# Patient Record
Sex: MALE | Race: WHITE | NOT HISPANIC OR LATINO | Employment: FULL TIME | URBAN - METROPOLITAN AREA
[De-identification: names, ages, dates, MRNs, and addresses within clinical notes are randomized per-mention and may not be internally consistent; named-entity substitution may affect disease eponyms.]

---

## 2017-03-23 ENCOUNTER — HOSPITAL ENCOUNTER (OUTPATIENT)
Dept: LAB | Facility: MEDICAL CENTER | Age: 57
End: 2017-03-23
Attending: INTERNAL MEDICINE
Payer: COMMERCIAL

## 2017-03-23 LAB
ALBUMIN SERPL BCP-MCNC: 4.4 G/DL (ref 3.2–4.9)
ALBUMIN/GLOB SERPL: 1.7 G/DL
ALP SERPL-CCNC: 63 U/L (ref 30–99)
ALT SERPL-CCNC: 35 U/L (ref 2–50)
ANION GAP SERPL CALC-SCNC: 8 MMOL/L (ref 0–11.9)
AST SERPL-CCNC: 24 U/L (ref 12–45)
BILIRUB SERPL-MCNC: 1.1 MG/DL (ref 0.1–1.5)
BUN SERPL-MCNC: 9 MG/DL (ref 8–22)
CALCIUM SERPL-MCNC: 9.9 MG/DL (ref 8.5–10.5)
CHLORIDE SERPL-SCNC: 101 MMOL/L (ref 96–112)
CHOLEST SERPL-MCNC: 127 MG/DL (ref 100–199)
CO2 SERPL-SCNC: 30 MMOL/L (ref 20–33)
CREAT SERPL-MCNC: 0.87 MG/DL (ref 0.5–1.4)
EST. AVERAGE GLUCOSE BLD GHB EST-MCNC: 160 MG/DL
GLOBULIN SER CALC-MCNC: 2.6 G/DL (ref 1.9–3.5)
GLUCOSE SERPL-MCNC: 120 MG/DL (ref 65–99)
HBA1C MFR BLD: 7.2 % (ref 0–5.6)
HDLC SERPL-MCNC: 32 MG/DL
LDLC SERPL CALC-MCNC: 70 MG/DL
POTASSIUM SERPL-SCNC: 3.8 MMOL/L (ref 3.6–5.5)
PROT SERPL-MCNC: 7 G/DL (ref 6–8.2)
SODIUM SERPL-SCNC: 139 MMOL/L (ref 135–145)
T4 FREE SERPL-MCNC: 1.14 NG/DL (ref 0.53–1.43)
TRIGL SERPL-MCNC: 127 MG/DL (ref 0–149)
TSH SERPL DL<=0.005 MIU/L-ACNC: 2.08 UIU/ML (ref 0.3–3.7)

## 2017-03-23 PROCEDURE — 36415 COLL VENOUS BLD VENIPUNCTURE: CPT

## 2017-03-23 PROCEDURE — 86337 INSULIN ANTIBODIES: CPT

## 2017-03-23 PROCEDURE — 80053 COMPREHEN METABOLIC PANEL: CPT

## 2017-03-23 PROCEDURE — 83516 IMMUNOASSAY NONANTIBODY: CPT

## 2017-03-23 PROCEDURE — 80061 LIPID PANEL: CPT

## 2017-03-23 PROCEDURE — 84681 ASSAY OF C-PEPTIDE: CPT

## 2017-03-23 PROCEDURE — 83036 HEMOGLOBIN GLYCOSYLATED A1C: CPT

## 2017-03-23 PROCEDURE — 84443 ASSAY THYROID STIM HORMONE: CPT

## 2017-03-23 PROCEDURE — 84439 ASSAY OF FREE THYROXINE: CPT

## 2017-03-25 LAB — C PEPTIDE SERPL-MCNC: 2.8 NG/ML (ref 0.8–3.5)

## 2017-03-26 LAB — GAD65 AB SER IA-ACNC: <5 IU/ML (ref 0–5)

## 2017-03-28 LAB — INSULIN HUMAN AB SER-ACNC: 8.3 U/ML (ref 0–0.4)

## 2017-05-01 ENCOUNTER — APPOINTMENT (OUTPATIENT)
Dept: RADIOLOGY | Facility: MEDICAL CENTER | Age: 57
DRG: 390 | End: 2017-05-01
Attending: HOSPITALIST
Payer: COMMERCIAL

## 2017-05-01 ENCOUNTER — APPOINTMENT (OUTPATIENT)
Dept: RADIOLOGY | Facility: MEDICAL CENTER | Age: 57
DRG: 390 | End: 2017-05-01
Attending: EMERGENCY MEDICINE
Payer: COMMERCIAL

## 2017-05-01 ENCOUNTER — RESOLUTE PROFESSIONAL BILLING HOSPITAL PROF FEE (OUTPATIENT)
Dept: HOSPITALIST | Facility: MEDICAL CENTER | Age: 57
End: 2017-05-01
Payer: COMMERCIAL

## 2017-05-01 ENCOUNTER — HOSPITAL ENCOUNTER (INPATIENT)
Facility: MEDICAL CENTER | Age: 57
LOS: 1 days | DRG: 390 | End: 2017-05-02
Attending: EMERGENCY MEDICINE | Admitting: HOSPITALIST
Payer: COMMERCIAL

## 2017-05-01 PROBLEM — I10 HTN (HYPERTENSION): Status: ACTIVE | Noted: 2017-05-01

## 2017-05-01 PROBLEM — K52.9 ENTERITIS: Status: ACTIVE | Noted: 2017-05-01

## 2017-05-01 PROBLEM — K56.7 ILEUS (HCC): Status: ACTIVE | Noted: 2017-05-01

## 2017-05-01 PROBLEM — D75.1 ERYTHROCYTOSIS: Status: ACTIVE | Noted: 2017-05-01

## 2017-05-01 PROBLEM — G47.33 OSA ON CPAP: Status: ACTIVE | Noted: 2017-05-01

## 2017-05-01 LAB
ALBUMIN SERPL BCP-MCNC: 4.4 G/DL (ref 3.2–4.9)
ALBUMIN/GLOB SERPL: 1.9 G/DL
ALP SERPL-CCNC: 61 U/L (ref 30–99)
ALT SERPL-CCNC: 21 U/L (ref 2–50)
ANION GAP SERPL CALC-SCNC: 12 MMOL/L (ref 0–11.9)
APPEARANCE UR: CLEAR
AST SERPL-CCNC: 15 U/L (ref 12–45)
BASOPHILS # BLD AUTO: 0.5 % (ref 0–1.8)
BASOPHILS # BLD: 0.07 K/UL (ref 0–0.12)
BILIRUB SERPL-MCNC: 0.9 MG/DL (ref 0.1–1.5)
BILIRUB UR QL STRIP.AUTO: NEGATIVE
BUN SERPL-MCNC: 9 MG/DL (ref 8–22)
CALCIUM SERPL-MCNC: 9.5 MG/DL (ref 8.5–10.5)
CHLORIDE SERPL-SCNC: 104 MMOL/L (ref 96–112)
CO2 SERPL-SCNC: 22 MMOL/L (ref 20–33)
COLOR UR: ABNORMAL
CREAT SERPL-MCNC: 0.8 MG/DL (ref 0.5–1.4)
CULTURE IF INDICATED INDCX: NO UA CULTURE
EOSINOPHIL # BLD AUTO: 0.45 K/UL (ref 0–0.51)
EOSINOPHIL NFR BLD: 3.4 % (ref 0–6.9)
ERYTHROCYTE [DISTWIDTH] IN BLOOD BY AUTOMATED COUNT: 39.5 FL (ref 35.9–50)
GFR SERPL CREATININE-BSD FRML MDRD: >60 ML/MIN/1.73 M 2
GLOBULIN SER CALC-MCNC: 2.3 G/DL (ref 1.9–3.5)
GLUCOSE BLD-MCNC: 138 MG/DL (ref 65–99)
GLUCOSE BLD-MCNC: 140 MG/DL (ref 65–99)
GLUCOSE BLD-MCNC: 90 MG/DL (ref 65–99)
GLUCOSE BLD-MCNC: 91 MG/DL (ref 65–99)
GLUCOSE SERPL-MCNC: 164 MG/DL (ref 65–99)
GLUCOSE UR STRIP.AUTO-MCNC: >1000 MG/DL
HCT VFR BLD AUTO: 50.7 % (ref 42–52)
HGB BLD-MCNC: 17.7 G/DL (ref 14–18)
IMM GRANULOCYTES # BLD AUTO: 0.03 K/UL (ref 0–0.11)
IMM GRANULOCYTES NFR BLD AUTO: 0.2 % (ref 0–0.9)
KETONES UR STRIP.AUTO-MCNC: 20 MG/DL
LEUKOCYTE ESTERASE UR QL STRIP.AUTO: NEGATIVE
LIPASE SERPL-CCNC: 42 U/L (ref 11–82)
LYMPHOCYTES # BLD AUTO: 1.47 K/UL (ref 1–4.8)
LYMPHOCYTES NFR BLD: 11.1 % (ref 22–41)
MCH RBC QN AUTO: 28.9 PG (ref 27–33)
MCHC RBC AUTO-ENTMCNC: 34.9 G/DL (ref 33.7–35.3)
MCV RBC AUTO: 82.8 FL (ref 81.4–97.8)
MICRO URNS: ABNORMAL
MONOCYTES # BLD AUTO: 1.03 K/UL (ref 0–0.85)
MONOCYTES NFR BLD AUTO: 7.8 % (ref 0–13.4)
NEUTROPHILS # BLD AUTO: 10.23 K/UL (ref 1.82–7.42)
NEUTROPHILS NFR BLD: 77 % (ref 44–72)
NITRITE UR QL STRIP.AUTO: NEGATIVE
NRBC # BLD AUTO: 0 K/UL
NRBC BLD AUTO-RTO: 0 /100 WBC
PH UR STRIP.AUTO: 6.5 [PH]
PLATELET # BLD AUTO: 310 K/UL (ref 164–446)
PMV BLD AUTO: 10.1 FL (ref 9–12.9)
POTASSIUM SERPL-SCNC: 3.8 MMOL/L (ref 3.6–5.5)
PROT SERPL-MCNC: 6.7 G/DL (ref 6–8.2)
PROT UR QL STRIP: NEGATIVE MG/DL
RBC # BLD AUTO: 6.12 M/UL (ref 4.7–6.1)
RBC UR QL AUTO: NEGATIVE
SODIUM SERPL-SCNC: 138 MMOL/L (ref 135–145)
SP GR UR STRIP.AUTO: >1.035
WBC # BLD AUTO: 13.3 K/UL (ref 4.8–10.8)

## 2017-05-01 PROCEDURE — 96376 TX/PRO/DX INJ SAME DRUG ADON: CPT

## 2017-05-01 PROCEDURE — A9270 NON-COVERED ITEM OR SERVICE: HCPCS | Performed by: HOSPITALIST

## 2017-05-01 PROCEDURE — 99223 1ST HOSP IP/OBS HIGH 75: CPT | Performed by: HOSPITALIST

## 2017-05-01 PROCEDURE — 770006 HCHG ROOM/CARE - MED/SURG/GYN SEMI*

## 2017-05-01 PROCEDURE — 700102 HCHG RX REV CODE 250 W/ 637 OVERRIDE(OP): Performed by: HOSPITALIST

## 2017-05-01 PROCEDURE — 700117 HCHG RX CONTRAST REV CODE 255: Performed by: EMERGENCY MEDICINE

## 2017-05-01 PROCEDURE — 80053 COMPREHEN METABOLIC PANEL: CPT

## 2017-05-01 PROCEDURE — 700105 HCHG RX REV CODE 258: Performed by: HOSPITALIST

## 2017-05-01 PROCEDURE — 74177 CT ABD & PELVIS W/CONTRAST: CPT

## 2017-05-01 PROCEDURE — 83690 ASSAY OF LIPASE: CPT

## 2017-05-01 PROCEDURE — A9270 NON-COVERED ITEM OR SERVICE: HCPCS | Performed by: PHARMACIST

## 2017-05-01 PROCEDURE — 85025 COMPLETE CBC W/AUTO DIFF WBC: CPT

## 2017-05-01 PROCEDURE — 74000 DX-ABDOMEN-1 VIEW: CPT

## 2017-05-01 PROCEDURE — 700111 HCHG RX REV CODE 636 W/ 250 OVERRIDE (IP): Performed by: HOSPITALIST

## 2017-05-01 PROCEDURE — 700111 HCHG RX REV CODE 636 W/ 250 OVERRIDE (IP): Performed by: EMERGENCY MEDICINE

## 2017-05-01 PROCEDURE — 96361 HYDRATE IV INFUSION ADD-ON: CPT

## 2017-05-01 PROCEDURE — 82962 GLUCOSE BLOOD TEST: CPT | Mod: 91

## 2017-05-01 PROCEDURE — 700105 HCHG RX REV CODE 258: Performed by: EMERGENCY MEDICINE

## 2017-05-01 PROCEDURE — 96375 TX/PRO/DX INJ NEW DRUG ADDON: CPT

## 2017-05-01 PROCEDURE — 81003 URINALYSIS AUTO W/O SCOPE: CPT

## 2017-05-01 PROCEDURE — 99285 EMERGENCY DEPT VISIT HI MDM: CPT

## 2017-05-01 PROCEDURE — 96374 THER/PROPH/DIAG INJ IV PUSH: CPT

## 2017-05-01 PROCEDURE — 700111 HCHG RX REV CODE 636 W/ 250 OVERRIDE (IP): Performed by: NURSE PRACTITIONER

## 2017-05-01 PROCEDURE — 304561 HCHG STAT O2

## 2017-05-01 PROCEDURE — 700102 HCHG RX REV CODE 250 W/ 637 OVERRIDE(OP): Performed by: PHARMACIST

## 2017-05-01 RX ORDER — OXYCODONE HYDROCHLORIDE 5 MG/1
5 TABLET ORAL ONCE
Status: COMPLETED | OUTPATIENT
Start: 2017-05-01 | End: 2017-05-01

## 2017-05-01 RX ORDER — ATORVASTATIN CALCIUM 40 MG/1
80 TABLET, FILM COATED ORAL NIGHTLY
Status: DISCONTINUED | OUTPATIENT
Start: 2017-05-01 | End: 2017-05-02 | Stop reason: HOSPADM

## 2017-05-01 RX ORDER — OXYCODONE HYDROCHLORIDE 10 MG/1
10 TABLET ORAL EVERY 4 HOURS PRN
Status: DISCONTINUED | OUTPATIENT
Start: 2017-05-01 | End: 2017-05-02 | Stop reason: HOSPADM

## 2017-05-01 RX ORDER — AMOXICILLIN 250 MG
2 CAPSULE ORAL 2 TIMES DAILY
Status: DISCONTINUED | OUTPATIENT
Start: 2017-05-01 | End: 2017-05-01

## 2017-05-01 RX ORDER — POLYETHYLENE GLYCOL 3350 17 G/17G
1 POWDER, FOR SOLUTION ORAL
Status: DISCONTINUED | OUTPATIENT
Start: 2017-05-01 | End: 2017-05-02 | Stop reason: HOSPADM

## 2017-05-01 RX ORDER — BENAZEPRIL HYDROCHLORIDE 20 MG/1
20 TABLET ORAL DAILY
Status: DISCONTINUED | OUTPATIENT
Start: 2017-05-01 | End: 2017-05-02 | Stop reason: HOSPADM

## 2017-05-01 RX ORDER — OXYCODONE HYDROCHLORIDE 5 MG/1
5 TABLET ORAL EVERY 4 HOURS PRN
Status: DISCONTINUED | OUTPATIENT
Start: 2017-05-01 | End: 2017-05-02 | Stop reason: HOSPADM

## 2017-05-01 RX ORDER — MORPHINE SULFATE 4 MG/ML
4 INJECTION, SOLUTION INTRAMUSCULAR; INTRAVENOUS ONCE
Status: COMPLETED | OUTPATIENT
Start: 2017-05-01 | End: 2017-05-01

## 2017-05-01 RX ORDER — BISACODYL 10 MG
10 SUPPOSITORY, RECTAL RECTAL
Status: DISCONTINUED | OUTPATIENT
Start: 2017-05-01 | End: 2017-05-02 | Stop reason: HOSPADM

## 2017-05-01 RX ORDER — SODIUM CHLORIDE 9 MG/ML
1000 INJECTION, SOLUTION INTRAVENOUS ONCE
Status: COMPLETED | OUTPATIENT
Start: 2017-05-01 | End: 2017-05-01

## 2017-05-01 RX ORDER — ONDANSETRON 2 MG/ML
4 INJECTION INTRAMUSCULAR; INTRAVENOUS ONCE
Status: COMPLETED | OUTPATIENT
Start: 2017-05-01 | End: 2017-05-01

## 2017-05-01 RX ORDER — LABETALOL HYDROCHLORIDE 5 MG/ML
10 INJECTION, SOLUTION INTRAVENOUS EVERY 4 HOURS PRN
Status: DISCONTINUED | OUTPATIENT
Start: 2017-05-01 | End: 2017-05-02 | Stop reason: HOSPADM

## 2017-05-01 RX ORDER — DEXTROSE MONOHYDRATE 25 G/50ML
25 INJECTION, SOLUTION INTRAVENOUS
Status: DISCONTINUED | OUTPATIENT
Start: 2017-05-01 | End: 2017-05-02 | Stop reason: HOSPADM

## 2017-05-01 RX ORDER — PROMETHAZINE HYDROCHLORIDE 25 MG/1
12.5-25 TABLET ORAL EVERY 4 HOURS PRN
Status: DISCONTINUED | OUTPATIENT
Start: 2017-05-01 | End: 2017-05-02 | Stop reason: HOSPADM

## 2017-05-01 RX ORDER — ONDANSETRON 2 MG/ML
4 INJECTION INTRAMUSCULAR; INTRAVENOUS EVERY 4 HOURS PRN
Status: DISCONTINUED | OUTPATIENT
Start: 2017-05-01 | End: 2017-05-02 | Stop reason: HOSPADM

## 2017-05-01 RX ORDER — DILTIAZEM HYDROCHLORIDE 180 MG/1
180 CAPSULE, COATED, EXTENDED RELEASE ORAL DAILY
Status: DISCONTINUED | OUTPATIENT
Start: 2017-05-01 | End: 2017-05-02 | Stop reason: HOSPADM

## 2017-05-01 RX ORDER — PANTOPRAZOLE SODIUM 40 MG/1
40 TABLET, DELAYED RELEASE ORAL DAILY
Status: DISCONTINUED | OUTPATIENT
Start: 2017-05-01 | End: 2017-05-01

## 2017-05-01 RX ORDER — SIMETHICONE 80 MG
80 TABLET,CHEWABLE ORAL 3 TIMES DAILY PRN
Status: DISCONTINUED | OUTPATIENT
Start: 2017-05-01 | End: 2017-05-02 | Stop reason: HOSPADM

## 2017-05-01 RX ORDER — MORPHINE SULFATE 4 MG/ML
1-2 INJECTION, SOLUTION INTRAMUSCULAR; INTRAVENOUS
Status: DISCONTINUED | OUTPATIENT
Start: 2017-05-01 | End: 2017-05-02 | Stop reason: HOSPADM

## 2017-05-01 RX ORDER — L. ACIDOPHILUS/L.BULGARICUS 100MM CELL
1 GRANULES IN PACKET (EA) ORAL
Status: DISCONTINUED | OUTPATIENT
Start: 2017-05-01 | End: 2017-05-02 | Stop reason: HOSPADM

## 2017-05-01 RX ORDER — OMEPRAZOLE 20 MG/1
20 CAPSULE, DELAYED RELEASE ORAL DAILY
Status: DISCONTINUED | OUTPATIENT
Start: 2017-05-01 | End: 2017-05-02 | Stop reason: HOSPADM

## 2017-05-01 RX ORDER — PROMETHAZINE HYDROCHLORIDE 25 MG/1
12.5-25 SUPPOSITORY RECTAL EVERY 4 HOURS PRN
Status: DISCONTINUED | OUTPATIENT
Start: 2017-05-01 | End: 2017-05-02 | Stop reason: HOSPADM

## 2017-05-01 RX ORDER — SODIUM CHLORIDE 9 MG/ML
INJECTION, SOLUTION INTRAVENOUS CONTINUOUS
Status: DISCONTINUED | OUTPATIENT
Start: 2017-05-01 | End: 2017-05-01

## 2017-05-01 RX ORDER — ONDANSETRON 4 MG/1
4 TABLET, ORALLY DISINTEGRATING ORAL EVERY 4 HOURS PRN
Status: DISCONTINUED | OUTPATIENT
Start: 2017-05-01 | End: 2017-05-02 | Stop reason: HOSPADM

## 2017-05-01 RX ORDER — DEXTROSE AND SODIUM CHLORIDE 5; .9 G/100ML; G/100ML
INJECTION, SOLUTION INTRAVENOUS CONTINUOUS
Status: DISCONTINUED | OUTPATIENT
Start: 2017-05-01 | End: 2017-05-02 | Stop reason: HOSPADM

## 2017-05-01 RX ORDER — ACETAMINOPHEN 325 MG/1
650 TABLET ORAL EVERY 6 HOURS
Status: DISCONTINUED | OUTPATIENT
Start: 2017-05-01 | End: 2017-05-02 | Stop reason: HOSPADM

## 2017-05-01 RX ADMIN — DILTIAZEM HYDROCHLORIDE 180 MG: 180 CAPSULE, EXTENDED RELEASE ORAL at 10:44

## 2017-05-01 RX ADMIN — DEXTROSE AND SODIUM CHLORIDE: 5; 900 INJECTION, SOLUTION INTRAVENOUS at 20:18

## 2017-05-01 RX ADMIN — MORPHINE SULFATE 4 MG: 4 INJECTION INTRAVENOUS at 05:00

## 2017-05-01 RX ADMIN — ACETAMINOPHEN 650 MG: 325 TABLET, FILM COATED ORAL at 18:05

## 2017-05-01 RX ADMIN — STANDARDIZED SENNA CONCENTRATE AND DOCUSATE SODIUM 2 TABLET: 8.6; 5 TABLET, FILM COATED ORAL at 08:55

## 2017-05-01 RX ADMIN — OXYCODONE HYDROCHLORIDE 10 MG: 10 TABLET ORAL at 15:09

## 2017-05-01 RX ADMIN — OXYCODONE HYDROCHLORIDE 5 MG: 5 TABLET ORAL at 08:56

## 2017-05-01 RX ADMIN — SODIUM CHLORIDE: 9 INJECTION, SOLUTION INTRAVENOUS at 18:08

## 2017-05-01 RX ADMIN — ENOXAPARIN SODIUM 40 MG: 100 INJECTION SUBCUTANEOUS at 12:46

## 2017-05-01 RX ADMIN — SODIUM CHLORIDE 1000 ML: 9 INJECTION, SOLUTION INTRAVENOUS at 05:00

## 2017-05-01 RX ADMIN — IOHEXOL 100 ML: 350 INJECTION, SOLUTION INTRAVENOUS at 03:36

## 2017-05-01 RX ADMIN — ONDANSETRON 4 MG: 2 INJECTION INTRAMUSCULAR; INTRAVENOUS at 10:38

## 2017-05-01 RX ADMIN — SODIUM CHLORIDE 1000 ML: 9 INJECTION, SOLUTION INTRAVENOUS at 02:21

## 2017-05-01 RX ADMIN — OMEPRAZOLE 20 MG: 20 CAPSULE, DELAYED RELEASE ORAL at 08:55

## 2017-05-01 RX ADMIN — OXYCODONE HYDROCHLORIDE 5 MG: 5 TABLET ORAL at 10:41

## 2017-05-01 RX ADMIN — ACETAMINOPHEN 650 MG: 325 TABLET, FILM COATED ORAL at 13:07

## 2017-05-01 RX ADMIN — ATORVASTATIN CALCIUM 80 MG: 40 TABLET, FILM COATED ORAL at 20:17

## 2017-05-01 RX ADMIN — SODIUM CHLORIDE: 9 INJECTION, SOLUTION INTRAVENOUS at 08:16

## 2017-05-01 RX ADMIN — ACETAMINOPHEN 650 MG: 325 TABLET, FILM COATED ORAL at 23:26

## 2017-05-01 RX ADMIN — LACTOBACILLUS ACIDOPHILUS / LACTOBACILLUS BULGARICUS 1 PACKET: 100 MILLION CFU STRENGTH GRANULES at 18:05

## 2017-05-01 RX ADMIN — ONDANSETRON 4 MG: 2 INJECTION INTRAMUSCULAR; INTRAVENOUS at 02:21

## 2017-05-01 RX ADMIN — MORPHINE SULFATE 4 MG: 4 INJECTION INTRAVENOUS at 02:21

## 2017-05-01 RX ADMIN — ASPIRIN 81 MG: 81 TABLET ORAL at 08:56

## 2017-05-01 RX ADMIN — BENAZEPRIL HYDROCHLORIDE 20 MG: 20 TABLET, COATED ORAL at 08:57

## 2017-05-01 ASSESSMENT — ENCOUNTER SYMPTOMS
SHORTNESS OF BREATH: 0
ABDOMINAL PAIN: 0
DIARRHEA: 0
CONSTIPATION: 0
VOMITING: 0
NAUSEA: 0

## 2017-05-01 ASSESSMENT — PAIN SCALES - GENERAL
PAINLEVEL_OUTOF10: 2
PAINLEVEL_OUTOF10: 4
PAINLEVEL_OUTOF10: 1
PAINLEVEL_OUTOF10: 2
PAINLEVEL_OUTOF10: 2
PAINLEVEL_OUTOF10: 6
PAINLEVEL_OUTOF10: 9

## 2017-05-01 ASSESSMENT — LIFESTYLE VARIABLES
ON A TYPICAL DAY WHEN YOU DRINK ALCOHOL HOW MANY DRINKS DO YOU HAVE: 3
EVER FELT BAD OR GUILTY ABOUT YOUR DRINKING: NO
TOTAL SCORE: 0
AVERAGE NUMBER OF DAYS PER WEEK YOU HAVE A DRINK CONTAINING ALCOHOL: 0
ALCOHOL_USE: YES
HOW MANY TIMES IN THE PAST YEAR HAVE YOU HAD 5 OR MORE DRINKS IN A DAY: 0
HAVE YOU EVER FELT YOU SHOULD CUT DOWN ON YOUR DRINKING: NO
HAVE PEOPLE ANNOYED YOU BY CRITICIZING YOUR DRINKING: NO
TOTAL SCORE: 0
EVER HAD A DRINK FIRST THING IN THE MORNING TO STEADY YOUR NERVES TO GET RID OF A HANGOVER: NO
CONSUMPTION TOTAL: NEGATIVE
EVER_SMOKED: YES
TOTAL SCORE: 0

## 2017-05-01 NOTE — ED PROVIDER NOTES
"ED Provider Note    Scribed for Yenny Lowery M.D. by Ida Gonzalez. 5/1/2017, 2:08 AM.    Primary care provider: Corwin Rios M.D.  Means of arrival: walk in   History obtained from: Patient  History limited by: none    CHIEF COMPLAINT  Chief Complaint   Patient presents with   • Abdominal Pain     HPI  Barron Hewitt is a 56 y.o. male who presents to the Emergency Department for abdominal pain onset 14 hours prior to examination.  He notes that his abdominal pain onset as \"gurgles\". The patient notes that he has had two episodes of diarrhea and two episodes of emesis as well.  The patient states that the pain now feels sharp and is 7/10 severity at this time. He notes that he is no longer passing gas but he is still burping. The patient denies any nausea, fevers or chills. The patient had a hiatal hernia repaired 4 years ago but denies any other prior abdominal surgeries.     REVIEW OF SYSTEMS  See HPI for further details. All other systems are negative. C    PAST MEDICAL HISTORY   has a past medical history of Hyperlipidemia; Hypertension; ASTHMA; Allergy; Anesthesia; Snoring; Sleep apnea; Myocardial bridge; CHEST PAIN (6/15/2011); Abnormal nuclear cardiac imaging test (1/31/2014); Chest pain at rest (1/31/2014); Coronary-myocardial bridge (11/28/2012); and Diabetes (2009).    SURGICAL HISTORY   has past surgical history that includes sinusotomies (1990's); knee arthroscopy (1990's); tumor excision with biopsy (1990's); shoulder arthroscopy (1990's); ventral hernia repair laparoscopic (11/1/2012); and recovery (4/8/2016).    SOCIAL HISTORY  Social History   Substance Use Topics   • Smoking status: Current Every Day Smoker     Types: Cigars   • Smokeless tobacco: Never Used   • Alcohol Use: Yes      Comment: 1-2/month      History   Drug Use No     Comment: occ cigar smoking     FAMILY HISTORY  Family History   Problem Relation Age of Onset   • Heart Disease     • Hypertension     • " Cancer       CURRENT MEDICATIONS  No current facility-administered medications on file prior to encounter.     Current Outpatient Prescriptions on File Prior to Encounter   Medication Sig Dispense Refill   • diltiazem CD (CARDIZEM CD) 180 MG CAPSULE SR 24 HR Take 1 Cap by mouth every day. 90 Cap 3   • benazepril (LOTENSIN) 20 MG Tab Take 1 Tab by mouth every day. 90 Tab 3   • VENTOLIN  (90 BASE) MCG/ACT Aero Soln inhalation aerosol INHALE 2 PUFFS BY MOUTH EVERY 4 HOURS AS NEEDED FOR SHORTNESS OF BREATH 1 Inhaler 5   • zolpidem (AMBIEN) 5 MG Tab TAKE ONE TABLET BY MOUTH AT BEDTIME AS NEEDED FOR  INSOMNIA 30 Tab 5   • Exenatide ER (BYDUREON) 2 MG SUSR Inject  as instructed. weekly     • Canagliflozin (INVOKANA) 300 MG TABS Take  by mouth every day.     • metformin (GLUCOPHAGE) 500 MG TABS Take 500 mg by mouth 2 times a day, with meals. 1 tablet am 2 tablets pm     • atorvastatin (LIPITOR) 80 MG tablet Take 80 mg by mouth every evening.     • insulin glargine (LANTUS) 100 UNIT/ML SOLN Inject  as instructed every evening. 14 units daily     • ALPHA LIPOIC ACID PO Take 600 mg by mouth 2 Times a Day.     • Omega-3 Fatty Acids (FISH OIL) 1200 MG CAPS Take  by mouth.     • Cinnamon 500 MG CAPS Take 1,000 mg by mouth.     • aspirin EC (ECOTRIN) 81 MG TBEC Take 81 mg by mouth every day.       • fluticasone-salmeterol (ADVAIR DISKUS) 500-50 MCG/DOSE AEPB Inhale 1 Puff by mouth every 12 hours.       • Albuterol (VENTOLIN INH) Inhale  by mouth as needed.       • pantoprazole (PROTONIX) 40 MG TBEC Take 40 mg by mouth every day.       • Coenzyme Q10 (CO Q-10) 200 MG CAPS Take  by mouth every day.       • Cholecalciferol (VITAMIN D) 2000 UNIT TABS Take  by mouth 2 Times a Day.     • cetirizine (ZYRTEC) 10 MG TABS Take 10 mg by mouth every day.     Reviewed. See Encounter Summary.     ALLERGIES  Allergies   Allergen Reactions   • Kiwi Extract Anaphylaxis   • Shellfish Allergy Anaphylaxis   • Strawberry Anaphylaxis   • Sulfa  "Drugs Rash     PHYSICAL EXAM  VITAL SIGNS: /101 mmHg  Pulse 90  Temp(Src) 36.3 °C (97.4 °F)  Resp 20  Ht 1.753 m (5' 9\")  Wt 89.4 kg (197 lb 1.5 oz)  BMI 29.09 kg/m2  SpO2 98%   Pulse ox interpretation: I interpret this pulse ox as normal.  Constitutional: Alert in no apparent distress.  HENT: No signs of trauma, Bilateral external ears normal, Nose normal.   Eyes: Pupils are equal and reactive, Conjunctiva normal, Non-icteric.   Neck: Normal range of motion, No tenderness, Supple, No stridor.   Lymphatic: No lymphadenopathy noted.   Cardiovascular: Regular rate and rhythm, no murmurs.   Thorax & Lungs: Normal breath sounds, No respiratory distress, No wheezing, No chest tenderness.   Abdomen: Bowel sounds hypoactive, Soft, diffuse tenderness, right lower quadrant with some guarding, no rebound. No masses, No pulsatile masses. No peritoneal signs.  Skin: Warm, Dry, No erythema, No rash.   Back: No bony tenderness, No CVA tenderness.   Extremities: Intact distal pulses, No edema, No tenderness, No cyanosis,  Negative Lesley's sign.   Musculoskeletal: Good range of motion in all major joints. No tenderness to palpation or major deformities noted.   Neurologic: Alert , Normal motor function, Normal sensory function, No focal deficits noted.   Psychiatric: Affect normal, Judgment normal, Mood normal.     DIFFERENTIAL DIAGNOSIS AND WORK UP PLAN    2:08 AM Patient seen and examined at bedside. The patient presents with abdominal pain and the differential diagnosis includes but is not limited to bowel obstruction, viral gastritis, food poisoning, appendicitis, colitis, pancreatitis, dehydration, electrolyte abnormality. Ordered for CT abdomen pelvis, CBC with differential, CMP, lipase, urinalysis and culture if indicated,estimated GFR to evaluate. Patient will be treated with Zofran 4mg IV, Morphine 4mg IV, NS infusion 1000mL for his symptoms.     DIAGNOSTIC STUDIES / PROCEDURES     LABS   white blood count 13 " "with a left shift CMP with elevated glucose of 154 otherwise CBC and CMP lipase within normal limits    All labs were reviewed by me.    RADIOLOGY  CT-ABDOMEN-PELVIS WITH   Final Result      1.  Findings suggestive of early or low grade obstruction in the mid to distal small bowel. Enteritis with associated ileus could also give a similar appearance.   2.  LEFT calyceal stone                 The radiologist's interpretation of all radiological studies have been reviewed by me.    COURSE & MEDICAL DECISION MAKING  Pertinent Labs & Imaging studies reviewed. (See chart for details)    4:24 AM - Re-examined; The patient is resting in bed and is still very uncomfortable. The  I discussed his above findings and plans for admission for further monitoring.  The patient verbalized understanding.     4:24 AM Paged Hospitalist     4:34 AM Spoke with Dr. Del Rio, Hospitalist, about the patient's condition. She will admit the patient, care is transferred at this time. \    Patient present to the ED with acute onset diarrhea and nausea vomiting with imaging likely consistent with an enteritis and associated ileus based on his history however could be a low-grade early small bowel obstruction. The patient is nothing by mouth will be given further IV fluids and pain management admitted to the hospital for further evaluation. I'll not consult surgery at this time as it is likely more of an ileus and he is not completely obstructed nothing by mouth and bowel rest should solve the problem.    /101 mmHg  Pulse 90  Temp(Src) 36.3 °C (97.4 °F)  Resp 20  Ht 1.753 m (5' 9\")  Wt 89.4 kg (197 lb 1.5 oz)  BMI 29.09 kg/m2  SpO2 96%      DISPOSITION:  [facility].     FINAL IMPRESSION  1. Enteritis  2. Ileus early SBO     I, Ida Gonazlez (Scribe), am scribing for, and in the presence of, Yenny Lowery M.D..    Electronically signed by: Ida Gonzalez " (Scribe), 5/1/2017    Yenny APODACA M.D. personally performed the services described in this documentation, as scribed by Ida Gonzalez in my presence, and it is both accurate and complete.    The note accurately reflects work and decisions made by me.  Yenny Lowery  5/1/2017  5:26 AM    This dictation has been created using voice recognition software and/or scribes. The accuracy of the dictation is limited by the abilities of the software and the expertise of the scribes. I expect there may be some errors of grammar and possibly content. I made every attempt to manually correct the errors within my dictation. However, errors related to voice recognition software and/or scribes may still exist and should be interpreted within the appropriate context.

## 2017-05-01 NOTE — PROGRESS NOTES
AO x 4, ambulated form gurney to bed with steady gait.  NPO, sips with meds per order.  Medicated for 6, aching intermittent, abdomen pain. +BM yesterday afternoon, loose, diarrhea per patient report. No emesis reported.  Admit profile completed, but awaiting name of diabetic med from patient's wife once she arrives. Plan of care discussed, questions answered, verbalized understanding.

## 2017-05-01 NOTE — H&P
CHIEF COMPLAINT:  Abdominal pain.    PRIMARY MEDICAL PHYSICIAN:  Corwin Rios MD    HISTORY OF PRESENT ILLNESS:  This is a 56-year-old gentleman with a known   history of diabetes mellitus, hypertension, hyperlipidemia, and obstructive   sleep apnea who comes in with complaints of half a day of abdominal pain.  The   patient states that his pain began yesterday after [employer].  He had had a   biscuit with gravy at a [employer], but no one else to his knowledge has been ill.    Around 4:00 p.m., the patient felt his stomach was gurgling and that he had   increased belching.  He initially thought it was a bad reaction to foods, but   then his pain worsened.  It was described as diffuse, sharp and 7/10 in   intensity.  Associated with this, he had 2 episodes of diarrhea and then 2   episodes of vomiting; however, he did not improve.  He also admits that his   vomiting was partially self-induced because of feeling so poorly.  He denies   any new medications.  No over-the-counter medications other than aspirin and   no herbal medications.  He has had chills, but no chest pain, shortness of   breath, or diarrhea.  His last formed normal bowel movement was yesterday   morning.  His last flatus was around 7-8 p.m. last night.  He does carry a   history of abdominal surgery and had a ventral hernia repair 4 years ago.    REVIEW OF SYSTEMS:  A full review of systems was completed and all pertinent   positives and negatives are included in the HPI above.    PAST MEDICAL HISTORY:  1.  Hypertension.  2.  Hyperlipidemia.  3.  Diabetes mellitus.  4.  ISELA.  5.  Myocardial bridge.    PAST SURGICAL HISTORY:  1.  Sinus surgery.  2.  Right knee scope.  3.  Right hand tumor removal.  4.  Right shoulder scope.  5.  Hernia repair.    SOCIAL HISTORY:  Patient smokes cigars, he has rare alcoholic drinks.  He   denies any illicit drugs.    FAMILY HISTORY:  Positive for both heart disease and hypertension.    ALLERGIES:  KIWI, SHELLFISH,  STRAWBERRIES AND SULFA.    HOME MEDICATIONS:  1.  Diltiazem 180 mg p.o. daily.  2.  Benazepril 20 mg p.o. daily.  3.  Ventolin 2 puffs q.4 hours p.r.n. shortness of breath.  4.  Ambien 5 mg p.o. at bedtime p.r.n. insomnia.  5.  Exenatide 2 mg injected weekly.  6.  Invokana 300 mg p.o. daily.  7.  Metformin 500 mg p.o. b.i.d.  8.  Lipitor 80 mg p.o. daily.  9.  Lantus 14 units subQ daily.  10.  Alpha lipoic acid.  11.  Omega-3 fatty acid.  12.  Cinnamon.  13.  Aspirin 81 mg p.o. daily.  14.  Advair 1 puff q.12 hours.  15.  Pantoprazole 40 mg p.o. daily.  16.  Coenzyme Q.  17.  Vitamin D.  18.  Zyrtec over-the-counter as needed.    PHYSICAL EXAMINATION:  VITAL SIGNS:  Temperature 97.4, heart rate 90, respirations 20, blood pressure   140/101.  Patient is saturating at 98% on room air.  GENERAL:  This is a well-appearing middle-aged white male who is in no acute   distress.  HEENT:  Normocephalic, atraumatic.  EOMI, moist mucous membranes.  NECK:  Supple, there is no JVD.  There is no supraclavicular adenopathy.  CARDIOVASCULAR:  Positive S1, S2, regular rate and rhythm.  No murmurs, rubs,   or gallops appreciated.  PULMONARY:  Clear to auscultation bilaterally.  No wheezes, rubs, or rhonchi   heard.  ABDOMEN:  Soft, diffusely tender, hypoactive to no bowel sounds.  No   hepatosplenomegaly appreciated; however, exam is limited by pain.  EXTREMITIES:  Warm, well-perfused.  No clubbing, cyanosis, or edema.    Capillary refill less than 3 seconds.  Distal pulses are intact.  NEUROLOGIC:  A and O x3.  Cranial nerves II-XII grossly intact.    STUDIES:  WBC 13.3, hemoglobin 17.7, hematocrit 50.7, platelets 310.  Sodium   138, potassium 3.8, chloride 104, CO2 22, glucose 164, anion gap 12, BUN 9,   creatinine 0.8, GFR greater than 60.    CT abdomen and pelvis:  Findings suggestive of early or low-grade obstruction   in the mid to distal small bowel, enteritis with associated ileus could also   give a similar appearance, left  calyceal stone.    ASSESSMENT AND PLAN:  This is a 56-year-old gentleman who comes in with   complaints of abdominal pain.  Upon assessment in the ER, he is noted to have   CT evidence of either an early small-bowel obstruction or ileus with   enteritis.  1.  Early obstruction versus ileus with enteritis:  The patient is not   nauseated and has not been vomiting here in the ER.  He does not need an NG   tube at this point, but we will have low tolerance for inserting one if his   symptoms worsen.  I will keep him n.p.o. for now for bowel rest and start him   on IV fluid resuscitation.  He will have symptomatic management for his pain   and nausea.  I will obtain a serial abdominal x-ray tomorrow to see if there   has been any improvement.  If he has significant improvement overnight, then   we will consider advancing his diet.  2.  Metabolic acidosis:  Secondary to above, the patient will be receiving IV   fluids and we will trend his chemistries.  3.  Leukocytosis:  Again, suspect reactive from above, continue to treat as   planned.  No clear indication of infection at this point and we will hold off   on antibiotic therapy.  4.  Hyperlipidemia.  Continue home aspirin and Lipitor.  5.  Hypertension.  Continue home Lotensin and diltiazem.  6.  Diabetes mellitus.  Holding home Lantus and oral hypoglycemics for now as   he will be n.p.o.  He will be covered with insulin sliding scale and   Accu-Cheks.    DISPOSITION:  Surgical floor.    PROPHYLAXIS:  Sequential compression devices for DVT prophylaxis, home PPI   ordered, stool softeners ordered.    CODE:  Full.       ____________________________________     RAMIREZ LIEBERMAN MD    DTC / NTS    DD:  05/01/2017 05:32:15  DT:  05/01/2017 05:52:39    D#:  [id number]  Job#:  [id number]

## 2017-05-01 NOTE — IP AVS SNAPSHOT
" Home Care Instructions                                                                                                                  Name:Barron Hewitt  Medical Record Number:[id number]  CSN: [id number]    YOB: 1960   Age: 56 y.o.  Sex: male  HT:1.753 m (5' 9\") WT: 89.4 kg (197 lb 1.5 oz)          Admit Date: 5/1/2017     Discharge Date:   Today's Date: 5/2/2017  Attending Doctor:  Chey Roberts M.D.                  Allergies:  Kiwi extract; Shellfish allergy; Strawberry; and Sulfa drugs            Discharge Instructions       Discharge Instructions    Discharged to home by car with relative. Discharged via wheelchair, hospital escort: Yes.  Special equipment needed: Not Applicable    Be sure to schedule a follow-up appointment with your primary care doctor or any specialists as instructed.     Discharge Plan:   Diet Plan: Discussed  Activity Level: Discussed  Confirmed Follow up Appointment: Patient to Call and Schedule Appointment  Confirmed Symptoms Management: Discussed  Medication Reconciliation Updated: Yes  Influenza Vaccine Indication: Indicated: Not available from distributor/    I understand that a diet low in cholesterol, fat, and sodium is recommended for good health. Unless I have been given specific instructions below for another diet, I accept this instruction as my diet prescription.   Other diet: Advance diet slowly and as tolerated    Special Instructions: None    · Is patient discharged on Warfarin / Coumadin?   No     · Is patient Post Blood Transfusion?  No    Depression / Suicide Risk    As you are discharged from this [facility] facility, it is important to learn how to keep safe from harming yourself.    Recognize the warning signs:  · Abrupt changes in personality, positive or negative- including increase in energy   · Giving away possessions  · Change in eating patterns- significant weight changes-  positive or negative  · Change in sleeping patterns- " unable to sleep or sleeping all the time   · Unwillingness or inability to communicate  · Depression  · Unusual sadness, discouragement and loneliness  · Talk of wanting to die  · Neglect of personal appearance   · Rebelliousness- reckless behavior  · Withdrawal from people/activities they love  · Confusion- inability to concentrate     If you or a loved one observes any of these behaviors or has concerns about self-harm, here's what you can do:  · Talk about it- your feelings and reasons for harming yourself  · Remove any means that you might use to hurt yourself (examples: pills, rope, extension cords, firearm)  · Get professional help from the community (Mental Health, Substance Abuse, psychological counseling)  · Do not be alone:Call your Safe Contact- someone whom you trust who will be there for you.  · Call your local CRISIS HOTLINE 234-4009 or 464-988-2930  · Call your local Children's Mobile Crisis Response Team [place] (209) 501-7975 or www.Flamsred  · Call the toll free National Suicide Prevention Hotlines   · National Suicide Prevention Lifeline 320-275-ROIG (0272)  · National Hope Line Network 800-SUICIDE (149-0621)        Small Bowel Obstruction  A small bowel obstruction is a blockage (obstruction) of the small intestine (small bowel). The small bowel is a long, slender tube that connects the stomach to the colon. Its job is to absorb nutrients from the fluids and foods you consume into the bloodstream.   CAUSES   There are many causes of intestinal blockage. The most common ones include:  · Hernias. This is a more common cause in children than adults.  · Inflammatory bowel disease (enteritis and colitis).  · Twisting of the bowel (volvulus).  · Tumors.  · Scar tissue (adhesions) from previous surgery or radiation treatment.  · Recent surgery. This may cause an acute small bowel obstruction called an ileus.  SYMPTOMS   2. Abdominal pain. This may be dull cramps or sharp pain. It may occur  in one area or may be present in the entire abdomen. Pain can range from mild to severe, depending on the degree of obstruction.  3. Nausea and vomiting. Vomit may be greenish or yellow bile color.  4. Distended or swollen stomach. Abdominal bloating is a common symptom.  5. Constipation.  6. Lack of passing gas.  7. Frequent belching.  8. Diarrhea. This may occur if runny stool is able to leak around the obstruction.  DIAGNOSIS   Your caregiver can usually diagnose small bowel obstruction by taking a history, doing a physical exam, and taking X-rays. If the cause is unclear, a CT scan (computerized tomography) of your abdomen and pelvis may be needed.  TREATMENT   Treatment of the blockage depends on the cause and how bad the problem is.   2. Sometimes, the obstruction improves with bed rest and intravenous (IV) fluids.  3. Resting the bowel is very important. This means following a simple diet. Sometimes, a clear liquid diet may be required for several days.  4. Sometimes, a small tube (nasogastric tube) is placed into the stomach to decompress the bowel. When the bowel is blocked, it usually swells up like a balloon filled with air and fluids. Decompression means that the air and fluids are removed by suction through that tube. This can help with pain, discomfort, and nausea. It can also help the obstruction resolve faster.  5. Surgery may be required if other treatments do not work. Bowel obstruction from a hernia may require early surgery and can be an emergency procedure. Adhesions that cause frequent or severe obstructions may also require surgery.  HOME CARE INSTRUCTIONS  If your bowel obstruction is only partial or incomplete, you may be allowed to go home.  2. Get plenty of rest.  3. Follow your diet as directed by your caregiver.  4. Only consume clear liquids until your condition improves.  5. Avoid solid foods as instructed.  SEEK IMMEDIATE MEDICAL CARE IF:  2. You have increased pain or  cramping.  3. You vomit blood.  4. You have uncontrolled vomiting or nausea.  5. You cannot drink fluids due to vomiting or pain.  6. You develop confusion.  7. You begin feeling very dry or thirsty (dehydrated).  8. You have severe bloating.  9. You have chills.  10. You have a fever.  11. You feel extremely weak or you faint.  MAKE SURE YOU:  · Understand these instructions.  · Will watch your condition.  · Will get help right away if you are not doing well or get worse.     This information is not intended to replace advice given to you by your health care provider. Make sure you discuss any questions you have with your health care provider.     Document Released: 03/06/2007 Document Revised: 03/11/2013 Document Reviewed: 02/11/2016  [employer] Interactive Patient Education ©2016 [employer].      Follow-up Information     1. Follow up with Corwin Rios M.D..    Specialty:  Family Medicine    Why:  As needed    Contact information    [address] [id number] [phone]           Discharge Medication Instructions:    Below are the medications your physician expects you to take upon discharge:    Review all your home medications and newly ordered medications with your doctor and/or pharmacist. Follow medication instructions as directed by your doctor and/or pharmacist.    Please keep your medication list with you and share with your physician.               Medication List      CONTINUE taking these medications        Instructions    Morning Afternoon Evening Bedtime    ADVAIR DISKUS 500-50 MCG/DOSE Aepb   Generic drug:  fluticasone-salmeterol        Inhale 1 Puff by mouth every 12 hours.   Dose:  1 Puff                        ALPHA LIPOIC ACID PO        Take 600 mg by mouth 2 Times a Day.   Dose:  600 mg                        aspirin EC 81 MG Tbec   Last time this was given:  81 mg on 5/2/2017  9:01 AM   Commonly known as:  ECOTRIN        Take 81 mg by mouth every day.   Dose:  81 mg                         benazepril 20 MG Tabs   Last time this was given:  20 mg on 5/2/2017  9:03 AM   Commonly known as:  LOTENSIN        Take 1 Tab by mouth every day.   Dose:  20 mg                        Cinnamon 500 MG Caps        Take 1,000 mg by mouth.   Dose:  1000 mg                        Coenzyme Q10 200 MG Caps        Take  by mouth every day.                        diltiazem  MG Cp24   Last time this was given:  180 mg on 5/2/2017  9:01 AM   Commonly known as:  CARDIZEM CD        Take 1 Cap by mouth every day.   Dose:  180 mg                        Fish Oil 1200 MG Caps        Take  by mouth.                        insulin glargine 100 UNIT/ML Soln   Commonly known as:  LANTUS        Inject  as instructed every evening. 14 units daily                        INVOKANA 300 MG Tabs   Generic drug:  Canagliflozin        Take  by mouth every day.                        LIPITOR 80 MG tablet   Last time this was given:  80 mg on 5/1/2017  8:17 PM   Generic drug:  atorvastatin        Take 80 mg by mouth every evening.   Dose:  80 mg                        metformin 500 MG Tabs   Commonly known as:  GLUCOPHAGE        Take 500 mg by mouth 2 times a day, with meals. 2 tablet am 1 tablets pm   Dose:  500 mg                        pantoprazole 40 MG Tbec   Commonly known as:  PROTONIX        Take 40 mg by mouth every day.   Dose:  40 mg                        VENTOLIN  (90 BASE) MCG/ACT Aers inhalation aerosol   Last time this was given:  2 Puffs on 5/2/2017 10:00 AM   Generic drug:  albuterol        INHALE 2 PUFFS BY MOUTH EVERY 4 HOURS AS NEEDED FOR SHORTNESS OF BREATH                        VENTOLIN INH        Inhale  by mouth as needed.                        VICTOZA 18 MG/3ML Sopn injection   Generic drug:  liraglutide        Inject 1.8 mg as instructed every day.   Dose:  1.8 mg                        vitamin D 2000 UNIT Tabs        Take  by mouth 2 Times a Day.                        zolpidem 5 MG Tabs    Commonly known as:  AMBIEN        TAKE ONE TABLET BY MOUTH AT BEDTIME AS NEEDED FOR  INSOMNIA                        ZYRTEC 10 MG Tabs   Generic drug:  cetirizine        Take 10 mg by mouth every day.   Dose:  10 mg                                Instructions           Diet / Nutrition:    Follow any diet instructions given to you by your doctor or the dietician, including how much salt (sodium) you are allowed each day.    If you are overweight, talk to your doctor about a weight reduction plan.    Activity:    Remain physically active following your doctor's instructions about exercise and activity.    Rest often.     Any time you become even a little tired or short of breath, SIT DOWN and rest.    Worsening Symptoms:    Report any of the following signs and symptoms to the doctor's office immediately:    *Pain of jaw, arm, or neck  *Chest pain not relieved by medication                               *Dizziness or loss of consciousness  *Difficulty breathing even when at rest   *More tired than usual                                       *Bleeding drainage or swelling of surgical site  *Swelling of feet, ankles, legs or stomach                 *Fever (>100ºF)  *Pink or blood tinged sputum  *Weight gain (3lbs/day or 5lbs /week)           *Shock from internal defibrillator (if applicable)  *Palpitations or irregular heartbeats                *Cool and/or numb extremities    Stroke Awareness    Common Risk Factors for Stroke include:    Age  Atrial Fibrillation  Carotid Artery Stenosis  Diabetes Mellitus  Excessive alcohol consumption  High blood pressure  Overweight   Physical inactivity  Smoking    Warning signs and symptoms of a stroke include:    *Sudden numbness or weakness of the face, arm or leg (especially on one side of the body).  *Sudden confusion, trouble speaking or understanding.  *Sudden trouble seeing in one or both eyes.  *Sudden trouble walking, dizziness, loss of balance or coordination.Sudden  severe headache with no known cause.    It is very important to get treatment quickly when a stroke occurs. If you experience any of the above warning signs, call 911 immediately.                   Disclaimer         Quit Smoking / Tobacco Use:    I understand the use of any tobacco products increases my chance of suffering from future heart disease or stroke and could cause other illnesses which may shorten my life. Quitting the use of tobacco products is the single most important thing I can do to improve my health. For further information on smoking / tobacco cessation call a Toll Free Quit Line at 1-278.641.3517 (*National [employer] [place]) or 1-730.507.4522 (American Lung Association) or you can access the web based program at www.lungusa.org.    [place] Tobacco Users Help Line:  (431) 677-5967       Toll Free: 1-229.307.9715  Quit Tobacco Program [facility] ([phone]    Crisis Hotline:    [place] Crisis Hotline:  7-242-JHVPCVO or 1-648.562.3450    [place] Crisis Hotline:    1-670.310.6810 or 751-357-9790    Discharge Survey:   Thank you for choosing [facility]. We hope we did everything we could to make your hospital stay a pleasant one. You may be receiving a phone survey and we would appreciate your time and participation in answering the questions. Your input is very valuable to us in our efforts to improve our service to our patients and their families.        My signature on this form indicates that:    1. I have reviewed and understand the above information.  2. My questions regarding this information have been answered to my satisfaction.  3. I have formulated a plan with my discharge nurse to obtain my prescribed medications for home.                  Disclaimer         __________________________________                     __________       ________                       Patient Signature                                                 Date                    Time

## 2017-05-01 NOTE — IP AVS SNAPSHOT
[employer] Access Code: Activation code not generated  Current [employer] Status: Active    [employer]  A secure, online tool to manage your health information     [employer]’s [employer]® is a secure, online tool that connects you to your personalized health information from the privacy of your home -- day or night - making it very easy for you to manage your healthcare. Once the activation process is completed, you can even access your medical information using the [employer] bharati, which is available for free in the Apple Bharati store or Google Play store.     [employer] provides the following levels of access (as shown below):   My Chart Features   [facility] Primary Care Doctor [facility]  Specialists [facility]  Urgent  Care Non-[facility]  Primary Care  Doctor   Email your healthcare team securely and privately 24/7 X X X X   Manage appointments: schedule your next appointment; view details of past/upcoming appointments X      Request prescription refills. X      View recent personal medical records, including lab and immunizations X X X X   View health record, including health history, allergies, medications X X X X   Read reports about your outpatient visits, procedures, consult and ER notes X X X X   See your discharge summary, which is a recap of your hospital and/or ER visit that includes your diagnosis, lab results, and care plan. X X       How to register for [employer]:  1. Go to  [email or web address].  2. Click on the Sign Up Now box, which takes you to the New Member Sign Up page. You will need to provide the following information:  a. Enter your [employer] Access Code exactly as it appears at the top of this page. (You will not need to use this code after you’ve completed the sign-up process. If you do not sign up before the expiration date, you must request a new code.)   b. Enter your date of birth.   c. Enter your home email address.   d. Click Submit, and follow the next screen’s instructions.  3. Create a [employer] ID. This will  be your [employer] login ID and cannot be changed, so think of one that is secure and easy to remember.  4. Create a [employer] password. You can change your password at any time.  5. Enter your Password Reset Question and Answer. This can be used at a later time if you forget your password.   6. Enter your e-mail address. This allows you to receive e-mail notifications when new information is available in [employer].  7. Click Sign Up. You can now view your health information.    For assistance activating your [employer] account, call [phone]

## 2017-05-01 NOTE — PROGRESS NOTES
Hospital Medicine Progress Note, Adult, Complex               Author: Uri Cochran Date & Time created: 5/1/2017  4:32 PM     Interval History:  Admitted for abd pain c ?early SBO vs ileus vs enteritis.  Pt is feeling much better.  He had a BM yesterday.  He thinks the pain may be from gas.  Family at bedside.    Review of Systems:  Review of Systems   Respiratory: Negative for shortness of breath.    Cardiovascular: Negative for chest pain.   Gastrointestinal: Negative for nausea, vomiting, abdominal pain, diarrhea and constipation.   All other systems reviewed and are negative.      Physical Exam:  Physical Exam  Nursing note and vitals reviewed.  Constitutional: He is oriented to person, place, and time. He appears well-developed and well-nourished overweight.   HENT:   Head: Normocephalic and atraumatic.   Right Ear: External ear normal.   Left Ear: External ear normal.   Nose: Nose normal.   Mouth/Throat: Oropharynx is small with Mallanpati score of 3.  Mucosa is clear and moist.   Eyes: Conjunctivae and extraocular motions are normal. Pupils are equal, round, and reactive to light.   Neck: Normal range of motion. Neck supple.   Cardiovascular: Normal rate, regular rhythm, normal heart sounds and intact distal pulses.    Pulmonary/Chest: Effort normal and breath sounds normal.   Abdominal: Soft. Bowel sounds are normal.  No pain.  Musculoskeletal: Normal range of motion.   Neurological: He is alert and oriented to person, place, and time.   Skin: Skin is warm and dry.       Labs:        Invalid input(s): [id number]      Recent Labs      05/01/17   0155   SODIUM  138   POTASSIUM  3.8   CHLORIDE  104   CO2  22   BUN  9   CREATININE  0.80   CALCIUM  9.5     Recent Labs      05/01/17   0155   ALTSGPT  21   ASTSGOT  15   ALKPHOSPHAT  61   TBILIRUBIN  0.9   LIPASE  42   GLUCOSE  164*     Recent Labs      05/01/17   0155   RBC  6.12*   HEMOGLOBIN  17.7   HEMATOCRIT  50.7   PLATELETCT  310     Recent Labs       17   0155   WBC  13.3*   NEUTSPOLYS  77.00*   LYMPHOCYTES  11.10*   MONOCYTES  7.80   EOSINOPHILS  3.40   BASOPHILS  0.50   ASTSGOT  15   ALTSGPT  21   ALKPHOSPHAT  61   TBILIRUBIN  0.9           Hemodynamics:  Temp (24hrs), Av.2 °C (97.1 °F), Min:36.1 °C (96.9 °F), Max:36.3 °C (97.4 °F)  Temperature: 36.1 °C (96.9 °F)  Pulse  Av.8  Min: 80  Max: 95   Blood Pressure: 107/70 mmHg     Respiratory:    Respiration: 18, Pulse Oximetry: 90 %           Fluids:  No intake or output data in the 24 hours ending 17 1632  Weight: 89.4 kg (197 lb 1.5 oz)  GI/Nutrition:  Orders Placed This Encounter   Procedures   • Diet NPO     Standing Status: Standing      Number of Occurrences: 1      Standing Expiration Date:      Order Specific Question:  Restrict to:     Answer:  Sips with Medications [3]      Comments:  ice chips ok     Medical Decision Making, by Problem:  Active Hospital Problems    Diagnosis   • Ileus (CMS-HCC) [K56.7] vs early SBO vs enteritis - cont PPI and start Lactinex.  Check AXR tomorrow.   Abd pain - improving.  Start simethicone.  Erythrocytosis - mildly above normal.  Follow for now.  Tobacco - cessation ed.  DM 2 - home regimen and cover c ISS.  L-nephrolithiasis - non-obstructive.  Outpatient follow up.  ISELA - CPAP per home setting.  HTN - follow c pain management.  Stable issues - med hx (myocardial bridge, HLD),  Preventives - IS, Vax, stool soft, DVTP.  Dispo - complex/guarded.      Medications reviewed, Radiology images reviewed and Labs reviewed  Mckeon catheter: No Mckeon      DVT Prophylaxis: Enoxaparin (Lovenox)    Ulcer prophylaxis: Yes    Assessed for rehab: Patient returned to prior level of function, rehabilitation not indicated at this time

## 2017-05-01 NOTE — ED NOTES
"Pt ambulatory to triage c/o \"sharp\" diffuse abd pain since 1700 yesterday. + n/v x2/ d x2. Pt reports pain is worse upper to mid abd. Educated on triage process, encouraged to inform staff of any changes.   "

## 2017-05-01 NOTE — IP AVS SNAPSHOT
5/2/2017    Barron Hewitt  [address] [place] [address]    Dear Barron:    [facility] wants to ensure your discharge home is safe and you or your loved ones have had all of your questions answered regarding your care after you leave the hospital.    Below is a list of resources and contact information should you have any questions regarding your hospital stay, follow-up instructions, or active medical symptoms.    Questions or Concerns Regarding… Contact   Medical Questions Related to Your Discharge  (7 days a week, 8am-5pm) Contact a Nurse Care Coordinator   [phone]   Medical Questions Not Related to Your Discharge  (24 hours a day / 7 days a week)  Contact the Nurse Health Line   [phone]    Medications or Discharge Instructions Refer to your discharge packet   or contact your [facility] Primary Care Provider   [phone]   Follow-up Appointment(s) Schedule your appointment via Sky[employer]rase   or contact Scheduling [phone]   Billing Review your statement via [employer]  or contact Billing [phone]   Medical Records Review your records via [employer]   or contact Medical Records [phone]     You may receive a telephone call within two days of discharge. This call is to make certain you understand your discharge instructions and have the opportunity to have any questions answered. You can also easily access your medical information, test results and upcoming appointments via the SkyPhrase free online health management tool. You can learn more and sign up at [employer]Sky[employer]rase. For assistance setting up your SkyPhrase account, please call [phone].    Once again, we want to ensure your discharge home is safe and that you have a clear understanding of any next steps in your care. If you have any questions or concerns, please do not hesitate to contact us, we are here for you. Thank you for choosing [facility] for your healthcare needs.    Sincerely,    Your [facility] Healthcare Team

## 2017-05-01 NOTE — IP AVS SNAPSHOT
" <p align=\"LEFT\"><IMG SRC=\"//[email or web address]$/[email or web address]\" alt=\"Image\" WIDTH=\"50%\" HEIGHT=\"200\" BORDER=\"\"></p>                   Name:Barron Hewitt  Medical Record Number:[id number]  CSN: [id number]    YOB: 1960   Age: 56 y.o.  Sex: male  HT:1.753 m (5' 9\") WT: 89.4 kg (197 lb 1.5 oz)          Admit Date: 5/1/2017     Discharge Date:   Today's Date: 5/2/2017  Attending Doctor:  Chey Roberts M.D.                  Allergies:  Kiwi extract; Shellfish allergy; Strawberry; and Sulfa drugs          Follow-up Information     1. Follow up with Corwin Rios M.D..    Specialty:  Family Medicine    Why:  As needed    Contact information    [facility] [address] [address]  [phone]           Medication List      Take these Medications        Instructions    ADVAIR DISKUS 500-50 MCG/DOSE Aepb   Generic drug:  fluticasone-salmeterol    Inhale 1 Puff by mouth every 12 hours.   Dose:  1 Puff       ALPHA LIPOIC ACID PO    Take 600 mg by mouth 2 Times a Day.   Dose:  600 mg       aspirin EC 81 MG Tbec   Commonly known as:  ECOTRIN    Take 81 mg by mouth every day.   Dose:  81 mg       benazepril 20 MG Tabs   Commonly known as:  LOTENSIN    Take 1 Tab by mouth every day.   Dose:  20 mg       Cinnamon 500 MG Caps    Take 1,000 mg by mouth.   Dose:  1000 mg       Coenzyme Q10 200 MG Caps    Take  by mouth every day.       diltiazem  MG Cp24   Commonly known as:  CARDIZEM CD    Take 1 Cap by mouth every day.   Dose:  180 mg       Fish Oil 1200 MG Caps    Take  by mouth.       insulin glargine 100 UNIT/ML Soln   Commonly known as:  LANTUS    Inject  as instructed every evening. 14 units daily       INVOKANA 300 MG Tabs   Generic drug:  Canagliflozin    Take  by mouth every day.       LIPITOR 80 MG tablet   Generic drug:  atorvastatin    Take 80 mg by mouth every evening.   Dose:  80 mg       metformin 500 MG Tabs   Commonly known as:  GLUCOPHAGE    Take 500 mg by mouth 2 times a day, with meals. 2 " tablet am 1 tablets pm   Dose:  500 mg       pantoprazole 40 MG Tbec   Commonly known as:  PROTONIX    Take 40 mg by mouth every day.   Dose:  40 mg       VENTOLIN  (90 BASE) MCG/ACT Aers inhalation aerosol   Generic drug:  albuterol    INHALE 2 PUFFS BY MOUTH EVERY 4 HOURS AS NEEDED FOR SHORTNESS OF BREATH       VENTOLIN INH    Inhale  by mouth as needed.       VICTOZA 18 MG/3ML Sopn injection   Generic drug:  liraglutide    Inject 1.8 mg as instructed every day.   Dose:  1.8 mg       vitamin D 2000 UNIT Tabs    Take  by mouth 2 Times a Day.       zolpidem 5 MG Tabs   Commonly known as:  AMBIEN    TAKE ONE TABLET BY MOUTH AT BEDTIME AS NEEDED FOR  INSOMNIA       ZYRTEC 10 MG Tabs   Generic drug:  cetirizine    Take 10 mg by mouth every day.   Dose:  10 mg

## 2017-05-02 ENCOUNTER — APPOINTMENT (OUTPATIENT)
Dept: RADIOLOGY | Facility: MEDICAL CENTER | Age: 57
DRG: 390 | End: 2017-05-02
Attending: HOSPITALIST
Payer: COMMERCIAL

## 2017-05-02 VITALS
BODY MASS INDEX: 29.19 KG/M2 | TEMPERATURE: 98.2 F | HEART RATE: 78 BPM | HEIGHT: 69 IN | DIASTOLIC BLOOD PRESSURE: 76 MMHG | RESPIRATION RATE: 18 BRPM | WEIGHT: 197.09 LBS | OXYGEN SATURATION: 96 % | SYSTOLIC BLOOD PRESSURE: 110 MMHG

## 2017-05-02 LAB
ANION GAP SERPL CALC-SCNC: 7 MMOL/L (ref 0–11.9)
BUN SERPL-MCNC: 8 MG/DL (ref 8–22)
CALCIUM SERPL-MCNC: 8.2 MG/DL (ref 8.5–10.5)
CHLORIDE SERPL-SCNC: 105 MMOL/L (ref 96–112)
CO2 SERPL-SCNC: 26 MMOL/L (ref 20–33)
CREAT SERPL-MCNC: 0.77 MG/DL (ref 0.5–1.4)
ERYTHROCYTE [DISTWIDTH] IN BLOOD BY AUTOMATED COUNT: 41.5 FL (ref 35.9–50)
GFR SERPL CREATININE-BSD FRML MDRD: >60 ML/MIN/1.73 M 2
GLUCOSE BLD-MCNC: 105 MG/DL (ref 65–99)
GLUCOSE BLD-MCNC: 112 MG/DL (ref 65–99)
GLUCOSE BLD-MCNC: 126 MG/DL (ref 65–99)
GLUCOSE SERPL-MCNC: 127 MG/DL (ref 65–99)
HCT VFR BLD AUTO: 45.3 % (ref 42–52)
HGB BLD-MCNC: 15 G/DL (ref 14–18)
MCH RBC QN AUTO: 28.6 PG (ref 27–33)
MCHC RBC AUTO-ENTMCNC: 33.1 G/DL (ref 33.7–35.3)
MCV RBC AUTO: 86.3 FL (ref 81.4–97.8)
PLATELET # BLD AUTO: 246 K/UL (ref 164–446)
PMV BLD AUTO: 10 FL (ref 9–12.9)
POTASSIUM SERPL-SCNC: 3.7 MMOL/L (ref 3.6–5.5)
RBC # BLD AUTO: 5.25 M/UL (ref 4.7–6.1)
SODIUM SERPL-SCNC: 138 MMOL/L (ref 135–145)
WBC # BLD AUTO: 7.9 K/UL (ref 4.8–10.8)

## 2017-05-02 PROCEDURE — 85027 COMPLETE CBC AUTOMATED: CPT

## 2017-05-02 PROCEDURE — A9270 NON-COVERED ITEM OR SERVICE: HCPCS | Performed by: HOSPITALIST

## 2017-05-02 PROCEDURE — 700102 HCHG RX REV CODE 250 W/ 637 OVERRIDE(OP): Performed by: NURSE PRACTITIONER

## 2017-05-02 PROCEDURE — 99239 HOSP IP/OBS DSCHRG MGMT >30: CPT | Performed by: HOSPITALIST

## 2017-05-02 PROCEDURE — A9270 NON-COVERED ITEM OR SERVICE: HCPCS | Performed by: PHARMACIST

## 2017-05-02 PROCEDURE — 700111 HCHG RX REV CODE 636 W/ 250 OVERRIDE (IP): Performed by: NURSE PRACTITIONER

## 2017-05-02 PROCEDURE — 700102 HCHG RX REV CODE 250 W/ 637 OVERRIDE(OP): Performed by: HOSPITALIST

## 2017-05-02 PROCEDURE — 700111 HCHG RX REV CODE 636 W/ 250 OVERRIDE (IP): Performed by: HOSPITALIST

## 2017-05-02 PROCEDURE — A9270 NON-COVERED ITEM OR SERVICE: HCPCS | Performed by: NURSE PRACTITIONER

## 2017-05-02 PROCEDURE — 80048 BASIC METABOLIC PNL TOTAL CA: CPT

## 2017-05-02 PROCEDURE — 82962 GLUCOSE BLOOD TEST: CPT

## 2017-05-02 PROCEDURE — 36415 COLL VENOUS BLD VENIPUNCTURE: CPT

## 2017-05-02 PROCEDURE — 74000 DX-ABDOMEN-1 VIEW: CPT

## 2017-05-02 PROCEDURE — 700102 HCHG RX REV CODE 250 W/ 637 OVERRIDE(OP): Performed by: PHARMACIST

## 2017-05-02 RX ORDER — ALBUTEROL SULFATE 90 UG/1
2 AEROSOL, METERED RESPIRATORY (INHALATION)
Status: DISCONTINUED | OUTPATIENT
Start: 2017-05-02 | End: 2017-05-02

## 2017-05-02 RX ORDER — ALBUTEROL SULFATE 90 UG/1
2 AEROSOL, METERED RESPIRATORY (INHALATION) EVERY 4 HOURS PRN
Status: DISCONTINUED | OUTPATIENT
Start: 2017-05-02 | End: 2017-05-02 | Stop reason: HOSPADM

## 2017-05-02 RX ADMIN — ALBUTEROL SULFATE 2 PUFF: 90 AEROSOL, METERED RESPIRATORY (INHALATION) at 10:00

## 2017-05-02 RX ADMIN — DILTIAZEM HYDROCHLORIDE 180 MG: 180 CAPSULE, EXTENDED RELEASE ORAL at 09:01

## 2017-05-02 RX ADMIN — ASPIRIN 81 MG: 81 TABLET ORAL at 09:01

## 2017-05-02 RX ADMIN — ENOXAPARIN SODIUM 40 MG: 100 INJECTION SUBCUTANEOUS at 09:04

## 2017-05-02 RX ADMIN — BENAZEPRIL HYDROCHLORIDE 20 MG: 20 TABLET, COATED ORAL at 09:03

## 2017-05-02 RX ADMIN — ACETAMINOPHEN 650 MG: 325 TABLET, FILM COATED ORAL at 05:27

## 2017-05-02 RX ADMIN — LACTOBACILLUS ACIDOPHILUS / LACTOBACILLUS BULGARICUS 1 PACKET: 100 MILLION CFU STRENGTH GRANULES at 09:02

## 2017-05-02 RX ADMIN — OMEPRAZOLE 20 MG: 20 CAPSULE, DELAYED RELEASE ORAL at 09:01

## 2017-05-02 RX ADMIN — DEXTROSE AND SODIUM CHLORIDE: 5; 900 INJECTION, SOLUTION INTRAVENOUS at 05:27

## 2017-05-02 RX ADMIN — ACETAMINOPHEN 650 MG: 325 TABLET, FILM COATED ORAL at 12:20

## 2017-05-02 ASSESSMENT — COPD QUESTIONNAIRES
DURING THE PAST 4 WEEKS HOW MUCH DID YOU FEEL SHORT OF BREATH: NONE/LITTLE OF THE TIME
COPD SCREENING SCORE: 1
HAVE YOU SMOKED AT LEAST 100 CIGARETTES IN YOUR ENTIRE LIFE: NO/DON'T KNOW
DO YOU EVER COUGH UP ANY MUCUS OR PHLEGM?: NO/ONLY WITH OCCASIONAL COLDS OR INFECTIONS

## 2017-05-02 ASSESSMENT — PAIN SCALES - GENERAL
PAINLEVEL_OUTOF10: 2
PAINLEVEL_OUTOF10: 5

## 2017-05-02 NOTE — PROGRESS NOTES
AO x 4, no c/o abdomen pain.  C/O headache, declined intervention.  No nausea/emesis.  Hyperactive BS and reported a little flatus.  IS in use, achieves 50199.  Up to restroom for shower, steady gait. Plan of care discussed, questions answered, verbalized understanding.

## 2017-05-02 NOTE — PROGRESS NOTES
Discharging Patient home per physician order.  Discharged with spouse.  Demonstrated understanding of discharge instructions, follow up appointments, home medications, prescriptions, home care for surgical wound, and nursing care instructions for bowel obstructions.  Ambulating without assistance, voiding without difficulty, pain well controlled, tolerating oral medications, oxygen saturation greater than 90% , tolerating diet.  No n/v with consuming 50% of clear liquid trays, denies abdomen pain. Educational handouts given and discussed.  Discussed home meds and what time next doses used. Verbalized understanding of discharge instructions and educational handouts.  All questions answered.  Patient able to state several reasons why to seek medical attention, call the Dr. Or return to the ED. Belongings with patient at time of discharge.

## 2017-05-02 NOTE — PROGRESS NOTES
Patient alert and oriented x 4. Patient stated that his pain level is at a 2/10. Stated that Tylenol was adequate for his pain level. Continues NPO diet with ice chips. Bed in lowest position and call light within reach.

## 2017-05-02 NOTE — DISCHARGE INSTRUCTIONS
Discharge Instructions    Discharged to home by car with relative. Discharged via wheelchair, hospital escort: Yes.  Special equipment needed: Not Applicable    Be sure to schedule a follow-up appointment with your primary care doctor or any specialists as instructed.     Discharge Plan:   Diet Plan: Discussed  Activity Level: Discussed  Confirmed Follow up Appointment: Patient to Call and Schedule Appointment  Confirmed Symptoms Management: Discussed  Medication Reconciliation Updated: Yes  Influenza Vaccine Indication: Indicated: Not available from distributor/    I understand that a diet low in cholesterol, fat, and sodium is recommended for good health. Unless I have been given specific instructions below for another diet, I accept this instruction as my diet prescription.   Other diet: Advance diet slowly and as tolerated    Special Instructions: None    · Is patient discharged on Warfarin / Coumadin?   No     · Is patient Post Blood Transfusion?  No    Depression / Suicide Risk    As you are discharged from this [facility] facility, it is important to learn how to keep safe from harming yourself.    Recognize the warning signs:  · Abrupt changes in personality, positive or negative- including increase in energy   · Giving away possessions  · Change in eating patterns- significant weight changes-  positive or negative  · Change in sleeping patterns- unable to sleep or sleeping all the time   · Unwillingness or inability to communicate  · Depression  · Unusual sadness, discouragement and loneliness  · Talk of wanting to die  · Neglect of personal appearance   · Rebelliousness- reckless behavior  · Withdrawal from people/activities they love  · Confusion- inability to concentrate     If you or a loved one observes any of these behaviors or has concerns about self-harm, here's what you can do:  · Talk about it- your feelings and reasons for harming yourself  · Remove any means that you might use to  hurt yourself (examples: pills, rope, extension cords, firearm)  · Get professional help from the community (Mental Health, Substance Abuse, psychological counseling)  · Do not be alone:Call your Safe Contact- someone whom you trust who will be there for you.  · Call your local CRISIS HOTLINE 320-1961 or 840-563-4348  · Call your local Children's Mobile Crisis Response Team [place] (423) 241-9741 or www.[employer]  · Call the toll free National Suicide Prevention Hotlines   · National Suicide Prevention Lifeline 979-355-HFPO (2505)  · [place] AS Line Network 800-SUICIDE (771-9753)        Small Bowel Obstruction  A small bowel obstruction is a blockage (obstruction) of the small intestine (small bowel). The small bowel is a long, slender tube that connects the stomach to the colon. Its job is to absorb nutrients from the fluids and foods you consume into the bloodstream.   CAUSES   There are many causes of intestinal blockage. The most common ones include:  · Hernias. This is a more common cause in children than adults.  · Inflammatory bowel disease (enteritis and colitis).  · Twisting of the bowel (volvulus).  · Tumors.  · Scar tissue (adhesions) from previous surgery or radiation treatment.  · Recent surgery. This may cause an acute small bowel obstruction called an ileus.  SYMPTOMS   2. Abdominal pain. This may be dull cramps or sharp pain. It may occur in one area or may be present in the entire abdomen. Pain can range from mild to severe, depending on the degree of obstruction.  3. Nausea and vomiting. Vomit may be greenish or yellow bile color.  4. Distended or swollen stomach. Abdominal bloating is a common symptom.  5. Constipation.  6. Lack of passing gas.  7. Frequent belching.  8. Diarrhea. This may occur if runny stool is able to leak around the obstruction.  DIAGNOSIS   Your caregiver can usually diagnose small bowel obstruction by taking a history, doing a physical exam, and taking  X-rays. If the cause is unclear, a CT scan (computerized tomography) of your abdomen and pelvis may be needed.  TREATMENT   Treatment of the blockage depends on the cause and how bad the problem is.   2. Sometimes, the obstruction improves with bed rest and intravenous (IV) fluids.  3. Resting the bowel is very important. This means following a simple diet. Sometimes, a clear liquid diet may be required for several days.  4. Sometimes, a small tube (nasogastric tube) is placed into the stomach to decompress the bowel. When the bowel is blocked, it usually swells up like a balloon filled with air and fluids. Decompression means that the air and fluids are removed by suction through that tube. This can help with pain, discomfort, and nausea. It can also help the obstruction resolve faster.  5. Surgery may be required if other treatments do not work. Bowel obstruction from a hernia may require early surgery and can be an emergency procedure. Adhesions that cause frequent or severe obstructions may also require surgery.  HOME CARE INSTRUCTIONS  If your bowel obstruction is only partial or incomplete, you may be allowed to go home.  2. Get plenty of rest.  3. Follow your diet as directed by your caregiver.  4. Only consume clear liquids until your condition improves.  5. Avoid solid foods as instructed.  SEEK IMMEDIATE MEDICAL CARE IF:  2. You have increased pain or cramping.  3. You vomit blood.  4. You have uncontrolled vomiting or nausea.  5. You cannot drink fluids due to vomiting or pain.  6. You develop confusion.  7. You begin feeling very dry or thirsty (dehydrated).  8. You have severe bloating.  9. You have chills.  10. You have a fever.  11. You feel extremely weak or you faint.  MAKE SURE YOU:  · Understand these instructions.  · Will watch your condition.  · Will get help right away if you are not doing well or get worse.     This information is not intended to replace advice given to you by your health care  provider. Make sure you discuss any questions you have with your health care provider.     Document Released: 03/06/2007 Document Revised: 03/11/2013 Document Reviewed: 02/11/2016  Elsevier Interactive Patient Education ©2016 Elsevier Inc.

## 2017-05-04 NOTE — DISCHARGE SUMMARY
DISCHARGE DIAGNOSES:  Small-bowel obstruction with ____, chronic diagnoses of   hypertension, obstructive sleep apnea, coronary artery disease, diabetes, and   hyperlipidemia.    HOME MEDICATIONS:  Advair Diskus 500/50 one puff b.i.d., alpha lipoic acid 600   mg twice daily, aspirin 81 mg daily, Benazepril 20 mg daily, cinnamon 1000 mg   daily, Coenzyme Q 200 mg daily, diltiazem  mg daily, fish oil 1200 mg   daily, insulin Glargine 14 units daily, Invokana 300 mg daily, Lipitor 80 mg   daily, metformin 1000 mg in a.m. and 500 mg in p.m., pantoprazole 40 mg daily,   Ventolin HFA 1-2 puffs every 4-6 hours as needed for shortness of breath or   wheezing, Victoza 1.8 mg injected daily, vitamin D 2000 units b.i.d., Zolpidem   5 mg at bedtime as needed for insomnia, and Zyrtec 10 mg daily.    CONSULTATIONS:  None.    PROCEDURES:  None.    HOSPITAL COURSE:  The patient is a 56-year-old man admitted with abdominal   pain and diarrhea associated with vomiting.  The pain got severe.  He came in   for evaluation and he was found to have early small-bowel obstruction versus   ileus with enteritis.  He does have a history of a ventral hernia repair in   the past.  Patient had an unexpectedly rapid recovery within 24 hours.  His   bowel sounds have returned.  Bloating and pain had subsided and he was   tolerating a diet without issue.  He had a follow up KUB film of the abdomen   that showed near resolution of the ileus.  He was ambulating in the halls   without difficulty and desired to be discharged home.    DISPOSITION:  Home.    CONDITION:  Good.    Follow up is with primary care provider in the next two weeks.  I recommended   that if he has symptoms, as such, again, he should make himself n.p.o., rest,   and take Xanax as needed.  He agrees with this plan.  If his pain returns, he   is to return to the emergency department.    DIET:  Regular diet.    Total time spent preparing him for discharge is 30 minutes.        ____________________________________     MD DIO STOVALL    DD:  05/03/2017 11:22:04  DT:  05/03/2017 22:36:04    D#:  [id number]  Job#:  [id number]

## 2017-06-27 ENCOUNTER — HOSPITAL ENCOUNTER (OUTPATIENT)
Dept: LAB | Facility: MEDICAL CENTER | Age: 57
End: 2017-06-27
Attending: PHYSICIAN ASSISTANT
Payer: COMMERCIAL

## 2017-06-27 LAB
EST. AVERAGE GLUCOSE BLD GHB EST-MCNC: 157 MG/DL
HBA1C MFR BLD: 7.1 % (ref 0–5.6)
T4 FREE SERPL-MCNC: 1.02 NG/DL (ref 0.53–1.43)
TSH SERPL DL<=0.005 MIU/L-ACNC: 1.62 UIU/ML (ref 0.3–3.7)

## 2017-06-27 PROCEDURE — 36415 COLL VENOUS BLD VENIPUNCTURE: CPT

## 2017-06-27 PROCEDURE — 84443 ASSAY THYROID STIM HORMONE: CPT

## 2017-06-27 PROCEDURE — 82985 ASSAY OF GLYCATED PROTEIN: CPT

## 2017-06-27 PROCEDURE — 84439 ASSAY OF FREE THYROXINE: CPT

## 2017-06-27 PROCEDURE — 83036 HEMOGLOBIN GLYCOSYLATED A1C: CPT

## 2017-06-28 LAB — FRUCTOSAMINE SERPL-SCNC: 266 UMOL/L (ref 170–285)

## 2017-07-20 ENCOUNTER — TELEPHONE (OUTPATIENT)
Dept: SLEEP MEDICINE | Facility: MEDICAL CENTER | Age: 57
End: 2017-07-20

## 2017-07-20 DIAGNOSIS — G47.33 OSA (OBSTRUCTIVE SLEEP APNEA): ICD-10-CM

## 2017-07-20 NOTE — TELEPHONE ENCOUNTER
Pt has FV for next month but wants Rx for supplies renewed to DME:  [facility]  / [facility] [phone] / fax [phone] Please sign if ok.  Also He would like a refill for his Zolpidem sent to [facility] in chart.  Prescribed on 6/30/16  Last seen 12/4/15  Next apt is 8/22/17  Please advise

## 2017-07-21 RX ORDER — ZOLPIDEM TARTRATE 5 MG/1
TABLET ORAL
Qty: 30 TAB | Refills: 0 | Status: SHIPPED
Start: 2017-07-21 | End: 2017-07-27 | Stop reason: SDUPTHER

## 2017-07-21 NOTE — TELEPHONE ENCOUNTER
Received call from [facility] looking for order and they wanted to fax over their copy of an order  I faxed the supply order and most recent notes to [facility]  Updated DME que

## 2017-07-22 NOTE — TELEPHONE ENCOUNTER
RX faxed to   [facility] - [place], [place] - [address]  [address] [facility] [address] [facility] [address]  Phone: [phone] Fax: [phone]      I spoke to the patient advised rx sent ant documentation sent to [facility]. Patient made appt

## 2017-07-27 ENCOUNTER — SLEEP CENTER VISIT (OUTPATIENT)
Dept: SLEEP MEDICINE | Facility: MEDICAL CENTER | Age: 57
End: 2017-07-27
Payer: COMMERCIAL

## 2017-07-27 VITALS
OXYGEN SATURATION: 94 % | HEART RATE: 107 BPM | SYSTOLIC BLOOD PRESSURE: 104 MMHG | DIASTOLIC BLOOD PRESSURE: 68 MMHG | RESPIRATION RATE: 16 BRPM | HEIGHT: 69 IN | WEIGHT: 199 LBS | BODY MASS INDEX: 29.47 KG/M2 | TEMPERATURE: 97.5 F

## 2017-07-27 DIAGNOSIS — G47.33 OSA ON CPAP: ICD-10-CM

## 2017-07-27 DIAGNOSIS — G47.33 OSA (OBSTRUCTIVE SLEEP APNEA): ICD-10-CM

## 2017-07-27 DIAGNOSIS — J45.20 MILD INTERMITTENT ASTHMA WITHOUT COMPLICATION: ICD-10-CM

## 2017-07-27 PROCEDURE — 99213 OFFICE O/P EST LOW 20 MIN: CPT | Performed by: NURSE PRACTITIONER

## 2017-07-27 RX ORDER — ZOLPIDEM TARTRATE 5 MG/1
TABLET ORAL
Qty: 30 TAB | Refills: 3 | Status: SHIPPED | OUTPATIENT
Start: 2017-07-27 | End: 2018-08-17 | Stop reason: SDUPTHER

## 2017-07-27 NOTE — PATIENT INSTRUCTIONS
1. Mask fit today  2. Continue APAP 5-15 centimeters.  3. Clean equipment weekly and replace supplies as allowed by insurance.

## 2017-07-27 NOTE — MR AVS SNAPSHOT
"        Barron Sepulveda Kash   2017 3:20 PM   Sleep Center Visit   MRN: [id number]    Department:  [facility]   Dept Phone:  [phone]    Description:  Male : 1960   Provider:  NIRANJAN Tavarez           Reason for Visit     Apnea LAst seem 2015      Allergies as of 2017     Allergen Noted Reactions    Kiwi Extract 10/30/2012   Anaphylaxis    Shellfish Allergy 10/30/2012   Anaphylaxis    Strawberry 10/30/2012   Anaphylaxis    Sulfa Drugs 2010   Rash      You were diagnosed with     ISELA on CPAP   [092838]       Mild intermittent asthma without complication   [842818]       ISELA (obstructive sleep apnea)   [869054]         Vital Signs     Blood Pressure Pulse Temperature Respirations Height Weight    104/68 mmHg 107 36.4 °C (97.5 °F) 16 1.753 m (5' 9.02\") 90.266 kg (199 lb)    Body Mass Index Oxygen Saturation Smoking Status             29.37 kg/m2 94% Current Every Day Smoker         Basic Information     Date Of Birth Sex Race Ethnicity Preferred Language    1960 Male White Non- English      Problem List              ICD-10-CM Priority Class Noted - Resolved    CHEST PAIN (Chronic)    6/15/2011 - Present    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   2012 - Present    Chest pain at rest R07.9   2014 - Present    Abnormal nuclear cardiac imaging test R93.1   2014 - Present    DM (diabetes mellitus) (CMS-HCC) E11.9   2014 - Present    Hyperlipidemia E78.5   2014 - Present    Hypertension I10   2014 - Present    Angina pectoris (CMS-HCC) I20.9   3/11/2016 - Present    Coronary artery disease, non-occlusive I25.10   4/15/2016 - Present    Ileus (CMS-HCC) K56.7   2017 - Present    Erythrocytosis D75.1   2017 - Present    ISELA on CPAP G47.33, Z99.89   2017 - Present    HTN (hypertension) I10   2017 - Present    Enteritis K52.9   2017 - Present    Mild intermittent asthma without complication J45.20   2017 - Present      "   Health Maintenance        Date Due Completion Dates    IMM HEP B VACCINE (1 of 3 - Primary Series) 1960 ---    DIABETES MONOFILAMENT / LE EXAM 5/3/1959 ---    RETINAL SCREENING 11/3/1976 ---    IMM DTaP/Tdap/Td Vaccine (1 - Tdap) 11/3/1977 ---    IMM PNEUMOCOCCAL 19-64 (ADULT) MEDIUM RISK SERIES (1 of 1 - PPSV23) 11/3/1977 ---    COLONOSCOPY 11/3/2008 ---    URINE ACR / MICROALBUMIN 9/1/2017 9/1/2016, 3/21/2016, 11/11/2014, 11/20/2013, 6/25/2013, 5/10/2013, 9/26/2012    IMM INFLUENZA (1) 9/1/2017 ---    A1C SCREENING 12/27/2017 6/27/2017, 3/23/2017, 12/22/2016, 9/1/2016, 5/18/2015, 11/11/2014, 5/20/2014, 2/14/2014, 11/20/2013, 8/27/2013, 5/10/2013, 2/12/2013, 9/26/2012, 7/17/2012    FASTING LIPID PROFILE 3/23/2018 3/23/2017, 3/21/2016, 5/18/2015, 5/20/2014, 8/27/2013, 8/27/2013, 5/10/2013, 2/12/2013, 9/26/2012, 7/17/2012    SERUM CREATININE 5/2/2018 5/2/2017, 5/1/2017, 3/23/2017, 4/7/2016, 3/21/2016, 5/18/2015, 11/11/2014, 5/20/2014, 2/14/2014, 11/20/2013, 10/21/2013, 8/27/2013, 8/27/2013, 6/25/2013, 2/12/2013, 11/27/2012, 10/30/2012, 9/26/2012, 7/17/2012, 7/10/2008            Current Immunizations     Pneumococcal Vaccine (PCV7) Historical Data 11/1/2015      Below and/or attached are the medications your provider expects you to take. Review all of your home medications and newly ordered medications with your provider and/or pharmacist. Follow medication instructions as directed by your provider and/or pharmacist. Please keep your medication list with you and share with your provider. Update the information when medications are discontinued, doses are changed, or new medications (including over-the-counter products) are added; and carry medication information at all times in the event of emergency situations     Allergies:  KIWI EXTRACT - Anaphylaxis     SHELLFISH ALLERGY - Anaphylaxis     STRAWBERRY - Anaphylaxis     SULFA DRUGS - Rash               Medications  Valid as of: July 27, 2017 -  3:49 PM     Generic Name Brand Name Tablet Size Instructions for use    Albuterol   Inhale  by mouth as needed.          Albuterol Sulfate (Aero Soln) VENTOLIN  (90 BASE) MCG/ACT INHALE 2 PUFFS BY MOUTH EVERY 4 HOURS AS NEEDED FOR SHORTNESS OF BREATH        Alpha-Lipoic Acid   Take 600 mg by mouth 2 Times a Day.        Aspirin (Tablet Delayed Response) ECOTRIN 81 MG Take 81 mg by mouth every day.          Atorvastatin Calcium (Tab) LIPITOR 80 MG Take 80 mg by mouth every evening.        Benazepril HCl (Tab) LOTENSIN 20 MG Take 1 Tab by mouth every day.        Canagliflozin (Tab) Canagliflozin 300 MG Take  by mouth every day.        Cetirizine HCl (Tab) ZYRTEC 10 MG Take 10 mg by mouth every day.        Cholecalciferol (Tab) vitamin D 2000 UNIT Take  by mouth 2 Times a Day.        Cinnamon (Cap) Cinnamon 500 MG Take 1,000 mg by mouth.        Coenzyme Q10 (Cap) Coenzyme Q10 200 MG Take  by mouth every day.          DilTIAZem HCl Coated Beads (CAPSULE SR 24 HR) CARDIZEM  MG Take 1 Cap by mouth every day.        Fluticasone-Salmeterol (AEROSOL POWDER, BREATH ACTIVATED) ADVAIR 500-50 MCG/DOSE Inhale 1 Puff by mouth every 12 hours.          Insulin Glargine (Solution) LANTUS 100 UNIT/ML Inject  as instructed every evening. 14 units daily        Liraglutide (Solution Pen-injector) VICTOZA 18 MG/3ML Inject 1.8 mg as instructed every day.        MetFORMIN HCl (Tab) GLUCOPHAGE 500 MG Take 500 mg by mouth 2 times a day, with meals. 2 tablet am 1 tablets pm        Omega-3 Fatty Acids (Cap) Fish Oil 1200 MG Take  by mouth.        Pantoprazole Sodium (Tablet Delayed Response) PROTONIX 40 MG Take 40 mg by mouth every day.          Zolpidem Tartrate (Tab) AMBIEN 5 MG TAKE ONE TABLET BY MOUTH AT BEDTIME AS NEEDED FOR  INSOMNIA        .                 Medicines prescribed today were sent to:     [facility] [address] [place] [address] - [address] [address] [facility] [address]    Phone: [phone] Fax:  [phone]    Open 24 Hours?: No    [id number] - [place], [place] - [address] [facility]    [address] [facility] ([facility]) [place] [address]    Phone: [phone] Fax: [phone]    Open 24 Hours?: No      Medication refill instructions:       If your prescription bottle indicates you have medication refills left, it is not necessary to call your provider’s office. Please contact your pharmacy and they will refill your medication.    If your prescription bottle indicates you do not have any refills left, you may request refills at any time through one of the following ways: The online YOLLEGE system (except Urgent Care), by calling your provider’s office, or by asking your pharmacy to contact your provider’s office with a refill request. Medication refills are processed only during regular business hours and may not be available until the next business day. Your provider may request additional information or to have a follow-up visit with you prior to refilling your medication.   *Please Note: Medication refills are assigned a new Rx number when refilled electronically. Your pharmacy may indicate that no refills were authorized even though a new prescription for the same medication is available at the pharmacy. Please request the medicine by name with the pharmacy before contacting your provider for a refill.        Instructions    1. Mask fit today  2. Continue APAP 5-15 centimeters.  3. Clean equipment weekly and replace supplies as allowed by insurance.               YOLLEGE Access Code: Activation code not generated  Current YOLLEGE Status: Active          Quit Tobacco Information     Do you want to quit using tobacco?    Quitting tobacco decreases risks of cancer, heart and lung disease, increases life expectancy, improves sense of taste and smell, and increases spending money, among other benefits.    If you are thinking about quitting, we can help.  • [facility] Quit  Tobacco Program: 101.259.1745  o Program occurs weekly for four weeks and includes pharmacist consultation on products to support quitting smoking or chewing tobacco. A provider referral is needed for pharmacist consultation.  • Tobacco Users Help Hotline: 3-003-QUIT-NOW (230-7883) or https://[place].quitlogix.org/  o Free, confidential telephone and online coaching for [place] residents. Sessions are designed on a schedule that is convenient for you. Eligible clients receive free nicotine replacement therapy.  • Nationally: www.smokefree.gov  o Information and professional assistance to support both immediate and long-term needs as you become, and remain, a non-smoker. Smokefree.gov allows you to choose the help that best fits your needs.

## 2017-07-27 NOTE — PROGRESS NOTES
Chief Complaint   Patient presents with   • Apnea     LAst seem 12/2015         HPI:  This is a 56 y.o. male with a history of asthma and obstructive sleep apnea. Pulmonary function tests indicate FEV1 2.64 L, 70% predicted with positive bronchodilator response, FEV1/FVC 71%, and DLCO 155% predicted. The patient is compliant with Advair 500/50 µg twice daily and Ventolin as needed. He reports no significant shortness of breath. He denies fevers, chills, sweats, and hemoptysis. He uses Ventolin minimally.    Polysomnogram indicates AHI 69.6 and minimum saturation 82%. The patient is using AutoPap 5-10 centimeters. Compliance dated 6/28/17-7/27/17 indicates 83% usage for 4 hours 16 minutes. The AHI is been reduced of 0.9. Average pressure is 10 cm. The patient reports only sleeping for 4 hours. He will except and then has it difficult time falling back asleep. He is going to try and use his ramp feature in the middle the night.    Past Medical History   Diagnosis Date   • Hyperlipidemia    • Hypertension    • ASTHMA    • Allergy    • Anesthesia      PONV   • Snoring    • Sleep apnea      has CPAP   • Myocardial bridge      cardiologist, Dr. 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 at [facility]   • Recovery  4/8/2016     Procedure: CATH LAB [facility] WITH POSSIBLE NAVIN;  Surgeon: [facility];  Location: SURGERY PRE-POST [facility] UNIT [facility] [place];  Service:   "      Social History   Substance Use Topics   • Smoking status: Current Every Day Smoker     Types: Cigars   • Smokeless tobacco: Never Used   • Alcohol Use: Yes      Comment: 1-2/month       ROS:   Constitutional: Denies fevers, chills, sweats, fatigue, and weight loss.  Eyes: Denies glasses.  Ears/nose/mouth/throat: Denies injury.  Cardiovascular: Denies chest pain, tightness.  Respiratory: See history of present illness.  GI: Denies heartburn, difficulty swallowing, nausea, and vomiting.  Neurological: Denies frequent headaches, dizziness, weakness.    Vitals:  Filed Vitals:    07/27/17 1453   Height: 1.753 m (5' 9.02\")   Weight: 90.266 kg (199 lb)   Weight % change since last entry.: 0 %   BP: 104/68   Pulse: 107   BMI (Calculated): 29.37   Resp: 16   Temp: 36.4 °C (97.5 °F)   O2 sat % room air: 94 %       Allergies:  Kiwi extract; Shellfish allergy; Strawberry; and Sulfa drugs    Medications:  Current Outpatient Prescriptions   Medication Sig Dispense Refill   • zolpidem (AMBIEN) 5 MG Tab TAKE ONE TABLET BY MOUTH AT BEDTIME AS NEEDED FOR  INSOMNIA 30 Tab 3   • liraglutide (VICTOZA) 18 MG/3ML Solution Pen-injector injection Inject 1.8 mg as instructed every day.     • diltiazem CD (CARDIZEM CD) 180 MG CAPSULE SR 24 HR Take 1 Cap by mouth every day. 90 Cap 3   • benazepril (LOTENSIN) 20 MG Tab Take 1 Tab by mouth every day. 90 Tab 3   • VENTOLIN  (90 BASE) MCG/ACT Aero Soln inhalation aerosol INHALE 2 PUFFS BY MOUTH EVERY 4 HOURS AS NEEDED FOR SHORTNESS OF BREATH 1 Inhaler 5   • Canagliflozin (INVOKANA) 300 MG TABS Take  by mouth every day.     • metformin (GLUCOPHAGE) 500 MG TABS Take 500 mg by mouth 2 times a day, with meals. 2 tablet am 1 tablets pm     • atorvastatin (LIPITOR) 80 MG tablet Take 80 mg by mouth every evening.     • insulin glargine (LANTUS) 100 UNIT/ML SOLN Inject  as instructed every evening. 14 units daily     • ALPHA LIPOIC ACID PO Take 600 mg by mouth 2 Times a Day.     • Omega-3 " Fatty Acids (FISH OIL) 1200 MG CAPS Take  by mouth.     • Cinnamon 500 MG CAPS Take 1,000 mg by mouth.     • aspirin EC (ECOTRIN) 81 MG TBEC Take 81 mg by mouth every day.       • fluticasone-salmeterol (ADVAIR DISKUS) 500-50 MCG/DOSE AEPB Inhale 1 Puff by mouth every 12 hours.       • pantoprazole (PROTONIX) 40 MG TBEC Take 40 mg by mouth every day.       • Coenzyme Q10 (CO Q-10) 200 MG CAPS Take  by mouth every day.       • Cholecalciferol (VITAMIN D) 2000 UNIT TABS Take  by mouth 2 Times a Day.     • cetirizine (ZYRTEC) 10 MG TABS Take 10 mg by mouth every day.     • Albuterol (VENTOLIN INH) Inhale  by mouth as needed.         No current facility-administered medications for this visit.       PHYSICAL EXAM:  Appearance: Well-developed, well-nourished, no acute distress.  Eyes. PERRL.  Hearing: Grossly intact.  Oropharynx: Tongue normal, posterior pharynx without erythema or exudate.  Respiratory effort: No intercostal retractions or use of accessory muscles.  Lung auscultation: No crackles, wheezing.  Heart auscultation: No murmur, gallop, or rub. Regular rate and rhythm.  Extremities: No cyanosis or edema.  Gait and Station: Normal  Orientation: Oriented to time, place, and person.    Assessment:  1. ISELA on CPAP  DME MASK AND SUPPLIES   2. Mild intermittent asthma without complication     3. ISELA (obstructive sleep apnea)  zolpidem (AMBIEN) 5 MG Tab         Plan:  1. Mask fit today  2. Continue APAP 5-15 centimeters.  3. Clean equipment weekly and replace supplies as allowed by insurance.  4. Zolpidem 5 mg #30, 3 refills     Return in about 1 year (around 7/27/2018) for With JAMES Edwards.

## 2017-07-29 DIAGNOSIS — I10 BENIGN ESSENTIAL HTN: ICD-10-CM

## 2017-07-31 RX ORDER — DILTIAZEM HYDROCHLORIDE 180 MG/1
CAPSULE, EXTENDED RELEASE ORAL
Qty: 90 CAP | Refills: 0 | Status: SHIPPED | OUTPATIENT
Start: 2017-07-31 | End: 2017-11-16 | Stop reason: SDUPTHER

## 2017-07-31 RX ORDER — BENAZEPRIL HYDROCHLORIDE 20 MG/1
TABLET ORAL
Qty: 90 TAB | Refills: 0 | Status: SHIPPED | OUTPATIENT
Start: 2017-07-31 | End: 2017-11-16 | Stop reason: SDUPTHER

## 2017-09-11 DIAGNOSIS — J45.909 UNCOMPLICATED ASTHMA: ICD-10-CM

## 2017-09-11 RX ORDER — ALBUTEROL SULFATE 90 UG/1
AEROSOL, METERED RESPIRATORY (INHALATION)
Qty: 18 INHALER | Refills: 5 | OUTPATIENT
Start: 2017-09-11

## 2017-09-11 NOTE — TELEPHONE ENCOUNTER
Have we ever prescribed this med? Yes.  If yes, what date? 07/01/2016    Last OV: 7/27/17-[facility]    Next OV: 1 yr return- no appt on file    DX: Asthma    Medications: Ventolin

## 2017-09-13 RX ORDER — ALBUTEROL SULFATE 90 UG/1
2 AEROSOL, METERED RESPIRATORY (INHALATION) EVERY 4 HOURS PRN
Qty: 1 INHALER | Refills: 5 | Status: SHIPPED | OUTPATIENT
Start: 2017-09-13 | End: 2018-08-17 | Stop reason: SDUPTHER

## 2017-09-15 ENCOUNTER — TELEPHONE (OUTPATIENT)
Dept: PULMONOLOGY | Facility: HOSPICE | Age: 57
End: 2017-09-15

## 2017-09-15 DIAGNOSIS — J45.909 UNCOMPLICATED ASTHMA, UNSPECIFIED ASTHMA SEVERITY: ICD-10-CM

## 2017-09-15 NOTE — TELEPHONE ENCOUNTER
Called rx into the [facility] mail order for 3/3 and spoke w/ Massimo (male pharmacist), pt notified.

## 2017-10-09 ENCOUNTER — OFFICE VISIT (OUTPATIENT)
Dept: CARDIOLOGY | Facility: MEDICAL CENTER | Age: 57
End: 2017-10-09
Payer: COMMERCIAL

## 2017-10-09 VITALS
HEIGHT: 69 IN | DIASTOLIC BLOOD PRESSURE: 72 MMHG | WEIGHT: 198 LBS | HEART RATE: 94 BPM | SYSTOLIC BLOOD PRESSURE: 116 MMHG | OXYGEN SATURATION: 96 % | BODY MASS INDEX: 29.33 KG/M2

## 2017-10-09 DIAGNOSIS — I25.10 CORONARY ARTERY DISEASE, NON-OCCLUSIVE: ICD-10-CM

## 2017-10-09 DIAGNOSIS — I10 ESSENTIAL HYPERTENSION, BENIGN: ICD-10-CM

## 2017-10-09 DIAGNOSIS — E78.5 DYSLIPIDEMIA: ICD-10-CM

## 2017-10-09 DIAGNOSIS — Q24.5 CORONARY-MYOCARDIAL BRIDGE: ICD-10-CM

## 2017-10-09 PROCEDURE — 99214 OFFICE O/P EST MOD 30 MIN: CPT | Performed by: INTERNAL MEDICINE

## 2017-10-09 ASSESSMENT — ENCOUNTER SYMPTOMS
ORTHOPNEA: 0
MYALGIAS: 0
SHORTNESS OF BREATH: 0
PND: 0
PALPITATIONS: 0
DIZZINESS: 0
LOSS OF CONSCIOUSNESS: 0

## 2017-10-09 NOTE — PROGRESS NOTES
Subjective:   Barron Hewitt is a 56 y.o. male who presents today for follow up evaluation of CAD by recent catheterization, hypertension, hyperlipidemia myocardial bridging.    Last seen8/22/2016.    Since 8/22/2016 the patient has had some mild inconsistent intermittent exertional angina.  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    Past medical history  All 4 of his children are engaged to be  within the next year.  Tolerating his medications.    The patient is being seen today after recent cardiac catheterization that was performed because of the patient having chest discomfort and abnormal standard stress test indicating ischemia.  Catheterization showed a 60's 70% diagonal lesion with an FFR of 89.  No new cardiac symptoms.    Has sleep apnea Followed by [facility].  Has not tolerated CPAP.  Does not get restful sleep and has a lot of fatigue.  Interested in exercising.  Walk regularly this past year up to 4 miles a day but not in the past several months.  Has been seen by Dr. Grover, orthopedic surgeon for his knee problems.    Has had previous plans to initiate a more aerobic cardiovascular exercise program with stationary bicycling.  In both quadriceps which has limited his exercise choices.    Diabetes mellitus is improved.    Past Medical History  In 2012 he had an abdominal hernia repair without complications.  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 Valverde   • Sleep apnea     has  CPAP   • Snoring      Past Surgical History:   Procedure Laterality Date   • KNEE ARTHROSCOPY  1990's    right   • RECOVERY  4/8/2016    Procedure: CATH LAB [facility] WITH POSSIBLE NAVIN;  Surgeon: [facility];  Location[place] PRE[place] [facility] [place];  Service:    • SHOULDER ARTHROSCOPY  1990's    right   • SINUSOTOMIES  1990's    times two surgeries   • TUMOR EXCISION WITH BIOPSY  1990's    right hand   • VENTRAL HERNIA REPAIR LAPAROSCOPIC  11/1/2012    Performed by Marcos Ramírez M.D. at [facility]     Family History   Problem Relation Age of Onset   • Heart Disease     • Hypertension     • Cancer     • Breast Cancer Mother    • Hypertension Mother    • Hypertension Father    • No Known Problems Sister    • Arthritis Brother    • No Known Problems Brother      History   Smoking Status   • Current Every Day Smoker   • Types: Cigars   Smokeless Tobacco   • Never Used     Allergies   Allergen Reactions   • Kiwi Extract Anaphylaxis   • Shellfish Allergy Anaphylaxis   • Strawberry Anaphylaxis   • Sulfa Drugs Rash     Outpatient Encounter Prescriptions as of 10/9/2017   Medication Sig Dispense Refill   • albuterol (VENTOLIN HFA) 108 (90 Base) MCG/ACT Aero Soln inhalation aerosol Inhale 2 Puffs by mouth every four hours as needed. FOR SHORTNESS OF BREATH 1 Inhaler 5   • CARTIA  MG CAPSULE SR 24 HR TAKE 1 CAPSULE BY MOUTH DAILY 90 Cap 0   • benazepril (LOTENSIN) 20 MG Tab TAKE 1 TABLET BY MOUTH ONE TIME A DAY 90 Tab 0   • zolpidem (AMBIEN) 5 MG Tab TAKE ONE TABLET BY MOUTH AT BEDTIME AS NEEDED FOR  INSOMNIA 30 Tab 3   • liraglutide (VICTOZA) 18 MG/3ML Solution Pen-injector injection Inject 1.8 mg as instructed every day.     • Canagliflozin (INVOKANA) 300 MG TABS Take  by mouth every day.     • metformin (GLUCOPHAGE) 500 MG TABS Take 1,000 mg by mouth 2 times a day, with meals. 2 tablet am 1 tablets pm     • atorvastatin (LIPITOR) 80 MG tablet Take 80 mg by mouth every evening.     • insulin  "glargine (LANTUS) 100 UNIT/ML SOLN Inject  as instructed every evening. 14 units daily     • ALPHA LIPOIC ACID PO Take 600 mg by mouth 2 Times a Day.     • Omega-3 Fatty Acids (FISH OIL) 1200 MG CAPS Take  by mouth.     • Cinnamon 500 MG CAPS Take 1,000 mg by mouth.     • aspirin EC (ECOTRIN) 81 MG TBEC Take 81 mg by mouth every day.       • fluticasone-salmeterol (ADVAIR DISKUS) 500-50 MCG/DOSE AEPB Inhale 1 Puff by mouth every 12 hours.       • pantoprazole (PROTONIX) 40 MG TBEC Take 40 mg by mouth every day.       • Coenzyme Q10 (CO Q-10) 200 MG CAPS Take  by mouth every day.       • Cholecalciferol (VITAMIN D) 2000 UNIT TABS Take  by mouth 2 Times a Day.     • cetirizine (ZYRTEC) 10 MG TABS Take 10 mg by mouth every day.     • Albuterol (VENTOLIN INH) Inhale  by mouth as needed.         No facility-administered encounter medications on file as of 10/9/2017.      Review of Systems   Respiratory: Negative for shortness of breath.    Cardiovascular: Negative for palpitations, orthopnea, leg swelling and PND.   Musculoskeletal: Negative for myalgias.   Neurological: Negative for dizziness and loss of consciousness.        Objective:   /72   Pulse 94   Ht 1.753 m (5' 9\")   Wt 89.8 kg (198 lb)   SpO2 96%   BMI 29.24 kg/m²     Physical Exam   Constitutional: He is oriented to person, place, and time. He appears well-nourished. No distress.   Neck: No JVD present. Carotid bruit is not present.   Normal jugular venous pressure.   Cardiovascular: Normal rate, regular rhythm, S1 normal and S2 normal.  Exam reveals no gallop and no friction rub.    No murmur heard.  Pulses:       Carotid pulses are 2+ on the right side, and 2+ on the left side.       Radial pulses are 2+ on the right side, and 2+ on the left side.        Femoral pulses are 2+ on the right side, and 2+ on the left side.       Posterior tibial pulses are 2+ on the right side, and 2+ on the left side.   No femoral bruits.   Pulmonary/Chest: Effort " "normal and breath sounds normal. He has no wheezes. He has no rhonchi. He has no rales.   Abdominal: Soft. Bowel sounds are normal. He exhibits no abdominal bruit, no pulsatile midline mass and no mass. There is no hepatosplenomegaly. There is no tenderness.   Protuberant.   Musculoskeletal: He exhibits no edema.   Neurological: He is alert and oriented to person, place, and time. He has normal strength. Gait normal.   Skin: Skin is warm and dry. No cyanosis. Nails show no clubbing.   Psychiatric: He has a normal mood and affect. His behavior is normal.     03/27/2008 Standard exercise stress test  demonstrated  asymptomatic upsloping horizontal 1 mm ST-segment depression in the  inferolateral leads.      05/22/2008 myocardial perfusion stress test     Demonstrated the 1 to 1-1/2 mm  horizontal/upsloping ST-segment depression in inferolateral leads  after achieving 91% of maximum age predicted heart rate of 155 with  the perfusion scan suggesting \"ischemia in the left anterior  descending distribution\", ejection fraction was 69%.    3/22/2016 TREADMILL STRESS TEST  Stress ECG: Ischemic 2 mm ST depression in the inferior and  lateral leads with exercise.to exercise  Impression: Ischemic ECG changes at a  fair workload    07/11/2008 Cardiac Catheterization:  EF 73%.  Normal left ventricular wall motion.  Mid LAD mild myocardial bridging.     04/06/2016 CARDIAC CATHETERIZATION  1.  Normal left ventricular end-diastolic pressure.  2.  Normal left ventricular systolic function. EF 75%.  3.  Eccentric 60-70% lesion at the origin of the second LAD diagonal with FFR    across this lesion of 0.89.  Assessment:     1. Coronary artery disease, non-occlusive     2. Coronary-myocardial bridge     3. Essential hypertension, benign     4. Dyslipidemia          Medical Decision Making:  Today's Assessment / Status / Plan:     Angina pectoris with exertion.     CAD. Cardiac catheterization 4/6/2016    Myocardial bridging.  2008 " angiogram.    Diabetes mellitus.    Hypertension. Currently controlled.    Hyperlipidemia. Continue atorvastatin.  Controlled. 3/2016 laboratory reviewed.    Recommendation  Custody walking exercise program.  Monitor symptoms of angina and have worse we'll contact me.  Continue current therapy.  Follow-up 6 months, sooner if necessary.

## 2017-10-09 NOTE — LETTER
[facility]   [address], [address]  [place] [address]  Phone: [phone]  Fax: [phone]              Barron Hewitt  1960    Encounter Date: 10/9/2017    Marcos Carlin M.D.          PROGRESS NOTE:  Subjective:   Barron Hewitt is a 56 y.o. male who presents today for follow up evaluation of CAD by recent catheterization, hypertension, hyperlipidemia myocardial bridging.    Last seen8/22/2016.    Since 8/22/2016 the patient has had some mild inconsistent intermittent exertional angina.  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    Past medical history  All 4 of his children are engaged to be  within the next year.  Tolerating his medications.    The patient is being seen today after recent cardiac catheterization that was performed because of the patient having chest discomfort and abnormal standard stress test indicating ischemia.  Catheterization showed a 60's 70% diagonal lesion with an FFR of 89.  No new cardiac symptoms.    Has sleep apnea Followed by [facility].  Has not tolerated CPAP.  Does not get restful sleep and has a lot of fatigue.  Interested in exercising.  Walk regularly this past year up to 4 miles a day but not in the past several months.  Has been seen by Dr. Grover, orthopedic surgeon for his knee problems.    Has had previous plans to initiate a more aerobic cardiovascular exercise program with stationary bicycling.  In both quadriceps which has limited his exercise choices.    Diabetes mellitus is improved.    Past Medical History  In 2012 he had an abdominal hernia repair without complications.  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 Valverde   • Sleep apnea     has CPAP   • Snoring      Past Surgical History:   Procedure Laterality Date   • KNEE ARTHROSCOPY  1990's    right   • RECOVERY  4/8/2016    Procedure: CATH LAB [facility] WITH POSSIBLE NAVIN;  Surgeon: [facility];  Location: SURGERY PRE-POST PROC UNIT [facility] [place];  Service:    • SHOULDER ARTHROSCOPY  1990's    right   • SINUSOTOMIES  1990's    times two surgeries   • TUMOR EXCISION WITH BIOPSY  1990's    right hand   • VENTRAL HERNIA REPAIR LAPAROSCOPIC  11/1/2012    Performed by Marcos Ramírez M.D. at [facility]     Family History   Problem Relation Age of Onset   • Heart Disease     • Hypertension     • Cancer     • Breast Cancer Mother    • Hypertension Mother    • Hypertension Father    • No Known Problems Sister    • Arthritis Brother    • No Known Problems Brother      History   Smoking Status   • Current Every Day Smoker   • Types: Cigars   Smokeless Tobacco   • Never Used     Allergies   Allergen Reactions   • Kiwi Extract Anaphylaxis   • Shellfish Allergy Anaphylaxis   • Strawberry Anaphylaxis   • Sulfa Drugs Rash     Outpatient Encounter Prescriptions as of 10/9/2017   Medication Sig Dispense Refill   • albuterol (VENTOLIN HFA) 108 (90 Base) MCG/ACT Aero Soln inhalation aerosol Inhale 2 Puffs by mouth every four hours as needed. FOR SHORTNESS OF BREATH 1 Inhaler 5   • CARTIA  MG CAPSULE SR 24 HR TAKE 1 CAPSULE BY MOUTH DAILY 90 Cap 0   • benazepril (LOTENSIN) 20 MG Tab TAKE 1 TABLET BY MOUTH ONE TIME A DAY 90 Tab 0   • zolpidem (AMBIEN) 5 MG Tab TAKE ONE TABLET BY MOUTH AT BEDTIME AS NEEDED FOR  INSOMNIA 30 Tab 3   • liraglutide (VICTOZA) 18 MG/3ML Solution Pen-injector injection Inject 1.8 mg as instructed every day.   "   • Canagliflozin (INVOKANA) 300 MG TABS Take  by mouth every day.     • metformin (GLUCOPHAGE) 500 MG TABS Take 1,000 mg by mouth 2 times a day, with meals. 2 tablet am 1 tablets pm     • atorvastatin (LIPITOR) 80 MG tablet Take 80 mg by mouth every evening.     • insulin glargine (LANTUS) 100 UNIT/ML SOLN Inject  as instructed every evening. 14 units daily     • ALPHA LIPOIC ACID PO Take 600 mg by mouth 2 Times a Day.     • Omega-3 Fatty Acids (FISH OIL) 1200 MG CAPS Take  by mouth.     • Cinnamon 500 MG CAPS Take 1,000 mg by mouth.     • aspirin EC (ECOTRIN) 81 MG TBEC Take 81 mg by mouth every day.       • fluticasone-salmeterol (ADVAIR DISKUS) 500-50 MCG/DOSE AEPB Inhale 1 Puff by mouth every 12 hours.       • pantoprazole (PROTONIX) 40 MG TBEC Take 40 mg by mouth every day.       • Coenzyme Q10 (CO Q-10) 200 MG CAPS Take  by mouth every day.       • Cholecalciferol (VITAMIN D) 2000 UNIT TABS Take  by mouth 2 Times a Day.     • cetirizine (ZYRTEC) 10 MG TABS Take 10 mg by mouth every day.     • Albuterol (VENTOLIN INH) Inhale  by mouth as needed.         No facility-administered encounter medications on file as of 10/9/2017.      Review of Systems   Respiratory: Negative for shortness of breath.    Cardiovascular: Negative for palpitations, orthopnea, leg swelling and PND.   Musculoskeletal: Negative for myalgias.   Neurological: Negative for dizziness and loss of consciousness.        Objective:   /72   Pulse 94   Ht 1.753 m (5' 9\")   Wt 89.8 kg (198 lb)   SpO2 96%   BMI 29.24 kg/m²      Physical Exam   Constitutional: He is oriented to person, place, and time. He appears well-nourished. No distress.   Neck: No JVD present. Carotid bruit is not present.   Normal jugular venous pressure.   Cardiovascular: Normal rate, regular rhythm, S1 normal and S2 normal.  Exam reveals no gallop and no friction rub.    No murmur heard.  Pulses:       Carotid pulses are 2+ on the right side, and 2+ on the left " "side.       Radial pulses are 2+ on the right side, and 2+ on the left side.        Femoral pulses are 2+ on the right side, and 2+ on the left side.       Posterior tibial pulses are 2+ on the right side, and 2+ on the left side.   No femoral bruits.   Pulmonary/Chest: Effort normal and breath sounds normal. He has no wheezes. He has no rhonchi. He has no rales.   Abdominal: Soft. Bowel sounds are normal. He exhibits no abdominal bruit, no pulsatile midline mass and no mass. There is no hepatosplenomegaly. There is no tenderness.   Protuberant.   Musculoskeletal: He exhibits no edema.   Neurological: He is alert and oriented to person, place, and time. He has normal strength. Gait normal.   Skin: Skin is warm and dry. No cyanosis. Nails show no clubbing.   Psychiatric: He has a normal mood and affect. His behavior is normal.     03/27/2008 Standard exercise stress test  demonstrated  asymptomatic upsloping horizontal 1 mm ST-segment depression in the  inferolateral leads.      05/22/2008 myocardial perfusion stress test     Demonstrated the 1 to 1-1/2 mm  horizontal/upsloping ST-segment depression in inferolateral leads  after achieving 91% of maximum age predicted heart rate of 155 with  the perfusion scan suggesting \"ischemia in the left anterior  descending distribution\", ejection fraction was 69%.    3/22/2016 TREADMILL STRESS TEST  Stress ECG: Ischemic 2 mm ST depression in the inferior and  lateral leads with exercise.to exercise  Impression: Ischemic ECG changes at a  fair workload    07/11/2008 Cardiac Catheterization:  EF 73%.  Normal left ventricular wall motion.  Mid LAD mild myocardial bridging.     04/06/2016 CARDIAC CATHETERIZATION  1.  Normal left ventricular end-diastolic pressure.  2.  Normal left ventricular systolic function. EF 75%.  3.  Eccentric 60-70% lesion at the origin of the second LAD diagonal with FFR    across this lesion of 0.89.  Assessment:     1. Coronary artery disease, " non-occlusive     2. Coronary-myocardial bridge     3. Essential hypertension, benign     4. Dyslipidemia          Medical Decision Making:  Today's Assessment / Status / Plan:     Angina pectoris with exertion.     CAD. Cardiac catheterization 4/6/2016    Myocardial bridging.  2008 angiogram.    Diabetes mellitus.    Hypertension. Currently controlled.    Hyperlipidemia. Continue atorvastatin.  Controlled. 3/2016 laboratory reviewed.    Recommendation  Custody walking exercise program.  Monitor symptoms of angina and have worse we'll contact me.  Continue current therapy.  Follow-up 6 months, sooner if necessary.      Corwin Rios M.D.  [address] [address]  VIA Facsimile: [phone]

## 2017-11-10 ENCOUNTER — HOSPITAL ENCOUNTER (OUTPATIENT)
Dept: LAB | Facility: MEDICAL CENTER | Age: 57
End: 2017-11-10
Attending: ORTHOPAEDIC SURGERY
Payer: COMMERCIAL

## 2017-11-10 LAB
BUN SERPL-MCNC: 14 MG/DL (ref 8–22)
CREAT SERPL-MCNC: 0.83 MG/DL (ref 0.5–1.4)
GFR SERPL CREATININE-BSD FRML MDRD: >60 ML/MIN/1.73 M 2

## 2017-11-10 PROCEDURE — 82565 ASSAY OF CREATININE: CPT

## 2017-11-10 PROCEDURE — 84520 ASSAY OF UREA NITROGEN: CPT

## 2017-11-10 PROCEDURE — 36415 COLL VENOUS BLD VENIPUNCTURE: CPT

## 2017-11-16 DIAGNOSIS — I10 BENIGN ESSENTIAL HTN: ICD-10-CM

## 2017-11-16 RX ORDER — BENAZEPRIL HYDROCHLORIDE 20 MG/1
TABLET ORAL
Qty: 90 TAB | Refills: 3 | Status: SHIPPED | OUTPATIENT
Start: 2017-11-16 | End: 2018-12-26 | Stop reason: SDUPTHER

## 2017-11-16 RX ORDER — DILTIAZEM HYDROCHLORIDE 180 MG/1
CAPSULE, EXTENDED RELEASE ORAL
Qty: 90 CAP | Refills: 3 | Status: SHIPPED | OUTPATIENT
Start: 2017-11-16 | End: 2019-01-03 | Stop reason: SDUPTHER

## 2017-11-27 ENCOUNTER — APPOINTMENT (OUTPATIENT)
Dept: RADIOLOGY | Facility: MEDICAL CENTER | Age: 57
End: 2017-11-27
Attending: ORTHOPAEDIC SURGERY
Payer: COMMERCIAL

## 2017-12-07 ENCOUNTER — HOSPITAL ENCOUNTER (OUTPATIENT)
Dept: RADIOLOGY | Facility: MEDICAL CENTER | Age: 57
End: 2017-12-07
Attending: ORTHOPAEDIC SURGERY
Payer: COMMERCIAL

## 2017-12-07 DIAGNOSIS — M75.42 IMPINGEMENT SYNDROME OF LEFT SHOULDER: ICD-10-CM

## 2017-12-07 PROCEDURE — 77002 NEEDLE LOCALIZATION BY XRAY: CPT | Mod: LT

## 2017-12-07 PROCEDURE — 73222 MRI JOINT UPR EXTREM W/DYE: CPT | Mod: LT

## 2017-12-07 PROCEDURE — A9579 GAD-BASE MR CONTRAST NOS,1ML: HCPCS | Performed by: ORTHOPAEDIC SURGERY

## 2017-12-07 PROCEDURE — 700117 HCHG RX CONTRAST REV CODE 255: Performed by: ORTHOPAEDIC SURGERY

## 2017-12-07 RX ADMIN — GADODIAMIDE 5 ML: 287 INJECTION INTRAVENOUS at 14:18

## 2017-12-07 RX ADMIN — IOHEXOL 50 ML: 300 INJECTION, SOLUTION INTRAVENOUS at 14:18

## 2017-12-20 ENCOUNTER — TELEPHONE (OUTPATIENT)
Dept: CARDIOLOGY | Facility: MEDICAL CENTER | Age: 57
End: 2017-12-20

## 2017-12-20 NOTE — TELEPHONE ENCOUNTER
Received cardiac clearance request from [facility] for a scheduled left shoulder surgery with Dr. Linwood Grover on 1/04/2018.     Hx: CAD w/ cath 4/2016 , myocardial bridging, HTN, HLD    Pt is scheduled to see Paula Rodriguez tomorrow 12/21 for surgery clearance.

## 2017-12-21 ENCOUNTER — OFFICE VISIT (OUTPATIENT)
Dept: CARDIOLOGY | Facility: MEDICAL CENTER | Age: 57
End: 2017-12-21
Payer: COMMERCIAL

## 2017-12-21 VITALS
DIASTOLIC BLOOD PRESSURE: 68 MMHG | OXYGEN SATURATION: 68 % | SYSTOLIC BLOOD PRESSURE: 120 MMHG | BODY MASS INDEX: 30.36 KG/M2 | HEIGHT: 69 IN | WEIGHT: 205 LBS | HEART RATE: 102 BPM

## 2017-12-21 DIAGNOSIS — I25.10 CORONARY ARTERY DISEASE, NON-OCCLUSIVE: ICD-10-CM

## 2017-12-21 DIAGNOSIS — I10 ESSENTIAL HYPERTENSION, BENIGN: ICD-10-CM

## 2017-12-21 DIAGNOSIS — Z01.818 PREOPERATIVE CLEARANCE: ICD-10-CM

## 2017-12-21 DIAGNOSIS — E78.5 DYSLIPIDEMIA: ICD-10-CM

## 2017-12-21 DIAGNOSIS — Q24.5 CORONARY-MYOCARDIAL BRIDGE: ICD-10-CM

## 2017-12-21 DIAGNOSIS — I25.119 CORONARY ARTERY DISEASE WITH ANGINA PECTORIS, UNSPECIFIED VESSEL OR LESION TYPE, UNSPECIFIED WHETHER NATIVE OR TRANSPLANTED HEART (HCC): ICD-10-CM

## 2017-12-21 DIAGNOSIS — R93.1 ABNORMAL NUCLEAR CARDIAC IMAGING TEST: ICD-10-CM

## 2017-12-21 LAB — EKG IMPRESSION: NORMAL

## 2017-12-21 PROCEDURE — 93000 ELECTROCARDIOGRAM COMPLETE: CPT | Performed by: NURSE PRACTITIONER

## 2017-12-21 PROCEDURE — 99213 OFFICE O/P EST LOW 20 MIN: CPT | Performed by: NURSE PRACTITIONER

## 2017-12-21 ASSESSMENT — ENCOUNTER SYMPTOMS
SINUS PAIN: 0
CLAUDICATION: 0
DIAPHORESIS: 0
SORE THROAT: 0
NAUSEA: 0
VOMITING: 0
PALPITATIONS: 0
BLOOD IN STOOL: 0
MYALGIAS: 0
CONSTIPATION: 0
HEADACHES: 0
DIZZINESS: 0
ABDOMINAL PAIN: 0
WEIGHT LOSS: 1
INSOMNIA: 0
DIARRHEA: 0
SENSORY CHANGE: 0
SHORTNESS OF BREATH: 0
HEMOPTYSIS: 0
FEVER: 0
SPEECH CHANGE: 0
WHEEZING: 0
SPUTUM PRODUCTION: 0
BRUISES/BLEEDS EASILY: 0
PND: 0
DEPRESSION: 0
FALLS: 0
CHILLS: 0
HEARTBURN: 0
NERVOUS/ANXIOUS: 0
COUGH: 0
ORTHOPNEA: 0
WEAKNESS: 0
FOCAL WEAKNESS: 0

## 2017-12-21 NOTE — LETTER
[facility]   [address]  [place] [address]  Phone: [phone]  Fax: [phone]              Barron Hewitt  1960    Encounter Date: 12/21/2017    VEDA Clayton          PROGRESS NOTE:  No notes on file      Corwin Rios M.D.  [address] [id number]  VIA Facsimile: [phone]

## 2017-12-21 NOTE — PROGRESS NOTES
Subjective:     Barron Hewitt is a pleasant 57 y.o. male who presents today for surgical clearance. He is scheduled on 1/4/18 to have left shoulder surgery. He does have a history of hypertension, hyperlipidemia, CAD, and myocardial bridging. He last saw Dr. Carlin in October of this year. He did have recent coronary angiography, which revealed 70% diagonal lesion with an FFR of 89. He has had preserved systolic function. Today he states he feels good. He has had no angina, shortness of breath, palpitations, dizziness, or syncope. He does have an past medical history of sleep apnea, for which he wears CPAP at home and has been compliant with that. His energy level has been good but not what it used to be when he was younger. He has already stopped his baby aspirin and fish oil for the procedure.  No bleeding diatheses.  His diabetes has been under relatively good control, this is managed by an endocrinologist. Blood pressure today is 120/68. Oxygen saturation 98%. He states he has intentionally lost quite a bit of weight, which has helped his overall medical problems. His EKG done today in clinic shows sinus rhythm rate 87 bpm, with no ST changes or ectopy.  Current cardiac medications include Cartia 180 mg capsules every day, benazepril 20 mg daily, atorvastatin 80 mg tablets daily, and the supplements CoQ10 in addition to fish oil. He denies having myalgias. He has no evidence of volume overload.    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 Valverde   • Sleep apnea     has CPAP   • Snoring      Past Surgical History:    Procedure Laterality Date   • RECOVERY  4/8/2016    Procedure: CATH LAB [facility] WITH POSSIBLE NAVIN;  Surgeon: [facility];  [place] [facility] [place];  Service:    • VENTRAL HERNIA REPAIR LAPAROSCOPIC  11/1/2012    Performed by Marcos Ramírez M.D. at SURGERY [facility] ORS   • SINUSOTOMIES  1990's    times two surgeries   • KNEE ARTHROSCOPY  1990's    right   • TUMOR EXCISION WITH BIOPSY  1990's    right hand   • SHOULDER ARTHROSCOPY  1990's    right     Family History   Problem Relation Age of Onset   • Heart Disease     • Hypertension     • Cancer     • Breast Cancer Mother    • Hypertension Mother    • Hypertension Father    • No Known Problems Sister    • Arthritis Brother    • No Known Problems Brother      History   Smoking Status   • Current Every Day Smoker   • Types: Cigars   Smokeless Tobacco   • Never Used     Allergies   Allergen Reactions   • Kiwi Extract Anaphylaxis   • Shellfish Allergy Anaphylaxis   • Strawberry Anaphylaxis   • Sulfa Drugs Rash     Outpatient Encounter Prescriptions as of 12/21/2017   Medication Sig Dispense Refill   • Insulin Degludec (TRESIBA FLEXTOUCH SC) Inject  as instructed.     • CARTIA  MG CAPSULE SR 24 HR TAKE 1 CAPSULE BY MOUTH DAILY 90 Cap 3   • benazepril (LOTENSIN) 20 MG Tab TAKE 1 TABLET BY MOUTH ONE TIME A DAY 90 Tab 3   • albuterol (VENTOLIN HFA) 108 (90 Base) MCG/ACT Aero Soln inhalation aerosol Inhale 2 Puffs by mouth every four hours as needed. FOR SHORTNESS OF BREATH 1 Inhaler 5   • zolpidem (AMBIEN) 5 MG Tab TAKE ONE TABLET BY MOUTH AT BEDTIME AS NEEDED FOR  INSOMNIA 30 Tab 3   • liraglutide (VICTOZA) 18 MG/3ML Solution Pen-injector injection Inject 1.8 mg as instructed every day.     • Canagliflozin (INVOKANA) 300 MG TABS Take  by mouth every day.     • metformin (GLUCOPHAGE) 500 MG TABS Take 1,000 mg by mouth 2 times a day, with meals. 2 tablet am 1 tablets pm     • atorvastatin (LIPITOR) 80 MG tablet Take 80 mg by mouth every  evening.     • ALPHA LIPOIC ACID PO Take 600 mg by mouth 2 Times a Day.     • Omega-3 Fatty Acids (FISH OIL) 1200 MG CAPS Take  by mouth.     • Cinnamon 500 MG CAPS Take 1,000 mg by mouth.     • aspirin EC (ECOTRIN) 81 MG TBEC Take 81 mg by mouth every day.       • fluticasone-salmeterol (ADVAIR DISKUS) 500-50 MCG/DOSE AEPB Inhale 1 Puff by mouth every 12 hours.       • pantoprazole (PROTONIX) 40 MG TBEC Take 40 mg by mouth every day.       • Coenzyme Q10 (CO Q-10) 200 MG CAPS Take  by mouth every day.       • Cholecalciferol (VITAMIN D) 2000 UNIT TABS Take  by mouth 2 Times a Day.     • cetirizine (ZYRTEC) 10 MG TABS Take 10 mg by mouth every day.     • [DISCONTINUED] insulin glargine (LANTUS) 100 UNIT/ML SOLN Inject  as instructed every evening. 14 units daily     • [DISCONTINUED] Albuterol (VENTOLIN INH) Inhale  by mouth as needed.         No facility-administered encounter medications on file as of 12/21/2017.      Review of Systems   Constitutional: Positive for weight loss. Negative for chills, diaphoresis, fever and malaise/fatigue.        Intentional weight loss     HENT: Negative for congestion, nosebleeds, sinus pain and sore throat.    Respiratory: Negative for cough, hemoptysis, sputum production, shortness of breath and wheezing.    Cardiovascular: Negative for chest pain, palpitations, orthopnea, claudication, leg swelling and PND.   Gastrointestinal: Negative for abdominal pain, blood in stool, constipation, diarrhea, heartburn, melena, nausea and vomiting.   Genitourinary: Negative for dysuria, frequency, hematuria and urgency.   Musculoskeletal: Negative for falls and myalgias.   Skin: Negative for itching and rash.   Neurological: Negative for dizziness, sensory change, speech change, focal weakness, weakness and headaches.   Endo/Heme/Allergies: Positive for environmental allergies. Does not bruise/bleed easily.   Psychiatric/Behavioral: Negative for depression. The patient is not nervous/anxious  "and does not have insomnia.    All other systems reviewed and are negative.     Objective:   /68   Pulse (!) 102   Ht 1.753 m (5' 9\")   Wt 93 kg (205 lb)   SpO2 (!) 68%   BMI 30.27 kg/m²     Physical Exam   Constitutional: He is oriented to person, place, and time. He appears well-developed and well-nourished. No distress.   HENT:   Head: Normocephalic and atraumatic.   Right Ear: External ear normal.   Left Ear: External ear normal.   Nose: Nose normal.   Mouth/Throat: Oropharynx is clear and moist.   Eyes: EOM are normal. Pupils are equal, round, and reactive to light. Right eye exhibits no discharge. Left eye exhibits no discharge. No scleral icterus.   Neck: Normal range of motion. Neck supple. No JVD present.   Cardiovascular: Normal rate, regular rhythm, normal heart sounds and intact distal pulses.  Exam reveals no gallop and no friction rub.    No murmur heard.  Pulmonary/Chest: Effort normal and breath sounds normal. No stridor. No respiratory distress. He has no wheezes. He has no rales.   Abdominal: Soft. Bowel sounds are normal. He exhibits no distension. There is no tenderness. There is no rebound and no guarding.   Musculoskeletal: Normal range of motion. He exhibits no edema.   Neurological: He is alert and oriented to person, place, and time.   Skin: Skin is warm and dry. No rash noted. He is not diaphoretic. No erythema. No pallor.   Psychiatric: He has a normal mood and affect. His behavior is normal. Judgment and thought content normal.   Nursing note and vitals reviewed.    Assessment:     1. Coronary artery disease with angina pectoris, unspecified vessel or lesion type, unspecified whether native or transplanted heart (CMS-[facility])  EKG   2. Preoperative clearance  DX-CHEST-2 VIEWS    CBC WITH DIFFERENTIAL    COMP METABOLIC PANEL    LIPID PROFILE    MAGNESIUM    BTYPE NATRIURETIC PEPTIDE   3. Essential hypertension, benign     4. Dyslipidemia  LIPID PROFILE   5. Coronary-myocardial " bridge     6. Coronary artery disease, non-occlusive     7. Abnormal nuclear cardiac imaging test       Medical Decision Making:  Today's Assessment / Status / Plan:     We will continue his current medication regimen. He has had no angina or anginal equivalents. We will obtain preoperative lab work in addition to a baseline chest x-ray. EKG in clinic today was normal. From a cardiac standpoint he is clear for surgery. We will send a letter of surgical clearance to [facility] as requested.  He will follow-up with Dr. Carlin in April, which is approximately 6 months since his last visit.      Collaborating MD:  Dr. White To

## 2017-12-21 NOTE — LETTER
[facility]   [address], [address]  [place] [address]  Phone: [phone]  Fax: [phone]              Barron Hewitt  1960    Encounter Date: 12/21/2017    VEDA Clayton          PROGRESS NOTE:  Subjective:     Barron Hewitt is a pleasant 57 y.o. male who presents today for surgical clearance. He is scheduled on 1/4/18 to have left shoulder surgery. He does have a history of hypertension, hyperlipidemia, CAD, and myocardial bridging. He last saw Dr. Carlin in October of this year. He did have recent coronary angiography, which revealed 70% diagonal lesion with an FFR of 89. He has had preserved systolic function. Today he states he feels good. He has had no angina, shortness of breath, palpitations, dizziness, or syncope. He does have an past medical history of sleep apnea, for which he wears CPAP at home and has been compliant with that. His energy level has been good but not what it used to be when he was younger. He has already stopped his baby aspirin and fish oil for the procedure.  No bleeding diatheses.  His diabetes has been under relatively good control, this is managed by an endocrinologist. Blood pressure today is 120/68. Oxygen saturation 98%. He states he has intentionally lost quite a bit of weight, which has helped his overall medical problems. His EKG done today in clinic shows sinus rhythm rate 87 bpm, with no ST changes or ectopy.  Current cardiac medications include Cartia 180 mg capsules every day, benazepril 20 mg daily, atorvastatin 80 mg tablets daily, and the supplements CoQ10 in addition to fish oil. He denies having myalgias. He has no evidence of volume overload.    Past Medical History:   Diagnosis Date   • Abnormal nuclear cardiac imaging test 1/31/2014   • Allergy    • Anesthesia     PONV   • ASTHMA    • CHEST PAIN 6/15/2011   • Chest pain at rest 1/31/2014   • Coronary-myocardial bridge  11/28/2012   • Diabetes 2009    insulin and oral meds   • Hyperlipidemia    • Hypertension    • Mild intermittent asthma without complication 7/27/2017   • Myocardial bridge     cardiologist, Dr. Valverde   • Sleep apnea     has CPAP   • Snoring      Past Surgical History:   Procedure Laterality Date   • RECOVERY  4/8/2016    Procedure: CATH LAB [facility] WITH POSSIBLE NAVIN;  Surgeon: Recoveryons[facility]ino Surgery;  Location: SURGERY PRE-POST PROC UNIT [facility] [place];  Service:    • VENTRAL HERNIA REPAIR LAPAROSCOPIC  11/1/2012    Performed by Marcos Ramírez M.D. at [facility]   • SINUSOTOMIES  1990's    times two surgeries   • KNEE ARTHROSCOPY  1990's    right   • TUMOR EXCISION WITH BIOPSY  1990's    right hand   • SHOULDER ARTHROSCOPY  1990's    right     Family History   Problem Relation Age of Onset   • Heart Disease     • Hypertension     • Cancer     • Breast Cancer Mother    • Hypertension Mother    • Hypertension Father    • No Known Problems Sister    • Arthritis Brother    • No Known Problems Brother      History   Smoking Status   • Current Every Day Smoker   • Types: Cigars   Smokeless Tobacco   • Never Used     Allergies   Allergen Reactions   • Kiwi Extract Anaphylaxis   • Shellfish Allergy Anaphylaxis   • Strawberry Anaphylaxis   • Sulfa Drugs Rash     Outpatient Encounter Prescriptions as of 12/21/2017   Medication Sig Dispense Refill   • Insulin Degludec (TRESIBA FLEXTOUCH SC) Inject  as instructed.     • CARTIA  MG CAPSULE SR 24 HR TAKE 1 CAPSULE BY MOUTH DAILY 90 Cap 3   • benazepril (LOTENSIN) 20 MG Tab TAKE 1 TABLET BY MOUTH ONE TIME A DAY 90 Tab 3   • albuterol (VENTOLIN HFA) 108 (90 Base) MCG/ACT Aero Soln inhalation aerosol Inhale 2 Puffs by mouth every four hours as needed. FOR SHORTNESS OF BREATH 1 Inhaler 5   • zolpidem (AMBIEN) 5 MG Tab TAKE ONE TABLET BY MOUTH AT BEDTIME AS NEEDED FOR  INSOMNIA 30 Tab 3   • liraglutide (VICTOZA) 18 MG/3ML Solution Pen-injector injection Inject 1.8 mg  as instructed every day.     • Canagliflozin (INVOKANA) 300 MG TABS Take  by mouth every day.     • metformin (GLUCOPHAGE) 500 MG TABS Take 1,000 mg by mouth 2 times a day, with meals. 2 tablet am 1 tablets pm     • atorvastatin (LIPITOR) 80 MG tablet Take 80 mg by mouth every evening.     • ALPHA LIPOIC ACID PO Take 600 mg by mouth 2 Times a Day.     • Omega-3 Fatty Acids (FISH OIL) 1200 MG CAPS Take  by mouth.     • Cinnamon 500 MG CAPS Take 1,000 mg by mouth.     • aspirin EC (ECOTRIN) 81 MG TBEC Take 81 mg by mouth every day.       • fluticasone-salmeterol (ADVAIR DISKUS) 500-50 MCG/DOSE AEPB Inhale 1 Puff by mouth every 12 hours.       • pantoprazole (PROTONIX) 40 MG TBEC Take 40 mg by mouth every day.       • Coenzyme Q10 (CO Q-10) 200 MG CAPS Take  by mouth every day.       • Cholecalciferol (VITAMIN D) 2000 UNIT TABS Take  by mouth 2 Times a Day.     • cetirizine (ZYRTEC) 10 MG TABS Take 10 mg by mouth every day.     • [DISCONTINUED] insulin glargine (LANTUS) 100 UNIT/ML SOLN Inject  as instructed every evening. 14 units daily     • [DISCONTINUED] Albuterol (VENTOLIN INH) Inhale  by mouth as needed.         No facility-administered encounter medications on file as of 12/21/2017.      Review of Systems   Constitutional: Positive for weight loss. Negative for chills, diaphoresis, fever and malaise/fatigue.        Intentional weight loss     HENT: Negative for congestion, nosebleeds, sinus pain and sore throat.    Respiratory: Negative for cough, hemoptysis, sputum production, shortness of breath and wheezing.    Cardiovascular: Negative for chest pain, palpitations, orthopnea, claudication, leg swelling and PND.   Gastrointestinal: Negative for abdominal pain, blood in stool, constipation, diarrhea, heartburn, melena, nausea and vomiting.   Genitourinary: Negative for dysuria, frequency, hematuria and urgency.   Musculoskeletal: Negative for falls and myalgias.   Skin: Negative for itching and rash.    "  Neurological: Negative for dizziness, sensory change, speech change, focal weakness, weakness and headaches.   Endo/Heme/Allergies: Positive for environmental allergies. Does not bruise/bleed easily.   Psychiatric/Behavioral: Negative for depression. The patient is not nervous/anxious and does not have insomnia.    All other systems reviewed and are negative.     Objective:   /68   Pulse (!) 102   Ht 1.753 m (5' 9\")   Wt 93 kg (205 lb)   SpO2 (!) 68%   BMI 30.27 kg/m²      Physical Exam   Constitutional: He is oriented to person, place, and time. He appears well-developed and well-nourished. No distress.   HENT:   Head: Normocephalic and atraumatic.   Right Ear: External ear normal.   Left Ear: External ear normal.   Nose: Nose normal.   Mouth/Throat: Oropharynx is clear and moist.   Eyes: EOM are normal. Pupils are equal, round, and reactive to light. Right eye exhibits no discharge. Left eye exhibits no discharge. No scleral icterus.   Neck: Normal range of motion. Neck supple. No JVD present.   Cardiovascular: Normal rate, regular rhythm, normal heart sounds and intact distal pulses.  Exam reveals no gallop and no friction rub.    No murmur heard.  Pulmonary/Chest: Effort normal and breath sounds normal. No stridor. No respiratory distress. He has no wheezes. He has no rales.   Abdominal: Soft. Bowel sounds are normal. He exhibits no distension. There is no tenderness. There is no rebound and no guarding.   Musculoskeletal: Normal range of motion. He exhibits no edema.   Neurological: He is alert and oriented to person, place, and time.   Skin: Skin is warm and dry. No rash noted. He is not diaphoretic. No erythema. No pallor.   Psychiatric: He has a normal mood and affect. His behavior is normal. Judgment and thought content normal.   Nursing note and vitals reviewed.    Assessment:     1. Coronary artery disease with angina pectoris, unspecified vessel or lesion type, unspecified whether native or " transplanted heart (CMS-HCC)  EKG   2. Preoperative clearance  DX-CHEST-2 VIEWS    CBC WITH DIFFERENTIAL    COMP METABOLIC PANEL    LIPID PROFILE    MAGNESIUM    BTYPE NATRIURETIC PEPTIDE   3. Essential hypertension, benign     4. Dyslipidemia  LIPID PROFILE   5. Coronary-myocardial bridge     6. Coronary artery disease, non-occlusive     7. Abnormal nuclear cardiac imaging test       Medical Decision Making:  Today's Assessment / Status / Plan:     We will continue his current medication regimen. He has had no angina or anginal equivalents. We will obtain preoperative lab work in addition to a baseline chest x-ray. EKG in clinic today was normal. From a cardiac standpoint he is clear for surgery. We will send a letter of surgical clearance to [facility] as requested.  He will follow-up with Dr. Carlin in April, which is approximately 6 months since his last visit.      Collaborating MD:  Dr. White To      Linwood Grover M.D.  [address]  [address]  [facility] [address]  VIA Facsimile: [phone]     Corwin Rios M.D.  [address]  [facility] [address]  VIA Facsimile: [phone]

## 2017-12-22 ENCOUNTER — APPOINTMENT (OUTPATIENT)
Dept: ADMISSIONS | Facility: MEDICAL CENTER | Age: 57
End: 2017-12-22
Attending: FAMILY MEDICINE
Payer: COMMERCIAL

## 2017-12-22 ENCOUNTER — HOSPITAL ENCOUNTER (OUTPATIENT)
Dept: LAB | Facility: MEDICAL CENTER | Age: 57
End: 2017-12-22
Attending: PHYSICIAN ASSISTANT
Payer: COMMERCIAL

## 2017-12-22 ENCOUNTER — HOSPITAL ENCOUNTER (OUTPATIENT)
Dept: RADIOLOGY | Facility: MEDICAL CENTER | Age: 57
End: 2017-12-22
Attending: NURSE PRACTITIONER
Payer: COMMERCIAL

## 2017-12-22 ENCOUNTER — TELEPHONE (OUTPATIENT)
Dept: CARDIOLOGY | Facility: MEDICAL CENTER | Age: 57
End: 2017-12-22

## 2017-12-22 ENCOUNTER — HOSPITAL ENCOUNTER (OUTPATIENT)
Dept: LAB | Facility: MEDICAL CENTER | Age: 57
End: 2017-12-22
Attending: NURSE PRACTITIONER
Payer: COMMERCIAL

## 2017-12-22 DIAGNOSIS — Z01.818 PREOPERATIVE CLEARANCE: ICD-10-CM

## 2017-12-22 DIAGNOSIS — E78.5 DYSLIPIDEMIA: ICD-10-CM

## 2017-12-22 LAB
ALBUMIN SERPL BCP-MCNC: 4.5 G/DL (ref 3.2–4.9)
ALBUMIN SERPL BCP-MCNC: 4.5 G/DL (ref 3.2–4.9)
ALBUMIN/GLOB SERPL: 1.7 G/DL
ALBUMIN/GLOB SERPL: 1.7 G/DL
ALP SERPL-CCNC: 67 U/L (ref 30–99)
ALP SERPL-CCNC: 68 U/L (ref 30–99)
ALT SERPL-CCNC: 27 U/L (ref 2–50)
ALT SERPL-CCNC: 27 U/L (ref 2–50)
ANION GAP SERPL CALC-SCNC: 7 MMOL/L (ref 0–11.9)
ANION GAP SERPL CALC-SCNC: 8 MMOL/L (ref 0–11.9)
APPEARANCE UR: CLEAR
AST SERPL-CCNC: 16 U/L (ref 12–45)
AST SERPL-CCNC: 18 U/L (ref 12–45)
BASOPHILS # BLD AUTO: 1.5 % (ref 0–1.8)
BASOPHILS # BLD AUTO: 1.9 % (ref 0–1.8)
BASOPHILS # BLD: 0.12 K/UL (ref 0–0.12)
BASOPHILS # BLD: 0.15 K/UL (ref 0–0.12)
BILIRUB SERPL-MCNC: 0.8 MG/DL (ref 0.1–1.5)
BILIRUB SERPL-MCNC: 0.8 MG/DL (ref 0.1–1.5)
BILIRUB UR QL STRIP.AUTO: NEGATIVE
BNP SERPL-MCNC: 10 PG/ML (ref 0–100)
BUN SERPL-MCNC: 10 MG/DL (ref 8–22)
BUN SERPL-MCNC: 10 MG/DL (ref 8–22)
CALCIUM SERPL-MCNC: 9.6 MG/DL (ref 8.5–10.5)
CALCIUM SERPL-MCNC: 9.6 MG/DL (ref 8.5–10.5)
CHLORIDE SERPL-SCNC: 104 MMOL/L (ref 96–112)
CHLORIDE SERPL-SCNC: 104 MMOL/L (ref 96–112)
CHOLEST SERPL-MCNC: 124 MG/DL (ref 100–199)
CHOLEST SERPL-MCNC: 126 MG/DL (ref 100–199)
CO2 SERPL-SCNC: 25 MMOL/L (ref 20–33)
CO2 SERPL-SCNC: 26 MMOL/L (ref 20–33)
COLOR UR: YELLOW
CREAT SERPL-MCNC: 0.75 MG/DL (ref 0.5–1.4)
CREAT SERPL-MCNC: 0.77 MG/DL (ref 0.5–1.4)
CREAT UR-MCNC: 95.8 MG/DL
CULTURE IF INDICATED INDCX: NO UA CULTURE
EOSINOPHIL # BLD AUTO: 0.38 K/UL (ref 0–0.51)
EOSINOPHIL # BLD AUTO: 0.4 K/UL (ref 0–0.51)
EOSINOPHIL NFR BLD: 4.8 % (ref 0–6.9)
EOSINOPHIL NFR BLD: 5 % (ref 0–6.9)
ERYTHROCYTE [DISTWIDTH] IN BLOOD BY AUTOMATED COUNT: 41.8 FL (ref 35.9–50)
ERYTHROCYTE [DISTWIDTH] IN BLOOD BY AUTOMATED COUNT: 42.1 FL (ref 35.9–50)
EST. AVERAGE GLUCOSE BLD GHB EST-MCNC: 194 MG/DL
GFR SERPL CREATININE-BSD FRML MDRD: >60 ML/MIN/1.73 M 2
GFR SERPL CREATININE-BSD FRML MDRD: >60 ML/MIN/1.73 M 2
GLOBULIN SER CALC-MCNC: 2.7 G/DL (ref 1.9–3.5)
GLOBULIN SER CALC-MCNC: 2.7 G/DL (ref 1.9–3.5)
GLUCOSE SERPL-MCNC: 124 MG/DL (ref 65–99)
GLUCOSE SERPL-MCNC: 135 MG/DL (ref 65–99)
GLUCOSE UR STRIP.AUTO-MCNC: >=1000 MG/DL
HBA1C MFR BLD: 8.4 % (ref 0–5.6)
HCT VFR BLD AUTO: 52.4 % (ref 42–52)
HCT VFR BLD AUTO: 53.1 % (ref 42–52)
HDLC SERPL-MCNC: 32 MG/DL
HDLC SERPL-MCNC: 33 MG/DL
HGB BLD-MCNC: 17.8 G/DL (ref 14–18)
HGB BLD-MCNC: 17.9 G/DL (ref 14–18)
IMM GRANULOCYTES # BLD AUTO: 0.02 K/UL (ref 0–0.11)
IMM GRANULOCYTES # BLD AUTO: 0.02 K/UL (ref 0–0.11)
IMM GRANULOCYTES NFR BLD AUTO: 0.3 % (ref 0–0.9)
IMM GRANULOCYTES NFR BLD AUTO: 0.3 % (ref 0–0.9)
KETONES UR STRIP.AUTO-MCNC: NEGATIVE MG/DL
LDLC SERPL CALC-MCNC: 56 MG/DL
LDLC SERPL CALC-MCNC: 59 MG/DL
LEUKOCYTE ESTERASE UR QL STRIP.AUTO: NEGATIVE
LYMPHOCYTES # BLD AUTO: 1.57 K/UL (ref 1–4.8)
LYMPHOCYTES # BLD AUTO: 1.62 K/UL (ref 1–4.8)
LYMPHOCYTES NFR BLD: 19.8 % (ref 22–41)
LYMPHOCYTES NFR BLD: 20.4 % (ref 22–41)
MAGNESIUM SERPL-MCNC: 1.8 MG/DL (ref 1.5–2.5)
MCH RBC QN AUTO: 28.1 PG (ref 27–33)
MCH RBC QN AUTO: 28.6 PG (ref 27–33)
MCHC RBC AUTO-ENTMCNC: 33.5 G/DL (ref 33.7–35.3)
MCHC RBC AUTO-ENTMCNC: 34.2 G/DL (ref 33.7–35.3)
MCV RBC AUTO: 83.7 FL (ref 81.4–97.8)
MCV RBC AUTO: 83.8 FL (ref 81.4–97.8)
MICRO URNS: ABNORMAL
MICROALBUMIN UR-MCNC: 2.4 MG/DL
MICROALBUMIN/CREAT UR: 25 MG/G (ref 0–30)
MONOCYTES # BLD AUTO: 0.71 K/UL (ref 0–0.85)
MONOCYTES # BLD AUTO: 0.71 K/UL (ref 0–0.85)
MONOCYTES NFR BLD AUTO: 9 % (ref 0–13.4)
MONOCYTES NFR BLD AUTO: 9 % (ref 0–13.4)
NEUTROPHILS # BLD AUTO: 5.03 K/UL (ref 1.82–7.42)
NEUTROPHILS # BLD AUTO: 5.11 K/UL (ref 1.82–7.42)
NEUTROPHILS NFR BLD: 63.4 % (ref 44–72)
NEUTROPHILS NFR BLD: 64.6 % (ref 44–72)
NITRITE UR QL STRIP.AUTO: NEGATIVE
NRBC # BLD AUTO: 0 K/UL
NRBC # BLD AUTO: 0 K/UL
NRBC BLD-RTO: 0 /100 WBC
NRBC BLD-RTO: 0 /100 WBC
PH UR STRIP.AUTO: 5 [PH]
PLATELET # BLD AUTO: 313 K/UL (ref 164–446)
PLATELET # BLD AUTO: 336 K/UL (ref 164–446)
PMV BLD AUTO: 10.6 FL (ref 9–12.9)
PMV BLD AUTO: 10.7 FL (ref 9–12.9)
POTASSIUM SERPL-SCNC: 3.8 MMOL/L (ref 3.6–5.5)
POTASSIUM SERPL-SCNC: 3.8 MMOL/L (ref 3.6–5.5)
PROT SERPL-MCNC: 7.2 G/DL (ref 6–8.2)
PROT SERPL-MCNC: 7.2 G/DL (ref 6–8.2)
PROT UR QL STRIP: NEGATIVE MG/DL
PSA SERPL-MCNC: 0.4 NG/ML (ref 0–4)
RBC # BLD AUTO: 6.26 M/UL (ref 4.7–6.1)
RBC # BLD AUTO: 6.34 M/UL (ref 4.7–6.1)
RBC UR QL AUTO: NEGATIVE
SODIUM SERPL-SCNC: 137 MMOL/L (ref 135–145)
SODIUM SERPL-SCNC: 137 MMOL/L (ref 135–145)
SP GR UR STRIP.AUTO: 1.04
T3FREE SERPL-MCNC: 3.59 PG/ML (ref 2.4–4.2)
T4 FREE SERPL-MCNC: 0.94 NG/DL (ref 0.53–1.43)
TRIGL SERPL-MCNC: 175 MG/DL (ref 0–149)
TRIGL SERPL-MCNC: 176 MG/DL (ref 0–149)
TSH SERPL DL<=0.005 MIU/L-ACNC: 1.23 UIU/ML (ref 0.38–5.33)
URATE SERPL-MCNC: 4.2 MG/DL (ref 2.5–8.3)
UROBILINOGEN UR STRIP.AUTO-MCNC: 0.2 MG/DL
WBC # BLD AUTO: 7.9 K/UL (ref 4.8–10.8)
WBC # BLD AUTO: 7.9 K/UL (ref 4.8–10.8)

## 2017-12-22 PROCEDURE — 71020 DX-CHEST-2 VIEWS: CPT

## 2017-12-22 PROCEDURE — 82043 UR ALBUMIN QUANTITATIVE: CPT

## 2017-12-22 PROCEDURE — 85025 COMPLETE CBC W/AUTO DIFF WBC: CPT | Mod: 91

## 2017-12-22 PROCEDURE — 81003 URINALYSIS AUTO W/O SCOPE: CPT

## 2017-12-22 PROCEDURE — 84153 ASSAY OF PSA TOTAL: CPT

## 2017-12-22 PROCEDURE — 80061 LIPID PANEL: CPT

## 2017-12-22 PROCEDURE — 85610 PROTHROMBIN TIME: CPT

## 2017-12-22 PROCEDURE — 83036 HEMOGLOBIN GLYCOSYLATED A1C: CPT

## 2017-12-22 PROCEDURE — 83880 ASSAY OF NATRIURETIC PEPTIDE: CPT

## 2017-12-22 PROCEDURE — 85730 THROMBOPLASTIN TIME PARTIAL: CPT

## 2017-12-22 PROCEDURE — 82570 ASSAY OF URINE CREATININE: CPT

## 2017-12-22 PROCEDURE — 36415 COLL VENOUS BLD VENIPUNCTURE: CPT

## 2017-12-22 PROCEDURE — 83735 ASSAY OF MAGNESIUM: CPT

## 2017-12-22 PROCEDURE — 84443 ASSAY THYROID STIM HORMONE: CPT

## 2017-12-22 PROCEDURE — 84439 ASSAY OF FREE THYROXINE: CPT

## 2017-12-22 PROCEDURE — 84550 ASSAY OF BLOOD/URIC ACID: CPT

## 2017-12-22 PROCEDURE — 80053 COMPREHEN METABOLIC PANEL: CPT | Mod: 91

## 2017-12-22 PROCEDURE — 85025 COMPLETE CBC W/AUTO DIFF WBC: CPT

## 2017-12-22 PROCEDURE — 80053 COMPREHEN METABOLIC PANEL: CPT

## 2017-12-22 PROCEDURE — 84481 FREE ASSAY (FT-3): CPT

## 2017-12-22 PROCEDURE — 80061 LIPID PANEL: CPT | Mod: 91

## 2017-12-22 NOTE — TELEPHONE ENCOUNTER
----- Message -----   From: VEDA Clayton   Sent: 12/21/2017   3:03 PM   To: Nory Langston R.N.     Please send letter of surgical clearance & my note to Dr. Grover at [facility].  Thank you!      Surgical clearance faxed to Dr. Grover with office notes.

## 2017-12-22 NOTE — OR NURSING
Pre admit apt: Pt. Instructed to continue regularly prescribed medications through day before surgery.  Instructed to take the following medications, the day of surgery, with a sip of water per anesthesia protocol:albuterol, cartia, zyrtec, advair, protonix

## 2017-12-23 LAB
APTT PPP: 27.7 SEC (ref 24.7–36)
INR PPP: 1.04 (ref 0.87–1.13)
PROTHROMBIN TIME: 13.3 SEC (ref 12–14.6)

## 2018-01-04 ENCOUNTER — HOSPITAL ENCOUNTER (OUTPATIENT)
Facility: MEDICAL CENTER | Age: 58
End: 2018-01-04
Attending: ORTHOPAEDIC SURGERY | Admitting: ORTHOPAEDIC SURGERY
Payer: COMMERCIAL

## 2018-01-04 VITALS
HEIGHT: 69 IN | SYSTOLIC BLOOD PRESSURE: 121 MMHG | HEART RATE: 81 BPM | WEIGHT: 197.53 LBS | RESPIRATION RATE: 16 BRPM | OXYGEN SATURATION: 96 % | TEMPERATURE: 97.5 F | DIASTOLIC BLOOD PRESSURE: 87 MMHG | BODY MASS INDEX: 29.26 KG/M2

## 2018-01-04 LAB — GLUCOSE BLD-MCNC: 124 MG/DL (ref 65–99)

## 2018-01-04 PROCEDURE — 700101 HCHG RX REV CODE 250

## 2018-01-04 PROCEDURE — 501838 HCHG SUTURE GENERAL: Performed by: ORTHOPAEDIC SURGERY

## 2018-01-04 PROCEDURE — 500151 HCHG CANNULA, THRDED 8.4: Performed by: ORTHOPAEDIC SURGERY

## 2018-01-04 PROCEDURE — 160036 HCHG PACU - EA ADDL 30 MINS PHASE I: Performed by: ORTHOPAEDIC SURGERY

## 2018-01-04 PROCEDURE — 302135 SEQUENTIAL COMPRESSION MACHINE: Performed by: ORTHOPAEDIC SURGERY

## 2018-01-04 PROCEDURE — 160029 HCHG SURGERY MINUTES - 1ST 30 MINS LEVEL 4: Performed by: ORTHOPAEDIC SURGERY

## 2018-01-04 PROCEDURE — 700105 HCHG RX REV CODE 258: Performed by: ORTHOPAEDIC SURGERY

## 2018-01-04 PROCEDURE — 160035 HCHG PACU - 1ST 60 MINS PHASE I: Performed by: ORTHOPAEDIC SURGERY

## 2018-01-04 PROCEDURE — 82962 GLUCOSE BLOOD TEST: CPT

## 2018-01-04 PROCEDURE — 700102 HCHG RX REV CODE 250 W/ 637 OVERRIDE(OP)

## 2018-01-04 PROCEDURE — 160025 RECOVERY II MINUTES (STATS): Performed by: ORTHOPAEDIC SURGERY

## 2018-01-04 PROCEDURE — 160048 HCHG OR STATISTICAL LEVEL 1-5: Performed by: ORTHOPAEDIC SURGERY

## 2018-01-04 PROCEDURE — 160041 HCHG SURGERY MINUTES - EA ADDL 1 MIN LEVEL 4: Performed by: ORTHOPAEDIC SURGERY

## 2018-01-04 PROCEDURE — 160022 HCHG BLOCK: Performed by: ORTHOPAEDIC SURGERY

## 2018-01-04 PROCEDURE — 700111 HCHG RX REV CODE 636 W/ 250 OVERRIDE (IP)

## 2018-01-04 PROCEDURE — 160009 HCHG ANES TIME/MIN: Performed by: ORTHOPAEDIC SURGERY

## 2018-01-04 PROCEDURE — A6402 STERILE GAUZE <= 16 SQ IN: HCPCS | Performed by: ORTHOPAEDIC SURGERY

## 2018-01-04 PROCEDURE — 500028 HCHG ARTHROWAND TURBOVAC 3.5/90 SUCT.: Performed by: ORTHOPAEDIC SURGERY

## 2018-01-04 PROCEDURE — A9270 NON-COVERED ITEM OR SERVICE: HCPCS

## 2018-01-04 PROCEDURE — 502581 HCHG PACK, SHOULDER ARTHROSCOPY: Performed by: ORTHOPAEDIC SURGERY

## 2018-01-04 PROCEDURE — 160002 HCHG RECOVERY MINUTES (STAT): Performed by: ORTHOPAEDIC SURGERY

## 2018-01-04 PROCEDURE — 160046 HCHG PACU - 1ST 60 MINS PHASE II: Performed by: ORTHOPAEDIC SURGERY

## 2018-01-04 RX ORDER — OXYCODONE HYDROCHLORIDE AND ACETAMINOPHEN 5; 325 MG/1; MG/1
TABLET ORAL
Status: COMPLETED
Start: 2018-01-04 | End: 2018-01-04

## 2018-01-04 RX ORDER — SODIUM CHLORIDE, SODIUM LACTATE, POTASSIUM CHLORIDE, CALCIUM CHLORIDE 600; 310; 30; 20 MG/100ML; MG/100ML; MG/100ML; MG/100ML
1000 INJECTION, SOLUTION INTRAVENOUS
Status: DISCONTINUED | OUTPATIENT
Start: 2018-01-04 | End: 2018-01-04 | Stop reason: HOSPADM

## 2018-01-04 RX ORDER — LIDOCAINE HYDROCHLORIDE 10 MG/ML
INJECTION, SOLUTION INFILTRATION; PERINEURAL
Status: COMPLETED
Start: 2018-01-04 | End: 2018-01-04

## 2018-01-04 RX ORDER — EPINEPHRINE 1 MG/ML(1)
AMPUL (ML) INJECTION
Status: DISCONTINUED | OUTPATIENT
Start: 2018-01-04 | End: 2018-01-04 | Stop reason: HOSPADM

## 2018-01-04 RX ADMIN — FENTANYL CITRATE 25 MCG: 50 INJECTION, SOLUTION INTRAMUSCULAR; INTRAVENOUS at 09:06

## 2018-01-04 RX ADMIN — SODIUM CHLORIDE, POTASSIUM CHLORIDE, SODIUM LACTATE AND CALCIUM CHLORIDE 1000 ML: 600; 310; 30; 20 INJECTION, SOLUTION INTRAVENOUS at 06:15

## 2018-01-04 RX ADMIN — OXYCODONE HYDROCHLORIDE AND ACETAMINOPHEN 1 TABLET: 5; 325 TABLET ORAL at 09:03

## 2018-01-04 RX ADMIN — LIDOCAINE HYDROCHLORIDE: 10 INJECTION, SOLUTION INFILTRATION; PERINEURAL at 06:15

## 2018-01-04 ASSESSMENT — PAIN SCALES - GENERAL
PAINLEVEL_OUTOF10: 0
PAINLEVEL_OUTOF10: 4
PAINLEVEL_OUTOF10: 0
PAINLEVEL_OUTOF10: 6

## 2018-01-04 NOTE — OR NURSING
0814: To PACU post left shoulder arthroscopy w/ interscalene block. Pt is extubated, breathing is spontaneous and unlabored. Strong pulse noted. Polar care in place.  0830: Pt more awake. Able to sense touch to and move left hand. States having tolerable burning pain in shoulder.  0900: Pain is increasing, see MAR.  0925: Pain is tolerable, no nausea. Unable to maintain room air sat > 90% at this time. 02 re-applied.

## 2018-01-04 NOTE — OR NURSING
Respirations easy. Dressing clean and dry.  Immobilizer in place. Fingers warm, mobile and pink with brisk capillary refill. Polar care in place. Patient and his wife verbalize good understanding of discharge instructions.

## 2018-01-04 NOTE — OP REPORT
DATE OF SERVICE:  01/04/2018    PREOPERATIVE DIAGNOSES:  Left shoulder biceps tendon tear, labral tear,   partial rotator cuff tear and acromioclavicular joint osteoarthritis.    POSTOPERATIVE DIAGNOSES:  Left shoulder biceps tendon tear, labral tear,   partial rotator cuff tear and acromioclavicular joint osteoarthritis.    PROCEDURES:  1.  Left shoulder arthroscopy with debridement of anterior and posterior   labrum, biceps tendon and anchor, subscapularis and supraspinatus partial   rotator cuff tearing.  2.  Biceps tenotomy arthroscopically.  3.  Subacromial decompression, bursectomy.  4.  Distal clavicle resection arthroscopically.    SURGEON:  Linwood Grover MD.    ASSISTANT:  Aishwarya Chairez, certified first assist.    ANESTHESIA:  General and block.    ANESTHESIOLOGIST:  Keagan Summers MD.    BLOOD LOSS:  Minimal.    COMPLICATIONS:  No apparent.    DISPOSITION:  PACU.    CONDITION:  Stable.    INDICATIONS:  The patient is a 57-year-old male with ongoing left shoulder   pain.  He has diabetes.  MRI shows partial rotator cuff tearing and   significant tearing of the biceps tendon and labrum, failed conservative   measures, injections and physical therapy.  After discussing risks, benefits,   and rationale, he wished to proceed with elective arthroscopic shoulder   surgery.  Risks include, but not limited to infection, neurovascular injury,   incomplete symptoms, complications of anesthesia, need for postoperative   recovery, progression of rotator cuff disease, DVT, PE, complications of   anesthesia.  Informed consent was signed and placed in the chart.  All of his   questions were answered; no guarantees implied or given.    TECHNIQUE:  Both patient and I agreed the correct operative site as the left   shoulder, signed and marked in the preop holding, was given 2 g IV Ancef   prophylaxis.  He was done as the first case of the day because of his   diabetes.  I consulted by Dr. Keagan Summers of anesthesia  regarding   perioperative pain management and he consented the patient for interscalene   block, which was carried out under sterile technique without complications.    Patient was taken to the operative suite.  After adequate anesthesia, a   time-out was taken by all in room to identify the correct patient, limb, and   procedure.  Examination of the arm under anesthesia showed good range of   motion and he had mechanical symptoms of the left shoulder.  He had mechanical   symptoms of the right shoulder without gross instability.  He was placed in a   beach-chair position with prominences well padded.  Left shoulder was   sterilely prepped and draped in standard fashion.  Standard arthroscopic   portals were established.  Findings were as follows:  Significant tearing of   the biceps tendon being held on by a small thread of tissue, tearing of the   biceps anchor, anterior labral fraying, posterior labral fraying, leading edge   supraspinatus articular-sided partial tearing and partial tearing of the   subscapularis superior border.  A shaver was placed into the shoulder.  The   remnant of the biceps tendon was released, tenotomy was completed, the   remaining portion of the biceps tendon was debrided including the biceps   labral anchor, anterior labrum and posterior labrum from roughly the 7 o'clock   to 2 o'clock position.  Debridement was carried out on the leading edge of   the supraspinatus tendon and also on the upper border of the subscapularis.    Subscapularis had a good attachment.  There was not enough of a tear to   warrant repair.  Instruments were then moved to subacromial space.  There was   significant bursitis.  A complete bursectomy was performed.  Evaluation of the   rotator cuff from above showed no significant bursal-sided tear, subacromial   decompression/acromioplasty was carried out with an arthroscopic bur, _____   from front to back, pelvis smoothed.  The distal clavicle, which had  symptoms   along with arthritic findings on MRI was resected.  The distal clavicle   resection was carried out arthroscopically.  Hemostasis was achieved.  Fluid   was taken off the shoulder.  Portals were closed with simple nylon.  Xeroform   soft compressive dressing applied.  Patient was transferred to recovery in   stable condition.  Counts were correct.  No apparent complications.  In   recovery, he had 2+ radial and ulnar pulses, fingers are pink and warm with   brisk capillary refill.    Aishwarya Chairez, certified first assist, was essential for successful   completion of the case.  It could not have been done without her.       ____________________________________     MD MIRA Pond / MARGE    DD:  01/04/2018 08:30:42  DT:  01/04/2018 09:13:20    D#:  [id number]  Job#:  [id number]

## 2018-01-04 NOTE — DISCHARGE INSTRUCTIONS
ACTIVITY: Rest and take it easy for the first 24 hours.  A responsible adult is recommended to remain with you during that time.  It is normal to feel sleepy.  We encourage you to not do anything that requires balance, judgment or coordination.    MILD FLU-LIKE SYMPTOMS ARE NORMAL. YOU MAY EXPERIENCE GENERALIZED MUSCLE ACHES, THROAT IRRITATION, HEADACHE AND/OR SOME NAUSEA.    FOR 24 HOURS DO NOT:  Drive, operate machinery or run household appliances.  Drink beer or alcoholic beverages.   Make important decisions or sign legal documents.    SPECIAL INSTRUCTIONS: Activity is to be non weight bearing to operative extremity. Ice and elevate. See Dr. Grover's instruction sheet for further detail.     You are at a higher risk for sleep apnea for the next week after anesthesia. Please be very vigilant in wearing your CPAP device even if napping.    DIET: To avoid nausea, slowly advance diet as tolerated, avoiding spicy or greasy foods for the first day.  Add more substantial food to your diet according to your physician's instructions.  Babies can be fed formula or breast milk as soon as they are hungry.  INCREASE FLUIDS AND FIBER TO AVOID CONSTIPATION.    SURGICAL DRESSING/BATHING: Keep dressings clean and dry. May shower in 3 days. See Dr. Grover's instruction sheet for further detail.       FOLLOW-UP APPOINTMENT:  A follow-up appointment should be arranged with your doctor in 1-2 days; call to schedule.    You should CALL YOUR PHYSICIAN if you develop:  Fever greater than 101 degrees F.  Pain not relieved by medication, or persistent nausea or vomiting.  Excessive bleeding (blood soaking through dressing) or unexpected drainage from the wound.  Extreme redness or swelling around the incision site, drainage of pus or foul smelling drainage.  Inability to urinate or empty your bladder within 8 hours.    You should call 911 if you develop problems with breathing or chest pain.    If you are unable to contact your doctor or  surgical center, you should go to the nearest emergency room or urgent care center.      Physician's telephone #: Dr. Grover [phone]    If any questions arise, call your doctor.  If your doctor is not available, please feel free to call the Surgical Center at [phone].  The Center is open Monday through Friday from 7AM to 7PM.      You can also call the HEALTH HOTLINE open 24 hours/day, 7 days/week and speak to a nurse at [phone], or toll free at [phone].    A registered nurse may call you a few days after your surgery to see how you are doing after your procedure.    MEDICATIONS: Resume taking daily medication.  Take prescribed pain medication with food.  If no medication is prescribed, you may take non-aspirin pain medication if needed.  PAIN MEDICATION CAN BE VERY CONSTIPATING.  Take a stool softener or laxative such as senokot, pericolace, or milk of magnesia if needed.    Prescription given for pre op.      Last pain medication (Percocet 5 mg) given at 9:00 am.    If your physician has prescribed pain medication that includes Acetaminophen (Tylenol), do not take additional Acetaminophen (Tylenol) while taking the prescribed medication.    Peripheral Nerve Block Discharge Instructions from Same Day Surgery and Inpatient :    What to Expect - Upper Extremity  · You may experience numbness and weakness in shoulder, arm and hand  on the same side as your surgery  · This is normal. For some people, this may be an unpleasant sensation. Be very careful with your numb limb  · Ask for help when you need it  Shoulder Surgery Side Effects  · In addition to numbness and weakness you may experience other symptoms  · Other nerves that are close to those nerves injected can also be affected by local anesthesia  · You may experience a hoarseness in your voice  · Your breathing may feel different  · You may also notice drooping of your eyelid, pupil constriction, and decreased sweating, on the side of  "your surgery  · All of these side effects are normal and will resolve when the local anesthetic wears off   Prevent Injury  · Protect the limb like a baby  · Beware of exposing your limb to extreme heat or cold or trauma  · The limb may be injured without you noticing because it is numb  · Keep the limb elevated whenever possible  · Do not sleep on the limb  · Change the position of the limb regularly  · Avoid putting pressure on your surgical limb  Pain Control  · The initial block on the day of surgery will make your extremity feel \"numb\"  · Any consecutive injection including prior to discharge from the hospital will make your extremity feel \"numb\"  · You may feel an aching or burning when the local anesthesia starts to wear off  · Take pain pills as prescribed by your surgeon  · Call your surgeon or anesthesiologist if you do not have adequate pain control  ·       Depression / Suicide Risk    As you are discharged from this [facility], it is important to learn how to keep safe from harming yourself.    Recognize the warning signs:  · Abrupt changes in personality, positive or negative- including increase in energy   · Giving away possessions  · Change in eating patterns- significant weight changes-  positive or negative  · Change in sleeping patterns- unable to sleep or sleeping all the time   · Unwillingness or inability to communicate  · Depression  · Unusual sadness, discouragement and loneliness  · Talk of wanting to die  · Neglect of personal appearance   · Rebelliousness- reckless behavior  · Withdrawal from people/activities they love  · Confusion- inability to concentrate     If you or a loved one observes any of these behaviors or has concerns about self-harm, here's what you can do:  · Talk about it- your feelings and reasons for harming yourself  · Remove any means that you might use to hurt yourself (examples: pills, rope, extension cords, firearm)  · Get professional help from the " community (Mental Health, Substance Abuse, psychological counseling)  · Do not be alone:Call your Safe Contact- someone whom you trust who will be there for you.  · Call your local CRISIS HOTLINE 103-7103 or 779-358-4624  · Call your local Children's Mobile Crisis Response Team [place] (020) 863-4460 or www.ClarityRay  · Call the toll free National Suicide Prevention Hotlines   · National Suicide Prevention Lifeline 934-106-GUTO (3201)  National Hope Line Network 800-SUICIDE (349-4647)

## 2018-01-04 NOTE — OR NURSING
1025: Rcvd report from TANYA Hagen and assumed care. Pain is tolerable, no nausea. Pt on room air, occasionally O2 saturations drop to 86-88%. Pt sat up more and IS started. Able to pull 200 ml on IS, goal is 3000 ml.  1030: Pain is still tolerable, no nausea. Pt continuing to work with IS.   1040: O2 saturations have remain > 90%, except for on drop to 83%, pt was encouraged to take a deep breath and sats returned to 92%.   1045: Meets criteria to transfer to Stage 2, report called and pt readied for transfer.

## 2018-01-04 NOTE — OR SURGEON
Immediate Post OP Note    PreOp Diagnosis: left shoulder biceps tear, labral tear, RTC tear, AC OA     PostOp Diagnosis: same     Procedure(s):  SHOULDER debridement and DECOMPRESSION ARTHROSCOPIC - SUBACROMIAL - Wound Class: Clean  SHOULDER ARTHROSCOPY W/ BICIPITAL Tenotomy - Wound Class: Clean  CLAVICLE DISTAL EXCISION - POSSIBLE - Wound Class: Clean    Surgeon(s):  Linwood Grover M.D.    Anesthesiologist/Type of Anesthesia:  Anesthesiologist: Keagan Summers M.D./General    Surgical Staff:  Circulator: Daysi Bowling R.N.  Scrub Person: Vidal Vallecillo Assist: Aishwarya Chairez [facility]    Specimens:  * No specimens in log *    Estimated Blood Loss: min    Findings: biceps tear, labral tear, RTC tear, AC OA     Complications: no apparent         1/4/2018 8:24 AM Linwood Grover

## 2018-01-11 ENCOUNTER — HOSPITAL ENCOUNTER (OUTPATIENT)
Dept: LAB | Facility: MEDICAL CENTER | Age: 58
End: 2018-01-11
Attending: PHYSICIAN ASSISTANT
Payer: COMMERCIAL

## 2018-01-11 LAB
ALBUMIN SERPL BCP-MCNC: 4.2 G/DL (ref 3.2–4.9)
ALBUMIN/GLOB SERPL: 1.4 G/DL
ALP SERPL-CCNC: 55 U/L (ref 30–99)
ALT SERPL-CCNC: 22 U/L (ref 2–50)
ANION GAP SERPL CALC-SCNC: 10 MMOL/L (ref 0–11.9)
AST SERPL-CCNC: 16 U/L (ref 12–45)
BILIRUB SERPL-MCNC: 0.6 MG/DL (ref 0.1–1.5)
BUN SERPL-MCNC: 12 MG/DL (ref 8–22)
CALCIUM SERPL-MCNC: 9.5 MG/DL (ref 8.5–10.5)
CHLORIDE SERPL-SCNC: 101 MMOL/L (ref 96–112)
CHOLEST SERPL-MCNC: 145 MG/DL (ref 100–199)
CO2 SERPL-SCNC: 29 MMOL/L (ref 20–33)
CORTIS SERPL-MCNC: 21.1 UG/DL (ref 0–23)
CREAT SERPL-MCNC: 0.82 MG/DL (ref 0.5–1.4)
CREAT UR-MCNC: 82 MG/DL
GLOBULIN SER CALC-MCNC: 2.9 G/DL (ref 1.9–3.5)
GLUCOSE SERPL-MCNC: 114 MG/DL (ref 65–99)
HDLC SERPL-MCNC: 37 MG/DL
LDLC SERPL CALC-MCNC: 71 MG/DL
MICROALBUMIN UR-MCNC: <0.7 MG/DL
MICROALBUMIN/CREAT UR: NORMAL MG/G (ref 0–30)
POTASSIUM SERPL-SCNC: 3.7 MMOL/L (ref 3.6–5.5)
PROT SERPL-MCNC: 7.1 G/DL (ref 6–8.2)
SODIUM SERPL-SCNC: 140 MMOL/L (ref 135–145)
T4 FREE SERPL-MCNC: 1.02 NG/DL (ref 0.53–1.43)
TRIGL SERPL-MCNC: 185 MG/DL (ref 0–149)
TSH SERPL DL<=0.005 MIU/L-ACNC: 2.21 UIU/ML (ref 0.38–5.33)

## 2018-01-11 PROCEDURE — 82533 TOTAL CORTISOL: CPT

## 2018-01-11 PROCEDURE — 82570 ASSAY OF URINE CREATININE: CPT

## 2018-01-11 PROCEDURE — 36415 COLL VENOUS BLD VENIPUNCTURE: CPT

## 2018-01-11 PROCEDURE — 84439 ASSAY OF FREE THYROXINE: CPT

## 2018-01-11 PROCEDURE — 80061 LIPID PANEL: CPT

## 2018-01-11 PROCEDURE — 80053 COMPREHEN METABOLIC PANEL: CPT

## 2018-01-11 PROCEDURE — 82043 UR ALBUMIN QUANTITATIVE: CPT

## 2018-01-11 PROCEDURE — 84305 ASSAY OF SOMATOMEDIN: CPT

## 2018-01-11 PROCEDURE — 84443 ASSAY THYROID STIM HORMONE: CPT

## 2018-01-11 PROCEDURE — 83036 HEMOGLOBIN GLYCOSYLATED A1C: CPT

## 2018-01-12 LAB
EST. AVERAGE GLUCOSE BLD GHB EST-MCNC: 169 MG/DL
HBA1C MFR BLD: 7.5 % (ref 0–5.6)

## 2018-01-13 LAB
IGF-I SERPL-MCNC: 129 NG/ML (ref 55–203)
IGF-I Z-SCORE SERPL: 0.3

## 2018-04-10 ENCOUNTER — HOSPITAL ENCOUNTER (OUTPATIENT)
Dept: LAB | Facility: MEDICAL CENTER | Age: 58
End: 2018-04-10
Attending: PHYSICIAN ASSISTANT
Payer: COMMERCIAL

## 2018-04-10 LAB
25(OH)D3 SERPL-MCNC: 56 NG/ML (ref 30–100)
CHOLEST SERPL-MCNC: 124 MG/DL (ref 100–199)
EST. AVERAGE GLUCOSE BLD GHB EST-MCNC: 169 MG/DL
HBA1C MFR BLD: 7.5 % (ref 0–5.6)
HDLC SERPL-MCNC: 35 MG/DL
LDLC SERPL CALC-MCNC: 48 MG/DL
T4 FREE SERPL-MCNC: 1 NG/DL (ref 0.53–1.43)
TRIGL SERPL-MCNC: 207 MG/DL (ref 0–149)
TSH SERPL DL<=0.005 MIU/L-ACNC: 1.65 UIU/ML (ref 0.38–5.33)

## 2018-04-10 PROCEDURE — 84439 ASSAY OF FREE THYROXINE: CPT

## 2018-04-10 PROCEDURE — 83036 HEMOGLOBIN GLYCOSYLATED A1C: CPT

## 2018-04-10 PROCEDURE — 84443 ASSAY THYROID STIM HORMONE: CPT

## 2018-04-10 PROCEDURE — 36415 COLL VENOUS BLD VENIPUNCTURE: CPT

## 2018-04-10 PROCEDURE — 82306 VITAMIN D 25 HYDROXY: CPT

## 2018-04-10 PROCEDURE — 80061 LIPID PANEL: CPT

## 2018-04-19 ENCOUNTER — OFFICE VISIT (OUTPATIENT)
Dept: CARDIOLOGY | Facility: MEDICAL CENTER | Age: 58
End: 2018-04-19
Payer: COMMERCIAL

## 2018-04-19 VITALS
BODY MASS INDEX: 29.03 KG/M2 | SYSTOLIC BLOOD PRESSURE: 126 MMHG | OXYGEN SATURATION: 93 % | HEART RATE: 88 BPM | WEIGHT: 196 LBS | HEIGHT: 69 IN | DIASTOLIC BLOOD PRESSURE: 80 MMHG

## 2018-04-19 DIAGNOSIS — E78.5 DYSLIPIDEMIA: ICD-10-CM

## 2018-04-19 DIAGNOSIS — I25.10 CORONARY ARTERY DISEASE, NON-OCCLUSIVE: ICD-10-CM

## 2018-04-19 DIAGNOSIS — I20.9 ANGINA PECTORIS (HCC): ICD-10-CM

## 2018-04-19 DIAGNOSIS — Q24.5 CORONARY-MYOCARDIAL BRIDGE: ICD-10-CM

## 2018-04-19 DIAGNOSIS — I10 ESSENTIAL HYPERTENSION, BENIGN: ICD-10-CM

## 2018-04-19 PROCEDURE — 99214 OFFICE O/P EST MOD 30 MIN: CPT | Performed by: INTERNAL MEDICINE

## 2018-04-19 ASSESSMENT — ENCOUNTER SYMPTOMS
LOSS OF CONSCIOUSNESS: 0
PALPITATIONS: 0
MYALGIAS: 0
WHEEZING: 0
DIZZINESS: 0
COUGH: 0

## 2018-04-19 NOTE — PROGRESS NOTES
Chief Complaint   Patient presents with   • Coronary artery disease        Subjective:   Barron Hewitt is a 57 y.o. male who presents today for follow up evaluation of CAD by recent catheterization, hypertension, hyperlipidemia myocardial bridging.     Last seen on 12/21/2017 by APN.    Since 12/21/2017 appointment the patient has had no cardiac symptoms.  In January, 2018 he had left shoulder surgery without complications or perioperative cardiac problems.  Compliant with medications.     Since 8/22/2016 the patient has had some mild inconsistent intermittent exertional angina.  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     Past medical history  All 4 of his children are engaged to be  within the next year.  Tolerating his medications.     The patient is being seen today after recent cardiac catheterization that was performed because of the patient having chest discomfort and abnormal standard stress test indicating ischemia.  Catheterization showed a 60's 70% diagonal lesion with an FFR of 89.  No new cardiac symptoms.     Has sleep apnea Followed by [facility].  Has not tolerated CPAP.  Does not get restful sleep and has a lot of fatigue.  Interested in exercising.  Walk regularly this past year up to 4 miles a day but not in the past several months.  Has been seen by Dr. Grover, orthopedic surgeon for his knee problems.     Has had previous plans to initiate a more aerobic cardiovascular exercise program with stationary bicycling.  In both quadriceps which has limited his exercise choices.     Diabetes mellitus is improved.     Past Medical History  In 2012 he had an abdominal hernia repair without complications.  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 Valverde   • Sleep apnea     has CPAP   • Snoring      Past Surgical History:   Procedure Laterality Date   • SHOULDER DECOMPRESSION ARTHROSCOPIC Left 1/4/2018    Procedure: SHOULDER DECOMPRESSION ARTHROSCOPIC - SUBACROMIAL;  Surgeon: Linwood Grover M.D.;  Location: SURGERY [facility];  Service: Orthopedics   • SHOULDER ARTHROSCOPY W/ BICIPITAL TENODESIS REPAIR Left 1/4/2018    Procedure: SHOULDER ARTHROSCOPY W/ BICIPITAL Tenotomy REPAIR;  Surgeon: Linwood Grover M.D.;  Location: [facility];  Service: Orthopedics   • CLAVICLE DISTAL EXCISION Left 1/4/2018    Procedure: CLAVICLE DISTAL EXCISION - POSSIBLE;  Surgeon: Linwood Grover M.D.;  Location: [facility];  Service: Orthopedics   • RECOVERY  4/8/2016    Procedure: CATH LAB [facility] WITH POSSIBLE NAVIN;  Surgeon: [facility];  Location: SURGERY PRE-POST PROC UNIT [facility];  Service:    • VENTRAL HERNIA REPAIR LAPAROSCOPIC  11/1/2012    Performed by Marcos Ramírez M.D. at [facility]   • SINUSOTOMIES  1990's    times two surgeries   • KNEE ARTHROSCOPY  1990's    right   • TUMOR EXCISION WITH BIOPSY  1990's    right hand   • SHOULDER ARTHROSCOPY  1990's    right     Family History   Problem Relation Age of Onset   • Breast Cancer Mother    • Hypertension Mother    • Hypertension Father    • No Known Problems Sister    • Arthritis Brother    • Heart Disease     • Hypertension     • Cancer     • No Known Problems Brother      Social History     Social History   • Marital status:      Spouse name: N/A   • Number of children: N/A   • Years of education: N/A     Occupational History   • Not on file.     Social History Main  Topics   • Smoking status: Current Every Day Smoker     Types: Cigars   • Smokeless tobacco: Never Used   • Alcohol use Yes      Comment: 1-2/month   • Drug use: No      Comment: occ cigar smoking   • Sexual activity: Not on file     Other Topics Concern   • Not on file     Social History Narrative   • No narrative on file     Allergies   Allergen Reactions   • Kiwi Extract Anaphylaxis   • Shellfish Allergy Anaphylaxis   • Strawberry Anaphylaxis   • Sulfa Drugs Rash     Outpatient Encounter Prescriptions as of 4/19/2018   Medication Sig Dispense Refill   • Insulin Degludec (TRESIBA FLEXTOUCH SC) Inject 28 Units as instructed.     • CARTIA  MG CAPSULE SR 24 HR TAKE 1 CAPSULE BY MOUTH DAILY 90 Cap 3   • benazepril (LOTENSIN) 20 MG Tab TAKE 1 TABLET BY MOUTH ONE TIME A DAY 90 Tab 3   • albuterol (VENTOLIN HFA) 108 (90 Base) MCG/ACT Aero Soln inhalation aerosol Inhale 2 Puffs by mouth every four hours as needed. FOR SHORTNESS OF BREATH 1 Inhaler 5   • zolpidem (AMBIEN) 5 MG Tab TAKE ONE TABLET BY MOUTH AT BEDTIME AS NEEDED FOR  INSOMNIA 30 Tab 3   • liraglutide (VICTOZA) 18 MG/3ML Solution Pen-injector injection Inject 1.8 mg as instructed every day.     • Canagliflozin (INVOKANA) 300 MG TABS Take  by mouth every day.     • metformin (GLUCOPHAGE) 500 MG TABS Take 1,000 mg by mouth 2 times a day, with meals. 2 tablet am 1 tablets pm     • atorvastatin (LIPITOR) 80 MG tablet Take 80 mg by mouth every evening.     • ALPHA LIPOIC ACID PO Take 600 mg by mouth 2 Times a Day.     • Omega-3 Fatty Acids (FISH OIL) 1200 MG CAPS Take  by mouth.     • Cinnamon 500 MG CAPS Take 1,000 mg by mouth.     • aspirin EC (ECOTRIN) 81 MG TBEC Take 81 mg by mouth every day.       • fluticasone-salmeterol (ADVAIR DISKUS) 500-50 MCG/DOSE AEPB Inhale 1 Puff by mouth every 12 hours.       • pantoprazole (PROTONIX) 40 MG TBEC Take 40 mg by mouth every day.       • Coenzyme Q10 (CO Q-10) 200 MG CAPS Take  by mouth every day.       •  "Cholecalciferol (VITAMIN D) 2000 UNIT TABS Take  by mouth 2 Times a Day.     • cetirizine (ZYRTEC) 10 MG TABS Take 10 mg by mouth every day.       No facility-administered encounter medications on file as of 4/19/2018.      Review of Systems   Respiratory: Negative for cough and wheezing.    Cardiovascular: Negative for chest pain and palpitations.   Musculoskeletal: Negative for myalgias.   Neurological: Negative for dizziness and loss of consciousness.        Objective:   /80   Pulse 88   Ht 1.753 m (5' 9\")   Wt 88.9 kg (196 lb)   SpO2 93%   BMI 28.94 kg/m²     Physical Exam   Constitutional: He is oriented to person, place, and time. He appears well-developed and well-nourished. No distress.   Neck: No JVD present.   Cardiovascular: Normal rate, regular rhythm, normal heart sounds and intact distal pulses.  Exam reveals no gallop and no friction rub.    No murmur heard.  Pulmonary/Chest: Effort normal and breath sounds normal. No respiratory distress. He has no wheezes. He has no rales.   Abdominal: Soft.   Musculoskeletal: He exhibits no edema.   Neurological: He is alert and oriented to person, place, and time.   Skin: Skin is warm and dry.   Psychiatric: He has a normal mood and affect. His behavior is normal.     03/27/2008 Standard exercise stress test  demonstrated  asymptomatic upsloping horizontal 1 mm ST-segment depression in the  inferolateral leads.       05/22/2008 myocardial perfusion stress test     Demonstrated the 1 to 1-1/2 mm  horizontal/upsloping ST-segment depression in inferolateral leads  after achieving 91% of maximum age predicted heart rate of 155 with  the perfusion scan suggesting \"ischemia in the left anterior  descending distribution\", ejection fraction was 69%.   3/22/2016 TREADMILL STRESS TEST  Stress ECG: Ischemic 2 mm ST depression in the inferior and  lateral leads with exercise.to exercise  Impression: Ischemic ECG changes at a  fair workload    07/11/2008 Cardiac " Catheterization:  EF 73%.  Normal left ventricular wall motion.  Mid LAD mild myocardial bridging.      04/06/2016 CARDIAC CATHETERIZATION  1.  Normal left ventricular end-diastolic pressure.  2.  Normal left ventricular systolic function. EF 75%.  3.  Eccentric 60-70% lesion at the origin of the second LAD diagonal with FFR    across this lesion of 0.89.    Assessment:     1. Angina pectoris (CMS-HCC)     2. Coronary artery disease, non-occlusive     3. Coronary-myocardial bridge     4. Essential hypertension, benign     5. Dyslipidemia         Medical Decision Making:  Today's Assessment / Status / Plan:   Angina pectoris with exertion. Stable.     CAD. Cardiac catheterization 4/6/2016     Myocardial bridging.  2008 angiogram.     Diabetes mellitus.     Hypertension. Currently controlled.     Hyperlipidemia. Continue atorvastatin.  Controlled.       Recommendation  I reviewed with patient that his current cardiac condition is stable.  I reviewed his recent lipid panel with the patient.  Continue current therapy.  Follow-up 6 months, sooner if necessary.

## 2018-08-10 ENCOUNTER — HOSPITAL ENCOUNTER (OUTPATIENT)
Dept: LAB | Facility: MEDICAL CENTER | Age: 58
End: 2018-08-10
Attending: PHYSICIAN ASSISTANT
Payer: COMMERCIAL

## 2018-08-10 LAB
CHOLEST SERPL-MCNC: 153 MG/DL (ref 100–199)
EST. AVERAGE GLUCOSE BLD GHB EST-MCNC: 174 MG/DL
HBA1C MFR BLD: 7.7 % (ref 0–5.6)
HDLC SERPL-MCNC: 33 MG/DL
LDLC SERPL CALC-MCNC: 88 MG/DL
T4 FREE SERPL-MCNC: 1.01 NG/DL (ref 0.53–1.43)
TRIGL SERPL-MCNC: 159 MG/DL (ref 0–149)
TSH SERPL DL<=0.005 MIU/L-ACNC: 1.66 UIU/ML (ref 0.38–5.33)

## 2018-08-10 PROCEDURE — 80061 LIPID PANEL: CPT

## 2018-08-10 PROCEDURE — 84443 ASSAY THYROID STIM HORMONE: CPT

## 2018-08-10 PROCEDURE — 84439 ASSAY OF FREE THYROXINE: CPT

## 2018-08-10 PROCEDURE — 36415 COLL VENOUS BLD VENIPUNCTURE: CPT

## 2018-08-10 PROCEDURE — 83036 HEMOGLOBIN GLYCOSYLATED A1C: CPT

## 2018-08-17 ENCOUNTER — SLEEP CENTER VISIT (OUTPATIENT)
Dept: SLEEP MEDICINE | Facility: MEDICAL CENTER | Age: 58
End: 2018-08-17
Payer: COMMERCIAL

## 2018-08-17 VITALS
DIASTOLIC BLOOD PRESSURE: 70 MMHG | RESPIRATION RATE: 16 BRPM | WEIGHT: 196 LBS | BODY MASS INDEX: 29.03 KG/M2 | HEIGHT: 69 IN | SYSTOLIC BLOOD PRESSURE: 132 MMHG | OXYGEN SATURATION: 94 % | HEART RATE: 91 BPM

## 2018-08-17 DIAGNOSIS — J45.20 MILD INTERMITTENT ASTHMA WITHOUT COMPLICATION: ICD-10-CM

## 2018-08-17 DIAGNOSIS — G47.33 OSA (OBSTRUCTIVE SLEEP APNEA): ICD-10-CM

## 2018-08-17 DIAGNOSIS — G47.09 OTHER INSOMNIA: ICD-10-CM

## 2018-08-17 DIAGNOSIS — G47.33 OSA ON CPAP: ICD-10-CM

## 2018-08-17 PROCEDURE — 99214 OFFICE O/P EST MOD 30 MIN: CPT | Performed by: NURSE PRACTITIONER

## 2018-08-17 RX ORDER — ALBUTEROL SULFATE 90 UG/1
2 AEROSOL, METERED RESPIRATORY (INHALATION) EVERY 4 HOURS PRN
Qty: 3 INHALER | Refills: 3 | Status: SHIPPED | OUTPATIENT
Start: 2018-08-17 | End: 2019-10-07 | Stop reason: SDUPTHER

## 2018-08-17 RX ORDER — ZOLPIDEM TARTRATE 5 MG/1
TABLET ORAL
Qty: 90 TAB | Refills: 0 | Status: SHIPPED
Start: 2018-08-17 | End: 2018-11-17

## 2018-08-17 ASSESSMENT — ENCOUNTER SYMPTOMS
FEVER: 0
SHORTNESS OF BREATH: 0
EYE DISCHARGE: 0
COUGH: 1
MUSCULOSKELETAL NEGATIVE: 1
EYE PAIN: 0
CHILLS: 0
NEUROLOGICAL NEGATIVE: 1
WHEEZING: 1
BRUISES/BLEEDS EASILY: 0
WEIGHT LOSS: 0
SPUTUM PRODUCTION: 0
HEMOPTYSIS: 0
CARDIOVASCULAR NEGATIVE: 1
WEAKNESS: 0
GASTROINTESTINAL NEGATIVE: 1
DIAPHORESIS: 0
PSYCHIATRIC NEGATIVE: 1
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
## 2018-11-14 ENCOUNTER — TELEPHONE (OUTPATIENT)
Dept: PULMONOLOGY | Facility: HOSPICE | Age: 58
End: 2018-11-14

## 2018-11-14 DIAGNOSIS — G47.33 OSA (OBSTRUCTIVE SLEEP APNEA): ICD-10-CM

## 2018-11-19 ENCOUNTER — HOSPITAL ENCOUNTER (OUTPATIENT)
Dept: PULMONOLOGY | Facility: MEDICAL CENTER | Age: 58
End: 2018-11-19
Attending: NURSE PRACTITIONER
Payer: COMMERCIAL

## 2018-11-19 PROCEDURE — 94729 DIFFUSING CAPACITY: CPT | Mod: 26 | Performed by: INTERNAL MEDICINE

## 2018-11-19 PROCEDURE — 94060 EVALUATION OF WHEEZING: CPT | Mod: 26 | Performed by: INTERNAL MEDICINE

## 2018-11-19 PROCEDURE — 94726 PLETHYSMOGRAPHY LUNG VOLUMES: CPT | Mod: 26 | Performed by: INTERNAL MEDICINE

## 2018-11-19 PROCEDURE — 94726 PLETHYSMOGRAPHY LUNG VOLUMES: CPT

## 2018-11-19 PROCEDURE — 94729 DIFFUSING CAPACITY: CPT

## 2018-11-19 PROCEDURE — 94060 EVALUATION OF WHEEZING: CPT

## 2018-11-19 ASSESSMENT — PULMONARY FUNCTION TESTS
FEV1_PERCENT_CHANGE: 8
FEV1/FVC: 70.97
FEV1_PERCENT_CHANGE: 6
FEV1/FVC: 70
FEV1/FVC_PERCENT_PREDICTED: 89
FEV1/FVC: 71
FVC_LLN: 3.74
FEV1/FVC: 70
FEV1/FVC_PERCENT_PREDICTED: 77
FVC_PERCENT_PREDICTED: 96
FEV1/FVC_PREDICTED: 78
FVC: 4.06
FEV1/FVC_PERCENT_LLN: 65
FEV1_LLN: 2.90
FVC_PERCENT_PREDICTED: 90
FEV1: 3.08
FVC: 4.34
FVC_PREDICTED: 4.48
FEV1_PERCENT_PREDICTED: 82
FEV1_PREDICTED: 3.47
FEV1/FVC_PERCENT_PREDICTED: 92
FEV1/FVC_PERCENT_PREDICTED: 91
FEV1/FVC_PERCENT_LLN: 65
FEV1: 2.85
FEV1_LLN: 2.90
FEV1/FVC_PERCENT_CHANGE: 1
FEV1/FVC_PERCENT_PREDICTED: 91
FVC_LLN: 3.74
FEV1/FVC_PERCENT_CHANGE: 133
FEV1_PERCENT_PREDICTED: 88

## 2018-12-26 DIAGNOSIS — I10 BENIGN ESSENTIAL HTN: ICD-10-CM

## 2018-12-30 RX ORDER — BENAZEPRIL HYDROCHLORIDE 20 MG/1
20 TABLET ORAL DAILY
Qty: 90 TAB | Refills: 1 | Status: SHIPPED | OUTPATIENT
Start: 2018-12-30 | End: 2020-12-15 | Stop reason: SDUPTHER

## 2019-01-03 DIAGNOSIS — I10 BENIGN ESSENTIAL HTN: ICD-10-CM

## 2019-01-05 RX ORDER — DILTIAZEM HYDROCHLORIDE 180 MG/1
180 CAPSULE, COATED, EXTENDED RELEASE ORAL DAILY
Qty: 90 CAP | Refills: 1 | Status: SHIPPED | OUTPATIENT
Start: 2019-01-05 | End: 2019-06-12 | Stop reason: SDUPTHER

## 2019-01-28 ENCOUNTER — OFFICE VISIT (OUTPATIENT)
Dept: CARDIOLOGY | Facility: MEDICAL CENTER | Age: 59
End: 2019-01-28
Payer: COMMERCIAL

## 2019-01-28 VITALS
HEART RATE: 90 BPM | DIASTOLIC BLOOD PRESSURE: 80 MMHG | HEIGHT: 69 IN | SYSTOLIC BLOOD PRESSURE: 120 MMHG | WEIGHT: 196 LBS | BODY MASS INDEX: 29.03 KG/M2 | OXYGEN SATURATION: 96 %

## 2019-01-28 DIAGNOSIS — E78.5 DYSLIPIDEMIA: ICD-10-CM

## 2019-01-28 DIAGNOSIS — I10 ESSENTIAL HYPERTENSION, BENIGN: ICD-10-CM

## 2019-01-28 DIAGNOSIS — R93.1 ABNORMAL NUCLEAR CARDIAC IMAGING TEST: ICD-10-CM

## 2019-01-28 DIAGNOSIS — I25.10 CORONARY ARTERY DISEASE, NON-OCCLUSIVE: ICD-10-CM

## 2019-01-28 DIAGNOSIS — Q24.5 CORONARY-MYOCARDIAL BRIDGE: ICD-10-CM

## 2019-01-28 PROCEDURE — 99214 OFFICE O/P EST MOD 30 MIN: CPT | Performed by: INTERNAL MEDICINE

## 2019-01-28 ASSESSMENT — ENCOUNTER SYMPTOMS
MYALGIAS: 0
PALPITATIONS: 0
WHEEZING: 0
LOSS OF CONSCIOUSNESS: 0
COUGH: 0
DIZZINESS: 0

## 2019-01-28 NOTE — PROGRESS NOTES
Chief Complaint   Patient presents with   • Coronary artery disease        Subjective:   Barron Hewitt is a 58 y.o. male who presents today for follow up evaluation of CAD by recent catheterization, hypertension, hyperlipidemia myocardial bridging.     Last seen on 4/19/2018.    Since 4/19/2018 appointment patient's had no chest pain, shortness of breath or lightheadedness.  Still struggling to get his diabetes under control.  Has some tingling in his feet when he walks.  Schedule seated podiatrist.    Since 12/21/2017 appointment the patient has had no cardiac symptoms.  In January, 2018 he had left shoulder surgery without complications or perioperative cardiac problems.  Compliant with medications.     Since 8/22/2016 the patient has had some mild inconsistent intermittent exertional angina.  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     Past medical history  All 4 of his children are engaged to be  within the next year.  Tolerating his medications.     The patient is being seen today after recent cardiac catheterization that was performed because of the patient having chest discomfort and abnormal standard stress test indicating ischemia.  Catheterization showed a 60's 70% diagonal lesion with an FFR of 89.  No new cardiac symptoms.     Has sleep apnea Followed by [facility].  Has not tolerated CPAP.  Does not get restful sleep and has a lot of fatigue.  Interested in exercising.  Walk regularly this past year up to 4 miles a day but not in the past several months.  Has been seen by Dr. Grover, orthopedic surgeon for his knee problems.     Has had previous plans to initiate a more aerobic cardiovascular exercise program with stationary bicycling.  In both quadriceps which has limited his exercise choices.     Diabetes mellitus is improved.     Past Medical History  In 2012 he had an abdominal hernia repair without  complications.  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 Valverde   • Sleep apnea     has CPAP   • Snoring      Past Surgical History:   Procedure Laterality Date   • SHOULDER DECOMPRESSION ARTHROSCOPIC Left 1/4/2018    Procedure: SHOULDER DECOMPRESSION ARTHROSCOPIC - SUBACROMIAL;  Surgeon: Linwood Grover M.D.;  Location: [facility];  Service: Orthopedics   • SHOULDER ARTHROSCOPY W/ BICIPITAL TENODESIS REPAIR Left 1/4/2018    Procedure: SHOULDER ARTHROSCOPY W/ BICIPITAL Tenotomy REPAIR;  Surgeon: Linwood Grover M.D.;  Location: [facility];  Service: Orthopedics   • CLAVICLE DISTAL EXCISION Left 1/4/2018    Procedure: CLAVICLE DISTAL EXCISION - POSSIBLE;  Surgeon: Linwood Grover M.D.;  Location: [facility];  Service: Orthopedics   • RECOVERY  4/8/2016    Procedure: CATH LAB [facility] WITH POSSIBLE NAVIN;  Surgeon: Recoveryons[facility] Surgery;  Location: SURGERY PRE-POST PROC UNIT [facility];  Service:    • VENTRAL HERNIA REPAIR LAPAROSCOPIC  11/1/2012    Performed by Marcos Ramírez M.D. at [facility]   • SINUSOTOMIES  1990's    times two surgeries   • KNEE ARTHROSCOPY  1990's    right   • TUMOR EXCISION WITH BIOPSY  1990's    right hand   • SHOULDER ARTHROSCOPY  1990's    right     Family History   Problem Relation Age of Onset   • Breast Cancer Mother    • Hypertension Mother    • Hypertension Father    • No Known Problems Sister    • Arthritis Brother    • Heart Disease Unknown    • Hypertension Unknown    •  Cancer Unknown    • No Known Problems Brother      Social History     Social History   • Marital status:      Spouse name: N/A   • Number of children: N/A   • Years of education: N/A     Occupational History   • Not on file.     Social History Main Topics   • Smoking status: Current Every Day Smoker     Types: Cigars   • Smokeless tobacco: Never Used   • Alcohol use Yes      Comment: 1-2/month   • Drug use: No      Comment: occ cigar smoking   • Sexual activity: Not on file     Other Topics Concern   • Not on file     Social History Narrative   • No narrative on file     Allergies   Allergen Reactions   • Kiwi Extract Anaphylaxis   • Shellfish Allergy Anaphylaxis   • Strawberry Anaphylaxis   • Sulfa Drugs Rash     Outpatient Encounter Prescriptions as of 1/28/2019   Medication Sig Dispense Refill   • Semaglutide (OZEMPIC) 0.25 or 0.5 MG/DOSE Solution Pen-injector Inject  as instructed.     • DILTIAZem CD (CARTIA XT) 180 MG CAPSULE SR 24 HR Take 1 Cap by mouth every day. 90 Cap 1   • benazepril (LOTENSIN) 20 MG Tab Take 1 Tab by mouth every day. 90 Tab 1   • albuterol (VENTOLIN HFA) 108 (90 Base) MCG/ACT Aero Soln inhalation aerosol Inhale 2 Puffs by mouth every four hours as needed. FOR SHORTNESS OF BREATH 3 Inhaler 3   • fluticasone-salmeterol (ADVAIR DISKUS) 500-50 MCG/DOSE AEROSOL POWDER, BREATH ACTIVATED Inhale 1 Puff by mouth every 12 hours. 3 Inhaler 3   • Insulin Degludec (TRESIBA FLEXTOUCH SC) Inject 28 Units as instructed.     • Canagliflozin (INVOKANA) 300 MG TABS Take  by mouth every day.     • metformin (GLUCOPHAGE) 500 MG TABS Take 1,000 mg by mouth 2 times a day, with meals. 2 tablet am 1 tablets pm     • atorvastatin (LIPITOR) 80 MG tablet Take 80 mg by mouth every evening.     • ALPHA LIPOIC ACID PO Take 600 mg by mouth 2 Times a Day.     • Omega-3 Fatty Acids (FISH OIL) 1200 MG CAPS Take  by mouth.     • Cinnamon 500 MG CAPS Take 1,000 mg by mouth.     • aspirin EC (ECOTRIN) 81 MG TBEC Take  "81 mg by mouth every day.       • pantoprazole (PROTONIX) 40 MG TBEC Take 40 mg by mouth every day.       • Coenzyme Q10 (CO Q-10) 200 MG CAPS Take  by mouth every day.       • Cholecalciferol (VITAMIN D) 2000 UNIT TABS Take  by mouth 2 Times a Day.     • cetirizine (ZYRTEC) 10 MG TABS Take 10 mg by mouth every day.     • Dulaglutide (TRULICITY) 1.5 MG/0.5ML Solution Pen-injector Inject  as instructed.     • liraglutide (VICTOZA) 18 MG/3ML Solution Pen-injector injection Inject 1.8 mg as instructed every day.       No facility-administered encounter medications on file as of 1/28/2019.      Review of Systems   Respiratory: Negative for cough and wheezing.    Cardiovascular: Negative for chest pain and palpitations.   Musculoskeletal: Negative for myalgias.   Neurological: Negative for dizziness and loss of consciousness.        Objective:   /80 (BP Location: Left arm, Patient Position: Sitting, BP Cuff Size: Adult)   Pulse 90   Ht 1.753 m (5' 9\")   Wt 88.9 kg (196 lb)   SpO2 96%   BMI 28.94 kg/m²     Physical Exam   Constitutional: He is oriented to person, place, and time. He appears well-developed and well-nourished. No distress.   Neck: No JVD present.   Cardiovascular: Normal rate, regular rhythm, normal heart sounds and intact distal pulses.  Exam reveals no gallop and no friction rub.    No murmur heard.  Pulses:       Dorsalis pedis pulses are 3+ on the right side, and 3+ on the left side.        Posterior tibial pulses are 3+ on the right side, and 3+ on the left side.   Pulmonary/Chest: Effort normal and breath sounds normal. No respiratory distress. He has no wheezes. He has no rales.   Abdominal: Soft.   Musculoskeletal: He exhibits no edema.   Neurological: He is alert and oriented to person, place, and time.   Skin: Skin is warm and dry.   Psychiatric: He has a normal mood and affect. His behavior is normal.     03/27/2008 Standard exercise stress test  demonstrated  asymptomatic upsloping " "horizontal 1 mm ST-segment depression in the  inferolateral leads.       05/22/2008 myocardial perfusion stress test     Demonstrated the 1 to 1-1/2 mm  horizontal/upsloping ST-segment depression in inferolateral leads  after achieving 91% of maximum age predicted heart rate of 155 with  the perfusion scan suggesting \"ischemia in the left anterior  descending distribution\", ejection fraction was 69%.   3/22/2016 TREADMILL STRESS TEST  Stress ECG: Ischemic 2 mm ST depression in the inferior and  lateral leads with exercise.to exercise  Impression: Ischemic ECG changes at a  fair workload    07/11/2008 Cardiac Catheterization:  EF 73%.  Normal left ventricular wall motion.  Mid LAD mild myocardial bridging.      04/06/2016 CARDIAC CATHETERIZATION  1.  Normal left ventricular end-diastolic pressure.  2.  Normal left ventricular systolic function. EF 75%.  3.  Eccentric 60-70% lesion at the origin of the second LAD diagonal with FFR    across this lesion of 0.89.    Assessment:     1. Coronary artery disease, non-occlusive     2. Coronary-myocardial bridge     3. Essential hypertension, benign     4. Dyslipidemia  LIPID PANEL   5. Abnormal nuclear cardiac imaging test         Medical Decision Making:  Today's Assessment / Status / Plan:   Angina pectoris with exertion.  No recurrence.     CAD. Cardiac catheterization 4/6/2016     Myocardial bridging.  2008 angiogram.     Diabetes mellitus.     Hypertension. Currently controlled.     Hyperlipidemia. Continue atorvastatin.  Controlled.       Recommendation  The patient's current cardiovascular status is stable in reference to his CAD, blood pressure and lipid status.  Update lipid status.  Continue current cardiac therapy.  Follow-up 12 months, sooner if necessary.    "

## 2019-02-28 ENCOUNTER — SLEEP CENTER VISIT (OUTPATIENT)
Dept: SLEEP MEDICINE | Facility: MEDICAL CENTER | Age: 59
End: 2019-02-28
Payer: COMMERCIAL

## 2019-02-28 VITALS
OXYGEN SATURATION: 93 % | HEART RATE: 82 BPM | HEIGHT: 69 IN | DIASTOLIC BLOOD PRESSURE: 80 MMHG | BODY MASS INDEX: 28.88 KG/M2 | SYSTOLIC BLOOD PRESSURE: 124 MMHG | WEIGHT: 195 LBS | RESPIRATION RATE: 16 BRPM

## 2019-02-28 DIAGNOSIS — J30.2 SEASONAL ALLERGIES: ICD-10-CM

## 2019-02-28 DIAGNOSIS — G47.33 OSA ON CPAP: ICD-10-CM

## 2019-02-28 DIAGNOSIS — J45.20 MILD INTERMITTENT ASTHMA WITHOUT COMPLICATION: ICD-10-CM

## 2019-02-28 DIAGNOSIS — G47.09 OTHER INSOMNIA: ICD-10-CM

## 2019-02-28 PROCEDURE — 99214 OFFICE O/P EST MOD 30 MIN: CPT | Performed by: NURSE PRACTITIONER

## 2019-02-28 ASSESSMENT — ENCOUNTER SYMPTOMS
EYE DISCHARGE: 0
MEMORY LOSS: 0
GASTROINTESTINAL NEGATIVE: 1
MUSCULOSKELETAL NEGATIVE: 1
BRUISES/BLEEDS EASILY: 0
DEPRESSION: 0
INSOMNIA: 1
CARDIOVASCULAR NEGATIVE: 1
EYE PAIN: 0
HALLUCINATIONS: 0
CONSTITUTIONAL NEGATIVE: 1
RESPIRATORY NEGATIVE: 1
NEUROLOGICAL NEGATIVE: 1
NERVOUS/ANXIOUS: 0

## 2019-02-28 ASSESSMENT — LIFESTYLE VARIABLES: SUBSTANCE_ABUSE: 0

## 2019-02-28 NOTE — PROGRESS NOTES
Chief Complaint   Patient presents with   • Apnea     Last Seen 08/17/18         HPI: This patient is a 58 y.o. male, who presents for PFT results.     Barron he has a history of moderate persistent asthma.  Former PFTs show an FEV1 of 2.64 L 70% predicted.  PFTs in November indicate an FEV1 of 2.85 L 82% predicted, FEV1 FEC ratio of 70, DLCO 167% predicted with no significant bronchodilator response.  He remains compliant with Advair 500/50 twice daily, Ventolin typically 1 time per day.  He has significant seasonal allergies for which he takes Zyrtec with good result.  He will occasionally require Benadryl in addition to Zyrtec.  He denies dyspnea, cough or wheeze.    Former PSG indicates severe sleep apnea with an AHI of 69.6 and a minimum saturation of 82%.  He is compliant with auto CPAP 5-15 cm H2O.  He did not bring his compliance chip today but continues to use his machine.  He requires mask and supplies.  Currently with [facility] but needs to change DME companies.    He has a history of insomnia uses Ambien on occasion.  Denies side effects.  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 Valverde   • Sleep apnea     has CPAP   • Snoring        Social History   Substance Use Topics   • Smoking status: Current Every Day Smoker     Types: Cigars   • Smokeless tobacco: Never Used      Comment: rare cigar   • Alcohol use Yes      Comment: 1-2/month       Family History   Problem Relation Age of Onset   • Breast Cancer Mother    • Hypertension Mother    • Hypertension Father    • No Known Problems Sister    • Arthritis Brother    •  Heart Disease Unknown    • Hypertension Unknown    • Cancer Unknown    • No Known Problems Brother        Immunization History   Administered Date(s) Administered   • Pneumococcal Vaccine (PCV7) Historical Data 11/01/2015       Current medications as of today   Current Outpatient Prescriptions   Medication Sig Dispense Refill   • Semaglutide (OZEMPIC) 0.25 or 0.5 MG/DOSE Solution Pen-injector Inject  as instructed.     • DILTIAZem CD (CARTIA XT) 180 MG CAPSULE SR 24 HR Take 1 Cap by mouth every day. 90 Cap 1   • benazepril (LOTENSIN) 20 MG Tab Take 1 Tab by mouth every day. 90 Tab 1   • fluticasone-salmeterol (ADVAIR DISKUS) 500-50 MCG/DOSE AEROSOL POWDER, BREATH ACTIVATED Inhale 1 Puff by mouth every 12 hours. 3 Inhaler 3   • Insulin Degludec (TRESIBA FLEXTOUCH SC) Inject 28 Units as instructed.     • Canagliflozin (INVOKANA) 300 MG TABS Take  by mouth every day.     • metformin (GLUCOPHAGE) 500 MG TABS Take 1,000 mg by mouth 2 times a day, with meals. 2 tablet am 1 tablets pm     • atorvastatin (LIPITOR) 80 MG tablet Take 80 mg by mouth every evening.     • ALPHA LIPOIC ACID PO Take 600 mg by mouth 2 Times a Day.     • Omega-3 Fatty Acids (FISH OIL) 1200 MG CAPS Take  by mouth.     • Cinnamon 500 MG CAPS Take 1,000 mg by mouth.     • aspirin EC (ECOTRIN) 81 MG TBEC Take 81 mg by mouth every day.       • pantoprazole (PROTONIX) 40 MG TBEC Take 40 mg by mouth every day.       • Coenzyme Q10 (CO Q-10) 200 MG CAPS Take  by mouth every day.       • Cholecalciferol (VITAMIN D) 2000 UNIT TABS Take  by mouth 2 Times a Day.     • cetirizine (ZYRTEC) 10 MG TABS Take 10 mg by mouth every day.     • Dulaglutide (TRULICITY) 1.5 MG/0.5ML Solution Pen-injector Inject  as instructed.     • albuterol (VENTOLIN HFA) 108 (90 Base) MCG/ACT Aero Soln inhalation aerosol Inhale 2 Puffs by mouth every four hours as needed. FOR SHORTNESS OF BREATH 3 Inhaler 3   • liraglutide (VICTOZA) 18 MG/3ML Solution Pen-injector injection Inject 1.8  "mg as instructed every day.       No current facility-administered medications for this visit.        Allergies: Kiwi extract; Shellfish allergy; Strawberry; and Sulfa drugs    Blood pressure 124/80, pulse 82, resp. rate 16, height 1.753 m (5' 9\"), weight 88.5 kg (195 lb), SpO2 93 %.      Review of Systems   Constitutional: Negative.    HENT: Negative.    Eyes: Negative for pain and discharge.   Respiratory: Negative.    Cardiovascular: Negative.    Gastrointestinal: Negative.    Musculoskeletal: Negative.    Skin: Negative.    Neurological: Negative.    Endo/Heme/Allergies: Negative for environmental allergies. Does not bruise/bleed easily.   Psychiatric/Behavioral: Negative for depression, hallucinations, memory loss, substance abuse and suicidal ideas. The patient has insomnia. The patient is not nervous/anxious.        Physical Exam   Constitutional: He is oriented to person, place, and time and well-developed, well-nourished, and in no distress.   HENT:   Head: Normocephalic and atraumatic.   Eyes: Pupils are equal, round, and reactive to light.   Neck: Normal range of motion. Neck supple. No tracheal deviation present.   Cardiovascular: Normal rate, regular rhythm and normal heart sounds.    Pulmonary/Chest: Breath sounds normal. No respiratory distress. He has no wheezes. He has no rales.   Musculoskeletal: Normal range of motion. He exhibits no edema.   Neurological: He is alert and oriented to person, place, and time.   Skin: Skin is warm and dry.   Psychiatric: Mood, memory, affect and judgment normal.   Vitals reviewed.      Diagnoses/Plan:    1. ISELA on CPAP   Continue CPAP nightly, Clean mask & tubing weekly, Replace supplies as insurance will allow, [facility].  Follow-up in 1 year at the sleep clinic.  - DME Mask and Supplies    2. Mild intermittent asthma without complication  Symptoms are stable, he will continue current bronchodilators. I would like him to follow-up " in 6 months at the pulmonary clinic.    3. Other insomnia  Stable, continue Ambien as needed.  Patient understands to use sparingly.    4. Seasonal allergies  Continue Zyrtec daily.  For significant allergy symptoms recommend the addition of Singulair.              This dictation was created using voice recognition software. The accuracy of the dictation is limited to the abilities of the software. I expect there may be some errors of grammar and possibly content.

## 2019-03-12 ENCOUNTER — HOSPITAL ENCOUNTER (OUTPATIENT)
Dept: LAB | Facility: MEDICAL CENTER | Age: 59
End: 2019-03-12
Attending: INTERNAL MEDICINE
Payer: COMMERCIAL

## 2019-03-12 ENCOUNTER — HOSPITAL ENCOUNTER (OUTPATIENT)
Dept: LAB | Facility: MEDICAL CENTER | Age: 59
End: 2019-03-12
Attending: PHYSICIAN ASSISTANT
Payer: COMMERCIAL

## 2019-03-12 LAB
25(OH)D3 SERPL-MCNC: 41 NG/ML (ref 30–100)
ALBUMIN SERPL BCP-MCNC: 4.4 G/DL (ref 3.2–4.9)
ALBUMIN/GLOB SERPL: 1.7 G/DL
ALP SERPL-CCNC: 71 U/L (ref 30–99)
ALT SERPL-CCNC: 18 U/L (ref 2–50)
ANION GAP SERPL CALC-SCNC: 8 MMOL/L (ref 0–11.9)
AST SERPL-CCNC: 15 U/L (ref 12–45)
BILIRUB SERPL-MCNC: 0.6 MG/DL (ref 0.1–1.5)
BUN SERPL-MCNC: 13 MG/DL (ref 8–22)
CALCIUM SERPL-MCNC: 9.9 MG/DL (ref 8.5–10.5)
CHLORIDE SERPL-SCNC: 101 MMOL/L (ref 96–112)
CHOLEST SERPL-MCNC: 196 MG/DL (ref 100–199)
CO2 SERPL-SCNC: 31 MMOL/L (ref 20–33)
CREAT SERPL-MCNC: 1 MG/DL (ref 0.5–1.4)
CREAT UR-MCNC: 92 MG/DL
EST. AVERAGE GLUCOSE BLD GHB EST-MCNC: 166 MG/DL
GLOBULIN SER CALC-MCNC: 2.6 G/DL (ref 1.9–3.5)
GLUCOSE SERPL-MCNC: 162 MG/DL (ref 65–99)
HBA1C MFR BLD: 7.4 % (ref 0–5.6)
HDLC SERPL-MCNC: 34 MG/DL
LDLC SERPL CALC-MCNC: 96 MG/DL
MICROALBUMIN UR-MCNC: 0.8 MG/DL
MICROALBUMIN/CREAT UR: 9 MG/G (ref 0–30)
POTASSIUM SERPL-SCNC: 4.6 MMOL/L (ref 3.6–5.5)
PROT SERPL-MCNC: 7 G/DL (ref 6–8.2)
SODIUM SERPL-SCNC: 140 MMOL/L (ref 135–145)
T4 FREE SERPL-MCNC: 0.99 NG/DL (ref 0.53–1.43)
TRIGL SERPL-MCNC: 330 MG/DL (ref 0–149)
TSH SERPL DL<=0.005 MIU/L-ACNC: 1.81 UIU/ML (ref 0.38–5.33)

## 2019-03-12 PROCEDURE — 80053 COMPREHEN METABOLIC PANEL: CPT

## 2019-03-12 PROCEDURE — 84439 ASSAY OF FREE THYROXINE: CPT

## 2019-03-12 PROCEDURE — 82306 VITAMIN D 25 HYDROXY: CPT

## 2019-03-12 PROCEDURE — 80061 LIPID PANEL: CPT

## 2019-03-12 PROCEDURE — 84443 ASSAY THYROID STIM HORMONE: CPT

## 2019-03-12 PROCEDURE — 82043 UR ALBUMIN QUANTITATIVE: CPT

## 2019-03-12 PROCEDURE — 83036 HEMOGLOBIN GLYCOSYLATED A1C: CPT

## 2019-03-12 PROCEDURE — 82570 ASSAY OF URINE CREATININE: CPT

## 2019-03-12 PROCEDURE — 36415 COLL VENOUS BLD VENIPUNCTURE: CPT

## 2019-03-14 ENCOUNTER — TELEPHONE (OUTPATIENT)
Dept: CARDIOLOGY | Facility: MEDICAL CENTER | Age: 59
End: 2019-03-14

## 2019-03-14 NOTE — TELEPHONE ENCOUNTER
Lipid status   Received: Today   Message Contents   Marcos Carlin M.D.  Abby Tellez RKRAIG.             Cholesterol slightly worse.   Continue atorvastatin 80 mg daily.   Recheck lipid panel prior to next appointment      Attempted to contact patient.  Left detailed message.

## 2019-06-07 DIAGNOSIS — G47.33 OSA (OBSTRUCTIVE SLEEP APNEA): ICD-10-CM

## 2019-06-07 RX ORDER — ZOLPIDEM TARTRATE 5 MG/1
TABLET ORAL
Qty: 30 TAB | Refills: 2 | Status: SHIPPED
Start: 2019-06-07 | End: 2021-10-04 | Stop reason: SDUPTHER

## 2019-06-07 NOTE — TELEPHONE ENCOUNTER
RX has been faxed to   [id number] - [place] - [address]  [address] [address]  Phone: [phone] Fax: [phone]

## 2019-06-07 NOTE — TELEPHONE ENCOUNTER
Have we ever prescribed this med? Yes.  If yes, what date? 08/17/2018    Last OV: 02/28/2019 - Yenny Vargas    Next OV: 08/26/2019 - Yenny Vargas    DX: ISELA    Medications: AMBIEN

## 2019-06-12 DIAGNOSIS — I10 BENIGN ESSENTIAL HTN: ICD-10-CM

## 2019-06-12 RX ORDER — DILTIAZEM HYDROCHLORIDE 180 MG/1
180 CAPSULE, COATED, EXTENDED RELEASE ORAL DAILY
Qty: 90 CAP | Refills: 2 | Status: SHIPPED | OUTPATIENT
Start: 2019-06-12 | End: 2020-04-16 | Stop reason: SDUPTHER

## 2019-08-23 ENCOUNTER — TELEPHONE (OUTPATIENT)
Dept: SLEEP MEDICINE | Facility: MEDICAL CENTER | Age: 59
End: 2019-08-23

## 2019-08-23 NOTE — TELEPHONE ENCOUNTER
Patient was last seen 02/28/19 with Sonal RAMIREZ at [facility]    Per Follow up Instructions     Return in about 6 months (around 8/28/2019) for F/U with Lata RAMIREZ, at pulmonary clinic & 1 year with anny at [facility].     Patient has Follow Up Appointment Scheduled at Pulm on 08/26/19 Sonal RAMIREZ      FYI to Sonal RAMIREZ

## 2019-08-26 ENCOUNTER — OFFICE VISIT (OUTPATIENT)
Dept: PULMONOLOGY | Facility: HOSPICE | Age: 59
End: 2019-08-26
Payer: COMMERCIAL

## 2019-08-26 VITALS
RESPIRATION RATE: 16 BRPM | HEIGHT: 69 IN | BODY MASS INDEX: 29.33 KG/M2 | OXYGEN SATURATION: 94 % | SYSTOLIC BLOOD PRESSURE: 124 MMHG | DIASTOLIC BLOOD PRESSURE: 70 MMHG | WEIGHT: 198 LBS | HEART RATE: 84 BPM

## 2019-08-26 DIAGNOSIS — G47.33 OSA ON CPAP: ICD-10-CM

## 2019-08-26 DIAGNOSIS — J45.20 MILD INTERMITTENT ASTHMA WITHOUT COMPLICATION: ICD-10-CM

## 2019-08-26 DIAGNOSIS — J30.2 SEASONAL ALLERGIES: ICD-10-CM

## 2019-08-26 PROCEDURE — 99214 OFFICE O/P EST MOD 30 MIN: CPT | Performed by: NURSE PRACTITIONER

## 2019-08-26 RX ORDER — MONTELUKAST SODIUM 10 MG/1
10 TABLET ORAL EVERY EVENING
Qty: 30 TAB | Refills: 5 | Status: SHIPPED | OUTPATIENT
Start: 2019-08-26 | End: 2020-03-23 | Stop reason: SDUPTHER

## 2019-08-26 RX ORDER — LEVOTHYROXINE SODIUM 0.07 MG/1
75 TABLET ORAL
COMMUNITY
End: 2021-10-04

## 2019-08-26 RX ORDER — LEVOTHYROXINE SODIUM 0.05 MG/1
50 TABLET ORAL
COMMUNITY
End: 2021-10-04 | Stop reason: SDUPTHER

## 2019-08-26 ASSESSMENT — ENCOUNTER SYMPTOMS
STRIDOR: 0
MUSCULOSKELETAL NEGATIVE: 1
MEMORY LOSS: 0
COUGH: 0
HEMOPTYSIS: 0
NEUROLOGICAL NEGATIVE: 1
SHORTNESS OF BREATH: 0
HALLUCINATIONS: 0
RESPIRATORY NEGATIVE: 1
CONSTITUTIONAL NEGATIVE: 1
EYE DISCHARGE: 0
CARDIOVASCULAR NEGATIVE: 1
SINUS PAIN: 0
WHEEZING: 0
BRUISES/BLEEDS EASILY: 0
EYE PAIN: 0
SPUTUM PRODUCTION: 0
GASTROINTESTINAL NEGATIVE: 1
DEPRESSION: 0
NERVOUS/ANXIOUS: 0
INSOMNIA: 1
SORE THROAT: 0

## 2019-08-26 ASSESSMENT — LIFESTYLE VARIABLES: SUBSTANCE_ABUSE: 0

## 2019-08-26 NOTE — PROGRESS NOTES
"Chief Complaint   Patient presents with   • Apnea     Last Seen 02/28/19         HPI: This patient is a 58 y.o. male, who presents for follow-up mild to moderate persistent asthma.     He is also followed at our sleep clinic for ISELA.    PFTs in November showed normal spirometry and indicate an FEV1 of 2.85 L 82% predicted, FEV1 FVC ratio of 70, DLCO 167% predicted with no significant bronchodilator response.  He remains compliant with Advair 500/50 twice daily, Ventolin typically 0-1 time per day.    He will smoke a rare cigar.  He does have some exertional dyspnea but remains very active and walks frequently.  He reports chest tightness on occasion.  Denies routine cough or wheeze.  Denies URI since last being seen.    He has significant seasonal allergies for which he takes Zyrtec with good result.  He will occasionally require Benadryl in addition to Zyrtec.  Reports his allergies have been bothering him for the past several months.  He reports chronic clear nasal drainage for which she uses nasal spray.  Frequent sneezing .     In regards to ISELA, former PSG indicates severe sleep apnea with an AHI of 69.6 and a minimum saturation of 82%.  He is compliant with auto CPAP 5-15 cm H2O.  He uses Ambien for chronic insomnia.  His insurance only provided \"28 tablets\" for period of 3 months.  He tries to use these infrequently.    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 Valverde   • Sleep apnea     has CPAP   • Snoring        Social History     Tobacco Use   • Smoking status: Current Every Day Smoker     Types: Cigars   • Smokeless " tobacco: Never Used   • Tobacco comment: rare cigar   Substance Use Topics   • Alcohol use: Yes     Comment: 1-2/month   • Drug use: No     Comment: occ cigar smoking       Family History   Problem Relation Age of Onset   • Breast Cancer Mother    • Hypertension Mother    • Hypertension Father    • No Known Problems Sister    • Arthritis Brother    • Heart Disease Other    • Hypertension Other    • Cancer Other    • No Known Problems Brother        Immunization History   Administered Date(s) Administered   • Pneumococcal Vaccine (PCV7) Historical Data 11/01/2015       Current medications as of today   Current Outpatient Medications   Medication Sig Dispense Refill   • levothyroxine (SYNTHROID) 75 MCG Tab Take 75 mcg by mouth Every morning on an empty stomach.     • levothyroxine (SYNTHROID) 50 MCG Tab Take 50 mcg by mouth Every morning on an empty stomach.     • DILTIAZem CD (CARTIA XT) 180 MG CAPSULE SR 24 HR Take 1 Cap by mouth every day. 90 Cap 2   • benazepril (LOTENSIN) 20 MG Tab Take 1 Tab by mouth every day. 90 Tab 1   • albuterol (VENTOLIN HFA) 108 (90 Base) MCG/ACT Aero Soln inhalation aerosol Inhale 2 Puffs by mouth every four hours as needed. FOR SHORTNESS OF BREATH 3 Inhaler 3   • fluticasone-salmeterol (ADVAIR DISKUS) 500-50 MCG/DOSE AEROSOL POWDER, BREATH ACTIVATED Inhale 1 Puff by mouth every 12 hours. 3 Inhaler 3   • Insulin Degludec (TRESIBA FLEXTOUCH SC) Inject 28 Units as instructed.     • Canagliflozin (INVOKANA) 300 MG TABS Take  by mouth every day.     • metformin (GLUCOPHAGE) 500 MG TABS Take 1,000 mg by mouth 2 times a day, with meals. 2 tablet am 1 tablets pm     • atorvastatin (LIPITOR) 80 MG tablet Take 80 mg by mouth every evening.     • ALPHA LIPOIC ACID PO Take 600 mg by mouth 2 Times a Day.     • Omega-3 Fatty Acids (FISH OIL) 1200 MG CAPS Take  by mouth.     • aspirin EC (ECOTRIN) 81 MG TBEC Take 81 mg by mouth every day.       • pantoprazole (PROTONIX) 40 MG TBEC Take 40 mg by mouth  "every day.       • Coenzyme Q10 (CO Q-10) 200 MG CAPS Take  by mouth every day.       • Cholecalciferol (VITAMIN D) 2000 UNIT TABS Take  by mouth 2 Times a Day.     • cetirizine (ZYRTEC) 10 MG TABS Take 10 mg by mouth every day.     • Semaglutide (OZEMPIC) 0.25 or 0.5 MG/DOSE Solution Pen-injector Inject  as instructed.     • Dulaglutide (TRULICITY) 1.5 MG/0.5ML Solution Pen-injector Inject  as instructed.     • liraglutide (VICTOZA) 18 MG/3ML Solution Pen-injector injection Inject 1.8 mg as instructed every day.     • Cinnamon 500 MG CAPS Take 1,000 mg by mouth.       No current facility-administered medications for this visit.        Allergies: Kiwi extract; Shellfish allergy; Strawberry; and Sulfa drugs    /70 (BP Location: Right arm, Patient Position: Sitting, BP Cuff Size: Adult)   Pulse 84   Resp 16   Ht 1.753 m (5' 9\")   Wt 89.8 kg (198 lb)   SpO2 94%       Review of Systems   Constitutional: Negative.    HENT: Positive for congestion. Negative for ear discharge, ear pain, hearing loss, nosebleeds, sinus pain, sore throat and tinnitus.         + allergy symptoms   Eyes: Negative for pain and discharge.   Respiratory: Negative.  Negative for cough, hemoptysis, sputum production, shortness of breath, wheezing and stridor.         + tightness   Cardiovascular: Negative.    Gastrointestinal: Negative.    Musculoskeletal: Negative.    Neurological: Negative.    Endo/Heme/Allergies: Negative for environmental allergies. Does not bruise/bleed easily.   Psychiatric/Behavioral: Negative for depression, hallucinations, memory loss, substance abuse and suicidal ideas. The patient has insomnia. The patient is not nervous/anxious.        Physical Exam   Constitutional: He is oriented to person, place, and time and well-developed, well-nourished, and in no distress.   HENT:   Head: Normocephalic and atraumatic.   Mouth/Throat: Oropharynx is clear and moist.   Eyes: Pupils are equal, round, and reactive to light. "   Neck: Normal range of motion. Neck supple. No tracheal deviation present.   Cardiovascular: Normal rate, regular rhythm and normal heart sounds.   Pulmonary/Chest: Effort normal and breath sounds normal. No respiratory distress. He has no wheezes. He has no rales.   Musculoskeletal: Normal range of motion. He exhibits no edema.   Neurological: He is alert and oriented to person, place, and time.   Skin: Skin is warm and dry.   Psychiatric: Mood, memory, affect and judgment normal.   Vitals reviewed.      Diagnoses/Plan:    1. Mild intermittent asthma without complication  Symptoms are overall stable.  He is using albuterol several times per week.  He will continue Advair 500/50 twice daily.  Recommend the addition of Singulair.    2. Seasonal allergies  Continue daily Zyrtec.  Add daily Singulair for allergy symptoms.  Continue Qnasl as needed.  - montelukast (SINGULAIR) 10 MG Tab; Take 1 Tab by mouth every evening.  Dispense: 30 Tab; Refill: 5    3. ISELA on CPAP  Stable, continues with CPAP use.  Recommend he bring chip to next visit for download. Our office will look into Priyanka contreras if necessary for Ambien.    He will follow-up in 6 months at the pulmonary clinic            This dictation was created using voice recognition software. The accuracy of the dictation is limited to the abilities of the software. I expect there may be some errors of grammar and possibly content.

## 2019-08-27 ENCOUNTER — TELEPHONE (OUTPATIENT)
Dept: PULMONOLOGY | Facility: HOSPICE | Age: 59
End: 2019-08-27

## 2019-08-27 NOTE — TELEPHONE ENCOUNTER
MEDICATION PRIOR AUTHORIZATION NEEDED:    1. Name of Medication: Zolpidem 5 mg    2. Requested By (Name of Pharmacy): [facility]     3. Is insurance on file current? yes    4. What is the name & phone number of the 3rd party payor? [employer] [phone]

## 2019-08-27 NOTE — TELEPHONE ENCOUNTER
DOCUMENTATION OF PRIOR AUTH STATUS    1. Medication name and dose: Zolpidem 5 mg    2. Name and Phone # of Prescription coverage company: [employer] [phone]    3. Date Prior Auth was submitted: 8/26/2019    4. What information was given to obtain insurance decision: Clinical notes    5. Prior Auth letter Approved or Denied: Approved through 8/25/2020    6. Pharmacy notified: Yes    7. Patient notified: Yes

## 2019-10-07 DIAGNOSIS — J45.20 MILD INTERMITTENT ASTHMA WITHOUT COMPLICATION: ICD-10-CM

## 2019-10-07 RX ORDER — ALBUTEROL SULFATE 90 UG/1
AEROSOL, METERED RESPIRATORY (INHALATION)
Qty: 3 INHALER | Refills: 3 | Status: SHIPPED | OUTPATIENT
Start: 2019-10-07 | End: 2020-11-25

## 2019-10-15 ENCOUNTER — TELEPHONE (OUTPATIENT)
Dept: CARDIOLOGY | Facility: MEDICAL CENTER | Age: 59
End: 2019-10-15

## 2019-10-15 NOTE — TELEPHONE ENCOUNTER
Barron Hewitt Robert D, M.D. Yesterday (8:31 AM)        Recently (about a month ago) I turned my head to back down a long driveway. After about 5 seconds I got severe lightheadedness, I had to stop and get my head clear before backing down the driveway. Since then I have experienced this 4 or 5 times, whether I turn my head right or left.     Last night (Oct 13th) I was at a restaurant and seemed to have blacked out (my wife says for about 4-5 seconds) When I came to my senses i didn't remember anything had happened.  I am a diabetic but my blood sugar at the time of dinner was about 165 so I believe this wasn't a diabetic event.     Would like to schedule an appt. to see Dr. Carlin      Attempted to contact patient for more information.  No answer. LVM to call back.       To SW - please advise.      Discussed with SW.  Defer to PCP.  Non-cardiac related.     Smit Ovenst message sent to patient. And also left

## 2019-11-07 ENCOUNTER — OFFICE VISIT (OUTPATIENT)
Dept: MEDICAL GROUP | Facility: LAB | Age: 59
End: 2019-11-07
Payer: COMMERCIAL

## 2019-11-07 VITALS
WEIGHT: 192 LBS | SYSTOLIC BLOOD PRESSURE: 120 MMHG | OXYGEN SATURATION: 96 % | RESPIRATION RATE: 14 BRPM | BODY MASS INDEX: 28.44 KG/M2 | HEIGHT: 69 IN | TEMPERATURE: 97.9 F | HEART RATE: 86 BPM | DIASTOLIC BLOOD PRESSURE: 70 MMHG

## 2019-11-07 DIAGNOSIS — D75.1 ERYTHROCYTOSIS: ICD-10-CM

## 2019-11-07 DIAGNOSIS — Z79.4 TYPE 2 DIABETES MELLITUS WITHOUT COMPLICATION, WITH LONG-TERM CURRENT USE OF INSULIN (HCC): ICD-10-CM

## 2019-11-07 DIAGNOSIS — J45.20 MILD INTERMITTENT ASTHMA WITHOUT COMPLICATION: ICD-10-CM

## 2019-11-07 DIAGNOSIS — J30.2 SEASONAL ALLERGIES: ICD-10-CM

## 2019-11-07 DIAGNOSIS — G47.33 OSA ON CPAP: ICD-10-CM

## 2019-11-07 DIAGNOSIS — E11.9 TYPE 2 DIABETES MELLITUS WITHOUT COMPLICATION, WITH LONG-TERM CURRENT USE OF INSULIN (HCC): ICD-10-CM

## 2019-11-07 DIAGNOSIS — G47.09 OTHER INSOMNIA: ICD-10-CM

## 2019-11-07 DIAGNOSIS — Z87.19 HISTORY OF SMALL BOWEL OBSTRUCTION: ICD-10-CM

## 2019-11-07 DIAGNOSIS — Q24.5 CORONARY-MYOCARDIAL BRIDGE: ICD-10-CM

## 2019-11-07 DIAGNOSIS — Z23 NEED FOR VACCINATION: ICD-10-CM

## 2019-11-07 DIAGNOSIS — I10 ESSENTIAL HYPERTENSION, BENIGN: ICD-10-CM

## 2019-11-07 DIAGNOSIS — I25.10 CORONARY ARTERY DISEASE, NON-OCCLUSIVE: ICD-10-CM

## 2019-11-07 DIAGNOSIS — R55 SYNCOPE AND COLLAPSE: ICD-10-CM

## 2019-11-07 DIAGNOSIS — E78.5 DYSLIPIDEMIA: ICD-10-CM

## 2019-11-07 PROBLEM — K52.9 ENTERITIS: Status: RESOLVED | Noted: 2017-05-01 | Resolved: 2019-11-07

## 2019-11-07 PROBLEM — K56.7 ILEUS (HCC): Status: RESOLVED | Noted: 2017-05-01 | Resolved: 2019-11-07

## 2019-11-07 PROCEDURE — 90746 HEPB VACCINE 3 DOSE ADULT IM: CPT | Performed by: NURSE PRACTITIONER

## 2019-11-07 PROCEDURE — 90472 IMMUNIZATION ADMIN EACH ADD: CPT | Performed by: NURSE PRACTITIONER

## 2019-11-07 PROCEDURE — 90715 TDAP VACCINE 7 YRS/> IM: CPT | Performed by: NURSE PRACTITIONER

## 2019-11-07 PROCEDURE — 90686 IIV4 VACC NO PRSV 0.5 ML IM: CPT | Performed by: NURSE PRACTITIONER

## 2019-11-07 PROCEDURE — 99215 OFFICE O/P EST HI 40 MIN: CPT | Mod: 25 | Performed by: NURSE PRACTITIONER

## 2019-11-07 PROCEDURE — 90471 IMMUNIZATION ADMIN: CPT | Performed by: NURSE PRACTITIONER

## 2019-11-07 ASSESSMENT — PATIENT HEALTH QUESTIONNAIRE - PHQ9: CLINICAL INTERPRETATION OF PHQ2 SCORE: 0

## 2019-11-08 ENCOUNTER — TELEPHONE (OUTPATIENT)
Dept: MEDICAL GROUP | Facility: LAB | Age: 59
End: 2019-11-08

## 2019-11-08 NOTE — TELEPHONE ENCOUNTER
----- Message from NIRANJAN Butts sent at 11/7/2019  4:44 PM PST -----  Please ask for colonoscopy from [facility] - thanks

## 2019-11-08 NOTE — PROGRESS NOTES
Chief Complaint   Patient presents with   • Faint     2 weeks ago    • Establish Care     lab orders needed    • Diabetes     see's Dr Lori BILLY  Aly is a 60 yo male here to [facility] and go over chronic medical issues.  Previous primary care provider was Dr. Rios.  Also followed by cardiology, endocrinology and sleep medicine.  He is  with 4 grown children and has several grandchildren.  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  He smokes occasional cigars but no cigarettes.  Drinks socially, typically once or twice a month.  Overall feeling pretty well with the exception of a syncopal episode a few weeks ago, see below.    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  Is followed by Dr. Carlin.      Coronary artery disease, non-occlusive  Chronic issue.  Taking atorvastatin 80 mg nightly.     Last cardiac cath 2016:   IMPRESSION:  1.  Normal left ventricular end-diastolic pressure.  2.  Normal left ventricular systolic function.  3.  Eccentric 60-70% lesion at the origin of the second LAD diagonal with FFR   across this lesion of 0.89.    ISELA on CPAP  Chronic issue. Dx 4-5 yrs ago.  Uses cpap nightly unless he has a URI.     Mild intermittent asthma without complication  Chronic issue / lifelong.  Uses advair bid and albuterol prn.  Well controlled.     Essential hypertension, benign  Chronic issue.  Taking benazepril / diltiazem daily.   BP well controlled per pt at home and in medical offices.      Dyslipidemia  Chronic issue.  Controlled with atorvastatin.  Last LP 3/2019 - low HDL and high TG.    Other insomnia  Improved with cpap usage.  Uses periodic ambien a few nights per week if needed.      Type 2 diabetes mellitus without complication, with long-term current use of insulin (HCC)  Chronic issue.  tx by Dr. Sandoval as well.  Taking Tresiba 28 units, invokana 300 mg and metformin 1000 mg daily.  A1C 7.2 - 8.0 over the past year.  Denies retinopathy,  "neuropathy, nephropathy.    Was very ill with ozempic / trulicity.      Seasonal allergies  Occasional issue for pt.  Takes zyrtec daily.      Syncope and collapse  Initial / only episode occurred at a dinner table for 3-4 seconds 2 weeks ago.  Felt a bit groggy when he \"came to.\"  Came down with a sinus infection a few days later - improving with amoxicillin x 7 d.    He states that he did not have a HA, n/v, bowel / bladder incontinence, or CP before or after the syncopal episode.  Admits that if he turns his head far to the right, he feels that he may pass out and quickly reverses his neck turn.  He denies neck pain.  No recent head injuries.  Denies new onset headaches or any other associated symptoms.  He tells me that he did not have any sort of hint that he would lose consciousness when he was sitting at the dinner table.  His wife did check his blood sugar and it was normal, patient cannot recall the exact number.  He has not been struggling with low blood pressures.  He tells me that he was hydrated at the table and had not been drinking alcohol.  Saw Dr. Rios's PA after this episode and EKG was done and was told this is normal.      HCM:  Colonoscopy [facility] 2 yrs ago - cleared for 10 yrs.   Due for influenza vaccine  Last PNA was 2015  Has had one shingrix about a year ago.   Last blood work 3/2019 - going again next month.      Current Outpatient Medications:   •  VENTOLIN HFA, USE 2 INHALATIONS EVERY 4 HOURS AS NEEDED FOR SHORTNESS OF BREATH, Taking  •  levothyroxine, 75 mcg, Oral, AM ES, Taking  •  levothyroxine, 50 mcg, Oral, AM ES, Taking  •  DILTIAZem CD, 180 mg, Oral, DAILY, Taking  •  benazepril, 20 mg, Oral, DAILY, Taking  •  fluticasone-salmeterol, 1 Puff, Inhalation, Q12HRS, Taking  •  Insulin Degludec (TRESIBA FLEXTOUCH SC), Inject 28 Units as instructed., Taking  •  Canagliflozin, Take  by mouth every day., Taking  •  metFORMIN, 1,000 mg, Oral, BID WITH MEALS, Taking  •  atorvastatin, 80 mg, " Oral, Nightly, Taking  •  ALPHA LIPOIC ACID PO, 600 mg, Oral, BID, Taking  •  Fish Oil, Take  by mouth., Taking  •  Cinnamon, Take 1,000 mg by mouth., Taking  •  aspirin EC, 81 mg, Oral, DAILY, Taking  •  pantoprazole, 40 mg, Oral, DAILY, Taking  •  Coenzyme Q10, Take  by mouth every day.  , Taking  •  vitamin D, Take  by mouth 2 Times a Day., Taking  •  cetirizine, 10 mg, Oral, DAILY, Taking  •  montelukast, 10 mg, Oral, Q EVENING  •  Semaglutide, Inject  as instructed., Not Taking  •  Dulaglutide, Inject  as instructed., Not Taking  •  liraglutide, 1.8 mg, Subcutaneous, DAILY, Not Taking    Family History   Problem Relation Age of Onset   • Breast Cancer Mother    • Hypertension Mother    • Cancer Mother         breast   • Heart Disease Mother         hx of bypass   • Hypertension Father    • Arthritis Father    • Diabetes Father         prediabetes   • No Known Problems Sister    • Arthritis Brother    • Heart Disease Other    • Hypertension Other    • Cancer Other    • No Known Problems Brother      Past Surgical History:   Procedure Laterality Date   • SHOULDER DECOMPRESSION ARTHROSCOPIC Left 1/4/2018    Procedure: SHOULDER DECOMPRESSION ARTHROSCOPIC - SUBACROMIAL;  Surgeon: Linwood Grover M.D.;  Location: [facility];  Service: Orthopedics   • SHOULDER ARTHROSCOPY W/ BICIPITAL TENODESIS REPAIR Left 1/4/2018    Procedure: SHOULDER ARTHROSCOPY W/ BICIPITAL Tenotomy REPAIR;  Surgeon: Linwood Grover M.D.;  Location: [facility];  Service: Orthopedics   • CLAVICLE DISTAL EXCISION Left 1/4/2018    Procedure: CLAVICLE DISTAL EXCISION - POSSIBLE;  Surgeon: Linwood Grover M.D.;  Location: [facility];  Service: Orthopedics   • RECOVERY  4/8/2016    Procedure: CATH LAB [facility] WITH POSSIBLE NAVIN;  Surgeon: Recoveryonsa[facility]no Surgery;  Location: SURGERY PRE-POST PROC UNIT [facility];  Service:    • VENTRAL HERNIA REPAIR LAPAROSCOPIC  11/1/2012    Performed by Marcos JONES  "ARIANNE Ramírez. at SURGERY [facility] ORS   • KNEE ARTHROSCOPY      right   • SHOULDER ARTHROSCOPY      right   • SINUSOTOMIES      times two surgeries   • TUMOR EXCISION WITH BIOPSY      right hand - thumb         Past medical, surgical, family, and social history is reviewed and updated in Epic chart by me today.   Medications and allergies reviewed and updated in Epic chart by me today.     ROS:   As documented in history of present illness above    Exam:  /70 (BP Location: Right arm, Patient Position: Sitting, BP Cuff Size: Large adult)   Pulse 86   Temp 36.6 °C (97.9 °F)   Resp 14   Ht 1.753 m (5' 9\")   Wt 87.1 kg (192 lb)   SpO2 96%   Constitutional: Alert, no distress, plus 3 vital signs  Skin:  Warm, dry, no rashes invisible areas  Eye: Equal, round and reactive, conjunctiva clear  ENMT: Lips without lesions, good dentition, oropharynx without erythema or exudate.  Tympanic membranes translucent bilaterally.  Neck: Trachea midline, no masses, no thyromegaly.  I do not appreciate any carotid bruits today.  Respiratory: Unlabored respiration, lungs clear to auscultation, no wheezes, no rhonchi  Cardiovascular: Normal rate and rhythm.  No edema.  Abdomen: Soft, nontender, no masses or hepatosplenomegaly  Psych: Alert, pleasant, well-groomed, normal affect    Assessment / Plan / Medical Decision makin. Type 2 diabetes mellitus without complication, with long-term current use of insulin (HCC)  -Stable based on last A1c.  Followed closely by endocrinology.  Has a follow-up with endocrinology in January.  Doing well on all medications.  Updated hepatitis B and influenza vaccines today.  Encouraged him to go see his eye doctor.    2. Coronary-myocardial bridge  -Followed by cardiology.  On statin therapy.    3. Coronary artery disease, non-occlusive  -Denies any current issues with chest pain.  On statin therapy and aspirin.  Followed closely by cardiology.  Having an " updated lipid panel in January.    4. History of small bowel obstruction  -Denies any current issues with constipation.    5. ISELA on CPAP  -Doing well with current CPAP machine.    6. Mild intermittent asthma without complication  -Well-controlled with Advair.  Updated influenza vaccine today.    7. Essential hypertension, benign  -Stable.  Continue same.    8. Dyslipidemia  -Stable based on last lipid panel with exception of elevated triglycerides and low HDL.  We discussed food sources of omega-3 and the importance of exercise.  I encouraged him to eliminate white carbohydrates.    9. Other insomnia  -Stable with periodic use of zolpidem.    10. Seasonal allergies  -Stable.    11. Syncope and collapse  -Because of his recent syncopal episode, recommend a carotid artery ultrasound with consideration of echocardiogram and MRI of his brain to follow.  EKG done through Kellen Vera @ Dr. Rios's office and pt has delivered this to his cardiologist.  Encouraged him to drive very little when possible until further work-up has been completed.  Sinus infection symptoms have resolved.  Feeling well today.  Encouraged him to stay well-hydrated and monitor his blood pressure as well as blood sugar very closely.  - US-CAROTID DOPPLER BILAT; Future    12. Erythrocytosis  -Recommend an updated CBC.  Encouraged him to give blood every 6 months.  - CBC WITH DIFFERENTIAL; Future    13. Need for vaccination  - Influenza Vaccine Quad Injection (PF)  - Tdap =>8yo IM  - Hepatitis B Vaccine Adult IM  pt was counseled regarding all immunizations, what the patient is being immunized against, possible side effects, and the importance of immunization.     Total face to face time 40 minutes of which over 50% of this visit is spent in counseling, education and outlining a plan of treatment and coordination of care for the above conditions. This included but was not limited to discussion of medication options and potential risks related  to the medications, referral and specialty care options. All patient questions were answered

## 2019-11-08 NOTE — ASSESSMENT & PLAN NOTE
"Initial / only episode occurred at a dinner table for 3-4 seconds 2 weeks ago.  Felt a bit groggy when he \"came to.\"  Came down with a sinus infection a few days later - improving with amoxicillin x 7 d.    He states that he did not have a HA, n/v, bowel / bladder incontinence, or CP before or after the syncopal episode.    Saw Dr. Rios's PA after this episode and EKG was done and was told this is normal.    "

## 2019-11-08 NOTE — ASSESSMENT & PLAN NOTE
Chronic issue.  tx by Dr. Sandoval as well.  Taking Tresiba 28 units, invokana 300 mg and metformin 1000 mg daily.  A1C 7.2 - 8.0 over the past year.  Denies retinopathy, neuropathy, nephropathy.    Was very ill with ozempic / trulicity.

## 2019-11-08 NOTE — ASSESSMENT & PLAN NOTE
Chronic issue.  Taking atorvastatin 80 mg nightly.     Last cardiac cath 2016:   IMPRESSION:  1.  Normal left ventricular end-diastolic pressure.  2.  Normal left ventricular systolic function.  3.  Eccentric 60-70% lesion at the origin of the second LAD diagonal with FFR   across this lesion of 0.89.

## 2019-11-08 NOTE — LETTER
[facility]  Ruthie Unger ATerenceP.N.  [address]  [facility] [address]  Fax: [phone]   Authorization for Release/Disclosure of   Protected Health Information   Name: YSLVIE JARAMILLO : 1960 SSN: [id number]   Address: [address] [place] [id number] Phone:    [phone] (home) [phone] (work)   I authorize the entity listed below to release/disclose the PHI below to:   [facility]/Ruthie Unger ATerenceP.SHEILA. and JOSE ENRIQUE Butts.P.N.   Provider or Entity Name:  [facility]   Address   City, [facility], Zip:               [address] [place] [address]   Phone:  [phone]      Fax:      [phone]        Reason for request: continuity of care   Information to be released:    [ X ] LAST COLONOSCOPY,  including any PATH REPORT and follow-up  [ X ] LAST FIT/COLOGUARD RESULT [  ] LAST DEXA  [  ] LAST MAMMOGRAM  [  ] LAST PAP  [  ] LAST LABS [  ] RETINA EXAM REPORT  [  ] IMMUNIZATION RECORDS  [  ] Release all info      [  ] Check here and initial the line next to each item to release ALL health information INCLUDING  _____ Care and treatment for drug and / or alcohol abuse  _____ HIV testing, infection status, or AIDS  _____ Genetic Testing    DATES OF SERVICE OR TIME PERIOD TO BE DISCLOSED: _____________  I understand and acknowledge that:  * This Authorization may be revoked at any time by you in writing, except if your health information has already been used or disclosed.  * Your health information that will be used or disclosed as a result of you signing this authorization could be re-disclosed by the recipient. If this occurs, your re-disclosed health information may no longer be protected by State or Federal laws.  * You may refuse to sign this Authorization. Your refusal will not affect your ability to obtain treatment.  * This Authorization becomes effective upon signing and will  on (date) __________.      If no date is  indicated, this Authorization will  one (1) year from the signature date.    Name: Barron Hewitt    Signature:Continuity of Care   Date:     2019       PLEASE FAX REQUESTED RECORDS BACK TO: [phone]

## 2019-11-08 NOTE — ASSESSMENT & PLAN NOTE
Chronic issue.  Taking benazepril / diltiazem daily.   BP well controlled per pt at home and in medical offices.

## 2019-12-15 ENCOUNTER — OFFICE VISIT (OUTPATIENT)
Dept: URGENT CARE | Facility: PHYSICIAN GROUP | Age: 59
End: 2019-12-15
Payer: COMMERCIAL

## 2019-12-15 VITALS
TEMPERATURE: 96.6 F | HEIGHT: 69 IN | RESPIRATION RATE: 16 BRPM | OXYGEN SATURATION: 94 % | HEART RATE: 92 BPM | BODY MASS INDEX: 28.14 KG/M2 | WEIGHT: 190 LBS | SYSTOLIC BLOOD PRESSURE: 122 MMHG | DIASTOLIC BLOOD PRESSURE: 60 MMHG

## 2019-12-15 DIAGNOSIS — J22 BACTERIAL LOWER RESPIRATORY INFECTION: ICD-10-CM

## 2019-12-15 DIAGNOSIS — B96.89 BACTERIAL LOWER RESPIRATORY INFECTION: ICD-10-CM

## 2019-12-15 DIAGNOSIS — R06.2 WHEEZING: ICD-10-CM

## 2019-12-15 DIAGNOSIS — R05.9 COUGH: ICD-10-CM

## 2019-12-15 PROCEDURE — 99214 OFFICE O/P EST MOD 30 MIN: CPT | Performed by: NURSE PRACTITIONER

## 2019-12-15 RX ORDER — CODEINE PHOSPHATE/GUAIFENESIN 10-100MG/5
5 LIQUID (ML) ORAL 3 TIMES DAILY PRN
Qty: 100 ML | Refills: 0 | Status: SHIPPED | OUTPATIENT
Start: 2019-12-15 | End: 2019-12-22

## 2019-12-15 RX ORDER — DOXYCYCLINE HYCLATE 100 MG
100 TABLET ORAL 2 TIMES DAILY
Qty: 20 TAB | Refills: 0 | Status: SHIPPED | OUTPATIENT
Start: 2019-12-15 | End: 2021-02-02 | Stop reason: SDUPTHER

## 2019-12-15 ASSESSMENT — ENCOUNTER SYMPTOMS
SHORTNESS OF BREATH: 1
NAUSEA: 0
ABDOMINAL PAIN: 0
FEVER: 0
COUGH: 1
HEADACHES: 1
WHEEZING: 1
SINUS PAIN: 1
VOMITING: 0
DIZZINESS: 0

## 2019-12-15 NOTE — PROGRESS NOTES
Subjective:      Barron Hewitt is a 59 y.o. male who presents with Cough (congestion, L ear [id number] )            Cough   This is a recurrent problem. Episode onset: was seen one month ago and placed on abx, got better, now back for 5 days. The problem has been gradually worsening. The cough is productive of sputum. Associated symptoms include ear congestion, ear pain, headaches, nasal congestion, shortness of breath and wheezing. Pertinent negatives include no fever. Associated symptoms comments: Patient states he was seen by primary care provider and placed on amoxicillin 1 month ago.  States that his symptoms resolved and now are back with a very productive cough that is worse at night.  Is taking Mucinex and does take Zyrtec for seasonal allergies.  Patient also notes that he is an asthmatic and takes albuterol as well as Advair and is currently using them more than usual.. The symptoms are aggravated by lying down. The treatment provided mild relief. His past medical history is significant for asthma and environmental allergies.       Review of Systems   Constitutional: Negative for fever.   HENT: Positive for congestion, ear pain and sinus pain.    Respiratory: Positive for cough, shortness of breath and wheezing.    Gastrointestinal: Negative for abdominal pain, nausea and vomiting.   Neurological: Positive for headaches. Negative for dizziness.   Endo/Heme/Allergies: Positive for environmental allergies.     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 Valverde   • Sleep apnea     has CPAP   • Snoring       Past Surgical  History:   Procedure Laterality Date   • SHOULDER DECOMPRESSION ARTHROSCOPIC Left 1/4/2018    Procedure: SHOULDER DECOMPRESSION ARTHROSCOPIC - SUBACROMIAL;  Surgeon: Linwood Grover M.D.;  Location: SURGERY [facility];  Service: Orthopedics   • SHOULDER ARTHROSCOPY W/ BICIPITAL TENODESIS REPAIR Left 1/4/2018    Procedure: SHOULDER ARTHROSCOPY W/ BICIPITAL Tenotomy REPAIR;  Surgeon: Linwood Grover M.D.;  Location: SURGERY [facility];  Service: Orthopedics   • CLAVICLE DISTAL EXCISION Left 1/4/2018    Procedure: CLAVICLE DISTAL EXCISION - POSSIBLE;  Surgeon: Linwood Grover M.D.;  Location: SURGERY [facility];  Service: Orthopedics   • RECOVERY  4/8/2016    Procedure: CATH LAB C WITH POSSIBLE NAVIN;  Surgeon: Recoveryonsabino Surgery;  Location: SURGERY PRE-POST PROC UNIT [facility] [place];  Service:    • VENTRAL HERNIA REPAIR LAPAROSCOPIC  11/1/2012    Performed by Marcos Ramírez M.D. at [facility]   • KNEE ARTHROSCOPY  1990's    right   • SHOULDER ARTHROSCOPY  1990's    right   • SINUSOTOMIES  1990's    times two surgeries   • TUMOR EXCISION WITH BIOPSY  1990's    right hand - thumb      Social History     Socioeconomic History   • Marital status:      Spouse name: Not on file   • Number of children: Not on file   • Years of education: Not on file   • Highest education level: Not on file   Occupational History   • Not on file   Social Needs   • Financial resource strain: Not on file   • Food insecurity:     Worry: Not on file     Inability: Not on file   • Transportation needs:     Medical: Not on file     Non-medical: Not on file   Tobacco Use   • Smoking status: Light Tobacco Smoker     Packs/day: 0.00     Types: Cigars   • Smokeless tobacco: Never Used   • Tobacco comment: occasional cigars   Substance and Sexual Activity   • Alcohol use: Yes     Comment: rare - 1-2 per month (social)   • Drug use: No     Comment: occ cigar smoking   • Sexual activity: Not on file  "    Comment: ; 2 biological kids (2 of his wife's); 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   Lifestyle   • Physical activity:     Days per week: Not on file     Minutes per session: Not on file   • Stress: Not on file   Relationships   • Social connections:     Talks on phone: Not on file     Gets together: Not on file     Attends Confucianist service: Not on file     Active member of club or organization: Not on file     Attends meetings of clubs or organizations: Not on file     Relationship status: Not on file   • Intimate partner violence:     Fear of current or ex partner: Not on file     Emotionally abused: Not on file     Physically abused: Not on file     Forced sexual activity: Not on file   Other Topics Concern   • Not on file   Social History Narrative   • Not on file   Allergies:  Kiwi extract; Shellfish allergy; Strawberry; and Sulfa drugs           Objective:     /60   Pulse 92   Temp 35.9 °C (96.6 °F) (Temporal)   Resp 16   Ht 1.753 m (5' 9\")   Wt 86.2 kg (190 lb)   SpO2 94%   BMI 28.06 kg/m²      Physical Exam  Vitals signs reviewed.   Constitutional:       Appearance: Normal appearance.   HENT:      Right Ear: Tympanic membrane, ear canal and external ear normal.      Left Ear: Tympanic membrane, ear canal and external ear normal.      Nose: Nose normal.      Mouth/Throat:      Lips: Pink.      Mouth: Mucous membranes are moist.      Pharynx: Uvula midline.      Comments: Post nasal drip noted  Cardiovascular:      Rate and Rhythm: Normal rate and regular rhythm.      Heart sounds: Normal heart sounds.   Pulmonary:      Effort: Pulmonary effort is normal.      Breath sounds: Examination of the right-upper field reveals wheezing. Examination of the left-upper field reveals wheezing. Examination of the right-middle field reveals wheezing. Examination of the left-middle field reveals wheezing. Examination of the right-lower field reveals wheezing. Examination of the left-lower " field reveals wheezing. Wheezing present.      Comments: Has good air movement throughout all lung fields  Lymphadenopathy:      Cervical: Cervical adenopathy present.   Skin:     General: Skin is warm.   Neurological:      Mental Status: He is alert and oriented to person, place, and time.   Psychiatric:         Mood and Affect: Mood normal.         Behavior: Behavior normal.         Thought Content: Thought content normal.         Judgment: Judgment normal.                 Assessment/Plan:   1. Bacterial lower respiratory infection  - doxycycline (VIBRAMYCIN) 100 MG Tab; Take 1 Tab by mouth 2 times a day for 10 days.  Dispense: 20 Tab; Refill: 0    2. Cough  - guaifenesin-codeine (TUSSI-ORGANIDIN NR) 100-10 MG/5ML syrup; Take 5 mL by mouth 3 times a day as needed for Cough for up to 7 days.  Dispense: 100 mL; Refill: 0    3. Wheezing    Discussed physical examination findings as well as patient symptoms are indicative of bacterial lower respiratory infection.  Will treat with antibiotics and provide cough syrup with codeine for nighttime cough only.  Instructed patient on symptomatic care including Mucinex, increase fluids.  Patient should continue to take albuterol and Advair. Because patient is an asthmatic will not provide systemic steroids at this time but if patient is not feeling better should follow-up with primary care provider within 40 to 72 hours.    Supportive care, differential diagnoses, and indications for immediate follow-up discussed with patient.    Pathogenesis of diagnosis discussed including typical length and natural progression. Patient expresses understanding and agrees to plan.     Work  Note provided    Please note that this dictation was created using voice recognition software. I have made every reasonable attempt to correct obvious errors, but I expect that there are errors of grammar and possibly content that I did not discover before finalizing the note.

## 2019-12-15 NOTE — LETTER
December 15, 2019         Patient: Barron Hewitt   YOB: 1960   Date of Visit: 12/15/2019           To Whom it May Concern:    Barron Hewitt was seen in my clinic on 12/15/2019. He may return to work on 12/18/2019    If you have any questions or concerns, please don't hesitate to call.        Sincerely,           HERMELINDA Avalos.  Electronically Signed

## 2019-12-31 ENCOUNTER — HOSPITAL ENCOUNTER (OUTPATIENT)
Dept: LAB | Facility: MEDICAL CENTER | Age: 59
End: 2019-12-31
Attending: PHYSICIAN ASSISTANT
Payer: COMMERCIAL

## 2019-12-31 LAB
25(OH)D3 SERPL-MCNC: 46 NG/ML (ref 30–100)
ALBUMIN SERPL BCP-MCNC: 4.1 G/DL (ref 3.2–4.9)
ALBUMIN/GLOB SERPL: 1.6 G/DL
ALP SERPL-CCNC: 55 U/L (ref 30–99)
ALT SERPL-CCNC: 27 U/L (ref 2–50)
ANION GAP SERPL CALC-SCNC: 7 MMOL/L (ref 0–11.9)
AST SERPL-CCNC: 19 U/L (ref 12–45)
BILIRUB SERPL-MCNC: 0.6 MG/DL (ref 0.1–1.5)
BUN SERPL-MCNC: 9 MG/DL (ref 8–22)
CALCIUM SERPL-MCNC: 9 MG/DL (ref 8.5–10.5)
CHLORIDE SERPL-SCNC: 103 MMOL/L (ref 96–112)
CHOLEST SERPL-MCNC: 103 MG/DL (ref 100–199)
CO2 SERPL-SCNC: 29 MMOL/L (ref 20–33)
CREAT SERPL-MCNC: 0.77 MG/DL (ref 0.5–1.4)
CREAT UR-MCNC: 84 MG/DL
EST. AVERAGE GLUCOSE BLD GHB EST-MCNC: 174 MG/DL
GLOBULIN SER CALC-MCNC: 2.5 G/DL (ref 1.9–3.5)
GLUCOSE SERPL-MCNC: 139 MG/DL (ref 65–99)
HBA1C MFR BLD: 7.7 % (ref 0–5.6)
HDLC SERPL-MCNC: 37 MG/DL
LDLC SERPL CALC-MCNC: 38 MG/DL
MICROALBUMIN UR-MCNC: 1.2 MG/DL
MICROALBUMIN/CREAT UR: 14 MG/G (ref 0–30)
POTASSIUM SERPL-SCNC: 3.6 MMOL/L (ref 3.6–5.5)
PROT SERPL-MCNC: 6.6 G/DL (ref 6–8.2)
SODIUM SERPL-SCNC: 139 MMOL/L (ref 135–145)
T4 FREE SERPL-MCNC: 0.86 NG/DL (ref 0.53–1.43)
TRIGL SERPL-MCNC: 140 MG/DL (ref 0–149)
TSH SERPL DL<=0.005 MIU/L-ACNC: 0.91 UIU/ML (ref 0.38–5.33)

## 2019-12-31 PROCEDURE — 82985 ASSAY OF GLYCATED PROTEIN: CPT

## 2019-12-31 PROCEDURE — 80053 COMPREHEN METABOLIC PANEL: CPT

## 2019-12-31 PROCEDURE — 82306 VITAMIN D 25 HYDROXY: CPT

## 2019-12-31 PROCEDURE — 36415 COLL VENOUS BLD VENIPUNCTURE: CPT

## 2019-12-31 PROCEDURE — 80061 LIPID PANEL: CPT

## 2019-12-31 PROCEDURE — 84439 ASSAY OF FREE THYROXINE: CPT

## 2019-12-31 PROCEDURE — 83036 HEMOGLOBIN GLYCOSYLATED A1C: CPT

## 2019-12-31 PROCEDURE — 84443 ASSAY THYROID STIM HORMONE: CPT

## 2019-12-31 PROCEDURE — 82043 UR ALBUMIN QUANTITATIVE: CPT

## 2019-12-31 PROCEDURE — 82570 ASSAY OF URINE CREATININE: CPT

## 2020-01-01 LAB — FRUCTOSAMINE SERPL-SCNC: 255 UMOL/L (ref 170–285)

## 2020-01-14 ENCOUNTER — OFFICE VISIT (OUTPATIENT)
Dept: MEDICAL GROUP | Facility: LAB | Age: 60
End: 2020-01-14
Payer: COMMERCIAL

## 2020-01-14 VITALS
RESPIRATION RATE: 16 BRPM | BODY MASS INDEX: 27.99 KG/M2 | SYSTOLIC BLOOD PRESSURE: 120 MMHG | OXYGEN SATURATION: 95 % | HEIGHT: 69 IN | WEIGHT: 189 LBS | DIASTOLIC BLOOD PRESSURE: 76 MMHG | TEMPERATURE: 97.1 F | HEART RATE: 70 BPM

## 2020-01-14 DIAGNOSIS — J45.20 MILD INTERMITTENT ASTHMA WITHOUT COMPLICATION: ICD-10-CM

## 2020-01-14 DIAGNOSIS — R55 SYNCOPE, UNSPECIFIED SYNCOPE TYPE: ICD-10-CM

## 2020-01-14 DIAGNOSIS — J06.9 ACUTE URI: ICD-10-CM

## 2020-01-14 PROCEDURE — 99213 OFFICE O/P EST LOW 20 MIN: CPT | Performed by: NURSE PRACTITIONER

## 2020-01-14 RX ORDER — MONTELUKAST SODIUM 10 MG/1
10 TABLET ORAL DAILY
Qty: 30 TAB | Refills: 1 | Status: SHIPPED | OUTPATIENT
Start: 2020-01-14 | End: 2020-06-04

## 2020-01-14 RX ORDER — AMOXICILLIN AND CLAVULANATE POTASSIUM 875; 125 MG/1; MG/1
1 TABLET, FILM COATED ORAL 2 TIMES DAILY
Qty: 14 TAB | Refills: 0 | Status: SHIPPED | OUTPATIENT
Start: 2020-01-14 | End: 2020-03-23 | Stop reason: SDUPTHER

## 2020-01-14 ASSESSMENT — PATIENT HEALTH QUESTIONNAIRE - PHQ9: CLINICAL INTERPRETATION OF PHQ2 SCORE: 0

## 2020-01-14 NOTE — PROGRESS NOTES
"Chief Complaint   Patient presents with   • Cough     green mucus       HPI  Aly is a 59-year-old established male here to follow-up on upper respiratory tract infection that was treated here 1 month ago.  He states his symptoms did improve briefly with doxycycline and have returned.  He is now experiencing sinus pressure, blowing out green mucus, but denies ear or throat pain.  Hears wheezing a few x per day - particularly in the morning.  SOB with exertion.  Using ventolin which helps.  advair is bid chronically for pt.  Not taking singular.  Has asthma.  Taking Qnasl, cpap, mucinex, zyrtec  Completed doxycyline 100 mg 12/22/2019.     Past medical, surgical, family, and social history is reviewed and updated in Epic chart by me today.   Medications and allergies reviewed and updated in Epic chart by me today.     ROS:   As documented in history of present illness above    Exam:  /76 (BP Location: Left arm, Patient Position: Sitting, BP Cuff Size: Large adult)   Pulse 70   Temp 36.2 °C (97.1 °F)   Resp 16   Ht 1.753 m (5' 9\")   Wt 85.7 kg (189 lb)   SpO2 95%   Constitutional: Alert, no distress, plus 3 vital signs  Skin:  Warm, dry, no rashes invisible areas  Eye: Equal, round and reactive, conjunctiva clear  ENMT: Bilateral tympanic membranes are retracted but translucent without erythema or perforation. Positive bilateral maxillary sinus tenderness. Oropharynx is slightly injected without exudate. No tonsillar enlargement.  Neck: Mild anterior cervical, nontender lymphadenopathy  Respiratory: Unlabored respiration, lungs clear to auscultation, no wheezes, no rhonchi  Cardiovascular: Normal rate and rhythm, no murmur, no edema  Psych: Alert, pleasant, well-groomed, normal affect      Assessment / Plan / Medical Decision making:   #1-acute sinusitis: Failure of doxycycline.  Treated Augmentin twice daily for 7 days.  Singulair added on because of wheezing and asthma exacerbation that is affiliated with " this URI.  He will continue on Advair twice daily and albuterol as needed.  Encouraged him also could continue with Qnasl, his CPAP machine, Mucinex and Zyrtec.  He is agreeable to following up with me in 7 days on email, sooner with any worsening.    ER precautions prn fever >102.5, increased WOB, onset CP, or worsening overall symptoms.      Side effects of all medications prescribed today were discussed with the patient including how to take the medications and proper dosage. Discussed repercussions of not taking the medications as prescribed. Instructed to call the office should she have any negative side effects or problems with the medications.

## 2020-01-22 ENCOUNTER — TELEPHONE (OUTPATIENT)
Dept: CARDIOLOGY | Facility: MEDICAL CENTER | Age: 60
End: 2020-01-22

## 2020-01-22 NOTE — TELEPHONE ENCOUNTER
Spoke w/ pt. Regarding upcoming visit w/ SW on 2/3. Pt. Has not had a chance to complete Carotid US ordered by PCP, but will schedule at [facility]. Confirmed appt. Time and date.

## 2020-01-23 ENCOUNTER — HOSPITAL ENCOUNTER (OUTPATIENT)
Dept: RADIOLOGY | Facility: MEDICAL CENTER | Age: 60
End: 2020-01-23
Attending: NURSE PRACTITIONER
Payer: COMMERCIAL

## 2020-01-23 DIAGNOSIS — R55 SYNCOPE, UNSPECIFIED SYNCOPE TYPE: ICD-10-CM

## 2020-01-23 PROCEDURE — 93880 EXTRACRANIAL BILAT STUDY: CPT

## 2020-02-03 ENCOUNTER — OFFICE VISIT (OUTPATIENT)
Dept: CARDIOLOGY | Facility: MEDICAL CENTER | Age: 60
End: 2020-02-03
Payer: COMMERCIAL

## 2020-02-03 VITALS
SYSTOLIC BLOOD PRESSURE: 120 MMHG | HEIGHT: 69 IN | OXYGEN SATURATION: 96 % | HEART RATE: 84 BPM | WEIGHT: 194 LBS | BODY MASS INDEX: 28.73 KG/M2 | DIASTOLIC BLOOD PRESSURE: 80 MMHG

## 2020-02-03 DIAGNOSIS — I25.10 CORONARY ARTERY DISEASE, NON-OCCLUSIVE: ICD-10-CM

## 2020-02-03 DIAGNOSIS — R55 SYNCOPE AND COLLAPSE: ICD-10-CM

## 2020-02-03 DIAGNOSIS — Q24.5 CORONARY-MYOCARDIAL BRIDGE: ICD-10-CM

## 2020-02-03 DIAGNOSIS — E78.5 DYSLIPIDEMIA: ICD-10-CM

## 2020-02-03 DIAGNOSIS — I10 ESSENTIAL HYPERTENSION, BENIGN: ICD-10-CM

## 2020-02-03 DIAGNOSIS — I20.9 ANGINA PECTORIS (HCC): ICD-10-CM

## 2020-02-03 LAB — EKG IMPRESSION: NORMAL

## 2020-02-03 PROCEDURE — 99214 OFFICE O/P EST MOD 30 MIN: CPT | Performed by: INTERNAL MEDICINE

## 2020-02-03 PROCEDURE — 93000 ELECTROCARDIOGRAM COMPLETE: CPT | Performed by: INTERNAL MEDICINE

## 2020-02-03 ASSESSMENT — ENCOUNTER SYMPTOMS
MYALGIAS: 0
PALPITATIONS: 0
LOSS OF CONSCIOUSNESS: 0
DIZZINESS: 0
COUGH: 0
WHEEZING: 0

## 2020-02-03 NOTE — PROGRESS NOTES
Chief Complaint   Patient presents with   • Coronary artery disease        Subjective:   Barron Hewitt is a 59 y.o. male who presents today for follow up evaluation of CAD by catheterization, hypertension, hyperlipidemia myocardial bridging.     Last seen on 1/28/2019    Since 1/28/2019 had a fainting spell at a restaurant while eating in 11/2019.  Was not hospitalized.  Subsequently treated for significant respiratory infection with antibiotics.  Saw his new PCP Ruthie Unger.  Carotid studies 1/2019 with no obstructive disease.  No angina pectoris or palpitations.    Since 4/19/2018 appointment patient's had no chest pain, shortness of breath or lightheadedness.  Still struggling to get his diabetes under control.  Has some tingling in his feet when he walks.  Schedule seated podiatrist.    Since 12/21/2017 appointment the patient has had no cardiac symptoms.  In January, 2018 he had left shoulder surgery without complications or perioperative cardiac problems.  Compliant with medications.     Since 8/22/2016 the patient has had some mild inconsistent intermittent exertional angina.  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     Past medical history  All 4 of his children are engaged to be  within the next year.  Tolerating his medications.     The patient is being seen today after recent cardiac catheterization that was performed because of the patient having chest discomfort and abnormal standard stress test indicating ischemia.  Catheterization showed a 60's 70% diagonal lesion with an FFR of 89.  No new cardiac symptoms.     Has sleep apnea Followed by [facility].  Has not tolerated CPAP.  Does not get restful sleep and has a lot of fatigue.  Interested in exercising.  Walk regularly this past year up to 4 miles a day but not in the past several months.  Has been seen by Dr. Grover, orthopedic surgeon for his knee  problems.     Has had previous plans to initiate a more aerobic cardiovascular exercise program with stationary bicycling.  In both quadriceps which has limited his exercise choices.     Diabetes mellitus is improved.     Past Medical History  In 2012 he had an abdominal hernia repair without complications.  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 Valverde   • Sleep apnea     has CPAP   • Snoring      Past Surgical History:   Procedure Laterality Date   • SHOULDER DECOMPRESSION ARTHROSCOPIC Left 1/4/2018    Procedure: SHOULDER DECOMPRESSION ARTHROSCOPIC - SUBACROMIAL;  Surgeon: Linwood Grover M.D.;  Location: [facility];  Service: Orthopedics   • SHOULDER ARTHROSCOPY W/ BICIPITAL TENODESIS REPAIR Left 1/4/2018    Procedure: SHOULDER ARTHROSCOPY W/ BICIPITAL Tenotomy REPAIR;  Surgeon: Linwood Grover M.D.;  Location: [facility];  Service: Orthopedics   • CLAVICLE DISTAL EXCISION Left 1/4/2018    Procedure: CLAVICLE DISTAL EXCISION - POSSIBLE;  Surgeon: Linwood Grover M.D.;  Location: [facility];  Service: Orthopedics   • RECOVERY  4/8/2016    Procedure: CATH LAB [facility] WITH POSSIBLE NAVIN;  Surgeon: Recoveryon Surgery;  Location: SURGERY PRE-POST PROC UNIT [facility] [place];  Service:    • VENTRAL HERNIA REPAIR LAPAROSCOPIC  11/1/2012    Performed by Marcos Ramírez M.D. at [facility]   • KNEE ARTHROSCOPY  1990's    right   • SHOULDER ARTHROSCOPY  1990's    right   • SINUSOTOMIES  1990's    times two  surgeries   • TUMOR EXCISION WITH BIOPSY  1990's    right hand - thumb     Family History   Problem Relation Age of Onset   • Breast Cancer Mother    • Hypertension Mother    • Cancer Mother         breast   • Heart Disease Mother         hx of bypass   • Hypertension Father    • Arthritis Father    • Diabetes Father         prediabetes   • No Known Problems Sister    • Arthritis Brother    • Heart Disease Other    • Hypertension Other    • Cancer Other    • No Known Problems Brother      Social History     Socioeconomic History   • Marital status:      Spouse name: Not on file   • Number of children: Not on file   • Years of education: Not on file   • Highest education level: Not on file   Occupational History   • Not on file   Social Needs   • Financial resource strain: Not on file   • Food insecurity:     Worry: Not on file     Inability: Not on file   • Transportation needs:     Medical: Not on file     Non-medical: Not on file   Tobacco Use   • Smoking status: Light Tobacco Smoker     Packs/day: 0.00     Types: Cigars   • Smokeless tobacco: Never Used   • Tobacco comment: occasional cigars   Substance and Sexual Activity   • Alcohol use: Yes     Comment: rare - 1-2 per month (social)   • Drug use: No     Comment: occ cigar smoking   • Sexual activity: Not on file     Comment: ; 2 biological kids (2 of his wife's); 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   Lifestyle   • Physical activity:     Days per week: Not on file     Minutes per session: Not on file   • Stress: Not on file   Relationships   • Social connections:     Talks on phone: Not on file     Gets together: Not on file     Attends Congregational service: Not on file     Active member of club or organization: Not on file     Attends meetings of clubs or organizations: Not on file     Relationship status: Not on file   • Intimate partner violence:     Fear of current or ex partner: Not on file     Emotionally abused: Not on file      Physically abused: Not on file     Forced sexual activity: Not on file   Other Topics Concern   • Not on file   Social History Narrative   • Not on file     Allergies   Allergen Reactions   • Kiwi Extract Anaphylaxis   • Shellfish Allergy Anaphylaxis   • Strawberry Anaphylaxis   • Sulfa Drugs Rash and Unspecified   • Testosterone Rash     Outpatient Encounter Medications as of 2/3/2020   Medication Sig Dispense Refill   • VENTOLIN  (90 Base) MCG/ACT Aero Soln inhalation aerosol USE 2 INHALATIONS EVERY 4 HOURS AS NEEDED FOR SHORTNESS OF BREATH 3 Inhaler 3   • levothyroxine (SYNTHROID) 75 MCG Tab Take 75 mcg by mouth Every morning on an empty stomach.     • levothyroxine (SYNTHROID) 50 MCG Tab Take 50 mcg by mouth Every morning on an empty stomach.     • montelukast (SINGULAIR) 10 MG Tab Take 1 Tab by mouth every evening. 30 Tab 5   • DILTIAZem CD (CARTIA XT) 180 MG CAPSULE SR 24 HR Take 1 Cap by mouth every day. 90 Cap 2   • benazepril (LOTENSIN) 20 MG Tab Take 1 Tab by mouth every day. 90 Tab 1   • Dulaglutide (TRULICITY) 1.5 MG/0.5ML Solution Pen-injector Inject  as instructed.     • fluticasone-salmeterol (ADVAIR DISKUS) 500-50 MCG/DOSE AEROSOL POWDER, BREATH ACTIVATED Inhale 1 Puff by mouth every 12 hours. 3 Inhaler 3   • Insulin Degludec (TRESIBA FLEXTOUCH SC) Inject 28 Units as instructed.     • Canagliflozin (INVOKANA) 300 MG TABS Take  by mouth every day.     • metformin (GLUCOPHAGE) 500 MG TABS Take 1,000 mg by mouth 2 times a day, with meals. 2 tablet am 1 tablets pm     • atorvastatin (LIPITOR) 80 MG tablet Take 80 mg by mouth every evening.     • ALPHA LIPOIC ACID PO Take 600 mg by mouth 2 Times a Day.     • Omega-3 Fatty Acids (FISH OIL) 1200 MG CAPS Take  by mouth.     • Cinnamon 500 MG CAPS Take 1,000 mg by mouth.     • aspirin EC (ECOTRIN) 81 MG TBEC Take 81 mg by mouth every day.       • pantoprazole (PROTONIX) 40 MG TBEC Take 40 mg by mouth every day.       • Coenzyme Q10 (CO Q-10) 200 MG  "CAPS Take  by mouth every day.       • Cholecalciferol (VITAMIN D) 2000 UNIT TABS Take  by mouth 2 Times a Day.     • cetirizine (ZYRTEC) 10 MG TABS Take 10 mg by mouth every day.     • amoxicillin-clavulanate (AUGMENTIN) 875-125 MG Tab Take 1 Tab by mouth 2 times a day. (Patient not taking: Reported on 2/3/2020) 14 Tab 0   • montelukast (SINGULAIR) 10 MG Tab Take 1 Tab by mouth every day. At night 30 Tab 1   • Semaglutide (OZEMPIC) 0.25 or 0.5 MG/DOSE Solution Pen-injector Inject  as instructed.     • liraglutide (VICTOZA) 18 MG/3ML Solution Pen-injector injection Inject 1.8 mg as instructed every day.       No facility-administered encounter medications on file as of 2/3/2020.      Review of Systems   Respiratory: Negative for cough and wheezing.    Cardiovascular: Negative for chest pain and palpitations.   Musculoskeletal: Negative for myalgias.   Neurological: Negative for dizziness and loss of consciousness.        Objective:   /80 (BP Location: Left arm, Patient Position: Sitting)   Pulse 84   Ht 1.753 m (5' 9\")   Wt 88 kg (194 lb)   SpO2 96%   BMI 28.65 kg/m²     Physical Exam   Constitutional: He is oriented to person, place, and time. He appears well-developed and well-nourished. No distress.   Neck: No JVD present.   Cardiovascular: Normal rate, regular rhythm, normal heart sounds and intact distal pulses. Exam reveals no gallop and no friction rub.   No murmur heard.  Pulses:       Dorsalis pedis pulses are 3+ on the right side and 3+ on the left side.        Posterior tibial pulses are 3+ on the right side and 3+ on the left side.   Pulmonary/Chest: Effort normal and breath sounds normal. No respiratory distress. He has no wheezes. He has no rales.   Abdominal: Soft.   Musculoskeletal:         General: No edema.   Neurological: He is alert and oriented to person, place, and time.   Skin: Skin is warm and dry.   Psychiatric: He has a normal mood and affect. His behavior is normal. " "    03/27/2008 Standard exercise stress test  demonstrated  asymptomatic upsloping horizontal 1 mm ST-segment depression in the  inferolateral leads.       05/22/2008 myocardial perfusion stress test     Demonstrated the 1 to 1-1/2 mm  horizontal/upsloping ST-segment depression in inferolateral leads  after achieving 91% of maximum age predicted heart rate of 155 with  the perfusion scan suggesting \"ischemia in the left anterior  descending distribution\", ejection fraction was 69%.   3/22/2016 TREADMILL STRESS TEST  Stress ECG: Ischemic 2 mm ST depression in the inferior and  lateral leads with exercise.to exercise  Impression: Ischemic ECG changes at a  fair workload    07/11/2008 Cardiac Catheterization:  EF 73%.  Normal left ventricular wall motion.  Mid LAD mild myocardial bridging.      04/06/2016 CARDIAC CATHETERIZATION  1.  Normal left ventricular end-diastolic pressure.  2.  Normal left ventricular systolic function. EF 75%.  3.  Eccentric 60-70% lesion at the origin of the second LAD diagonal with FFR    across this lesion of 0.89.    Assessment:     1. Coronary artery disease, non-occlusive  EKG    Treadmill Stress   2. Angina pectoris (HCC)     3. Coronary-myocardial bridge     4. Dyslipidemia     5. Essential hypertension, benign     6. Syncope and collapse         Medical Decision Making:  Today's Assessment / Status / Plan:   Assessment  Angina pectoris with exertion.        CAD. Cardiac catheterization 4/6/2016     Myocardial bridging.  2008 angiogram.     Diabetes mellitus.     Hypertension.      Hyperlipidemia.  On atorvastatin.    Fainting episode     Recommendation  EKG today  Most recent lipid profile normal.  Suspect patient had a vasovagal event in November.  Treadmill.  "

## 2020-03-02 ENCOUNTER — APPOINTMENT (OUTPATIENT)
Dept: PULMONOLOGY | Facility: HOSPICE | Age: 60
End: 2020-03-02
Payer: COMMERCIAL

## 2020-03-06 ENCOUNTER — NON-PROVIDER VISIT (OUTPATIENT)
Dept: CARDIOLOGY | Facility: MEDICAL CENTER | Age: 60
End: 2020-03-06
Attending: INTERNAL MEDICINE
Payer: COMMERCIAL

## 2020-03-06 VITALS
HEART RATE: 86 BPM | DIASTOLIC BLOOD PRESSURE: 74 MMHG | HEIGHT: 69 IN | BODY MASS INDEX: 28.73 KG/M2 | OXYGEN SATURATION: 97 % | SYSTOLIC BLOOD PRESSURE: 140 MMHG | WEIGHT: 194 LBS

## 2020-03-06 DIAGNOSIS — I25.10 CORONARY ARTERY DISEASE, NON-OCCLUSIVE: ICD-10-CM

## 2020-03-06 LAB — TREADMILL STRESS: NORMAL

## 2020-03-06 PROCEDURE — 93015 CV STRESS TEST SUPVJ I&R: CPT | Performed by: INTERNAL MEDICINE

## 2020-03-11 ENCOUNTER — TELEPHONE (OUTPATIENT)
Dept: CARDIOLOGY | Facility: MEDICAL CENTER | Age: 60
End: 2020-03-11

## 2020-03-12 NOTE — TELEPHONE ENCOUNTER
Called and spoke with the patient today about the results of his treadmill stress test which continues to show exercise related inferior lateral ischemia similar to all of his previous stress test dating back to 2008 related to LAD myocardial bridging and ostial diagonal disease.  No angina pectoris.  Continue current therapy.

## 2020-03-23 ENCOUNTER — OFFICE VISIT (OUTPATIENT)
Dept: MEDICAL GROUP | Facility: LAB | Age: 60
End: 2020-03-23
Payer: COMMERCIAL

## 2020-03-23 DIAGNOSIS — J06.9 ACUTE URI: ICD-10-CM

## 2020-03-23 DIAGNOSIS — J45.20 MILD INTERMITTENT ASTHMA WITHOUT COMPLICATION: ICD-10-CM

## 2020-03-23 DIAGNOSIS — J30.2 SEASONAL ALLERGIES: ICD-10-CM

## 2020-03-23 PROCEDURE — 99442 PR PHYSICIAN TELEPHONE EVALUATION 11-20 MIN: CPT | Mod: CR | Performed by: NURSE PRACTITIONER

## 2020-03-23 RX ORDER — AMOXICILLIN AND CLAVULANATE POTASSIUM 875; 125 MG/1; MG/1
1 TABLET, FILM COATED ORAL 2 TIMES DAILY
Qty: 14 TAB | Refills: 0 | Status: SHIPPED | OUTPATIENT
Start: 2020-03-23 | End: 2020-06-04

## 2020-03-23 RX ORDER — MONTELUKAST SODIUM 10 MG/1
10 TABLET ORAL EVERY EVENING
Qty: 90 TAB | Refills: 3 | Status: SHIPPED | OUTPATIENT
Start: 2020-03-23 | End: 2021-05-03

## 2020-03-23 NOTE — PROGRESS NOTES
Telephone Appointment Visit   As a means of avoiding spread of COVID-19, this visit is being conducted by telephone. This telephone visit was initiated by the patient and they verbally consented.    Time at start of call: 1:20    Reason for Call:  New Symptom/ Concern: URI symptoms    Patient Comments / History:   Aly is a 59-year-old established male with complaint of nasal congestion, constant throat clearing, losing his voice, sinus pressure and increased coughing over the past week and a half.  Has a significant past medical history of asthma, type 2 diabetes and coronary artery disease.  He uses a CPAP mask for sleep apnea.  His symptoms seem to be worsening.  His mucus is a light yellow.  He denies shortness of breath or wheezing.  He is using Advair twice a day and his albuterol almost daily.  Singulair was added onto his regimen last January, approximately 2 months ago, and he has found this helpful.  Denies any known sick contacts.  Non-smoker.  Not taking anything over-the-counter.  Last antibiotic use was Augmentin in January, prior to that doxycycline about a month prior.    Labs / Images Reviewed   N/A    Assessment and Plan:   1. Acute URI  -We discussed that acutely his symptoms sound viral.  I encouraged him to start Augmentin if his symptoms begin to worsen over the next 1 to 2 weeks, particularly if he begins to have fevers greater than 100 or mucus that begins to appear darker, thicker and symptoms progress.  Encouraged him to email me if he has any questions regarding when to start antibiotics.  Otherwise we discussed high water intake, blowing his nose frequently and vitamin C.  - amoxicillin-clavulanate (AUGMENTIN) 875-125 MG Tab; Take 1 Tab by mouth 2 times a day.  Dispense: 14 Tab; Refill: 0    2. Seasonal allergies  -Continue Singulair both for allergies and asthma.  - montelukast (SINGULAIR) 10 MG Tab; Take 1 Tab by mouth every evening.  Dispense: 90 Tab; Refill: 3    3. Mild  intermittent asthma without complication  -Educated the patient regarding the use of a maintenance versus rescue inhaler.  I would like for him to start utilizing his albuterol only as needed for shortness of breath or wheezing.  Encouraged him to continue Advair 500/50 twice daily and Singulair 10 mg daily.  Discussed that he is at higher risk during coronavirus pandemic considering his diabetes, coronary artery disease and his asthma and I encouraged him to self isolate as much as possible, which he has been doing.    Follow-up: 10 d prn    Time at end of call: 1:34  Total Time Spent: 11-20 minutes    MARTHA Butts.

## 2020-04-16 DIAGNOSIS — I10 BENIGN ESSENTIAL HTN: ICD-10-CM

## 2020-04-16 RX ORDER — DILTIAZEM HYDROCHLORIDE 180 MG/1
180 CAPSULE, COATED, EXTENDED RELEASE ORAL DAILY
Qty: 90 CAP | Refills: 3 | Status: SHIPPED | OUTPATIENT
Start: 2020-04-16 | End: 2021-06-09 | Stop reason: SDUPTHER

## 2020-06-04 ENCOUNTER — OFFICE VISIT (OUTPATIENT)
Dept: MEDICAL GROUP | Facility: LAB | Age: 60
End: 2020-06-04
Payer: COMMERCIAL

## 2020-06-04 ENCOUNTER — HOSPITAL ENCOUNTER (OUTPATIENT)
Dept: LAB | Facility: MEDICAL CENTER | Age: 60
End: 2020-06-04
Attending: NURSE PRACTITIONER
Payer: COMMERCIAL

## 2020-06-04 VITALS
HEIGHT: 69 IN | TEMPERATURE: 97.6 F | HEART RATE: 88 BPM | WEIGHT: 181 LBS | OXYGEN SATURATION: 98 % | BODY MASS INDEX: 26.81 KG/M2 | DIASTOLIC BLOOD PRESSURE: 72 MMHG | SYSTOLIC BLOOD PRESSURE: 126 MMHG | RESPIRATION RATE: 16 BRPM

## 2020-06-04 DIAGNOSIS — Z00.00 WELL ADULT EXAM: ICD-10-CM

## 2020-06-04 DIAGNOSIS — Z12.5 SCREENING FOR PROSTATE CANCER: ICD-10-CM

## 2020-06-04 DIAGNOSIS — Z11.59 ENCOUNTER FOR SCREENING FOR OTHER VIRAL DISEASES: ICD-10-CM

## 2020-06-04 DIAGNOSIS — N40.1 BENIGN PROSTATIC HYPERPLASIA WITH URINARY HESITANCY: ICD-10-CM

## 2020-06-04 DIAGNOSIS — J45.20 MILD INTERMITTENT ASTHMA WITHOUT COMPLICATION: ICD-10-CM

## 2020-06-04 DIAGNOSIS — R39.11 BENIGN PROSTATIC HYPERPLASIA WITH URINARY HESITANCY: ICD-10-CM

## 2020-06-04 DIAGNOSIS — Z23 NEED FOR HEPATITIS B VACCINATION: ICD-10-CM

## 2020-06-04 LAB
BASOPHILS # BLD AUTO: 1.2 % (ref 0–1.8)
BASOPHILS # BLD: 0.12 K/UL (ref 0–0.12)
EOSINOPHIL # BLD AUTO: 0.34 K/UL (ref 0–0.51)
EOSINOPHIL NFR BLD: 3.5 % (ref 0–6.9)
ERYTHROCYTE [DISTWIDTH] IN BLOOD BY AUTOMATED COUNT: 42.9 FL (ref 35.9–50)
EST. AVERAGE GLUCOSE BLD GHB EST-MCNC: 174 MG/DL
HBA1C MFR BLD: 7.7 % (ref 0–5.6)
HCT VFR BLD AUTO: 54.6 % (ref 42–52)
HGB BLD-MCNC: 18.5 G/DL (ref 14–18)
IMM GRANULOCYTES # BLD AUTO: 0.03 K/UL (ref 0–0.11)
IMM GRANULOCYTES NFR BLD AUTO: 0.3 % (ref 0–0.9)
LYMPHOCYTES # BLD AUTO: 1.71 K/UL (ref 1–4.8)
LYMPHOCYTES NFR BLD: 17.6 % (ref 22–41)
MCH RBC QN AUTO: 29.6 PG (ref 27–33)
MCHC RBC AUTO-ENTMCNC: 33.9 G/DL (ref 33.7–35.3)
MCV RBC AUTO: 87.5 FL (ref 81.4–97.8)
MONOCYTES # BLD AUTO: 0.95 K/UL (ref 0–0.85)
MONOCYTES NFR BLD AUTO: 9.8 % (ref 0–13.4)
NEUTROPHILS # BLD AUTO: 6.54 K/UL (ref 1.82–7.42)
NEUTROPHILS NFR BLD: 67.6 % (ref 44–72)
NRBC # BLD AUTO: 0 K/UL
NRBC BLD-RTO: 0 /100 WBC
PLATELET # BLD AUTO: 344 K/UL (ref 164–446)
PMV BLD AUTO: 10.7 FL (ref 9–12.9)
RBC # BLD AUTO: 6.24 M/UL (ref 4.7–6.1)
WBC # BLD AUTO: 9.7 K/UL (ref 4.8–10.8)

## 2020-06-04 PROCEDURE — 80053 COMPREHEN METABOLIC PANEL: CPT

## 2020-06-04 PROCEDURE — 85025 COMPLETE CBC W/AUTO DIFF WBC: CPT

## 2020-06-04 PROCEDURE — 86769 SARS-COV-2 COVID-19 ANTIBODY: CPT

## 2020-06-04 PROCEDURE — 84153 ASSAY OF PSA TOTAL: CPT

## 2020-06-04 PROCEDURE — 86803 HEPATITIS C AB TEST: CPT

## 2020-06-04 PROCEDURE — 36415 COLL VENOUS BLD VENIPUNCTURE: CPT

## 2020-06-04 PROCEDURE — 99396 PREV VISIT EST AGE 40-64: CPT | Mod: 25 | Performed by: NURSE PRACTITIONER

## 2020-06-04 PROCEDURE — 83036 HEMOGLOBIN GLYCOSYLATED A1C: CPT

## 2020-06-04 PROCEDURE — 90471 IMMUNIZATION ADMIN: CPT | Performed by: NURSE PRACTITIONER

## 2020-06-04 PROCEDURE — 84439 ASSAY OF FREE THYROXINE: CPT

## 2020-06-04 PROCEDURE — 90746 HEPB VACCINE 3 DOSE ADULT IM: CPT | Performed by: NURSE PRACTITIONER

## 2020-06-04 PROCEDURE — 84443 ASSAY THYROID STIM HORMONE: CPT

## 2020-06-04 RX ORDER — FINASTERIDE 5 MG/1
5 TABLET, FILM COATED ORAL DAILY
Qty: 90 TAB | Refills: 3 | Status: SHIPPED | OUTPATIENT
Start: 2020-06-04 | End: 2021-10-04 | Stop reason: SDUPTHER

## 2020-06-04 NOTE — PROGRESS NOTES
Subjective:     CC:   Chief Complaint   Patient presents with   • Annual Exam       HPI:   Aly is a 59 y.o. male who presents for annual exam.  Feeling well overall.  Voice still scratchy throughout the day and has been for the past year- sees Dr. Stern.  Takes a ppi.    Tells me that his urinary stream takes forever.  Denies dribbling or incontinence.  Denies dysuria or hematuria.  Urinary stream has been slow for years and he is ready to take a medication for it.  Has never been told he has prostate cancer.  Last Colorectal Cancer Screening: UTD  Last Tdap: UTD  Exercise: walking  Diet: diabetic  Also followed by Dr. Anastasia Sandoval, endocrinology, for his type 2 diabetes and hypothyroidism.    Had a severe URI in winter 2019 (nov/ dec) - only travel was [place] 6/2019.  Wants coronavirus antibody testing.        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 He also has no past medical history of Bowel habit changes.  He  has a past surgical history that includes sinusotomies (1990's); knee arthroscopy (1990's); tumor excision with biopsy (1990's); shoulder arthroscopy (1990's); ventral hernia repair laparoscopic (11/1/2012); recovery (4/8/2016); shoulder decompression arthroscopic (Left, 1/4/2018); shoulder arthroscopy w/ bicipital tenodesis repair (Left, 1/4/2018); and clavicle distal excision (Left, 1/4/2018).    Family History   Problem Relation Age of Onset   • Breast Cancer Mother    • Hypertension Mother    • Cancer Mother         breast   • Heart Disease Mother         hx of bypass   • Hypertension Father    • Arthritis Father    • Diabetes Father         prediabetes   • No Known Problems Sister     • Arthritis Brother    • Heart Disease Other    • Hypertension Other    • Cancer Other    • No Known Problems Brother      Social History     Tobacco Use   • Smoking status: Light Tobacco Smoker     Packs/day: 0.00     Types: Cigars   • Smokeless tobacco: Never Used   • Tobacco comment: occasional cigars   Substance Use Topics   • Alcohol use: Yes     Comment: rare - 1-2 per month (social)   • Drug use: No     Comment: occ cigar smoking     He  has no history on file for sexual activity.    Patient Active Problem List    Diagnosis Date Noted   • Type 2 diabetes mellitus without complication, with long-term current use of insulin ([facility]) 11/07/2019   • History of small bowel obstruction - 2018 11/07/2019   • Syncope and collapse 11/07/2019   • Seasonal allergies 02/28/2019   • Other insomnia 08/17/2018   • Essential hypertension, benign 10/09/2017   • Dyslipidemia 10/09/2017   • Mild intermittent asthma without complication 07/27/2017   • Erythrocytosis 05/01/2017   • ISELA on CPAP - [facility] 05/01/2017   • Coronary artery disease, non-occlusive 04/15/2016   • Angina pectoris ([facility]) 03/11/2016   • Abnormal nuclear cardiac imaging test 01/31/2014   • 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 90 Cap 3   • montelukast (SINGULAIR) 10 MG Tab Take 1 Tab by mouth every evening. 90 Tab 3   • VENTOLIN  (90 Base) MCG/ACT Aero Soln inhalation aerosol USE 2 INHALATIONS EVERY 4 HOURS AS NEEDED FOR SHORTNESS OF BREATH 3 Inhaler 3   • levothyroxine (SYNTHROID) 75 MCG Tab Take 75 mcg by mouth Every morning on an empty stomach.     • levothyroxine (SYNTHROID) 50 MCG Tab Take 50 mcg by mouth Every morning on an empty stomach.     • Semaglutide (OZEMPIC) 0.25 or 0.5 MG/DOSE Solution Pen-injector Inject  as instructed.     • benazepril (LOTENSIN) 20 MG Tab Take 1 Tab by mouth  every day. 90 Tab 1   • Dulaglutide (TRULICITY) 1.5 MG/0.5ML Solution Pen-injector Inject  as instructed.     • fluticasone-salmeterol (ADVAIR DISKUS) 500-50 MCG/DOSE AEROSOL POWDER, BREATH ACTIVATED Inhale 1 Puff by mouth every 12 hours. 3 Inhaler 3   • Insulin Degludec (TRESIBA FLEXTOUCH SC) Inject 28 Units as instructed.     • liraglutide (VICTOZA) 18 MG/3ML Solution Pen-injector injection Inject 1.8 mg as instructed every day.     • Canagliflozin (INVOKANA) 300 MG TABS Take  by mouth every day.     • metformin (GLUCOPHAGE) 500 MG TABS Take 1,000 mg by mouth 2 times a day, with meals. 2 tablet am 1 tablets pm     • atorvastatin (LIPITOR) 80 MG tablet Take 80 mg by mouth every evening.     • ALPHA LIPOIC ACID PO Take 600 mg by mouth 2 Times a Day.     • Omega-3 Fatty Acids (FISH OIL) 1200 MG CAPS Take  by mouth.     • Cinnamon 500 MG CAPS Take 1,000 mg by mouth.     • aspirin EC (ECOTRIN) 81 MG TBEC Take 81 mg by mouth every day.       • Coenzyme Q10 (CO Q-10) 200 MG CAPS Take  by mouth every day.       • Cholecalciferol (VITAMIN D) 2000 UNIT TABS Take  by mouth 2 Times a Day.     • cetirizine (ZYRTEC) 10 MG TABS Take 10 mg by mouth every day.     • amoxicillin-clavulanate (AUGMENTIN) 875-125 MG Tab Take 1 Tab by mouth 2 times a day. 14 Tab 0   • montelukast (SINGULAIR) 10 MG Tab Take 1 Tab by mouth every day. At night 30 Tab 1   • pantoprazole (PROTONIX) 40 MG TBEC Take 40 mg by mouth every day.         No current facility-administered medications for this visit.      Allergies   Allergen Reactions   • Kiwi Extract Anaphylaxis   • Shellfish Allergy Anaphylaxis   • Strawberry Anaphylaxis   • Sulfa Drugs Rash and Unspecified   • Testosterone Rash       Review of Systems   Constitutional: Negative for fever, chills and malaise/fatigue.   HENT: Negative for congestion.    Eyes: Negative for pain.   Respiratory: Negative for cough and shortness of breath.    Cardiovascular: Negative for chest pain and leg swelling.  "  Gastrointestinal: Negative for nausea, vomiting, abdominal pain and diarrhea.   Genitourinary: Negative for dysuria and hematuria.   Skin: Negative for rash.   Neurological: Negative for dizziness, focal weakness and headaches.   Endo/Heme/Allergies: Does not bruise/bleed easily.   Psychiatric/Behavioral: Negative for depression.     Objective:   /72 (BP Location: Right arm, Patient Position: Sitting, BP Cuff Size: Adult)   Pulse 88   Temp 36.4 °C (97.6 °F)   Resp 16   Ht 1.753 m (5' 9\")   Wt 82.1 kg (181 lb)   SpO2 98%   BMI 26.73 kg/m²      Wt Readings from Last 4 Encounters:   06/04/20 82.1 kg (181 lb)   03/06/20 88 kg (194 lb)   02/03/20 88 kg (194 lb)   01/14/20 85.7 kg (189 lb)         Physical Exam:  Constitutional: Well-developed and well-nourished. Not diaphoretic. No distress.   Skin: Skin is warm and dry. No rash noted.  Head: Atraumatic without lesions.  Eyes: Conjunctivae and extraocular motions are normal. Pupils are equal, round, and reactive to light. No scleral icterus.   Ears:  External ears unremarkable. Tympanic membranes clear and intact.  Nose: Nares patent. Septum midline. Turbinates without erythema nor edema. No discharge.   Mouth/Throat: Tongue normal. Oropharynx is clear and moist. Posterior pharynx without erythema or exudates.  Neck: Supple, trachea midline. Normal range of motion. No thyromegaly present. No lymphadenopathy--cervical or supraclavicular.  Cardiovascular: Regular rate and rhythm, S1 and S2 without murmur, rubs, or gallops.    Abdomen: Soft, non tender, and without distention. Active bowel sounds in all four quadrants. No rebound, guarding, masses or HSM.  Extremities: No cyanosis, clubbing, erythema, nor edema. Distal pulses intact and symmetric.   Musculoskeletal: All major joints AROM full in all directions without pain.  Neurological: Alert and oriented x 3. Grossly non-focal. Strength and sensation grossly intact. DTRs 2+/3 and symmetric.   Psychiatric:  " Behavior, mood, and affect are appropriate.      Assessment and Plan:   1. Well adult exam  - CBC WITH DIFFERENTIAL; Future  - Comp Metabolic Panel; Future  - TSH; Future  - FREE THYROXINE; Future  - HEMOGLOBIN A1C; Future  - PROSTATE SPECIFIC AG SCREENING; Future  - HEP C VIRUS ANTIBODY; Future    2. Screening for prostate cancer  - PROSTATE SPECIFIC AG SCREENING; Future    3. Need for hepatitis B vaccination  - Hepatitis B Vaccine Adult IM    4. Mild intermittent asthma without complication  -stable.   - fluticasone-salmeterol (ADVAIR DISKUS) 500-50 MCG/DOSE AEROSOL POWDER, BREATH ACTIVATED; Inhale 1 Puff by mouth every 12 hours.  Dispense: 3 Inhaler; Refill: 3    5. Benign prostatic hyperplasia with urinary hesitancy  -trial of finasteride.  Discussed how finasteride works as well as potential side effects.  Refrained from tamsulosin because of his sulfa allergy.  If finasteride is not helpful for him after 1 month, he will contact me.  Recommend updated PSA.  - finasteride (PROSCAR) 5 MG Tab; Take 1 Tab by mouth every day.  Dispense: 90 Tab; Refill: 3    6. Encounter for screening for other viral diseases  - SARS CoV-2 Ab, Total; Future      F/u 6 mo. urged him to keep his follow-ups with endocrinology, cardiology and ENT.

## 2020-06-05 LAB
ALBUMIN SERPL BCP-MCNC: 4.9 G/DL (ref 3.2–4.9)
ALBUMIN/GLOB SERPL: 2 G/DL
ALP SERPL-CCNC: 73 U/L (ref 30–99)
ALT SERPL-CCNC: 24 U/L (ref 2–50)
ANION GAP SERPL CALC-SCNC: 16 MMOL/L (ref 7–16)
AST SERPL-CCNC: 20 U/L (ref 12–45)
BILIRUB SERPL-MCNC: 0.6 MG/DL (ref 0.1–1.5)
BUN SERPL-MCNC: 10 MG/DL (ref 8–22)
CALCIUM SERPL-MCNC: 9.5 MG/DL (ref 8.5–10.5)
CHLORIDE SERPL-SCNC: 100 MMOL/L (ref 96–112)
CO2 SERPL-SCNC: 22 MMOL/L (ref 20–33)
CREAT SERPL-MCNC: 0.74 MG/DL (ref 0.5–1.4)
GLOBULIN SER CALC-MCNC: 2.4 G/DL (ref 1.9–3.5)
GLUCOSE SERPL-MCNC: 83 MG/DL (ref 65–99)
HCV AB SER QL: NORMAL
POTASSIUM SERPL-SCNC: 3.6 MMOL/L (ref 3.6–5.5)
PROT SERPL-MCNC: 7.3 G/DL (ref 6–8.2)
PSA SERPL-MCNC: 0.28 NG/ML (ref 0–4)
SARS-COV-2 IGG+IGM SERPL QL IA: NORMAL
SODIUM SERPL-SCNC: 138 MMOL/L (ref 135–145)
T4 FREE SERPL-MCNC: 1.43 NG/DL (ref 0.93–1.7)
TSH SERPL DL<=0.005 MIU/L-ACNC: 1.56 UIU/ML (ref 0.38–5.33)

## 2020-06-08 ENCOUNTER — TELEPHONE (OUTPATIENT)
Dept: MEDICAL GROUP | Facility: LAB | Age: 60
End: 2020-06-08

## 2020-06-24 ENCOUNTER — HOSPITAL ENCOUNTER (OUTPATIENT)
Dept: LAB | Facility: MEDICAL CENTER | Age: 60
End: 2020-06-24
Attending: PHYSICIAN ASSISTANT
Payer: COMMERCIAL

## 2020-06-24 LAB
T4 FREE SERPL-MCNC: 1.3 NG/DL (ref 0.93–1.7)
TSH SERPL DL<=0.005 MIU/L-ACNC: 1.21 UIU/ML (ref 0.38–5.33)

## 2020-06-24 PROCEDURE — 84681 ASSAY OF C-PEPTIDE: CPT

## 2020-06-24 PROCEDURE — 84439 ASSAY OF FREE THYROXINE: CPT

## 2020-06-24 PROCEDURE — 36415 COLL VENOUS BLD VENIPUNCTURE: CPT

## 2020-06-24 PROCEDURE — 84443 ASSAY THYROID STIM HORMONE: CPT

## 2020-06-25 LAB — C PEPTIDE SERPL-MCNC: 2.1 NG/ML (ref 0.8–3.5)

## 2020-07-07 ENCOUNTER — OFFICE VISIT (OUTPATIENT)
Dept: MEDICAL GROUP | Facility: LAB | Age: 60
End: 2020-07-07
Payer: COMMERCIAL

## 2020-07-07 VITALS
RESPIRATION RATE: 16 BRPM | HEART RATE: 88 BPM | HEIGHT: 69 IN | OXYGEN SATURATION: 96 % | DIASTOLIC BLOOD PRESSURE: 78 MMHG | WEIGHT: 173 LBS | TEMPERATURE: 98.2 F | BODY MASS INDEX: 25.62 KG/M2 | SYSTOLIC BLOOD PRESSURE: 138 MMHG

## 2020-07-07 DIAGNOSIS — Z79.891 USE OF OPIATES FOR THERAPEUTIC PURPOSES: ICD-10-CM

## 2020-07-07 DIAGNOSIS — M54.42 ACUTE BILATERAL LOW BACK PAIN WITH LEFT-SIDED SCIATICA: ICD-10-CM

## 2020-07-07 PROCEDURE — 99213 OFFICE O/P EST LOW 20 MIN: CPT | Performed by: NURSE PRACTITIONER

## 2020-07-07 RX ORDER — TIZANIDINE 4 MG/1
4 TABLET ORAL EVERY 6 HOURS PRN
Qty: 30 TAB | Refills: 0 | Status: SHIPPED | OUTPATIENT
Start: 2020-07-07 | End: 2020-07-14 | Stop reason: SDUPTHER

## 2020-07-07 RX ORDER — HYDROCODONE BITARTRATE AND ACETAMINOPHEN 5; 325 MG/1; MG/1
1-2 TABLET ORAL EVERY 8 HOURS PRN
Qty: 20 TAB | Refills: 0 | Status: SHIPPED | OUTPATIENT
Start: 2020-07-07 | End: 2020-07-14

## 2020-07-07 RX ORDER — KETOROLAC TROMETHAMINE 30 MG/ML
60 INJECTION, SOLUTION INTRAMUSCULAR; INTRAVENOUS ONCE
Status: COMPLETED | OUTPATIENT
Start: 2020-07-07 | End: 2020-07-07

## 2020-07-07 RX ADMIN — KETOROLAC TROMETHAMINE 60 MG: 30 INJECTION, SOLUTION INTRAMUSCULAR; INTRAVENOUS at 15:32

## 2020-07-07 ASSESSMENT — FIBROSIS 4 INDEX: FIB4 SCORE: 0.72

## 2020-07-07 NOTE — PROGRESS NOTES
"CC  Acute low back pain    HPI  Aly is a 58 yo est male here with c/o acute low back pain.  Woke up with \"tweak in back\" x 5 d.  Tried chiropractor which did not help.  Has taken naproxen / percocet which helps.  Tells me that he is bed ridden - feels a nail being driven into back of left low back, radiating down left leg.  Standing and changing positions is very painful.  Can get comfortable lying on his side with legs bent.  Sleeping in a recliner.    Hx of same last year but resolved within 24 hours with stretching.   Can't recall every having a back w/u.    wking from home on computer.    No injuries or falls before onset low back pain last week.   Denies bowel / bladder control issues.   Denies any other associated symptoms.    Past medical, surgical, family, and social history is reviewed and updated in Epic chart by me today.   Medications and allergies reviewed and updated in Epic chart by me today.     ROS:   As documented in history of present illness above    Exam:  /78 (BP Location: Left arm, Patient Position: Sitting, BP Cuff Size: Adult)   Pulse 88   Temp 36.8 °C (98.2 °F)   Resp 16   Ht 1.753 m (5' 9\")   Wt 78.5 kg (173 lb)   SpO2 96%   Constitutional: Alert, no distress, plus 3 vital signs  Skin:  Warm, dry, no rashes invisible areas  Eye: Equal, round and reactive, conjunctiva clear  ENMT: Lips without lesions  Respiratory: Unlabored respiration  Cardiovascular: Normal rate and rhythm  Back: Appearance: normal   Palpation: Left-sided lumbar paraspinous tenderness to palpate, no midline tenderness.  He has pain with flexion beyond 45 degrees.  ROM:  mild forward flexion and extension deficits seen because of discomfort  Reflexes - Patellar 2+ bilaterally; Achilles 2+ bilaterally   Straight Leg Raise -he is unable to tolerate this on the left side because of discomfort  Lower Ext: Strength: 5/5 throughout bilaterally   Sensation: grossly intact throughout bilaterally  Psych: Alert, " "pleasant, well-groomed, normal affect    Assessment / Plan / Medical Decision making:   \"  1. Acute bilateral low back pain with left-sided sciatica  REFERRAL TO PHYSICAL THERAPY Reason for Therapy: Eval/Treat/Report    tizanidine (ZANAFLEX) 4 MG Tab    ketorolac (TORADOL) injection 60 mg    HYDROcodone-acetaminophen (NORCO) 5-325 MG Tab per tablet   2. Use of opiates for therapeutic purposes  Controlled Substance Treatment Agreement   \"  At this point he is unable to tolerate imaging as he has a lot of pain with lying flat and changing positions.  He was given a shot of Toradol today in our office and sent home to take tizanidine every 6-8 hours for the next 48 hours.  He was given hydrocodone to use for severe pain.  We discussed getting into see his physical therapist as soon as possible and referral was placed.  I encouraged him to refrain from lifting, pulling or pushing anything over 10 pounds until his pain has improved.  We discussed heating pads and topical anti-inflammatories as well as gentle stretches.  When he is feeling better, encouraged him to contact me for MRI as this has become a recurrent issue once a year.  We also discussed a consult with physiatry, based on how he is feeling over the next 3 days.  I encouraged him to go the emergency department if his pain is not responding.    In prescribing controlled substances to this patient, I certify that I have obtained and reviewed the medical history of Barron Hewitt. I have also made a good dixie effort to obtain applicable records from other providers who have treated the patient and records did not demonstrate any increased risk of substance abuse that would prevent me from prescribing controlled substances.     I have conducted a physical exam and documented it. I have reviewed Mr. Hewitt’s prescription history as maintained by the [place] Prescription Monitoring Program.     I have assessed the patient’s risk for abuse, dependency, " and addiction using the validated Opioid Risk Tool available at https://www.mdcalc.com/iogrxd-oduk-zloq-ort-narcotic-abuse.     Given the above, I believe the benefits of controlled substance therapy outweigh the risks. The reasons for prescribing controlled substances include non-narcotic, oral analgesic alternatives have been inadequate for pain control. Accordingly, I have discussed the risk and benefits, treatment plan, and alternative therapies with the patient.     Controlled substance agreement was signed today.  He understands that he should not drive a car for the next 72 hours while he is taking hydrocodone and tizanidine.

## 2020-07-14 DIAGNOSIS — M54.42 ACUTE BILATERAL LOW BACK PAIN WITH LEFT-SIDED SCIATICA: ICD-10-CM

## 2020-07-14 DIAGNOSIS — G47.33 OSA ON CPAP: ICD-10-CM

## 2020-07-14 RX ORDER — TIZANIDINE 4 MG/1
4 TABLET ORAL EVERY 6 HOURS PRN
Qty: 30 TAB | Refills: 0 | Status: SHIPPED | OUTPATIENT
Start: 2020-07-14 | End: 2021-05-10 | Stop reason: SDUPTHER

## 2020-07-31 ENCOUNTER — HOSPITAL ENCOUNTER (OUTPATIENT)
Dept: RADIOLOGY | Facility: MEDICAL CENTER | Age: 60
End: 2020-07-31
Attending: ORTHOPAEDIC SURGERY
Payer: COMMERCIAL

## 2020-07-31 DIAGNOSIS — M25.552 LEFT HIP PAIN: ICD-10-CM

## 2020-07-31 PROCEDURE — 73721 MRI JNT OF LWR EXTRE W/O DYE: CPT | Mod: LT

## 2020-08-25 ENCOUNTER — OFFICE VISIT (OUTPATIENT)
Dept: MEDICAL GROUP | Facility: LAB | Age: 60
End: 2020-08-25
Payer: COMMERCIAL

## 2020-08-25 ENCOUNTER — HOSPITAL ENCOUNTER (OUTPATIENT)
Dept: LAB | Facility: MEDICAL CENTER | Age: 60
End: 2020-08-25
Attending: NURSE PRACTITIONER
Payer: COMMERCIAL

## 2020-08-25 VITALS
HEIGHT: 69 IN | SYSTOLIC BLOOD PRESSURE: 120 MMHG | RESPIRATION RATE: 16 BRPM | HEART RATE: 88 BPM | WEIGHT: 164 LBS | OXYGEN SATURATION: 95 % | TEMPERATURE: 98.1 F | DIASTOLIC BLOOD PRESSURE: 70 MMHG | BODY MASS INDEX: 24.29 KG/M2

## 2020-08-25 DIAGNOSIS — R35.1 NOCTURIA: ICD-10-CM

## 2020-08-25 DIAGNOSIS — R29.898 WEAKNESS OF LEFT LOWER EXTREMITY: ICD-10-CM

## 2020-08-25 DIAGNOSIS — R63.4 UNINTENDED WEIGHT LOSS: ICD-10-CM

## 2020-08-25 DIAGNOSIS — Z79.4 TYPE 2 DIABETES MELLITUS WITHOUT COMPLICATION, WITH LONG-TERM CURRENT USE OF INSULIN (HCC): ICD-10-CM

## 2020-08-25 DIAGNOSIS — R39.11 URINARY HESITANCY: ICD-10-CM

## 2020-08-25 DIAGNOSIS — D75.89 MACROCYTOSIS: ICD-10-CM

## 2020-08-25 DIAGNOSIS — E11.9 TYPE 2 DIABETES MELLITUS WITHOUT COMPLICATION, WITH LONG-TERM CURRENT USE OF INSULIN (HCC): ICD-10-CM

## 2020-08-25 DIAGNOSIS — M54.42 ACUTE BILATERAL LOW BACK PAIN WITH LEFT-SIDED SCIATICA: ICD-10-CM

## 2020-08-25 DIAGNOSIS — E03.9 ACQUIRED HYPOTHYROIDISM: ICD-10-CM

## 2020-08-25 LAB
AMYLASE SERPL-CCNC: 133 U/L (ref 20–103)
LIPASE SERPL-CCNC: 79 U/L (ref 11–82)
VIT B12 SERPL-MCNC: 460 PG/ML (ref 211–911)

## 2020-08-25 PROCEDURE — 82607 VITAMIN B-12: CPT

## 2020-08-25 PROCEDURE — 99215 OFFICE O/P EST HI 40 MIN: CPT | Performed by: NURSE PRACTITIONER

## 2020-08-25 PROCEDURE — 83690 ASSAY OF LIPASE: CPT

## 2020-08-25 PROCEDURE — 36415 COLL VENOUS BLD VENIPUNCTURE: CPT

## 2020-08-25 PROCEDURE — 82150 ASSAY OF AMYLASE: CPT

## 2020-08-25 RX ORDER — DULAGLUTIDE 1.5 MG/.5ML
0.5 INJECTION, SOLUTION SUBCUTANEOUS
Qty: 1 EACH | Refills: 5
Start: 2020-08-25 | End: 2021-05-03

## 2020-08-25 RX ORDER — DICLOFENAC SODIUM 75 MG/1
TABLET, DELAYED RELEASE ORAL
COMMUNITY
Start: 2020-08-08 | End: 2021-05-03

## 2020-08-25 ASSESSMENT — FIBROSIS 4 INDEX: FIB4 SCORE: 0.72

## 2020-08-25 NOTE — PROGRESS NOTES
"Chief Complaint   Patient presents with   • Weight Loss     follow up        HPI   Aly is a 59-year-old established male here to follow-up on unintentional weight loss, type 2 diabetes, hypothyroidism, acute low back pain and bilateral lower extremity weakness.  Has lost 30 lbs in 7 months.    UTD with endocrinology and told all is well.  Off Tresiba/insulin completely because of an A1c around 5.7.  GLP-1 agonist changed to Trulicity instead of Ozempic (stopped last week b/c of nausea).  Endocrinology also d/c metformin last week.   Still taking invokana.  (was on bydureon years ago and has taken invokana x 3 years).   Low appetite and nausea with ozempic for 3 months.   Hypothyroidism:  Chronic issue.  50mcg levothyroxine 6 d per week and 75 mcg on the 7th.  Last TSH and T4 were within normal limits 2 months ago.  In terms of his back pain, this was an acute onset issue in early July.  Now seeing Dominic  - ANU and Dr. Dennis as hip / back specialist.    Has suffered from bilateral quad weakness / slowed response x 15 yrs since work accident.  This is worse since \"back went out,\" a month ago - now left leg is very weak and he feels he is rapidly loosing muscle mass.    Last colonoscopy 2016 - repeat 10 yrs. denies black or bloody stools, abdominal pain, nausea, vomiting or diarrhea.  Denies night sweats.  Recent CBC shows macrocytosis.  Only drinks whiskey on Wednesdays, typically 2 glasses at most.  Steroids were extremely helpful last week for his back pain.    MRI lumbar spine this Friday at [facility].      He has also been suffering from urinary hesitancy, frequency and nocturia for years but the most bothersome symptom is urinary hesitancy.  He has been taking finasteride for the past 3 months without improvement.  Denies hematuria.  Last PSA was below 1 in June.  Has a sulfa allergy, has never been on Flomax.    Past medical, surgical, family, and social history is reviewed and updated in Epic chart by me today. " "  Medications and allergies reviewed and updated in Epic chart by me today.     ROS:   Denies any recent falls.  Denies difficulty swallowing or breathing.    Exam:  /70   Pulse 88   Temp 36.7 °C (98.1 °F)   Resp 16   Ht 1.753 m (5' 9\")   Wt 74.4 kg (164 lb)   SpO2 95%   Constitutional: Alert, no distress, plus 3 vital signs  Skin:  Warm, dry, no rashes invisible areas  Eye: Equal, round and reactive, conjunctiva clear  ENMT: Lips without lesions, good dentition, oropharynx clear    Neck: Trachea midline, no masses, no thyromegaly  Respiratory: Unlabored respiration, lungs clear to auscultation, no wheezes, no rhonchi  Cardiovascular: Normal rate and rhythm, no murmur, no edema  Abdomen: Soft, nontender  Musculoskeletal: He is able to dorsi and plantarflex both feet both passively and with active resistance.  His gait is slightly abnormal with a minor limp favoring the left leg.  He is able to completely flex and extend both legs.  Bilateral patellar reflexes are +2.  Full sensation with soft touch to bilateral calves although he has difficulty feeling the monofilament to bilateral anterior mid quad region.  Psych: Alert, pleasant, well-groomed, normal affect    Assessment / Plan / Medical Decision makin. Type 2 diabetes mellitus without complication, with long-term current use of insulin ([facility]) - Dr. Sandoval  -I discussed with him that it is very likely that GLP-1 agonists are causing his weight loss, particularly when he was nauseated for 3 months on Ozempic.  He tells me that his endocrinologist agrees that his weight loss is likely related to his diabetic medication and is not concerned.  Recommend lipase and amylase with consideration of a CT scan of his abdomen if weight loss continues.  - Dulaglutide (TRULICITY) 1.5 MG/0.5ML Solution Pen-injector; Inject 0.5 mL as instructed every 7 days.  Dispense: 1 Each; Refill: 5    2. Urinary hesitancy  -Discussed that it can take finasteride up to 6 " months to start working.  I did not prescribe tamsulosin because of his sulfa allergy.  Recommend a consult with urology.  - REFERRAL TO UROLOGY    3. Nocturia  - REFERRAL TO UROLOGY    4. Macrocytosis  -Discussed potential causes of macrocytosis.  Recommend a B12 level, especially considering his lower extremity weakness.  - VITAMIN B12; Future    5. Unintended weight loss  -Recommend further labs as below, repeat colonoscopy.  Consider CT scan abdomen/pelvis.  Fortunately he feels excellent.  - VITAMIN B12; Future  - LIPASE; Future  - AMYLASE; Future  - REFERRAL TO GASTROENTEROLOGY    6. Weakness of left lower extremity  - VITAMIN B12; Future    7. Acute bilateral low back pain with left-sided sciatica  -Continues to work with PT.  A steroid pack was very helpful for him.  Looking forward to his MRI on Friday.    8. Acquired hypothyroidism  -Stable and followed by endocrinology.    Total face to face time 40 minutes of which over 50% of this visit is spent in counseling, education and outlining a plan of treatment and coordination of care for the above conditions. This included but was not limited to discussion of medication options and potential risks related to the medications, referral and specialty care options. All patient questions were answered.     I encouraged him to follow-up here in a few weeks for a weight check, lab review and to discuss specialty appointments as well as potential referral to neurology if MRI of his lumbar spine is not revealing regarding lower extremity weakness and low back pain.

## 2020-08-26 DIAGNOSIS — R74.8 ELEVATED AMYLASE: ICD-10-CM

## 2020-08-28 ENCOUNTER — HOSPITAL ENCOUNTER (OUTPATIENT)
Dept: RADIOLOGY | Facility: MEDICAL CENTER | Age: 60
End: 2020-08-28
Attending: ORTHOPAEDIC SURGERY
Payer: COMMERCIAL

## 2020-08-28 DIAGNOSIS — M54.50 LOW BACK PAIN, UNSPECIFIED BACK PAIN LATERALITY, UNSPECIFIED CHRONICITY, UNSPECIFIED WHETHER SCIATICA PRESENT: ICD-10-CM

## 2020-08-28 PROCEDURE — 72148 MRI LUMBAR SPINE W/O DYE: CPT

## 2020-09-17 ENCOUNTER — HOSPITAL ENCOUNTER (OUTPATIENT)
Dept: LAB | Facility: MEDICAL CENTER | Age: 60
End: 2020-09-17
Attending: PHYSICIAN ASSISTANT
Payer: COMMERCIAL

## 2020-09-17 PROCEDURE — 84153 ASSAY OF PSA TOTAL: CPT

## 2020-09-18 LAB — PSA SERPL-MCNC: 0.14 NG/ML (ref 0–4)

## 2020-10-08 ENCOUNTER — HOSPITAL ENCOUNTER (OUTPATIENT)
Dept: RADIOLOGY | Facility: MEDICAL CENTER | Age: 60
End: 2020-10-08
Attending: NURSE PRACTITIONER
Payer: COMMERCIAL

## 2020-10-08 DIAGNOSIS — R74.8 ELEVATED AMYLASE: ICD-10-CM

## 2020-10-08 PROCEDURE — 76705 ECHO EXAM OF ABDOMEN: CPT

## 2020-10-19 ENCOUNTER — TELEPHONE (OUTPATIENT)
Dept: MEDICAL GROUP | Facility: LAB | Age: 60
End: 2020-10-19

## 2020-10-19 NOTE — TELEPHONE ENCOUNTER
"· order [facility] paperwork received from [facility] requiring provider signature.     · All appropriate fields completed by Medical Assistant: Yes    · Paperwork placed in \"MA to Provider\" folder/basket. Awaiting provider completion/signature.  "

## 2020-11-23 ENCOUNTER — TELEPHONE (OUTPATIENT)
Dept: SLEEP MEDICINE | Facility: MEDICAL CENTER | Age: 60
End: 2020-11-23

## 2020-11-23 NOTE — TELEPHONE ENCOUNTER
Caller : [facility]    Phone Number: [phone]    Message: [facility] called and lm. Re.RF request for pt's Ventolin HFA.         Have we ever prescribed this med? Yes.  If yes, what date? 10/07/19    Last OV: 08/26/2019 with Yenny RAMIREZ  He will follow-up in 6 months at the pulmonary clinic    Next OV: No Pending appt.     DX: Mild intermittent asthma without complication (J45.20)    Medications: Ventolin HFA          Denied. Pt needs to make an appt.    Called and spoke with Anette at [facility]. Notified her of this.  They going to cancel the request being sent to us.

## 2020-11-24 ENCOUNTER — APPOINTMENT (OUTPATIENT)
Dept: MEDICAL GROUP | Facility: LAB | Age: 60
End: 2020-11-24
Payer: COMMERCIAL

## 2020-11-25 ENCOUNTER — TELEMEDICINE (OUTPATIENT)
Dept: MEDICAL GROUP | Facility: LAB | Age: 60
End: 2020-11-25
Payer: COMMERCIAL

## 2020-11-25 ENCOUNTER — TELEPHONE (OUTPATIENT)
Dept: MEDICAL GROUP | Facility: LAB | Age: 60
End: 2020-11-25

## 2020-11-25 ENCOUNTER — HOSPITAL ENCOUNTER (OUTPATIENT)
Dept: LAB | Facility: MEDICAL CENTER | Age: 60
End: 2020-11-25
Attending: NURSE PRACTITIONER
Payer: COMMERCIAL

## 2020-11-25 VITALS — HEIGHT: 69 IN | TEMPERATURE: 97.1 F | BODY MASS INDEX: 24.44 KG/M2 | WEIGHT: 165 LBS

## 2020-11-25 DIAGNOSIS — Z11.59 ENCOUNTER FOR SCREENING FOR OTHER VIRAL DISEASES: ICD-10-CM

## 2020-11-25 DIAGNOSIS — J45.20 MILD INTERMITTENT ASTHMA WITHOUT COMPLICATION: ICD-10-CM

## 2020-11-25 DIAGNOSIS — Z79.4 TYPE 2 DIABETES MELLITUS WITHOUT COMPLICATION, WITH LONG-TERM CURRENT USE OF INSULIN (HCC): ICD-10-CM

## 2020-11-25 DIAGNOSIS — J06.9 ACUTE URI: ICD-10-CM

## 2020-11-25 DIAGNOSIS — E11.9 TYPE 2 DIABETES MELLITUS WITHOUT COMPLICATION, WITH LONG-TERM CURRENT USE OF INSULIN (HCC): ICD-10-CM

## 2020-11-25 LAB — COVID ORDER STATUS COVID19: NORMAL

## 2020-11-25 PROCEDURE — C9803 HOPD COVID-19 SPEC COLLECT: HCPCS

## 2020-11-25 PROCEDURE — 99214 OFFICE O/P EST MOD 30 MIN: CPT | Mod: 95,CR | Performed by: NURSE PRACTITIONER

## 2020-11-25 PROCEDURE — U0003 INFECTIOUS AGENT DETECTION BY NUCLEIC ACID (DNA OR RNA); SEVERE ACUTE RESPIRATORY SYNDROME CORONAVIRUS 2 (SARS-COV-2) (CORONAVIRUS DISEASE [COVID-19]), AMPLIFIED PROBE TECHNIQUE, MAKING USE OF HIGH THROUGHPUT TECHNOLOGIES AS DESCRIBED BY CMS-2020-01-R: HCPCS

## 2020-11-25 RX ORDER — AMOXICILLIN AND CLAVULANATE POTASSIUM 875; 125 MG/1; MG/1
1 TABLET, FILM COATED ORAL 2 TIMES DAILY
Qty: 14 TAB | Refills: 0 | Status: SHIPPED | OUTPATIENT
Start: 2020-11-25 | End: 2021-05-03

## 2020-11-25 RX ORDER — ALBUTEROL SULFATE 90 UG/1
2 AEROSOL, METERED RESPIRATORY (INHALATION) EVERY 4 HOURS PRN
Qty: 3 EACH | Refills: 3 | Status: SHIPPED | OUTPATIENT
Start: 2020-11-25 | End: 2021-10-04

## 2020-11-25 ASSESSMENT — FIBROSIS 4 INDEX: FIB4 SCORE: 0.74

## 2020-11-25 NOTE — PROGRESS NOTES
Telemedicine: Established Patient   This evaluation was conducted via Zoom using secure and encrypted videoconferencing technology. The patient was in a private location in the state of [place].    The patient's identity was confirmed and verbal consent was obtained for this virtual visit.    Subjective:   CC:   Chief Complaint   Patient presents with   • Sinus Problem       Aly is a 60 y.o. male presenting for evaluation and management of:    #1- possible sinusitis in asthmatic patient:  New issue x a few weeks - mentions chronic allergies worsening initially, then hoarse voice, sinus pressure, PND, green mucus and not feeling great x 2-3 days.  Denies SOB or wheezing.  Has asthma - using advair daily, Singulair 10 mg daily, ventolin prn and qnasal nasal spray.  Denies fever - 97 this morning.  Denies body aches / flu symptoms.  Symptoms are worsening.  Last antibiotic use was 3/2020.  Requesting treatment for a sinus infection as this is been a recurrent issue for him for most of his adult life and he does have a history of sinus surgery years ago.  Wife tested negative for covid 2 weeks ago.      #2- DM type II: Chronic issue.  Average  in the past 7 days.  Feeling well.  Walking for exercise.  Finishing PT for his spine which has helped.  Followed by Dr. Sandoval, endocrinology.    ROS  Denies nausea, vomiting or diarrhea.  Denies hyperglycemia.    Allergies   Allergen Reactions   • Kiwi Extract Anaphylaxis   • Shellfish Allergy Anaphylaxis   • Strawberry Anaphylaxis   • Sulfa Drugs Rash and Unspecified   • Testosterone Rash       Current medicines (including changes today)  Current Outpatient Medications   Medication Sig Dispense Refill   • Dulaglutide (TRULICITY) 1.5 MG/0.5ML Solution Pen-injector Inject 0.5 mL as instructed every 7 days. 1 Each 5   • diclofenac DR (VOLTAREN) 75 MG Tablet Delayed Response TAKE 1 TABLET BY MOUTH TWICE DAILY FOR 2 WEEKS     • NON SPECIFIED CPAP supplies through [facility] 1 Each 1   • tizanidine (ZANAFLEX) 4 MG Tab Take 1 Tab by mouth every 6 hours as needed (muscle spasms). 30 Tab 0   • fluticasone-salmeterol (ADVAIR DISKUS) 500-50 MCG/DOSE AEROSOL POWDER, BREATH ACTIVATED Inhale 1 Puff by mouth every 12 hours. 3 Inhaler 3   • finasteride (PROSCAR) 5 MG Tab Take 1 Tab by mouth every day. 90 Tab 3   • DILTIAZem CD (CARTIA XT) 180 MG CAPSULE SR 24 HR Take 1 Cap by mouth every day. 90 Cap 3   • montelukast (SINGULAIR) 10 MG Tab Take 1 Tab by mouth every evening. 90 Tab 3   • VENTOLIN  (90 Base) MCG/ACT Aero Soln inhalation aerosol USE 2 INHALATIONS EVERY 4 HOURS AS NEEDED FOR SHORTNESS OF BREATH 3 Inhaler 3   • levothyroxine (SYNTHROID) 75 MCG Tab Take 75 mcg by mouth Every morning on an empty stomach.     • levothyroxine (SYNTHROID) 50 MCG Tab Take 50 mcg by mouth Every morning on an empty stomach.     • benazepril (LOTENSIN) 20 MG Tab Take 1 Tab by mouth every day. 90 Tab 1   • Canagliflozin (INVOKANA) 300 MG TABS Take  by mouth every day.     • metformin (GLUCOPHAGE) 500 MG TABS Take 1,000 mg by mouth 2 times a day, with meals. 2 tablet am 1 tablets pm     • atorvastatin (LIPITOR) 80 MG tablet Take 80 mg by mouth every evening.     • ALPHA LIPOIC ACID PO Take 600 mg by mouth 2 Times a Day.     • Omega-3 Fatty Acids (FISH OIL) 1200 MG CAPS Take  by mouth.     • Cinnamon 500 MG CAPS Take 1,000 mg by mouth.     • aspirin EC (ECOTRIN) 81 MG TBEC Take 81 mg by mouth every day.       • pantoprazole (PROTONIX) 40 MG TBEC Take 40 mg by mouth every day.       • Coenzyme Q10 (CO Q-10) 200 MG CAPS Take  by mouth every day.       • Cholecalciferol (VITAMIN D) 2000 UNIT TABS Take  by mouth 2 Times a Day.     • cetirizine (ZYRTEC) 10 MG TABS Take 10 mg by mouth every day.       No current facility-administered medications for this visit.        Patient Active Problem List    Diagnosis Date Noted   • Acquired hypothyroidism 08/25/2020   • Type 2 diabetes mellitus without  complication, with long-term current use of insulin (HCC) - Dr. 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       Family History   Problem Relation Age of Onset   • Breast Cancer Mother    • Hypertension Mother    • Cancer Mother         breast   • Heart Disease Mother         hx of bypass   • Hypertension Father    • Arthritis Father    • Diabetes Father         prediabetes   • No Known Problems Sister    • Arthritis Brother    • Heart Disease Other    • Hypertension Other    • Cancer Other    • No Known Problems Brother        He  has a past medical history of Abnormal nuclear cardiac imaging test (1/31/2014), Allergy, Anesthesia, ASTHMA, CHEST PAIN (6/15/2011), Chest pain at rest (1/31/2014), Coronary-myocardial bridge (11/28/2012), Diabetes (2009), Hyperlipidemia, Hypertension, Mild intermittent asthma without complication (7/27/2017), Myocardial bridge, Sleep apnea, and Snoring. He also has no past medical history of Bowel habit changes.  He  has a past surgical history that includes sinusotomies (1990's); knee arthroscopy (1990's); tumor excision with biopsy (1990's); shoulder arthroscopy (1990's); ventral hernia repair laparoscopic (11/1/2012); recovery (4/8/2016); shoulder decompression arthroscopic (Left, 1/4/2018); shoulder arthroscopy w/ bicipital tenodesis repair (Left, 1/4/2018); and clavicle distal excision (Left, 1/4/2018).       Objective:   There were no vitals taken for this  visit.    Physical Exam  Gen. appears healthy in no distress.  He is able to speak in full sentences.  Skin appropriate for sex and age   Neck trachea is midline  Lungs unlabored breathing  Heart regular rate  Neuro alert and oriented x3  Psych appropriate, calm, interactive, well-groomed  Assessment and Plan:   The following treatment plan was discussed:   1. Acute URI  -Likely sinusitis.  Low suspicion for Covid as he is afebrile, lacks body aches, symptoms have been gradually ongoing for a few weeks and his blood sugars have not spiked.  Recommend Covid testing to be on the safe side.  He will follow-up with me in 10 days if symptoms have not completely resolved, sooner with worsening.  - amoxicillin-clavulanate (AUGMENTIN) 875-125 MG Tab; Take 1 Tab by mouth 2 times a day.  Dispense: 14 Tab; Refill: 0    2. Encounter for screening for other viral diseases  -Discussed that there is a drive through in the [facility] parking lot off [address] that is open from 7-2:30 pm Mon - Sat.   The order is in their computer system for him.  Discussed that his result will be released to [facility] immediately and if this is positive, he needs to quarantine for 10 days from the first day of his symptoms.  Discussed ER precautions.  - COVID/SARS COV-2 PCR; Future    3. Mild intermittent asthma without complication  -Stable with maintenance Phuc and Singulair.  Refilled albuterol to use as needed.  - albuterol 108 (90 Base) MCG/ACT Aero Soln inhalation aerosol; Inhale 2 Puffs every four hours as needed for Shortness of Breath.  Dispense: 3 Each; Refill: 3    4. Type 2 diabetes mellitus without complication, with long-term current use of insulin ([facility]) - Dr. Sandoval  -Currently stable.  Also followed by endocrinology.        Follow-up: 10 d or prn

## 2020-11-25 NOTE — TELEPHONE ENCOUNTER
"· Physical Therapy paperwork received from [facility] requiring provider signature.     · All appropriate fields completed by Medical Assistant: No    · Paperwork placed in \"MA to Provider\" folder/basket. Awaiting provider completion/signature.  "

## 2020-11-26 LAB
SARS-COV-2 RNA RESP QL NAA+PROBE: NOTDETECTED
SPECIMEN SOURCE: NORMAL

## 2020-12-04 ENCOUNTER — HOSPITAL ENCOUNTER (OUTPATIENT)
Facility: MEDICAL CENTER | Age: 60
End: 2020-12-04
Attending: PHYSICIAN ASSISTANT
Payer: COMMERCIAL

## 2020-12-04 PROCEDURE — 87086 URINE CULTURE/COLONY COUNT: CPT

## 2020-12-07 LAB
BACTERIA UR CULT: NORMAL
SIGNIFICANT IND 70042: NORMAL
SITE SITE: NORMAL
SOURCE SOURCE: NORMAL

## 2020-12-15 DIAGNOSIS — I10 BENIGN ESSENTIAL HTN: ICD-10-CM

## 2020-12-15 RX ORDER — BENAZEPRIL HYDROCHLORIDE 20 MG/1
20 TABLET ORAL DAILY
Qty: 90 TAB | Refills: 3 | Status: SHIPPED | OUTPATIENT
Start: 2020-12-15 | End: 2022-01-04

## 2021-01-07 ENCOUNTER — HOSPITAL ENCOUNTER (OUTPATIENT)
Dept: LAB | Facility: MEDICAL CENTER | Age: 61
End: 2021-01-07
Attending: PHYSICIAN ASSISTANT
Payer: COMMERCIAL

## 2021-01-07 LAB
ALBUMIN SERPL BCP-MCNC: 4.8 G/DL (ref 3.2–4.9)
ALBUMIN/GLOB SERPL: 2 G/DL
ALP SERPL-CCNC: 72 U/L (ref 30–99)
ALT SERPL-CCNC: 25 U/L (ref 2–50)
ANION GAP SERPL CALC-SCNC: 11 MMOL/L (ref 7–16)
AST SERPL-CCNC: 21 U/L (ref 12–45)
BILIRUB SERPL-MCNC: 0.8 MG/DL (ref 0.1–1.5)
BUN SERPL-MCNC: 14 MG/DL (ref 8–22)
CALCIUM SERPL-MCNC: 10 MG/DL (ref 8.5–10.5)
CHLORIDE SERPL-SCNC: 100 MMOL/L (ref 96–112)
CHOLEST SERPL-MCNC: 145 MG/DL (ref 100–199)
CO2 SERPL-SCNC: 29 MMOL/L (ref 20–33)
CREAT SERPL-MCNC: 0.75 MG/DL (ref 0.5–1.4)
CREAT UR-MCNC: 78.31 MG/DL
FASTING STATUS PATIENT QL REPORTED: NORMAL
GLOBULIN SER CALC-MCNC: 2.4 G/DL (ref 1.9–3.5)
GLUCOSE SERPL-MCNC: 141 MG/DL (ref 65–99)
HDLC SERPL-MCNC: 44 MG/DL
LDLC SERPL CALC-MCNC: 84 MG/DL
MICROALBUMIN UR-MCNC: <1.2 MG/DL
MICROALBUMIN/CREAT UR: NORMAL MG/G (ref 0–30)
POTASSIUM SERPL-SCNC: 4.3 MMOL/L (ref 3.6–5.5)
PROT SERPL-MCNC: 7.2 G/DL (ref 6–8.2)
SODIUM SERPL-SCNC: 140 MMOL/L (ref 135–145)
T4 FREE SERPL-MCNC: 1.49 NG/DL (ref 0.93–1.7)
TRIGL SERPL-MCNC: 84 MG/DL (ref 0–149)
TSH SERPL DL<=0.005 MIU/L-ACNC: 1.92 UIU/ML (ref 0.38–5.33)

## 2021-01-07 PROCEDURE — 36415 COLL VENOUS BLD VENIPUNCTURE: CPT

## 2021-01-07 PROCEDURE — 84439 ASSAY OF FREE THYROXINE: CPT

## 2021-01-07 PROCEDURE — 80053 COMPREHEN METABOLIC PANEL: CPT

## 2021-01-07 PROCEDURE — 84443 ASSAY THYROID STIM HORMONE: CPT

## 2021-01-07 PROCEDURE — 80061 LIPID PANEL: CPT

## 2021-01-07 PROCEDURE — 82570 ASSAY OF URINE CREATININE: CPT

## 2021-01-07 PROCEDURE — 82043 UR ALBUMIN QUANTITATIVE: CPT

## 2021-02-02 ENCOUNTER — APPOINTMENT (OUTPATIENT)
Dept: MEDICAL GROUP | Facility: LAB | Age: 61
End: 2021-02-02
Payer: COMMERCIAL

## 2021-02-02 DIAGNOSIS — J22 BACTERIAL LOWER RESPIRATORY INFECTION: ICD-10-CM

## 2021-02-02 DIAGNOSIS — B96.89 BACTERIAL LOWER RESPIRATORY INFECTION: ICD-10-CM

## 2021-02-02 RX ORDER — DOXYCYCLINE HYCLATE 100 MG
100 TABLET ORAL 2 TIMES DAILY
Qty: 14 TAB | Refills: 0 | Status: SHIPPED | OUTPATIENT
Start: 2021-02-02 | End: 2023-01-03 | Stop reason: SDUPTHER

## 2021-03-15 DIAGNOSIS — Z23 NEED FOR VACCINATION: ICD-10-CM

## 2021-05-03 ENCOUNTER — TELEPHONE (OUTPATIENT)
Dept: MEDICAL GROUP | Facility: LAB | Age: 61
End: 2021-05-03

## 2021-05-03 ENCOUNTER — OFFICE VISIT (OUTPATIENT)
Dept: MEDICAL GROUP | Facility: LAB | Age: 61
End: 2021-05-03
Payer: COMMERCIAL

## 2021-05-03 VITALS
DIASTOLIC BLOOD PRESSURE: 70 MMHG | RESPIRATION RATE: 12 BRPM | HEART RATE: 83 BPM | BODY MASS INDEX: 25.18 KG/M2 | SYSTOLIC BLOOD PRESSURE: 100 MMHG | OXYGEN SATURATION: 98 % | WEIGHT: 170 LBS | HEIGHT: 69 IN | TEMPERATURE: 97.5 F

## 2021-05-03 DIAGNOSIS — Z79.4 TYPE 2 DIABETES MELLITUS WITHOUT COMPLICATION, WITH LONG-TERM CURRENT USE OF INSULIN (HCC): ICD-10-CM

## 2021-05-03 DIAGNOSIS — G62.9 NEUROPATHY: ICD-10-CM

## 2021-05-03 DIAGNOSIS — E11.9 TYPE 2 DIABETES MELLITUS WITHOUT COMPLICATION, WITH LONG-TERM CURRENT USE OF INSULIN (HCC): ICD-10-CM

## 2021-05-03 DIAGNOSIS — M51.36 DDD (DEGENERATIVE DISC DISEASE), LUMBAR: ICD-10-CM

## 2021-05-03 PROCEDURE — 99214 OFFICE O/P EST MOD 30 MIN: CPT | Performed by: NURSE PRACTITIONER

## 2021-05-03 RX ORDER — DULAGLUTIDE 1.5 MG/.5ML
1 INJECTION, SOLUTION SUBCUTANEOUS
Qty: 1 EACH | Refills: 5
Start: 2021-05-03 | End: 2021-06-09 | Stop reason: SDUPTHER

## 2021-05-03 RX ORDER — TAMSULOSIN HYDROCHLORIDE 0.4 MG/1
0.4 CAPSULE ORAL
Qty: 30 CAPSULE | Refills: 4 | Status: ON HOLD
Start: 2021-05-03 | End: 2022-07-06

## 2021-05-03 ASSESSMENT — PATIENT HEALTH QUESTIONNAIRE - PHQ9: CLINICAL INTERPRETATION OF PHQ2 SCORE: 0

## 2021-05-03 ASSESSMENT — FIBROSIS 4 INDEX: FIB4 SCORE: 0.76

## 2021-05-03 NOTE — TELEPHONE ENCOUNTER
----- Message from NIRANJAN Butts sent at 5/3/2021 11:45 AM PDT -----  Please have Fabby Gomez send consult notes from spring 2020.  Thanks!

## 2021-05-03 NOTE — PROGRESS NOTES
"Chief Complaint   Patient presents with   • Diabetes Follow-up       HPI   Aly is a 60-year-old established male here to follow-up on a couple of things:  Chronic back pain: #1-lumbar mri 8/2020 shows multilevel DDD. Doing home PT b/c of PT benefits running out -finds PT extremely helpful.  Has chronic pain down left leg with noticeable decrease in left thigh size.   He has seen physiatry at [facility], had an emg winter 2020 and was told that he has proximal neuropathy from DM -would like this repeated as it has been a year and he would like to know regarding any progression or potentially different diagnoses as his endocrinologist did not feel that his neuropathy should be from diabetes.  Aware of both feet but they do not hurt.  +bilateral lower extremity weakness martinez going from squat to standing.  Cramps easily with stretching.   Takes tizanidine as needed.      #2-DM type II:  Chronic issue. Wants to change from Dr. Sandoval's office to [facility] due to personality conflicts with the physicians assistant there.  a1c 7.2% - now on invokana and trulicity once weekly at 3 mg.  Using free style rubia system to monitor BG.  Tells me his A1c was originally around 13 when he was initially diagnosed.      Past medical, surgical, family, and social history is reviewed and updated in Epic chart by me today.   Medications and allergies reviewed and updated in Epic chart by me today.     ROS:   As documented in history of present illness above    Exam:  /70   Pulse 83   Temp 36.4 °C (97.5 °F)   Resp 12   Ht 1.753 m (5' 9\")   Wt 77.1 kg (170 lb)   SpO2 98%   Constitutional: Alert, no distress, plus 3 vital signs  Skin:  Warm, dry, no rashes invisible areas  Eye: Equal, round and reactive, conjunctiva clear  ENMT: Lips without lesions, good dentition, oropharynx clear    Neck: Trachea midline, no masses, no thyromegaly  Respiratory: Unlabored respiration, lungs clear to auscultation, no wheezes, no " rhonchi  Cardiovascular: Normal rate and rhythm  M/s: notable left thigh muscular atrophy compared to right.    Psych: Alert, pleasant, well-groomed, normal affect    Assessment / Plan / Medical Decision makin. Type 2 diabetes mellitus without complication, with long-term current use of insulin ([facility]) - Dr. Sandoval  REFERRAL TO ENDOCRINOLOGY    Dulaglutide (TRULICITY) 1.5 MG/0.5ML Solution Pen-injector   2. Neuropathy - proximal, diabetic  REFERRAL TO NEURODIAGNOSTICS (EEG,EP,EMG/NCS/DBS)   3. DDD (degenerative disc disease), lumbar  REFERRAL TO NEURODIAGNOSTICS (EEG,EP,EMG/NCS/DBS)   Referred to establish care with [facility] per patient preference.  Currently diabetes is well controlled.  Also referred for repeat EMG 1 year later, especially considering notable left thigh atrophy.  Briefly discussed a consult with [facility] based on EMG results and patient will consider.  Requested results from [place] fracture.  He is overdue for an appointment with his cardiologist and I encouraged him to schedule ASAP.  Up-to-date with his urologist, medications added that he brought in on his list.  Compliant with CPAP as long as he does not have severe sinus congestion.  Still only sleeping 4 to 5 hours which he states he can function with.  Was happy to meet his new twin grandchildren.    Total face to face time 30 minutes of which over 50% of this visit is spent in counseling, education and outlining a plan of treatment and coordination of care for the above conditions. This included but was not limited to discussion of medication options and potential risks related to the medications, referral and specialty care options. All patient questions were answered

## 2021-05-03 NOTE — LETTER
[facility]  SONAL ButtsP.N.  [address] [address]  [place] [address]  Fax: [phone]   Authorization for Release/Disclosure of   Protected Health Information   Name: SYLVIE JARAMILLO : 1960 SSN: [id number]   Address: [address] [place] [address] Phone:    [phone] (home) [phone] (work)   I authorize the entity listed below to release/disclose the PHI below to:   [facility]/SONAL ButtsPKRAIG. and MARTHA Butts.   Provider or Entity Name:  FORTUNATO NEAL   [facility]   City, State, [facility]   Phone:      Fax:  [phone]   Reason for request: continuity of care   Information to be released:    [  ] LAST COLONOSCOPY,  including any PATH REPORT and follow-up  [  ] LAST FIT/COLOGUARD RESULT [  ] LAST DEXA  [  ] LAST MAMMOGRAM  [  ] LAST PAP  [  ] LAST LABS [  ] RETINA EXAM REPORT  [  ] IMMUNIZATION RECORDS  [XX  ] Release consult notes from spring 2020.        [  ] Check here and initial the line next to each item to release ALL health information INCLUDING  _____ Care and treatment for drug and / or alcohol abuse  _____ HIV testing, infection status, or AIDS  _____ Genetic Testing    DATES OF SERVICE OR TIME PERIOD TO BE DISCLOSED: _____________  I understand and acknowledge that:  * This Authorization may be revoked at any time by you in writing, except if your health information has already been used or disclosed.  * Your health information that will be used or disclosed as a result of you signing this authorization could be re-disclosed by the recipient. If this occurs, your re-disclosed health information may no longer be protected by State or Federal laws.  * You may refuse to sign this Authorization. Your refusal will not affect your ability to obtain treatment.  * This Authorization becomes effective upon signing and will  on (date) __________.      If no date is indicated, this Authorization will  one (1) year from the  signature date.    Name: Barron Hewitt    Signature: CONTINUITY OF CARE   Date:     5/3/2021       PLEASE FAX REQUESTED RECORDS BACK TO: [phone]

## 2021-05-10 ENCOUNTER — PATIENT MESSAGE (OUTPATIENT)
Dept: MEDICAL GROUP | Facility: LAB | Age: 61
End: 2021-05-10

## 2021-05-10 DIAGNOSIS — M54.42 ACUTE BILATERAL LOW BACK PAIN WITH LEFT-SIDED SCIATICA: ICD-10-CM

## 2021-05-10 RX ORDER — TIZANIDINE 4 MG/1
4 TABLET ORAL EVERY 6 HOURS PRN
Qty: 30 TABLET | Refills: 0 | Status: SHIPPED | OUTPATIENT
Start: 2021-05-10 | End: 2021-10-04 | Stop reason: SDUPTHER

## 2021-05-10 NOTE — TELEPHONE ENCOUNTER
From: Barron Hewitt  To: Nurse Practitioner Ruthie Unger  Sent: 5/10/2021 10:58 AM PDT  Subject: Non-Urgent Medical Question    Good morning Ruthie, a couple of things,   I haven't heard anything on the EMG scheduling    I was wondering if I could get a prescription of Tizanidine for the leg cramps phoned into [facility] on [address]? I'm traveling on Wednesday and think it might be a good idea to have it.    FYI Heart appointment is scheduled and eye is as well.

## 2021-05-10 NOTE — PATIENT COMMUNICATION
Refill Request:    Received request via: Patient    Was the patient seen in the last year in this department? Yes  Last Office Visit: 5/03/2021    Does the patient have an active prescription (recently filled or refills available) for medication(s) requested? No

## 2021-05-26 ENCOUNTER — NON-PROVIDER VISIT (OUTPATIENT)
Dept: NEUROLOGY | Facility: MEDICAL CENTER | Age: 61
End: 2021-05-26
Attending: NURSE PRACTITIONER
Payer: COMMERCIAL

## 2021-05-26 DIAGNOSIS — G62.9 NEUROPATHY: ICD-10-CM

## 2021-05-26 PROCEDURE — 95913 NRV CNDJ TEST 13/> STUDIES: CPT | Performed by: PSYCHIATRY & NEUROLOGY

## 2021-05-26 PROCEDURE — 95886 MUSC TEST DONE W/N TEST COMP: CPT | Performed by: PSYCHIATRY & NEUROLOGY

## 2021-05-26 PROCEDURE — 95886 MUSC TEST DONE W/N TEST COMP: CPT | Mod: 26 | Performed by: PSYCHIATRY & NEUROLOGY

## 2021-05-26 PROCEDURE — 95913 NRV CNDJ TEST 13/> STUDIES: CPT | Mod: 26 | Performed by: PSYCHIATRY & NEUROLOGY

## 2021-05-26 NOTE — PROCEDURES
"NERVE CONDUCTION STUDIES AND ELECTROMYOGRAPHY REPORT  [facility]  05/26/21          IMPRESSION:  This is an abnormal electrodiagnostic study due to evidence of chronic reinnervation changes in the left vastus lateralis/medialis with mild active denervation changes noted.  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      There is no strong electrodiagnostic evidence of a an active right lumbosacral plexopathy though given some responses are unobtainable a mild lumbar plexopathy/femoral neuropathy may be present.  There is no EMG evidence of a right lumbosacral radiculopathy.      Recommend clinical correlation and repeat study if progressive symptoms.        Yenny Ogden MD  Neurology - Neurophysiology  [facility]        REASON FOR REFERRAL:  Mr. Barron Hewitt 60 y.o. referred by Ruthie WISE for evaluation of bilateral leg weakness and gait instability.  Approximately 6 months prior, patient woke up with left lower back/groin pain associated numbness of the left proximal lower extremity.  Gradually developed weakness in the left lower extremity with stabilization.  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  Since March 2021 patient has experienced similar symptoms in the right leg.  He does have degenerative disc disease of the lumbar spine.  He denies a history of prior back surgery.    Height: 5'9\"  Weight: 165 lbs    Symptom focused neurological exam shows muscle asymmetry with decreased muscle bulk of the left quadriceps compared to the right.  Thigh abductors and knee extensors are strong bilaterally.  Sensation to light touch in the bilateral lateral and medial thigh are normal.  Bilateral patellar reflexes are " present.      ELECTRODIAGNOSTIC EXAMINATION:  Nerve conduction studies (NCS) and electromyography (EMG) are utilized to evaluate direct or indirect damage to the peripheral nervous system. NCS are performed to measure the nerve(s) response(s) to electrostimulation across a given nerve segment. EMG evaluates the passive and active electrical activity of the muscle(s) in question.  Muscles are innervated by specific peripheral nerves and roots. Often times, several nerves the muscle to be examined in order to determine the presence or absence of the disease process. Furthermore, nerves and muscles may need to be tested in a [id number]-awvy comparison, as well as in additional extremities, as this may be crucial in characterizing the extent of the disease process, which may be diffuse or isolated and of varying degree of severity. The extent of the neurodiagnostic exam is justified as it may help arrive to a proper diagnosis, which ultimately may contribute to better management of the patient. Therefore, the nerves to muscles examined during the study were medically necessary.    Unless otherwise noted, temperature of the extremity(s) study was monitored before and during the examination and remained between 32 and 36 degrees C for the upper extremities, and between 30 and 36 degrees C for the lower extremities. The patient tolerated testing well, without any complications.       NERVE CONDUCTION STUDY SUMMARY:  Selected nerves of the bilateral lower extremity are studied.    Unobtainable bilateral saphenous sensory responses.  Normal and symmetric bilateral lateral femoral cutaneous sensory responses.  Normal bilateral sural sensory responses.  Unobtainable bilateral femoral motor responses at the vastus lateralis.  Abnormal left common peroneal motor response at the extensor digitorum brevis due to low amplitude.  This is of uncertain etiology or significance.  Normal left common peroneal motor response at the tibialis  anterior.  Normal right common peroneal motor response at the extensor digitorum brevis.  Normal bilateral tibial motor responses at the abductor hallucis brevis.  Bilateral tibial and right common peroneal F waves are within normal limits.      NEEDLE EMG SUMMARY:  Concentric needle study of selected bilateral lower extremity and lower lumbar paraspinal muscles is performed.     Insertion activity is increased in the left vastus lateralis due to presence of fibrillation potentials and positive waves.  Insertion activity is normal in the bilateral lower lumbar paraspinal muscles.  Large amplitude prolonged duration motor unit potentials with reduced recruitment are appreciated in the left vastus lateralis and medialis.  Otherwise with activation, there are normal morphology (amplitude/duration) motor unit action potentials firing with normal recruitment in remaining muscles tested.       PATIENT DATA TABLES  Nerve Conduction Studies     Stim Site NR Onset (ms) Norm Onset (ms) O-P Amp (µV) Norm O-P Amp Site1 Site2 Delta-P (ms) Dist (cm) Benjamin (m/s) Norm Benjamin (m/s)   Left Lat Femoral Cutan Anti Sensory (Lateral Thigh)   ASIS    2.0  10.3  ASIS Lateral Thigh 2.9 12.0 41    Site 2    2.0  10.3          Right Lat Femoral Cutan Anti Sensory (Lateral Thigh)   ASIS    2.8  10.4  ASIS Lateral Thigh 3.6 12.0 33    Site 2    2.8  9.1          Site 3    2.8  9.6          Left Saphenous Anti Sensory (Ant Med Mall)   14cm *NR  <4.6  >3 14cm Ant Med Mall  0.0  >40   Right Saphenous Anti Sensory (Ant Med Mall)   14cm *NR  <4.6  >3 14cm Ant Med Mall  0.0  >40   Left Sural Anti Sensory (Lat Mall)   Calf    3.1 <4.6 3.9 >3 Calf Lat Mall 3.7 14.0 *38 >40   Right Sural Anti Sensory (Lat Mall)   Calf    2.9 <4.6 5.0 >3 Calf Lat Mall 3.7 14.0 *38 >40        Stim Site NR Onset (ms) Norm Onset (ms) O-P Amp (mV) Norm O-P Amp Site1 Site2 Delta-0 (ms) Dist (cm) Benjamin (m/s) Norm Benjamin (m/s)   Left Femoral Motor (Vastus Med)   Abv Ing Lig *NR  <6.5  >3          Right Femoral Motor (Vastus Med)   Abv Ing Lig *NR  <6.5  >3         Left Peroneal EDB Motor (Ext Dig Brev)   Ankle    5.7 <6 *1.0 >2.5 B Fib Ankle 7.0 30.5 44 >40   B Fib    12.7  0.4  Poplt B Fib 1.9 10.0 53    Poplt    14.6  0.4          Right Peroneal EDB Motor (Ext Dig Brev)   Ankle    4.2 <6 6.3 >2.5 B Fib Ankle 6.7 33.0 49 >40   B Fib    10.9  5.6  Poplt B Fib 1.8 10.0 56    Poplt    12.7  5.1          Left Peroneal TA Motor (AntTibialis)   Fib Head    4.0 <4.5 6.1 3 Poplit Fib Head 2.3 10.0 43 >40   Poplit    6.3  4.3          Left Tibial Motor (Abd Barrow Brev)   Ankle    4.2 <6 6.7 >4 Knee Ankle 7.1 40.0 56 >40   Knee    11.3  3.7          Right Tibial Motor (Abd Barrow Brev)   Ankle    3.1 <6 4.2 >4 Knee Ankle 8.0 42.0 53 >40   Knee    11.1  3.5            F Wave Studies     NR F-Lat (ms) Lat Norm (ms)   Right Peroneal EDB (Ext Dig Brev)      44.55 <57   Left Tibial (Abd Hallucis)      45.61 <57   Right Tibial (Abd Hallucis)      45.99 <57                                             Electromyography     Side Muscle Nerve Root Ins Act Fibs Psw Amp Dur Poly Recrt Int Pat Comment   Left AntTibialis Dp Br Fibular L4-5 Nml Nml Nml Nml Nml 0 Nml Nml    Left Gastroc Tibial S1-2 Nml Nml Nml Nml Nml 0 Nml Nml    Left VastusLat Femoral L2-4 *Incr *1+ *1+ *Incr *>12ms 0 *Reduced *50%    Left GluteusMed SupGluteal L5-S1 Nml Nml Nml Nml Nml 0 Nml Nml    Left Lumbo Parasp Low Rami L5-S1 Nml Nml Nml         Left VastusMed Femoral L2-4 Nml Nml Nml *Incr *>12ms 0 *Reduced *50%    Left AdductorLong Obturator L2-4 Nml Nml Nml Nml Nml 0 Nml Nml    Right AntTibialis Dp Br Fibular L4-5 Nml Nml Nml Nml Nml 0 Nml Nml    Right Gastroc Tibial S1-2 Nml Nml Nml Nml Nml 0 Nml Nml    Right VastusLat Femoral L2-4 Nml Nml Nml Nml Nml 0 Nml Nml    Right GluteusMed SupGluteal L5-S1 Nml Nml Nml Nml Nml 0 Nml Nml    Right Lumbo Parasp Low Rami L5-S1 Nml Nml Nml         Right VastusMed Femoral L2-4 Nml Nml Nml Nml Nml 0 Nml Nml    Right  AdductorLong Obturator L2-4 Nml Nml Nml Nml Nml 0 Nml Nml

## 2021-06-09 ENCOUNTER — HOSPITAL ENCOUNTER (OUTPATIENT)
Dept: LAB | Facility: MEDICAL CENTER | Age: 61
End: 2021-06-09
Attending: NURSE PRACTITIONER
Payer: COMMERCIAL

## 2021-06-09 ENCOUNTER — TELEPHONE (OUTPATIENT)
Dept: MEDICAL GROUP | Facility: LAB | Age: 61
End: 2021-06-09

## 2021-06-09 ENCOUNTER — OFFICE VISIT (OUTPATIENT)
Dept: MEDICAL GROUP | Facility: LAB | Age: 61
End: 2021-06-09
Payer: COMMERCIAL

## 2021-06-09 VITALS
HEIGHT: 69 IN | DIASTOLIC BLOOD PRESSURE: 70 MMHG | TEMPERATURE: 97.7 F | BODY MASS INDEX: 24.88 KG/M2 | RESPIRATION RATE: 16 BRPM | OXYGEN SATURATION: 95 % | SYSTOLIC BLOOD PRESSURE: 102 MMHG | HEART RATE: 98 BPM | WEIGHT: 168 LBS

## 2021-06-09 DIAGNOSIS — I10 BENIGN ESSENTIAL HTN: ICD-10-CM

## 2021-06-09 DIAGNOSIS — E03.9 ACQUIRED HYPOTHYROIDISM: ICD-10-CM

## 2021-06-09 DIAGNOSIS — Z79.4 TYPE 2 DIABETES MELLITUS WITHOUT COMPLICATION, WITH LONG-TERM CURRENT USE OF INSULIN (HCC): ICD-10-CM

## 2021-06-09 DIAGNOSIS — E11.9 TYPE 2 DIABETES MELLITUS WITHOUT COMPLICATION, WITH LONG-TERM CURRENT USE OF INSULIN (HCC): ICD-10-CM

## 2021-06-09 DIAGNOSIS — E11.44 DIABETIC AMYOTROPHY ASSOCIATED WITH TYPE 2 DIABETES MELLITUS (HCC): ICD-10-CM

## 2021-06-09 PROCEDURE — 83036 HEMOGLOBIN GLYCOSYLATED A1C: CPT

## 2021-06-09 PROCEDURE — 80053 COMPREHEN METABOLIC PANEL: CPT

## 2021-06-09 PROCEDURE — 36415 COLL VENOUS BLD VENIPUNCTURE: CPT

## 2021-06-09 PROCEDURE — 84439 ASSAY OF FREE THYROXINE: CPT

## 2021-06-09 PROCEDURE — 99214 OFFICE O/P EST MOD 30 MIN: CPT | Performed by: NURSE PRACTITIONER

## 2021-06-09 PROCEDURE — 84443 ASSAY THYROID STIM HORMONE: CPT

## 2021-06-09 RX ORDER — DULAGLUTIDE 1.5 MG/.5ML
1.5 INJECTION, SOLUTION SUBCUTANEOUS
Qty: 12 EACH | Refills: 3 | Status: SHIPPED | OUTPATIENT
Start: 2021-06-09 | End: 2021-11-22

## 2021-06-09 RX ORDER — CANAGLIFLOZIN 300 MG/1
1 TABLET, FILM COATED ORAL DAILY
Qty: 90 TABLET | Refills: 3 | Status: SHIPPED | OUTPATIENT
Start: 2021-06-09 | End: 2022-06-23

## 2021-06-09 RX ORDER — DILTIAZEM HYDROCHLORIDE 180 MG/1
180 CAPSULE, COATED, EXTENDED RELEASE ORAL DAILY
Qty: 90 CAPSULE | Refills: 3 | Status: SHIPPED | OUTPATIENT
Start: 2021-06-09 | End: 2022-05-12

## 2021-06-09 ASSESSMENT — FIBROSIS 4 INDEX: FIB4 SCORE: 0.76

## 2021-06-09 NOTE — PROGRESS NOTES
"Chief Complaint   Patient presents with   • Lab Results       HPI  Aly is a 61 yo est male here to f/u on recent EMG-- showing \"chronic reinnervation changes in the left vastus lateralis / medialis with mild active denervation changes noted consistent with patients hx of DM.\"   Has flare ups of bilateral leg pain / weakness / sharp tinges - left worse than right weakness and discomfort.  PT has been helpful in the past - doing home PT now.  Experiences stabbing muscle pain in thighs throughout the day.  Off balance very easily when he goes from sitting to standing b/c of weakness in left leg.      Hypothyroidism - taking 50 mcg 6 d per week and 75 mcg one d per week.  Due for labs.     DM type II - chronic issue.  Looking forwards to seeing renjane matamoros in July, was followed by Dr. Sandoval's office.  Last a1c around 7.  Injecting trulicity 1.5 mg and invokana 300 mg daily.  Has tried 3 mg trulicity but BG did not change and he struggles to maintain his weight.      Past medical, surgical, family, and social history is reviewed and updated in Epic chart by me today.   Medications and allergies reviewed and updated in Epic chart by me today.     ROS:   As documented in history of present illness above    Exam:  /70 (BP Location: Left arm, Patient Position: Sitting, BP Cuff Size: Large adult)   Pulse 98   Temp 36.5 °C (97.7 °F)   Resp 16   Ht 1.753 m (5' 9\")   Wt 76.2 kg (168 lb)   SpO2 95%   Constitutional: Alert, no distress, plus 3 vital signs  Skin:  Warm, dry, no rashes invisible areas  Eye: Equal, round and reactive, conjunctiva clear  ENMT: Lips without lesions  Respiratory: Unlabored respiration, lungs clear to auscultation, no wheezes, no rhonchi  Cardiovascular: Normal rate and rhythm  M/s:  He does wobble a bit to the left when he rises from sitting to standing.    Psych: Alert, pleasant, well-groomed, normal affect    Assessment / Plan / Medical Decision makin. 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  He would like to discuss with neurology first and refrain from adding any further pills at this time. Referral placed to [facility] for discussion of long term prognosis and reiterate importance of PT for rehabilitation / strengthening.    - REFERRAL TO NEUROLOGY    2. Type 2 diabetes mellitus without complication, with long-term current use of insulin ([facility]) - Dr. Sandoval  -a1c, cmp and thyroid studies before endo consult in July.  BG currently stable.   - Dulaglutide (TRULICITY) 1.5 MG/0.5ML Solution Pen-injector; Inject 1.5 mL under the skin every 7 days.  Dispense: 12 Each; Refill: 3  - Comp Metabolic Panel; Future  - HEMOGLOBIN A1C; Future    3. Acquired hypothyroidism  - TSH; Future  - FREE THYROXINE; Future    4. Benign essential HTN  -stable.  Has f/u with cardiology later this month.   - DILTIAZem CD (CARTIA XT) 180 MG CAPSULE SR 24 HR; Take 1 capsule by mouth every day.  Dispense: 90 capsule; Refill: 3

## 2021-06-09 NOTE — TELEPHONE ENCOUNTER
DOCUMENTATION OF PAR STATUS:    1. Name of Medication & Dose:  Canagliflozin (INVOKANA) 300 MG Tab    2. Name of Prescription Coverage Company & phone #: cover my meds. [employer]/[id number]    3. Date Prior Auth Submitted: 6/9/21    4. What information was given to obtain insurance decision? OV NOTES FAXED    5. Prior Auth Status? Approved    6. Patient Notified: yes

## 2021-06-10 ENCOUNTER — TELEPHONE (OUTPATIENT)
Dept: MEDICAL GROUP | Facility: LAB | Age: 61
End: 2021-06-10

## 2021-06-10 LAB
ALBUMIN SERPL BCP-MCNC: 4.7 G/DL (ref 3.2–4.9)
ALBUMIN/GLOB SERPL: 1.8 G/DL
ALP SERPL-CCNC: 89 U/L (ref 30–99)
ALT SERPL-CCNC: 23 U/L (ref 2–50)
ANION GAP SERPL CALC-SCNC: 11 MMOL/L (ref 7–16)
AST SERPL-CCNC: 18 U/L (ref 12–45)
BILIRUB SERPL-MCNC: 0.8 MG/DL (ref 0.1–1.5)
BUN SERPL-MCNC: 12 MG/DL (ref 8–22)
CALCIUM SERPL-MCNC: 9.6 MG/DL (ref 8.5–10.5)
CHLORIDE SERPL-SCNC: 100 MMOL/L (ref 96–112)
CO2 SERPL-SCNC: 24 MMOL/L (ref 20–33)
CREAT SERPL-MCNC: 0.74 MG/DL (ref 0.5–1.4)
EST. AVERAGE GLUCOSE BLD GHB EST-MCNC: 157 MG/DL
FASTING STATUS PATIENT QL REPORTED: NORMAL
GLOBULIN SER CALC-MCNC: 2.6 G/DL (ref 1.9–3.5)
GLUCOSE SERPL-MCNC: 113 MG/DL (ref 65–99)
HBA1C MFR BLD: 7.1 % (ref 4–5.6)
POTASSIUM SERPL-SCNC: 3.6 MMOL/L (ref 3.6–5.5)
PROT SERPL-MCNC: 7.3 G/DL (ref 6–8.2)
SODIUM SERPL-SCNC: 135 MMOL/L (ref 135–145)
T4 FREE SERPL-MCNC: 1.52 NG/DL (ref 0.93–1.7)
TSH SERPL DL<=0.005 MIU/L-ACNC: 2.13 UIU/ML (ref 0.38–5.33)

## 2021-06-17 ENCOUNTER — TELEPHONE (OUTPATIENT)
Dept: MEDICAL GROUP | Facility: LAB | Age: 61
End: 2021-06-17

## 2021-06-17 ENCOUNTER — OFFICE VISIT (OUTPATIENT)
Dept: CARDIOLOGY | Facility: MEDICAL CENTER | Age: 61
End: 2021-06-17
Payer: COMMERCIAL

## 2021-06-17 VITALS
RESPIRATION RATE: 16 BRPM | HEIGHT: 69 IN | OXYGEN SATURATION: 96 % | DIASTOLIC BLOOD PRESSURE: 74 MMHG | WEIGHT: 169.8 LBS | HEART RATE: 91 BPM | SYSTOLIC BLOOD PRESSURE: 120 MMHG | BODY MASS INDEX: 25.15 KG/M2

## 2021-06-17 DIAGNOSIS — E78.5 DYSLIPIDEMIA: ICD-10-CM

## 2021-06-17 DIAGNOSIS — I25.10 CORONARY ARTERY DISEASE, NON-OCCLUSIVE: ICD-10-CM

## 2021-06-17 DIAGNOSIS — I20.9 ANGINA PECTORIS (HCC): ICD-10-CM

## 2021-06-17 DIAGNOSIS — I10 ESSENTIAL HYPERTENSION, BENIGN: ICD-10-CM

## 2021-06-17 DIAGNOSIS — Q24.5 CORONARY-MYOCARDIAL BRIDGE: ICD-10-CM

## 2021-06-17 PROCEDURE — 99214 OFFICE O/P EST MOD 30 MIN: CPT | Performed by: INTERNAL MEDICINE

## 2021-06-17 ASSESSMENT — ENCOUNTER SYMPTOMS
COUGH: 0
DIZZINESS: 0
WHEEZING: 0
LOSS OF CONSCIOUSNESS: 0
MYALGIAS: 0
PALPITATIONS: 0

## 2021-06-17 ASSESSMENT — FIBROSIS 4 INDEX: FIB4 SCORE: 0.68

## 2021-06-17 NOTE — PROGRESS NOTES
"Chief Complaint   Patient presents with   • Coronary artery disease        Subjective:   Barron Hewitt is a 60 y.o. male who presents today for follow up evaluation of CAD by catheterization, hypertension, hyperlipidemia and myocardial bridging.     Since 2/3/2020 appointment the patient has had no cardiac problems or symptoms.  Shortly after his last appointment he has had progressive weight loss with balance problems.  Subsequent evaluation showed \"3 bulging disks\".  EMG at [facility] showed significant proximal neuropathy felt to be related to diabetes mellitus.  Had follow-up EMG.  Has undergone outpatient PT with some improvement.  [facility]    Past Medical History  In 2012 he had an abdominal hernia repair without complications.  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 Valverde   • Sleep apnea     has CPAP   • Snoring      Past Surgical History:   Procedure Laterality Date   • SHOULDER DECOMPRESSION ARTHROSCOPIC Left 1/4/2018    Procedure: SHOULDER DECOMPRESSION ARTHROSCOPIC - SUBACROMIAL;  Surgeon: Linwood Grover M.D.;  Location: SURGERY [facility];  Service: Orthopedics   • SHOULDER ARTHROSCOPY W/ BICIPITAL TENODESIS REPAIR Left 1/4/2018    Procedure: SHOULDER ARTHROSCOPY W/ BICIPITAL Tenotomy REPAIR;  Surgeon: Linwood Grover M.D.;  Location: SURGERY [facility];  Service: Orthopedics   • CLAVICLE DISTAL EXCISION Left 1/4/2018    Procedure: CLAVICLE " DISTAL EXCISION - POSSIBLE;  Surgeon: Linwood Grover M.D.;  Location: SURGERY [facility];  Service: Orthopedics   • RECOVERY  4/8/2016    Procedure: CATH LAB [facility] WITH POSSIBLE NAVIN;  Surgeon: Recoveryonly Surgery;  [place] [facility] [place]:    • VENTRAL HERNIA REPAIR LAPAROSCOPIC  11/1/2012    Performed by Marcos Ramírez M.D. at SURGERY [facility]   • KNEE ARTHROSCOPY  1990's    right   • SHOULDER ARTHROSCOPY  1990's    right   • SINUSOTOMIES  1990's    times two surgeries   • TUMOR EXCISION WITH BIOPSY  1990's    right hand - thumb     Family History   Problem Relation Age of Onset   • Breast Cancer Mother    • Hypertension Mother    • Cancer Mother         breast   • Heart Disease Mother         hx of bypass   • Hypertension Father    • Arthritis Father    • Diabetes Father         prediabetes   • No Known Problems Sister    • Arthritis Brother    • Heart Disease Other    • Hypertension Other    • Cancer Other    • No Known Problems Brother      Social History     Socioeconomic History   • Marital status:      Spouse name: Not on file   • Number of children: Not on file   • Years of education: Not on file   • Highest education level: Not on file   Occupational History   • Not on file   Tobacco Use   • Smoking status: Light Tobacco Smoker     Packs/day: 0.00     Types: Cigars   • Smokeless tobacco: Never Used   • Tobacco comment: occasional cigars   Vaping Use   • Vaping Use: Never used   Substance and Sexual Activity   • Alcohol use: Yes     Comment: rare - 1-2 per month (social)   • Drug use: No     Comment: occ cigar smoking   • Sexual activity: Not on file     Comment: ; 2 biological kids (2 of his wife's); 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   Other Topics Concern   • Not on file   Social History Narrative   • Not on file     Social Determinants of Health     Financial Resource Strain:    • Difficulty of Paying Living Expenses:    Food  Insecurity:    • Worried About Running Out of Food in the Last Year:    • Ran Out of Food in the Last Year:    Transportation Needs:    • Lack of Transportation (Medical):    • Lack of Transportation (Non-Medical):    Physical Activity:    • Days of Exercise per Week:    • Minutes of Exercise per Session:    Stress:    • Feeling of Stress :    Social Connections:    • Frequency of Communication with Friends and Family:    • Frequency of Social Gatherings with Friends and Family:    • Attends [employer] Services:    • Active Member of Clubs or Organizations:    • Attends Club or Organization Meetings:    • Marital Status:    Intimate Partner Violence:    • Fear of Current or Ex-Partner:    • Emotionally Abused:    • Physically Abused:    • Sexually Abused:      Allergies   Allergen Reactions   • Kiwi Extract Anaphylaxis   • Shellfish Allergy Anaphylaxis   • Strawberry Anaphylaxis   • Sulfa Drugs Rash and Unspecified   • Testosterone Rash     Outpatient Encounter Medications as of 6/17/2021   Medication Sig Dispense Refill   • Canagliflozin (INVOKANA) 300 MG Tab Take 1 tablet by mouth every day. 90 tablet 3   • Dulaglutide (TRULICITY) 1.5 MG/0.5ML Solution Pen-injector Inject 1.5 mL under the skin every 7 days. 12 Each 3   • DILTIAZem CD (CARTIA XT) 180 MG CAPSULE SR 24 HR Take 1 capsule by mouth every day. 90 capsule 3   • atorvastatin (LIPITOR) 80 MG tablet Take 1 tablet by mouth every evening. 90 tablet 0   • tizanidine (ZANAFLEX) 4 MG Tab Take 1 tablet by mouth every 6 hours as needed (muscle spasms). 30 tablet 0   • tamsulosin (FLOMAX) 0.4 MG capsule Take 1 capsule by mouth 1/2 hour after breakfast. 30 capsule 4   • benazepril (LOTENSIN) 20 MG Tab Take 1 Tab by mouth every day. 90 Tab 3   • albuterol 108 (90 Base) MCG/ACT Aero Soln inhalation aerosol Inhale 2 Puffs every four hours as needed for Shortness of Breath. 3 Each 3   • finasteride (PROSCAR) 5 MG Tab Take 1 Tab by mouth every day. 90 Tab 3   •  "levothyroxine (SYNTHROID) 75 MCG Tab Take 75 mcg by mouth Every morning on an empty stomach.     • levothyroxine (SYNTHROID) 50 MCG Tab Take 50 mcg by mouth Every morning on an empty stomach.     • ALPHA LIPOIC ACID PO Take 600 mg by mouth 2 Times a Day.     • Omega-3 Fatty Acids (FISH OIL) 1200 MG CAPS Take  by mouth.     • Cinnamon 500 MG CAPS Take 1,000 mg by mouth.     • aspirin EC (ECOTRIN) 81 MG TBEC Take 81 mg by mouth every day.       • pantoprazole (PROTONIX) 40 MG TBEC Take 40 mg by mouth every day.       • Coenzyme Q10 (CO Q-10) 200 MG CAPS Take  by mouth every day.       • Cholecalciferol (VITAMIN D) 2000 UNIT TABS Take  by mouth 2 Times a Day.     • cetirizine (ZYRTEC) 10 MG TABS Take 10 mg by mouth every day.     • NON SPECIFIED CPAP supplies through [facility] 1 Each 1   • fluticasone-salmeterol (ADVAIR DISKUS) 500-50 MCG/DOSE AEROSOL POWDER, BREATH ACTIVATED Inhale 1 Puff by mouth every 12 hours. 3 Inhaler 3     No facility-administered encounter medications on file as of 6/17/2021.     Review of Systems   Respiratory: Negative for cough and wheezing.    Cardiovascular: Negative for chest pain and palpitations.   Musculoskeletal: Negative for myalgias.   Neurological: Negative for dizziness and loss of consciousness.        Objective:   /74 (BP Location: Left arm, Patient Position: Sitting, BP Cuff Size: Adult)   Pulse 91   Resp 16   Ht 1.753 m (5' 9\")   Wt 77 kg (169 lb 12.8 oz)   SpO2 96%   BMI 25.08 kg/m²     Physical Exam   Constitutional: He is oriented to person, place, and time. He appears well-developed. No distress.   Eyes: Pupils are equal, round, and reactive to light. Conjunctivae are normal.   Neck: No JVD present.   Cardiovascular: Normal rate, regular rhythm and normal heart sounds. Exam reveals no gallop and no friction rub.   No murmur heard.  Pulses:       Carotid pulses are 2+ on the right side and 2+ on the left side.       Dorsalis pedis pulses are 3+ on " "the right side and 3+ on the left side.        Posterior tibial pulses are 3+ on the right side and 3+ on the left side.   Pulmonary/Chest: Effort normal and breath sounds normal. No accessory muscle usage. No respiratory distress. He has no wheezes. He has no rales.   Abdominal: Soft. He exhibits no distension and no mass. There is no abdominal tenderness.   Musculoskeletal:      Cervical back: Normal range of motion and neck supple.   Neurological: He is alert and oriented to person, place, and time.   Skin: Skin is warm and dry. No rash noted. Nails show no clubbing.   Psychiatric: His behavior is normal.   Vitals reviewed.    03/27/2008 Standard exercise stress test  demonstrated  asymptomatic upsloping horizontal 1 mm ST-segment depression in the  inferolateral leads.       05/22/2008 myocardial perfusion stress test     Demonstrated the 1 to 1-1/2 mm  horizontal/upsloping ST-segment depression in inferolateral leads  after achieving 91% of maximum age predicted heart rate of 155 with  the perfusion scan suggesting \"ischemia in the left anterior  descending distribution\", ejection fraction was 69%.   3/22/2016 TREADMILL STRESS TEST  Stress ECG: Ischemic 2 mm ST depression in the inferior and  lateral leads with exercise.to exercise  Impression: Ischemic ECG changes at a  fair workload    07/11/2008 Cardiac Catheterization:  EF 73%.  Normal left ventricular wall motion.  Mid LAD mild myocardial bridging.      04/06/2016 CARDIAC CATHETERIZATION  1.  Normal left ventricular end-diastolic pressure.  2.  Normal left ventricular systolic function. EF 75%.  3.  Eccentric 60-70% lesion at the origin of the second LAD diagonal with FFR    across this lesion of 0.89.    Assessment:     1. Angina pectoris (HCC)     2. Coronary artery disease, non-occlusive     3. Essential hypertension, benign     4. Dyslipidemia     5. Coronary-myocardial bridge         Medical Decision Making:  Today's Assessment / Status / Plan: "   Assessment  1.  Angina pectoris.  2.  CAD. Cardiac catheterization 4/6/2016  3.  Myocardial bridging.  2008 angiogram.  4.  Dyslipidemia.  5.  Hypertension.  Diabetes mellitus.  6.  Peripheral neuropathy with balance problems.  7.  Lumbar degenerative disc disease.     Recommendation Discussion  1.  The patient is stable from a cardiac standpoint.  2.  Reviewed recent blood work lipid panel improved.  3.  Continue current cardiac therapy.  4.  RTC 1 year.

## 2021-06-17 NOTE — TELEPHONE ENCOUNTER
DOCUMENTATION OF PAR STATUS:    1. Name of Medication & Dose: Dulaglutide (TRULICITY) 1.5 MG/0.5ML Solution Pen-injector     2. Name of Prescription Coverage Company & phone #: COVER MY MEDS/ [id number]    3. Date Prior Auth Submitted: 6/17/21    4. What information was given to obtain insurance decision? Drug is covered by current plan benefit no further PA needed.    5. Prior Auth Status?     6. Patient Notified: yes

## 2021-06-21 DIAGNOSIS — J30.2 SEASONAL ALLERGIES: ICD-10-CM

## 2021-06-21 RX ORDER — MONTELUKAST SODIUM 10 MG/1
10 TABLET ORAL EVERY EVENING
Qty: 90 TABLET | Refills: 3 | Status: SHIPPED | OUTPATIENT
Start: 2021-06-21 | End: 2022-06-17 | Stop reason: SDUPTHER

## 2021-06-21 NOTE — TELEPHONE ENCOUNTER
Received request via: Pharmacy    Was the patient seen in the last year in this department? Yes  6/9/21  Does the patient have an active prescription (recently filled or refills available) for medication(s) requested? No

## 2021-06-22 ENCOUNTER — OFFICE VISIT (OUTPATIENT)
Dept: NEUROLOGY | Facility: MEDICAL CENTER | Age: 61
End: 2021-06-22
Attending: PSYCHIATRY & NEUROLOGY
Payer: COMMERCIAL

## 2021-06-22 VITALS
BODY MASS INDEX: 25.56 KG/M2 | TEMPERATURE: 97.9 F | WEIGHT: 172.6 LBS | RESPIRATION RATE: 15 BRPM | OXYGEN SATURATION: 97 % | HEART RATE: 90 BPM | HEIGHT: 69 IN | SYSTOLIC BLOOD PRESSURE: 102 MMHG | DIASTOLIC BLOOD PRESSURE: 60 MMHG

## 2021-06-22 DIAGNOSIS — Z12.11 SCREENING FOR COLORECTAL CANCER: ICD-10-CM

## 2021-06-22 DIAGNOSIS — Z12.12 SCREENING FOR COLORECTAL CANCER: ICD-10-CM

## 2021-06-22 DIAGNOSIS — E08.44 DIABETIC AMYOTROPHY ASSOCIATED WITH DIABETES MELLITUS DUE TO UNDERLYING CONDITION (HCC): ICD-10-CM

## 2021-06-22 DIAGNOSIS — R55 SYNCOPE, UNSPECIFIED SYNCOPE TYPE: ICD-10-CM

## 2021-06-22 DIAGNOSIS — R29.898 WEAKNESS OF BOTH LOWER EXTREMITIES: ICD-10-CM

## 2021-06-22 PROCEDURE — 99202 OFFICE O/P NEW SF 15 MIN: CPT | Performed by: PSYCHIATRY & NEUROLOGY

## 2021-06-22 PROCEDURE — 99212 OFFICE O/P EST SF 10 MIN: CPT | Performed by: PSYCHIATRY & NEUROLOGY

## 2021-06-22 PROCEDURE — 99204 OFFICE O/P NEW MOD 45 MIN: CPT | Performed by: PSYCHIATRY & NEUROLOGY

## 2021-06-22 RX ORDER — DULAGLUTIDE 3 MG/.5ML
1 INJECTION, SOLUTION SUBCUTANEOUS
COMMUNITY
End: 2021-07-07 | Stop reason: SDUPTHER

## 2021-06-22 ASSESSMENT — FIBROSIS 4 INDEX: FIB4 SCORE: 0.68

## 2021-07-07 ENCOUNTER — OFFICE VISIT (OUTPATIENT)
Dept: ENDOCRINOLOGY | Facility: MEDICAL CENTER | Age: 61
End: 2021-07-07
Attending: NURSE PRACTITIONER
Payer: COMMERCIAL

## 2021-07-07 VITALS
BODY MASS INDEX: 25.43 KG/M2 | HEIGHT: 69 IN | HEART RATE: 94 BPM | DIASTOLIC BLOOD PRESSURE: 76 MMHG | WEIGHT: 171.7 LBS | OXYGEN SATURATION: 95 % | SYSTOLIC BLOOD PRESSURE: 122 MMHG

## 2021-07-07 DIAGNOSIS — E78.5 HYPERLIPIDEMIA ASSOCIATED WITH TYPE 2 DIABETES MELLITUS (HCC): ICD-10-CM

## 2021-07-07 DIAGNOSIS — E11.9 TYPE 2 DIABETES MELLITUS WITHOUT COMPLICATION, WITHOUT LONG-TERM CURRENT USE OF INSULIN (HCC): ICD-10-CM

## 2021-07-07 DIAGNOSIS — E03.9 HYPOTHYROIDISM, UNSPECIFIED TYPE: ICD-10-CM

## 2021-07-07 DIAGNOSIS — E11.69 HYPERLIPIDEMIA ASSOCIATED WITH TYPE 2 DIABETES MELLITUS (HCC): ICD-10-CM

## 2021-07-07 DIAGNOSIS — E55.9 VITAMIN D DEFICIENCY: ICD-10-CM

## 2021-07-07 PROCEDURE — 99211 OFF/OP EST MAY X REQ PHY/QHP: CPT | Performed by: NURSE PRACTITIONER

## 2021-07-07 PROCEDURE — 99214 OFFICE O/P EST MOD 30 MIN: CPT | Performed by: NURSE PRACTITIONER

## 2021-07-07 RX ORDER — CHLORHEXIDINE GLUCONATE ORAL RINSE 1.2 MG/ML
SOLUTION DENTAL
COMMUNITY
Start: 2021-06-29 | End: 2021-09-20

## 2021-07-07 ASSESSMENT — FIBROSIS 4 INDEX: FIB4 SCORE: 0.68

## 2021-07-07 NOTE — PROGRESS NOTES
Chief Complaint:  Consult requested by NIRANJAN Butts for initial evaluation of Type 2 Diabetes Mellitus.     HPI:   Barron Hewitt is a 60 y.o. male with Type 2 Diabetes Mellitus diagnosed in 10 years ago. Denies hospitalizations for DKA in the past.    Patient monitors blood glucose with CGM Freestyle Lucy 14 day. Claudette glucometer.  Device was downloaded and report is scanned under media tab.  Patient also has a blood glucose values range:    Fasting glucose: 112-136.   Average glucose 140.  In target range 88% of the time.    Current Diabetes Regimen:   Trulicity 3mg Q Week  Invokana 300mg QD-started 6 weeks ago    Patient refills Invokana 90 day supply at [facility] with coupon. All other meds are with [facility].     Was discontinued from tresiba and metformin.     Started losing weight January 2020. 07/02/2020 tweaked back and has had some weight gain.    Patient previously saw SANG Weiner and Dr. Mary.   Patient reports history of proximal Neuropathy.   Patient currently being evaluated by Dr. Grewal -awaiting EMG with MRI for neuropathic pain.     Reports hypoglycemic episodes occurring 6 months ago and this was mild.  Patient denies hypoglycemic unawareness.  Patient denies episodes of severe hypoglycemia requiring third party assistance. Patient isn't wearing a medical alert bracelet or necklace. Patient doesn't have a glucagon emergency kit.    Patient has attempted diabetes education classes in the past.  Diet: 2-3 meals a day with 1-2 snacks.    Diabetes Complications   Denies history of retinopathy. Denies laser eye surgery. Last eye exam: [facility] 06/2021. Reports history of peripheral sensory neuropathy. Denies history of foot sores.   Patient denies history of kidney damage.  Patient is on ACE inhibitor or ARB. Currently taking benazepril 80mg QD. BP currently 122/76.      Ref. Range 1/7/2021 06:14   Creatinine, Urine Latest Units: mg/dL 78.31   Microalbumin, Urine  Random Latest Units: mg/dL <1.2     Patient reports history of coronary artery disease, nonocclusive. Patient denies history of stroke and denies TIA. Patient denies history of PAD. Myocardial Bridging Angiograms, denies stents.   Reports history of hyperlipidemia. Currently taking atorvastatin 80mg QD.        Ref. Range 1/7/2021 06:14   Cholesterol,Tot Latest Ref Range: 100 - 199 mg/dL 145   Triglycerides Latest Ref Range: 0 - 149 mg/dL 84   HDL Latest Ref Range: >=40 mg/dL 44   LDL Latest Ref Range: <100 mg/dL 84     Currently taking Vitamin D 2000IU daily.      Ref. Range 12/31/2019 07:23   25-Hydroxy   Vitamin D 25 Latest Ref Range: 30 - 100 ng/mL 46       Patient reports history of hypothyroidism for 4 years.  Currently taking levothyroxine 50 mcg x 6 days and 75 mcg on Sunday.  This is been patient's dosage for several years.  Patient takes thyroid hormone stomach 1 hour prior to breakfast.  Patient denies taking calcium, antacids and/or iron supplementation.       Ref. Range 6/9/2021 13:13   TSH Latest Ref Range: 0.380 - 5.330 uIU/mL 2.130   Free T-4 Latest Ref Range: 0.93 - 1.70 ng/dL 1.52      Ref. Range 3/21/2016 07:22   Microsomal -Tpo- Abs Latest Ref Range: 0.0 - 9.0 IU/mL 0.4       ROS:     CONS:     No fever, no chills, no weight loss, no fatigue   EYES:      No diplopia, no blurry vision, no redness of eyes, no swelling of eyelids   ENT:    No hearing loss, No ear pain, No sore throat, no dysphagia, no neck swelling   CV:     No chest pain, no palpitations, no claudication, no orthopnea, no PND   PULM:    No SOB, no cough, no hemoptysis, no wheezing    GI:   No nausea, no vomiting, no diarrhea, no constipation, no bloody stools   :  Passing urine well, no dysuria, no hematuria   ENDO:   No polyuria, no polydipsia, no heat intolerance, no cold intolerance   NEURO: No headaches, no dizziness, no convulsions, no tremors   MUSC:  No joint swellings, no arthralgias, no myalgias, no weakness   SKIN:   No  rash, no ulcers, no dry skin   PSYCH:   No depression, no anxiety, no difficulty sleeping       Past Medical History:  Patient Active Problem List    Diagnosis Date Noted   • 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 Sandoval 11/07/2019   • History of small bowel obstruction - 2018 11/07/2019   • Syncope and collapse 11/07/2019   • Seasonal allergies 02/28/2019   • Other insomnia 08/17/2018   • Essential hypertension, benign 10/09/2017   • Dyslipidemia 10/09/2017   • Mild intermittent asthma without complication 07/27/2017   • Erythrocytosis 05/01/2017   • ISELA on CPAP - [facility] 05/01/2017   • Coronary artery disease, non-occlusive 04/15/2016   • Angina pectoris ([facility]) 03/11/2016   • Abnormal radionuclide heart study 01/31/2014   • Coronary-myocardial bridge 11/28/2012       Past Surgical History:  Past Surgical History:   Procedure Laterality Date   • SHOULDER DECOMPRESSION ARTHROSCOPIC Left 1/4/2018    Procedure: SHOULDER DECOMPRESSION ARTHROSCOPIC - SUBACROMIAL;  Surgeon: Linwood Grover M.D.;  Location: SURGERY [facility];  Service: Orthopedics   • SHOULDER ARTHROSCOPY W/ BICIPITAL TENODESIS REPAIR Left 1/4/2018    Procedure: SHOULDER ARTHROSCOPY W/ BICIPITAL Tenotomy REPAIR;  Surgeon: Linwood Grover M.D.;  Location: SURGERY [facility];  Service: Orthopedics   • CLAVICLE DISTAL EXCISION Left 1/4/2018    Procedure: CLAVICLE DISTAL EXCISION - POSSIBLE;  Surgeon: Linwood Grover M.D.;  Location: SURGERY [facility];  Service: Orthopedics   • RECOVERY  4/8/2016    Procedure: CATH LAB [facility] WITH POSSIBLE NAVIN;  Surgeon: Gilmer Surgery;  Location: SURGERY PRE-POST PROC UNIT [facility];  Service:    • VENTRAL HERNIA REPAIR LAPAROSCOPIC  11/1/2012     Performed by Marcos Ramírez M.D. at SURGERY [facility]   • KNEE ARTHROSCOPY  1990's    right   • SHOULDER ARTHROSCOPY  1990's    right   • SINUSOTOMIES  1990's    times two surgeries   • TUMOR EXCISION WITH BIOPSY  1990's    right hand - thumb        Allergies:  Kiwi extract, Shellfish allergy, Strawberry, Sulfa drugs, and Testosterone     Current Medications:    Current Outpatient Medications:   •  chlorhexidine (PERIDEX) 0.12 % Solution, RINSE WITH 15ML FOR 30 SECONDS AND SPIT USE TWICE DAILY AFTER BRUSHING AND FLOSSING ., Disp: , Rfl:   •  Dulaglutide (TRULICITY) 3 MG/0.5ML Solution Pen-injector, Inject 1 Dose under the skin every 7 days., Disp: , Rfl:   •  montelukast (SINGULAIR) 10 MG Tab, Take 1 tablet by mouth every evening., Disp: 90 tablet, Rfl: 3  •  Canagliflozin (INVOKANA) 300 MG Tab, Take 1 tablet by mouth every day., Disp: 90 tablet, Rfl: 3  •  Dulaglutide (TRULICITY) 1.5 MG/0.5ML Solution Pen-injector, Inject 1.5 mL under the skin every 7 days., Disp: 12 Each, Rfl: 3  •  DILTIAZem CD (CARTIA XT) 180 MG CAPSULE SR 24 HR, Take 1 capsule by mouth every day., Disp: 90 capsule, Rfl: 3  •  atorvastatin (LIPITOR) 80 MG tablet, Take 1 tablet by mouth every evening., Disp: 90 tablet, Rfl: 0  •  tizanidine (ZANAFLEX) 4 MG Tab, Take 1 tablet by mouth every 6 hours as needed (muscle spasms)., Disp: 30 tablet, Rfl: 0  •  tamsulosin (FLOMAX) 0.4 MG capsule, Take 1 capsule by mouth 1/2 hour after breakfast., Disp: 30 capsule, Rfl: 4  •  benazepril (LOTENSIN) 20 MG Tab, Take 1 Tab by mouth every day., Disp: 90 Tab, Rfl: 3  •  albuterol 108 (90 Base) MCG/ACT Aero Soln inhalation aerosol, Inhale 2 Puffs every four hours as needed for Shortness of Breath., Disp: 3 Each, Rfl: 3  •  NON SPECIFIED, CPAP supplies through [facility], Disp: 1 Each, Rfl: 1  •  fluticasone-salmeterol (ADVAIR DISKUS) 500-50 MCG/DOSE AEROSOL POWDER, BREATH ACTIVATED, Inhale 1 Puff by mouth every 12 hours., Disp: 3 Inhaler,  Rfl: 3  •  finasteride (PROSCAR) 5 MG Tab, Take 1 Tab by mouth every day., Disp: 90 Tab, Rfl: 3  •  levothyroxine (SYNTHROID) 75 MCG Tab, Take 75 mcg by mouth Every morning on an empty stomach., Disp: , Rfl:   •  levothyroxine (SYNTHROID) 50 MCG Tab, Take 50 mcg by mouth Every morning on an empty stomach., Disp: , Rfl:   •  ALPHA LIPOIC ACID PO, Take 600 mg by mouth 2 Times a Day., Disp: , Rfl:   •  Omega-3 Fatty Acids (FISH OIL) 1200 MG CAPS, Take  by mouth., Disp: , Rfl:   •  Cinnamon 500 MG CAPS, Take 1,000 mg by mouth., Disp: , Rfl:   •  aspirin EC (ECOTRIN) 81 MG TBEC, Take 81 mg by mouth every day.  , Disp: , Rfl:   •  pantoprazole (PROTONIX) 40 MG TBEC, Take 40 mg by mouth every day.  , Disp: , Rfl:   •  Coenzyme Q10 (CO Q-10) 200 MG CAPS, Take  by mouth every day.  , Disp: , Rfl:   •  Cholecalciferol (VITAMIN D) 2000 UNIT TABS, Take  by mouth 2 Times a Day., Disp: , Rfl:   •  cetirizine (ZYRTEC) 10 MG TABS, Take 10 mg by mouth every day., Disp: , Rfl:     Social History:  Social History     Socioeconomic History   • Marital status:      Spouse name: Not on file   • Number of children: Not on file   • Years of education: Not on file   • Highest education level: Not on file   Occupational History   • Not on file   Tobacco Use   • Smoking status: Light Tobacco Smoker     Packs/day: 0.00     Types: Cigars   • Smokeless tobacco: Never Used   • Tobacco comment: occasional cigars   Vaping Use   • Vaping Use: Never used   Substance and Sexual Activity   • Alcohol use: Yes     Comment: rare - 1-2 per month (social)   • Drug use: Yes     Types: Marijuana     Comment: occ cigar smoking   • Sexual activity: Not on file     Comment: ; 2 biological kids (2 of his wife's); 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   Other Topics Concern   • Not on file   Social History Narrative   • Not on file     Social Determinants of Health     Financial Resource Strain:    • Difficulty of Paying Living Expenses:   "  Food Insecurity:    • Worried About Running Out of Food in the Last Year:    • Ran Out of Food in the Last Year:    Transportation Needs:    • Lack of Transportation (Medical):    • Lack of Transportation (Non-Medical):    Physical Activity:    • Days of Exercise per Week:    • Minutes of Exercise per Session:    Stress:    • Feeling of Stress :    Social Connections:    • Frequency of Communication with Friends and Family:    • Frequency of Social Gatherings with Friends and Family:    • Attends [employer] Services:    • Active Member of Clubs or Organizations:    • Attends Club or Organization Meetings:    • Marital Status:    Intimate Partner Violence:    • Fear of Current or Ex-Partner:    • Emotionally Abused:    • Physically Abused:    • Sexually Abused:         Family History:   Family History   Problem Relation Age of Onset   • Breast Cancer Mother    • Hypertension Mother    • Cancer Mother         breast   • Heart Disease Mother         hx of bypass   • Hypertension Father    • Arthritis Father    • Diabetes Father         prediabetes   • No Known Problems Sister    • Arthritis Brother    • Heart Disease Other    • Hypertension Other    • Cancer Other    • No Known Problems Brother        PHYSICAL EXAM:   Vital signs: /76 (BP Location: Left arm, Patient Position: Sitting, BP Cuff Size: Large adult)   Pulse 94   Ht 1.753 m (5' 9\")   Wt 77.9 kg (171 lb 11.2 oz)   SpO2 95%   BMI 25.36 kg/m²   GENERAL: Well-developed, well-nourished  in no apparent distress.   EYE: No ocular and eyelid asymmetry, Anicteric sclerae,  PERRL, No exophthalmos or lidlag  HENT: Hearing grossly intact, Normocephalic, atraumatic. Pink, moist mucous membranes, No exudate  NECK: Supple. Trachea midline.   CARDIOVASCULAR: Regular rate and rhythm. No murmurs, rubs, or gallops.   LUNGS: Clear to auscultation bilaterally   ABDOMEN: Soft, nontender with positive bowel sounds.   EXTREMITIES: No clubbing, cyanosis, or edema. "   NEUROLOGICAL: Cranial nerves II-XII are grossly intact   Symmetric reflexes at the patella no proximal muscle weakness, No visible tremor with both outstretched hands  LYMPH: No cervical, supraclavicular,  adenopathy palpated.   SKIN: No rashes, lesions. Turgor is normal.  FOOT: Normal sensation to monofilament testing, normal pulses, no ulcers.  Normal Vibration quantitative sensation test.    ASSESSMENT/PLAN:   1. Type 2 diabetes mellitus without complication, without long-term current use of insulin (HCC) continue diabetes regimen:  Stable.  Continue diabetes regimen:  Trulicity 3 mg weekly  Invokana 300 mg daily    Continue freestyle rubia 14-day system CGM.  Serum daily glucose values will continue to decrease with the continued use of Invokana and Trulicity together.  Dilated eye exam is up-to-date.  Daily foot inspections are always recommended.    Recommend 2-3   Recommend continued balanced meal planning such as my [email or web address].  Recommend 150 minutes of exercise each week.    2. Hypothyroidism, unspecified type  Stable.  Continue taking levothyroxine 50 mcg x 6 days and 75 mcg on Sunday.    3. Hyperlipidemia associated with type 2 diabetes mellitus (HCC)  Stable.  Continue taking atorvastatin 80 mg daily.    4. Vitamin D deficiency  Stable.  Continue taking vitamin D 2000 IU daily.    Complete point-of-care A1c at next appointment.  Next appointment in 4 months.    Thank you kindly for allowing me to participate in the diabetes care plan for this patient.    Danelle Mi, APRN  07/07/21    CC:   NIRANJAN Butts

## 2021-07-13 RX ORDER — DULAGLUTIDE 3 MG/.5ML
1 INJECTION, SOLUTION SUBCUTANEOUS
Qty: 2 ML | Refills: 11 | Status: SHIPPED | OUTPATIENT
Start: 2021-07-13 | End: 2022-07-18 | Stop reason: SDUPTHER

## 2021-07-27 ENCOUNTER — HOSPITAL ENCOUNTER (OUTPATIENT)
Dept: RADIOLOGY | Facility: MEDICAL CENTER | Age: 61
End: 2021-07-27
Attending: PSYCHIATRY & NEUROLOGY
Payer: COMMERCIAL

## 2021-07-27 DIAGNOSIS — R55 SYNCOPE, UNSPECIFIED SYNCOPE TYPE: ICD-10-CM

## 2021-07-27 DIAGNOSIS — R29.898 WEAKNESS OF BOTH LOWER EXTREMITIES: ICD-10-CM

## 2021-07-27 PROCEDURE — 70547 MR ANGIOGRAPHY NECK W/O DYE: CPT

## 2021-07-27 PROCEDURE — 70544 MR ANGIOGRAPHY HEAD W/O DYE: CPT

## 2021-07-27 PROCEDURE — 72148 MRI LUMBAR SPINE W/O DYE: CPT

## 2021-08-02 DIAGNOSIS — J45.20 MILD INTERMITTENT ASTHMA WITHOUT COMPLICATION: ICD-10-CM

## 2021-08-02 RX ORDER — ALBUTEROL SULFATE 90 UG/1
2 AEROSOL, METERED RESPIRATORY (INHALATION) EVERY 4 HOURS PRN
Qty: 18 G | Refills: 5 | Status: SHIPPED | OUTPATIENT
Start: 2021-08-02 | End: 2021-08-23

## 2021-08-02 NOTE — TELEPHONE ENCOUNTER
----- Message from Barron Hewitt sent at 7/31/2021  9:02 AM PDT -----  Regarding: Prescription Question  Contact: [phone]  Good Saturday to you, I need both Ventolin and Advair refills please.   90 Day - [facility]    Thank you

## 2021-08-10 ENCOUNTER — PATIENT MESSAGE (OUTPATIENT)
Dept: ENDOCRINOLOGY | Facility: MEDICAL CENTER | Age: 61
End: 2021-08-10

## 2021-08-10 DIAGNOSIS — Z79.4 TYPE 2 DIABETES MELLITUS WITHOUT COMPLICATION, WITH LONG-TERM CURRENT USE OF INSULIN (HCC): ICD-10-CM

## 2021-08-10 DIAGNOSIS — E11.9 TYPE 2 DIABETES MELLITUS WITHOUT COMPLICATION, WITH LONG-TERM CURRENT USE OF INSULIN (HCC): ICD-10-CM

## 2021-08-10 RX ORDER — FLASH GLUCOSE SENSOR
1 KIT MISCELLANEOUS
Qty: 2 EACH | Refills: 11 | Status: SHIPPED | OUTPATIENT
Start: 2021-08-10 | End: 2021-08-13 | Stop reason: SDUPTHER
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
## 2021-08-13 DIAGNOSIS — Z79.4 TYPE 2 DIABETES MELLITUS WITHOUT COMPLICATION, WITH LONG-TERM CURRENT USE OF INSULIN (HCC): ICD-10-CM

## 2021-08-13 DIAGNOSIS — E11.9 TYPE 2 DIABETES MELLITUS WITHOUT COMPLICATION, WITH LONG-TERM CURRENT USE OF INSULIN (HCC): ICD-10-CM

## 2021-08-13 RX ORDER — FLASH GLUCOSE SENSOR
1 KIT MISCELLANEOUS
Qty: 6 EACH | Refills: 3 | Status: SHIPPED | OUTPATIENT
Start: 2021-08-13 | End: 2022-08-09 | Stop reason: SDUPTHER

## 2021-08-23 ENCOUNTER — TELEPHONE (OUTPATIENT)
Dept: MEDICAL GROUP | Facility: LAB | Age: 61
End: 2021-08-23

## 2021-08-23 ENCOUNTER — PATIENT MESSAGE (OUTPATIENT)
Dept: MEDICAL GROUP | Facility: LAB | Age: 61
End: 2021-08-23

## 2021-08-23 DIAGNOSIS — E78.5 DYSLIPIDEMIA: ICD-10-CM

## 2021-08-23 RX ORDER — EPINEPHRINE 0.3 MG/.3ML
INJECTION SUBCUTANEOUS
Qty: 1 EACH | Refills: 0 | Status: SHIPPED | OUTPATIENT
Start: 2021-08-23 | End: 2024-03-14 | Stop reason: SDUPTHER

## 2021-08-23 RX ORDER — ATORVASTATIN CALCIUM 80 MG/1
80 TABLET, FILM COATED ORAL EVERY EVENING
Qty: 90 TABLET | Refills: 0 | Status: SHIPPED | OUTPATIENT
Start: 2021-08-23 | End: 2021-10-04 | Stop reason: SDUPTHER

## 2021-08-23 RX ORDER — ALBUTEROL SULFATE 90 UG/1
2 AEROSOL, METERED RESPIRATORY (INHALATION) EVERY 4 HOURS PRN
Qty: 1 EACH | Refills: 4 | Status: SHIPPED | OUTPATIENT
Start: 2021-08-23 | End: 2022-05-02

## 2021-08-23 NOTE — TELEPHONE ENCOUNTER
----- Message from Barron Hewitt sent at 8/23/2021  9:17 AM PDT -----  Regarding: Eppy Pen  Can I get an Eppy Pen. I haven't had one for several years and should probably be caring one.

## 2021-08-23 NOTE — TELEPHONE ENCOUNTER
"Prior auth needed for / please advise if you want PA or change to the med below  albuterol (VENTOLIN HFA) 108 (90 Base) MCG/ACT Aero Soln inhalation aerosol 18 g 5/5 8/2/2021       The drug you are requesting prior authorization for is not covered. Please select an alternative drug from the choices provided and send in a new prescription for that drug. You may also select \"Complete PA\" to obtain a PA for the originally requested drug.   ALBUTEROL HFA INHALER 8.5GM 90MCG      ALBUTEROL HFA INHALER 8.5GM 90MCG      Complete PA     "

## 2021-09-20 ENCOUNTER — OFFICE VISIT (OUTPATIENT)
Dept: NEUROLOGY | Facility: MEDICAL CENTER | Age: 61
End: 2021-09-20
Attending: PSYCHIATRY & NEUROLOGY
Payer: COMMERCIAL

## 2021-09-20 VITALS
BODY MASS INDEX: 25.83 KG/M2 | RESPIRATION RATE: 16 BRPM | DIASTOLIC BLOOD PRESSURE: 78 MMHG | HEIGHT: 69 IN | SYSTOLIC BLOOD PRESSURE: 130 MMHG | WEIGHT: 174.38 LBS | TEMPERATURE: 97.4 F | OXYGEN SATURATION: 97 % | HEART RATE: 77 BPM

## 2021-09-20 DIAGNOSIS — M79.89 LEG SWELLING: ICD-10-CM

## 2021-09-20 DIAGNOSIS — E08.44 DIABETIC AMYOTROPHY ASSOCIATED WITH DIABETES MELLITUS DUE TO UNDERLYING CONDITION (HCC): ICD-10-CM

## 2021-09-20 DIAGNOSIS — R29.898 WEAKNESS OF BOTH LOWER EXTREMITIES: ICD-10-CM

## 2021-09-20 PROCEDURE — 99212 OFFICE O/P EST SF 10 MIN: CPT | Performed by: PSYCHIATRY & NEUROLOGY

## 2021-09-20 PROCEDURE — 99214 OFFICE O/P EST MOD 30 MIN: CPT | Performed by: PSYCHIATRY & NEUROLOGY

## 2021-09-20 ASSESSMENT — FIBROSIS 4 INDEX: FIB4 SCORE: 0.68

## 2021-09-20 NOTE — PROGRESS NOTES
Chief Complaint   Patient presents with   • Follow-Up     Weakness of both lower extremities     History of present illness:  Barron Hewitt 60 y.o. male presents today for weakness f/u.   History is obtained from patient.  and Patient is accompanied by wife Anastasia.    Duration/timing: As below  Context: Weakness: in 2020 he was losing weight (15 lbs loss) and saw his endo and they thought he was overmedicating with DM meds. They adjusted the medications stabilizing with weight. Then in 7/2020, he got out of bed and his back pinch (lower back, sharp pain, mid). Next couple of days the back got tighter and tighter. The pain then progressed down the left leg radiating down lateral. MRI L spine was performed and EMG was subsequently ordered. EMG was suggestive to be plexopathy. There was significant groin pain at that time. There was worsening weakness in the left leg proximally in 8/2020. There was no numbness, there was just pain. Now (as of 4/2021), he is starting to have tingling on the top of the feet > bottom and now having stabbing pain in the lateral 2 toes that would come and go. Occasional right lateral buttocks pain from the back. +Weakness with right proximal leg.   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      Diabetic amyotrophy, left: Patient feels like his left leg is  worse. He is having some more tingling in the left lower extremity specifically across the toes and lateral foot.  His leg cramps when he lays in the fetal position.  He has been having swelling in the left foot as well for at least a couple of months.  Uncertain how long the tingling has been ongoing for.  The tingling is intermittent and he does have shooting back pain side to side.  Tingling can occur for hours and then go away.  No clear positional aspect but possibly with prolonged sitting.  Times are worse with being fatigued and with abnormal sugars.    Right leg is fine.  No new symptoms in the right leg.    Persistent dizziness with turning the head to the right.  He has had diabetes for over 10 years.    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 Valverde   • Sleep apnea     has CPAP   • Snoring        Past surgical history:   Past Surgical History:   Procedure Laterality Date   • SHOULDER DECOMPRESSION ARTHROSCOPIC Left 1/4/2018    Procedure: SHOULDER DECOMPRESSION ARTHROSCOPIC - SUBACROMIAL;  Surgeon: Linwood Grover M.D.;  Location: [facility];  Service: Orthopedics   • SHOULDER ARTHROSCOPY W/ BICIPITAL TENODESIS REPAIR Left 1/4/2018    Procedure: SHOULDER ARTHROSCOPY W/ BICIPITAL Tenotomy REPAIR;  Surgeon: Linwood Grover M.D.;  Location: [facility];  Service: Orthopedics   • CLAVICLE DISTAL EXCISION Left 1/4/2018    Procedure: CLAVICLE DISTAL EXCISION - POSSIBLE;  Surgeon: Linwood Grover M.D.;  Location: [facility];  Service: Orthopedics    • RECOVERY  4/8/2016    Procedure: CATH LAB [facility] WITH POSSIBLE NAVIN;  Surgeon: [facility];  Location: SURGERY PRE-POST PROC UNIT [facility] [place];  Service:    • VENTRAL HERNIA REPAIR LAPAROSCOPIC  11/1/2012    Performed by Marcos Ramírez M.D. at SURGERY [facility] ORS   • KNEE ARTHROSCOPY  1990's    right   • SHOULDER ARTHROSCOPY  1990's    right   • SINUSOTOMIES  1990's    times two surgeries   • TUMOR EXCISION WITH BIOPSY  1990's    right hand - thumb       Family history:   Family History   Problem Relation Age of Onset   • Breast Cancer Mother    • Hypertension Mother    • Cancer Mother         breast   • Heart Disease Mother         hx of bypass   • Hypertension Father    • Arthritis Father    • Diabetes Father         prediabetes   • No Known Problems Sister    • Arthritis Brother    • Heart Disease Other    • Hypertension Other    • Cancer Other    • No Known Problems Brother        Social history:   Tobacco Use   • Smoking status: Light Tobacco Smoker     Packs/day: 0.00     Types: Cigars   • Smokeless tobacco: Never Used   • Tobacco comment: occasional cigars   Vaping Use   • Vaping Use: Never used   Substance and Sexual Activity   • Alcohol use: Yes     Comment: rare - 1-2 per month (social)   • Drug use: Yes     Types: Marijuana     Comment: occ cigar smoking       Current medications:   Current Outpatient Medications   Medication   • EPINEPHrine (EPIPEN) 0.3 MG/0.3ML Solution Auto-injector solution for injection   • albuterol 108 (90 Base) MCG/ACT Aero Soln inhalation aerosol   • atorvastatin (LIPITOR) 80 MG tablet   • Continuous Blood Gluc Sensor (FREESTYLE LUNA 14 DAY SENSOR) [facility] [place]   • Dulaglutide (TRULICITY) 3 MG/0.5ML Solution Pen-injector   • montelukast (SINGULAIR) 10 MG Tab   • Canagliflozin (INVOKANA) 300 MG Tab   • Dulaglutide (TRULICITY) 1.5 MG/0.5ML Solution Pen-injector   • DILTIAZem CD (CARTIA XT) 180 MG CAPSULE SR 24 HR   • tizanidine (ZANAFLEX) 4 MG Tab   • tamsulosin (FLOMAX) 0.4 MG  "capsule   • benazepril (LOTENSIN) 20 MG Tab   • albuterol 108 (90 Base) MCG/ACT Aero Soln inhalation aerosol   • NON SPECIFIED   • fluticasone-salmeterol (ADVAIR DISKUS) 500-50 MCG/DOSE AEROSOL POWDER, BREATH ACTIVATED   • finasteride (PROSCAR) 5 MG Tab   • levothyroxine (SYNTHROID) 75 MCG Tab   • levothyroxine (SYNTHROID) 50 MCG Tab   • ALPHA LIPOIC ACID PO   • Omega-3 Fatty Acids (FISH OIL) 1200 MG CAPS   • Cinnamon 500 MG CAPS   • aspirin EC (ECOTRIN) 81 MG TBEC   • pantoprazole (PROTONIX) 40 MG TBEC   • Coenzyme Q10 (CO Q-10) 200 MG CAPS   • Cholecalciferol (VITAMIN D) 2000 UNIT TABS   • cetirizine (ZYRTEC) 10 MG TABS     No current facility-administered medications for this visit.       Medication Allergy:  Allergies   Allergen Reactions   • Kiwi Extract Anaphylaxis   • Shellfish Allergy Anaphylaxis   • Strawberry Anaphylaxis   • Sulfa Drugs Rash and Unspecified   • Testosterone Rash       ROS per HPI    Physical examination:   Vitals:    09/20/21 1340   BP: 130/78   BP Location: Left arm   Patient Position: Sitting   BP Cuff Size: Adult   Pulse: 77   Resp: 16   Temp: 36.3 °C (97.4 °F)   SpO2: 97%   Weight: 79.1 kg (174 lb 6.1 oz)   Height: 1.753 m (5' 9\")     General: Patient in well nourished in no apparent distress.  HENT: Normocephalic, atraumatic  Cardiovascular: Left lower extremity edema -foot, 2+ pitting without redness  Respiratory: Normal respiratory effort.   Psychiatric: Pleasant.     NEUROLOGICAL EXAM:   Mental status: Awake, alert and fully oriented to person, place, time and situation. Normal attention, concentration and fund of knowledge for education level.   Speech and language: Speech is fluent without errors and clear.  Cranial nerve exam: Prior exam  II: Pupils are equally round and reactive to light. Visual fields are intact by confrontation.  III, IV, VI: EOMI, no diplopia, no ptosis.  V: Sensation to light touch is normal over V1-3 distributions bilaterally.  .  VII: Facial movements are " symmetrical. There is no facial droop. .  VIII: Hearing intact to soft speech and finger rub bilaterally  IX: Palate elevates symmetrically, uvula is midline. Dysarthria is not present.  XI: Shoulder shrug are symmetrical and strong.   XII: Tongue protrudes midline.    Motor exam:  Muscle tone is normal in all 4 limbs. and No abnormal movements appreciated.    Muscle strength: UE prior exam as below, LE exam repeated today     Right  Left  Deltoid   5/5  5/5      Biceps   5/5  5/5  Triceps  5/5  5/5   Wrist extensors 5/5  5/5  Wrist flexors  5/5  5/5     5/5  5/5  Interossei  5/5  5/5  Thenar (APB)  NT/5  NT/5   Hip flexors  5/5  5/5  Quadriceps  5/5  5-?/5 (stable compared to prior)   Adduction  5/5  5/5  Abduction  5/5  5/5   Hamstrings  5/5  5/5  Dorsiflexors  5/5  5/5  Plantarflexors  5/5  5/5  Toe extension  5/5  5/5  Foot inversion  5/5  5/5  Foot eversion  5/5  5/5  NT = not tested    Sensory exam:  Intact to Light touch in bilateral upper and lower extremity including entire LLE. Vibration intact.     Reflexes:  Stable LE reflexes     Right  Left  Biceps   1/4  1/4  Triceps  1/4  1/4  Brachioradialis 1/4  1/4  Knee jerk  2++/4  2/4  Ankle jerk  1/4  1/4   bilateral toes are downgoing to plantar stimulation.    Coordination: shows a normal finger-nose-finger and heel to shin bilaterally.   Gait: Heel walk is normal., Toe walk is normal. and He has a mild limp on the left - stable  He can get up from a squatting position to standing.  Can stand without use of arms.  Negative Trendelenburg sign      ANCILLARY DATA REVIEWED:   Lab Data Review:  Lab Results   Component Value Date/Time    WBC 9.7 06/04/2020 02:18 PM    RBC 6.24 (H) 06/04/2020 02:18 PM    HEMOGLOBIN 18.5 (H) 06/04/2020 02:18 PM    HEMATOCRIT 54.6 (H) 06/04/2020 02:18 PM    MCV 87.5 06/04/2020 02:18 PM    MCH 29.6 06/04/2020 02:18 PM    MCHC 33.9 06/04/2020 02:18 PM    MPV 10.7 06/04/2020 02:18 PM    NEUTSPOLYS 67.60 06/04/2020 02:18 PM     LYMPHOCYTES 17.60 (L) 06/04/2020 02:18 PM    MONOCYTES 9.80 06/04/2020 02:18 PM    EOSINOPHILS 3.50 06/04/2020 02:18 PM    BASOPHILS 1.20 06/04/2020 02:18 PM    HYPOCHROMIA 1+ 08/27/2013 07:13 AM      Lab Results   Component Value Date/Time    SODIUM 135 06/09/2021 01:13 PM    POTASSIUM 3.6 06/09/2021 01:13 PM    CHLORIDE 100 06/09/2021 01:13 PM    CO2 24 06/09/2021 01:13 PM    GLUCOSE 113 (H) 06/09/2021 01:13 PM    BUN 12 06/09/2021 01:13 PM    CREATININE 0.74 06/09/2021 01:13 PM    CREATININE 1.1 07/10/2008 09:25 AM     Lab Results   Component Value Date/Time    ASTSGOT 18 06/09/2021 1313    ALTSGPT 23 06/09/2021 1313    ALKPHOSPHAT 89 06/09/2021 1313    ALBUMIN 4.7 06/09/2021 1313     Lab Results   Component Value Date/Time    HBA1C 7.1 (H) 06/09/2021 01:13 PM      A1C: 7.4> 7.7> 7.7> 7.1    EMG NCS May 2021 (Alin):  This is an abnormal electrodiagnostic study due to evidence of chronic reinnervation changes in the left vastus lateralis/medialis with mild active denervation changes noted.  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       There is no strong electrodiagnostic evidence of a an active right lumbosacral plexopathy though given some responses are unobtainable a mild lumbar plexopathy/femoral neuropathy may be present.  There is no EMG evidence of a right lumbosacral radiculopathy.      I have personally re-reviewed the patient's EMG study waveforms and tables as above and agree with the interpretation.  No evidence of large fiber peripheral neuropathy to explain his imbalance.        Imaging:   MRI L-spine without contrast 8/20/2020:  1.  Multilevel degenerative disc disease and facet arthropathy. Significant left foraminal narrowing at L3-4 and L4-5.  L1-2 is unremarkable.  L2-3 is unremarkable.  L3-4 demonstrates a left foraminal  disc protrusion moderate to severely narrowing the left neuroforamen. There is mild bilateral facet arthropathy.  L4-5 demonstrates a concentric disc bulge with mild ligament flavum hypertrophy and facet arthropathy minimally indenting the anterior aspect of the thecal sac. There is mild right and moderate left foraminal narrowing.  L5-S1 demonstrates a concentric disc bulge and mild facet arthropathy without significant narrowing of the thecal sac. There is mild bilateral foraminal narrowing.    MRI L spine without 7/2021:  1.  L4-5 annular disc bulge with a central disc protrusion and bilateral facet degeneration. There is borderline central canal narrowing. There is moderate mild right neural foraminal narrowing.  2.  L3-4 annular disc bulge with a left lateral disc protrusion. There is moderate to severe left and mild right neural foraminal narrowing again seen.  3.  L5-S1 degenerative disc disease and facet degeneration results in moderate bilateral neuroforaminal narrowing.    MRA head/neck 7/2021:  There is no significant stenosis in the visualized extracranial neck arteries.  1.  There is no aneurysm, stenosis or vascular formation.  2.  Mild cerebral atrophy.  3.  Mild chronic microvascular ischemic disease.  4.  Paranasal sinus inflammatory disease.    Records reviewed: Chart reviewed.  PCP note reviewed dated June 2021.      ASSESSMENT AND PLAN:    1. Weakness of both lower extremities: On exam he does seem quite strong.  He had a prior MRI of the lumbar spine in August 2020 with evidence of L3-5 degenerative changes primarily on the left, this is represented again on MRI of the lumbar spine performed in July 2021.  Most notable for moderate to severe left L3-4 neuroforaminal narrowing without evidence of lumbar spinal stenosis.  EMG in May 2021 by me did not reveal any evidence of a right lumbosacral radiculopathy but does not rule out presence of lumbar spinal stenosis.  Findings in the left lower  extremity were mostly chronic changes with a possible contribution from the left neuro foraminal stenosis - though the adductor was spared.  There is no evidence of weakness of the hip girdle muscles to suggest superimposed etiology like muscular dystrophy. Sensory disturbance is inconsistent with his proximal weakness.   -Continue physical therapy  -Repeat clinical exam in 8 to 12 weeks  -He is to reach out with any more persistent symptoms such as worsening numbness or weakness, if present we will pursue repeat EMG/NCS to assess for changes, will hold off given stable exam  -Neurosurgery referral if more concern for L3-4 radiculopathy    2. 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  There was involvement of both the obturator nerve and femoral nerve though it seemed predominantly femoral nerve.  A superimposed lumbar spine radiculopathy is also suspected which may account for her ongoing mild denervation changes though on clinical exam he is very strong.  Although NCS was difficult to perform on this patient and some responses were unobtainable, I am not entirely convinced that there is diabetic amyotrophy affecting the right lower extremity.    -Physical therapy as above    3. Syncope, unspecified syncope type: Patient had an episode of syncope and while back with acute onset.  It does seem more related to cerebral hypoperfusion given the brief onset and unassociated confusion.  He recovered quickly.  Currently he does have some symptoms suggestive of vertebrobasilar insufficiency.  MRA head and neck without evidence of significant vascular stenosis.  -MRA head and neck reviewed with patient    4. Left foot swelling, new: Patient with pitting edema in the left lower extremity of uncertain etiology.  No known history of renal dysfunction or heart  failure.  He does have a history of peripheral neuropathy but this is asymmetric.  The tingling may be to a certain extent related to the swelling.  -Left lower extremity ultrasound rule out DVT  -If unremarkable recommend follow-up with primary care  -PCP forwarded the note    5. Left foot tingling: Uncertain etiology.  See problem #1 and for above.  Possibly stress related to sciatica though no significant back pain history.  Clinically monitor given the intermittent symptoms.    FOLLOW-UP: Return in about 3 months (around 12/20/2021).  For clinical monitoring   EDUCATION AND COUNSELING:  -I personally discussed the following with the patient:   Differential diagnosis and medical reasoning as above, reasons for further testing, red flag symptoms to monitor for and to present to reach out sooner or seek urgent medical attention, seek urgent medical attention if there is unexplained worsening shortness of breath which may indicate pulmonary embolism      This dictation was created using voice recognition software. I have made every reasonable attempt to avoid dictation errors, but this document may contain an error not identified before finalizing. If the error changes the accuracy of the document, I would appreciate it being brought to my attention. Thank you.      Yenny Ogden MD  Neurology

## 2021-09-23 ENCOUNTER — HOSPITAL ENCOUNTER (OUTPATIENT)
Dept: RADIOLOGY | Facility: MEDICAL CENTER | Age: 61
End: 2021-09-23
Attending: PSYCHIATRY & NEUROLOGY
Payer: COMMERCIAL

## 2021-09-23 DIAGNOSIS — M79.89 LEG SWELLING: ICD-10-CM

## 2021-09-23 PROCEDURE — 93971 EXTREMITY STUDY: CPT | Mod: LT

## 2021-09-30 ENCOUNTER — APPOINTMENT (OUTPATIENT)
Dept: MEDICAL GROUP | Facility: LAB | Age: 61
End: 2021-09-30
Payer: COMMERCIAL

## 2021-10-04 ENCOUNTER — OFFICE VISIT (OUTPATIENT)
Dept: MEDICAL GROUP | Facility: LAB | Age: 61
End: 2021-10-04
Payer: COMMERCIAL

## 2021-10-04 VITALS
WEIGHT: 172 LBS | OXYGEN SATURATION: 95 % | BODY MASS INDEX: 25.48 KG/M2 | HEART RATE: 80 BPM | HEIGHT: 69 IN | SYSTOLIC BLOOD PRESSURE: 112 MMHG | RESPIRATION RATE: 16 BRPM | DIASTOLIC BLOOD PRESSURE: 68 MMHG | TEMPERATURE: 96.6 F

## 2021-10-04 DIAGNOSIS — R39.11 BENIGN PROSTATIC HYPERPLASIA WITH URINARY HESITANCY: ICD-10-CM

## 2021-10-04 DIAGNOSIS — E11.44 DIABETIC AMYOTROPHY ASSOCIATED WITH TYPE 2 DIABETES MELLITUS (HCC): ICD-10-CM

## 2021-10-04 DIAGNOSIS — G47.33 OSA (OBSTRUCTIVE SLEEP APNEA): ICD-10-CM

## 2021-10-04 DIAGNOSIS — M79.605 PAIN OF LEFT LOWER EXTREMITY: ICD-10-CM

## 2021-10-04 DIAGNOSIS — J45.20 MILD INTERMITTENT ASTHMA WITHOUT COMPLICATION: ICD-10-CM

## 2021-10-04 DIAGNOSIS — E78.5 DYSLIPIDEMIA: ICD-10-CM

## 2021-10-04 DIAGNOSIS — R29.898 WEAKNESS OF BOTH LOWER EXTREMITIES: ICD-10-CM

## 2021-10-04 DIAGNOSIS — Z79.891 USE OF OPIATES FOR THERAPEUTIC PURPOSES: ICD-10-CM

## 2021-10-04 DIAGNOSIS — N40.1 BENIGN PROSTATIC HYPERPLASIA WITH URINARY HESITANCY: ICD-10-CM

## 2021-10-04 DIAGNOSIS — Z23 NEED FOR SHINGLES VACCINE: ICD-10-CM

## 2021-10-04 DIAGNOSIS — R60.0 EDEMA OF LEFT LOWER EXTREMITY: ICD-10-CM

## 2021-10-04 PROCEDURE — 99214 OFFICE O/P EST MOD 30 MIN: CPT | Mod: 25 | Performed by: NURSE PRACTITIONER

## 2021-10-04 PROCEDURE — 90750 HZV VACC RECOMBINANT IM: CPT | Performed by: NURSE PRACTITIONER

## 2021-10-04 PROCEDURE — 90471 IMMUNIZATION ADMIN: CPT | Performed by: NURSE PRACTITIONER

## 2021-10-04 RX ORDER — ATORVASTATIN CALCIUM 80 MG/1
80 TABLET, FILM COATED ORAL EVERY EVENING
Qty: 90 TABLET | Refills: 3 | Status: SHIPPED | OUTPATIENT
Start: 2021-10-04 | End: 2022-11-05 | Stop reason: SDUPTHER

## 2021-10-04 RX ORDER — LEVOTHYROXINE SODIUM 0.05 MG/1
TABLET ORAL
Qty: 90 TABLET | Refills: 3 | Status: SHIPPED | OUTPATIENT
Start: 2021-10-04 | End: 2022-04-11 | Stop reason: SDUPTHER

## 2021-10-04 RX ORDER — TRAMADOL HYDROCHLORIDE 50 MG/1
50 TABLET ORAL EVERY 8 HOURS PRN
Qty: 30 TABLET | Refills: 1 | Status: SHIPPED | OUTPATIENT
Start: 2021-10-04 | End: 2021-11-03

## 2021-10-04 RX ORDER — TIZANIDINE 4 MG/1
4 TABLET ORAL EVERY 6 HOURS PRN
Qty: 30 TABLET | Refills: 2 | Status: SHIPPED | OUTPATIENT
Start: 2021-10-04 | End: 2022-07-25 | Stop reason: SDUPTHER

## 2021-10-04 RX ORDER — ZOLPIDEM TARTRATE 5 MG/1
TABLET ORAL
Qty: 30 TABLET | Refills: 2 | Status: SHIPPED | OUTPATIENT
Start: 2021-10-04 | End: 2022-04-28

## 2021-10-04 RX ORDER — ALBUTEROL SULFATE 90 UG/1
2 AEROSOL, METERED RESPIRATORY (INHALATION) EVERY 4 HOURS PRN
Qty: 3 EACH | Refills: 3
Start: 2021-10-04 | End: 2022-04-12 | Stop reason: SDUPTHER

## 2021-10-04 RX ORDER — FINASTERIDE 5 MG/1
5 TABLET, FILM COATED ORAL DAILY
Qty: 90 TABLET | Refills: 3 | Status: ON HOLD | OUTPATIENT
Start: 2021-10-04 | End: 2022-07-06

## 2021-10-04 RX ORDER — LEVOTHYROXINE SODIUM 0.07 MG/1
75 TABLET ORAL
Qty: 12 TABLET | Refills: 3 | Status: SHIPPED | OUTPATIENT
Start: 2021-10-04 | End: 2023-03-31

## 2021-10-04 ASSESSMENT — FIBROSIS 4 INDEX: FIB4 SCORE: 0.68

## 2021-10-04 NOTE — PROGRESS NOTES
"Chief Complaint   Patient presents with   • Back Pain       HPI  Aly is a 60-year-old established male here to follow-up on chronic bilateral lower extremity weakness, chronic left leg pain and back pain.  He also has type 2 diabetes, hypothyroidism, hypertension among other chronic medical diagnoses.  Needs medication refills and would like a referral to see Dr. Blandon, physiatrist with [facility] that specializes in neurological issues.  Since his last visit he has seen neurology, September 20, and recommendation was to continue physical therapy, rereturn in 8 to 12 weeks and was diagnosed with weakness of both lower extremities as well as diabetic amyotrophy.  Is requesting a prescription for tramadol, he takes this when he has moderate to severe extremity pain and it works well.  Denies any negative side effects of tramadol.  Last prescription for tramadol was given by [facility] and he has not seen there on a regular basis.    He is also been dealing with some left foot swelling -  negative ultrasound DVT study done in September.  No personal history of congestive heart failure.  He does have known coronary artery disease.  Blood pressure has been well controlled.  He is not on a diuretic and does not take a calcium channel blocker.  Denies shortness of breath.  Denies any left lower extremity injuries.    Sleep disturbance: Intermittent issue.  Typically 1 prescription of Ambien will last him for almost 2 years as he takes it sparingly.  Ambien works well for him when he needs it.    Past medical, surgical, family, and social history is reviewed and updated in Epic chart by me today.   Medications and allergies reviewed and updated in Epic chart by me today.     ROS:   As documented in history of present illness above    Exam:  /68 (BP Location: Right arm, Patient Position: Sitting, BP Cuff Size: Adult)   Pulse 80   Temp 35.9 °C (96.6 °F)   Resp 16   Ht 1.753 m (5' 9\")   Wt 78 kg (172 lb)   SpO2 " 95%   Constitutional: Alert, no distress, plus 3 vital signs  Skin:  Warm, dry, no rashes invisible areas  Eye: Equal, round and reactive, conjunctiva clear  Neck: Trachea midline, no masses, no thyromegaly  Respiratory: Unlabored respiration, lungs clear to auscultation, no wheezes, no rhonchi  Cardiovascular: Normal rate and rhythm.  He has +1 pitting edema to the distal aspect of his left dorsal foot without overlying erythema.  Extremity exam: Lower extremities are both well perfused without cyanosis, cold sensation, open lesions or hyper/hypopigmented skin.  Psych: Alert, pleasant, well-groomed, normal affect  Monofilament testing with a 10 gram force: sensation intact: intact bilaterally  Visual Inspection: Feet without maceration, ulcers, fissures.  Pedal pulses: intact bilaterally      Assessment / Plan / Medical Decision makin. Edema of left lower extremity  EC-ECHOCARDIOGRAM COMPLETE W/O CONT   2. 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)  REFERRAL TO PHYSICAL MEDICINE REHAB    tizanidine (ZANAFLEX) 4 MG Tab   3. Weakness of both lower extremities  REFERRAL TO PHYSICAL MEDICINE REHAB    tizanidine (ZANAFLEX) 4 MG Tab   4. Mild intermittent asthma without complication  albuterol 108 (90 Base) MCG/ACT Aero Soln inhalation aerosol    fluticasone-salmeterol (ADVAIR DISKUS) 500-50 MCG/DOSE AEROSOL POWDER, BREATH ACTIVATED   5. Benign prostatic hyperplasia with urinary hesitancy  finasteride (PROSCAR) 5 MG Tab   6. ISELA (obstructive sleep apnea)  zolpidem (AMBIEN) 5 MG Tab   7. Pain of left lower extremity  traMADol (ULTRAM) 50 MG Tab    tizanidine (ZANAFLEX) 4 MG Tab   8. Dyslipidemia  atorvastatin (LIPITOR) 80 MG tablet   9. Use of opiates for therapeutic purposes  Controlled Substance Treatment Agreement   10. Need for shingles vaccine  Shingles Vaccine (Shingrix)   Uncertain etiology of edema.  Recommend echocardiogram.  Most recent kidney function normal.  Reviewed recent note  from neurology with the patient.  Referred to Dr. Blandon, physiatry, for her opinion per patient request.  He is doing well in physical therapy.  Refilled tizanidine to tramadol to use as needed for lower extremity pain and spasms.  Refilled Ambien.  Side effects of all medications prescribed today were discussed with the patient including how to take the medications and proper dosage. Discussed repercussions of not taking the medications as prescribed. Instructed to call the office should she have any negative side effects or problems with the medications.  Updated Shingrix No. 2.  Patient was counseled regarding all immunizations, what the patient is being immunized against, possible side effects, and the importance of immunization.  He is waiting on endocrinology for updated labs regarding thyroid and A1c.  In prescribing controlled substances to this patient, I certify that I have obtained and reviewed the medical history of Barron Hewitt. I have also made a good dixie effort to obtain applicable records from other providers who have treated the patient and records did not demonstrate any increased risk of substance abuse that would prevent me from prescribing controlled substances.     I have conducted a physical exam and documented it. I have reviewed Mr. Hewitt’s prescription history as maintained by the [place] Prescription Monitoring Program.     I have assessed the patient’s risk for abuse, dependency, and addiction using the validated Opioid Risk Tool available at https://www.mdcalc.com/xqijrm-brng-frep-ort-narcotic-abuse.     Given the above, I believe the benefits of controlled substance therapy outweigh the risks. The reasons for prescribing controlled substances include non-narcotic, oral analgesic alternatives have been inadequate for pain control. Accordingly, I have discussed the risk and benefits, treatment plan, and alternative therapies with the patient.

## 2021-10-05 RX ORDER — AMOXICILLIN AND CLAVULANATE POTASSIUM 875; 125 MG/1; MG/1
1 TABLET, FILM COATED ORAL 2 TIMES DAILY
Qty: 14 TABLET | Refills: 0 | Status: SHIPPED | OUTPATIENT
Start: 2021-10-05 | End: 2021-10-12

## 2021-10-08 ENCOUNTER — HOSPITAL ENCOUNTER (OUTPATIENT)
Dept: CARDIOLOGY | Facility: MEDICAL CENTER | Age: 61
End: 2021-10-08
Attending: NURSE PRACTITIONER
Payer: COMMERCIAL

## 2021-10-08 DIAGNOSIS — R60.0 EDEMA OF LEFT LOWER EXTREMITY: ICD-10-CM

## 2021-10-08 LAB
LV EJECT FRACT  99904: 55
LV EJECT FRACT MOD 2C 99903: 53.35
LV EJECT FRACT MOD 4C 99902: 56.81
LV EJECT FRACT MOD BP 99901: 55.54

## 2021-10-08 PROCEDURE — 93306 TTE W/DOPPLER COMPLETE: CPT

## 2021-10-08 PROCEDURE — 93306 TTE W/DOPPLER COMPLETE: CPT | Mod: 26 | Performed by: INTERNAL MEDICINE

## 2021-11-02 ENCOUNTER — OFFICE VISIT (OUTPATIENT)
Dept: PHYSICAL MEDICINE AND REHAB | Facility: REHABILITATION | Age: 61
End: 2021-11-02
Payer: COMMERCIAL

## 2021-11-02 VITALS
WEIGHT: 165 LBS | DIASTOLIC BLOOD PRESSURE: 60 MMHG | HEIGHT: 69 IN | SYSTOLIC BLOOD PRESSURE: 142 MMHG | RESPIRATION RATE: 15 BRPM | HEART RATE: 88 BPM | BODY MASS INDEX: 24.44 KG/M2 | OXYGEN SATURATION: 98 % | TEMPERATURE: 98.5 F

## 2021-11-02 DIAGNOSIS — M54.16 LEFT LUMBAR RADICULOPATHY: Primary | ICD-10-CM

## 2021-11-02 DIAGNOSIS — R53.1 WEAKNESS: ICD-10-CM

## 2021-11-02 DIAGNOSIS — M62.552 ATROPHY OF MUSCLE OF LEFT THIGH: ICD-10-CM

## 2021-11-02 PROCEDURE — 99205 OFFICE O/P NEW HI 60 MIN: CPT | Performed by: PHYSICAL MEDICINE & REHABILITATION

## 2021-11-02 ASSESSMENT — FIBROSIS 4 INDEX: FIB4 SCORE: 0.68

## 2021-11-02 NOTE — PROGRESS NOTES
[facility]  [facility]  [address] [place] [address]  Ph: [phone]    NEW PATIENT EVALUATION    Patient Name: Barron Hewitt   Patient : 1960  Patient Age: 60 y.o.     Examining Physician: Dr. Myah Blandon DO    SUBJECTIVE:   Patient Identification: Barron Hewitt is a 60 y.o. male with PMH significant for CAD, ISELA, asthma, hypertension, dyslipidemia, seasonal allergies, type 2 diabetes mellitus, history of SBO 2018, hypothyroidism and rehabilitation history significant for muscle weakness, pain and is presenting to PM&R clinic for a NEW OUTPATIENT evaluation with the following chief complaint/s:    Chief Complaint: Weakness and pain in left lower extremity.     History of Present Illness:   -Records reviewed: Referral note reviewed in its entirety.  Patient is established with neurology.  Recommendation was to continue PT and return in 2 to 3 months.  Carries diagnosis of diabetic amyotrophy.  On tramadol and tizanidine for lower extremity pain and spasms.  On Ambien for insomnia.  - Records reviewed: Neurology note from 2021.  Followed by Dr. Ogden.  Per her note and history patient's weakness started in  when he was losing weight, diabetic medications were adjusted and subsequently had weight stabilization.  Later that year, approximately 2020 he got out of bed and had lower back pain which progressed down the left leg radiating laterally.  MRI L-spine was performed an EMG was ordered.  EMG suggestive plexopathy.  Weakness in the left leg continued to worsen approximately around 2020.  As of earlier this year, 2020, has tingling on the top of his feet more so than the bottom and stabbing pain in the lateral 2 toes.  Occasional right lateral buttocks pain from the back.  Additionally has history of bilateral quadricep tear.  - 15+ years ago patient tore both quads, fell down stairs and had tear of both-->12 years ago dx with  DM-->lost weight 2 years ago, his meds were stopped for DM-->7/2/20 got out of bed and hurt back--> EMG and MRI done-->told was not related to MRI findings, but proximal issue.   - Last A1c was 7.1.   - Has been in therapy for 6 months with a break and then now has been in therapy since August. Goes to PT of of [facility].   - At some point saw Dr. Dennis, approximately 7/2020. Recommended EMG/NCS first.   - Pain is what is most bothersome. The pain goes across the back along lumbar region. Also gets shooting pains down his leg. Only affects the left mostly in terms of radiating pain. Right foot has started to be tingly.   - Recently returned from [place] and pain was significantly reduced. Out of 14 days only had about 3 that required pain medications.   - Tries to stay active, did walk most days, also was able to sun bathe and relax.   - Thyroid has been doing well.  - Now gets a lot of cramping in his hamstring and foot.   - Still feels impaired in terms of balance and strength.   - Takes Aleve 3-4 times per week.   - Patient's pain radiates from SI joint region on left laterally down thigh then anteriorly down shin to ankle. Then gets tingling in the toes.   - Also gets lumbar pain only at times, walking tends to loosen things up.   - No obvious exacerbating factors for either pain.   - Has also had issues with syncope. Looking over r shoulder causes blacking out.     Review of Systems:  All other pertinent positive review of systems are noted above in HPI.   All other systems reviewed and are negative.    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 Valverde   • Sleep apnea     has CPAP   • Snoring       Past Surgical History:   Procedure Laterality Date   • SHOULDER DECOMPRESSION ARTHROSCOPIC Left 1/4/2018    Procedure: SHOULDER DECOMPRESSION ARTHROSCOPIC - SUBACROMIAL;  Surgeon: Linwood Grover M.D.;  Location: [facility];  Service: Orthopedics   • SHOULDER ARTHROSCOPY W/ BICIPITAL TENODESIS REPAIR Left 1/4/2018    Procedure: SHOULDER ARTHROSCOPY W/ BICIPITAL Tenotomy REPAIR;  Surgeon: Linwood Grover M.D.;  Location: [facility];  Service: Orthopedics   • CLAVICLE DISTAL EXCISION Left 1/4/2018    Procedure: CLAVICLE DISTAL EXCISION - POSSIBLE;  Surgeon: Linwood Grover M.D.;  Location: [facility];  Service: Orthopedics   • RECOVERY  4/8/2016    Procedure: CATH LAB [facility] WITH POSSIBLE NAVIN;  Surgeon: Recoveryonly Surgery;  Location: SURGERY PRE-POST PROC UNIT [facility];  Service:    • VENTRAL HERNIA REPAIR LAPAROSCOPIC  11/1/2012    Performed by Marcos Ramírez M.D. at [facility]   • KNEE ARTHROSCOPY  1990's    right   • SHOULDER ARTHROSCOPY  1990's    right   • SINUSOTOMIES  1990's    times two surgeries   • TUMOR EXCISION WITH BIOPSY  1990's    right hand - thumb        Current Outpatient Medications:   •  albuterol 108 (90 Base) MCG/ACT Aero Soln inhalation aerosol, Inhale 2 Puffs every four hours as needed for Shortness of Breath. Pt prefers ventolin if possible. Thank you, Disp: 3 Each, Rfl: 3  •  fluticasone-salmeterol (ADVAIR DISKUS) 500-50 MCG/DOSE AEROSOL POWDER, BREATH ACTIVATED, Inhale 1 Puff every 12 hours., Disp: 3 Each, Rfl: 3  •  finasteride (PROSCAR) 5 MG Tab, Take 1 Tablet by mouth every day., Disp: 90 Tablet, Rfl: 3  •  levothyroxine (SYNTHROID) 75 MCG Tab, Take 1 Tablet by mouth every morning on an empty stomach. Taking only one d per week.  50 mcg all other days., Disp: 12 Tablet, Rfl: 3  •  levothyroxine (SYNTHROID) 50 MCG  Tab, Take one pill 6 days per week on empty stomach.  75 mcg on 7th day., Disp: 90 Tablet, Rfl: 3  •  traMADol (ULTRAM) 50 MG Tab, Take 1 Tablet by mouth every 8 hours as needed for up to 30 days., Disp: 30 Tablet, Rfl: 1  •  zolpidem (AMBIEN) 5 MG Tab, TAKE 1 TABLET BY MOUTH ONCE DAILY AT BEDTIME AS NEEDED FOR  INSOMNIA, Disp: 30 Tablet, Rfl: 2  •  atorvastatin (LIPITOR) 80 MG tablet, Take 1 Tablet by mouth every evening., Disp: 90 Tablet, Rfl: 3  •  tizanidine (ZANAFLEX) 4 MG Tab, Take 1 Tablet by mouth every 6 hours as needed (muscle spasms)., Disp: 30 Tablet, Rfl: 2  •  EPINEPHrine (EPIPEN) 0.3 MG/0.3ML Solution Auto-injector solution for injection, Inject 0.3 mL into the thigh one time for 1 dose., Disp: 1 Each, Rfl: 0  •  albuterol 108 (90 Base) MCG/ACT Aero Soln inhalation aerosol, Inhale 2 Puffs every four hours as needed for Shortness of Breath., Disp: 1 Each, Rfl: 4  •  Continuous Blood Gluc Sensor (FREESTYLE LUNA 14 DAY SENSOR) Misc, 1 Application every 14 days., Disp: 6 Each, Rfl: 3  •  Dulaglutide (TRULICITY) 3 MG/0.5ML Solution Pen-injector, Inject 1 Dose under the skin every 7 days., Disp: 2 mL, Rfl: 11  •  montelukast (SINGULAIR) 10 MG Tab, Take 1 tablet by mouth every evening., Disp: 90 tablet, Rfl: 3  •  Canagliflozin (INVOKANA) 300 MG Tab, Take 1 tablet by mouth every day., Disp: 90 tablet, Rfl: 3  •  Dulaglutide (TRULICITY) 1.5 MG/0.5ML Solution Pen-injector, Inject 1.5 mL under the skin every 7 days., Disp: 12 Each, Rfl: 3  •  DILTIAZem CD (CARTIA XT) 180 MG CAPSULE SR 24 HR, Take 1 capsule by mouth every day., Disp: 90 capsule, Rfl: 3  •  tamsulosin (FLOMAX) 0.4 MG capsule, Take 1 capsule by mouth 1/2 hour after breakfast., Disp: 30 capsule, Rfl: 4  •  benazepril (LOTENSIN) 20 MG Tab, Take 1 Tab by mouth every day., Disp: 90 Tab, Rfl: 3  •  NON SPECIFIED, CPAP supplies through [facility], Disp: 1 Each, Rfl: 1  •  ALPHA LIPOIC ACID PO, Take 600 mg by mouth 2 Times a Day., Disp: ,  Rfl:   •  Omega-3 Fatty Acids (FISH OIL) 1200 MG CAPS, Take  by mouth., Disp: , Rfl:   •  Cinnamon 500 MG CAPS, Take 1,000 mg by mouth., Disp: , Rfl:   •  aspirin EC (ECOTRIN) 81 MG TBEC, Take 81 mg by mouth every day.  , Disp: , Rfl:   •  pantoprazole (PROTONIX) 40 MG TBEC, Take 40 mg by mouth every day.  , Disp: , Rfl:   •  Coenzyme Q10 (CO Q-10) 200 MG CAPS, Take  by mouth every day.  , Disp: , Rfl:   •  Cholecalciferol (VITAMIN D) 2000 UNIT TABS, Take  by mouth 2 Times a Day., Disp: , Rfl:   •  cetirizine (ZYRTEC) 10 MG TABS, Take 10 mg by mouth every day., Disp: , Rfl:   Allergies   Allergen Reactions   • Kiwi Extract Anaphylaxis   • Shellfish Allergy Anaphylaxis   • Strawberry Anaphylaxis   • Sulfa Drugs Rash and Unspecified   • Testosterone Rash        Past Social History:  Social History     Socioeconomic History   • Marital status:      Spouse name: Not on file   • Number of children: Not on file   • Years of education: Not on file   • Highest education level: Not on file   Occupational History   • Not on file   Tobacco Use   • Smoking status: Light Tobacco Smoker     Packs/day: 0.00     Types: Cigars   • Smokeless tobacco: Never Used   • Tobacco comment: occasional cigars   Vaping Use   • Vaping Use: Never used   Substance and Sexual Activity   • Alcohol use: Yes     Comment: rare - 1-2 per month (social)   • Drug use: Yes     Types: Marijuana     Comment: occ cigar smoking   • Sexual activity: Not on file     Comment: ; 2 biological kids (2 of his wife's); 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   Other Topics Concern   • Not on file   Social History Narrative   • Not on file     Social Determinants of Health     Financial Resource Strain:    • Difficulty of Paying Living Expenses:    Food Insecurity:    • Worried About Running Out of Food in the Last Year:    • Ran Out of Food in the Last Year:    Transportation Needs:    • Lack of Transportation (Medical):    • Lack of  Transportation (Non-Medical):    Physical Activity:    • Days of Exercise per Week:    • Minutes of Exercise per Session:    Stress:    • Feeling of Stress :    Social Connections:    • Frequency of Communication with Friends and Family:    • Frequency of Social Gatherings with Friends and Family:    • Attends Hinduism Services:    • Active Member of Clubs or Organizations:    • Attends Club or Organization Meetings:    • Marital Status:    Intimate Partner Violence:    • Fear of Current or Ex-Partner:    • Emotionally Abused:    • Physically Abused:    • Sexually Abused:         Family History:  Family History   Problem Relation Age of Onset   • Breast Cancer Mother    • Hypertension Mother    • Cancer Mother         breast   • Heart Disease Mother         hx of bypass   • Hypertension Father    • Arthritis Father    • Diabetes Father         prediabetes   • No Known Problems Sister    • Arthritis Brother    • Heart Disease Other    • Hypertension Other    • Cancer Other    • No Known Problems Brother        Depression and Opioid Screening  PHQ-9:  Depression Screen (PHQ-2/PHQ-9) 11/7/2019 1/14/2020 5/3/2021   PHQ-2 Total Score 0 0 0     Interpretation of PHQ-9 Total Score   Score Severity   1-4 No Depression   5-9 Mild Depression   10-14 Moderate Depression   15-19 Moderately Severe Depression   20-27 Severe Depression     OBJECTIVE:   Vital Signs:  Vitals:    11/02/21 1525   BP: 142/60   Pulse: 88   Resp: 15   Temp: 36.9 °C (98.5 °F)   SpO2: 98%        Pertinent Labs:  Lab Results   Component Value Date/Time    SODIUM 135 06/09/2021 01:13 PM    POTASSIUM 3.6 06/09/2021 01:13 PM    CHLORIDE 100 06/09/2021 01:13 PM    CO2 24 06/09/2021 01:13 PM    GLUCOSE 113 (H) 06/09/2021 01:13 PM    BUN 12 06/09/2021 01:13 PM    CREATININE 0.74 06/09/2021 01:13 PM    CREATININE 1.1 07/10/2008 09:25 AM       Lab Results   Component Value Date/Time    HBA1C 7.1 (H) 06/09/2021 01:13 PM       Lab Results   Component Value Date/Time     WBC 9.7 06/04/2020 02:18 PM    RBC 6.24 (H) 06/04/2020 02:18 PM    HEMOGLOBIN 18.5 (H) 06/04/2020 02:18 PM    HEMATOCRIT 54.6 (H) 06/04/2020 02:18 PM    MCV 87.5 06/04/2020 02:18 PM    MCH 29.6 06/04/2020 02:18 PM    MCHC 33.9 06/04/2020 02:18 PM    MPV 10.7 06/04/2020 02:18 PM    NEUTSPOLYS 67.60 06/04/2020 02:18 PM    LYMPHOCYTES 17.60 (L) 06/04/2020 02:18 PM    MONOCYTES 9.80 06/04/2020 02:18 PM    EOSINOPHILS 3.50 06/04/2020 02:18 PM    BASOPHILS 1.20 06/04/2020 02:18 PM    HYPOCHROMIA 1+ 08/27/2013 07:13 AM       Lab Results   Component Value Date/Time    ASTSGOT 18 06/09/2021 01:13 PM    ALTSGPT 23 06/09/2021 01:13 PM        Physical Exam:   GEN: No apparent distress  HEENT: Head normocephalic, atraumatic.  Sclera nonicteric bilaterally, no ocular discharge appreciated bilaterally.  CV: Extremities warm and well-perfused, no peripheral edema appreciated bilaterally.  PULMONARY: Breathing nonlabored on room air, no respiratory accessory muscle use.  Not requiring supplemental oxygen.  SKIN: No appreciable skin breakdown on exposed areas of skin.  PSYCH: Mood and affect within normal limits.  NEURO: Awake alert.  Conversational.  Logical thought content.    Motor Exam Upper Extremities   ? Myotome R L   Shoulder Abduction C5 5 5   Elbow flexion C5 5 5   Wrist extension C6 5 5   Elbow extension C7 5 5   Finger flexion C8 5 5   Finger abduction T1 5 5     Motor Exam Lower Extremities  ? Myotome R L   Hip flexion L2 5 5   Knee extension L3 5 5   Ankle dorsiflexion L4 5 5   Toe extension L5 5 5   Ankle plantarflexion S1 5 5     No clonus bilateral ankles  James's negative bilaterally  No significant spasticity appreciated via modified Cirilo scale  Able to toe walk 10 steps without issue  Able to tandem walk 10 steps with mild imbalance but largely able to complete task  Able to heel walk 10 steps without issue  Straight leg raise negative bilaterally  Slump test negative bilaterally  Muscle atrophy left  quadricep greater than right quadricep    Imaging/Procedures:   EMG/NCS 5/26/21  This is an abnormal electrodiagnostic study due to evidence of   chronic reinnervation changes in the left vastus   lateralis/medialis with mild active denervation changes noted.     This is consistent with patient's prior history of diabetic   amyotrophy with primary involvement of the left femoral nerve   though differential does include an isolated left femoral   neuropathy and L2-4 radiculopathy.       There is no strong electrodiagnostic evidence of a an active   right lumbosacral plexopathy though given some responses are   unobtainable a mild lumbar plexopathy/femoral neuropathy may be   present.  There is no EMG evidence of a right lumbosacral   radiculopathy.       Recommend clinical correlation and repeat study if progressive   Symptoms.    MRI lumbar spine 8/28/2020  1.  Multilevel degenerative disc disease and facet arthropathy. Significant left foraminal narrowing at L3-4 and L4-5.    MRI lumbar spine 7/27/2021  1.  L4-5 annular disc bulge with a central disc protrusion and bilateral facet degeneration. There is borderline central canal narrowing. There is moderate mild right neural foraminal narrowing.  2.  L3-4 annular disc bulge with a left lateral disc protrusion. There is moderate to severe left and mild right neural foraminal narrowing again seen.  3.  L5-S1 degenerative disc disease and facet degeneration results in moderate bilateral neuroforaminal narrowing.    ASSESSMENT/PLAN: Barron Hewitt  is a 60 y.o. male with rehabilitation history significant for muscle weakness, pain, here for evaluation. The following plan was discussed with the patient who is in agreement.     Visit Diagnoses     ICD-10-CM   1. Left lumbar radiculopathy  M54.16   2. Weakness  R53.1   3. Atrophy of muscle of left thigh  M62.552        Rehab/Neuro:   1. Muscle weakness  2. Left lumbar radiculitis: MRI positive for left foraminal  narrowing at L3-4, L4-5  -Records reviewed as aforementioned in the HPI.  -Therapy: Continue outpatient physical therapy.  Recently on hiatus due to vacation in [place] but will reinitiate this Thursday.  -At next visit we will discuss community resources for more aggressive strengthening  -Occupation: Actively working for [employer]  -Driving status: Actively drives  -Equipment: None needed  -Assessment: Extensive time was spent discussing patient's symptoms, history, imaging today.  Overall I believe that the patient has several issues going on concurrently.  He has baseline mild quadricep weakness secondary to bilateral quadricep tear many years ago.  Discussed with him in great detail that as he ages he will have to work harder to maintain his strength, but regardless some of it will reduce simply as a part of the natural aging process.  Concurrently, he has been diagnosed with diabetic amyotrophia which could be contributing to his weakness as well.  Diabetes can explain the neuropathy that he is starting to get in his right foot.  Overall, he states that he is actually very pleased with where he is at from a strength perspective as he has done significantly well with physical therapy and feels that his atrophy and weakness are stabilizing and even mildly improving.  What is currently bothering him more is the pain across his low back as well as the radicular symptoms that he is getting down the left lower extremity.  I highly recommend that he revisit with interventional list who might be able to offer him L3-4, L4-5 epidural steroid injection.  Certainly his diabetic amyotrophy could be contributing to his muscular wasting and other symptomatology, however, he also has significant neuroforaminal stenosis at L3-4, L4-5 seen on MRI with primarily left quadricep wasting, and EMG/NCS I cannot exclude left L2-4 radiculopathy.  His pain pattern is consistent with L4 radiculopathy.  This is what limits him most from doing  things that he would like to do.  I discussed with him that I think it would be worthwhile to rediscuss with the interventional list possible left TESI given objective MRI findings corroborating subjective history.     Spasticity: No spasticity on exam.  Would not expect this with either diabetic amyotrophy or neuroforaminal stenosis.    Neuropathic Pain: Lumbar radiculitis/radiculopathy on the left as aforementioned    Neurogenic Bladder: Continent, volitional.    Neurogenic Bowel: Continent, volitional.    Follow up: 2 months for reevaluation    Total time spent was 69 minutes.  Included in this time is the time spent preparing for the visit including record review, my exam and evaluation, counseling and education regarding that which is aforementioned in the assessment and plan. Time was spent ordering the appropriate labs, tests, procedures, referrals, medications.  Including this time was also the time spent in care coordination. Included this time as the time spent obtaining history from someone other than the patient. Discussion involved the patient.     Please note that this dictation was created using voice recognition software. I have made every reasonable attempt to correct obvious errors but there may be errors of grammar and content that I may have overlooked prior to finalization of this note.    Dr. Myah Blandon DO, MS  Department of Physical Medicine & Rehabilitation  Neuro Rehabilitation Clinic  [facility]

## 2021-11-16 ENCOUNTER — OFFICE VISIT (OUTPATIENT)
Dept: ENDOCRINOLOGY | Facility: MEDICAL CENTER | Age: 61
End: 2021-11-16
Attending: NURSE PRACTITIONER
Payer: COMMERCIAL

## 2021-11-16 VITALS
DIASTOLIC BLOOD PRESSURE: 66 MMHG | HEIGHT: 69 IN | RESPIRATION RATE: 16 BRPM | WEIGHT: 172.9 LBS | OXYGEN SATURATION: 96 % | HEART RATE: 104 BPM | SYSTOLIC BLOOD PRESSURE: 136 MMHG | BODY MASS INDEX: 25.61 KG/M2

## 2021-11-16 DIAGNOSIS — E11.9 TYPE 2 DIABETES MELLITUS WITHOUT COMPLICATION, WITH LONG-TERM CURRENT USE OF INSULIN (HCC): ICD-10-CM

## 2021-11-16 DIAGNOSIS — E03.9 HYPOTHYROIDISM, UNSPECIFIED TYPE: ICD-10-CM

## 2021-11-16 DIAGNOSIS — E55.9 VITAMIN D DEFICIENCY: ICD-10-CM

## 2021-11-16 DIAGNOSIS — E78.5 HYPERLIPIDEMIA ASSOCIATED WITH TYPE 2 DIABETES MELLITUS (HCC): ICD-10-CM

## 2021-11-16 DIAGNOSIS — E11.69 HYPERLIPIDEMIA ASSOCIATED WITH TYPE 2 DIABETES MELLITUS (HCC): ICD-10-CM

## 2021-11-16 DIAGNOSIS — Z79.4 TYPE 2 DIABETES MELLITUS WITHOUT COMPLICATION, WITH LONG-TERM CURRENT USE OF INSULIN (HCC): ICD-10-CM

## 2021-11-16 LAB
HBA1C MFR BLD: 7.2 % (ref 0–5.6)
INT CON NEG: ABNORMAL
INT CON POS: ABNORMAL

## 2021-11-16 PROCEDURE — 99214 OFFICE O/P EST MOD 30 MIN: CPT | Performed by: NURSE PRACTITIONER

## 2021-11-16 PROCEDURE — 99213 OFFICE O/P EST LOW 20 MIN: CPT | Performed by: NURSE PRACTITIONER

## 2021-11-16 PROCEDURE — 83036 HEMOGLOBIN GLYCOSYLATED A1C: CPT | Performed by: NURSE PRACTITIONER

## 2021-11-16 PROCEDURE — 95251 CONT GLUC MNTR ANALYSIS I&R: CPT | Performed by: NURSE PRACTITIONER

## 2021-11-16 ASSESSMENT — FIBROSIS 4 INDEX: FIB4 SCORE: 0.69

## 2021-11-16 NOTE — PROGRESS NOTES
Chief Complaint:   Follow-up evaluation of Type 2 Diabetes Mellitus.     HPI:   Barron Hewitt is a 60 y.o. male here for continued evaluation & treatment of the followin.  Type 2 Diabetes Mellitus   Diagnosed in 10 years ago.   Patient reports he is doing well overall from his last appointment.  Recent vacation to [place] the last 4 weeks.  Patient has recognized ongoing swelling to his left lower extremity primarily his foot.    Patient mentioned the swelling to Dr Grewal Neurologist and Myah Blandon Physiatrist.  They provided no recommendations and told him to discuss it with endocrinology.  Patient denies redness, injury or trauma, and pain.    Patient monitors blood glucose with CGM [employer] 14 day and Claudette glucometer.  Device was downloaded and report is scanned under media tab.  Patient also has a blood glucose values range:    Fasting glucose: 112-136.   Average glucose 170.  In target range 67% of the time.    Current Diabetes Regimen:   Trulicity 3mg Q Week  Invokana 300mg QD-started 6 weeks ago      POC A1c 2021: 7.2%  Serum Glycohemoglobin 2021: 7.1%    Patient refills Invokana 90 day supply at [facility] with coupon. All other meds are with [facility].     Was discontinued from tresiba and metformin by previous endocrinology team.  Patient previously saw SANG Weiner and Dr. Mary.   Patient reports history of proximal Neuropathy.   Patient currently being evaluated by Dr. Grewal -awaiting EMG with MRI for neuropathic pain.     Patient denies hypoglycemic events.  Patient states he is hypoglycemic aware.    Patient has attempted diabetes education classes in the past.  Diet: 2-3 meals a day with 1-2 snacks.    Diabetes Complications   Denies history of retinopathy. Denies laser eye surgery. Last eye exam: [facility] 2021. Reports history of peripheral sensory neuropathy. Denies history of foot sores.   Patient denies history of kidney damage.      Patient is on  ACE inhibitor or ARB. Currently taking benazepril 80mg QD. BP currently 136/66.     Ref. Range 1/7/2021 06:14   Creatinine, Urine Latest Units: mg/dL 78.31   Microalbumin, Urine Random Latest Units: mg/dL <1.2       2. Hyperlipidemia  Currently taking atorvastatin 80mg QD.   Patient denies myalgias and or muscle cramps.  Patient has tolerated statin therapy well for over 2 years.       Ref. Range 1/7/2021 06:14   Cholesterol,Tot Latest Ref Range: 100 - 199 mg/dL 145   Triglycerides Latest Ref Range: 0 - 149 mg/dL 84   HDL Latest Ref Range: >=40 mg/dL 44   LDL Latest Ref Range: <100 mg/dL 84       3.  Vitamin D deficiency  Currently taking Vitamin D 2000IU daily.   No current vitamin D D level assay.     Ref. Range 12/31/2019 07:23   25-Hydroxy   Vitamin D 25 Latest Ref Range: 30 - 100 ng/mL 46       4.  Acquired Hypothyroidism   Diagnosed 4 years.    Currently taking levothyroxine 50 mcg x 6 days and 75 mcg on Sunday.  This is been patient's dosage for several years.  Patient takes thyroid hormone stomach 1 hour prior to breakfast.  Patient denies taking calcium, antacids and/or iron supplementation.       Ref. Range 6/9/2021 13:13   TSH Latest Ref Range: 0.380 - 5.330 uIU/mL 2.130   Free T-4 Latest Ref Range: 0.93 - 1.70 ng/dL 1.52      Ref. Range 3/21/2016 07:22   Microsomal -Tpo- Abs Latest Ref Range: 0.0 - 9.0 IU/mL 0.4       ROS:     CONS:     No fever, no chills, no weight loss, no fatigue   EYES:      No diplopia, no blurry vision, no redness of eyes, no swelling of eyelids   ENT:    No hearing loss, No ear pain, No sore throat, no dysphagia, no neck swelling   CV:     No chest pain, no palpitations, no claudication, no orthopnea, no PND   PULM:    No SOB, no cough, no hemoptysis, no wheezing    GI:   No nausea, no vomiting, no diarrhea, no constipation, no bloody stools   :  Passing urine well, no dysuria, no hematuria   ENDO:   No polyuria, no polydipsia, no heat intolerance, no cold intolerance   NEURO:  No headaches, no dizziness, no convulsions, no tremors   MUSC:  No joint swellings, no arthralgias, no myalgias, no weakness   SKIN:   No rash, no ulcers, no dry skin   PSYCH:   No depression, no anxiety, no difficulty sleeping       Past Medical History:  Patient Active Problem List    Diagnosis Date Noted   • 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) - Dr. Sandoval 11/07/2019   • History of small bowel obstruction - 2018 11/07/2019   • Syncope and collapse 11/07/2019   • Seasonal allergies 02/28/2019   • Other insomnia 08/17/2018   • Essential hypertension, benign 10/09/2017   • Dyslipidemia 10/09/2017   • Mild intermittent asthma without complication 07/27/2017   • Erythrocytosis 05/01/2017   • ISELA on CPAP - [facility] 05/01/2017   • Coronary artery disease, non-occlusive 04/15/2016   • Angina pectoris (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       Past Surgical History:  Past Surgical History:   Procedure Laterality Date   • SHOULDER DECOMPRESSION ARTHROSCOPIC Left 1/4/2018    Procedure: SHOULDER DECOMPRESSION ARTHROSCOPIC - SUBACROMIAL;  Surgeon: Linwood Grover M.D.;  Location: [facility];  Service: Orthopedics   • SHOULDER ARTHROSCOPY W/ BICIPITAL TENODESIS REPAIR Left 1/4/2018    Procedure: SHOULDER ARTHROSCOPY W/ BICIPITAL Tenotomy REPAIR;  Surgeon: Linwood Grover M.D.;  Location: [facility];  Service: Orthopedics   • CLAVICLE DISTAL EXCISION Left 1/4/2018    Procedure: CLAVICLE DISTAL EXCISION - POSSIBLE;  Surgeon: Linwood Grover M.D.;  Location: [facility];  Service: Orthopedics   • RECOVERY  4/8/2016    Procedure: CATH LAB [facility] WITH POSSIBLE NAVIN;   Surgeon: Gilmer Surgery;  Location: SURGERY PRE-POST PROC UNIT [facility] [place] [facility];  Service:    • VENTRAL HERNIA REPAIR LAPAROSCOPIC  11/1/2012    Performed by Marcos Ramírez M.D. at [facility] ORS   • KNEE ARTHROSCOPY  1990's    right   • SHOULDER ARTHROSCOPY  1990's    right   • SINUSOTOMIES  1990's    times two surgeries   • TUMOR EXCISION WITH BIOPSY  1990's    right hand - thumb        Allergies:  Kiwi extract, Shellfish allergy, Strawberry, Sulfa drugs, and Testosterone     Current Medications:    Current Outpatient Medications:   •  albuterol 108 (90 Base) MCG/ACT Aero Soln inhalation aerosol, Inhale 2 Puffs every four hours as needed for Shortness of Breath. Pt prefers ventolin if possible. Thank you, Disp: 3 Each, Rfl: 3  •  fluticasone-salmeterol (ADVAIR DISKUS) 500-50 MCG/DOSE AEROSOL POWDER, BREATH ACTIVATED, Inhale 1 Puff every 12 hours., Disp: 3 Each, Rfl: 3  •  finasteride (PROSCAR) 5 MG Tab, Take 1 Tablet by mouth every day., Disp: 90 Tablet, Rfl: 3  •  levothyroxine (SYNTHROID) 75 MCG Tab, Take 1 Tablet by mouth every morning on an empty stomach. Taking only one d per week.  50 mcg all other days., Disp: 12 Tablet, Rfl: 3  •  levothyroxine (SYNTHROID) 50 MCG Tab, Take one pill 6 days per week on empty stomach.  75 mcg on 7th day., Disp: 90 Tablet, Rfl: 3  •  atorvastatin (LIPITOR) 80 MG tablet, Take 1 Tablet by mouth every evening., Disp: 90 Tablet, Rfl: 3  •  tizanidine (ZANAFLEX) 4 MG Tab, Take 1 Tablet by mouth every 6 hours as needed (muscle spasms)., Disp: 30 Tablet, Rfl: 2  •  EPINEPHrine (EPIPEN) 0.3 MG/0.3ML Solution Auto-injector solution for injection, Inject 0.3 mL into the thigh one time for 1 dose., Disp: 1 Each, Rfl: 0  •  albuterol 108 (90 Base) MCG/ACT Aero Soln inhalation aerosol, Inhale 2 Puffs every four hours as needed for Shortness of Breath., Disp: 1 Each, Rfl: 4  •  Continuous Blood Gluc Sensor ([employer] LUNA 14 DAY SENSOR) [facility] [place] [facility], 1 Application every 14 days., Disp: 6  Each, Rfl: 3  •  Dulaglutide (TRULICITY) 3 MG/0.5ML Solution Pen-injector, Inject 1 Dose under the skin every 7 days., Disp: 2 mL, Rfl: 11  •  montelukast (SINGULAIR) 10 MG Tab, Take 1 tablet by mouth every evening., Disp: 90 tablet, Rfl: 3  •  Canagliflozin (INVOKANA) 300 MG Tab, Take 1 tablet by mouth every day., Disp: 90 tablet, Rfl: 3  •  DILTIAZem CD (CARTIA XT) 180 MG CAPSULE SR 24 HR, Take 1 capsule by mouth every day., Disp: 90 capsule, Rfl: 3  •  tamsulosin (FLOMAX) 0.4 MG capsule, Take 1 capsule by mouth 1/2 hour after breakfast., Disp: 30 capsule, Rfl: 4  •  benazepril (LOTENSIN) 20 MG Tab, Take 1 Tab by mouth every day., Disp: 90 Tab, Rfl: 3  •  NON SPECIFIED, CPAP supplies through [facility], Disp: 1 Each, Rfl: 1  •  ALPHA LIPOIC ACID PO, Take 600 mg by mouth 2 Times a Day., Disp: , Rfl:   •  Omega-3 Fatty Acids (FISH OIL) 1200 MG CAPS, Take  by mouth., Disp: , Rfl:   •  Cinnamon 500 MG CAPS, Take 1,000 mg by mouth., Disp: , Rfl:   •  aspirin EC (ECOTRIN) 81 MG TBEC, Take 81 mg by mouth every day.  , Disp: , Rfl:   •  pantoprazole (PROTONIX) 40 MG TBEC, Take 40 mg by mouth every day.  , Disp: , Rfl:   •  Coenzyme Q10 (CO Q-10) 200 MG CAPS, Take  by mouth every day.  , Disp: , Rfl:   •  Cholecalciferol (VITAMIN D) 2000 UNIT TABS, Take  by mouth 2 Times a Day., Disp: , Rfl:   •  cetirizine (ZYRTEC) 10 MG TABS, Take 10 mg by mouth every day., Disp: , Rfl:   •  Dulaglutide (TRULICITY) 1.5 MG/0.5ML Solution Pen-injector, Inject 1.5 mL under the skin every 7 days. (Patient not taking: Reported on 11/16/2021), Disp: 12 Each, Rfl: 3    Social History:  Social History     Socioeconomic History   • Marital status:      Spouse name: Not on file   • Number of children: Not on file   • Years of education: Not on file   • Highest education level: Not on file   Occupational History   • Not on file   Tobacco Use   • Smoking status: Former Smoker     Packs/day: 0.00     Types: Cigars   • Smokeless  tobacco: Never Used   • Tobacco comment: occasional cigars   Vaping Use   • Vaping Use: Never used   Substance and Sexual Activity   • Alcohol use: Yes     Comment: rare - 1-2 per month (social)   • Drug use: Yes     Types: Marijuana     Comment: occ cigar smoking   • Sexual activity: Not on file     Comment: ; 2 biological kids (2 of his wife's); 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   Other Topics Concern   • Not on file   Social History Narrative   • Not on file     Social Determinants of Health     Financial Resource Strain:    • Difficulty of Paying Living Expenses: Not on file   Food Insecurity:    • Worried About Running Out of Food in the Last Year: Not on file   • Ran Out of Food in the Last Year: Not on file   Transportation Needs:    • Lack of Transportation (Medical): Not on file   • Lack of Transportation (Non-Medical): Not on file   Physical Activity:    • Days of Exercise per Week: Not on file   • Minutes of Exercise per Session: Not on file   Stress:    • Feeling of Stress : Not on file   Social Connections:    • Frequency of Communication with Friends and Family: Not on file   • Frequency of Social Gatherings with Friends and Family: Not on file   • Attends [employer] Services: Not on file   • Active Member of Clubs or Organizations: Not on file   • Attends Club or Organization Meetings: Not on file   • Marital Status: Not on file   Intimate Partner Violence:    • Fear of Current or Ex-Partner: Not on file   • Emotionally Abused: Not on file   • Physically Abused: Not on file   • Sexually Abused: Not on file   Housing Stability:    • Unable to Pay for Housing in the Last Year: Not on file   • Number of Places Lived in the Last Year: Not on file   • Unstable Housing in the Last Year: Not on file        Family History:   Family History   Problem Relation Age of Onset   • Breast Cancer Mother    • Hypertension Mother    • Cancer Mother         breast   • Heart Disease Mother         hx  "of bypass   • Hypertension Father    • Arthritis Father    • Diabetes Father         prediabetes   • No Known Problems Sister    • Arthritis Brother    • Heart Disease Other    • Hypertension Other    • Cancer Other    • No Known Problems Brother        PHYSICAL EXAM:   Vital signs: /66 (BP Location: Left arm, Patient Position: Sitting, BP Cuff Size: Adult)   Pulse (!) 104   Resp 16   Ht 1.753 m (5' 9\")   Wt 78.4 kg (172 lb 14.4 oz)   SpO2 96%   BMI 25.53 kg/m²   GENERAL: Well-developed, well-nourished  in no apparent distress.   EYE: No ocular and eyelid asymmetry, Anicteric sclerae,  PERRL, No exophthalmos or lidlag  HENT: Hearing grossly intact, Normocephalic, atraumatic. Pink, moist mucous membranes, No exudate  NECK: Supple. Trachea midline.   CARDIOVASCULAR: Regular rate and rhythm. No murmurs, rubs, or gallops.   LUNGS: Clear to auscultation bilaterally   ABDOMEN: Soft, nontender with positive bowel sounds.   EXTREMITIES: No clubbing, cyanosis, or edema.   NEUROLOGICAL: Cranial nerves II-XII are grossly intact   Symmetric reflexes at the patella no proximal muscle weakness, No visible tremor with both outstretched hands  LYMPH: No cervical, supraclavicular,  adenopathy palpated.   SKIN: No rashes, lesions. Turgor is normal.  FOOT: Normal sensation to monofilament testing, normal pulses, no ulcers.  Normal Vibration quantitative sensation test.  Left foot with +1-+2 swelling to the lateral side, nonpitting, no erythema, no tenderness to palpation.    ASSESSMENT/PLAN:   1. Type 2 diabetes mellitus without complication, without long-term current use of insulin (HCC) continue diabetes regimen:  Stable.  Continue diabetes regimen:  Trulicity 3 mg weekly  Invokana 300 mg daily    Freestyle rubia 14-day system.  Serum daily glucose values will continue to decrease with the continued use of Invokana and Trulicity together.  Dilated eye exam is up-to-date.  Daily foot inspections are always recommended.  "   Recommend 2-3   Recommend continued balanced meal planning such as [email or web address].  Recommend 150 minutes of exercise each week.    2. Hypothyroidism, unspecified type  Stable.  Continue taking levothyroxine 50 mcg x 6 days and 75 mcg on Sunday.    3. Hyperlipidemia associated with type 2 diabetes mellitus (HCC)  Stable.  Continue taking atorvastatin 80 mg daily.    4. Vitamin D deficiency  Stable.  Continue taking vitamin D 2000 IU daily.    Complete point-of-care A1c at next appointment.  Next appointment in 4 months.    Thank you kindly for allowing me to participate in the diabetes care plan for this patient.    Danelle Mi, APRN  11/16/2021    CC:   NIRANJAN Butts

## 2021-11-22 ENCOUNTER — OFFICE VISIT (OUTPATIENT)
Dept: NEUROLOGY | Facility: MEDICAL CENTER | Age: 61
End: 2021-11-22
Attending: PSYCHIATRY & NEUROLOGY
Payer: COMMERCIAL

## 2021-11-22 VITALS
DIASTOLIC BLOOD PRESSURE: 76 MMHG | SYSTOLIC BLOOD PRESSURE: 132 MMHG | TEMPERATURE: 98 F | BODY MASS INDEX: 25.89 KG/M2 | OXYGEN SATURATION: 98 % | WEIGHT: 174.82 LBS | HEART RATE: 75 BPM | RESPIRATION RATE: 12 BRPM | HEIGHT: 69 IN

## 2021-11-22 DIAGNOSIS — E08.44 DIABETIC AMYOTROPHY ASSOCIATED WITH DIABETES MELLITUS DUE TO UNDERLYING CONDITION (HCC): ICD-10-CM

## 2021-11-22 DIAGNOSIS — M51.36 DEGENERATIVE DISC DISEASE, LUMBAR: ICD-10-CM

## 2021-11-22 PROCEDURE — 99214 OFFICE O/P EST MOD 30 MIN: CPT | Performed by: PSYCHIATRY & NEUROLOGY

## 2021-11-22 PROCEDURE — 99212 OFFICE O/P EST SF 10 MIN: CPT | Performed by: PSYCHIATRY & NEUROLOGY

## 2021-11-22 ASSESSMENT — FIBROSIS 4 INDEX: FIB4 SCORE: 0.69

## 2021-11-22 NOTE — PROGRESS NOTES
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 male presents today for weakness f/u.   History is obtained from patient.  and Patient is accompanied by wife Anastasia.    Duration/timing: As below  Context: Diabetic Amyotrophy/Weakness: in 2020 he was losing weight (15 lbs loss) and saw his endo and they thought he was overmedicating with DM meds. They adjusted the medications stabilizing with weight. Then in 7/2020, he got out of bed and his back pinch (lower back, sharp pain, mid). Next couple of days the back got tighter and tighter. The pain then progressed down the left leg radiating down lateral. MRI L spine was performed and EMG was subsequently ordered. EMG was suggestive to be plexopathy. There was significant groin pain at that time. There was worsening weakness in the left leg proximally in 8/2020. There was no numbness, there was just pain. Now (as of 4/2021), he is starting to have tingling on the top of the feet > bottom and now having stabbing pain in the lateral 2 toes that would come and go. Occasional right lateral buttocks pain from the back. +Weakness with right proximal leg.   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      Weakness: he is working on balance and leg strength.  The right foot tingling is intermittent and in the same distribution as the left foot.  He denies any new weakness or new symptoms.  He continues to get benefit from physical therapy.    He used a walking stick once and felt more stable.    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 Valverde   • Sleep apnea     has CPAP   • Snoring        Past surgical history:   Past Surgical History:   Procedure Laterality Date   • SHOULDER DECOMPRESSION ARTHROSCOPIC Left 1/4/2018    Procedure: SHOULDER DECOMPRESSION ARTHROSCOPIC - SUBACROMIAL;  Surgeon: Linwood Grover M.D.;  Location: [facility];  Service: Orthopedics   • SHOULDER ARTHROSCOPY W/ BICIPITAL TENODESIS REPAIR Left 1/4/2018    Procedure: SHOULDER ARTHROSCOPY W/ BICIPITAL Tenotomy REPAIR;  Surgeon: Linwood Grover M.D.;  Location: [facility];  Service: Orthopedics   • CLAVICLE DISTAL EXCISION Left 1/4/2018    Procedure: CLAVICLE DISTAL EXCISION - POSSIBLE;  Surgeon: Linwood Grover M.D.;  Location: [facility];  Service: Orthopedics   • RECOVERY  4/8/2016    Procedure: CATH LAB [facility] WITH POSSIBLE NAVIN;  Surgeon: Recoveryonly Surgery;  Location: SURG[place] PRE-[place] PROC UNIT [facility] [place];  Service:    • VENTRAL HERNIA REPAIR LAPAROSCOPIC  11/1/2012    Performed by Marcos Ramírez M.D. at [facility]   • KNEE ARTHROSCOPY  1990's    right   • SHOULDER ARTHROSCOPY  1990's    right   • SINUSOTOMIES  1990's    times two surgeries   • TUMOR EXCISION WITH BIOPSY  1990's    right hand - thumb       Family  history:   Family History   Problem Relation Age of Onset   • Breast Cancer Mother    • Hypertension Mother    • Cancer Mother         breast   • Heart Disease Mother         hx of bypass   • Hypertension Father    • Arthritis Father    • Diabetes Father         prediabetes   • No Known Problems Sister    • Arthritis Brother    • Heart Disease Other    • Hypertension Other    • Cancer Other    • No Known Problems Brother        Social history:   Tobacco Use   • Smoking status: Light Tobacco Smoker     Packs/day: 0.00     Types: Cigars   • Smokeless tobacco: Never Used   • Tobacco comment: occasional cigars   Vaping Use   • Vaping Use: Never used   Substance and Sexual Activity   • Alcohol use: Yes     Comment: rare - 1-2 per month (social)   • Drug use: Yes     Types: Marijuana     Comment: occ cigar smoking       Current medications:   Current Outpatient Medications   Medication   • albuterol 108 (90 Base) MCG/ACT Aero Soln inhalation aerosol   • fluticasone-salmeterol (ADVAIR DISKUS) 500-50 MCG/DOSE AEROSOL POWDER, BREATH ACTIVATED   • finasteride (PROSCAR) 5 MG Tab   • levothyroxine (SYNTHROID) 75 MCG Tab   • levothyroxine (SYNTHROID) 50 MCG Tab   • atorvastatin (LIPITOR) 80 MG tablet   • tizanidine (ZANAFLEX) 4 MG Tab   • EPINEPHrine (EPIPEN) 0.3 MG/0.3ML Solution Auto-injector solution for injection   • albuterol 108 (90 Base) MCG/ACT Aero Soln inhalation aerosol   • Continuous Blood Gluc Sensor (FREESTYLE LUNA 14 DAY SENSOR) Misc   • Dulaglutide (TRULICITY) 3 MG/0.5ML Solution Pen-injector   • montelukast (SINGULAIR) 10 MG Tab   • Canagliflozin (INVOKANA) 300 MG Tab   • DILTIAZem CD (CARTIA XT) 180 MG CAPSULE SR 24 HR   • tamsulosin (FLOMAX) 0.4 MG capsule   • benazepril (LOTENSIN) 20 MG Tab   • NON SPECIFIED   • ALPHA LIPOIC ACID PO   • Omega-3 Fatty Acids (FISH OIL) 1200 MG CAPS   • Cinnamon 500 MG CAPS   • aspirin EC (ECOTRIN) 81 MG TBEC   • pantoprazole (PROTONIX) 40 MG TBEC   • Coenzyme Q10 (CO Q-10) 200  "MG CAPS   • Cholecalciferol (VITAMIN D) 2000 UNIT TABS   • cetirizine (ZYRTEC) 10 MG TABS     No current facility-administered medications for this visit.       Medication Allergy:  Allergies   Allergen Reactions   • Kiwi Extract Anaphylaxis   • Shellfish Allergy Anaphylaxis   • Strawberry Anaphylaxis   • Ozempic (0.25 Or 0.5 Mg-Dose) [Semaglutide] Unspecified     Pt does not recall any reaction   • Sulfa Drugs Rash and Unspecified   • Testosterone Rash       ROS per HPI    Physical examination:   Vitals:    11/22/21 1343   BP: 132/76   BP Location: Right arm   Patient Position: Sitting   BP Cuff Size: Adult   Pulse: 75   Resp: 12   Temp: 36.7 °C (98 °F)   TempSrc: Temporal   SpO2: 98%   Weight: 79.3 kg (174 lb 13.2 oz)   Height: 1.753 m (5' 9\")     General: Patient in well nourished in no apparent distress.  HENT: Normocephalic, atraumatic  Respiratory: Normal respiratory effort.   Psychiatric: Pleasant.     NEUROLOGICAL EXAM:   Mental status: Awake, alert and fully oriented to person, place, time and situation. Normal attention, concentration and fund of knowledge for education level.   Speech and language: Speech is fluent without errors and clear.  Cranial nerve exam: Prior exam  II: Pupils are equally round and reactive to light. Visual fields are intact by confrontation.  III, IV, VI: EOMI, no diplopia, no ptosis.  V: Sensation to light touch is normal over V1-3 distributions bilaterally.  .  VII: Facial movements are symmetrical. There is no facial droop. .  VIII: Hearing intact to soft speech and finger rub bilaterally  IX: Palate elevates symmetrically, uvula is midline. Dysarthria is not present.  XI: Shoulder shrug are symmetrical and strong.   XII: Tongue protrudes midline.    Motor exam:  Muscle tone is normal in all 4 limbs. and No abnormal movements appreciated.    Muscle strength: UE prior exam as below, LE exam repeated " today     Right  Left  Deltoid   5/5  5/5      Biceps   5/5  5/5  Triceps  5/5  5/5   Wrist extensors 5/5  5/5  Wrist flexors  5/5  5/5     5/5  5/5  Interossei  5/5  5/5  Thenar (APB)  NT/5  NT/5   Hip flexors  5/5  5/5  Quadriceps  5/5  5/5 (stable compared to prior)   Adduction  5/5  5/5  Abduction  5/5  5/5   Hamstrings  5/5  5/5  Dorsiflexors  5/5  5/5  Plantarflexors  5/5  5/5  Toe extension  5/5  5/5  Foot inversion  5/5  5/5  Foot eversion  5/5  5/5  NT = not tested    Sensory exam: Prior  Intact to Light touch in bilateral upper and lower extremity including entire LLE. Vibration intact.     Reflexes:  Prior     Right  Left  Biceps   1/4 1/4  Triceps  1/4 1/4  Brachioradialis 1/4 1/4  Knee jerk  2++/4  2/4  Ankle jerk  1/4 1/4   bilateral toes are downgoing to plantar stimulation.    Coordination: shows a normal finger-nose-finger and heel to shin bilaterally. Prior   Gait: Heel walk is normal., Toe walk is normal. and He has a mild limp on the left - stable      ANCILLARY DATA REVIEWED:   Lab Data Review:  Lab Results   Component Value Date/Time    WBC 9.7 06/04/2020 02:18 PM    RBC 6.24 (H) 06/04/2020 02:18 PM    HEMOGLOBIN 18.5 (H) 06/04/2020 02:18 PM    HEMATOCRIT 54.6 (H) 06/04/2020 02:18 PM    MCV 87.5 06/04/2020 02:18 PM    MCH 29.6 06/04/2020 02:18 PM    MCHC 33.9 06/04/2020 02:18 PM    MPV 10.7 06/04/2020 02:18 PM    NEUTSPOLYS 67.60 06/04/2020 02:18 PM    LYMPHOCYTES 17.60 (L) 06/04/2020 02:18 PM    MONOCYTES 9.80 06/04/2020 02:18 PM    EOSINOPHILS 3.50 06/04/2020 02:18 PM    BASOPHILS 1.20 06/04/2020 02:18 PM    HYPOCHROMIA 1+ 08/27/2013 07:13 AM      Lab Results   Component Value Date/Time    SODIUM 135 06/09/2021 01:13 PM    POTASSIUM 3.6 06/09/2021 01:13 PM    CHLORIDE 100 06/09/2021 01:13 PM    CO2 24 06/09/2021 01:13 PM    GLUCOSE 113 (H) 06/09/2021 01:13 PM    BUN 12 06/09/2021 01:13 PM    CREATININE 0.74 06/09/2021 01:13 PM    CREATININE 1.1 07/10/2008 09:25 AM     Lab Results    Component Value Date/Time    ASTSGOT 18 06/09/2021 1313    ALTSGPT 23 06/09/2021 1313    ALKPHOSPHAT 89 06/09/2021 1313    ALBUMIN 4.7 06/09/2021 1313     Lab Results   Component Value Date/Time    HBA1C 7.2 (A) 11/16/2021 02:36 PM      A1C: 7.4> 7.7> 7.7> 7.1    Imaging:   MRI L spine without 7/2021:  1.  L4-5 annular disc bulge with a central disc protrusion and bilateral facet degeneration. There is borderline central canal narrowing. There is moderate mild right neural foraminal narrowing.  2.  L3-4 annular disc bulge with a left lateral disc protrusion. There is moderate to severe left and mild right neural foraminal narrowing again seen.  3.  L5-S1 degenerative disc disease and facet degeneration results in moderate bilateral neuroforaminal narrowing.    Records reviewed: Francia Blandon note reviewed dated 11/20/2021.  She highly recommended interventional epidural steroid injection.      ASSESSMENT AND PLAN:    1. Weakness of both lower extremities: He had a prior MRI of the lumbar spine in August 2020 with evidence of L3-5 degenerative changes primarily on the left, this is represented again on MRI of the lumbar spine performed in July 2021.  Most notable for moderate to severe left L3-4 neuroforaminal narrowing without evidence of lumbar spinal stenosis.  EMG in May 2021 revealed primarily involvement of the left vastus medialis and lateralis with sparing of the adductors which suggest focal neuropathy. A lumbosacral radiculopathy can be superimposed. Findings in the left lower extremity were mostly chronic changes with a possible contribution from the left neuro foraminal stenosis - though again the adductor was spared.  There is no evidence of weakness of the hip girdle muscles to suggest superimposed etiology like muscular dystrophy. Sensory disturbance is inconsistent with his proximal weakness.   -Continue physical therapy  -Discussed red flag symptoms and focal exam findings suggest progressive  degenerative disease of the lumbar spine which may be contributing to his sensory disturbance.  We had an extensive discussion today with regards to further management options including but not limited to continuing PT.  Patient would like most conservative therapy has possible based on his symptoms today.  We discussed other treatment options including but not limited to epidural steroid injections which he is also discussed with Dr. Blandon and does not want to pursue.  -Neurosurgery referral if progressive symptoms  -Walking stick for stability    2. 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  There was involvement of both the obturator nerve and femoral nerve though it seemed predominantly femoral nerve.  A superimposed lumbar spine radiculopathy is also suspected which may account for her ongoing mild denervation changes though on clinical exam he is very strong.  Although NCS was difficult to perform on this patient and some responses were unobtainable, I am not entirely convinced that there is diabetic amyotrophy affecting the right lower extremity.    -Physical therapy as above    3. Sensory disturbance: Possibly secondary to radiculopathy given the distribution versus early peripheral neuropathy.  His sural sensory responses in May 2021 are lower than expected for age.  Again patient has discussed this with Dr. Blandon and is declining interventional therapy.    FOLLOW-UP: Return in about 6 months (around 5/22/2022) for to one year.  For clinical monitoring   EDUCATION AND COUNSELING:  -I personally discussed the following with the patient:   medical reasoning as above    This dictation was created using voice recognition software. I have made every reasonable attempt to avoid dictation errors, but this document may contain an error not  identified before finalizing. If the error changes the accuracy of the document, I would appreciate it being brought to my attention. Thank you.      Yenny Ogden MD  Neurology

## 2021-12-15 DIAGNOSIS — J45.20 MILD INTERMITTENT ASTHMA WITHOUT COMPLICATION: ICD-10-CM

## 2021-12-15 RX ORDER — ALBUTEROL SULFATE 90 UG/1
2 AEROSOL, METERED RESPIRATORY (INHALATION) EVERY 4 HOURS PRN
Qty: 18 G | Refills: 5 | Status: SHIPPED | OUTPATIENT
Start: 2021-12-15 | End: 2022-05-02

## 2021-12-15 NOTE — TELEPHONE ENCOUNTER
Regarding: [employer] Letter   I forgot to ask if you could also ask Ruthie requesting Ventolin versus Albuterol. She was going to try and get that one approved also as the insurance recently made that change also and I respond to the Ventolin better than the Albuterol.    Thanks in Advance

## 2021-12-19 DIAGNOSIS — M54.16 LEFT LUMBAR RADICULOPATHY: ICD-10-CM

## 2022-01-04 DIAGNOSIS — Z79.4 TYPE 2 DIABETES MELLITUS WITHOUT COMPLICATION, WITH LONG-TERM CURRENT USE OF INSULIN (HCC): ICD-10-CM

## 2022-01-04 DIAGNOSIS — I10 BENIGN ESSENTIAL HTN: ICD-10-CM

## 2022-01-04 DIAGNOSIS — E11.9 TYPE 2 DIABETES MELLITUS WITHOUT COMPLICATION, WITH LONG-TERM CURRENT USE OF INSULIN (HCC): ICD-10-CM

## 2022-01-04 RX ORDER — METFORMIN HYDROCHLORIDE 500 MG/1
1000 TABLET, EXTENDED RELEASE ORAL 2 TIMES DAILY
Qty: 120 TABLET | Refills: 6 | Status: SHIPPED | OUTPATIENT
Start: 2022-01-04 | End: 2022-01-14

## 2022-01-04 RX ORDER — BENAZEPRIL HYDROCHLORIDE 20 MG/1
TABLET ORAL
Qty: 90 TABLET | Refills: 3 | Status: SHIPPED | OUTPATIENT
Start: 2022-01-04 | End: 2022-12-07

## 2022-01-04 NOTE — TELEPHONE ENCOUNTER
Received request via: Pharmacy    Was the patient seen in the last year in this department? Yes  10/4/21  Does the patient have an active prescription (recently filled or refills available) for medication(s) requested? No

## 2022-01-18 ENCOUNTER — OFFICE VISIT (OUTPATIENT)
Dept: PHYSICAL MEDICINE AND REHAB | Facility: MEDICAL CENTER | Age: 62
End: 2022-01-18
Payer: COMMERCIAL

## 2022-01-18 VITALS
BODY MASS INDEX: 25.27 KG/M2 | SYSTOLIC BLOOD PRESSURE: 130 MMHG | HEIGHT: 69 IN | DIASTOLIC BLOOD PRESSURE: 80 MMHG | OXYGEN SATURATION: 94 % | HEART RATE: 70 BPM | RESPIRATION RATE: 18 BRPM | TEMPERATURE: 98.6 F | WEIGHT: 170.64 LBS

## 2022-01-18 DIAGNOSIS — G89.29 CHRONIC LEFT-SIDED LOW BACK PAIN WITH LEFT-SIDED SCIATICA: ICD-10-CM

## 2022-01-18 DIAGNOSIS — M54.42 CHRONIC LEFT-SIDED LOW BACK PAIN WITH LEFT-SIDED SCIATICA: ICD-10-CM

## 2022-01-18 DIAGNOSIS — M54.16 LUMBAR RADICULOPATHY: ICD-10-CM

## 2022-01-18 DIAGNOSIS — M47.816 LUMBAR SPONDYLOSIS: ICD-10-CM

## 2022-01-18 PROCEDURE — 99215 OFFICE O/P EST HI 40 MIN: CPT | Performed by: PHYSICAL MEDICINE & REHABILITATION

## 2022-01-18 ASSESSMENT — FIBROSIS 4 INDEX: FIB4 SCORE: 0.69

## 2022-01-18 NOTE — PROGRESS NOTES
New patient note (the patient is new to me was previously seen by Dr. Blandon who is in our group)    Interventional spine and Pain  Physiatry (physical medicine and  Rehabilitation)     Date of service: See epic    Chief complaint:   Chief Complaint   Patient presents with   • New Patient     back pain         Referring provider: Myah Blandon D.O.     HISTORY    HPI: Barron Hewitt 61 y.o.  who presents today with Diagnoses of Lumbar radiculopathy, Lumbar spondylosis, and Chronic left-sided low back pain with left-sided sciatica were pertinent to this visit.    HPI    He reported acute onset back pain and a pop on July 2, 2021. This caused severe pain. Bilateral axial low back pain radiating down the left posterior thigh with associated pins and needles. Intermittent severe 10/10 pain. This can be worse with sitting, standing or walking. Associated with spasms. He has difficulty with ADLs including walking, bending, lifting, lower body dressing.     He saw a physician at [facility]. He also saw a Dr Kennedy Adames.     Medications tried include zanaflex, nsaids, tramadol, norco       Medical records review:  I reviewed the notes from Dr. Myah Blandon including the most recent note from 11/2/2021.  Concern for muscle weakness and muscle wasting.  This is thought to be secondary to a left lumbar radiculitis.  The patient also does have diabetes and neuropathy.          ROS:   Positive for asthma but no current symptoms  Red Flags ROS:   Fever, Chills, Sweats: Denies  Involuntary Weight Loss: Denies  Bladder Incontinence: Denies  Bowel Incontinence: denies  Saddle Anesthesia: Denies    All other systems reviewed and negative.       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 Valverde   • Sleep apnea     has CPAP   • Snoring          Current Outpatient Medications on File Prior to Visit   Medication Sig Dispense Refill   • benazepril (LOTENSIN) 20 MG Tab TAKE 1 TABLET DAILY 90 Tablet 3   • albuterol (VENTOLIN HFA) 108 (90 Base) MCG/ACT Aero Soln inhalation aerosol Inhale 2 Puffs every four hours as needed. FOR SHORTNESS OF BREATH 18 g 5   • albuterol 108 (90 Base) MCG/ACT Aero Soln inhalation aerosol Inhale 2 Puffs every four hours as needed for Shortness of Breath. Pt prefers ventolin if possible. Thank you 3 Each 3   • fluticasone-salmeterol (ADVAIR DISKUS) 500-50 MCG/DOSE AEROSOL POWDER, BREATH ACTIVATED Inhale 1 Puff every 12 hours. 3 Each 3   • finasteride (PROSCAR) 5 MG Tab Take 1 Tablet by mouth every day. 90 Tablet 3   • levothyroxine (SYNTHROID) 75 MCG Tab Take 1 Tablet by mouth every morning on an empty stomach. Taking only one d per week.  50 mcg all other days. 12 Tablet 3   • levothyroxine (SYNTHROID) 50 MCG Tab Take one pill 6 days per week on empty stomach.  75 mcg on 7th day. 90 Tablet 3   • atorvastatin (LIPITOR) 80 MG tablet Take 1 Tablet by mouth every evening. 90 Tablet 3   • tizanidine (ZANAFLEX) 4 MG Tab Take 1 Tablet by mouth every 6 hours as needed (muscle spasms). 30 Tablet 2   • EPINEPHrine (EPIPEN) 0.3 MG/0.3ML Solution Auto-injector solution for injection Inject 0.3 mL into the thigh one time for 1 dose. 1 Each 0   • albuterol 108 (90 Base) MCG/ACT Aero Soln inhalation aerosol Inhale 2 Puffs every four hours as needed for Shortness of Breath. 1 Each 4   • Continuous Blood Gluc Sensor (FREESTYLE LUNA 14 DAY SENSOR) Misc 1 Application every 14 days. 6 Each 3   • Dulaglutide (TRULICITY) 3 MG/0.5ML Solution Pen-injector Inject 1 Dose under the skin every 7 days. 2 mL 11   • montelukast (SINGULAIR)  10 MG Tab Take 1 tablet by mouth every evening. 90 tablet 3   • Canagliflozin (INVOKANA) 300 MG Tab Take 1 tablet by mouth every day. 90 tablet 3   • DILTIAZem CD (CARTIA XT) 180 MG CAPSULE SR 24 HR Take 1 capsule by mouth every day. 90 capsule 3   • tamsulosin (FLOMAX) 0.4 MG capsule Take 1 capsule by mouth 1/2 hour after breakfast. 30 capsule 4   • NON SPECIFIED CPAP supplies through [facility] 1 Each 1   • ALPHA LIPOIC ACID PO Take 600 mg by mouth 2 Times a Day.     • Omega-3 Fatty Acids (FISH OIL) 1200 MG CAPS Take  by mouth.     • Cinnamon 500 MG CAPS Take 1,000 mg by mouth.     • aspirin EC (ECOTRIN) 81 MG TBEC Take 81 mg by mouth every day.       • pantoprazole (PROTONIX) 40 MG TBEC Take 40 mg by mouth every day.       • Coenzyme Q10 (CO Q-10) 200 MG CAPS Take  by mouth every day.       • Cholecalciferol (VITAMIN D) 2000 UNIT TABS Take  by mouth 2 Times a Day.     • cetirizine (ZYRTEC) 10 MG TABS Take 10 mg by mouth every day.       No current facility-administered medications on file prior to visit.        PSHx:   Past Surgical History:   Procedure Laterality Date   • SHOULDER DECOMPRESSION ARTHROSCOPIC Left 1/4/2018    Procedure: SHOULDER DECOMPRESSION ARTHROSCOPIC - SUBACROMIAL;  Surgeon: Linwood Grover M.D.;  Location: [facility];  Service: Orthopedics   • SHOULDER ARTHROSCOPY W/ BICIPITAL TENODESIS REPAIR Left 1/4/2018    Procedure: SHOULDER ARTHROSCOPY W/ BICIPITAL Tenotomy REPAIR;  Surgeon: Linwood Grover M.D.;  Location: [facility];  Service: Orthopedics   • CLAVICLE DISTAL EXCISION Left 1/4/2018    Procedure: CLAVICLE DISTAL EXCISION - POSSIBLE;  Surgeon: Linwood Grover M.D.;  Location: [facility];  Service: Orthopedics   • RECOVERY  4/8/2016    Procedure: CATH LAB [facility] WITH POSSIBLE NAVIN;  Surgeon: Recoveryonly Surgery;  Location: SURGERY PRE-POST PROC UNIT [facility];  Service:    • VENTRAL HERNIA REPAIR LAPAROSCOPIC  11/1/2012     Performed by Marcos Ramírez M.D. at SURGERY [facility]   • KNEE ARTHROSCOPY  1990's    right   • SHOULDER ARTHROSCOPY  1990's    right   • SINUSOTOMIES  1990's    times two surgeries   • TUMOR EXCISION WITH BIOPSY  1990's    right hand - thumb       Family history   Family History   Problem Relation Age of Onset   • Breast Cancer Mother    • Hypertension Mother    • Cancer Mother         breast   • Heart Disease Mother         hx of bypass   • Hypertension Father    • Arthritis Father    • Diabetes Father         prediabetes   • No Known Problems Sister    • Arthritis Brother    • Heart Disease Other    • Hypertension Other    • Cancer Other    • No Known Problems Brother          Medications: reviewed on epic.   Outpatient Medications Marked as Taking for the 1/18/22 encounter (Office Visit) with Dragan Ayala M.D.   Medication Sig Dispense Refill   • benazepril (LOTENSIN) 20 MG Tab TAKE 1 TABLET DAILY 90 Tablet 3   • albuterol (VENTOLIN HFA) 108 (90 Base) MCG/ACT Aero Soln inhalation aerosol Inhale 2 Puffs every four hours as needed. FOR SHORTNESS OF BREATH 18 g 5   • albuterol 108 (90 Base) MCG/ACT Aero Soln inhalation aerosol Inhale 2 Puffs every four hours as needed for Shortness of Breath. Pt prefers ventolin if possible. Thank you 3 Each 3   • fluticasone-salmeterol (ADVAIR DISKUS) 500-50 MCG/DOSE AEROSOL POWDER, BREATH ACTIVATED Inhale 1 Puff every 12 hours. 3 Each 3   • finasteride (PROSCAR) 5 MG Tab Take 1 Tablet by mouth every day. 90 Tablet 3   • levothyroxine (SYNTHROID) 75 MCG Tab Take 1 Tablet by mouth every morning on an empty stomach. Taking only one d per week.  50 mcg all other days. 12 Tablet 3   • levothyroxine (SYNTHROID) 50 MCG Tab Take one pill 6 days per week on empty stomach.  75 mcg on 7th day. 90 Tablet 3   • atorvastatin (LIPITOR) 80 MG tablet Take 1 Tablet by mouth every evening. 90 Tablet 3   • tizanidine (ZANAFLEX) 4 MG Tab Take 1 Tablet by mouth every 6 hours as needed  (muscle spasms). 30 Tablet 2   • EPINEPHrine (EPIPEN) 0.3 MG/0.3ML Solution Auto-injector solution for injection Inject 0.3 mL into the thigh one time for 1 dose. 1 Each 0   • albuterol 108 (90 Base) MCG/ACT Aero Soln inhalation aerosol Inhale 2 Puffs every four hours as needed for Shortness of Breath. 1 Each 4   • Continuous Blood Gluc Sensor (FREESTYLE LUNA 14 DAY SENSOR) Misc 1 Application every 14 days. 6 Each 3   • Dulaglutide (TRULICITY) 3 MG/0.5ML Solution Pen-injector Inject 1 Dose under the skin every 7 days. 2 mL 11   • montelukast (SINGULAIR) 10 MG Tab Take 1 tablet by mouth every evening. 90 tablet 3   • Canagliflozin (INVOKANA) 300 MG Tab Take 1 tablet by mouth every day. 90 tablet 3   • DILTIAZem CD (CARTIA XT) 180 MG CAPSULE SR 24 HR Take 1 capsule by mouth every day. 90 capsule 3   • tamsulosin (FLOMAX) 0.4 MG capsule Take 1 capsule by mouth 1/2 hour after breakfast. 30 capsule 4   • NON SPECIFIED CPAP supplies through [facility] 1 Each 1   • ALPHA LIPOIC ACID PO Take 600 mg by mouth 2 Times a Day.     • Omega-3 Fatty Acids (FISH OIL) 1200 MG CAPS Take  by mouth.     • Cinnamon 500 MG CAPS Take 1,000 mg by mouth.     • aspirin EC (ECOTRIN) 81 MG TBEC Take 81 mg by mouth every day.       • pantoprazole (PROTONIX) 40 MG TBEC Take 40 mg by mouth every day.       • Coenzyme Q10 (CO Q-10) 200 MG CAPS Take  by mouth every day.       • Cholecalciferol (VITAMIN D) 2000 UNIT TABS Take  by mouth 2 Times a Day.     • cetirizine (ZYRTEC) 10 MG TABS Take 10 mg by mouth every day.          Allergies:   Allergies   Allergen Reactions   • Kiwi Extract Anaphylaxis   • Shellfish Allergy Anaphylaxis   • Strawberry Anaphylaxis   • Ozempic (0.25 Or 0.5 Mg-Dose) [Semaglutide] Unspecified     Pt does not recall any reaction   • Sulfa Drugs Rash and Unspecified   • Testosterone Rash       Social Hx:   Social History     Socioeconomic History   • Marital status:      Spouse name: Not on file   • Number of  children: Not on file   • Years of education: Not on file   • Highest education level: Not on file   Occupational History   • Not on file   Tobacco Use   • Smoking status: Former Smoker     Packs/day: 0.00     Types: Cigars   • Smokeless tobacco: Never Used   • Tobacco comment: occasional cigars   Vaping Use   • Vaping Use: Never used   Substance and Sexual Activity   • Alcohol use: Yes     Comment: rare - 1-2 per month (social)   • Drug use: Yes     Types: Marijuana     Comment: occ cigar smoking   • Sexual activity: Not on file     Comment: ; 2 biological kids (2 of his wife's); 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   Other Topics Concern   • Not on file   Social History Narrative   • Not on file     Social Determinants of Health     Financial Resource Strain:    • Difficulty of Paying Living Expenses: Not on file   Food Insecurity:    • Worried About Running Out of Food in the Last Year: Not on file   • Ran Out of Food in the Last Year: Not on file   Transportation Needs:    • Lack of Transportation (Medical): Not on file   • Lack of Transportation (Non-Medical): Not on file   Physical Activity:    • Days of Exercise per Week: Not on file   • Minutes of Exercise per Session: Not on file   Stress:    • Feeling of Stress : Not on file   Social Connections:    • Frequency of Communication with Friends and Family: Not on file   • Frequency of Social Gatherings with Friends and Family: Not on file   • Attends Nondenominational Services: Not on file   • Active Member of Clubs or Organizations: Not on file   • Attends Club or Organization Meetings: Not on file   • Marital Status: Not on file   Intimate Partner Violence:    • Fear of Current or Ex-Partner: Not on file   • Emotionally Abused: Not on file   • Physically Abused: Not on file   • Sexually Abused: Not on file   Housing Stability:    • Unable to Pay for Housing in the Last Year: Not on file   • Number of Places Lived in the Last Year: Not on file   •  "Unstable Housing in the Last Year: Not on file         EXAMINATION     Physical Exam:   Vitals: /80 (BP Location: Right arm, Patient Position: Sitting, BP Cuff Size: Adult)   Pulse 70   Temp 37 °C (98.6 °F) (Temporal)   Resp 18   Ht 1.753 m (5' 9\")   Wt 77.4 kg (170 lb 10.2 oz)   SpO2 94%     Constitutional:   Body Habitus: Body mass index is 25.2 kg/m².  Cooperation: Fully cooperates with exam  Appearance: Well-groomed, well-nourished, not disheveled     Eyes: No scleral icterus to suggest severe liver disease, no proptosis to suggest severe hyperthyroid    ENT -no obvious auditory deficits, no obvious tongue lesions, tongue midline, no facial droop     Skin -no rashes or lesions noted     Respiratory-  breathing comfortable on room air, no audible wheezing    Cardiovascular- capillary refills less than 2 seconds.     Psychiatric- alert and oriented ×3. Normal affect.     Gait - normal gait, no use of ambulatory device, nonantalgic.     Musculoskeletal and Neuro -         Thoracic/Lumbar Spine/Sacral Spine/Hips   Inspection: Atrophy of the left thigh    ROM: decreased active range of motion with flexion, lateral flexion, and rotation bilaterally.   There is decreased active range of motion with lumbar extension with pain.    There is pain with facet loading maneuver (extension rotation) with axial low back pain on the BILATERAL side(s)    Palpation:   No tenderness to palpation in midline at T1-T12 levels. No tenderness to palpation in the left and right of the midline T1-L5, NEGATIVE for tenderness to palpation to the para-midline region in the lower lumbar levels.  palpation over SI joint: negative bilaterally    palpation in hip or over the gluteus medius tendon insertion: negative bilaterally      Lumbar spine Special tests  Neuro tension  Straight leg test negative right, positive left    Slump test negative right, positive left            Key points for the international standards for neurological " classification of spinal cord injury (ISNCSCI) to light touch.     Dermatome R L                                      L2 2 2   L3 2 2   L4 2 2   L5 2 2   S1 2 2   S2 2 2       Motor Exam Lower Extremities    ? Myotome R L   Hip flexion L2 5 5-   Knee extension L3 5 5-   Ankle dorsiflexion L4 5 5   Toe extension L5 5 5   Ankle plantarflexion S1 5 5         Reflexes      Babinski sign negative bilaterally   Clonus of the ankle negative bilaterally       MEDICAL DECISION MAKING    Medical records review: see under HPI section.     DATA    Labs:   Lab Results   Component Value Date/Time    SODIUM 135 06/09/2021 01:13 PM    POTASSIUM 3.6 06/09/2021 01:13 PM    CHLORIDE 100 06/09/2021 01:13 PM    CO2 24 06/09/2021 01:13 PM    ANION 11.0 06/09/2021 01:13 PM    GLUCOSE 113 (H) 06/09/2021 01:13 PM    BUN 12 06/09/2021 01:13 PM    CREATININE 0.74 06/09/2021 01:13 PM    CREATININE 1.1 07/10/2008 09:25 AM    CALCIUM 9.6 06/09/2021 01:13 PM    ASTSGOT 18 06/09/2021 01:13 PM    ALTSGPT 23 06/09/2021 01:13 PM    TBILIRUBIN 0.8 06/09/2021 01:13 PM    ALBUMIN 4.7 06/09/2021 01:13 PM    TOTPROTEIN 7.3 06/09/2021 01:13 PM    GLOBULIN 2.6 06/09/2021 01:13 PM    AGRATIO 1.8 06/09/2021 01:13 PM   ]    Lab Results   Component Value Date/Time    PROTHROMBTM 13.3 12/22/2017 09:26 AM    INR 1.04 12/22/2017 09:26 AM        Lab Results   Component Value Date/Time    WBC 9.7 06/04/2020 02:18 PM    RBC 6.24 (H) 06/04/2020 02:18 PM    HEMOGLOBIN 18.5 (H) 06/04/2020 02:18 PM    HEMATOCRIT 54.6 (H) 06/04/2020 02:18 PM    MCV 87.5 06/04/2020 02:18 PM    MCH 29.6 06/04/2020 02:18 PM    MCHC 33.9 06/04/2020 02:18 PM    MPV 10.7 06/04/2020 02:18 PM    NEUTSPOLYS 67.60 06/04/2020 02:18 PM    LYMPHOCYTES 17.60 (L) 06/04/2020 02:18 PM    MONOCYTES 9.80 06/04/2020 02:18 PM    EOSINOPHILS 3.50 06/04/2020 02:18 PM    BASOPHILS 1.20 06/04/2020 02:18 PM    HYPOCHROMIA 1+ 08/27/2013 07:13 AM        Lab Results   Component Value Date/Time    HBA1C 7.2 (A)  11/16/2021 02:36 PM        Imaging:   I personally reviewed following images, these are my reads  MRI lumbar spine 7/27/2021 with moderate left neuroforaminal stenosis at L3-4 and L4-5 with mild right neuroforaminal stenosis at L3-4 and L4-5.  Mild to moderate bilateral neuroforaminal stenosis at L5-S1.  There is facet arthropathy bilaterally at L3-4, L4-5, L5-S1        IMAGING radiology reads. I reviewed the following radiology reads                      Results for orders placed during the hospital encounter of 07/27/21    MR-LUMBAR SPINE-W/O    Impression  1.  L4-5 annular disc bulge with a central disc protrusion and bilateral facet degeneration. There is borderline central canal narrowing. There is moderate mild right neural foraminal narrowing.    2.  L3-4 annular disc bulge with a left lateral disc protrusion. There is moderate to severe left and mild right neural foraminal narrowing again seen.    3.  L5-S1 degenerative disc disease and facet degeneration results in moderate bilateral neuroforaminal narrowing.        Results for orders placed during the hospital encounter of 07/27/21    MR-LUMBAR SPINE-W/O    Impression  1.  L4-5 annular disc bulge with a central disc protrusion and bilateral facet degeneration. There is borderline central canal narrowing. There is moderate mild right neural foraminal narrowing.    2.  L3-4 annular disc bulge with a left lateral disc protrusion. There is moderate to severe left and mild right neural foraminal narrowing again seen.    3.  L5-S1 degenerative disc disease and facet degeneration results in moderate bilateral neuroforaminal narrowing.      Results for orders placed during the hospital encounter of 07/27/21    MR-MRA NECK-W/O    Impression  There is no significant stenosis in the visualized extracranial neck arteries.                                      Results for orders placed during the hospital encounter of 12/22/17    DX-CHEST-2 VIEWS    Impression  Negative  two views of the chest.      Electrodiagnostics   1/18/2022 EMG  IMPRESSION:  This is an abnormal electrodiagnostic study due to evidence of chronic reinnervation changes in the left vastus lateralis/medialis with mild active denervation changes noted.  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       There is no strong electrodiagnostic evidence of a an active right lumbosacral plexopathy though given some responses are unobtainable a mild lumbar plexopathy/femoral neuropathy may be present.  There is no EMG evidence of a right lumbosacral radiculopathy.                                             Diagnosis   Visit Diagnoses     ICD-10-CM   1. Lumbar radiculopathy  M54.16   2. Lumbar spondylosis  M47.816   3. Chronic left-sided low back pain with left-sided sciatica  M54.42    G89.29           ASSESSMENT AND PLAN:  Barron Sepulveda Kash 61 y.o. male      Barron was seen today for new patient.    Diagnoses and all orders for this visit:    Lumbar radiculopathy  -     Referral to Physical Medicine Rehab    Lumbar spondylosis    Chronic left-sided low back pain with left-sided sciatica        The patient has imaging and physical exam consistent with a left lower lumbar radiculopathy superimposed on facet mediated pain as well.  He has failed multiple conservative treatments including home exercise program medication management.       Diagnostic workup: Procedure below for diagnostic and therapeutic purposes    Medications: For now it is reasonable to continue Zanaflex as needed    Interventional program:   I have ordered a left L3-4 and L4-5 transforaminal epidural steroid injection    The risks benefits and alternatives to this procedure were discussed and the patient wishes to proceed with the procedure. Risks include but are not limited to  damage to surrounding structures, infection, bleeding, worsening of pain which can be permanent, weakness which can be permanent. Benefits include pain relief, improved function. Alternatives includes not doing the procedure.      If he has improvement of the radicular component of his pain but still has axial back pain I would consider him for medial branch blocks      Outside records requested:  The patient signed outside records request form for his outside records including outside images. This includes the records from [facility].       Follow-up: After the above diagnostic studies     Total time spent on the day of the encounter: 43 minutes.  This includes counseling, care coordination, medical records review.          Please note that this dictation was created using voice recognition software. I have made every reasonable attempt to correct obvious errors but there may be errors of grammar and content that I may have overlooked prior to finalization of this note.      Dragan Ayala MD  Physical Medicine and Rehabilitation  Interventional Spine and Sports Physiatry  [facility]           CC Myah Blandon D.O.   CC NIRANJAN Butts   CC Yenny Ogden MD

## 2022-01-18 NOTE — H&P (VIEW-ONLY)
New patient note (the patient is new to me was previously seen by Dr. Blandon who is in our group)    Interventional spine and Pain  Physiatry (physical medicine and  Rehabilitation)     Date of service: See epic    Chief complaint:   Chief Complaint   Patient presents with   • New Patient     back pain         Referring provider: Myah Blandon D.O.     HISTORY    HPI: Barron Hewitt 61 y.o.  who presents today with Diagnoses of Lumbar radiculopathy, Lumbar spondylosis, and Chronic left-sided low back pain with left-sided sciatica were pertinent to this visit.    HPI    He reported acute onset back pain and a pop on July 2, 2021. This caused severe pain. Bilateral axial low back pain radiating down the left posterior thigh with associated pins and needles. Intermittent severe 10/10 pain. This can be worse with sitting, standing or walking. Associated with spasms. He has difficulty with ADLs including walking, bending, lifting, lower body dressing.     He saw a physician at [facility]. He also saw a Dr Kennedy Adames.     Medications tried include zanaflex, nsaids, tramadol, norco       Medical records review:  I reviewed the notes from Dr. Myah Blandon including the most recent note from 11/2/2021.  Concern for muscle weakness and muscle wasting.  This is thought to be secondary to a left lumbar radiculitis.  The patient also does have diabetes and neuropathy.          ROS:   Positive for asthma but no current symptoms  Red Flags ROS:   Fever, Chills, Sweats: Denies  Involuntary Weight Loss: Denies  Bladder Incontinence: Denies  Bowel Incontinence: denies  Saddle Anesthesia: Denies    All other systems reviewed and negative.       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 Valverde   • Sleep apnea     has CPAP   • Snoring          Current Outpatient Medications on File Prior to Visit   Medication Sig Dispense Refill   • benazepril (LOTENSIN) 20 MG Tab TAKE 1 TABLET DAILY 90 Tablet 3   • albuterol (VENTOLIN HFA) 108 (90 Base) MCG/ACT Aero Soln inhalation aerosol Inhale 2 Puffs every four hours as needed. FOR SHORTNESS OF BREATH 18 g 5   • albuterol 108 (90 Base) MCG/ACT Aero Soln inhalation aerosol Inhale 2 Puffs every four hours as needed for Shortness of Breath. Pt prefers ventolin if possible. Thank you 3 Each 3   • fluticasone-salmeterol (ADVAIR DISKUS) 500-50 MCG/DOSE AEROSOL POWDER, BREATH ACTIVATED Inhale 1 Puff every 12 hours. 3 Each 3   • finasteride (PROSCAR) 5 MG Tab Take 1 Tablet by mouth every day. 90 Tablet 3   • levothyroxine (SYNTHROID) 75 MCG Tab Take 1 Tablet by mouth every morning on an empty stomach. Taking only one d per week.  50 mcg all other days. 12 Tablet 3   • levothyroxine (SYNTHROID) 50 MCG Tab Take one pill 6 days per week on empty stomach.  75 mcg on 7th day. 90 Tablet 3   • atorvastatin (LIPITOR) 80 MG tablet Take 1 Tablet by mouth every evening. 90 Tablet 3   • tizanidine (ZANAFLEX) 4 MG Tab Take 1 Tablet by mouth every 6 hours as needed (muscle spasms). 30 Tablet 2   • EPINEPHrine (EPIPEN) 0.3 MG/0.3ML Solution Auto-injector solution for injection Inject 0.3 mL into the thigh one time for 1 dose. 1 Each 0   • albuterol 108 (90 Base) MCG/ACT Aero Soln inhalation aerosol Inhale 2 Puffs every four hours as needed for Shortness of Breath. 1 Each 4   • Continuous Blood Gluc Sensor (FREESTYLE LUNA 14 DAY SENSOR) Misc 1 Application every 14 days. 6 Each 3   • Dulaglutide (TRULICITY) 3 MG/0.5ML Solution Pen-injector Inject 1 Dose under the skin every 7 days. 2 mL 11   • montelukast (SINGULAIR)  10 MG Tab Take 1 tablet by mouth every evening. 90 tablet 3   • Canagliflozin (INVOKANA) 300 MG Tab Take 1 tablet by mouth every day. 90 tablet 3   • DILTIAZem CD (CARTIA XT) 180 MG CAPSULE SR 24 HR Take 1 capsule by mouth every day. 90 capsule 3   • tamsulosin (FLOMAX) 0.4 MG capsule Take 1 capsule by mouth 1/2 hour after breakfast. 30 capsule 4   • NON SPECIFIED CPAP supplies through [facility] 1 Each 1   • ALPHA LIPOIC ACID PO Take 600 mg by mouth 2 Times a Day.     • Omega-3 Fatty Acids (FISH OIL) 1200 MG CAPS Take  by mouth.     • Cinnamon 500 MG CAPS Take 1,000 mg by mouth.     • aspirin EC (ECOTRIN) 81 MG TBEC Take 81 mg by mouth every day.       • pantoprazole (PROTONIX) 40 MG TBEC Take 40 mg by mouth every day.       • Coenzyme Q10 (CO Q-10) 200 MG CAPS Take  by mouth every day.       • Cholecalciferol (VITAMIN D) 2000 UNIT TABS Take  by mouth 2 Times a Day.     • cetirizine (ZYRTEC) 10 MG TABS Take 10 mg by mouth every day.       No current facility-administered medications on file prior to visit.        PSHx:   Past Surgical History:   Procedure Laterality Date   • SHOULDER DECOMPRESSION ARTHROSCOPIC Left 1/4/2018    Procedure: SHOULDER DECOMPRESSION ARTHROSCOPIC - SUBACROMIAL;  Surgeon: Linwood Grover M.D.;  Location: [facility];  Service: Orthopedics   • SHOULDER ARTHROSCOPY W/ BICIPITAL TENODESIS REPAIR Left 1/4/2018    Procedure: SHOULDER ARTHROSCOPY W/ BICIPITAL Tenotomy REPAIR;  Surgeon: Linwood Grover M.D.;  Location: [facility];  Service: Orthopedics   • CLAVICLE DISTAL EXCISION Left 1/4/2018    Procedure: CLAVICLE DISTAL EXCISION - POSSIBLE;  Surgeon: Linwood Grover M.D.;  Location: [facility];  Service: Orthopedics   • RECOVERY  4/8/2016    Procedure: CATH LAB [facility] WITH POSSIBLE NAVIN;  Surgeon: Recoveryonly Surgery;  [place] [facility] [place]  [place]:    • VENTRAL HERNIA REPAIR LAPAROSCOPIC  11/1/2012     Performed by Marcos Ramírez M.D. at SURGERY [facility] ORS   • KNEE ARTHROSCOPY  1990's    right   • SHOULDER ARTHROSCOPY  1990's    right   • SINUSOTOMIES  1990's    times two surgeries   • TUMOR EXCISION WITH BIOPSY  1990's    right hand - thumb       Family history   Family History   Problem Relation Age of Onset   • Breast Cancer Mother    • Hypertension Mother    • Cancer Mother         breast   • Heart Disease Mother         hx of bypass   • Hypertension Father    • Arthritis Father    • Diabetes Father         prediabetes   • No Known Problems Sister    • Arthritis Brother    • Heart Disease Other    • Hypertension Other    • Cancer Other    • No Known Problems Brother          Medications: reviewed on epic.   Outpatient Medications Marked as Taking for the 1/18/22 encounter (Office Visit) with Dragan Ayala M.D.   Medication Sig Dispense Refill   • benazepril (LOTENSIN) 20 MG Tab TAKE 1 TABLET DAILY 90 Tablet 3   • albuterol (VENTOLIN HFA) 108 (90 Base) MCG/ACT Aero Soln inhalation aerosol Inhale 2 Puffs every four hours as needed. FOR SHORTNESS OF BREATH 18 g 5   • albuterol 108 (90 Base) MCG/ACT Aero Soln inhalation aerosol Inhale 2 Puffs every four hours as needed for Shortness of Breath. Pt prefers ventolin if possible. Thank you 3 Each 3   • fluticasone-salmeterol (ADVAIR DISKUS) 500-50 MCG/DOSE AEROSOL POWDER, BREATH ACTIVATED Inhale 1 Puff every 12 hours. 3 Each 3   • finasteride (PROSCAR) 5 MG Tab Take 1 Tablet by mouth every day. 90 Tablet 3   • levothyroxine (SYNTHROID) 75 MCG Tab Take 1 Tablet by mouth every morning on an empty stomach. Taking only one d per week.  50 mcg all other days. 12 Tablet 3   • levothyroxine (SYNTHROID) 50 MCG Tab Take one pill 6 days per week on empty stomach.  75 mcg on 7th day. 90 Tablet 3   • atorvastatin (LIPITOR) 80 MG tablet Take 1 Tablet by mouth every evening. 90 Tablet 3   • tizanidine (ZANAFLEX) 4 MG Tab Take 1 Tablet by mouth every 6 hours as needed  (muscle spasms). 30 Tablet 2   • EPINEPHrine (EPIPEN) 0.3 MG/0.3ML Solution Auto-injector solution for injection Inject 0.3 mL into the thigh one time for 1 dose. 1 Each 0   • albuterol 108 (90 Base) MCG/ACT Aero Soln inhalation aerosol Inhale 2 Puffs every four hours as needed for Shortness of Breath. 1 Each 4   • Continuous Blood Gluc Sensor (FREESTYLE LUNA 14 DAY SENSOR) Misc 1 Application every 14 days. 6 Each 3   • Dulaglutide (TRULICITY) 3 MG/0.5ML Solution Pen-injector Inject 1 Dose under the skin every 7 days. 2 mL 11   • montelukast (SINGULAIR) 10 MG Tab Take 1 tablet by mouth every evening. 90 tablet 3   • Canagliflozin (INVOKANA) 300 MG Tab Take 1 tablet by mouth every day. 90 tablet 3   • DILTIAZem CD (CARTIA XT) 180 MG CAPSULE SR 24 HR Take 1 capsule by mouth every day. 90 capsule 3   • tamsulosin (FLOMAX) 0.4 MG capsule Take 1 capsule by mouth 1/2 hour after breakfast. 30 capsule 4   • NON SPECIFIED CPAP supplies through [facility] 1 Each 1   • ALPHA LIPOIC ACID PO Take 600 mg by mouth 2 Times a Day.     • Omega-3 Fatty Acids (FISH OIL) 1200 MG CAPS Take  by mouth.     • Cinnamon 500 MG CAPS Take 1,000 mg by mouth.     • aspirin EC (ECOTRIN) 81 MG TBEC Take 81 mg by mouth every day.       • pantoprazole (PROTONIX) 40 MG TBEC Take 40 mg by mouth every day.       • Coenzyme Q10 (CO Q-10) 200 MG CAPS Take  by mouth every day.       • Cholecalciferol (VITAMIN D) 2000 UNIT TABS Take  by mouth 2 Times a Day.     • cetirizine (ZYRTEC) 10 MG TABS Take 10 mg by mouth every day.          Allergies:   Allergies   Allergen Reactions   • Kiwi Extract Anaphylaxis   • Shellfish Allergy Anaphylaxis   • Strawberry Anaphylaxis   • Ozempic (0.25 Or 0.5 Mg-Dose) [Semaglutide] Unspecified     Pt does not recall any reaction   • Sulfa Drugs Rash and Unspecified   • Testosterone Rash       Social Hx:   Social History     Socioeconomic History   • Marital status:      Spouse name: Not on file   • Number of  children: Not on file   • Years of education: Not on file   • Highest education level: Not on file   Occupational History   • Not on file   Tobacco Use   • Smoking status: Former Smoker     Packs/day: 0.00     Types: Cigars   • Smokeless tobacco: Never Used   • Tobacco comment: occasional cigars   Vaping Use   • Vaping Use: Never used   Substance and Sexual Activity   • Alcohol use: Yes     Comment: rare - 1-2 per month (social)   • Drug use: Yes     Types: Marijuana     Comment: occ cigar smoking   • Sexual activity: Not on file     Comment: ; 2 biological kids (2 of his wife's); 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   Other Topics Concern   • Not on file   Social History Narrative   • Not on file     Social Determinants of Health     Financial Resource Strain:    • Difficulty of Paying Living Expenses: Not on file   Food Insecurity:    • Worried About Running Out of Food in the Last Year: Not on file   • Ran Out of Food in the Last Year: Not on file   Transportation Needs:    • Lack of Transportation (Medical): Not on file   • Lack of Transportation (Non-Medical): Not on file   Physical Activity:    • Days of Exercise per Week: Not on file   • Minutes of Exercise per Session: Not on file   Stress:    • Feeling of Stress : Not on file   Social Connections:    • Frequency of Communication with Friends and Family: Not on file   • Frequency of Social Gatherings with Friends and Family: Not on file   • Attends Jain Services: Not on file   • Active Member of Clubs or Organizations: Not on file   • Attends Club or Organization Meetings: Not on file   • Marital Status: Not on file   Intimate Partner Violence:    • Fear of Current or Ex-Partner: Not on file   • Emotionally Abused: Not on file   • Physically Abused: Not on file   • Sexually Abused: Not on file   Housing Stability:    • Unable to Pay for Housing in the Last Year: Not on file   • Number of Places Lived in the Last Year: Not on file   •  "Unstable Housing in the Last Year: Not on file         EXAMINATION     Physical Exam:   Vitals: /80 (BP Location: Right arm, Patient Position: Sitting, BP Cuff Size: Adult)   Pulse 70   Temp 37 °C (98.6 °F) (Temporal)   Resp 18   Ht 1.753 m (5' 9\")   Wt 77.4 kg (170 lb 10.2 oz)   SpO2 94%     Constitutional:   Body Habitus: Body mass index is 25.2 kg/m².  Cooperation: Fully cooperates with exam  Appearance: Well-groomed, well-nourished, not disheveled     Eyes: No scleral icterus to suggest severe liver disease, no proptosis to suggest severe hyperthyroid    ENT -no obvious auditory deficits, no obvious tongue lesions, tongue midline, no facial droop     Skin -no rashes or lesions noted     Respiratory-  breathing comfortable on room air, no audible wheezing    Cardiovascular- capillary refills less than 2 seconds.     Psychiatric- alert and oriented ×3. Normal affect.     Gait - normal gait, no use of ambulatory device, nonantalgic.     Musculoskeletal and Neuro -         Thoracic/Lumbar Spine/Sacral Spine/Hips   Inspection: Atrophy of the left thigh    ROM: decreased active range of motion with flexion, lateral flexion, and rotation bilaterally.   There is decreased active range of motion with lumbar extension with pain.    There is pain with facet loading maneuver (extension rotation) with axial low back pain on the BILATERAL side(s)    Palpation:   No tenderness to palpation in midline at T1-T12 levels. No tenderness to palpation in the left and right of the midline T1-L5, NEGATIVE for tenderness to palpation to the para-midline region in the lower lumbar levels.  palpation over SI joint: negative bilaterally    palpation in hip or over the gluteus medius tendon insertion: negative bilaterally      Lumbar spine Special tests  Neuro tension  Straight leg test negative right, positive left    Slump test negative right, positive left            Key points for the international standards for neurological " classification of spinal cord injury (ISNCSCI) to light touch.     Dermatome R L                                      L2 2 2   L3 2 2   L4 2 2   L5 2 2   S1 2 2   S2 2 2       Motor Exam Lower Extremities    ? Myotome R L   Hip flexion L2 5 5-   Knee extension L3 5 5-   Ankle dorsiflexion L4 5 5   Toe extension L5 5 5   Ankle plantarflexion S1 5 5         Reflexes      Babinski sign negative bilaterally   Clonus of the ankle negative bilaterally       MEDICAL DECISION MAKING    Medical records review: see under HPI section.     DATA    Labs:   Lab Results   Component Value Date/Time    SODIUM 135 06/09/2021 01:13 PM    POTASSIUM 3.6 06/09/2021 01:13 PM    CHLORIDE 100 06/09/2021 01:13 PM    CO2 24 06/09/2021 01:13 PM    ANION 11.0 06/09/2021 01:13 PM    GLUCOSE 113 (H) 06/09/2021 01:13 PM    BUN 12 06/09/2021 01:13 PM    CREATININE 0.74 06/09/2021 01:13 PM    CREATININE 1.1 07/10/2008 09:25 AM    CALCIUM 9.6 06/09/2021 01:13 PM    ASTSGOT 18 06/09/2021 01:13 PM    ALTSGPT 23 06/09/2021 01:13 PM    TBILIRUBIN 0.8 06/09/2021 01:13 PM    ALBUMIN 4.7 06/09/2021 01:13 PM    TOTPROTEIN 7.3 06/09/2021 01:13 PM    GLOBULIN 2.6 06/09/2021 01:13 PM    AGRATIO 1.8 06/09/2021 01:13 PM   ]    Lab Results   Component Value Date/Time    PROTHROMBTM 13.3 12/22/2017 09:26 AM    INR 1.04 12/22/2017 09:26 AM        Lab Results   Component Value Date/Time    WBC 9.7 06/04/2020 02:18 PM    RBC 6.24 (H) 06/04/2020 02:18 PM    HEMOGLOBIN 18.5 (H) 06/04/2020 02:18 PM    HEMATOCRIT 54.6 (H) 06/04/2020 02:18 PM    MCV 87.5 06/04/2020 02:18 PM    MCH 29.6 06/04/2020 02:18 PM    MCHC 33.9 06/04/2020 02:18 PM    MPV 10.7 06/04/2020 02:18 PM    NEUTSPOLYS 67.60 06/04/2020 02:18 PM    LYMPHOCYTES 17.60 (L) 06/04/2020 02:18 PM    MONOCYTES 9.80 06/04/2020 02:18 PM    EOSINOPHILS 3.50 06/04/2020 02:18 PM    BASOPHILS 1.20 06/04/2020 02:18 PM    HYPOCHROMIA 1+ 08/27/2013 07:13 AM        Lab Results   Component Value Date/Time    HBA1C 7.2 (A)  11/16/2021 02:36 PM        Imaging:   I personally reviewed following images, these are my reads  MRI lumbar spine 7/27/2021 with moderate left neuroforaminal stenosis at L3-4 and L4-5 with mild right neuroforaminal stenosis at L3-4 and L4-5.  Mild to moderate bilateral neuroforaminal stenosis at L5-S1.  There is facet arthropathy bilaterally at L3-4, L4-5, L5-S1        IMAGING radiology reads. I reviewed the following radiology reads                      Results for orders placed during the hospital encounter of 07/27/21    MR-LUMBAR SPINE-W/O    Impression  1.  L4-5 annular disc bulge with a central disc protrusion and bilateral facet degeneration. There is borderline central canal narrowing. There is moderate mild right neural foraminal narrowing.    2.  L3-4 annular disc bulge with a left lateral disc protrusion. There is moderate to severe left and mild right neural foraminal narrowing again seen.    3.  L5-S1 degenerative disc disease and facet degeneration results in moderate bilateral neuroforaminal narrowing.        Results for orders placed during the hospital encounter of 07/27/21    MR-LUMBAR SPINE-W/O    Impression  1.  L4-5 annular disc bulge with a central disc protrusion and bilateral facet degeneration. There is borderline central canal narrowing. There is moderate mild right neural foraminal narrowing.    2.  L3-4 annular disc bulge with a left lateral disc protrusion. There is moderate to severe left and mild right neural foraminal narrowing again seen.    3.  L5-S1 degenerative disc disease and facet degeneration results in moderate bilateral neuroforaminal narrowing.      Results for orders placed during the hospital encounter of 07/27/21    MR-MRA NECK-W/O    Impression  There is no significant stenosis in the visualized extracranial neck arteries.                                      Results for orders placed during the hospital encounter of 12/22/17    DX-CHEST-2 VIEWS    Impression  Negative  two views of the chest.      Electrodiagnostics   1/18/2022 EMG  IMPRESSION:  This is an abnormal electrodiagnostic study due to evidence of chronic reinnervation changes in the left vastus lateralis/medialis with mild active denervation changes noted.  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       There is no strong electrodiagnostic evidence of a an active right lumbosacral plexopathy though given some responses are unobtainable a mild lumbar plexopathy/femoral neuropathy may be present.  There is no EMG evidence of a right lumbosacral radiculopathy.                                             Diagnosis   Visit Diagnoses     ICD-10-CM   1. Lumbar radiculopathy  M54.16   2. Lumbar spondylosis  M47.816   3. Chronic left-sided low back pain with left-sided sciatica  M54.42    G89.29           ASSESSMENT AND PLAN:  Barron Sepulveda Kash 61 y.o. male      Barron was seen today for new patient.    Diagnoses and all orders for this visit:    Lumbar radiculopathy  -     Referral to Physical Medicine Rehab    Lumbar spondylosis    Chronic left-sided low back pain with left-sided sciatica        The patient has imaging and physical exam consistent with a left lower lumbar radiculopathy superimposed on facet mediated pain as well.  He has failed multiple conservative treatments including home exercise program medication management.       Diagnostic workup: Procedure below for diagnostic and therapeutic purposes    Medications: For now it is reasonable to continue Zanaflex as needed    Interventional program:   I have ordered a left L3-4 and L4-5 transforaminal epidural steroid injection    The risks benefits and alternatives to this procedure were discussed and the patient wishes to proceed with the procedure. Risks include but are not limited to  damage to surrounding structures, infection, bleeding, worsening of pain which can be permanent, weakness which can be permanent. Benefits include pain relief, improved function. Alternatives includes not doing the procedure.      If he has improvement of the radicular component of his pain but still has axial back pain I would consider him for medial branch blocks      Outside records requested:  The patient signed outside records request form for his outside records including outside images. This includes the records from [facility].       Follow-up: After the above diagnostic studies     Total time spent on the day of the encounter: 43 minutes.  This includes counseling, care coordination, medical records review.          Please note that this dictation was created using voice recognition software. I have made every reasonable attempt to correct obvious errors but there may be errors of grammar and content that I may have overlooked prior to finalization of this note.      Dragan Ayala MD  Physical Medicine and Rehabilitation  Interventional Spine and Sports Physiatry  [facility]           CC Myah Blandon D.O.   CC NIRANJAN Butts   CC Yenny Ogden MD

## 2022-01-18 NOTE — Clinical Note
I agree there is a lumbar radiculopathy.  He likely has facet pain superimposed on this as well.  I scheduled him for an epidural.

## 2022-01-18 NOTE — PATIENT INSTRUCTIONS
Your procedure will be at the [facility] special procedure suite.    [facility] [place] [address]       PRE-PROCEDURE INSTRUCTIONS  You may take your regular medications except:   · No Anti-inflammatories 5 days prior to your procedure. Anti-inflammatories include medicines such as  ibuprofen (Motrin, Advil), Excedrin, Naproxen (Aleve, Anaprox, Naprelan, Naprosyn), Celecoxib (Celebrex), Diclofenac (Voltaren-XR tab), and Meloxicam (Mobic).   · You can take the remainder of your pain medications as prescribed.   · If you are having a diagnostic procedure such as a medial branch block, do not use her pain meds on the day of the procedure  · No Glucophage or Metformin 24 hours before your procedure. You may resume next day after your procedure.  · Call the physiatry office if you are taking or prescribed anti-biotics within five days of procedure.  · Please ask provider if you are taking any new diabetes medication.  · CONTINUE TAKING BLOOD PRESSURE MEDICATIONS AS PRESCRIBED.  · Pain medications will not be prescribed on the procedure day. Procedural pain medication may be used by your provider   · Call your doctor's office performing the procedure if you have a fever, chills, rash or new illness prior to your procedure    Anticoagulation/antiplatelet medications  No Blood thinning medications such as Coumadin, Xarelto, aspirin or Plavix 5 days prior to procedure unless your doctor said to continue these medications. Call your doctor if a new medication is prescribed in this class.     Restrictions for eating before procedure:   · If you are getting procedural sedation, then do not eat to for 8 hours prior to procedure appointment time. Do not drink fluids for four hours prior to your procedure time.   · If you are not having procedural sedation, then Skip the meal prior to your procedure. If you have a morning procedure then skip breakfast. If you have an afternoon procedure then skip lunch.   · You  may drink clear liquids up to 2 hours prior to your procedure  · You must have a  the day of procedure to accompany you home.      POST PROCEDURE INSTRUCTIONS   · No heavy lifting, strenuous bending or strenuous exercise for 3 days after your procedure.  · No hot tubs, baths, swimming for 3 days after your procedure  · You can remove the bandage the day after the procedure.  · IF YOU RECEIVED A STEROID INJECTION. PLEASE NOTE THAT THERE MAY BE A DELAY FOR THE INJECTION TO START WORKING, THE DELAY MAY BE UP TO TWO WEEKS. IF YOU HAVE DIABETES, PLEASE NOTE THAT YOUR SUGAR LEVELS MAY BE ELEVATED FOR 1-2 DAYS AFTER A STEROID INJECTION.  THE STEROID MAY CAUSE TEMPORARY SYMPTOMS WHICH USUALLY RESOLVE ON THEIR OWN WITHIN 1 TO 2 DAYS INCLUDING FACIAL FLUSHING OR A FEELING OF WARMTH ON THE FACE, TEMPORARY INCREASES IN BLOOD SUGAR, INSOMNIA, INCREASED HUNGER  · IF YOU EXPERIENCE PROLONGED WEAKNESS LONGER THAN ONE DAY, BOWEL OR BLADDER INCONTINENCE THEN PLEASE CALL THE PHYSIATRY OFFICE.  · Your leg may feel heavy, weak and numb for up to 1-2 days. Be very careful walking.   ·  You may resume normal activities 3 days after procedure.

## 2022-01-21 ENCOUNTER — HOSPITAL ENCOUNTER (OUTPATIENT)
Facility: REHABILITATION | Age: 62
End: 2022-01-21
Attending: PHYSICAL MEDICINE & REHABILITATION | Admitting: PHYSICAL MEDICINE & REHABILITATION
Payer: COMMERCIAL

## 2022-01-25 ENCOUNTER — HOSPITAL ENCOUNTER (OUTPATIENT)
Facility: MEDICAL CENTER | Age: 62
End: 2022-01-25
Attending: NURSE PRACTITIONER
Payer: COMMERCIAL

## 2022-01-25 ENCOUNTER — TELEPHONE (OUTPATIENT)
Dept: PHYSICAL MEDICINE AND REHAB | Facility: MEDICAL CENTER | Age: 62
End: 2022-01-25

## 2022-01-25 ENCOUNTER — TELEMEDICINE (OUTPATIENT)
Dept: MEDICAL GROUP | Facility: LAB | Age: 62
End: 2022-01-25
Payer: COMMERCIAL

## 2022-01-25 VITALS
TEMPERATURE: 98.4 F | BODY MASS INDEX: 25.18 KG/M2 | OXYGEN SATURATION: 96 % | WEIGHT: 170 LBS | HEIGHT: 69 IN | HEART RATE: 84 BPM

## 2022-01-25 DIAGNOSIS — J01.40 ACUTE NON-RECURRENT PANSINUSITIS: ICD-10-CM

## 2022-01-25 PROCEDURE — 99213 OFFICE O/P EST LOW 20 MIN: CPT | Mod: 95,CS | Performed by: NURSE PRACTITIONER

## 2022-01-25 PROCEDURE — U0003 INFECTIOUS AGENT DETECTION BY NUCLEIC ACID (DNA OR RNA); SEVERE ACUTE RESPIRATORY SYNDROME CORONAVIRUS 2 (SARS-COV-2) (CORONAVIRUS DISEASE [COVID-19]), AMPLIFIED PROBE TECHNIQUE, MAKING USE OF HIGH THROUGHPUT TECHNOLOGIES AS DESCRIBED BY CMS-2020-01-R: HCPCS

## 2022-01-25 PROCEDURE — U0005 INFEC AGEN DETEC AMPLI PROBE: HCPCS

## 2022-01-25 RX ORDER — AMOXICILLIN AND CLAVULANATE POTASSIUM 875; 125 MG/1; MG/1
1 TABLET, FILM COATED ORAL 2 TIMES DAILY
Qty: 14 TABLET | Refills: 0 | Status: SHIPPED | OUTPATIENT
Start: 2022-01-25 | End: 2022-02-11

## 2022-01-25 ASSESSMENT — FIBROSIS 4 INDEX: FIB4 SCORE: 0.69

## 2022-01-26 DIAGNOSIS — J01.40 ACUTE NON-RECURRENT PANSINUSITIS: ICD-10-CM

## 2022-01-26 LAB — COVID ORDER STATUS COVID19: NORMAL

## 2022-01-26 NOTE — PROGRESS NOTES
"Virtual Visit: Established Patient   This visit was conducted via Zoom using secure and encrypted videoconferencing technology.   The patient was in a private location in the state of [place].    The patient's identity was confirmed and verbal consent was obtained for this virtual visit.    Subjective:   CC:   Chief Complaint   Patient presents with   • Recurrent Sinusitis     runny nose , congestion , ear pain      Barron Hewitt is a 61 y.o. male presenting for evaluation and management of:    Sinusitis  Onset Monday. Current symptoms include sneezing, runny nose, congestion, sinus pain/pressure, PND, green mucus.   He does have a history of asthma, is concerned that this will travel to his lungs.   Denies fever, shortness of breath, wheezing, myalgias.   Currently taking singulair 10 mg daily, albuterol prn, nasal saline rinses.   No known sick contacts.   Requesting treatment for a sinus infection as this is been a recurrent issue for him for most of his adult life and he does have a history of sinus surgery years ago    ROS   As documented in history of present illness above     Objective:   Pulse 84   Temp 36.9 °C (98.4 °F)   Ht 1.753 m (5' 9\")   Wt 77.1 kg (170 lb)   SpO2 96%   BMI 25.10 kg/m²     Physical Exam:  Constitutional: Alert, no distress, well-groomed.  Skin: No rashes in visible areas.  Eye: Round. Conjunctiva clear, lids normal. No icterus.   ENMT: Lips pink without lesions, good dentition, moist mucous membranes. Phonation normal.  Neck: No masses, no thyromegaly. Moves freely without pain.  Respiratory: Unlabored respiratory effort, no cough or audible wheeze  Psych: Alert and oriented x3, normal affect and mood.     Assessment and Plan:   The following treatment plan was discussed:     1. Acute non-recurrent pansinusitis  Covid swab sent out for testing, will contact patient with results.  Notified patient it could take anywhere from 2 to 7 days for results to come back.  Discussed " worsening symptoms or new symptoms and ER follow-up if needed.  Patient to follow employer (if employed), county, local, state, national guidelines for protection.  Patient to contact the county for recommendations and written notes about clearing from Covid and returning to work. Patient to take medications as prescribed.  Discussed potential adverse reactions, patient to contact with any new or worsening symptoms or reactions to medications.  Patient can take over-the-counter Tylenol for pain, fever, discomfort, and other over-the-counter medications for symptom relief.  All questions answered.  - amoxicillin-clavulanate (AUGMENTIN) 875-125 MG Tab; Take 1 Tablet by mouth 2 times a day.  Dispense: 14 Tablet; Refill: 0  - SARS-CoV-2 PCR (24 hour In-House): Collect NP swab in [facility]; Future

## 2022-01-27 LAB
SARS-COV-2 RNA RESP QL NAA+PROBE: DETECTED
SPECIMEN SOURCE: ABNORMAL

## 2022-02-08 ENCOUNTER — APPOINTMENT (OUTPATIENT)
Dept: PHYSICAL MEDICINE AND REHAB | Facility: REHABILITATION | Age: 62
End: 2022-02-08
Payer: COMMERCIAL

## 2022-02-09 ENCOUNTER — TELEPHONE (OUTPATIENT)
Dept: PHYSICAL MEDICINE AND REHAB | Facility: MEDICAL CENTER | Age: 62
End: 2022-02-09

## 2022-02-09 NOTE — TELEPHONE ENCOUNTER
LVM to call us back to [facility] SP that is scheduled on 2/15/22, to verify also if Pt is taking Augmentin.

## 2022-02-15 ENCOUNTER — HOSPITAL ENCOUNTER (OUTPATIENT)
Facility: REHABILITATION | Age: 62
End: 2022-02-15
Attending: PHYSICAL MEDICINE & REHABILITATION | Admitting: PHYSICAL MEDICINE & REHABILITATION
Payer: COMMERCIAL

## 2022-02-15 ENCOUNTER — APPOINTMENT (OUTPATIENT)
Dept: RADIOLOGY | Facility: REHABILITATION | Age: 62
End: 2022-02-15
Attending: PHYSICAL MEDICINE & REHABILITATION
Payer: COMMERCIAL

## 2022-02-15 VITALS
RESPIRATION RATE: 18 BRPM | SYSTOLIC BLOOD PRESSURE: 141 MMHG | HEIGHT: 69 IN | OXYGEN SATURATION: 97 % | HEART RATE: 80 BPM | WEIGHT: 167.11 LBS | DIASTOLIC BLOOD PRESSURE: 84 MMHG | TEMPERATURE: 97.2 F | BODY MASS INDEX: 24.75 KG/M2

## 2022-02-15 PROCEDURE — 700117 HCHG RX CONTRAST REV CODE 255

## 2022-02-15 PROCEDURE — 64483 NJX AA&/STRD TFRM EPI L/S 1: CPT

## 2022-02-15 PROCEDURE — 700111 HCHG RX REV CODE 636 W/ 250 OVERRIDE (IP)

## 2022-02-15 PROCEDURE — 64484 NJX AA&/STRD TFRM EPI L/S EA: CPT

## 2022-02-15 PROCEDURE — A9576 INJ PROHANCE MULTIPACK: HCPCS

## 2022-02-15 RX ORDER — DEXAMETHASONE SODIUM PHOSPHATE 10 MG/ML
INJECTION, SOLUTION INTRAMUSCULAR; INTRAVENOUS
Status: COMPLETED
Start: 2022-02-15 | End: 2022-02-15

## 2022-02-15 RX ORDER — LIDOCAINE HYDROCHLORIDE 10 MG/ML
INJECTION, SOLUTION EPIDURAL; INFILTRATION; INTRACAUDAL; PERINEURAL
Status: COMPLETED
Start: 2022-02-15 | End: 2022-02-15

## 2022-02-15 RX ADMIN — GADOTERIDOL 2 ML: 279.3 INJECTION, SOLUTION INTRAVENOUS at 08:50

## 2022-02-15 RX ADMIN — LIDOCAINE HYDROCHLORIDE 10 ML: 10 INJECTION, SOLUTION EPIDURAL; INFILTRATION; INTRACAUDAL; PERINEURAL at 08:48

## 2022-02-15 RX ADMIN — DEXAMETHASONE SODIUM PHOSPHATE 10 MG: 10 INJECTION, SOLUTION INTRAMUSCULAR; INTRAVENOUS at 08:51

## 2022-02-15 ASSESSMENT — PAIN DESCRIPTION - PAIN TYPE: TYPE: CHRONIC PAIN

## 2022-02-15 ASSESSMENT — FIBROSIS 4 INDEX: FIB4 SCORE: 0.69

## 2022-02-15 NOTE — OP REPORT
Date of Service: 2/15/2022     Patient: Barron Hewitt 61 y.o. male     MRN: [id number]     Physician/s: Dragan Ayala MD    Pre-operative Diagnosis: Lumbar radiculopathy    Post-operative Diagnosis: Lumbar radiculopathy    Procedure: left Lumbar Transforaminal Epidural Steroid  at the L3-4 and L4-5 levels.     Description of procedure:    The risks, benefits, and alternatives of the procedure were reviewed and discussed with the patient.  Written informed consent was freely obtained. A pre-procedural time-out was conducted by the physician verifying patient’s identity, procedure to be performed, procedure site and side, and allergy verification. Appropriate equipment was determined to be in place for the procedure.         The patient's vital signs were carefully monitored before, throughout, and after the procedure.     In the fluoroscopy suite the patient was placed in a prone position, a pillow placed underneath their umbilicus. The skin was prepped and draped in the usual sterile fashion.     The fluoroscope was placed over the lumbar spine and adjusted into the proper AP/Oblique view to enter the transforaminal space just adjacent to the pedicle at the levels below. The targets for injection were then marked at the left L3-4. A 27g 1.5 inch needle was placed into the marked site, and 1mL of 1% Lidocaine was injected subcutaneously into the epidermal and dermal layers. The needle was removed intact.  A 25g 3.5 inch spinal needle was then placed and advanced under fluoroscopic guidance in an oblique view towards the epidural space of the levels noted above. The needle position was confirmed to not be past the 6 o'clock position in the AP view and it was in the neuroforamen in the lateral view.        The fluoroscope was was then adjusted over the lumbar spine and adjusted into the proper AP/Oblique view to enter the transforaminal space adjacent to the pedicle at the LEFT  L4-5. A 27g 1.5 inch needle was  placed into the marked site, and 1mL of 1% Lidocaine was injected subcutaneously into the epidermal and dermal layers. The needle was removed intact.  A 25g 3.5 inch spinal needle was then placed and advanced under fluoroscopic guidance in an oblique view towards the epidural space of the levels noted above. The needle position was confirmed to not be past the 6 o'clock position in the AP view and it was in the neuroforamen in the lateral view.       Under live fluoroscopic guidance in the AP view, contrast dye was used to highlight the epidural space spread of each level above. Final fluoroscopic images were saved.  Following negative aspiration, 1mL of 1% lidocaine preservative free with 5mg dexamethasone   was then injected at each level above, and the needles were removed intact after restyleted. The patient's back was covered with a 4x4 gauze, the area was cleansed with sterile normal saline, and a dressing was applied. There were no complications noted.     The patient was then evaluated post-procedure, and was hemodynamically stable prior to leaving the post-operative care unit.     Follow-up as scheduled    Dragan Ayala MD  Interventional Spine and Pain  Physical Medicine and Rehabilitation  [facility]          CPT codes  Transforaminal epidural injection- lumbar or sacral (first level):  46257  Transforaminal epidural injection- lumbar or sacral (each additional level):  55529

## 2022-02-15 NOTE — PROGRESS NOTES
*0842: Hand-off rec'd from pre-procedure RN, pre-assessment completed, pt placed onto procedure table and positioned prone and hands resting on stool, blood pressure and pulse ox connected by CNA and RN, medical history reviewed (GERD, Asthma, DM, ISELA), allergies reviewed (Shellfish, sulfa, ozempic, testosterone), no sedation given, (ASA & Aleve) blood thinners off for 5 days, patient identified, time out performed and team agreed.

## 2022-02-15 NOTE — INTERVAL H&P NOTE
H&P reviewed. The patient was examined and there are no changes to the H&P     Lungs clear to auscultation bilaterally.  No abdominal tenderness.  Heart regular rate and rhythm.  Vitals reviewed.    Proceed with the originally scheduled procedure.  The risks, benefits and alternatives were discussed.  The patient understands these.    Pre-Op Diagnosis Codes:     * Lumbar radiculopathy [M54.16]  Procedure(s):  LEFT L3-4 and L4-5 transforaminal epidural steroid injection with fluoroscopic guidance  .  .    Dragan Ayala MD  Physical Medicine and Rehabilitation  Interventional Spine and Sports Physiatry  [facility]

## 2022-02-15 NOTE — INTERVAL H&P NOTE
H&P reviewed. The patient was examined and there are no changes to the H&P     Lungs clear to auscultation bilaterally.  No abdominal tenderness.  Heart regular rate and rhythm.  Vitals reviewed.    Proceed with the originally scheduled procedure.  The risks, benefits and alternatives were discussed.  The patient understands these.    Pre-Op Diagnosis Codes:     * Lumbar radiculopathy [M54.16]  Procedure(s):  LEFT L3-4 and L4-5 transforaminal epidural steroid injection with fluoroscopic guidance  .  .    Dragan Ayala MD  Physical Medicine and Rehabilitation  Interventional Spine and Sports Physiatry  [facility]

## 2022-02-15 NOTE — PROGRESS NOTES
0813 Pt arrived to pre-procedure area. Procedure & plan for recovery reviewed, site confirmed, & consent signed. VSS. Allergies & current medications verified. Appointed  to be called for DC home. Printed discharge instructions discussed & signed, all questions answered at this time. Pertinent medical Hx includes DM. Per pt, FSBG checked pre-procedure= 252. MD to bedside prior to procedure.     0855 Pt to recovery area s/p epidural & updates received from procedure RN. VSS. Pt tolerating PO fluids & snacks with no C/O N/V. Dr. Ayala to bedside to evaluate pt post-procedure.     0915 Pt ambulates without difficulty & meets criteria for DC. All belongings with pt. RN assisted pt off unit to appointed .

## 2022-02-17 ENCOUNTER — TELEPHONE (OUTPATIENT)
Dept: PHYSICAL MEDICINE AND REHAB | Facility: MEDICAL CENTER | Age: 62
End: 2022-02-17
Payer: COMMERCIAL

## 2022-02-17 NOTE — TELEPHONE ENCOUNTER
PAULM to call us back in regards to his SP that was done with Dr. Ayala dated 2/15/22 for his left L3-4 and L4-5 transforaminal epidural steroid injection with fluoroscopic guidance.

## 2022-02-28 NOTE — TELEPHONE ENCOUNTER
----- Message from NIRANJAN Butts sent at 6/7/2020  1:04 PM PDT -----  Please fax a copy to Dr. Anastasia Sandoval - endocrinology. thanks   History  Chief Complaint   Patient presents with    Knee Pain     Left knee pain since Saturday  Pt reports was doing a new exercise when it started  The patient is a 64-year-old female with history of hypertension and migraines who presents to the emergency department for evaluation left knee pain  The patient states she tried some new exercises on Saturday that her chiropractor advised that she do, and she had gradual onset of pain following starting these exercises  She states the pain is mostly on the inside of her knee  Bearing weight significantly worsens the pain  She states she has been using cold and warm compresses on her knee, which does help with the pain  She has not taken anything for the pain  She denies any further injuries at this time  She additionally denies any numbness, tingling, redness or fever  History provided by:  Patient   used: No    Knee Pain  Associated symptoms: no back pain, no fever and no neck pain        Prior to Admission Medications   Prescriptions Last Dose Informant Patient Reported? Taking?    Alpha-D-Galactosidase (BEANO) TABS  Self Yes No   Sig: Take by mouth   Alum Hydroxide-Mag Trisilicate (GAVISCON) 50-48 2 MG CHEW  Self Yes No   Sig: Chew   Cholecalciferol 400 units TABS  Self Yes No   Sig: Take by mouth   Digestive Enzyme CAPS  Self Yes No   Sig: Take by mouth   Homeopathic Products (ZICAM ALLERGY RELIEF NA)  Self Yes No   Sig: into each nostril   Minoxidil (ROGAINE WOMENS) 5 % FOAM  Self Yes No   Sig: Apply topically   Multiple Vitamins-Minerals (CENTRUM SILVER ULTRA WOMENS PO)  Self Yes No   Sig: Take by mouth   Premarin vaginal cream   No No   Sig: Insert 1 g into the vagina once a week Brand Necessary   ascorbic acid (VITAMIN C) 500 mg tablet  Self Yes No   Sig: Take by mouth   ciclopirox (PENLAC) 8 % solution  Self Yes No   Sig: Apply topically   diltiazem (CARDIZEM CD) 240 mg 24 hr capsule   No No   Sig: Take 1 capsule (240 mg total) by mouth daily May give patient  the stock bottle   famotidine (PEPCID) 10 mg tablet  Self Yes No   Sig: Take 10 mg by mouth 2 (two) times a day   fluticasone (FLONASE) 50 mcg/act nasal spray  Self No No   Si sprays into each nostril daily   lactase (LACTAID) 3,000 units tablet  Self Yes No   Sig: Take by mouth   meclizine (ANTIVERT) 25 mg tablet   No No   Sig: Take 1 tablet (25 mg total) by mouth every 8 (eight) hours as needed for dizziness   polyethylene glycol-propylene glycol (SYSTANE) 0 4-0 3 %  Self Yes No      Facility-Administered Medications: None       Past Medical History:   Diagnosis Date    Acne     Basal cell carcinoma 2020    right lower forehead    BCC (basal cell carcinoma of skin) 2020    mid forehead    Benign neoplasm of skin     Disease of thyroid gland     GERD (gastroesophageal reflux disease)     Hypertension     Inflamed seborrheic keratosis     Irritable bowel syndrome     Malignant neoplasm of skin of face     Migraines     Nonmelanoma skin cancer     Last Assessed:17    Squamous cell skin cancer 2020    In situ, left lower forehead    Tachycardia     Telogen effluvium     Temporomandibular joint disorder     Vertigo        Past Surgical History:   Procedure Laterality Date    APPENDECTOMY      CHOLECYSTECTOMY      COLONOSCOPY  2019    COMPLEX WOUND CLOSURE TO EXTREMITY N/A 2020    Procedure: COMPLEX CLOSURE MID FOREHEAD;  Surgeon: Miriam Marquez MD;  [facility];  Service: Plastics    ESOPHAGOGASTRODUODENOSCOPY  2009    FLAP LOCAL HEAD / NECK N/A 2020    Procedure: FLAP MID FOREHEAD;  Surgeon: Miriam Marquez MD;  [facility];  Service: Plastics    HYSTERECTOMY  1987    MALIGNANT SKIN LESION EXCISION      [facility] Lesion [facility]    MOHS RECONSTRUCTION N/A 2020    Procedure: RECONSTRUCTION MOHS DEFECT MID FOREHEAD;  Surgeon: David Broussard Tiesha Avila MD;  [facility];  Service: Plastics    MOHS SURGERY  07/27/2020    Right &left lower forehead, mid forehead    OOPHORECTOMY Bilateral 1987    ROTATOR CUFF REPAIR Right     SKIN BIOPSY      TUBAL LIGATION  1976? Family History   Problem Relation Age of Onset    Hypertension Mother     Osteoporosis Mother     Skin cancer Mother    [de-identified] Migraines Mother    [de-identified] Dementia Mother     Hypertension Father     Skin cancer Father     Dementia Father     Uterine cancer Maternal Grandmother 34    Cancer Paternal Grandmother     Uterine cancer Paternal Grandmother 72    Cancer Paternal Aunt     Uterine cancer Paternal Aunt 53    Diabetes type II Maternal Grandfather     Skin cancer Sister 68    Migraines Sister     No Known Problems Daughter     No Known Problems Paternal Aunt     No Known Problems Paternal Aunt     No Known Problems Maternal Aunt      I have reviewed and agree with the history as documented  E-Cigarette/Vaping    E-Cigarette Use Never User      E-Cigarette/Vaping Substances     Social History     Tobacco Use    Smoking status: Never Smoker    Smokeless tobacco: Never Used   Vaping Use    Vaping Use: Never used   Substance Use Topics    Alcohol use: No    Drug use: No       Review of Systems   Constitutional: Negative for chills and fever  HENT: Negative for ear pain and sore throat  Eyes: Negative for redness and visual disturbance  Respiratory: Negative for cough and shortness of breath  Cardiovascular: Negative for chest pain  Gastrointestinal: Negative for abdominal pain, diarrhea, nausea and vomiting  Genitourinary: Negative for dysuria and hematuria  Musculoskeletal: Positive for arthralgias  Negative for back pain, neck pain and neck stiffness  Skin: Negative for color change and rash  Neurological: Negative for dizziness, light-headedness and headaches  All other systems reviewed and are negative        Physical Exam  Physical Exam  Constitutional:       Appearance: Normal appearance  HENT:      Head: Normocephalic and atraumatic  Nose: Nose normal    Eyes:      Extraocular Movements: Extraocular movements intact  Conjunctiva/sclera: Conjunctivae normal    Pulmonary:      Effort: Pulmonary effort is normal  No respiratory distress  Musculoskeletal:      Cervical back: Normal range of motion and neck supple  Left hip: Normal       Left knee: Swelling present  Decreased range of motion  Tenderness present over the medial joint line  Normal pulse  Left ankle: Normal    Skin:     General: Skin is warm and dry  Neurological:      General: No focal deficit present  Mental Status: She is alert and oriented to person, place, and time  Vital Signs  ED Triage Vitals   Temperature Pulse Respirations Blood Pressure SpO2   02/28/22 1025 02/28/22 1027 02/28/22 1027 02/28/22 1027 02/28/22 1027   97 7 °F (36 5 °C) 99 20 (!) 185/81 99 %      Temp Source Heart Rate Source Patient Position - Orthostatic VS BP Location FiO2 (%)   02/28/22 1025 02/28/22 1027 02/28/22 1027 02/28/22 1027 --   Oral Monitor Sitting Right arm       Pain Score       02/28/22 1025       8           Vitals:    02/28/22 1027   BP: (!) 185/81   Pulse: 99   Patient Position - Orthostatic VS: Sitting         Visual Acuity      ED Medications  Medications - No data to display    Diagnostic Studies  Results Reviewed     None                 XR knee 4+ views LEFT   ED Interpretation by David Morrison PA-C (02/28 1255)   No acute osseous abnormality      Final Result by Patience Diaz MD (02/28 1443)      No acute osseous abnormality  Workstation performed: [id number]                    Procedures  Orthopedic injury treatment    Date/Time: 2/28/2022 3:27 PM  Performed by: David Morrison PA-C  Authorized by:  David Mrorison PA-C     Patient Location:  ED  Other Assisting Provider: No    Verbal consent obtained?: Yes    Risks and benefits: Risks, benefits and alternatives were discussed    Consent given by:  Patient  Patient states understanding of procedure being performed: Yes    Patient identity confirmed:  Verbally with patient  Injury location:  Knee  Location details:  Left knee  Injury type: Soft tissue  Neurovascular status: Neurovascularly intact    Immobilization:  Ace wrap and other (comment) (Cane)  Neurovascular status: Neurovascularly intact    Patient tolerance:  Patient tolerated the procedure well with no immediate complications             ED Course                                             MDM  Number of Diagnoses or Management Options  Left knee sprain: new and requires workup  Diagnosis management comments: Patient presents for evaluation of left-sided knee pain  Differential includes but is not limited to sprain versus contusion versus fracture versus dislocation versus Baker cyst versus arthritis versus septic arthritis  Exam findings are most consistent with probable soft tissue injury, possibly either MCL or medial meniscus  There is no drainable effusion on my exam   X-ray of knee does not show any acute osseous abnormalities  Results were discussed with the patient  Patient's left knee was wrapped with Ace wrap  The patient was provided with a cane to assist with ambulation  I advised elevation when possible and to continue to alternate heat and ice  Patient was given information for follow-up with Orthopedics  She was advised to return to the ED with any significantly worsening pain, swelling or if leg becomes erythematous or she develops fever  She acknowledged understanding  At the time of discharge, the patient is ambulating with a steady gait utilizing a cane  Patient is stable for discharge         Amount and/or Complexity of Data Reviewed  Tests in the radiology section of CPT®: ordered and reviewed  Decide to obtain previous medical records or to obtain history from someone other than the patient: yes  Review and summarize past medical records: yes    Risk of Complications, Morbidity, and/or Mortality  Presenting problems: low  Diagnostic procedures: low  Management options: low    Patient Progress  Patient progress: stable      Disposition  Final diagnoses:   Left knee sprain     Time reflects when diagnosis was documented in both MDM as applicable and the Disposition within this note     Time User Action Codes Description Comment    2/28/2022 12:57 PM Marvin Mcdaniels Add [J59  92XA] Left knee sprain       ED Disposition     ED Disposition Condition Date/Time Comment    Discharge Stable Mon Feb 28, 2022 12:57 PM Arlene Hennessy discharge to home/self care              Follow-up Information     Follow up With Specialties Details Why Contact Info Additional [address] [facility] Schedule an appointment as soon as possible for a visit   [address] [facility] [phone]  [address] [place] [address] [facility] [place], [facility] [address], [address], [place], [place], [address] [facility] Emergency Department Emergency Medicine  If symptoms worsen [address] [facility] [id number] [place] [address] Emergency Department, [address], [place], [place], [address]          Discharge Medication List as of 2/28/2022 12:58 PM      CONTINUE these medications which have NOT CHANGED    Details   Alpha-D-Galactosidase (BEANO) TABS Take by mouth, Historical Med      Alum Hydroxide-Mag Trisilicate (GAVISCON) 93-32 8 MG CHEW Chew, Historical Med      ascorbic acid (VITAMIN C) 500 mg tablet Take by mouth, Historical Med      Cholecalciferol 400 units TABS Take by mouth, Historical Med      ciclopirox (PENLAC) 8 % solution Apply topically, Historical Med      Digestive Enzyme CAPS Take by mouth, Historical Med diltiazem (CARDIZEM CD) 240 mg 24 hr capsule Take 1 capsule (240 mg total) by mouth daily May give patient  the stock bottle, Starting Thu 12/2/2021, Normal      famotidine (PEPCID) 10 mg tablet Take 10 mg by mouth 2 (two) times a day, Historical Med      fluticasone (FLONASE) 50 mcg/act nasal spray 2 sprays into each nostril daily, Starting Mon 8/20/2018, Normal      Homeopathic Products (ZICAM ALLERGY RELIEF NA) into each nostril, Historical Med      lactase (LACTAID) 3,000 units tablet Take by mouth, Historical Med      meclizine (ANTIVERT) 25 mg tablet Take 1 tablet (25 mg total) by mouth every 8 (eight) hours as needed for dizziness, Starting Sat 2/12/2022, Normal      Minoxidil (ROGAINE WOMENS) 5 % FOAM Apply topically, Historical Med      Multiple Vitamins-Minerals (CENTRUM SILVER ULTRA WOMENS PO) Take by mouth, Historical Med      polyethylene glycol-propylene glycol (SYSTANE) 0 4-0 3 % Historical Med      Premarin vaginal cream Insert 1 g into the vagina once a week Brand Necessary, Starting Wed 1/12/2022, Normal             No discharge procedures on file      [address] Review     None          ED Provider  Electronically Signed by           Artie Owen PA-C  02/28/22 51 Yoli Cook PA-C  02/28/22 1538

## 2022-03-01 ENCOUNTER — OFFICE VISIT (OUTPATIENT)
Dept: PHYSICAL MEDICINE AND REHAB | Facility: MEDICAL CENTER | Age: 62
End: 2022-03-01
Payer: COMMERCIAL

## 2022-03-01 VITALS
DIASTOLIC BLOOD PRESSURE: 82 MMHG | BODY MASS INDEX: 24.88 KG/M2 | WEIGHT: 167.99 LBS | HEIGHT: 69 IN | SYSTOLIC BLOOD PRESSURE: 118 MMHG | HEART RATE: 82 BPM | OXYGEN SATURATION: 95 % | TEMPERATURE: 97.7 F

## 2022-03-01 DIAGNOSIS — G89.29 CHRONIC LEFT-SIDED LOW BACK PAIN WITH LEFT-SIDED SCIATICA: ICD-10-CM

## 2022-03-01 DIAGNOSIS — M54.42 CHRONIC LEFT-SIDED LOW BACK PAIN WITH LEFT-SIDED SCIATICA: ICD-10-CM

## 2022-03-01 DIAGNOSIS — R53.1 WEAKNESS: ICD-10-CM

## 2022-03-01 DIAGNOSIS — M54.16 LEFT LUMBAR RADICULOPATHY: ICD-10-CM

## 2022-03-01 DIAGNOSIS — M62.552 ATROPHY OF MUSCLE OF LEFT THIGH: ICD-10-CM

## 2022-03-01 DIAGNOSIS — M47.816 LUMBAR SPONDYLOSIS: ICD-10-CM

## 2022-03-01 DIAGNOSIS — M54.16 LUMBAR RADICULOPATHY: ICD-10-CM

## 2022-03-01 PROCEDURE — 99214 OFFICE O/P EST MOD 30 MIN: CPT | Performed by: PHYSICAL MEDICINE & REHABILITATION

## 2022-03-01 ASSESSMENT — PATIENT HEALTH QUESTIONNAIRE - PHQ9
CLINICAL INTERPRETATION OF PHQ2 SCORE: 1
5. POOR APPETITE OR OVEREATING: 0 - NOT AT ALL
SUM OF ALL RESPONSES TO PHQ QUESTIONS 1-9: 4

## 2022-03-01 ASSESSMENT — FIBROSIS 4 INDEX: FIB4 SCORE: 0.69

## 2022-03-01 ASSESSMENT — PAIN SCALES - GENERAL: PAINLEVEL: 7=MODERATE-SEVERE PAIN

## 2022-03-01 NOTE — PATIENT INSTRUCTIONS
Your procedure will be at the [facility] special procedure suite.    [facility] [place] [address]       PRE-PROCEDURE INSTRUCTIONS  You may take your regular medications except:   · No Anti-inflammatories 5 days prior to your procedure. Anti-inflammatories include medicines such as  ibuprofen (Motrin, Advil), Excedrin, Naproxen (Aleve, Anaprox, Naprelan, Naprosyn), Celecoxib (Celebrex), Diclofenac (Voltaren-XR tab), and Meloxicam (Mobic).   · You can take the remainder of your pain medications as prescribed.   · If you are having a diagnostic procedure such as a medial branch block, do not use her pain meds on the day of the procedure  · No Glucophage or Metformin 24 hours before your procedure. You may resume next day after your procedure.  · Call the physiatry office if you are taking or prescribed anti-biotics within five days of procedure.  · Please ask provider if you are taking any new diabetes medication.  · CONTINUE TAKING BLOOD PRESSURE MEDICATIONS AS PRESCRIBED.  · Pain medications will not be prescribed on the procedure day. Procedural pain medication may be used by your provider   · Call your doctor's office performing the procedure if you have a fever, chills, rash or new illness prior to your procedure    Anticoagulation/antiplatelet medications  No Blood thinning medications such as Coumadin, Xarelto, aspirin or Plavix 5 days prior to procedure unless your doctor said to continue these medications. Call your doctor if a new medication is prescribed in this class.     Restrictions for eating before procedure:   · If you are getting procedural sedation, then do not eat to for 8 hours prior to procedure appointment time. Do not drink fluids for four hours prior to your procedure time.   · If you are not having procedural sedation, then Skip the meal prior to your procedure. If you have a morning procedure then skip breakfast. If you have an afternoon procedure then skip lunch.   · You  may drink clear liquids up to 2 hours prior to your procedure  · You must have a  the day of procedure to accompany you home.      POST PROCEDURE INSTRUCTIONS   · No heavy lifting, strenuous bending or strenuous exercise for 3 days after your procedure.  · No hot tubs, baths, swimming for 3 days after your procedure  · You can remove the bandage the day after the procedure.  · IF YOU RECEIVED A STEROID INJECTION. PLEASE NOTE THAT THERE MAY BE A DELAY FOR THE INJECTION TO START WORKING, THE DELAY MAY BE UP TO TWO WEEKS. IF YOU HAVE DIABETES, PLEASE NOTE THAT YOUR SUGAR LEVELS MAY BE ELEVATED FOR 1-2 DAYS AFTER A STEROID INJECTION.  THE STEROID MAY CAUSE TEMPORARY SYMPTOMS WHICH USUALLY RESOLVE ON THEIR OWN WITHIN 1 TO 2 DAYS INCLUDING FACIAL FLUSHING OR A FEELING OF WARMTH ON THE FACE, TEMPORARY INCREASES IN BLOOD SUGAR, INSOMNIA, INCREASED HUNGER  · IF YOU EXPERIENCE PROLONGED WEAKNESS LONGER THAN ONE DAY, BOWEL OR BLADDER INCONTINENCE THEN PLEASE CALL THE PHYSIATRY OFFICE.  · Your leg may feel heavy, weak and numb for up to 1-2 days. Be very careful walking.   ·  You may resume normal activities 3 days after procedure.

## 2022-03-01 NOTE — PROGRESS NOTES
Follow up patient note  Interventional spine and Pain  Physiatry (physical medicine and  Rehabilitation)       Chief complaint:   Chief Complaint   Patient presents with   • Back Pain          HISTORY    Please see new patient note by Dr Ayala,  for more details.     HPI  Patient identification: Barron Hewitt ,  1960,   With Diagnoses of Lumbar radiculopathy, Lumbar spondylosis, Chronic left-sided low back pain with left-sided sciatica, Left lumbar radiculopathy, Weakness, and Atrophy of muscle of left thigh were pertinent to this visit.     procedures   2/15/2022 left Lumbar Transforaminal Epidural Steroid  at the L3-4 and L4-5 levels.       He goes on walks for about a mile per day which is a big improvement for him. Prior to the injury he was walking about [id number] steps per day.     Now he is having intermittent pain which is happening several times per day when the pain is present is 8 of 10 intensity.  Currently the pain is 3 out of 10 intensity.  He notes a significant improvement after the epidural steroid injection but now his pain is intermittent previous of this was constant.  Previously had a constant 10 of 10 pain.    Medications tried include zanaflex, nsaids, tramadol, norco     ROS Red Flags :   Fever, Chills, Sweats: Denies  Involuntary Weight Loss: Denies  Bowel/Bladder Incontinence: Denies  Saddle Anesthesia: Denies        PMHx:   Past Medical History:   Diagnosis Date   • Abnormal nuclear cardiac imaging test 2014   • Allergy    • Anesthesia     PONV   • ASTHMA    • CHEST PAIN 6/15/2011   • Chest pain at rest 2014   • Coronary-myocardial bridge 2012   • Diabetes 2009    insulin and oral meds   • Hyperlipidemia    • Hypertension    • Mild intermittent asthma without complication 2017   • Myocardial bridge     cardiologist, Dr. Valverde   • Sleep apnea     has CPAP   • Snoring        PSHx:   Past Surgical History:   Procedure Laterality Date   • [place] NJX  AA&/STRD TFRML EPI LUMBAR/SACRAL 1 LEVEL Left 2/15/2022    Procedure: LEFT L3-4 and L4-5 transforaminal epidural steroid injection with fluoroscopic guidance;  Surgeon: Dragan Ayala M.D.;  [facility];  Service: Pain Management   • SHOULDER DECOMPRESSION ARTHROSCOPIC Left 1/4/2018    Procedure: SHOULDER DECOMPRESSION ARTHROSCOPIC - SUBACROMIAL;  Surgeon: Linwood Grover M.D.;  Location: [facility];  Service: Orthopedics   • SHOULDER ARTHROSCOPY W/ BICIPITAL TENODESIS REPAIR Left 1/4/2018    Procedure: SHOULDER ARTHROSCOPY W/ BICIPITAL Tenotomy REPAIR;  Surgeon: Linwood Grover M.D.;  Location: [facility];  Service: Orthopedics   • CLAVICLE DISTAL EXCISION Left 1/4/2018    Procedure: CLAVICLE DISTAL EXCISION - POSSIBLE;  Surgeon: Linwood Grover M.D.;  Location: [facility];  Service: Orthopedics   • RECOVERY  4/8/2016    Procedure: CATH LAB [facility] WITH POSSIBLE NAVIN;  Surgeon: [facility];  [place] [facility] [place]  Service:    • VENTRAL HERNIA REPAIR LAPAROSCOPIC  11/1/2012    Performed by Marcos Ramírez M.D. at [facility]   • KNEE ARTHROSCOPY  1990's    right   • SHOULDER ARTHROSCOPY  1990's    right   • SINUSOTOMIES  1990's    times two surgeries   • TUMOR EXCISION WITH BIOPSY  1990's    right hand - thumb       Family history   Family History   Problem Relation Age of Onset   • Breast Cancer Mother    • Hypertension Mother    • Cancer Mother         breast   • Heart Disease Mother         hx of bypass   • Hypertension Father    • Arthritis Father    • Diabetes Father         prediabetes   • No Known Problems Sister    • Arthritis Brother    • Heart Disease Other    • Hypertension Other    • Cancer Other    • No Known Problems Brother          Medications:   Outpatient Medications Marked as Taking for the 3/1/22 encounter (Office Visit) with Dragan Ayala M.D.   Medication Sig  Dispense Refill   • metFORMIN (GLUCOPHAGE) 500 MG Tab Take 500 mg by mouth 2 times a day with meals.     • benazepril (LOTENSIN) 20 MG Tab TAKE 1 TABLET DAILY 90 Tablet 3   • albuterol (VENTOLIN HFA) 108 (90 Base) MCG/ACT Aero Soln inhalation aerosol Inhale 2 Puffs every four hours as needed. FOR SHORTNESS OF BREATH 18 g 5   • fluticasone-salmeterol (ADVAIR DISKUS) 500-50 MCG/DOSE AEROSOL POWDER, BREATH ACTIVATED Inhale 1 Puff every 12 hours. 3 Each 3   • finasteride (PROSCAR) 5 MG Tab Take 1 Tablet by mouth every day. 90 Tablet 3   • levothyroxine (SYNTHROID) 75 MCG Tab Take 1 Tablet by mouth every morning on an empty stomach. Taking only one d per week.  50 mcg all other days. 12 Tablet 3   • levothyroxine (SYNTHROID) 50 MCG Tab Take one pill 6 days per week on empty stomach.  75 mcg on 7th day. 90 Tablet 3   • atorvastatin (LIPITOR) 80 MG tablet Take 1 Tablet by mouth every evening. 90 Tablet 3   • tizanidine (ZANAFLEX) 4 MG Tab Take 1 Tablet by mouth every 6 hours as needed (muscle spasms). 30 Tablet 2   • EPINEPHrine (EPIPEN) 0.3 MG/0.3ML Solution Auto-injector solution for injection Inject 0.3 mL into the thigh one time for 1 dose. 1 Each 0   • Continuous Blood Gluc Sensor (FREESTYLE LUNA 14 DAY SENSOR) Misc 1 Application every 14 days. 6 Each 3   • Dulaglutide (TRULICITY) 3 MG/0.5ML Solution Pen-injector Inject 1 Dose under the skin every 7 days. 2 mL 11   • montelukast (SINGULAIR) 10 MG Tab Take 1 tablet by mouth every evening. 90 tablet 3   • Canagliflozin (INVOKANA) 300 MG Tab Take 1 tablet by mouth every day. 90 tablet 3   • DILTIAZem CD (CARTIA XT) 180 MG CAPSULE SR 24 HR Take 1 capsule by mouth every day. 90 capsule 3   • tamsulosin (FLOMAX) 0.4 MG capsule Take 1 capsule by mouth 1/2 hour after breakfast. 30 capsule 4   • NON SPECIFIED CPAP supplies through [facility] 1 Each 1   • ALPHA LIPOIC ACID PO Take 600 mg by mouth 2 Times a Day.     • Omega-3 Fatty Acids (FISH OIL) 1200 MG CAPS Take   by mouth.     • Cinnamon 500 MG CAPS Take 1,000 mg by mouth.     • aspirin EC (ECOTRIN) 81 MG TBEC Take 81 mg by mouth every day.       • pantoprazole (PROTONIX) 40 MG TBEC Take 40 mg by mouth every day.       • Coenzyme Q10 200 MG Cap Take  by mouth every day.       • Cholecalciferol (VITAMIN D) 2000 UNIT TABS Take  by mouth 2 times a day.     • cetirizine (ZYRTEC) 10 MG Tab Take 10 mg by mouth every day.          Current Outpatient Medications on File Prior to Visit   Medication Sig Dispense Refill   • metFORMIN (GLUCOPHAGE) 500 MG Tab Take 500 mg by mouth 2 times a day with meals.     • benazepril (LOTENSIN) 20 MG Tab TAKE 1 TABLET DAILY 90 Tablet 3   • albuterol (VENTOLIN HFA) 108 (90 Base) MCG/ACT Aero Soln inhalation aerosol Inhale 2 Puffs every four hours as needed. FOR SHORTNESS OF BREATH 18 g 5   • fluticasone-salmeterol (ADVAIR DISKUS) 500-50 MCG/DOSE AEROSOL POWDER, BREATH ACTIVATED Inhale 1 Puff every 12 hours. 3 Each 3   • finasteride (PROSCAR) 5 MG Tab Take 1 Tablet by mouth every day. 90 Tablet 3   • levothyroxine (SYNTHROID) 75 MCG Tab Take 1 Tablet by mouth every morning on an empty stomach. Taking only one d per week.  50 mcg all other days. 12 Tablet 3   • levothyroxine (SYNTHROID) 50 MCG Tab Take one pill 6 days per week on empty stomach.  75 mcg on 7th day. 90 Tablet 3   • atorvastatin (LIPITOR) 80 MG tablet Take 1 Tablet by mouth every evening. 90 Tablet 3   • tizanidine (ZANAFLEX) 4 MG Tab Take 1 Tablet by mouth every 6 hours as needed (muscle spasms). 30 Tablet 2   • EPINEPHrine (EPIPEN) 0.3 MG/0.3ML Solution Auto-injector solution for injection Inject 0.3 mL into the thigh one time for 1 dose. 1 Each 0   • Continuous Blood Gluc Sensor (FREESTYLE LUNA 14 DAY SENSOR) Misc 1 Application every 14 days. 6 Each 3   • Dulaglutide (TRULICITY) 3 MG/0.5ML Solution Pen-injector Inject 1 Dose under the skin every 7 days. 2 mL 11   • montelukast (SINGULAIR) 10 MG Tab Take 1 tablet by mouth every  evening. 90 tablet 3   • Canagliflozin (INVOKANA) 300 MG Tab Take 1 tablet by mouth every day. 90 tablet 3   • DILTIAZem CD (CARTIA XT) 180 MG CAPSULE SR 24 HR Take 1 capsule by mouth every day. 90 capsule 3   • tamsulosin (FLOMAX) 0.4 MG capsule Take 1 capsule by mouth 1/2 hour after breakfast. 30 capsule 4   • NON SPECIFIED CPAP supplies through [facility] 1 Each 1   • ALPHA LIPOIC ACID PO Take 600 mg by mouth 2 Times a Day.     • Omega-3 Fatty Acids (FISH OIL) 1200 MG CAPS Take  by mouth.     • Cinnamon 500 MG CAPS Take 1,000 mg by mouth.     • aspirin EC (ECOTRIN) 81 MG TBEC Take 81 mg by mouth every day.       • pantoprazole (PROTONIX) 40 MG TBEC Take 40 mg by mouth every day.       • Coenzyme Q10 200 MG Cap Take  by mouth every day.       • Cholecalciferol (VITAMIN D) 2000 UNIT TABS Take  by mouth 2 times a day.     • cetirizine (ZYRTEC) 10 MG Tab Take 10 mg by mouth every day.     • albuterol 108 (90 Base) MCG/ACT Aero Soln inhalation aerosol Inhale 2 Puffs every four hours as needed for Shortness of Breath. Pt prefers ventolin if possible. Thank you 3 Each 3   • albuterol 108 (90 Base) MCG/ACT Aero Soln inhalation aerosol Inhale 2 Puffs every four hours as needed for Shortness of Breath. 1 Each 4     No current facility-administered medications on file prior to visit.         Allergies:   Allergies   Allergen Reactions   • Kiwi Extract Anaphylaxis   • Shellfish Allergy Anaphylaxis   • Strawberry Anaphylaxis   • Ozempic (0.25 Or 0.5 Mg-Dose) [Semaglutide] Unspecified     Pt does not recall any reaction   • Sulfa Drugs Rash and Unspecified   • Testosterone Rash       Social Hx:   Social History     Socioeconomic History   • Marital status:      Spouse name: Not on file   • Number of children: Not on file   • Years of education: Not on file   • Highest education level: Not on file   Occupational History   • Not on file   Tobacco Use   • Smoking status: Former Smoker     Packs/day: 0.00      "Types: Cigars   • Smokeless tobacco: Never Used   • Tobacco comment: occasional cigars   Vaping Use   • Vaping Use: Never used   Substance and Sexual Activity   • Alcohol use: Yes     Comment: rare - 1-2 per month (social)   • Drug use: Yes     Types: Marijuana     Comment: occ cigar smoking   • Sexual activity: Not on file     Comment: ; 2 biological kids (2 of his wife's); 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   Other Topics Concern   • Not on file   Social History Narrative   • Not on file     Social Determinants of Health     Financial Resource Strain: Not on file   Food Insecurity: Not on file   Transportation Needs: Not on file   Physical Activity: Not on file   Stress: Not on file   Social Connections: Not on file   Intimate Partner Violence: Not on file   Housing Stability: Not on file         EXAMINATION     Physical Exam:   Vitals: /82 (BP Location: Right arm, Patient Position: Sitting, BP Cuff Size: Adult)   Pulse 82   Temp 36.5 °C (97.7 °F) (Temporal)   Ht 1.753 m (5' 9\")   Wt 76.2 kg (167 lb 15.9 oz)   SpO2 95%     Constitutional:   Body Habitus: Body mass index is 24.81 kg/m².  Cooperation: Fully cooperates with exam  Appearance: Well-groomed no disheveled    Respiratory-  breathing comfortable on room air, no audible wheezing  Cardiovascular- capillary refills less than 2 seconds. No lower extremity edema is noted.   Psychiatric- alert and oriented ×3. Normal affect.    MSK and Neuro: -      Thoracic/Lumbar Spine/Sacral Spine/Hips   decreased active range of motion with flexion, lateral flexion, and rotation bilaterally.   There is decreased active range of motion with lumbar extension.      Palpation:   No tenderness to palpation in midline at T1-T12 levels. No tenderness to palpation in the left and right of the midline T1-L5, NEGATIVE for tenderness to palpation to the para-midline region in the lower lumbar levels.  palpation over SI joint: negative bilaterally  "   palpation in hip or over the gluteus medius tendon insertion: negative bilaterally      Lumbar spine Special tests  Neuro tension  Straight leg test negative right, positive left    Slump test negative right, positive left      Key points for the international standards for neurological classification of spinal cord injury (ISNCSCI) to light touch.     Dermatome R L                                      L2 2 2   L3 2 2   L4 2 2   L5 2 2   S1 2 2   S2 2 2         Motor Exam Lower Extremities    ? Myotome R L   Hip flexion L2 5 5   Knee extension L3 5 4+   Ankle dorsiflexion L4 5 4+   Toe extension L5 5 5   Ankle plantarflexion S1 5 5                 MEDICAL DECISION MAKING    DATA    Labs:   Lab Results   Component Value Date/Time    SODIUM 135 06/09/2021 01:13 PM    POTASSIUM 3.6 06/09/2021 01:13 PM    CHLORIDE 100 06/09/2021 01:13 PM    CO2 24 06/09/2021 01:13 PM    GLUCOSE 113 (H) 06/09/2021 01:13 PM    BUN 12 06/09/2021 01:13 PM    CREATININE 0.74 06/09/2021 01:13 PM    CREATININE 1.1 07/10/2008 09:25 AM        Lab Results   Component Value Date/Time    PROTHROMBTM 13.3 12/22/2017 09:26 AM    INR 1.04 12/22/2017 09:26 AM        Lab Results   Component Value Date/Time    WBC 9.7 06/04/2020 02:18 PM    RBC 6.24 (H) 06/04/2020 02:18 PM    HEMOGLOBIN 18.5 (H) 06/04/2020 02:18 PM    HEMATOCRIT 54.6 (H) 06/04/2020 02:18 PM    MCV 87.5 06/04/2020 02:18 PM    MCH 29.6 06/04/2020 02:18 PM    MCHC 33.9 06/04/2020 02:18 PM    MPV 10.7 06/04/2020 02:18 PM    NEUTSPOLYS 67.60 06/04/2020 02:18 PM    LYMPHOCYTES 17.60 (L) 06/04/2020 02:18 PM    MONOCYTES 9.80 06/04/2020 02:18 PM    EOSINOPHILS 3.50 06/04/2020 02:18 PM    BASOPHILS 1.20 06/04/2020 02:18 PM    HYPOCHROMIA 1+ 08/27/2013 07:13 AM        Lab Results   Component Value Date/Time    HBA1C 7.2 (A) 11/16/2021 02:36 PM          Imaging:   I personally reviewed following images    MRI lumbar spine 7/27/2021 with moderate left neuroforaminal stenosis at L3-4 and L4-5 with  mild right neuroforaminal stenosis at L3-4 and L4-5.  Mild to moderate bilateral neuroforaminal stenosis at L5-S1.  There is facet arthropathy bilaterally at L3-4, L4-5, L5-S1        I reviewed the following radiology reports                         Results for orders placed during the hospital encounter of 07/27/21    MR-LUMBAR SPINE-W/O    Impression  1.  L4-5 annular disc bulge with a central disc protrusion and bilateral facet degeneration. There is borderline central canal narrowing. There is moderate mild right neural foraminal narrowing.    2.  L3-4 annular disc bulge with a left lateral disc protrusion. There is moderate to severe left and mild right neural foraminal narrowing again seen.    3.  L5-S1 degenerative disc disease and facet degeneration results in moderate bilateral neuroforaminal narrowing.        Results for orders placed during the hospital encounter of 07/27/21    MR-LUMBAR SPINE-W/O    Impression  1.  L4-5 annular disc bulge with a central disc protrusion and bilateral facet degeneration. There is borderline central canal narrowing. There is moderate mild right neural foraminal narrowing.    2.  L3-4 annular disc bulge with a left lateral disc protrusion. There is moderate to severe left and mild right neural foraminal narrowing again seen.    3.  L5-S1 degenerative disc disease and facet degeneration results in moderate bilateral neuroforaminal narrowing.      Results for orders placed during the hospital encounter of 07/27/21    MR-MRA NECK-W/O    Impression  There is no significant stenosis in the visualized extracranial neck arteries.                                                                   Results for orders placed during the hospital encounter of 05/01/17    CT-ABDOMEN-PELVIS WITH    Impression  1.  Findings suggestive of early or low grade obstruction in the mid to distal small bowel. Enteritis with associated ileus could also give a similar appearance.  2.  LEFT calyceal  stone                       Results for orders placed during the hospital encounter of 17    DX-CHEST-2 VIEWS    Impression  Negative two views of the chest.                          Electrodiagnostics   2022 EMG  IMPRESSION:  This is an abnormal electrodiagnostic study due to evidence of chronic reinnervation changes in the left vastus lateralis/medialis with mild active denervation changes noted.  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       There is no strong electrodiagnostic evidence of a an active right lumbosacral plexopathy though given some responses are unobtainable a mild lumbar plexopathy/femoral neuropathy may be present.  There is no EMG evidence of a right lumbosacral radiculopathy.                                         DIAGNOSIS   Visit Diagnoses     ICD-10-CM   1. Lumbar radiculopathy  M54.16   2. Lumbar spondylosis  M47.816   3. Chronic left-sided low back pain with left-sided sciatica  M54.42    G89.29   4. Left lumbar radiculopathy  M54.16   5. Weakness  R53.1   6. Atrophy of muscle of left thigh  M62.552         ASSESSMENT and PLAN:     Barron Hewitt  1960 male      Barron was seen today for back pain.    Diagnoses and all orders for this visit:    Lumbar radiculopathy  -     Referral to Physical Medicine Rehab    Lumbar spondylosis    Chronic left-sided low back pain with left-sided sciatica    Left lumbar radiculopathy    Weakness    Atrophy of muscle of left thigh        I reviewed the fluoroscopic images showing successful left L3-4 and L4-5 transforaminal epidural steroid injection.     The patient had significant improvement in his baseline pain he still has intermittent pain.  Strength is been improving.  His exercises been improving.  These are still not back to baseline.  He still  has moderate to severe pain which can happen throughout the day but these are now happening intermittently.  Overall the patient is very happy with the results of the epidural steroid injection.  The strength is also improving but he still does have weakness.  We discussed emergency precautions.    We discussed an additional epidural steroid injection the patient is amenable to this.    I have ordered a left L3-4 and L4-5 transforaminal epidural steroid injection    The risks benefits and alternatives to this procedure were discussed and the patient wishes to proceed with the procedure. Risks include but are not limited to damage to surrounding structures, infection, bleeding, worsening of pain which can be permanent, weakness which can be permanent. Benefits include pain relief, improved function. Alternatives includes not doing the procedure.      Continue Zanaflex as needed    Follow up: After the above procedure    Thank you for allowing me to participate in the care of this patient. If you have any questions please not hesitate to contact me.             Please note that this dictation was created using voice recognition software. I have made every reasonable attempt to correct obvious errors but there may be errors of grammar and content that I may have overlooked prior to finalization of this note.      Dragan Ayala MD  Interventional Spine and Sports Physiatry  Physical Medicine and Rehabilitation  [facility]

## 2022-03-07 ENCOUNTER — APPOINTMENT (OUTPATIENT)
Dept: PHYSICAL MEDICINE AND REHAB | Facility: REHABILITATION | Age: 62
End: 2022-03-07
Payer: COMMERCIAL

## 2022-03-16 ENCOUNTER — TELEPHONE (OUTPATIENT)
Dept: PHYSICAL MEDICINE AND REHAB | Facility: MEDICAL CENTER | Age: 62
End: 2022-03-16
Payer: COMMERCIAL

## 2022-03-22 ENCOUNTER — TELEPHONE (OUTPATIENT)
Dept: MEDICAL GROUP | Facility: LAB | Age: 62
End: 2022-03-22

## 2022-03-22 ENCOUNTER — TELEPHONE (OUTPATIENT)
Dept: MEDICAL GROUP | Facility: LAB | Age: 62
End: 2022-03-22
Payer: COMMERCIAL

## 2022-03-22 ENCOUNTER — APPOINTMENT (OUTPATIENT)
Dept: ENDOCRINOLOGY | Facility: MEDICAL CENTER | Age: 62
End: 2022-03-22
Attending: NURSE PRACTITIONER
Payer: COMMERCIAL

## 2022-03-22 ENCOUNTER — HOSPITAL ENCOUNTER (OUTPATIENT)
Facility: REHABILITATION | Age: 62
End: 2022-03-22
Attending: PHYSICAL MEDICINE & REHABILITATION | Admitting: PHYSICAL MEDICINE & REHABILITATION
Payer: COMMERCIAL

## 2022-03-22 ENCOUNTER — APPOINTMENT (OUTPATIENT)
Dept: RADIOLOGY | Facility: REHABILITATION | Age: 62
End: 2022-03-22
Attending: PHYSICAL MEDICINE & REHABILITATION
Payer: COMMERCIAL

## 2022-03-22 VITALS
SYSTOLIC BLOOD PRESSURE: 121 MMHG | DIASTOLIC BLOOD PRESSURE: 74 MMHG | HEART RATE: 69 BPM | RESPIRATION RATE: 16 BRPM | OXYGEN SATURATION: 71 % | BODY MASS INDEX: 24.72 KG/M2 | WEIGHT: 166.89 LBS | TEMPERATURE: 96.4 F | HEIGHT: 69 IN

## 2022-03-22 PROCEDURE — A9576 INJ PROHANCE MULTIPACK: HCPCS

## 2022-03-22 PROCEDURE — 700117 HCHG RX CONTRAST REV CODE 255

## 2022-03-22 PROCEDURE — 700111 HCHG RX REV CODE 636 W/ 250 OVERRIDE (IP)

## 2022-03-22 PROCEDURE — 64483 NJX AA&/STRD TFRM EPI L/S 1: CPT

## 2022-03-22 PROCEDURE — 64484 NJX AA&/STRD TFRM EPI L/S EA: CPT

## 2022-03-22 RX ORDER — LIDOCAINE HYDROCHLORIDE 10 MG/ML
INJECTION, SOLUTION EPIDURAL; INFILTRATION; INTRACAUDAL; PERINEURAL
Status: COMPLETED
Start: 2022-03-22 | End: 2022-03-22

## 2022-03-22 RX ORDER — DEXAMETHASONE SODIUM PHOSPHATE 10 MG/ML
INJECTION, SOLUTION INTRAMUSCULAR; INTRAVENOUS
Status: COMPLETED
Start: 2022-03-22 | End: 2022-03-22

## 2022-03-22 RX ADMIN — DEXAMETHASONE SODIUM PHOSPHATE 10 MG: 10 INJECTION, SOLUTION INTRAMUSCULAR; INTRAVENOUS at 09:04

## 2022-03-22 RX ADMIN — LIDOCAINE HYDROCHLORIDE 10 ML: 10 INJECTION, SOLUTION EPIDURAL; INFILTRATION; INTRACAUDAL; PERINEURAL at 09:03

## 2022-03-22 RX ADMIN — GADOTERIDOL 5 ML: 279.3 INJECTION, SOLUTION INTRAVENOUS at 09:04

## 2022-03-22 ASSESSMENT — PAIN DESCRIPTION - PAIN TYPE: TYPE: NEUROPATHIC PAIN

## 2022-03-22 ASSESSMENT — FIBROSIS 4 INDEX: FIB4 SCORE: 0.69

## 2022-03-22 NOTE — TELEPHONE ENCOUNTER
MEDICATION PRIOR AUTHORIZATION NEEDED:    1. Name of Medication: INVOKANA    2. Requested By (Name of Pharmacy): [facility]     3. Is insurance on file current? yes    4. What is the name & phone number of the 3rd party payor?   APPROVED

## 2022-03-22 NOTE — TELEPHONE ENCOUNTER
PA for INVOKANA is not covered by pt's insurance  No PA - Pioglitazone 30 MG, Glipizide 10 MG   Basaglar Kwik pen 100  Pt has already tried Metformin 500

## 2022-03-22 NOTE — INTERVAL H&P NOTE
H&P reviewed. The patient was examined and there are no changes to the H&P     Lungs clear to auscultation bilaterally.  No abdominal tenderness.  Heart regular rate and rhythm.  Vitals reviewed.    Proceed with the originally scheduled procedure.  The risks, benefits and alternatives were discussed.  The patient understands these.    Pre-Op Diagnosis Codes:     * Lumbar radiculopathy [M54.16]  Procedure(s):  LEFT L3-4 and L4-5 transforaminal epidural steroid injection with fluoroscopic guidance    Dragan Ayala MD  Physical Medicine and Rehabilitation  Interventional Spine and Sports Physiatry  [facility]

## 2022-03-22 NOTE — PROGRESS NOTES
Discharge instructions reviewed again with reinforcement of infection prevention tips; ie:hand washing, covering cough,site  observation, Social distancing and mask use.  Review of ISELA and the importance of using CPAP. Taking fluids w/o difficulty. Dressing clean dry intact.   DC to  [facility]

## 2022-03-22 NOTE — OP REPORT
Date of Service: 3/22/2022     Patient: Barron Hewitt 61 y.o. male     MRN: [id number]     Physician/s: Dragan Ayala MD    Pre-operative Diagnosis: Lumbar radiculopathy    Post-operative Diagnosis: Lumbar radiculopathy    Procedure: left Lumbar Transforaminal Epidural Steroid  at the L3-4 and L4-5 levels.     Description of procedure:    The risks, benefits, and alternatives of the procedure were reviewed and discussed with the patient.  Written informed consent was freely obtained. A pre-procedural time-out was conducted by the physician verifying patient’s identity, procedure to be performed, procedure site and side, and allergy verification. Appropriate equipment was determined to be in place for the procedure.       The patient's vital signs were carefully monitored before, throughout, and after the procedure.     In the fluoroscopy suite the patient was placed in a prone position, a pillow placed underneath their umbilicus. The skin was prepped and draped in the usual sterile fashion.     The fluoroscope was placed over the lumbar spine and adjusted into the proper AP/Oblique view to enter the transforaminal space just adjacent to the pedicle at the levels below. The targets for injection were then marked at the left L3-4. A 27g 1.5 inch needle was placed into the marked site, and 1mL of 1% Lidocaine was injected subcutaneously into the epidermal and dermal layers. The needle was removed intact.  A 25g 3.5 inch spinal needle was then placed and advanced under fluoroscopic guidance in an oblique view towards the epidural space of the levels noted above. The needle position was confirmed to not be past the 6 o'clock position in the AP view and it was in the neuroforamen in the lateral view.        The fluoroscope was was then adjusted over the lumbar spine and adjusted into the proper AP/Oblique view to enter the transforaminal space adjacent to the pedicle at the LEFT  L4-5. A 27g 1.5 inch needle was  placed into the marked site, and 1mL of 1% Lidocaine was injected subcutaneously into the epidermal and dermal layers. The needle was removed intact.  A 25g 3.5 inch spinal needle was then placed and advanced under fluoroscopic guidance in an oblique view towards the epidural space of the levels noted above. The needle position was confirmed to not be past the 6 o'clock position in the AP view and it was in the neuroforamen in the lateral view.       Under live fluoroscopic guidance in the AP view, contrast dye was used.  The needles were then adjusted slightly, contrast dye was used to highlight the epidural space spread of each level above. Final fluoroscopic images were saved.  Following negative aspiration, 1mL of 1% lidocaine preservative free with 5mg dexamethasone   was then injected at each level above, and the needles were removed intact after restyleted. The patient's back was covered with a 4x4 gauze, the area was cleansed with sterile normal saline, and a dressing was applied. There were no complications noted.     The patient was then evaluated post-procedure, and was hemodynamically stable prior to leaving the post-operative care unit.     Follow-up as scheduled    Dragan Ayala MD  Interventional Spine and Pain  Physical Medicine and Rehabilitation  [facility]          CPT codes  Transforaminal epidural injection- lumbar or sacral (first level):  73225  Transforaminal epidural injection- lumbar or sacral (each additional level):  59344

## 2022-03-22 NOTE — PROGRESS NOTES
Admitting assessment completed w 2 identifiers. Pt stated procedure. Medical Hx, meds +allergies reviewed +systems: respiratory (diagnosed with sleep Apnea; however has not uses CPAP in 6 months),cardiac, off anticoagulants for 5 days+ noantibiotic therapy,no renal, surgeries, 2-5 pain, no falls, implants,diabetes FBS this am at home was 170,no  hepatitis. Confirmed Pt understanding of DC Instructions. Infection prevention including COVID precautions reviewed. Pt has  waiting.

## 2022-03-22 NOTE — PROGRESS NOTES
Preprocedure assessment completed.  Pertinent health information(DM2, HTN, asthma, ISELA) communicated to physician and staff prior to time out.  Patient positioned preprocedure by RN, CSTand XRAY.  Foam wedge under ankles for support.

## 2022-03-23 ENCOUNTER — TELEPHONE (OUTPATIENT)
Dept: PHYSICAL MEDICINE AND REHAB | Facility: MEDICAL CENTER | Age: 62
End: 2022-03-23
Payer: COMMERCIAL

## 2022-03-23 NOTE — TELEPHONE ENCOUNTER
PAULM to call us back in regards to his SP that was done with Dr. Ayala dated 3/22/22 for his left L3-4 and L4-5 transforaminal epidural steroid injection with fluoroscopic guidance.

## 2022-04-04 ENCOUNTER — OFFICE VISIT (OUTPATIENT)
Dept: PHYSICAL MEDICINE AND REHAB | Facility: MEDICAL CENTER | Age: 62
End: 2022-04-04
Payer: COMMERCIAL

## 2022-04-04 VITALS
WEIGHT: 168.43 LBS | BODY MASS INDEX: 24.95 KG/M2 | HEART RATE: 86 BPM | DIASTOLIC BLOOD PRESSURE: 68 MMHG | SYSTOLIC BLOOD PRESSURE: 118 MMHG | OXYGEN SATURATION: 96 % | HEIGHT: 69 IN | TEMPERATURE: 97.4 F

## 2022-04-04 DIAGNOSIS — M47.816 LUMBAR SPONDYLOSIS: ICD-10-CM

## 2022-04-04 DIAGNOSIS — M54.42 CHRONIC LEFT-SIDED LOW BACK PAIN WITH LEFT-SIDED SCIATICA: ICD-10-CM

## 2022-04-04 DIAGNOSIS — M54.16 LUMBAR RADICULOPATHY: ICD-10-CM

## 2022-04-04 DIAGNOSIS — G89.29 CHRONIC LEFT-SIDED LOW BACK PAIN WITH LEFT-SIDED SCIATICA: ICD-10-CM

## 2022-04-04 DIAGNOSIS — M62.552 ATROPHY OF MUSCLE OF LEFT THIGH: ICD-10-CM

## 2022-04-04 PROCEDURE — 99214 OFFICE O/P EST MOD 30 MIN: CPT | Performed by: PHYSICAL MEDICINE & REHABILITATION

## 2022-04-04 ASSESSMENT — PATIENT HEALTH QUESTIONNAIRE - PHQ9: CLINICAL INTERPRETATION OF PHQ2 SCORE: 0

## 2022-04-04 ASSESSMENT — PAIN SCALES - GENERAL: PAINLEVEL: 3=SLIGHT PAIN

## 2022-04-04 ASSESSMENT — FIBROSIS 4 INDEX: FIB4 SCORE: 0.69

## 2022-04-04 NOTE — Clinical Note
He did well after the epidural steroid injection.  I believe now he would tolerate physical therapy.  I placed a referral to PT.  He also wanted to discuss this with you.   I will plan to follow-up with him after PT

## 2022-04-04 NOTE — PROGRESS NOTES
"Follow up patient note  Interventional spine and Pain  Physiatry (physical medicine and  Rehabilitation)       Chief complaint:   Chief Complaint   Patient presents with   • Follow-Up     Back pain          HISTORY    Please see new patient note by Dr Ayala,  for more details.     HPI  Patient identification: Barron Hewitt ,  1960,   With Diagnoses of Lumbar radiculopathy, Lumbar spondylosis, Atrophy of muscle of left thigh, and Chronic left-sided low back pain with left-sided sciatica were pertinent to this visit.     procedures   2/15/2022 left Lumbar Transforaminal Epidural Steroid  at the L3-4 and L4-5 levels.   3/22/2022 left Lumbar Transforaminal Epidural Steroid  at the L3-4 and L4-5 levels 80% improved at the follow-up visit    He goes on walks for a minimum of mile per day which is a big improvement for him. Prior to the injury he was walking about [id number] steps per day.     He overall has a significant improvement in his pain. His pain is 2-3/10 in intensity radiating down the left leg. He does get intermittent \"zingers\" radiating down he left leg however this happens about once per day and is no longer causing functional deficits      Medications tried include zanaflex, nsaids, tramadol, norco     ROS Red Flags :   Fever, Chills, Sweats: Denies  Involuntary Weight Loss: Denies  Bowel/Bladder Incontinence: Denies  Saddle Anesthesia: Denies        PMHx:   Past Medical History:   Diagnosis Date   • Abnormal nuclear cardiac imaging test 2014   • Allergy    • Anesthesia     PONV   • ASTHMA    • CHEST PAIN 6/15/2011   • Chest pain at rest 2014   • Coronary-myocardial bridge 2012   • Diabetes 2009    insulin and oral meds   • Hyperlipidemia    • Hypertension    • Mild intermittent asthma without complication 2017   • Myocardial bridge     cardiologist, Dr. Valverde   • Sleep apnea     has CPAP   • Snoring        PSHx:   Past Surgical History:   Procedure Laterality Date "   • BLOCK EPIDURAL STEROID INJECTION Left 3/22/2022    Procedure: LEFT L3-4 and L4-5 transforaminal epidural steroid injection with fluoroscopic guidance;  Surgeon: Dragan Ayala M.D.;  [facility];  Service: Pain Management   • MO NJX AA&/STRD TFRML EPI LUMBAR/SACRAL 1 LEVEL Left 2/15/2022    Procedure: LEFT L3-4 and L4-5 transforaminal epidural steroid injection with fluoroscopic guidance;  Surgeon: Dragan Ayala M.D.;  [facility];  Service: Pain Management   • SHOULDER DECOMPRESSION ARTHROSCOPIC Left 1/4/2018    Procedure: SHOULDER DECOMPRESSION ARTHROSCOPIC - SUBACROMIAL;  Surgeon: Linwood Grover M.D.;  Location: [facility];  Service: Orthopedics   • SHOULDER ARTHROSCOPY W/ BICIPITAL TENODESIS REPAIR Left 1/4/2018    Procedure: SHOULDER ARTHROSCOPY W/ BICIPITAL Tenotomy REPAIR;  Surgeon: Linwood Grover M.D.;  Location: [facility];  Service: Orthopedics   • CLAVICLE DISTAL EXCISION Left 1/4/2018    Procedure: CLAVICLE DISTAL EXCISION - POSSIBLE;  Surgeon: Linwood Grover M.D.;  Location: [facility];  Service: Orthopedics   • RECOVERY  4/8/2016    Procedure: CATH LAB [facility] WITH POSSIBLE NAVIN;  Surgeon: Recoveryonly Surgery;  Location: [facility] PROC UNIT [facility];  Service:    • VENTRAL HERNIA REPAIR LAPAROSCOPIC  11/1/2012    Performed by Marcos Ramírez M.D. at [facility]   • KNEE ARTHROSCOPY  1990's    right   • SHOULDER ARTHROSCOPY  1990's    right   • SINUSOTOMIES  1990's    times two surgeries   • TUMOR EXCISION WITH BIOPSY  1990's    right hand - thumb       Family history   Family History   Problem Relation Age of Onset   • Breast Cancer Mother    • Hypertension Mother    • Cancer Mother         breast   • Heart Disease Mother         hx of bypass   • Hypertension Father    • Arthritis Father    • Diabetes Father         prediabetes   • No Known Problems Sister     • Arthritis Brother    • Heart Disease Other    • Hypertension Other    • Cancer Other    • No Known Problems Brother          Medications:   Outpatient Medications Marked as Taking for the 4/4/22 encounter (Office Visit) with Dragan Ayala M.D.   Medication Sig Dispense Refill   • meloxicam (MOBIC) 15 MG tablet Take 1 Tablet by mouth 1 time a day as needed (pain). Do not take other NSAIDs. No refills. 60 Tablet 0   • gabapentin (NEURONTIN) 300 MG Cap Take 1 Capsule by mouth 3 times a day as needed (pain). 90 Capsule 5   • metFORMIN (GLUCOPHAGE) 500 MG Tab Take 500 mg by mouth 2 times a day with meals.     • benazepril (LOTENSIN) 20 MG Tab TAKE 1 TABLET DAILY 90 Tablet 3   • albuterol (VENTOLIN HFA) 108 (90 Base) MCG/ACT Aero Soln inhalation aerosol Inhale 2 Puffs every four hours as needed. FOR SHORTNESS OF BREATH 18 g 5   • albuterol 108 (90 Base) MCG/ACT Aero Soln inhalation aerosol Inhale 2 Puffs every four hours as needed for Shortness of Breath. Pt prefers ventolin if possible. Thank you 3 Each 3   • fluticasone-salmeterol (ADVAIR DISKUS) 500-50 MCG/DOSE AEROSOL POWDER, BREATH ACTIVATED Inhale 1 Puff every 12 hours. 3 Each 3   • finasteride (PROSCAR) 5 MG Tab Take 1 Tablet by mouth every day. 90 Tablet 3   • levothyroxine (SYNTHROID) 75 MCG Tab Take 1 Tablet by mouth every morning on an empty stomach. Taking only one d per week.  50 mcg all other days. 12 Tablet 3   • levothyroxine (SYNTHROID) 50 MCG Tab Take one pill 6 days per week on empty stomach.  75 mcg on 7th day. 90 Tablet 3   • atorvastatin (LIPITOR) 80 MG tablet Take 1 Tablet by mouth every evening. 90 Tablet 3   • tizanidine (ZANAFLEX) 4 MG Tab Take 1 Tablet by mouth every 6 hours as needed (muscle spasms). 30 Tablet 2   • EPINEPHrine (EPIPEN) 0.3 MG/0.3ML Solution Auto-injector solution for injection Inject 0.3 mL into the thigh one time for 1 dose. 1 Each 0   • albuterol 108 (90 Base) MCG/ACT Aero Soln inhalation aerosol Inhale 2 Puffs every  four hours as needed for Shortness of Breath. 1 Each 4   • Continuous Blood Gluc Sensor (FREESTYLE LUNA 14 DAY SENSOR) Misc 1 Application every 14 days. 6 Each 3   • Dulaglutide (TRULICITY) 3 MG/0.5ML Solution Pen-injector Inject 1 Dose under the skin every 7 days. 2 mL 11   • montelukast (SINGULAIR) 10 MG Tab Take 1 tablet by mouth every evening. 90 tablet 3   • Canagliflozin (INVOKANA) 300 MG Tab Take 1 tablet by mouth every day. 90 tablet 3   • DILTIAZem CD (CARTIA XT) 180 MG CAPSULE SR 24 HR Take 1 capsule by mouth every day. 90 capsule 3   • tamsulosin (FLOMAX) 0.4 MG capsule Take 1 capsule by mouth 1/2 hour after breakfast. 30 capsule 4   • NON SPECIFIED CPAP supplies through [facility] 1 Each 1   • ALPHA LIPOIC ACID PO Take 600 mg by mouth 2 Times a Day.     • Omega-3 Fatty Acids (FISH OIL) 1200 MG CAPS Take  by mouth.     • Cinnamon 500 MG CAPS Take 1,000 mg by mouth.     • pantoprazole (PROTONIX) 40 MG TBEC Take 40 mg by mouth every day.       • Coenzyme Q10 200 MG Cap Take  by mouth every day.       • Cholecalciferol (VITAMIN D) 2000 UNIT TABS Take  by mouth 2 times a day.     • cetirizine (ZYRTEC) 10 MG Tab Take 10 mg by mouth every day.          Current Outpatient Medications on File Prior to Visit   Medication Sig Dispense Refill   • meloxicam (MOBIC) 15 MG tablet Take 1 Tablet by mouth 1 time a day as needed (pain). Do not take other NSAIDs. No refills. 60 Tablet 0   • gabapentin (NEURONTIN) 300 MG Cap Take 1 Capsule by mouth 3 times a day as needed (pain). 90 Capsule 5   • metFORMIN (GLUCOPHAGE) 500 MG Tab Take 500 mg by mouth 2 times a day with meals.     • benazepril (LOTENSIN) 20 MG Tab TAKE 1 TABLET DAILY 90 Tablet 3   • albuterol (VENTOLIN HFA) 108 (90 Base) MCG/ACT Aero Soln inhalation aerosol Inhale 2 Puffs every four hours as needed. FOR SHORTNESS OF BREATH 18 g 5   • albuterol 108 (90 Base) MCG/ACT Aero Soln inhalation aerosol Inhale 2 Puffs every four hours as needed for  Shortness of Breath. Pt prefers ventolin if possible. Thank you 3 Each 3   • fluticasone-salmeterol (ADVAIR DISKUS) 500-50 MCG/DOSE AEROSOL POWDER, BREATH ACTIVATED Inhale 1 Puff every 12 hours. 3 Each 3   • finasteride (PROSCAR) 5 MG Tab Take 1 Tablet by mouth every day. 90 Tablet 3   • levothyroxine (SYNTHROID) 75 MCG Tab Take 1 Tablet by mouth every morning on an empty stomach. Taking only one d per week.  50 mcg all other days. 12 Tablet 3   • levothyroxine (SYNTHROID) 50 MCG Tab Take one pill 6 days per week on empty stomach.  75 mcg on 7th day. 90 Tablet 3   • atorvastatin (LIPITOR) 80 MG tablet Take 1 Tablet by mouth every evening. 90 Tablet 3   • tizanidine (ZANAFLEX) 4 MG Tab Take 1 Tablet by mouth every 6 hours as needed (muscle spasms). 30 Tablet 2   • EPINEPHrine (EPIPEN) 0.3 MG/0.3ML Solution Auto-injector solution for injection Inject 0.3 mL into the thigh one time for 1 dose. 1 Each 0   • albuterol 108 (90 Base) MCG/ACT Aero Soln inhalation aerosol Inhale 2 Puffs every four hours as needed for Shortness of Breath. 1 Each 4   • Continuous Blood Gluc Sensor (FREESTYLE LUNA 14 DAY SENSOR) Misc 1 Application every 14 days. 6 Each 3   • Dulaglutide (TRULICITY) 3 MG/0.5ML Solution Pen-injector Inject 1 Dose under the skin every 7 days. 2 mL 11   • montelukast (SINGULAIR) 10 MG Tab Take 1 tablet by mouth every evening. 90 tablet 3   • Canagliflozin (INVOKANA) 300 MG Tab Take 1 tablet by mouth every day. 90 tablet 3   • DILTIAZem CD (CARTIA XT) 180 MG CAPSULE SR 24 HR Take 1 capsule by mouth every day. 90 capsule 3   • tamsulosin (FLOMAX) 0.4 MG capsule Take 1 capsule by mouth 1/2 hour after breakfast. 30 capsule 4   • NON SPECIFIED CPAP supplies through [facility] 1 Each 1   • ALPHA LIPOIC ACID PO Take 600 mg by mouth 2 Times a Day.     • Omega-3 Fatty Acids (FISH OIL) 1200 MG CAPS Take  by mouth.     • Cinnamon 500 MG CAPS Take 1,000 mg by mouth.     • pantoprazole (PROTONIX) 40 MG TBEC Take 40  mg by mouth every day.       • Coenzyme Q10 200 MG Cap Take  by mouth every day.       • Cholecalciferol (VITAMIN D) 2000 UNIT TABS Take  by mouth 2 times a day.     • cetirizine (ZYRTEC) 10 MG Tab Take 10 mg by mouth every day.     • aspirin EC (ECOTRIN) 81 MG TBEC Take 81 mg by mouth every day.         No current facility-administered medications on file prior to visit.         Allergies:   Allergies   Allergen Reactions   • Kiwi Extract Anaphylaxis   • Shellfish Allergy Anaphylaxis   • Strawberry Anaphylaxis   • Ozempic (0.25 Or 0.5 Mg-Dose) [Semaglutide] Unspecified     Pt does not recall any reaction   • Sulfa Drugs Rash and Unspecified   • Testosterone Rash       Social Hx:   Social History     Socioeconomic History   • Marital status:      Spouse name: Not on file   • Number of children: Not on file   • Years of education: Not on file   • Highest education level: Not on file   Occupational History   • Not on file   Tobacco Use   • Smoking status: Former Smoker     Packs/day: 0.00     Types: Cigars   • Smokeless tobacco: Never Used   • Tobacco comment: occasional cigars   Vaping Use   • Vaping Use: Never used   Substance and Sexual Activity   • Alcohol use: Yes     Comment: rare - 1-2 per month (social)   • Drug use: Yes     Types: Marijuana     Comment: occ cigar smoking   • Sexual activity: Not on file     Comment: ; 2 biological kids (2 of his wife's); 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   Other Topics Concern   • Not on file   Social History Narrative   • Not on file     Social Determinants of Health     Financial Resource Strain: Not on file   Food Insecurity: Not on file   Transportation Needs: Not on file   Physical Activity: Not on file   Stress: Not on file   Social Connections: Not on file   Intimate Partner Violence: Not on file   Housing Stability: Not on file         EXAMINATION     Physical Exam:   Vitals: /68 (BP Location: Right arm, Patient Position: Sitting, BP  "Cuff Size: Adult)   Pulse 86   Temp 36.3 °C (97.4 °F) (Temporal)   Ht 1.753 m (5' 9\")   Wt 76.4 kg (168 lb 6.9 oz)   SpO2 96%     Constitutional:   Body Habitus: Body mass index is 24.87 kg/m².  Cooperation: Fully cooperates with exam  Appearance: Well-groomed no disheveled    Respiratory-  breathing comfortable on room air, no audible wheezing  Cardiovascular- capillary refills less than 2 seconds. No lower extremity edema is noted.   Psychiatric- alert and oriented ×3. Normal affect.    MSK and Neuro: -      Thoracic/Lumbar Spine/Sacral Spine/Hips   There are no signs of infection around the injection sites.   full  active range of motion with flexion, lateral flexion, and rotation bilaterally.   There is full  active range of motion with lumbar extension.      Palpation:   No tenderness to palpation in midline at T1-T12 levels. No tenderness to palpation in the left and right of the midline T1-L5, NEGATIVE for tenderness to palpation to the para-midline region in the lower lumbar levels.  palpation over SI joint: negative bilaterally    palpation in hip or over the gluteus medius tendon insertion: negative bilaterally      Lumbar spine Special tests  Neuro tension  Straight leg test negative bilaterally    Slump test negative bilaterally      Key points for the international standards for neurological classification of spinal cord injury (ISNCSCI) to light touch.     Dermatome R L                                      L2 2 2   L3 2 2   L4 2 2   L5 2 2   S1 2 2   S2 2 2         Motor Exam Lower Extremities    ? Myotome R L   Hip flexion L2 5 5   Knee extension L3 5 5-   Ankle dorsiflexion L4 5 5-   Toe extension L5 5 5   Ankle plantarflexion S1 5 5               MEDICAL DECISION MAKING    DATA    Labs:   Lab Results   Component Value Date/Time    SODIUM 135 06/09/2021 01:13 PM    POTASSIUM 3.6 06/09/2021 01:13 PM    CHLORIDE 100 06/09/2021 01:13 PM    CO2 24 06/09/2021 01:13 PM    GLUCOSE 113 (H) 06/09/2021 " 01:13 PM    BUN 12 06/09/2021 01:13 PM    CREATININE 0.74 06/09/2021 01:13 PM    CREATININE 1.1 07/10/2008 09:25 AM        Lab Results   Component Value Date/Time    PROTHROMBTM 13.3 12/22/2017 09:26 AM    INR 1.04 12/22/2017 09:26 AM        Lab Results   Component Value Date/Time    WBC 9.7 06/04/2020 02:18 PM    RBC 6.24 (H) 06/04/2020 02:18 PM    HEMOGLOBIN 18.5 (H) 06/04/2020 02:18 PM    HEMATOCRIT 54.6 (H) 06/04/2020 02:18 PM    MCV 87.5 06/04/2020 02:18 PM    MCH 29.6 06/04/2020 02:18 PM    MCHC 33.9 06/04/2020 02:18 PM    MPV 10.7 06/04/2020 02:18 PM    NEUTSPOLYS 67.60 06/04/2020 02:18 PM    LYMPHOCYTES 17.60 (L) 06/04/2020 02:18 PM    MONOCYTES 9.80 06/04/2020 02:18 PM    EOSINOPHILS 3.50 06/04/2020 02:18 PM    BASOPHILS 1.20 06/04/2020 02:18 PM    HYPOCHROMIA 1+ 08/27/2013 07:13 AM        Lab Results   Component Value Date/Time    HBA1C 7.2 (A) 11/16/2021 02:36 PM          Imaging:   I personally reviewed following images    MRI lumbar spine 7/27/2021 with moderate left neuroforaminal stenosis at L3-4 and L4-5 with mild right neuroforaminal stenosis at L3-4 and L4-5.  Mild to moderate bilateral neuroforaminal stenosis at L5-S1.  There is facet arthropathy bilaterally at L3-4, L4-5, L5-S1        I reviewed the following radiology reports                         Results for orders placed during the hospital encounter of 07/27/21    MR-LUMBAR SPINE-W/O    Impression  1.  L4-5 annular disc bulge with a central disc protrusion and bilateral facet degeneration. There is borderline central canal narrowing. There is moderate mild right neural foraminal narrowing.    2.  L3-4 annular disc bulge with a left lateral disc protrusion. There is moderate to severe left and mild right neural foraminal narrowing again seen.    3.  L5-S1 degenerative disc disease and facet degeneration results in moderate bilateral neuroforaminal narrowing.        Results for orders placed during the hospital encounter of  07/27/21    MR-LUMBAR SPINE-W/O    Impression  1.  L4-5 annular disc bulge with a central disc protrusion and bilateral facet degeneration. There is borderline central canal narrowing. There is moderate mild right neural foraminal narrowing.    2.  L3-4 annular disc bulge with a left lateral disc protrusion. There is moderate to severe left and mild right neural foraminal narrowing again seen.    3.  L5-S1 degenerative disc disease and facet degeneration results in moderate bilateral neuroforaminal narrowing.      Results for orders placed during the hospital encounter of 07/27/21    MR-MRA NECK-W/O    Impression  There is no significant stenosis in the visualized extracranial neck arteries.                                                                   Results for orders placed during the hospital encounter of 05/01/17    CT-ABDOMEN-PELVIS WITH    Impression  1.  Findings suggestive of early or low grade obstruction in the mid to distal small bowel. Enteritis with associated ileus could also give a similar appearance.  2.  LEFT calyceal stone                       Results for orders placed during the hospital encounter of 12/22/17    DX-CHEST-2 VIEWS    Impression  Negative two views of the chest.                          Electrodiagnostics   1/18/2022 EMG  IMPRESSION:  This is an abnormal electrodiagnostic study due to evidence of chronic reinnervation changes in the left vastus lateralis/medialis with mild active denervation changes noted.  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       There is no strong electrodiagnostic evidence of a an active right lumbosacral plexopathy though given some responses are unobtainable a mild lumbar plexopathy/femoral neuropathy may be present.  There is no EMG evidence of a right lumbosacral  radiculopathy.                                         DIAGNOSIS   Visit Diagnoses     ICD-10-CM   1. Lumbar radiculopathy  M54.16   2. Lumbar spondylosis  M47.816   3. Atrophy of muscle of left thigh  M62.552   4. Chronic left-sided low back pain with left-sided sciatica  M54.42    G89.29         ASSESSMENT and PLAN:     Barron Hewitt  1960 male      Barron was seen today for follow-up.    Diagnoses and all orders for this visit:    Lumbar radiculopathy  -     Referral to Physical Therapy    Lumbar spondylosis  -     Referral to Physical Therapy    Atrophy of muscle of left thigh  -     Referral to Physical Therapy    Chronic left-sided low back pain with left-sided sciatica  -     Referral to Physical Therapy      The patient had a significant provement in pain and function after the epidural steroid injection.  I believe now he would tolerate a home exercise program and physical therapy.  I provided the patient with home exercises and stretches as well as referral to physical therapy.    Continue gabapentin PRN    Continue Zanaflex as needed    Follow up: after the above conservative treatments.     Thank you for allowing me to participate in the care of this patient. If you have any questions please not hesitate to contact me.             Please note that this dictation was created using voice recognition software. I have made every reasonable attempt to correct obvious errors but there may be errors of grammar and content that I may have overlooked prior to finalization of this note.      Dragan Ayala MD  Interventional Spine and Sports Physiatry  Physical Medicine and Rehabilitation  [facility]

## 2022-04-05 ENCOUNTER — OFFICE VISIT (OUTPATIENT)
Dept: PHYSICAL MEDICINE AND REHAB | Facility: REHABILITATION | Age: 62
End: 2022-04-05
Payer: COMMERCIAL

## 2022-04-05 VITALS
WEIGHT: 161 LBS | DIASTOLIC BLOOD PRESSURE: 60 MMHG | OXYGEN SATURATION: 97 % | HEIGHT: 69 IN | BODY MASS INDEX: 23.85 KG/M2 | RESPIRATION RATE: 16 BRPM | TEMPERATURE: 98.6 F | HEART RATE: 86 BPM | SYSTOLIC BLOOD PRESSURE: 100 MMHG

## 2022-04-05 DIAGNOSIS — R26.89 POOR BALANCE: ICD-10-CM

## 2022-04-05 DIAGNOSIS — M54.16 LEFT LUMBAR RADICULOPATHY: ICD-10-CM

## 2022-04-05 DIAGNOSIS — R29.898 WEAKNESS OF BOTH LOWER EXTREMITIES: Primary | ICD-10-CM

## 2022-04-05 DIAGNOSIS — M62.552 ATROPHY OF MUSCLE OF LEFT THIGH: ICD-10-CM

## 2022-04-05 PROCEDURE — 99215 OFFICE O/P EST HI 40 MIN: CPT | Performed by: PHYSICAL MEDICINE & REHABILITATION

## 2022-04-05 ASSESSMENT — FIBROSIS 4 INDEX: FIB4 SCORE: 0.69

## 2022-04-05 NOTE — PROGRESS NOTES
[facility]  PM&R Neuro Rehabilitation Clinic  [facility] [place] [address]  Ph: [phone]    FOLLOW UP PATIENT EVALUATION    Patient Name: Barron Hewitt   Patient : 1960  Patient Age: 61 y.o.     Examining Physician: Dr. Myah Blandon DO    SUBJECTIVE:   Patient Identification: Barron Hewitt is a 61 y.o. male with PMH significant for CAD, ISELA, asthma, hypertension, dyslipidemia, seasonal allergies, type 2 diabetes mellitus, history of SBO 2018, hypothyroidism and rehabilitation history significant for muscle weakness, pain and is presenting to PM&R clinic for a FOLLOW UP OUTPATIENT evaluation with the following chief complaint/s:    Chief Complaint: Reduced pain.    History of Present Illness:   - Records reviewed: S/p L3-4 and L4-5 lumbar TESI 2/15/22, 3/22/22  - States that he has had relief since the epidural injections. Both epidural injections have cumulatively helped to some degree.   - Bought his own balance board at home. He built his own step box to 8 inches. Also has resistance bands.   - Has not restarted therapy, but was referred by Pain clinic.   - Has had a hard time re-establishing with endocrinology. Set up to see Dr. Lyon.   - He has had weight loss from 180's to 160's.   - Interested in massage.   - Patient states that he is finding weird symptoms that continue. Left hand will sometimes flex.   - Needs to re-establish with neurology.   - Has both Tizanidine and Gabapentin but does not take the gabapentin.     Review of Systems:  All other pertinent positive review of systems are noted above in HPI.   All other systems reviewed and are negative.    Past Medical History:  Past Medical History:   Diagnosis Date   • Mild intermittent asthma without complication 2017   • Abnormal nuclear cardiac imaging test 2014   • Chest pain at rest 2014   • Coronary-myocardial bridge 2012   • CHEST PAIN 6/15/2011   • Diabetes 2009    insulin and oral  meds   • Allergy    • Anesthesia     PONV   • ASTHMA    • Hyperlipidemia    • Hypertension    • Myocardial bridge     cardiologist, Dr. Valverde   • Sleep apnea     has CPAP   • Snoring       Past Surgical History:   Procedure Laterality Date   • BLOCK EPIDURAL STEROID INJECTION Left 3/22/2022    Procedure: LEFT L3-4 and L4-5 transforaminal epidural steroid injection with fluoroscopic guidance;  Surgeon: Dragan Ayala M.D.;  [facility];  Service: Pain Management   • NH NJX AA&/STRD TFRML EPI LUMBAR/SACRAL 1 LEVEL Left 2/15/2022    Procedure: LEFT L3-4 and L4-5 transforaminal epidural steroid injection with fluoroscopic guidance;  Surgeon: Dragan Ayala M.D.;  Location: [facility];  Service: Pain Management   • SHOULDER DECOMPRESSION ARTHROSCOPIC Left 1/4/2018    Procedure: SHOULDER DECOMPRESSION ARTHROSCOPIC - SUBACROMIAL;  Surgeon: Linwood Grover M.D.;  Location: [facility];  Service: Orthopedics   • SHOULDER ARTHROSCOPY W/ BICIPITAL TENODESIS REPAIR Left 1/4/2018    Procedure: SHOULDER ARTHROSCOPY W/ BICIPITAL [place] REPAIR;  Surgeon: Linwood Grover M.D.;  Location: SURGERY [facility];  Service: Orthopedics   • CLAVICLE DISTAL EXCISION Left 1/4/2018    Procedure: CLAVICLE DISTAL EXCISION - POSSIBLE;  Surgeon: Linwood Grover M.D.;  Location: [facility];  Service: Orthopedics   • RECOVERY  4/8/2016    Procedure: CATH LAB [facility] WITH POSSIBLE NAVIN;  Surgeon: Recoveryonly Surgery;  [place] [facility]G[place] [facility];  Service:    • VENTRAL HERNIA REPAIR LAPAROSCOPIC  11/1/2012    Performed by Marcos Ramírez M.D. at [facility]   • KNEE ARTHROSCOPY  1990's    right   • SHOULDER ARTHROSCOPY  1990's    right   • SINUSOTOMIES  1990's    times two surgeries   • TUMOR EXCISION WITH BIOPSY  1990's    right hand - thumb        Current Outpatient Medications:   •  meloxicam (MOBIC) 15 MG  tablet, Take 1 Tablet by mouth 1 time a day as needed (pain). Do not take other NSAIDs. No refills., Disp: 60 Tablet, Rfl: 0  •  metFORMIN (GLUCOPHAGE) 500 MG Tab, Take 500 mg by mouth 2 times a day with meals., Disp: , Rfl:   •  benazepril (LOTENSIN) 20 MG Tab, TAKE 1 TABLET DAILY, Disp: 90 Tablet, Rfl: 3  •  albuterol (VENTOLIN HFA) 108 (90 Base) MCG/ACT Aero Soln inhalation aerosol, Inhale 2 Puffs every four hours as needed. FOR SHORTNESS OF BREATH, Disp: 18 g, Rfl: 5  •  albuterol 108 (90 Base) MCG/ACT Aero Soln inhalation aerosol, Inhale 2 Puffs every four hours as needed for Shortness of Breath. Pt prefers ventolin if possible. Thank you, Disp: 3 Each, Rfl: 3  •  fluticasone-salmeterol (ADVAIR DISKUS) 500-50 MCG/DOSE AEROSOL POWDER, BREATH ACTIVATED, Inhale 1 Puff every 12 hours., Disp: 3 Each, Rfl: 3  •  finasteride (PROSCAR) 5 MG Tab, Take 1 Tablet by mouth every day., Disp: 90 Tablet, Rfl: 3  •  levothyroxine (SYNTHROID) 75 MCG Tab, Take 1 Tablet by mouth every morning on an empty stomach. Taking only one d per week.  50 mcg all other days., Disp: 12 Tablet, Rfl: 3  •  levothyroxine (SYNTHROID) 50 MCG Tab, Take one pill 6 days per week on empty stomach.  75 mcg on 7th day., Disp: 90 Tablet, Rfl: 3  •  atorvastatin (LIPITOR) 80 MG tablet, Take 1 Tablet by mouth every evening., Disp: 90 Tablet, Rfl: 3  •  tizanidine (ZANAFLEX) 4 MG Tab, Take 1 Tablet by mouth every 6 hours as needed (muscle spasms)., Disp: 30 Tablet, Rfl: 2  •  EPINEPHrine (EPIPEN) 0.3 MG/0.3ML Solution Auto-injector solution for injection, Inject 0.3 mL into the thigh one time for 1 dose., Disp: 1 Each, Rfl: 0  •  albuterol 108 (90 Base) MCG/ACT Aero Soln inhalation aerosol, Inhale 2 Puffs every four hours as needed for Shortness of Breath., Disp: 1 Each, Rfl: 4  •  Continuous Blood Gluc Sensor (FREESTYLE LUNA 14 DAY SENSOR) Misc, 1 Application every 14 days., Disp: 6 Each, Rfl: 3  •  Dulaglutide (TRULICITY) 3 MG/0.5ML Solution  Pen-injector, Inject 1 Dose under the skin every 7 days., Disp: 2 mL, Rfl: 11  •  montelukast (SINGULAIR) 10 MG Tab, Take 1 tablet by mouth every evening., Disp: 90 tablet, Rfl: 3  •  Canagliflozin (INVOKANA) 300 MG Tab, Take 1 tablet by mouth every day., Disp: 90 tablet, Rfl: 3  •  DILTIAZem CD (CARTIA XT) 180 MG CAPSULE SR 24 HR, Take 1 capsule by mouth every day., Disp: 90 capsule, Rfl: 3  •  tamsulosin (FLOMAX) 0.4 MG capsule, Take 1 capsule by mouth 1/2 hour after breakfast., Disp: 30 capsule, Rfl: 4  •  NON SPECIFIED, CPAP supplies through [facility], Disp: 1 Each, Rfl: 1  •  ALPHA LIPOIC ACID PO, Take 600 mg by mouth 2 Times a Day., Disp: , Rfl:   •  Omega-3 Fatty Acids (FISH OIL) 1200 MG CAPS, Take  by mouth., Disp: , Rfl:   •  Cinnamon 500 MG CAPS, Take 1,000 mg by mouth., Disp: , Rfl:   •  aspirin EC (ECOTRIN) 81 MG TBEC, Take 81 mg by mouth every day.  , Disp: , Rfl:   •  pantoprazole (PROTONIX) 40 MG TBEC, Take 40 mg by mouth every day.  , Disp: , Rfl:   •  Coenzyme Q10 200 MG Cap, Take  by mouth every day.  , Disp: , Rfl:   •  Cholecalciferol (VITAMIN D) 2000 UNIT TABS, Take  by mouth 2 times a day., Disp: , Rfl:   •  cetirizine (ZYRTEC) 10 MG Tab, Take 10 mg by mouth every day., Disp: , Rfl:   Allergies   Allergen Reactions   • Kiwi Extract Anaphylaxis   • Shellfish Allergy Anaphylaxis   • Strawberry Anaphylaxis   • Ozempic (0.25 Or 0.5 Mg-Dose) [Semaglutide] Unspecified     Pt does not recall any reaction   • Sulfa Drugs Rash and Unspecified   • Testosterone Rash        Past Social History:  Social History     Socioeconomic History   • Marital status:      Spouse name: Not on file   • Number of children: Not on file   • Years of education: Not on file   • Highest education level: Not on file   Occupational History   • Not on file   Tobacco Use   • Smoking status: Former Smoker     Packs/day: 0.00     Types: Cigars   • Smokeless tobacco: Never Used   • Tobacco comment: occasional  cigars   Vaping Use   • Vaping Use: Never used   Substance and Sexual Activity   • Alcohol use: Yes     Comment: rare - 1-2 per month (social)   • Drug use: Yes     Types: Marijuana     Comment: occ cigar smoking   • Sexual activity: Not on file     Comment: ; 2 biological kids (2 of his wife's); 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   Other Topics Concern   • Not on file   Social History Narrative   • Not on file     Social Determinants of Health     Financial Resource Strain: Not on file   Food Insecurity: Not on file   Transportation Needs: Not on file   Physical Activity: Not on file   Stress: Not on file   Social Connections: Not on file   Intimate Partner Violence: Not on file   Housing Stability: Not on file        Family History:  Family History   Problem Relation Age of Onset   • Breast Cancer Mother    • Hypertension Mother    • Cancer Mother         breast   • Heart Disease Mother         hx of bypass   • Hypertension Father    • Arthritis Father    • Diabetes Father         prediabetes   • No Known Problems Sister    • Arthritis Brother    • Heart Disease Other    • Hypertension Other    • Cancer Other    • No Known Problems Brother        Depression and Opioid Screening  PHQ-9:  Depression Screen (PHQ-2/PHQ-9) 5/3/2021 3/1/2022 4/4/2022   PHQ-2 Total Score 0 1 0   PHQ-9 Total Score - 4 -     Interpretation of PHQ-9 Total Score   Score Severity   1-4 No Depression   5-9 Mild Depression   10-14 Moderate Depression   15-19 Moderately Severe Depression   20-27 Severe Depression     OBJECTIVE:   Vital Signs:  Vitals:    04/05/22 1440   BP: 100/60   Pulse: 86   Resp: 16   Temp: 37 °C (98.6 °F)   SpO2: 97%        Pertinent Labs:  Lab Results   Component Value Date/Time    SODIUM 135 06/09/2021 01:13 PM    POTASSIUM 3.6 06/09/2021 01:13 PM    CHLORIDE 100 06/09/2021 01:13 PM    CO2 24 06/09/2021 01:13 PM    GLUCOSE 113 (H) 06/09/2021 01:13 PM    BUN 12 06/09/2021 01:13 PM    CREATININE 0.74  06/09/2021 01:13 PM    CREATININE 1.1 07/10/2008 09:25 AM       Lab Results   Component Value Date/Time    HBA1C 7.2 (A) 11/16/2021 02:36 PM       Lab Results   Component Value Date/Time    WBC 9.7 06/04/2020 02:18 PM    RBC 6.24 (H) 06/04/2020 02:18 PM    HEMOGLOBIN 18.5 (H) 06/04/2020 02:18 PM    HEMATOCRIT 54.6 (H) 06/04/2020 02:18 PM    MCV 87.5 06/04/2020 02:18 PM    MCH 29.6 06/04/2020 02:18 PM    MCHC 33.9 06/04/2020 02:18 PM    MPV 10.7 06/04/2020 02:18 PM    NEUTSPOLYS 67.60 06/04/2020 02:18 PM    LYMPHOCYTES 17.60 (L) 06/04/2020 02:18 PM    MONOCYTES 9.80 06/04/2020 02:18 PM    EOSINOPHILS 3.50 06/04/2020 02:18 PM    BASOPHILS 1.20 06/04/2020 02:18 PM    HYPOCHROMIA 1+ 08/27/2013 07:13 AM       Lab Results   Component Value Date/Time    ASTSGOT 18 06/09/2021 01:13 PM    ALTSGPT 23 06/09/2021 01:13 PM        Physical Exam:   GEN: No apparent distress  HEENT: Head normocephalic, atraumatic.  Sclera nonicteric bilaterally, no ocular discharge appreciated bilaterally.  CV: Extremities warm and well-perfused, no peripheral edema appreciated bilaterally.  PULMONARY: Breathing nonlabored on room air, no respiratory accessory muscle use.  Not requiring supplemental oxygen.  SKIN: No appreciable skin breakdown on exposed areas of skin.  PSYCH: Mood and affect within normal limits.  NEURO: Awake alert.  Conversational.  Logical thought content.  Answers questions appropriately.  Reflexes symmetric and intact bilaterally at 2+/4 at L4 and 1+/4 at C5, C6.  Ambulatory without assistive device.  Slightly wide-based gait.  Able to toe walk adequately, able to heel walk adequately.  Poor balance with tandem walk.      ASSESSMENT/PLAN: Barron Hewitt  is a 61 y.o. male with rehabilitation history significant for muscle weakness, pain, here for evaluation. The following plan was discussed with the patient who is in agreement.     Visit Diagnoses     ICD-10-CM   1. Weakness of both lower extremities  R29.896   2.  "Left lumbar radiculopathy  M54.16   3. Atrophy of muscle of left thigh  M62.552   4. Poor balance  R26.89        Rehab/Neuro:   1. Muscle weakness  2. Left lumbar radiculitis: MRI positive for left foraminal narrowing at L3-4, L4-5 s/p epidural steroid injections with improved pain.  3. Diabetic amyotrophy/weakness-Per neuro  4. Lower extremity cramping  -Extensive discussion today with the patient regarding his rehabilitation.  He is doing a great job and is clearly very motivated to stay active.  -Highly recommend he continue with his home exercise program that he has made for himself including using a balance board, resistance bands, stairs.  -Referral: Recommend that he go to neuro therapist at [facility].  I have placed this referral today.  -Recommend he continue to follow-up with neurology which he already was planning to have a repeat appointment.  -Med management: Recommend that he try taking tizanidine consistently at night.  He has 4 mg tablet at home.  -Counseling regarding use of assistive device in order to assist with his balance.  At this time he is compensating fairly well and has not had any falls, however, I fear that it may decline in the future,   Though hopefully not with continued conditioning and strengthening.  Discussed and counseled that it would be a good idea to get grab bars in his shower or other places where he is frequently feeling imbalanced from \"first step\" such as getting out of shower or bed.      Follow up: I will see patient back in approximately 3 months after therapy and use of tizanidine at night.    Total time spent was 47 minutes.  Included in this time is the time spent preparing for the visit including record review, my exam and evaluation, counseling and education regarding that which is aforementioned in the assessment and plan.  Time was spent documenting into patient's electronic health record.  Time was spent ordering the appropriate labs, tests, " procedures, referrals, medications.  Including this time was also the time spent in care coordination with therapy. Discussion involved the patient.       Please note that this dictation was created using voice recognition software. I have made every reasonable attempt to correct obvious errors but there may be errors of grammar and content that I may have overlooked prior to finalization of this note.    Dr. Myah Blandon DO, MS  Department of Physical Medicine & Rehabilitation  Neuro Rehabilitation Clinic  [facility]

## 2022-04-11 ENCOUNTER — PATIENT MESSAGE (OUTPATIENT)
Dept: MEDICAL GROUP | Facility: LAB | Age: 62
End: 2022-04-11
Payer: COMMERCIAL

## 2022-04-11 DIAGNOSIS — E03.9 ACQUIRED HYPOTHYROIDISM: ICD-10-CM

## 2022-04-11 DIAGNOSIS — J45.20 MILD INTERMITTENT ASTHMA WITHOUT COMPLICATION: ICD-10-CM

## 2022-04-11 RX ORDER — LEVOTHYROXINE SODIUM 0.05 MG/1
TABLET ORAL
Qty: 90 TABLET | Refills: 3 | Status: SHIPPED | OUTPATIENT
Start: 2022-04-11 | End: 2022-12-14

## 2022-04-11 NOTE — TELEPHONE ENCOUNTER
From: Barron Hewitt  To: Nurse Practitioner Ruthie Unger  Sent: 4/11/2022 11:08 AM PDT  Subject: RX's    Need a refill order for Advair Discus and the Synthroid 50 mcg that I take once a week.

## 2022-04-12 DIAGNOSIS — J45.20 MILD INTERMITTENT ASTHMA WITHOUT COMPLICATION: ICD-10-CM

## 2022-04-12 RX ORDER — ALBUTEROL SULFATE 90 UG/1
2 AEROSOL, METERED RESPIRATORY (INHALATION) EVERY 4 HOURS PRN
Qty: 3 EACH | Refills: 3 | Status: SHIPPED | OUTPATIENT
Start: 2022-04-12 | End: 2022-04-25 | Stop reason: SDUPTHER

## 2022-04-20 ENCOUNTER — PATIENT MESSAGE (OUTPATIENT)
Dept: MEDICAL GROUP | Facility: LAB | Age: 62
End: 2022-04-20
Payer: COMMERCIAL

## 2022-04-20 DIAGNOSIS — J45.20 MILD INTERMITTENT ASTHMA WITHOUT COMPLICATION: ICD-10-CM

## 2022-04-20 NOTE — TELEPHONE ENCOUNTER
From: Barron Hewitt  To: Nurse Practitioner Ruthie Unger  Sent: 4/20/2022 1:00 PM PDT  Subject: Ventolin RX     Good afternoon, I was on the phone with [employer] regarding the above and they advised if you call the Prior Authorization Department at [phone] hopefully they will approve. They said it looked like we haven't tried for the Pre-Auth.     Can we try again please and thank you in advance

## 2022-04-25 RX ORDER — ALBUTEROL SULFATE 90 UG/1
2 AEROSOL, METERED RESPIRATORY (INHALATION) EVERY 4 HOURS PRN
Qty: 3 EACH | Refills: 3 | Status: SHIPPED | OUTPATIENT
Start: 2022-04-25 | End: 2023-04-26

## 2022-04-28 ENCOUNTER — TELEMEDICINE (OUTPATIENT)
Dept: MEDICAL GROUP | Facility: LAB | Age: 62
End: 2022-04-28
Payer: COMMERCIAL

## 2022-04-28 VITALS
WEIGHT: 165 LBS | HEART RATE: 78 BPM | BODY MASS INDEX: 24.44 KG/M2 | OXYGEN SATURATION: 98 % | RESPIRATION RATE: 14 BRPM | HEIGHT: 69 IN

## 2022-04-28 DIAGNOSIS — J01.01 ACUTE RECURRENT MAXILLARY SINUSITIS: ICD-10-CM

## 2022-04-28 PROCEDURE — 99213 OFFICE O/P EST LOW 20 MIN: CPT | Mod: 95 | Performed by: FAMILY MEDICINE

## 2022-04-28 RX ORDER — AMOXICILLIN AND CLAVULANATE POTASSIUM 875; 125 MG/1; MG/1
1 TABLET, FILM COATED ORAL 2 TIMES DAILY
Qty: 14 TABLET | Refills: 0 | Status: SHIPPED | OUTPATIENT
Start: 2022-04-28 | End: 2022-05-05

## 2022-04-28 ASSESSMENT — FIBROSIS 4 INDEX: FIB4 SCORE: 0.69

## 2022-04-28 NOTE — PROGRESS NOTES
Virtual Visit: Established Patient   This visit was conducted via Zoom using secure and encrypted videoconferencing technology.   The patient was in their home in the state of [place].    The patient's identity was confirmed and verbal consent was obtained for this virtual visit.     Subjective:   CC:   Chief Complaint   Patient presents with   • Sinusitis     X ABOUT 2-3 GRUBBS AGO, Monday sinus flushing, green phlegm, pressure in ears and eyes, cheeks.        Barron Hewitt is a 61 y.o. male presenting for evaluation and management of:    #URI   -Patient states that for the last 4 days been having symptoms of sore throat, sinus pressure, headaches, postnasal drainage, cough.  He states his symptoms have worsened since the point where he is now having very green discharge from nose with increased sinus pressure.  He has a history of leading to sinus infections.  Pertinent medical history of asthma, type 2 diabetes.  Patient denies any fevers, chills, chest pain, shortness of breath, wheezing, difficulty breathing.    ROS   -See HPI.    Current medicines (including changes today)  Current Outpatient Medications   Medication Sig Dispense Refill   • albuterol 108 (90 Base) MCG/ACT Aero Soln inhalation aerosol Inhale 2 Puffs every four hours as needed for Shortness of Breath. Pt prefers ventolin if possible. Thank you 3 Each 3   • levothyroxine (SYNTHROID) 50 MCG Tab Take one pill 6 days per week on empty stomach.  75 mcg on 7th day. 90 Tablet 3   • fluticasone-salmeterol (ADVAIR DISKUS) 500-50 MCG/DOSE AEROSOL POWDER, BREATH ACTIVATED Inhale 1 Puff every 12 hours. 3 Each 3   • meloxicam (MOBIC) 15 MG tablet Take 1 Tablet by mouth 1 time a day as needed (pain). Do not take other NSAIDs. No refills. 60 Tablet 0   • metFORMIN (GLUCOPHAGE) 500 MG Tab Take 500 mg by mouth 2 times a day with meals.     • benazepril (LOTENSIN) 20 MG Tab TAKE 1 TABLET DAILY 90 Tablet 3   • albuterol (VENTOLIN HFA) 108 (90 Base) MCG/ACT  Aero Soln inhalation aerosol Inhale 2 Puffs every four hours as needed. FOR SHORTNESS OF BREATH 18 g 5   • finasteride (PROSCAR) 5 MG Tab Take 1 Tablet by mouth every day. 90 Tablet 3   • levothyroxine (SYNTHROID) 75 MCG Tab Take 1 Tablet by mouth every morning on an empty stomach. Taking only one d per week.  50 mcg all other days. 12 Tablet 3   • atorvastatin (LIPITOR) 80 MG tablet Take 1 Tablet by mouth every evening. 90 Tablet 3   • tizanidine (ZANAFLEX) 4 MG Tab Take 1 Tablet by mouth every 6 hours as needed (muscle spasms). 30 Tablet 2   • EPINEPHrine (EPIPEN) 0.3 MG/0.3ML Solution Auto-injector solution for injection Inject 0.3 mL into the thigh one time for 1 dose. 1 Each 0   • albuterol 108 (90 Base) MCG/ACT Aero Soln inhalation aerosol Inhale 2 Puffs every four hours as needed for Shortness of Breath. 1 Each 4   • Continuous Blood Gluc Sensor (FREESTYLE LUNA 14 DAY SENSOR) Misc 1 Application every 14 days. 6 Each 3   • Dulaglutide (TRULICITY) 3 MG/0.5ML Solution Pen-injector Inject 1 Dose under the skin every 7 days. 2 mL 11   • montelukast (SINGULAIR) 10 MG Tab Take 1 tablet by mouth every evening. 90 tablet 3   • Canagliflozin (INVOKANA) 300 MG Tab Take 1 tablet by mouth every day. 90 tablet 3   • DILTIAZem CD (CARTIA XT) 180 MG CAPSULE SR 24 HR Take 1 capsule by mouth every day. 90 capsule 3   • tamsulosin (FLOMAX) 0.4 MG capsule Take 1 capsule by mouth 1/2 hour after breakfast. 30 capsule 4   • NON SPECIFIED CPAP supplies through [facility] 1 Each 1   • ALPHA LIPOIC ACID PO Take 600 mg by mouth 2 Times a Day.     • Omega-3 Fatty Acids (FISH OIL) 1200 MG CAPS Take  by mouth.     • Cinnamon 500 MG CAPS Take 1,000 mg by mouth.     • aspirin EC (ECOTRIN) 81 MG TBEC Take 81 mg by mouth every day.       • pantoprazole (PROTONIX) 40 MG TBEC Take 40 mg by mouth every day.       • Coenzyme Q10 200 MG Cap Take  by mouth every day.       • Cholecalciferol (VITAMIN D) 2000 UNIT TABS Take  by mouth 2  "times a day.     • cetirizine (ZYRTEC) 10 MG Tab Take 10 mg by mouth every day.       No current facility-administered medications for this visit.       Patient Active Problem List    Diagnosis Date Noted   • 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) 06/09/2021   • Neuropathy - proximal, diabetic 05/03/2021   • Acquired hypothyroidism 08/25/2020   • Type 2 diabetes mellitus without complication, with long-term current use of insulin ([facility]) - Dr. Sandoval 11/07/2019   • History of small bowel obstruction - 2018 11/07/2019   • Syncope and collapse 11/07/2019   • Seasonal allergies 02/28/2019   • Other insomnia 08/17/2018   • Essential hypertension, benign 10/09/2017   • Dyslipidemia 10/09/2017   • Mild intermittent asthma without complication 07/27/2017   • Erythrocytosis 05/01/2017   • ISELA on CPAP - [facility] 05/01/2017   • Coronary artery disease, non-occlusive 04/15/2016   • Angina pectoris ([facility]) 03/11/2016   • Abnormal radionuclide heart study 01/31/2014   • Coronary-myocardial bridge 11/28/2012        Objective:   Pulse 78   Resp 14   Ht 1.753 m (5' 9.02\")   Wt 74.8 kg (165 lb)   SpO2 98%   BMI 24.35 kg/m²     Physical Exam:  Constitutional: Alert, no distress, well-groomed.  Skin: No rashes in visible areas.  Eye: Round. Conjunctiva clear, lids normal. No icterus.   ENMT: Lips pink without lesions, good dentition, moist mucous membranes. Phonation normal.  Neck: No masses, no thyromegaly. Moves freely without pain.  Respiratory: Unlabored respiratory effort, no cough or audible wheeze  Psych: Alert and oriented x3, normal affect and mood.     Assessment and Plan:   The following treatment plan was discussed:     1. Acute recurrent maxillary sinusitis  -Given patient's symptoms as well as his medical history we will treat for bacterial sinusitis at this time.  Will prescribe Augmentin.  Discussed possible side effects and return precautions.  Patient understood and agree with " current treatment plan.  - amoxicillin-clavulanate (AUGMENTIN) 875-125 MG Tab; Take 1 Tablet by mouth 2 times a day for 7 days.  Dispense: 14 Tablet; Refill: 0    Follow-up: No follow-ups on file.

## 2022-05-02 ENCOUNTER — PRE-ADMISSION TESTING (OUTPATIENT)
Dept: ADMISSIONS | Facility: MEDICAL CENTER | Age: 62
End: 2022-05-02
Attending: UROLOGY
Payer: COMMERCIAL

## 2022-05-02 ENCOUNTER — APPOINTMENT (OUTPATIENT)
Dept: ADMISSIONS | Facility: MEDICAL CENTER | Age: 62
End: 2022-05-02
Payer: COMMERCIAL

## 2022-05-02 DIAGNOSIS — Z01.810 PRE-OPERATIVE CARDIOVASCULAR EXAMINATION: ICD-10-CM

## 2022-05-02 DIAGNOSIS — Z01.812 PRE-OPERATIVE LABORATORY EXAMINATION: ICD-10-CM

## 2022-05-02 LAB
ANION GAP SERPL CALC-SCNC: 16 MMOL/L (ref 7–16)
APPEARANCE UR: CLEAR
BILIRUB UR QL STRIP.AUTO: NEGATIVE
BUN SERPL-MCNC: 8 MG/DL (ref 8–22)
CALCIUM SERPL-MCNC: 9.7 MG/DL (ref 8.4–10.2)
CHLORIDE SERPL-SCNC: 101 MMOL/L (ref 96–112)
CO2 SERPL-SCNC: 22 MMOL/L (ref 20–33)
COLOR UR: YELLOW
CREAT SERPL-MCNC: 0.62 MG/DL (ref 0.5–1.4)
EKG IMPRESSION: NORMAL
ERYTHROCYTE [DISTWIDTH] IN BLOOD BY AUTOMATED COUNT: 45.7 FL (ref 35.9–50)
GFR SERPLBLD CREATININE-BSD FMLA CKD-EPI: 107 ML/MIN/1.73 M 2
GLUCOSE SERPL-MCNC: 111 MG/DL (ref 65–99)
GLUCOSE UR STRIP.AUTO-MCNC: >=1000 MG/DL
HCT VFR BLD AUTO: 50.9 % (ref 42–52)
HGB BLD-MCNC: 17 G/DL (ref 14–18)
INR PPP: 1.13 (ref 0.87–1.13)
KETONES UR STRIP.AUTO-MCNC: ABNORMAL MG/DL
LEUKOCYTE ESTERASE UR QL STRIP.AUTO: NEGATIVE
MCH RBC QN AUTO: 30.2 PG (ref 27–33)
MCHC RBC AUTO-ENTMCNC: 33.4 G/DL (ref 33.7–35.3)
MCV RBC AUTO: 90.6 FL (ref 81.4–97.8)
MICRO URNS: ABNORMAL
NITRITE UR QL STRIP.AUTO: NEGATIVE
PH UR STRIP.AUTO: 5.5 [PH] (ref 5–8)
PLATELET # BLD AUTO: 374 K/UL (ref 164–446)
PMV BLD AUTO: 10.3 FL (ref 9–12.9)
POTASSIUM SERPL-SCNC: 3.8 MMOL/L (ref 3.6–5.5)
PROT UR QL STRIP: NEGATIVE MG/DL
PROTHROMBIN TIME: 13.6 SEC (ref 12–14.6)
RBC # BLD AUTO: 5.62 M/UL (ref 4.7–6.1)
RBC UR QL AUTO: NEGATIVE
SODIUM SERPL-SCNC: 139 MMOL/L (ref 135–145)
SP GR UR STRIP.AUTO: 1.01
WBC # BLD AUTO: 10.6 K/UL (ref 4.8–10.8)

## 2022-05-02 PROCEDURE — 93010 ELECTROCARDIOGRAM REPORT: CPT | Performed by: INTERNAL MEDICINE

## 2022-05-02 PROCEDURE — 85610 PROTHROMBIN TIME: CPT

## 2022-05-02 PROCEDURE — 87086 URINE CULTURE/COLONY COUNT: CPT

## 2022-05-02 PROCEDURE — 93005 ELECTROCARDIOGRAM TRACING: CPT

## 2022-05-02 PROCEDURE — 85027 COMPLETE CBC AUTOMATED: CPT

## 2022-05-02 PROCEDURE — 80048 BASIC METABOLIC PNL TOTAL CA: CPT

## 2022-05-02 PROCEDURE — 36415 COLL VENOUS BLD VENIPUNCTURE: CPT

## 2022-05-02 PROCEDURE — 81003 URINALYSIS AUTO W/O SCOPE: CPT

## 2022-05-02 RX ORDER — GABAPENTIN 100 MG/1
100 CAPSULE ORAL 2 TIMES DAILY PRN
COMMUNITY
End: 2022-12-02

## 2022-05-02 ASSESSMENT — FIBROSIS 4 INDEX: FIB4 SCORE: 0.69

## 2022-05-02 NOTE — PREPROCEDURE INSTRUCTIONS
Per anesthesia protocol instructed to take the following medications DOS: albuterol inhaler, zyrtec,diltiazem, advair, levothyroxine, protonix, zanaflex.

## 2022-05-04 ENCOUNTER — PHYSICAL THERAPY (OUTPATIENT)
Dept: PHYSICAL THERAPY | Facility: MEDICAL CENTER | Age: 62
End: 2022-05-04
Attending: PHYSICAL MEDICINE & REHABILITATION
Payer: COMMERCIAL

## 2022-05-04 DIAGNOSIS — Z79.4 TYPE 2 DIABETES MELLITUS WITHOUT COMPLICATION, WITH LONG-TERM CURRENT USE OF INSULIN (HCC): ICD-10-CM

## 2022-05-04 DIAGNOSIS — M62.81 MUSCLE WEAKNESS (GENERALIZED): ICD-10-CM

## 2022-05-04 DIAGNOSIS — E11.9 TYPE 2 DIABETES MELLITUS WITHOUT COMPLICATION, WITH LONG-TERM CURRENT USE OF INSULIN (HCC): ICD-10-CM

## 2022-05-04 LAB
BACTERIA UR CULT: NORMAL
SIGNIFICANT IND 70042: NORMAL
SITE SITE: NORMAL
SOURCE SOURCE: NORMAL

## 2022-05-04 PROCEDURE — 97162 PT EVAL MOD COMPLEX 30 MIN: CPT

## 2022-05-04 SDOH — ECONOMIC STABILITY: GENERAL: QUALITY OF LIFE: FAIR

## 2022-05-04 ASSESSMENT — ENCOUNTER SYMPTOMS
PAIN SCALE AT HIGHEST: 9
QUALITY: ACHING
EXACERBATED BY: RAPID POSITION CHANGES
PAIN TIMING: IN THE EVENING
QUALITY: TIGHTNESS
EXACERBATED BY: ACTIVITY
PAIN SCALE: 0
QUALITY: DISCOMFORT
PAIN TIMING: INTERMITTENT
ALLEVIATING FACTORS: LYING DOWN
PAIN LOCATION: LBP
QUALITY: SHOOTING
EXACERBATED BY: TWISTING
PAIN SCALE AT LOWEST: 0
PAIN TIMING: EVERY DAY
ALLEVIATING FACTORS: REST
ALLEVIATING FACTORS: CHANGE IN POSITION
QUALITY: STABBING

## 2022-05-04 ASSESSMENT — ACTIVITIES OF DAILY LIVING (ADL): POOR_BALANCE: 1

## 2022-05-04 NOTE — OP THERAPY EVALUATION
"  Outpatient Physical Therapy  INITIAL NEUROLOGICAL EVALUATION    [facility] Outpatient Physical Therapy  [address]  [place] [address]  Phone:  [phone]  Fax:  [phone]    Date of Evaluation: 05/04/2022    Patient: Aly Hewitt  YOB: 1960  MRN: [id number]     Referring Provider: Myah Blandon D.O.  [address]  [place] [address]   Referring Diagnosis Radiculopathy, lumbar region [M54.16];Muscle wasting and atrophy, not elsewhere classified, left thigh [M62.552]     Time Calculation    Start time: 0902                  Chief Complaint: Difficulty Walking and Loss Of Balance    Visit Diagnoses     ICD-10-CM   1. Muscle weakness (generalized)  M62.81       Subjective:   History of Present Illness:     Date of onset:  4/5/2022    Mechanism of injury:  The pt presents to physical therapist initial evaluation reporting that about 2 years ago he started having leg and back pain. Additionally he was diagnosed with proximal diabetic neuropathy. Has previously had PT, but was having increased pain after PT. Reports a constant, dull ache in the back. The pt has had 2 epidurals that did significantly help his pain but he still experiences intermittent severe pain in the back. The pt is working on his balance at home. Pt is hoping to retire in a year and a half. Pt works for [employer] and sits at a desk, but gets up every hour to walk or move. Radicular symptoms primarily in the L leg, but occasional tingles in the R leg. Significant fatigue in B LEs by the end of the day. Takes his dog for a walk when he is stiff. Pain wakes from sleep, pt is a side-sleeper. In full supine, leg cramps used to wake him from sleep. Works 6 am to 3:30, fatigue onset between 11 and 1. Utilizes a standing desk to switch between sitting and standing. Feels a heaviness in his feet. Sleeps with compression socks. Pt has not fallen, but has had \"near falls\". Feels " "coldness in the extremities. 1 mile walking limit, some days only 1/4 mile.     Per pt's PMI, \"Barron Hewitt is a 61 y.o. male with PMH significant for CAD, ISELA, asthma, hypertension, dyslipidemia, seasonal allergies, type 2 diabetes mellitus, history of SBO 2018, hypothyroidism and rehabilitation history significant for muscle weakness, pain. Patient with balance issues and weakness of LE's due to diabetic amyotrophy/weakness. Please work on strengthening and balance.\"    Quality of life:  Fair  Prior level of function:  Pt previously independent and walking 17,000 steps per day  Sleep disturbance:  Interrupted sleep  Pain:     Current pain ratin    At best pain ratin    At worst pain ratin    Location:  LBP     Quality:  Aching, tightness, stabbing, discomfort and shooting    Pain timing:  Intermittent, every day and in the evening    Relieving factors:  Change in position, rest and lying down    Aggravating factors:  Activity, rapid position changes and twisting    Progression:  Worsening  Social Support:     Lives in:  One-story house (Difficulty with stairs)    Lives with:  Spouse  Hand dominance:  Right  Diagnostic Tests:     Abnormal MRI: See MRI below.      Diagnostic Tests Comments:  IMPRESSION:     1.  L4-5 annular disc bulge with a central disc protrusion and bilateral facet degeneration. There is borderline central canal narrowing. There is moderate mild right neural foraminal narrowing.     2.  L3-4 annular disc bulge with a left lateral disc protrusion. There is moderate to severe left and mild right neural foraminal narrowing again seen.     3.  L5-S1 degenerative disc disease and facet degeneration results in moderate bilateral neuroforaminal narrowing.  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 Valverde   • PONV (postoperative nausea and vomiting)    • Sleep apnea     has CPAP   • Snoring      Past Surgical History:   Procedure Laterality Date   • BLOCK EPIDURAL STEROID INJECTION Left 3/22/2022    Procedure: LEFT L3-4 and L4-5 transforaminal epidural steroid injection with fluoroscopic guidance;  Surgeon: Dragan Ayala M.D.;  [facility];  Service: Pain Management   • NM NJX AA&/STRD TFRML EPI LUMBAR/SACRAL 1 LEVEL Left 2/15/2022    Procedure: LEFT L3-4 and L4-5 transforaminal epidural steroid injection with fluoroscopic guidance;  Surgeon: Dragan Ayala M.D.;  Location: [facility];  Service: Pain Management   • SHOULDER DECOMPRESSION ARTHROSCOPIC Left 1/4/2018    Procedure: SHOULDER DECOMPRESSION ARTHROSCOPIC - SUBACROMIAL;  Surgeon: Linwood Grover M.D.;  Location: [facility];  Service: Orthopedics   • SHOULDER ARTHROSCOPY W/ BICIPITAL TENODESIS REPAIR Left 1/4/2018    Procedure: SHOULDER ARTHROSCOPY W/ BICIPITAL Tenotomy REPAIR;  Surgeon: Linwood Grover M.D.;  Location: [facility];  Service: Orthopedics   • CLAVICLE DISTAL EXCISION Left 1/4/2018    Procedure: CLAVICLE DISTAL EXCISION - POSSIBLE;  Surgeon: Linwood Grover M.D.;  Location: [facility];  Service: Orthopedics   • RECOVERY  4/8/2016    Procedure: CATH LAB [facility] WITH POSSIBLE NAVIN;  Surgeon: " Recoveryonly Surgery;  Location: [facility]ERY [facility] PROC [facility];  Service:    • VENTRAL HERNIA REPAIR LAPAROSCOPIC  2012    Performed by Marcos Ramírez M.D. at SURGERY [facility] ORS   • KNEE ARTHROSCOPY      right   • SHOULDER ARTHROSCOPY      right   • SINUSOTOMIES      times two surgeries   • TUMOR EXCISION WITH BIOPSY      right hand - thumb     Social History     Tobacco Use   • Smoking status: Former Smoker     Packs/day: 0.00     Quit date:      Years since quittin.3   • Smokeless tobacco: Never Used   • Tobacco comment: occasional cigars   Substance Use Topics   • Alcohol use: Yes     Comment: rare - 1-2 per month (social)     Family and Occupational History     Socioeconomic History   • Marital status:      Spouse name: Not on file   • Number of children: Not on file   • Years of education: Not on file   • Highest education level: Not on file   Occupational History   • Not on file       Objective:   Active Range of Motion:   Active range of motion comments: Lumbar AROM  Flexion: fingertips to mid-shin  Extension: decreased by 25%  Side-bending: L side-bending caused radicular symptoms to L heel , R WNL   Rotation: tightness with R rotation, L WNL    Reflexes: L L4 1+, all  Other 2+     Negative slump test bilaterally         Passive Range of Motion:     Passive Range of Motion Comments:  Passive lumbar vertebral mobility:  S1: hypomobile and painful  L5: hypomobile and painful  L4: hypomobile  L3: WNL  L2: WNL  L1: WNL    Decreased passive vertebral mobility throughout thoracic spine, no symptom onset    Palpation: TTP at L SIJ, piriformis, glute med. Reports of radicular symptoms with palpation at L piriformis to L 5th metatarsal    Strength:   Lower extremity (left):     Hip flexion: 5    Hip extension: 4+    Hip abduction: 5    Hip adduction: 5    Hip external rotation: 5    Hip internal rotation: 5    Knee flexion: 5    Knee extension: 4+    Ankle  "dorsiflexion: 5  Lower extremity (right):     Hip flexion: 5    Hip extension: 5    Hip abduction: 5    Hip adduction: 5    Hip internal rotation: 5    Knee flexion: 5    Knee extension: 5    Ankle dorsiflexion: 5    Tone, Sensation and Coordination:     Sensation   Lower extremity (left):     Light touch: Intact    Proprioception: Intact   Lower extremity (right):     Light touch: Intact    Proprioception: Intact     Sensation comments:   Pt reports a \"heaviness\" in both his feet, particularly by the end of the day. Intermittent radicular symptoms of pins and needles, but overall intact sensation in B feet    Balance/Gait Comments     MiniBEST Test  1. SIT TO STAND 2/2    2. RISE TO TOES 1/2    3. STAND ON ONE LEG 1/2    Left: Time in Seconds Trial 1:10 Trial 2:9    Right: Time in Seconds Trial 1:8 Trial 2: 9    4. COMPENSATORY STEPPING CORRECTION- FORWARD 2/2     5. COMPENSATORY STEPPING CORRECTION- BACKWARD 2/2    6. COMPENSATORY STEPPING CORRECTION- LATERAL 1/2    7. STANCE (FEET TOGETHER); EYES OPEN, FIRM SURFACE 2/2  Time in seconds: 30    8. STANCE (FEET TOGETHER); EYES CLOSED, FOAM SURFACE 2/2    9. INCLINE- EYES CLOSED 2/2   Time in seconds: 30    10. CHANGE IN GAIT SPEED 2/2    11. WALK WITH HEAD TURNS - HORIZONTAL 1/2    12. WALK WITH PIVOT TURNS 1/2    13. STEP OVER OBSTACLES 2/2    14. TIMED UP & GO WITH DUAL TASK 2/2   TU.5 seconds; Dual Task TU.05 seconds    Total:      10MWT 10.14 sec no AD  Gait speed: 0.98 m/s no AD        Exercises/Treatment  Time-based treatments/modalities:           Assessment, Response and Plan:   Impairments: abnormal or restricted ROM, activity intolerance, impaired balance, lacks appropriate home exercise program and pain with function    Assessment details:  Aly Hewitt is a 61 y.o. male who presents to skilled physical therapist initial evaluation with current complaints of LBP with radiculopathy and impaired balance and sensation secondary to diabetic " neuropathy. The pt's PMH includes CAD, ISELA, asthma, hypertension, dyslipidemia, seasonal allergies, type 2 diabetes mellitus, history of SBO 2018, hypothyroidism. The pt presents with objective impairments in thoracic and lumbar vertebral mobility, decreased LE flexibility, impaired reactive and dynamic gait and balance, and decreased tolerance to participation in desired functional and recreational activities including walking his dog and spending time with his grandchildren. The impairments found during today's evaluation can be addressed by PT intervention, but his prognosis and time to achieve goals may be impacted by his comorbidities and chronicity of symptoms. However, the pt appears highly motivated to participate in PT intervention and will benefit from skilled therapy to address functional limitations and impairments detailed above and to maximize his CLOF as well as to learn an individualized exercise program to prevent future decline in function.   Barriers to therapy:  Comorbidities  Prognosis: good    Goals:   Short Term Goals:   1. Pt will demonstrate 3 point improvement on MiniBEST test (to 26/28) to improve his dynamic balance and stability  2. Pt will be able to walk for a mile and a half consistently without an increase in back pain   3. Pt will be able to play with his grandchildren for up to 1 hour without an increase in symptoms  4. Pt will report 50% improvement in the heaviness in his feet after a full day of working   Short term goal time span:  2-4 weeks      Long Term Goals:    1. Pt will be able to walk up to 17,000 steps daily without an increase in symptoms  2. Pt will be able to work a full week at work with at worst back pain to <5/10  3. Pt will be able to sleep throughout the night without onset of leg cramps  4. Pt will be independent with participation with [facility]  Long term goal time span:  6-8 weeks    Plan:   Therapy options:  Physical therapy treatment to continue  Planned  therapy interventions:  Therapeutic Exercise (CPT 86664), Therapeutic Activities (CPT 38623), E Stim Unattended (CPT 74345), Gait Training (CPT 19154), Neuromuscular Re-education (CPT 94558) and Manual Therapy (CPT 47890)  Frequency:  1x week  Duration in weeks:  6  Duration in visits:  6  Discussed with:  Patient  Plan details:  Pt educated on their current objective clinical presentation, anticipated PT POC, and importance of adherence to prescribed HEP in order to maximize PT benefit.        Functional Assessment Used  Oneil Shayan Low Back Pain and Disability Score: 54.17       Referring provider co-signature:  I have reviewed this plan of care and my co-signature certifies the need for services.    Certification Period: 05/04/2022 to  06/22/22    Physician Signature: ________________________________ Date: ______________

## 2022-05-11 DIAGNOSIS — I10 BENIGN ESSENTIAL HTN: ICD-10-CM

## 2022-05-12 ENCOUNTER — PHYSICAL THERAPY (OUTPATIENT)
Dept: PHYSICAL THERAPY | Facility: MEDICAL CENTER | Age: 62
End: 2022-05-12
Attending: PHYSICAL MEDICINE & REHABILITATION
Payer: COMMERCIAL

## 2022-05-12 DIAGNOSIS — M62.81 MUSCLE WEAKNESS (GENERALIZED): ICD-10-CM

## 2022-05-12 PROCEDURE — 97110 THERAPEUTIC EXERCISES: CPT

## 2022-05-12 PROCEDURE — 97140 MANUAL THERAPY 1/> REGIONS: CPT

## 2022-05-12 PROCEDURE — 97112 NEUROMUSCULAR REEDUCATION: CPT

## 2022-05-12 RX ORDER — DILTIAZEM HYDROCHLORIDE 180 MG/1
CAPSULE, COATED, EXTENDED RELEASE ORAL
Qty: 90 CAPSULE | Refills: 3 | Status: SHIPPED | OUTPATIENT
Start: 2022-05-12 | End: 2022-08-03

## 2022-05-12 NOTE — TELEPHONE ENCOUNTER
Received request via: Pharmacy    Was the patient seen in the last year in this department? Yes  LOV 04/28/2022 - Telemedicine  Does the patient have an active prescription (recently filled or refills available) for medication(s) requested? No

## 2022-05-12 NOTE — OP THERAPY DAILY TREATMENT
"  Outpatient Physical Therapy  DAILY TREATMENT     [facility] Outpatient Physical Therapy  [address]  [place] [address]  Phone:  [phone]  Fax:  [phone]    Date: 05/12/2022    Patient: Aly Hewitt  YOB: 1960  MRN: [id number]     Time Calculation    Start time: 1315  Stop time: 1357 Time Calculation (min): 42 minutes     Chief Complaint: Back Problem and Loss Of Balance    Visit #: 2    SUBJECTIVE:  The pt states that he had a few good days regarding his back, but yesterday experienced significant onset of pain in his lower back. He reports that the pain felt like a knife in his back. He had to call out of work today. He is agreeable to PT.     OBJECTIVE:        Therapeutic Exercises (CPT 60776):     1. Stationary bike warm up, L3, 5 minutes    2. Prone to KATTY, 10x    3. Cat camel , 10x    4. Quadruped hip extension , 10x each, cueing to maintain neutral hip rotation    20. POC 05/04/2022 to  06/22/22      Therapeutic Exercise Summary: Pt educated to continue with current strength regimen with added HEP detailed below    Access Code: [email or web address]  URL: [email or web address]  Date: 05/12/2022  Prepared by: Tashia Velazquez    Exercises  Prone Press Up On Elbows - 1 x daily - 7 x weekly - 3 sets - 10 reps  Cat-Camel - 1 x daily - 7 x weekly - 3 sets - 10 reps  Quadruped Single Leg Hip Extension - 1 x daily - 7 x weekly - 3 sets - 10 reps  Crossover Step Up with Knee Drive - 1 x daily - 7 x weekly - 3 sets - 10 reps      Therapeutic Treatments and Modalities:     1. Manual Therapy (CPT 62298), See below    2. Neuromuscular Re-education (CPT 31727), See below    Therapeutic Treatment and Modalities Summary: Manual Therapy   -Prone lying IASTM to B lumbar paraspinals and QL, PA mobilizations at L1-5 grade II-III, prone quad stretch 3x30\" each     Neuromuscular Re-Education  -AirEx tandem balance with cueing for ankle over hip strategy 2x45\" each " "side  -AirEx tandem balance with head turns 10x each side  -AirEx step up to 8\" step with knee drive and SLS, 8x each side, supervision for pt safety    Time-based treatments/modalities:    Physical Therapy Timed Treatment Charges  Manual therapy minutes (CPT 70716): 15 minutes  Neuromusc re-ed, balance, coor, post minutes (CPT 66586): 15 minutes  Therapeutic exercise minutes (CPT 87909): 8 minutes    ASSESSMENT:   Response to treatment: Despite pt's aggravation of low back pain yesterday, he tolerated initiation of therex for lumbar mobility and stability without aggravation of symptoms. He demonstrated soft tissue tension in his lumbar spinal musculature that improved with mth. He was challenged with compliant balance tasks, but improved his stability with cueing for ankle strategy over hip strategy. The pt is highly motivated to participate in PT and will benefit from ongoing skilled therapy to maximize his CLOF.     PLAN/RECOMMENDATIONS:   Plan for treatment: therapy treatment to continue next visit.  Planned interventions for next visit: continue with current treatment.       "

## 2022-05-18 ENCOUNTER — PHYSICAL THERAPY (OUTPATIENT)
Dept: PHYSICAL THERAPY | Facility: MEDICAL CENTER | Age: 62
End: 2022-05-18
Attending: PHYSICAL MEDICINE & REHABILITATION
Payer: COMMERCIAL

## 2022-05-18 DIAGNOSIS — M62.81 MUSCLE WEAKNESS (GENERALIZED): ICD-10-CM

## 2022-05-18 PROCEDURE — 97112 NEUROMUSCULAR REEDUCATION: CPT

## 2022-05-18 PROCEDURE — 97110 THERAPEUTIC EXERCISES: CPT

## 2022-05-18 PROCEDURE — 97140 MANUAL THERAPY 1/> REGIONS: CPT

## 2022-05-18 NOTE — OP THERAPY DAILY TREATMENT
Outpatient Physical Therapy  DAILY TREATMENT     [facility] Outpatient Physical Therapy  [address]  [place] [address]  Phone:  [phone]  Fax:  [phone]    Date: 05/18/2022    Patient: Aly Hewitt  YOB: 1960  MRN: [id number]     Time Calculation    Start time: 1531  Stop time: 1614 Time Calculation (min): 43 minutes     Chief Complaint: Weakness    Visit #: 3    SUBJECTIVE:  The pt states that he was sore for a couple of days after last PT visit. He states that he has been stiff and sore in his low back but has been participating in exercise program.     OBJECTIVE:        Therapeutic Exercises (CPT 88415):     1. Stationary bike warm up, L1, 5 minutes    2. Prone to KATTY, 10x    3. Cat camel , 10x    4. Quadruped hip extension , 10x each, cueing to maintain neutral hip rotation    5. Quadruped thread the needle, 5x    6. Open book, 5x    7. Bridge march , 5x    20. POC 05/04/2022 to  06/22/22      Therapeutic Exercise Summary: Pt educated to continue with current strength regimen with added HEP detailed below    Access Code: [email or web address]  URL: [email or web address]  Date: 05/18/2022  Prepared by: Tashia Velazquez    Exercises  Prone Press Up On Elbows - 1 x daily - 7 x weekly - 3 sets - 10 reps  Cat-Camel - 1 x daily - 7 x weekly - 3 sets - 10 reps  Quadruped Single Leg Hip Extension - 1 x daily - 7 x weekly - 3 sets - 10 reps  Crossover Step Up with Knee Drive - 1 x daily - 7 x weekly - 3 sets - 10 reps  Quadruped Full Range Thoracic Rotation with Reach - 1 x daily - 7 x weekly - 3 sets - 10 reps  Sidelying Thoracic Rotation with Open Book - 1 x daily - 7 x weekly - 3 sets - 10 reps  Marching Bridge - 1 x daily - 7 x weekly - 3 sets - 10 reps  Forward T with Counter Support - 1 x daily - 7 x weekly - 3 sets - 10 reps      Therapeutic Treatments and Modalities:     1. Manual Therapy (CPT 47397), See below    2. Neuromuscular Re-education (CPT  "71518), See below    Therapeutic Treatment and Modalities Summary: Manual Therapy   -Prone lying IASTM to B lumbar paraspinals and QL, PA mobilizations at L1-5 grade II-III, prone t-spine PA mobilizations    Neuromuscular Re-Education  -AirEx tandem balance with eyes closed 3x30\"  -AirEx forward T reach x10 each side    Time-based treatments/modalities:    Physical Therapy Timed Treatment Charges  Manual therapy minutes (CPT 78580): 15 minutes  Neuromusc re-ed, balance, coor, post minutes (CPT 62625): 10 minutes  Therapeutic exercise minutes (CPT 89874): 15 minutes    ASSESSMENT:   Response to treatment: The pt tolerated a progression in therex for spinal mobility and stabilization without an increase in his symptoms. He reported tenderness at L T12 paraspinals that improved with MTH. He was challenged with eyes closed balance but improved his stability with cueing for \"grounding\" through his feet and hips. The pt will continue to benefit from skilled therapy services to maximize CLOF.     PLAN/RECOMMENDATIONS:   Plan for treatment: therapy treatment to continue next visit.  Planned interventions for next visit: continue with current treatment.       "

## 2022-05-23 ENCOUNTER — OFFICE VISIT (OUTPATIENT)
Dept: NEUROLOGY | Facility: MEDICAL CENTER | Age: 62
End: 2022-05-23
Attending: PSYCHIATRY & NEUROLOGY
Payer: COMMERCIAL

## 2022-05-23 VITALS
DIASTOLIC BLOOD PRESSURE: 84 MMHG | HEART RATE: 91 BPM | RESPIRATION RATE: 16 BRPM | TEMPERATURE: 97.1 F | OXYGEN SATURATION: 94 % | SYSTOLIC BLOOD PRESSURE: 120 MMHG | HEIGHT: 69 IN | BODY MASS INDEX: 25.06 KG/M2 | WEIGHT: 169.2 LBS

## 2022-05-23 DIAGNOSIS — M54.17 LUMBOSACRAL RADICULOPATHY: ICD-10-CM

## 2022-05-23 PROCEDURE — 99212 OFFICE O/P EST SF 10 MIN: CPT | Performed by: PSYCHIATRY & NEUROLOGY

## 2022-05-23 PROCEDURE — 99214 OFFICE O/P EST MOD 30 MIN: CPT | Performed by: PSYCHIATRY & NEUROLOGY

## 2022-05-23 ASSESSMENT — FIBROSIS 4 INDEX: FIB4 SCORE: 0.63

## 2022-05-23 NOTE — PROGRESS NOTES
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 male presents today for weakness f/u.   History is obtained from patient.  and Patient is accompanied by wife Anastasia.    Duration/timing: As below  Context: Diabetic Amyotrophy/Weakness: in 2020 he was losing weight (15 lbs loss) and saw his endo and they thought he was overmedicating with DM meds. They adjusted the medications stabilizing with weight. Then in 7/2020, he got out of bed and his back pinch (lower back, sharp pain, mid). Next couple of days the back got tighter and tighter. The pain then progressed down the left leg radiating down lateral. MRI L spine was performed and EMG was subsequently ordered. EMG was suggestive to be plexopathy. There was significant groin pain at that time. There was worsening weakness in the left leg proximally in 8/2020. There was no numbness, there was just pain. Now (as of 4/2021), he is starting to have tingling on the top of the feet > bottom and now having stabbing pain in the lateral 2 toes that would come and go. Occasional right lateral buttocks pain from the back. +Weakness with right proximal leg.   Associated signs/symptoms: 2010 delfin he slipped on carpet at work and he tore the bilateral quadriceps confirmed on MRI - conservative therapy with PT; he does not feel like he returned back to 100% - about 85%; syncope at restaurant 1/2020 with dizziness with turning head to right when he is backing up car  Denies: bladder incontinence, bowel incontinence, other weakness, other numbness/tingling, swallowing difficulties, speech disturbance, positional changes, exertional qualities and falls     Patient has tried:  -Home PT -with improvement    Subjective: Patient was last seen in neurology clinic on 11/2021.      Low back pain: This seems to be the most pressing issue.   "He does have low back pain and this is associated with a numbness and tingling below.  He has been established with Dr. Ayala who has tried epidural steroid injections.  He plans on seeing Dr. Adames.  He is inquiring about surgical option/opinion.  He did restart PT to see if this helps.    Numbness/tingling: Intermittent, left worse than right, located in the toes numbness.  The left side can start in the back and have aching down the leg to the lateral portion of the foot.    Weakness: stable.      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 Valverde   • PONV (postoperative nausea and vomiting)    • Sleep apnea     has CPAP   • Snoring        Past surgical history:   Past Surgical History:   Procedure Laterality Date   • BLOCK EPIDURAL STEROID INJECTION Left 3/22/2022    Procedure: LEFT L3-4 and L4-5 transforaminal epidural steroid injection with fluoroscopic guidance;  Surgeon: Dragan Ayala M.D.;  [facility];  Service: Pain Management   • MD NJX AA&/STRD TFRML EPI LUMBAR/SACRAL 1 LEVEL Left 2/15/2022    Procedure: LEFT L3-4 and L4-5 transforaminal epidural steroid injection with fluoroscopic guidance;  Surgeon: Dragan Ayala M.D.;  Location: [facility];  Service: Pain Management   • SHOULDER DECOMPRESSION ARTHROSCOPIC Left 1/4/2018    Procedure: SHOULDER DECOMPRESSION ARTHROSCOPIC - SUBACROMIAL;  Surgeon: Linwood Grover M.D.;  Location: SURGERY [facility];  Service: " Orthopedics   • SHOULDER ARTHROSCOPY W/ BICIPITAL TENODESIS REPAIR Left 1/4/2018    Procedure: SHOULDER ARTHROSCOPY W/ BICIPITAL Tenotomy REPAIR;  Surgeon: Linwood Grover M.D.;  Location: SURGERY [facility];  Service: Orthopedics   • CLAVICLE DISTAL EXCISION Left 1/4/2018    Procedure: CLAVICLE DISTAL EXCISION - POSSIBLE;  Surgeon: Linwood Grover M.D.;  Location: SURGERY [facility];  Service: Orthopedics   • RECOVERY  4/8/2016    Procedure: CATH LAB [facility] WITH POSSIBLE NAVIN;  Surgeon: Recoveryons[facility]ino Surgery;  Location: SURGERY PRE-POST PROC UNIT [facility];  Service:    • VENTRAL HERNIA REPAIR LAPAROSCOPIC  11/1/2012    Performed by Marcos Ramírez M.D. at SURGERY [facility]   • KNEE ARTHROSCOPY  1990's    right   • SHOULDER ARTHROSCOPY  1990's    right   • SINUSOTOMIES  1990's    times two surgeries   • TUMOR EXCISION WITH BIOPSY  1990's    right hand - thumb       Family history:   Family History   Problem Relation Age of Onset   • Breast Cancer Mother    • Hypertension Mother    • Cancer Mother         breast   • Heart Disease Mother         hx of bypass   • Hypertension Father    • Arthritis Father    • Diabetes Father         prediabetes   • No Known Problems Sister    • Arthritis Brother    • Heart Disease Other    • Hypertension Other    • Cancer Other    • No Known Problems Brother        Social history:   Tobacco Use   • Smoking status: Light Tobacco Smoker     Packs/day: 0.00     Types: Cigars   • Smokeless tobacco: Never Used   • Tobacco comment: occasional cigars   Vaping Use   • Vaping Use: Never used   Substance and Sexual Activity   • Alcohol use: Yes     Comment: rare - 1-2 per month (social)   • Drug use: Yes     Types: Marijuana     Comment: occ cigar smoking       Current medications:   Current Outpatient Medications   Medication   • DILTIAZem CD (CARDIZEM CD) 180 MG CAPSULE SR 24 HR   • metFORMIN (GLUCOPHAGE) 500 MG Tab   • gabapentin (NEURONTIN) 100 MG Cap   • zolpidem  "(AMBIEN) 5 MG Tab   • albuterol 108 (90 Base) MCG/ACT Aero Soln inhalation aerosol   • levothyroxine (SYNTHROID) 50 MCG Tab   • fluticasone-salmeterol (ADVAIR DISKUS) 500-50 MCG/DOSE AEROSOL POWDER, BREATH ACTIVATED   • meloxicam (MOBIC) 15 MG tablet   • benazepril (LOTENSIN) 20 MG Tab   • finasteride (PROSCAR) 5 MG Tab   • levothyroxine (SYNTHROID) 75 MCG Tab   • atorvastatin (LIPITOR) 80 MG tablet   • tizanidine (ZANAFLEX) 4 MG Tab   • EPINEPHrine (EPIPEN) 0.3 MG/0.3ML Solution Auto-injector solution for injection   • Continuous Blood Gluc Sensor ([employer] 14 DAY SENSOR) Misc   • Dulaglutide (TRULICITY) 3 MG/0.5ML Solution Pen-injector   • montelukast (SINGULAIR) 10 MG Tab   • Canagliflozin (INVOKANA) 300 MG Tab   • tamsulosin (FLOMAX) 0.4 MG capsule   • NON SPECIFIED   • ALPHA LIPOIC ACID PO   • Omega-3 Fatty Acids (FISH OIL) 1200 MG CAPS   • Cinnamon 500 MG CAPS   • pantoprazole (PROTONIX) 40 MG TBEC   • Coenzyme Q10 200 MG Cap   • Cholecalciferol (VITAMIN D) 2000 UNIT TABS   • cetirizine (ZYRTEC) 10 MG Tab   • aspirin EC (ECOTRIN) 81 MG TBEC     No current facility-administered medications for this visit.       Medication Allergy:  Allergies   Allergen Reactions   • Kiwi Extract Anaphylaxis   • Shellfish Allergy Anaphylaxis   • Strawberry Anaphylaxis   • Ozempic (0.25 Or 0.5 Mg-Dose) [Semaglutide] Nausea     Pt does not recall any reaction   • Sulfa Drugs Rash and Unspecified     rash   • Testosterone Rash     rash       ROS per HPI    Physical examination:   Vitals:    05/23/22 1317   BP: 120/84   BP Location: Left arm   Patient Position: Sitting   BP Cuff Size: Adult   Pulse: 91   Resp: 16   Temp: 36.2 °C (97.1 °F)   TempSrc: Temporal   SpO2: 94%   Weight: 76.7 kg (169 lb 3.2 oz)   Height: 1.753 m (5' 9\")     General: Patient in well nourished in no apparent distress.  HENT: Normocephalic, atraumatic  Respiratory: Normal respiratory effort.   Psychiatric: Pleasant.     NEUROLOGICAL EXAM:   Mental " status: Awake, alert and fully oriented to person, place, time and situation. Normal attention, concentration and fund of knowledge for education level.   Speech and language: Speech is fluent without errors and clear.  Cranial nerve exam: Prior exam  II: Pupils are equally round and reactive to light. Visual fields are intact by confrontation.  III, IV, VI: EOMI, no diplopia, no ptosis.  V: Sensation to light touch is normal over V1-3 distributions bilaterally.  .  VII: Facial movements are symmetrical. There is no facial droop. .  VIII: Hearing intact to soft speech and finger rub bilaterally  IX: Palate elevates symmetrically, uvula is midline. Dysarthria is not present.  XI: Shoulder shrug are symmetrical and strong.   XII: Tongue protrudes midline.    Motor exam:  Muscle tone is normal in all 4 limbs. and No abnormal movements appreciated.    Muscle strength: UE prior exam as below, LE exam repeated today     Right  Left  Deltoid   5/5  5/5      Biceps   5/5  5/5  Triceps  5/5  5/5   Wrist extensors 5/5  5/5  Wrist flexors  5/5  5/5     5/5  5/5  Interossei  5/5  5/5  Thenar (APB)  NT/5  NT/5   Hip flexors  5/5  5/5  Quadriceps  5/5  5/5   Adduction  5/5  5/5  Abduction  5/5  5/5   Hamstrings  5/5  5/5  Dorsiflexors  5/5  5/5  Plantarflexors  5/5  5/5  Toe extension  5/5  5/5  Foot inversion  5/5  5/5  Foot eversion  5/5  5/5  NT = not tested    Sensory exam:   Intact to Light touch in bilateral upper and lower extremity including entire LLE. Vibration intact.     Reflexes:       Right  Left  Biceps   1/4  1/4  Triceps  1/4  1/4  Brachioradialis 1/4  1/4  Knee jerk  2/4  2/4  Ankle jerk  1/4  1/4   bilateral toes are downgoing to plantar stimulation.    Coordination: shows a normal finger-nose-finger   Gait: Heel walk is normal., Toe walk is normal. and He has a mild limp on the left - stable      ANCILLARY DATA REVIEWED:   Lab Data Review:  Lab Results   Component Value Date/Time    WBC 10.6 05/02/2022  12:25 PM    RBC 5.62 05/02/2022 12:25 PM    HEMOGLOBIN 17.0 05/02/2022 12:25 PM    HEMATOCRIT 50.9 05/02/2022 12:25 PM    MCV 90.6 05/02/2022 12:25 PM    MCH 30.2 05/02/2022 12:25 PM    MCHC 33.4 (L) 05/02/2022 12:25 PM    MPV 10.3 05/02/2022 12:25 PM    NEUTSPOLYS 67.60 06/04/2020 02:18 PM    LYMPHOCYTES 17.60 (L) 06/04/2020 02:18 PM    MONOCYTES 9.80 06/04/2020 02:18 PM    EOSINOPHILS 3.50 06/04/2020 02:18 PM    BASOPHILS 1.20 06/04/2020 02:18 PM    HYPOCHROMIA 1+ 08/27/2013 07:13 AM      Lab Results   Component Value Date/Time    SODIUM 139 05/02/2022 12:25 PM    POTASSIUM 3.8 05/02/2022 12:25 PM    CHLORIDE 101 05/02/2022 12:25 PM    CO2 22 05/02/2022 12:25 PM    GLUCOSE 111 (H) 05/02/2022 12:25 PM    BUN 8 05/02/2022 12:25 PM    CREATININE 0.62 05/02/2022 12:25 PM    CREATININE 1.1 07/10/2008 09:25 AM     Lab Results   Component Value Date/Time    ASTSGOT 18 06/09/2021 1313    ALTSGPT 23 06/09/2021 1313    ALKPHOSPHAT 89 06/09/2021 1313    ALBUMIN 4.7 06/09/2021 1313     Lab Results   Component Value Date/Time    HBA1C 7.2 (A) 11/16/2021 02:36 PM      A1C: 7.4> 7.7> 7.7> 7.1    Imaging:   MRI L spine without 7/2021:  1.  L4-5 annular disc bulge with a central disc protrusion and bilateral facet degeneration. There is borderline central canal narrowing. There is moderate mild right neural foraminal narrowing.  2.  L3-4 annular disc bulge with a left lateral disc protrusion. There is moderate to severe left and mild right neural foraminal narrowing again seen.  3.  L5-S1 degenerative disc disease and facet degeneration results in moderate bilateral neuroforaminal narrowing.    Records reviewed: None       ASSESSMENT AND PLAN:    1. Low back pain: Originally seen for weakness and has since resolved.  His most pressing issue is his low back pain, numbness tingling, radicular symptoms.  He does have degenerative disc disease of the lumbar spine most prominent at L3-4/4 5 without strong evidence of central canal  narrowing or spinal claudication.  He is undergoing conservative therapy with Dr. Ayala and PT.  He returns today to seek an opinion about his low back pain if there is anything else going on.  His exam is stable-there is no evidence of weakness or sensory deficit on exam.  -We had an extensive discussion with regards to treatment approaches including conservative versus surgical therapy.  I do not feel this is surgical at this point in time.  Based on his clinical exam I do not think repeat EMG/NCS would be of high yield.  -Recommend following up with pain management  -Referral to [facility] for second opinion    2. 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  There was involvement of both the obturator nerve and femoral nerve though it seemed predominantly femoral nerve.  A superimposed lumbar spine radiculopathy is also suspected which may account for her ongoing mild denervation changes though on clinical exam he is very strong.  Although NCS was difficult to perform on this patient and some responses were unobtainable, I am not entirely convinced that there is diabetic amyotrophy affecting the right lower extremity.    -Monitor clinically    3. Sensory disturbance: As above    FOLLOW-UP: Return if symptoms worsen or fail to improve.   EDUCATION AND COUNSELING:  -I personally discussed the following with the patient:   medical reasoning as above     Total time spent on day of visit: 35 min  Additional time was spent: reviewing diagnostic workup to date, reviewing/obtaining separately obtained history, counseling and educating the patient/family/caregiver, ordering medications, tests, or procedures and documenting clinical information in EMR      This dictation was created using voice recognition software. I have made every reasonable  attempt to avoid dictation errors, but this document may contain an error not identified before finalizing. If the error changes the accuracy of the document, I would appreciate it being brought to my attention. Thank you.      Yenny Ogden MD  Neurology

## 2022-05-27 ENCOUNTER — HOSPITAL ENCOUNTER (OUTPATIENT)
Facility: MEDICAL CENTER | Age: 62
End: 2022-05-27
Attending: UROLOGY | Admitting: UROLOGY
Payer: COMMERCIAL

## 2022-05-27 ENCOUNTER — APPOINTMENT (OUTPATIENT)
Dept: PHYSICAL THERAPY | Facility: MEDICAL CENTER | Age: 62
End: 2022-05-27
Attending: PHYSICAL MEDICINE & REHABILITATION
Payer: COMMERCIAL

## 2022-05-27 ENCOUNTER — ANESTHESIA (OUTPATIENT)
Dept: SURGERY | Facility: MEDICAL CENTER | Age: 62
End: 2022-05-27
Payer: COMMERCIAL

## 2022-05-27 ENCOUNTER — ANESTHESIA EVENT (OUTPATIENT)
Dept: SURGERY | Facility: MEDICAL CENTER | Age: 62
End: 2022-05-27
Payer: COMMERCIAL

## 2022-05-27 VITALS
BODY MASS INDEX: 24.59 KG/M2 | HEIGHT: 69 IN | WEIGHT: 166.01 LBS | SYSTOLIC BLOOD PRESSURE: 138 MMHG | DIASTOLIC BLOOD PRESSURE: 76 MMHG | RESPIRATION RATE: 16 BRPM | HEART RATE: 79 BPM | OXYGEN SATURATION: 93 % | TEMPERATURE: 97.5 F

## 2022-05-27 DIAGNOSIS — N40.1 BENIGN PROSTATIC HYPERPLASIA WITH URINARY FREQUENCY: Primary | ICD-10-CM

## 2022-05-27 DIAGNOSIS — R35.0 BENIGN PROSTATIC HYPERPLASIA WITH URINARY FREQUENCY: Primary | ICD-10-CM

## 2022-05-27 LAB — PATHOLOGY CONSULT NOTE: NORMAL

## 2022-05-27 PROCEDURE — 700105 HCHG RX REV CODE 258: Performed by: UROLOGY

## 2022-05-27 PROCEDURE — 160028 HCHG SURGERY MINUTES - 1ST 30 MINS LEVEL 3: Performed by: UROLOGY

## 2022-05-27 PROCEDURE — 160025 RECOVERY II MINUTES (STATS): Performed by: UROLOGY

## 2022-05-27 PROCEDURE — 700111 HCHG RX REV CODE 636 W/ 250 OVERRIDE (IP): Performed by: ANESTHESIOLOGY

## 2022-05-27 PROCEDURE — 160039 HCHG SURGERY MINUTES - EA ADDL 1 MIN LEVEL 3: Performed by: UROLOGY

## 2022-05-27 PROCEDURE — 160009 HCHG ANES TIME/MIN: Performed by: UROLOGY

## 2022-05-27 PROCEDURE — 160046 HCHG PACU - 1ST 60 MINS PHASE II: Performed by: UROLOGY

## 2022-05-27 PROCEDURE — 88305 TISSUE EXAM BY PATHOLOGIST: CPT

## 2022-05-27 PROCEDURE — 160035 HCHG PACU - 1ST 60 MINS PHASE I: Performed by: UROLOGY

## 2022-05-27 PROCEDURE — A9270 NON-COVERED ITEM OR SERVICE: HCPCS | Performed by: ANESTHESIOLOGY

## 2022-05-27 PROCEDURE — 501329 HCHG SET, CYSTO IRRIG Y TUR: Performed by: UROLOGY

## 2022-05-27 PROCEDURE — 00914 ANES TRURL PX RESCJ PRST8: CPT | Performed by: ANESTHESIOLOGY

## 2022-05-27 PROCEDURE — 160048 HCHG OR STATISTICAL LEVEL 1-5: Performed by: UROLOGY

## 2022-05-27 PROCEDURE — 160036 HCHG PACU - EA ADDL 30 MINS PHASE I: Performed by: UROLOGY

## 2022-05-27 PROCEDURE — 160002 HCHG RECOVERY MINUTES (STAT): Performed by: UROLOGY

## 2022-05-27 PROCEDURE — 700102 HCHG RX REV CODE 250 W/ 637 OVERRIDE(OP): Performed by: ANESTHESIOLOGY

## 2022-05-27 RX ORDER — ONDANSETRON 2 MG/ML
INJECTION INTRAMUSCULAR; INTRAVENOUS PRN
Status: DISCONTINUED | OUTPATIENT
Start: 2022-05-27 | End: 2022-05-27 | Stop reason: SURG

## 2022-05-27 RX ORDER — POLYETHYLENE GLYCOL 3350 17 G/17G
17 POWDER, FOR SOLUTION ORAL DAILY
Qty: 14 EACH | Refills: 0 | COMMUNITY
End: 2022-07-25

## 2022-05-27 RX ORDER — SODIUM CHLORIDE, SODIUM LACTATE, POTASSIUM CHLORIDE, CALCIUM CHLORIDE 600; 310; 30; 20 MG/100ML; MG/100ML; MG/100ML; MG/100ML
INJECTION, SOLUTION INTRAVENOUS CONTINUOUS
Status: DISCONTINUED | OUTPATIENT
Start: 2022-05-27 | End: 2022-05-27

## 2022-05-27 RX ORDER — IPRATROPIUM BROMIDE AND ALBUTEROL SULFATE 2.5; .5 MG/3ML; MG/3ML
3 SOLUTION RESPIRATORY (INHALATION)
Status: DISCONTINUED | OUTPATIENT
Start: 2022-05-27 | End: 2022-05-27 | Stop reason: HOSPADM

## 2022-05-27 RX ORDER — HYDRALAZINE HYDROCHLORIDE 20 MG/ML
5 INJECTION INTRAMUSCULAR; INTRAVENOUS
Status: DISCONTINUED | OUTPATIENT
Start: 2022-05-27 | End: 2022-05-27 | Stop reason: HOSPADM

## 2022-05-27 RX ORDER — OXYCODONE HCL 5 MG/5 ML
10 SOLUTION, ORAL ORAL
Status: COMPLETED | OUTPATIENT
Start: 2022-05-27 | End: 2022-05-27

## 2022-05-27 RX ORDER — OXYCODONE HYDROCHLORIDE 5 MG/1
5-10 TABLET ORAL EVERY 6 HOURS PRN
Qty: 5 TABLET | Refills: 0 | Status: SHIPPED | OUTPATIENT
Start: 2022-05-27 | End: 2022-05-30

## 2022-05-27 RX ORDER — PHENAZOPYRIDINE HYDROCHLORIDE 100 MG/1
100 TABLET, FILM COATED ORAL 3 TIMES DAILY PRN
Qty: 6 TABLET | Refills: 0 | COMMUNITY
End: 2022-07-29

## 2022-05-27 RX ORDER — CEFAZOLIN SODIUM 1 G/3ML
INJECTION, POWDER, FOR SOLUTION INTRAMUSCULAR; INTRAVENOUS PRN
Status: DISCONTINUED | OUTPATIENT
Start: 2022-05-27 | End: 2022-05-27 | Stop reason: SURG

## 2022-05-27 RX ORDER — OXYCODONE HCL 5 MG/5 ML
5 SOLUTION, ORAL ORAL
Status: COMPLETED | OUTPATIENT
Start: 2022-05-27 | End: 2022-05-27

## 2022-05-27 RX ORDER — DEXAMETHASONE SODIUM PHOSPHATE 4 MG/ML
INJECTION, SOLUTION INTRA-ARTICULAR; INTRALESIONAL; INTRAMUSCULAR; INTRAVENOUS; SOFT TISSUE PRN
Status: DISCONTINUED | OUTPATIENT
Start: 2022-05-27 | End: 2022-05-27 | Stop reason: SURG

## 2022-05-27 RX ORDER — HYDROMORPHONE HYDROCHLORIDE 1 MG/ML
0.2 INJECTION, SOLUTION INTRAMUSCULAR; INTRAVENOUS; SUBCUTANEOUS
Status: DISCONTINUED | OUTPATIENT
Start: 2022-05-27 | End: 2022-05-27 | Stop reason: HOSPADM

## 2022-05-27 RX ORDER — SODIUM CHLORIDE, SODIUM LACTATE, POTASSIUM CHLORIDE, CALCIUM CHLORIDE 600; 310; 30; 20 MG/100ML; MG/100ML; MG/100ML; MG/100ML
INJECTION, SOLUTION INTRAVENOUS CONTINUOUS
Status: DISCONTINUED | OUTPATIENT
Start: 2022-05-27 | End: 2022-05-27 | Stop reason: HOSPADM

## 2022-05-27 RX ORDER — DIPHENHYDRAMINE HYDROCHLORIDE 50 MG/ML
12.5 INJECTION INTRAMUSCULAR; INTRAVENOUS
Status: DISCONTINUED | OUTPATIENT
Start: 2022-05-27 | End: 2022-05-27 | Stop reason: HOSPADM

## 2022-05-27 RX ORDER — METOPROLOL TARTRATE 1 MG/ML
1 INJECTION, SOLUTION INTRAVENOUS
Status: DISCONTINUED | OUTPATIENT
Start: 2022-05-27 | End: 2022-05-27 | Stop reason: HOSPADM

## 2022-05-27 RX ORDER — MIDAZOLAM HYDROCHLORIDE 1 MG/ML
1 INJECTION INTRAMUSCULAR; INTRAVENOUS
Status: DISCONTINUED | OUTPATIENT
Start: 2022-05-27 | End: 2022-05-27 | Stop reason: HOSPADM

## 2022-05-27 RX ORDER — HYDROMORPHONE HYDROCHLORIDE 1 MG/ML
0.4 INJECTION, SOLUTION INTRAMUSCULAR; INTRAVENOUS; SUBCUTANEOUS
Status: DISCONTINUED | OUTPATIENT
Start: 2022-05-27 | End: 2022-05-27 | Stop reason: HOSPADM

## 2022-05-27 RX ORDER — HALOPERIDOL 5 MG/ML
1 INJECTION INTRAMUSCULAR
Status: DISCONTINUED | OUTPATIENT
Start: 2022-05-27 | End: 2022-05-27 | Stop reason: HOSPADM

## 2022-05-27 RX ORDER — ONDANSETRON 2 MG/ML
4 INJECTION INTRAMUSCULAR; INTRAVENOUS
Status: DISCONTINUED | OUTPATIENT
Start: 2022-05-27 | End: 2022-05-27 | Stop reason: HOSPADM

## 2022-05-27 RX ORDER — DOCUSATE SODIUM 100 MG/1
100 CAPSULE, LIQUID FILLED ORAL 2 TIMES DAILY
Qty: 30 CAPSULE | Refills: 0 | COMMUNITY
End: 2022-07-25

## 2022-05-27 RX ORDER — CEPHALEXIN 500 MG/1
500 CAPSULE ORAL 2 TIMES DAILY
Qty: 6 CAPSULE | Refills: 0 | Status: SHIPPED | OUTPATIENT
Start: 2022-05-27 | End: 2022-05-30

## 2022-05-27 RX ORDER — MEPERIDINE HYDROCHLORIDE 25 MG/ML
12.5 INJECTION INTRAMUSCULAR; INTRAVENOUS; SUBCUTANEOUS
Status: DISCONTINUED | OUTPATIENT
Start: 2022-05-27 | End: 2022-05-27 | Stop reason: HOSPADM

## 2022-05-27 RX ORDER — HYDROMORPHONE HYDROCHLORIDE 1 MG/ML
0.1 INJECTION, SOLUTION INTRAMUSCULAR; INTRAVENOUS; SUBCUTANEOUS
Status: DISCONTINUED | OUTPATIENT
Start: 2022-05-27 | End: 2022-05-27 | Stop reason: HOSPADM

## 2022-05-27 RX ADMIN — FENTANYL CITRATE 50 MCG: 50 INJECTION, SOLUTION INTRAMUSCULAR; INTRAVENOUS at 09:54

## 2022-05-27 RX ADMIN — DEXAMETHASONE SODIUM PHOSPHATE 8 MG: 4 INJECTION, SOLUTION INTRAMUSCULAR; INTRAVENOUS at 08:51

## 2022-05-27 RX ADMIN — ONDANSETRON 4 MG: 2 INJECTION INTRAMUSCULAR; INTRAVENOUS at 09:13

## 2022-05-27 RX ADMIN — FENTANYL CITRATE 50 MCG: 50 INJECTION, SOLUTION INTRAMUSCULAR; INTRAVENOUS at 08:41

## 2022-05-27 RX ADMIN — SODIUM CHLORIDE, POTASSIUM CHLORIDE, SODIUM LACTATE AND CALCIUM CHLORIDE: 600; 310; 30; 20 INJECTION, SOLUTION INTRAVENOUS at 08:30

## 2022-05-27 RX ADMIN — FENTANYL CITRATE 50 MCG: 50 INJECTION, SOLUTION INTRAMUSCULAR; INTRAVENOUS at 10:00

## 2022-05-27 RX ADMIN — OXYCODONE HYDROCHLORIDE 10 MG: 5 SOLUTION ORAL at 09:54

## 2022-05-27 RX ADMIN — FENTANYL CITRATE 100 MCG: 50 INJECTION, SOLUTION INTRAMUSCULAR; INTRAVENOUS at 08:46

## 2022-05-27 RX ADMIN — PROPOFOL 50 MG: 10 INJECTION, EMULSION INTRAVENOUS at 08:39

## 2022-05-27 RX ADMIN — CEFAZOLIN 2 G: 330 INJECTION, POWDER, FOR SOLUTION INTRAMUSCULAR; INTRAVENOUS at 08:41

## 2022-05-27 RX ADMIN — PROPOFOL 150 MG: 10 INJECTION, EMULSION INTRAVENOUS at 08:38

## 2022-05-27 RX ADMIN — FENTANYL CITRATE 50 MCG: 50 INJECTION, SOLUTION INTRAMUSCULAR; INTRAVENOUS at 08:38

## 2022-05-27 ASSESSMENT — FIBROSIS 4 INDEX: FIB4 SCORE: 0.63

## 2022-05-27 ASSESSMENT — PAIN DESCRIPTION - PAIN TYPE: TYPE: SURGICAL PAIN

## 2022-05-27 NOTE — OR NURSING
1038: Patient arrived to stage 2 after receiving report.     1043: Patient dressed by CNA and resting in recliner. Wife brought into stage 2     1105: Discharge instructions reviewed with patient and patients wife. Both verbalize understanding. PIV removed. All belongings returned to patient. Patient discharged home with leg bag in place, night bag sent with patient. Urine yellow and clear in hatfield bag at time of discharge. Discharged home into care of responsible adult.

## 2022-05-27 NOTE — DISCHARGE INSTRUCTIONS
ACTIVITY: Rest and take it easy for the first 24 hours.  A responsible adult is recommended to remain with you during that time.  It is normal to feel sleepy.  We encourage you to not do anything that requires balance, judgment or coordination.    MILD FLU-LIKE SYMPTOMS ARE NORMAL. YOU MAY EXPERIENCE GENERALIZED MUSCLE ACHES, THROAT IRRITATION, HEADACHE AND/OR SOME NAUSEA.    FOR 24 HOURS DO NOT:  Drive, operate machinery or run household appliances.  Drink beer or alcoholic beverages.   Make important decisions or sign legal documents.    Dr. Mckee' Discharge Instructions FOLLOWING    TRAnsurethral resection of prostate (TURP)     DIET:  You can resume your regular diet. We encourage you to eat well-balanced and nutritious meals.      ACTIVITY:  Please restrain from strenuous activity or heavy lifting (more than 10 pounds) for two weeks following your TUR procedure.  Please walk daily as much as tolerated, making exercise a part of your daily life. Do not drive while using narcotics for pain control if you are using them.  Please avoid excessive straining with defecation and use stool softeners as directed to prevent constipation!     WOUND CARE:  You have no dressing or wound to manage.     CATHETER CARE:   You have a hatfield catheter in place, and you should care for it as instructed by nurse.  This should be removed on in 2 days (Sunday) using a syringe to deflate the catheter balloon (30cc) as instructed by the RN or at a follow up visit in our clinic on Tuesday.      MEDICATIONS:  1. Please use plain tylenol or advil for pain and stool softeners (Miralax and Colace) as directed.   2. Continue your home medications with the exception of none.  3. Please take a 3 day course of keflex (antibiotic) as prescribed.  4. If you take aspirin, plavix, coumadin or other blood thinners, please do not take for 2 days following your surgery.    FOLLOW-UP:  We will call you to schedule your follow up appointment in 2-3 days  for your void trial and catheter removal. If you have not heard from us in 1-2 business days, please call [phone] to schedule your follow-up appointment. You may also contact this number if you have questions or concerns that can be answered by Dr. Mckee’ staff.     WARNING SIGNS:  Fever greater than 101 degrees Fahrenheit, chills, nausea or vomiting, Large amount of clots in urine that make it difficult to urinate or for urine to drain from hatfield, increasing pain, or abdominal swelling. If you are experiencing these symptoms, call the Urology Clinic or go to your local PCP or emergency room.    It is normal to see blood in your urine for up to 2 weeks even from surgery. The urine may clear up entirely, and then turn bloody again a few days later depending on your activity level; do not be alarmed. However, if you experience severe pain or tenderness, have a lot of increased bleeding, or find that you are unable to urinate because of large clots, please notify your doctor immediately     MEDICAL HELP DURING NORMAL BUSINESS HOURS  Between the hours of 8 AM and 5 PM, please call [phone] to speak with Dr. Lee Mckee’ staff.     MEDICAL HELP AFTER HOURS  If you have a serious emergency such as chest pain, shortness of breath, relentless pain you should call [phone]. For other urgent problems after hours you may contact the urology physician on call by phoning the [phone]. You may also visit the Emergency room at local hospital for help.    For non-emergent problems such as prescription refills or routine questions, please do your best to contact us during normal business hours. This after-hours number should be used for urgent or emergent questions only.       Lee Mckee M.D.   [address]  [place] [address]   [phone]          DIET: To avoid nausea, slowly advance diet as tolerated, avoiding spicy or greasy foods for the first day.  Add more substantial food to your diet according to your  physician's instructions. INCREASE FLUIDS AND FIBER TO AVOID CONSTIPATION.    FOLLOW-UP APPOINTMENT:  A follow-up appointment should be arranged with your doctor; call [phone] to schedule.    You should CALL YOUR PHYSICIAN if you develop:  Fever greater than 101 degrees F.  Pain not relieved by medication, or persistent nausea or vomiting.  Excessive bleeding (blood soaking through dressing) or unexpected drainage from the wound.  Extreme redness or swelling around the incision site, drainage of pus or foul smelling drainage.  Inability to urinate or empty your bladder within 8 hours.  Problems with breathing or chest pain.    You should call [phone] if you develop problems with breathing or chest pain.  If you are unable to contact your doctor or surgical center, you should go to the nearest emergency room or urgent care center.    Physician's telephone #: [phone]    If any questions arise, call your doctor.  If your doctor is not available, please feel free to call the Surgical Center at [phone].     A registered nurse may call you a few days after your surgery to see how you are doing after your procedure.    MEDICATIONS: Resume taking daily medication.  Take prescribed pain medication with food.  If no medication is prescribed, you may take non-aspirin pain medication if needed.  PAIN MEDICATION CAN BE VERY CONSTIPATING.  Take a stool softener or laxative such as senokot, pericolace, or milk of magnesia if needed.    Last pain medication given at 10MG OXYCODONE 1000.    If your physician has prescribed pain medication that includes Acetaminophen (Tylenol), do not take additional Acetaminophen (Tylenol) while taking the prescribed medication.    Discharge Education for patients on ISELA (Obstructive Sleep Apnea) Protocol    Prior to receiving sedation or anesthesia, we screen all patients for Obstructive Sleep Apnea.  During your screening, you were identified as having Obstructive Sleep  Apnea(ISELA).    What is Obstructive Sleep Apnea?  Sleep apnea (AP-ne-ah) is a common disorder which involves breathing pauses that occur during sleep.  These can last from 10 seconds to a minute or longer.  Normal breathing resumes often with a loud snort or choking sound.    Sleep apnea occurs in all age groups and both genders but is more common in men and people over 40 years of age.  It has been estimated that as many as 18 million Americans have sleep apnea.  Most people who have sleep apnea don’t know they have it because it only occurs during sleep.  A family member and/or bed partner may first notice the signs of sleep apnea.  Sleep apnea is a chronic (ongoing) condition that disrupts the quality and quantity of your sleep repeatedly throughout the night.  This often results in excessive daytime sleepiness or fatigue during the day.  It may also contribute to high blood pressure, heart problems, and complications following medications used for surgery and procedures.    To establish a definitive diagnosis, further testing from a specialist would be needed.  We recommend that you follow up with your primary care physician.    We recommend that you should be with an adult observer for at least 24 hours after your sedation/anesthesia.  If you have a CPAP machine, you should wear it during any sleep period (day or night) for the week following your procedure.  We encourage you to sleep on your side or in a sitting position, even with napping.  Lying flat on your back increases the risk of apnea and airway obstruction during your post procedure recovery period.    It is important to prevent over-sedation that could increase your risk for apnea.  Please take all pain medication as directed by your physician.  If you are not getting pain relief, please contact your physician to discuss possible approaches to relieving pain while minimizing medications that can affect your breathing and oxygen levels.    Depression /  Suicide Risk    As you are discharged from this [facility] facility, it is important to learn how to keep safe from harming yourself.    Recognize the warning signs:  Abrupt changes in personality, positive or negative- including increase in energy   Giving away possessions  Change in eating patterns- significant weight changes-  positive or negative  Change in sleeping patterns- unable to sleep or sleeping all the time   Unwillingness or inability to communicate  Depression  Unusual sadness, discouragement and loneliness  Talk of wanting to die  Neglect of personal appearance   Rebelliousness- reckless behavior  Withdrawal from people/activities they love  Confusion- inability to concentrate     If you or a loved one observes any of these behaviors or has concerns about self-harm, here's what you can do:  Talk about it- your feelings and reasons for harming yourself  Remove any means that you might use to hurt yourself (examples: pills, rope, extension cords, firearm)  Get professional help from the community (Mental Health, Substance Abuse, psychological counseling)  Do not be alone:Call your Safe Contact- someone whom you trust who will be there for you.  Call your local CRISIS HOTLINE 122-0016 or 818-868-5897  Call your local Children's Mobile Crisis Response Team [place] (750) 468-1126 or www.[employer]  Call the toll free National Suicide Prevention Hotlines   National Suicide Prevention Lifeline 762-582-BSRH (2935)  National Hope Line Network 800-SUICIDE (548-7481)

## 2022-05-27 NOTE — ANESTHESIA PROCEDURE NOTES
Airway    Date/Time: 5/27/2022 8:40 AM  Performed by: Theodore Quintanilla M.D.  Authorized by: Theodore Quintanilla M.D.     Location:  OR  Urgency:  Elective  Difficult Airway: No    Indications for Airway Management:  Anesthesia      Spontaneous Ventilation: absent    Sedation Level:  Deep  Preoxygenated: Yes    Mask Difficulty Assessment:  1 - vent by mask  Final Airway Type:  Supraglottic airway  Final Supraglottic Airway:  Standard LMA    SGA Size:  4  Number of Attempts at Approach:  1

## 2022-05-27 NOTE — OR NURSING
0921: Patient arrived from OR via gurney.  Urethra/hatfield insertion CDI with hatfield draining patent clear pink tinged urine to gravity, attached to leg with tape..     + Stop Bang per protocol for a score of YESx3.    Sedation/Resp Status: Non responsive to verbal with LMA in place. Respirations spontaneous and non-labored.    HR 60s SR; VSS on 6L 02 via mask.    0930: Cont stable with LMA in place, hatfield draining light, pink tinged urine to gravity bag.     0933: LMA out for return of spontaneous eye opening/gag reflex - respirations continue spontaneous and non-labored. Denies pain, states feels irritation in bladder, but no pain.     0939: Dr Mckee at patient station. New irrigation bag hung for fresh start I/O output.     0945: Cont stable, A/Ox3, pain increasing - meds per mar. Otherwise cont to drain clear, light pink tinged urine to gravity.     1000: Report pain improving/eased after IV meds per mar. Tolerating sips of clears with no nausea. Hatfield cont to drain clear, pink tinged urine.     1015: Cont stable, no changes. Urine cont clear, pink tinged, no clots or blood noted.     1021: End Stop Bang per protocol.    1024: Med hold completed. Meets criteria to transfer to phase II for DC. Report to Delaney MARTÍNEZ.

## 2022-05-27 NOTE — ANESTHESIA TIME REPORT
Anesthesia Start and Stop Event Times     Date Time Event    5/27/2022 0820 Ready for Procedure     0830 Anesthesia Start     0923 Anesthesia Stop        Responsible Staff  05/27/22    Name Role Begin End    Theodore Quintanilla M.D. Anesth 0830 0923        Overtime Reason:  no overtime (within assigned shift)    Comments:

## 2022-05-27 NOTE — ANESTHESIA POSTPROCEDURE EVALUATION
Patient: Barron Hewitt    Procedure Summary     Date: 05/27/22 Room / Location:  OR  / SURGERY [facility]    Anesthesia Start: 0830 Anesthesia Stop: 0923    Procedure: TURP, USING BUTTON ELECTRODE (N/A ) Diagnosis: (LOWER URINARY TRACT SYMPTOMS DUE TO BENIGN PROSTATIC HYPERTROPHY)    Surgeons: Lee Mckee M.D. Responsible Provider: Theodore Quintanilla M.D.    Anesthesia Type: general ASA Status: 3          Final Anesthesia Type: general  Last vitals  BP   Blood Pressure: 131/86    Temp   36.3 °C (97.4 °F)    Pulse   81   Resp   16    SpO2   97 %      Anesthesia Post Evaluation    Patient location during evaluation: PACU  Patient participation: complete - patient participated  Level of consciousness: awake and alert    Airway patency: patent  Anesthetic complications: no  Cardiovascular status: hemodynamically stable  Respiratory status: acceptable  Hydration status: euvolemic    PONV: none          No complications documented.     Nurse Pain Score: 0 (NPRS)

## 2022-05-27 NOTE — OP REPORT
Genitourinary Operative Report    Pre-operative Diagnosis: BPH with Lower urinary tract symptoms  Nocturnal frequency  Frequency of urination  Urgency of urination  Weakened urinary stream  Incomplete bladder emptying   Diabetes Mellitus   Post-operative Diagnosis: Same as above   Procedure: TransUrethral Resection of the Prostate (TURP)      Attending: Lee Mckee M.D., MD   Assistant: None   Anesthesia: Anesthesiologist: Theodore Quintanilla M.D.   General   Estimated Blood Loss: <100cc   IV fluids: See anesthesia record   Drains: 22F 3 way hatfield catheter started on CBI   Specimens: Prostatic chips sent for surgical pathology     Complications: None   Condition: Stable, procedure well tolerated    Disposition:  1. PACU, traction with pink tape for 1 hour in recovery and then discharge home with hatfield if still clear vs observation overnight with CBI prn.  2. Void trial in 2 days   Findings: Normal appearing bladder, mild trabeculations, ureteral orifice's clearly visualized with efflux clear urine before and after case, prostate with obstructive lateral lobes and somewhat obstructive median lobe with hi gh and tight bladder neck     Indication:  Braron Hewitt is a 61 y.o.with history of diabetes who presents with with benign prostatic hyperplasia with bladder outlet obstruction refractory to conservative management. The risks, benefits, treatment options, potential complications and expected outcomes were discussed with the patient. The patient concurred with the proposed plan, giving informed consent. Risks discussed, but not limited to, were infection, sepsis, bleeding, bladder injury, need for secondary procedure, inability to resect all of prostate, injury to the rectum, injury to ureters, need for hatfield post op, TUR syndrome, erectile dysfunction, urinary incontinence, and the cardiovascular and pulmonary complications of anesthesia/surgery including DVT, Mi, PE, and death.    Procedure Details:  The  patient was taken to the Operating Room, SCD’s were placed, and a gram of Ancef was administered for emi-operative antiobiotics.  After induction of general anesthesia the patient was intubated and then placed in the lithotomy position using gio stirrups. The perineum and genitals were then prepped and draped in the standard sterile fashion.    After calibration of the urethral meatus to 30 Dominican using male sounds, a 26-Dominican rigid resectoscope with visual obturator and 30 degree lens was passed per urethra sterilely. The urethra was normal up to the verumontanum at which point a 2 and half to 3 cm prostate with coapting lobes was noted. The bladder itself had mild trabeculation. Bilateral ureteral orifices were visualized showing normal efflux and orientation and the bladder had no mucosal abnormalities.     The prostate was then methodically resected using a by polar loop from the bladder neck to the verumontanum. Attention was then turned toward the left lateral lobes and resected circumferentially from 6 o'clock to 12 o'clock and then the right lobe from 12 o'clock to 6 o'clock in a similar fashion. Care was taken not to perforate the capsule, not to injure the ureters, and not to resect past the verumontanum and damage the sphincter.     At the end of resection, an [employer] evacuator was used to evacuate all prostate chips. The resectoscope was then passed back in and there were no chips remaining in the bladder or prostatic fossa. Hemostasis was achieved with loop fulguration.  A final check revealed intact ureteral orifices with clear urine from each side and the Verumontanum and sphincter appeared intact.     A 22 Dominican 3 way Mckeon catheter was introduced per urethra sterilely and its balloon was inflated with 30 mL. The catheter was kept stabilized with pink tape on gentle traction, and it was connected expeditiously to continuous bladder irrigation with efflux of clear urine.        Lee Mckee,  M.D.   [id number]  [place] [id number]   [phone]

## 2022-05-27 NOTE — ANESTHESIA PREPROCEDURE EVALUATION
Case: [id number] Date/Time: 05/27/22 0815    Procedure: TURP, USING BUTTON ELECTRODE    Pre-op diagnosis: LOWER URINARY TRACT SYMPTOMS DUE TO BENIGN PROSTATIC HYPERTROPHY    Location: [facility] OR 02 / SURGERY [facility]    Surgeons: Lee Mckee M.D.          Relevant Problems   ANESTHESIA   (positive) ISELA on CPAP - [facility]erred home care   (positive) PONV (postoperative nausea and vomiting)      PULMONARY   (positive) Asthma   (positive) Mild intermittent asthma without complication      NEURO   (positive) History of small bowel obstruction - 2018      CARDIAC   (positive) Angina pectoris (HCC)   (positive) Coronary artery disease, non-occlusive   (positive) Essential hypertension, benign      ENDO   (positive) Acquired hypothyroidism   (positive) Type 2 diabetes mellitus without complication, with long-term current use of insulin (HCC) - Dr. Sandoval       Physical Exam    Airway   Mallampati: II  TM distance: >3 FB  Neck ROM: full       Cardiovascular - normal exam  Rhythm: regular  Rate: normal  (-) murmur     Dental - normal exam           Pulmonary - normal exam  Breath sounds clear to auscultation     Abdominal    Neurological - normal exam                 Anesthesia Plan    ASA 3 (see prob list)   ASA physical status 3 criteria: other (comment)    Plan - general       Airway plan will be LMA          Induction: intravenous    Postoperative Plan: Postoperative administration of opioids is intended.    Pertinent diagnostic labs and testing reviewed    Informed Consent:    Anesthetic plan and risks discussed with patient.    Use of blood products discussed with: patient whom consented to blood products.

## 2022-05-27 NOTE — OR NURSING
0652: Brought patient back to pre-op and assumed care.  0730: Patient allergies and NPO status verified, home medication reconciliation completed and belongings secured. Patient verbalizes understanding of pain scale, expected course of stay and plan of care. Surgical site verified with patient. IV access established. Sequentials placed on legs.

## 2022-06-06 ENCOUNTER — OFFICE VISIT (OUTPATIENT)
Dept: PHYSICAL MEDICINE AND REHAB | Facility: MEDICAL CENTER | Age: 62
End: 2022-06-06
Payer: COMMERCIAL

## 2022-06-06 VITALS
WEIGHT: 168.43 LBS | HEIGHT: 69 IN | DIASTOLIC BLOOD PRESSURE: 62 MMHG | SYSTOLIC BLOOD PRESSURE: 116 MMHG | HEART RATE: 71 BPM | BODY MASS INDEX: 24.95 KG/M2 | OXYGEN SATURATION: 96 % | TEMPERATURE: 97.1 F

## 2022-06-06 DIAGNOSIS — G89.29 CHRONIC BILATERAL LOW BACK PAIN WITHOUT SCIATICA: ICD-10-CM

## 2022-06-06 DIAGNOSIS — M54.50 CHRONIC BILATERAL LOW BACK PAIN WITHOUT SCIATICA: ICD-10-CM

## 2022-06-06 DIAGNOSIS — M54.16 LUMBAR RADICULOPATHY: ICD-10-CM

## 2022-06-06 DIAGNOSIS — M47.816 LUMBAR SPONDYLOSIS: ICD-10-CM

## 2022-06-06 PROCEDURE — 99214 OFFICE O/P EST MOD 30 MIN: CPT | Performed by: PHYSICAL MEDICINE & REHABILITATION

## 2022-06-06 ASSESSMENT — PATIENT HEALTH QUESTIONNAIRE - PHQ9: CLINICAL INTERPRETATION OF PHQ2 SCORE: 0

## 2022-06-06 ASSESSMENT — PAIN SCALES - GENERAL: PAINLEVEL: 7=MODERATE-SEVERE PAIN

## 2022-06-06 ASSESSMENT — FIBROSIS 4 INDEX: FIB4 SCORE: 0.63

## 2022-06-06 NOTE — H&P (VIEW-ONLY)
"Follow up patient note  Interventional spine and Pain  Physiatry (physical medicine and  Rehabilitation)       Chief complaint:   Chief Complaint   Patient presents with   • Follow-Up     Back pain          HISTORY    Please see new patient note by Dr Ayala,  for more details.     HPI  Patient identification: Barron Hewitt ,  1960,   With Diagnoses of Lumbar spondylosis, Lumbar radiculopathy, stable after epidural, and Chronic bilateral low back pain without sciatica were pertinent to this visit.     procedures   2/15/2022 left Lumbar Transforaminal Epidural Steroid  at the L3-4 and L4-5 levels.   3/22/2022 left Lumbar Transforaminal Epidural Steroid  at the L3-4 and L4-5 levels 80% improved at the follow-up visit    The patient's leg pain improved significantly.  After the epidural steroid injection he continues to have pain relief in the legs.  However the patient is having axial low back pain bilateral 8 out of 10 in intensity worse with lumbar extension and extension rotation maneuver.  Worse with lifting causing functional deficits.  He is unable to play golf and do other exercises because of this pain.      Medications tried include zanaflex, nsaids, tramadol, norco     ROS Red Flags :   Fever, Chills, Sweats: Denies  Involuntary Weight Loss: Denies  Bowel/Bladder Incontinence: Denies  Saddle Anesthesia: Denies        PMHx:   Past Medical History:   Diagnosis Date   • Abnormal nuclear cardiac imaging test 2014   • Allergy    • Anesthesia     PONV   • Anginal syndrome (HCC)     \"myocardial bridging\"   • ASTHMA    • CHEST PAIN 6/15/2011   • Chest pain at rest 2014   • Coronary-myocardial bridge 2012   • Diabetes 2009    insulin and oral meds   • High cholesterol    • Hyperlipidemia    • Hypertension    • Mild intermittent asthma without complication 2017   • Myocardial bridge     cardiologist, Dr. Valverde   • PONV (postoperative nausea and vomiting)    • Sleep apnea  "    has CPAP   • Snoring        PSHx:   Past Surgical History:   Procedure Laterality Date   • MA TRANSURETHRAL ELEC-SURG PROSTATECTOM N/A 5/27/2022    Procedure: TURP, USING BUTTON ELECTRODE;  Surgeon: Lee Mckee M.D.;  Location: [facility];  Service: Urology   • BLOCK EPIDURAL STEROID INJECTION Left 3/22/2022    Procedure: LEFT L3-4 and L4-5 transforaminal epidural steroid injection with fluoroscopic guidance;  Surgeon: Dragan Ayala M.D.;  [facility];  Service: Pain Management   • MA NJX AA&/STRD TFRML EPI LUMBAR/SACRAL 1 LEVEL Left 2/15/2022    Procedure: LEFT L3-4 and L4-5 transforaminal epidural steroid injection with fluoroscopic guidance;  Surgeon: Dragna Ayala M.D.;  Location: [facility];  Service: Pain Management   • SHOULDER DECOMPRESSION ARTHROSCOPIC Left 1/4/2018    Procedure: SHOULDER DECOMPRESSION ARTHROSCOPIC - SUBACROMIAL;  Surgeon: Linwood Grover M.D.;  Location: [facility];  Service: Orthopedics   • SHOULDER ARTHROSCOPY W/ BICIPITAL TENODESIS REPAIR Left 1/4/2018    Procedure: SHOULDER ARTHROSCOPY W/ BICIPITAL Tenotomy REPAIR;  Surgeon: Linwood Grover M.D.;  Location: [facility];  Service: Orthopedics   • CLAVICLE DISTAL EXCISION Left 1/4/2018    Procedure: CLAVICLE DISTAL EXCISION - POSSIBLE;  Surgeon: Linwood Grover M.D.;  Location: [facility];  Service: Orthopedics   • RECOVERY  4/8/2016    Procedure: CATH LAB [facility] WITH POSSIBLE NAVIN;  Surgeon: [facility]ons[facility];  Location: SURGERY PRE-POST PROC UNIT [facility];  Service:    • VENTRAL HERNIA REPAIR LAPAROSCOPIC  11/1/2012    Performed by Marcos Ramírez M.D. at [facility]   • KNEE ARTHROSCOPY  1990's    right   • SHOULDER ARTHROSCOPY  1990's    right   • SINUSOTOMIES  1990's    times two surgeries   • TUMOR EXCISION WITH BIOPSY  1990's    right hand - thumb       Family history   Family History    Problem Relation Age of Onset   • Breast Cancer Mother    • Hypertension Mother    • Cancer Mother         breast   • Heart Disease Mother         hx of bypass   • Hypertension Father    • Arthritis Father    • Diabetes Father         prediabetes   • No Known Problems Sister    • Arthritis Brother    • Heart Disease Other    • Hypertension Other    • Cancer Other    • No Known Problems Brother          Medications:   Outpatient Medications Marked as Taking for the 6/6/22 encounter (Office Visit) with Dragan Ayala M.D.   Medication Sig Dispense Refill   • docusate sodium (COLACE) 100 MG Cap Take 1 Capsule by mouth 2 times a day. 30 Capsule 0   • polyethylene glycol/lytes (MIRALAX) 17 g Pack Take 1 Packet by mouth every day. Please take to prevent constipation following your procedure.  Hold if loose stools 14 Each 0   • phenazopyridine (PYRIDIUM) 100 MG Tab Take 1 Tablet by mouth 3 times a day as needed (bladder pain, dysuria). 6 Tablet 0   • DILTIAZem CD (CARDIZEM CD) 180 MG CAPSULE SR 24 HR TAKE 1 CAPSULE DAILY 90 Capsule 3   • metFORMIN (GLUCOPHAGE) 500 MG Tab Take 1 Tablet by mouth 2 times a day with meals. 180 Tablet 2   • gabapentin (NEURONTIN) 100 MG Cap Take 100 mg by mouth 2 times a day as needed.     • albuterol 108 (90 Base) MCG/ACT Aero Soln inhalation aerosol Inhale 2 Puffs every four hours as needed for Shortness of Breath. Pt prefers ventolin if possible. Thank you 3 Each 3   • levothyroxine (SYNTHROID) 50 MCG Tab Take one pill 6 days per week on empty stomach.  75 mcg on 7th day. 90 Tablet 3   • fluticasone-salmeterol (ADVAIR DISKUS) 500-50 MCG/DOSE AEROSOL POWDER, BREATH ACTIVATED Inhale 1 Puff every 12 hours. (Patient taking differently: Inhale every 12 hours.) 3 Each 3   • meloxicam (MOBIC) 15 MG tablet Take 1 Tablet by mouth 1 time a day as needed (pain). Do not take other NSAIDs. No refills. 60 Tablet 0   • benazepril (LOTENSIN) 20 MG Tab TAKE 1 TABLET DAILY 90 Tablet 3   • finasteride  (PROSCAR) 5 MG Tab Take 1 Tablet by mouth every day. 90 Tablet 3   • levothyroxine (SYNTHROID) 75 MCG Tab Take 1 Tablet by mouth every morning on an empty stomach. Taking only one d per week.  50 mcg all other days. 12 Tablet 3   • atorvastatin (LIPITOR) 80 MG tablet Take 1 Tablet by mouth every evening. 90 Tablet 3   • tizanidine (ZANAFLEX) 4 MG Tab Take 1 Tablet by mouth every 6 hours as needed (muscle spasms). 30 Tablet 2   • EPINEPHrine (EPIPEN) 0.3 MG/0.3ML Solution Auto-injector solution for injection Inject 0.3 mL into the thigh one time for 1 dose. 1 Each 0   • Continuous Blood Gluc Sensor (FREESTYLE LUNA 14 DAY SENSOR) Misc 1 Application every 14 days. 6 Each 3   • Dulaglutide (TRULICITY) 3 MG/0.5ML Solution Pen-injector Inject 1 Dose under the skin every 7 days. 2 mL 11   • montelukast (SINGULAIR) 10 MG Tab Take 1 tablet by mouth every evening. 90 tablet 3   • Canagliflozin (INVOKANA) 300 MG Tab Take 1 tablet by mouth every day. 90 tablet 3   • tamsulosin (FLOMAX) 0.4 MG capsule Take 1 capsule by mouth 1/2 hour after breakfast. 30 capsule 4   • NON SPECIFIED CPAP supplies through [facility] 1 Each 1   • ALPHA LIPOIC ACID PO Take 600 mg by mouth 2 Times a Day.     • Omega-3 Fatty Acids (FISH OIL) 1200 MG CAPS Take  by mouth.     • Cinnamon 500 MG CAPS Take 1,000 mg by mouth.     • aspirin EC (ECOTRIN) 81 MG TBEC Take 81 mg by mouth every day.       • pantoprazole (PROTONIX) 40 MG TBEC Take 40 mg by mouth every day.       • Coenzyme Q10 200 MG Cap Take  by mouth every day.       • Cholecalciferol (VITAMIN D) 2000 UNIT TABS Take  by mouth 2 times a day.     • cetirizine (ZYRTEC) 10 MG Tab Take 10 mg by mouth every day.          Current Outpatient Medications on File Prior to Visit   Medication Sig Dispense Refill   • docusate sodium (COLACE) 100 MG Cap Take 1 Capsule by mouth 2 times a day. 30 Capsule 0   • polyethylene glycol/lytes (MIRALAX) 17 g Pack Take 1 Packet by mouth every day. Please take  to prevent constipation following your procedure.  Hold if loose stools 14 Each 0   • phenazopyridine (PYRIDIUM) 100 MG Tab Take 1 Tablet by mouth 3 times a day as needed (bladder pain, dysuria). 6 Tablet 0   • DILTIAZem CD (CARDIZEM CD) 180 MG CAPSULE SR 24 HR TAKE 1 CAPSULE DAILY 90 Capsule 3   • metFORMIN (GLUCOPHAGE) 500 MG Tab Take 1 Tablet by mouth 2 times a day with meals. 180 Tablet 2   • gabapentin (NEURONTIN) 100 MG Cap Take 100 mg by mouth 2 times a day as needed.     • albuterol 108 (90 Base) MCG/ACT Aero Soln inhalation aerosol Inhale 2 Puffs every four hours as needed for Shortness of Breath. Pt prefers ventolin if possible. Thank you 3 Each 3   • levothyroxine (SYNTHROID) 50 MCG Tab Take one pill 6 days per week on empty stomach.  75 mcg on 7th day. 90 Tablet 3   • fluticasone-salmeterol (ADVAIR DISKUS) 500-50 MCG/DOSE AEROSOL POWDER, BREATH ACTIVATED Inhale 1 Puff every 12 hours. (Patient taking differently: Inhale every 12 hours.) 3 Each 3   • meloxicam (MOBIC) 15 MG tablet Take 1 Tablet by mouth 1 time a day as needed (pain). Do not take other NSAIDs. No refills. 60 Tablet 0   • benazepril (LOTENSIN) 20 MG Tab TAKE 1 TABLET DAILY 90 Tablet 3   • finasteride (PROSCAR) 5 MG Tab Take 1 Tablet by mouth every day. 90 Tablet 3   • levothyroxine (SYNTHROID) 75 MCG Tab Take 1 Tablet by mouth every morning on an empty stomach. Taking only one d per week.  50 mcg all other days. 12 Tablet 3   • atorvastatin (LIPITOR) 80 MG tablet Take 1 Tablet by mouth every evening. 90 Tablet 3   • tizanidine (ZANAFLEX) 4 MG Tab Take 1 Tablet by mouth every 6 hours as needed (muscle spasms). 30 Tablet 2   • EPINEPHrine (EPIPEN) 0.3 MG/0.3ML Solution Auto-injector solution for injection Inject 0.3 mL into the thigh one time for 1 dose. 1 Each 0   • Continuous Blood Gluc Sensor (FREESTYLE LUNA 14 DAY SENSOR) Misc 1 Application every 14 days. 6 Each 3   • Dulaglutide (TRULICITY) 3 MG/0.5ML Solution Pen-injector Inject 1 Dose  routine and ritual male circumcision under the skin every 7 days. 2 mL 11   • montelukast (SINGULAIR) 10 MG Tab Take 1 tablet by mouth every evening. 90 tablet 3   • Canagliflozin (INVOKANA) 300 MG Tab Take 1 tablet by mouth every day. 90 tablet 3   • tamsulosin (FLOMAX) 0.4 MG capsule Take 1 capsule by mouth 1/2 hour after breakfast. 30 capsule 4   • NON SPECIFIED CPAP supplies through [facility] 1 Each 1   • ALPHA LIPOIC ACID PO Take 600 mg by mouth 2 Times a Day.     • Omega-3 Fatty Acids (FISH OIL) 1200 MG CAPS Take  by mouth.     • Cinnamon 500 MG CAPS Take 1,000 mg by mouth.     • aspirin EC (ECOTRIN) 81 MG TBEC Take 81 mg by mouth every day.       • pantoprazole (PROTONIX) 40 MG TBEC Take 40 mg by mouth every day.       • Coenzyme Q10 200 MG Cap Take  by mouth every day.       • Cholecalciferol (VITAMIN D) 2000 UNIT TABS Take  by mouth 2 times a day.     • cetirizine (ZYRTEC) 10 MG Tab Take 10 mg by mouth every day.       No current facility-administered medications on file prior to visit.         Allergies:   Allergies   Allergen Reactions   • Kiwi Extract Anaphylaxis   • Shellfish Allergy Anaphylaxis   • Strawberry Anaphylaxis   • Ozempic (0.25 Or 0.5 Mg-Dose) [Semaglutide] Nausea     Pt does not recall any reaction   • Sulfa Drugs Rash and Unspecified     rash   • Testosterone Rash     rash       Social Hx:   Social History     Socioeconomic History   • Marital status:      Spouse name: Not on file   • Number of children: Not on file   • Years of education: Not on file   • Highest education level: Not on file   Occupational History   • Not on file   Tobacco Use   • Smoking status: Former Smoker     Packs/day: 0.00     Quit date:      Years since quittin.4   • Smokeless tobacco: Never Used   • Tobacco comment: occasional cigars   Vaping Use   • Vaping Use: Never used   Substance and Sexual Activity   • Alcohol use: Yes     Comment: rare - 1-2 per month (social)   • Drug use: Yes     Types: Marijuana, Inhaled, Oral      "Comment: THC   • Sexual activity: Not on file     Comment: ; 2 biological kids (2 of his wife's); 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   Other Topics Concern   • Not on file   Social History Narrative   • Not on file     Social Determinants of Health     Financial Resource Strain: Not on file   Food Insecurity: Not on file   Transportation Needs: Not on file   Physical Activity: Not on file   Stress: Not on file   Social Connections: Not on file   Intimate Partner Violence: Not on file   Housing Stability: Not on file         EXAMINATION     Physical Exam:   Vitals: /62 (BP Location: Right arm, Patient Position: Sitting, BP Cuff Size: Adult)   Pulse 71   Temp 36.2 °C (97.1 °F) (Temporal)   Ht 1.753 m (5' 9\")   Wt 76.4 kg (168 lb 6.9 oz)   SpO2 96%     Constitutional:   Body Habitus: Body mass index is 24.87 kg/m².  Cooperation: Fully cooperates with exam  Appearance: Well-groomed no disheveled    Respiratory-  breathing comfortable on room air, no audible wheezing  Cardiovascular- capillary refills less than 2 seconds. No lower extremity edema is noted.   Psychiatric- alert and oriented ×3. Normal affect.    MSK and Neuro: -      Thoracic/Lumbar Spine/Sacral Spine/Hips   There are no signs of infection around the injection sites.   decreased active range of motion with flexion, lateral flexion, and rotation bilaterally.   There is decreased active range of motion with lumbar extension.    There is  pain with lumbar extension.   There is pain with facet loading maneuver (extension rotation) with axial low back pain on the BILATERAL sides around L3-S1    Palpation:   No tenderness to palpation in midline at T1-T12 levels. No tenderness to palpation in the left and right of the midline T1-L5, NEGATIVE for tenderness to palpation to the para-midline region in the lower lumbar levels.  palpation over SI joint: negative bilaterally    palpation in hip or over the gluteus medius tendon " insertion: negative bilaterally      Lumbar spine Special tests  Neuro tension  Straight leg test negative bilaterally    Slump test negative bilaterally      Key points for the international standards for neurological classification of spinal cord injury (ISNCSCI) to light touch.     Dermatome R L                                      L2 2 2   L3 2 2   L4 2 2   L5 2 2   S1 2 2   S2 2 2         Motor Exam Lower Extremities    ? Myotome R L   Hip flexion L2 5 5   Knee extension L3 5 5   Ankle dorsiflexion L4 5 5   Toe extension L5 5 5   Ankle plantarflexion S1 5 5                 MEDICAL DECISION MAKING    DATA    Labs:   Lab Results   Component Value Date/Time    SODIUM 139 05/02/2022 12:25 PM    POTASSIUM 3.8 05/02/2022 12:25 PM    CHLORIDE 101 05/02/2022 12:25 PM    CO2 22 05/02/2022 12:25 PM    GLUCOSE 111 (H) 05/02/2022 12:25 PM    BUN 8 05/02/2022 12:25 PM    CREATININE 0.62 05/02/2022 12:25 PM    CREATININE 1.1 07/10/2008 09:25 AM        Lab Results   Component Value Date/Time    PROTHROMBTM 13.6 05/02/2022 12:25 PM    INR 1.13 05/02/2022 12:25 PM        Lab Results   Component Value Date/Time    WBC 10.6 05/02/2022 12:25 PM    RBC 5.62 05/02/2022 12:25 PM    HEMOGLOBIN 17.0 05/02/2022 12:25 PM    HEMATOCRIT 50.9 05/02/2022 12:25 PM    MCV 90.6 05/02/2022 12:25 PM    MCH 30.2 05/02/2022 12:25 PM    MCHC 33.4 (L) 05/02/2022 12:25 PM    MPV 10.3 05/02/2022 12:25 PM    NEUTSPOLYS 67.60 06/04/2020 02:18 PM    LYMPHOCYTES 17.60 (L) 06/04/2020 02:18 PM    MONOCYTES 9.80 06/04/2020 02:18 PM    EOSINOPHILS 3.50 06/04/2020 02:18 PM    BASOPHILS 1.20 06/04/2020 02:18 PM    HYPOCHROMIA 1+ 08/27/2013 07:13 AM        Lab Results   Component Value Date/Time    HBA1C 7.2 (A) 11/16/2021 02:36 PM          Imaging:   I personally reviewed following images    MRI lumbar spine 7/27/2021 with moderate left neuroforaminal stenosis at L3-4 and L4-5 with mild right neuroforaminal stenosis at L3-4 and L4-5.  Mild to moderate bilateral  neuroforaminal stenosis at L5-S1.  There is facet arthropathy bilaterally at L3-4, L4-5, L5-S1        I reviewed the following radiology reports                         Results for orders placed during the hospital encounter of 07/27/21    MR-LUMBAR SPINE-W/O    Impression  1.  L4-5 annular disc bulge with a central disc protrusion and bilateral facet degeneration. There is borderline central canal narrowing. There is moderate mild right neural foraminal narrowing.    2.  L3-4 annular disc bulge with a left lateral disc protrusion. There is moderate to severe left and mild right neural foraminal narrowing again seen.    3.  L5-S1 degenerative disc disease and facet degeneration results in moderate bilateral neuroforaminal narrowing.        Results for orders placed during the hospital encounter of 07/27/21    MR-LUMBAR SPINE-W/O    Impression  1.  L4-5 annular disc bulge with a central disc protrusion and bilateral facet degeneration. There is borderline central canal narrowing. There is moderate mild right neural foraminal narrowing.    2.  L3-4 annular disc bulge with a left lateral disc protrusion. There is moderate to severe left and mild right neural foraminal narrowing again seen.    3.  L5-S1 degenerative disc disease and facet degeneration results in moderate bilateral neuroforaminal narrowing.      Results for orders placed during the hospital encounter of 07/27/21    MR-MRA NECK-W/O    Impression  There is no significant stenosis in the visualized extracranial neck arteries.                                                                   Results for orders placed during the hospital encounter of 05/01/17    CT-ABDOMEN-PELVIS WITH    Impression  1.  Findings suggestive of early or low grade obstruction in the mid to distal small bowel. Enteritis with associated ileus could also give a similar appearance.  2.  LEFT calyceal stone                       Results for orders placed during the hospital encounter  of 17    DX-CHEST-2 VIEWS    Impression  Negative two views of the chest.                          Electrodiagnostics   2022 EMG  IMPRESSION:  This is an abnormal electrodiagnostic study due to evidence of chronic reinnervation changes in the left vastus lateralis/medialis with mild active denervation changes noted.  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       There is no strong electrodiagnostic evidence of a an active right lumbosacral plexopathy though given some responses are unobtainable a mild lumbar plexopathy/femoral neuropathy may be present.  There is no EMG evidence of a right lumbosacral radiculopathy.                                         DIAGNOSIS   Visit Diagnoses     ICD-10-CM   1. Lumbar spondylosis  M47.816   2. Lumbar radiculopathy, stable after epidural  M54.16   3. Chronic bilateral low back pain without sciatica  M54.50    G89.29         ASSESSMENT and PLAN:     Barron Fernándezmee  1960 male      Barron was seen today for follow-up.    Diagnoses and all orders for this visit:    Lumbar spondylosis  -     Referral to Physical Medicine Rehab  -     Referral to Physical Medicine Rehab    Lumbar radiculopathy, stable after epidural    Chronic bilateral low back pain without sciatica       The patient is failed conservative treatments with medication management, home exercise program from the direction of physical therapy/physician including the past 3 months .  I have ordered a BILATERAL diagnostic medial branch block targeting the L3-4, L4-5 and L5-S1 facet joints #1 and #2.  Procedure #2 is only to be done if #1 is a positive block.    The risks benefits and alternatives to this procedure were discussed and the patient wishes to proceed with the procedure. Risks include but are not limited to  damage to surrounding structures, infection, bleeding, worsening of pain which can be permanent, weakness which can be permanent. Benefits include pain relief, improved function. Alternatives includes not doing the procedure.   If there are 2 positive blocks I would consider the patient for radiofrequency neurotomy of the previously blocked nerves.      Continue gabapentin PRN    Continue Zanaflex as needed    Follow up: after the above conservative treatments.     Thank you for allowing me to participate in the care of this patient. If you have any questions please not hesitate to contact me.             Please note that this dictation was created using voice recognition software. I have made every reasonable attempt to correct obvious errors but there may be errors of grammar and content that I may have overlooked prior to finalization of this note.      Dragan Ayala MD  Interventional Spine and Sports Physiatry  Physical Medicine and Rehabilitation  [facility]

## 2022-06-06 NOTE — PATIENT INSTRUCTIONS
Your procedure will be at the [facility] special procedure suite.    [facility] [place] [address]       PRE-PROCEDURE INSTRUCTIONS  You may take your regular medications except:   No Anti-inflammatories 5 days prior to your procedure. Anti-inflammatories include medicines such as  ibuprofen (Motrin, Advil), Excedrin, Naproxen (Aleve, Anaprox, Naprelan, Naprosyn), Celecoxib (Celebrex), Diclofenac (Voltaren-XR tab), and Meloxicam (Mobic).   You can take the remainder of your pain medications as prescribed.   If you are having a diagnostic procedure such as a medial branch block, do not use her pain meds on the day of the procedure  No Glucophage or Metformin 24 hours before your procedure. You may resume next day after your procedure.  Call the physiatry office if you are taking or prescribed anti-biotics within five days of procedure.  Please ask provider if you are taking any new diabetes medication.  CONTINUE TAKING BLOOD PRESSURE MEDICATIONS AS PRESCRIBED.  Pain medications will not be prescribed on the procedure day. Procedural pain medication may be used by your provider   Call your doctor's office performing the procedure if you have a fever, chills, rash or new illness prior to your procedure    Anticoagulation/antiplatelet medications  No Blood thinning medications such as Coumadin, Xarelto, aspirin or Plavix 5 days prior to procedure unless your doctor said to continue these medications. Call your doctor if a new medication is prescribed in this class.     Restrictions for eating before procedure:   If you are getting procedural sedation, then do not eat to for 8 hours prior to procedure appointment time. Do not drink fluids for four hours prior to your procedure time.   If you are not having procedural sedation, then Skip the meal prior to your procedure. If you have a morning procedure then skip breakfast. If you have an afternoon procedure then skip lunch.   You may drink clear liquids  up to 2 hours prior to your procedure  You must have a  the day of procedure to accompany you home.      POST PROCEDURE INSTRUCTIONS   No heavy lifting, strenuous bending or strenuous exercise for 3 days after your procedure.  No hot tubs, baths, swimming for 3 days after your procedure  You can remove the bandage the day after the procedure.  IF YOU RECEIVED A DIAGNOSTIC PROCEDURE (SUCH AS A MEDIAL BRANCH BLOCK), PLEASE NOTE THAT WE DO EXPECT THIS INJECTION TO WEAR OFF.  IT IS IMPORTANT TO COMPLETE THE PAIN DIARY AND LIST THE PAIN SCORE ONLY FOR THE REGION WHERE THE PROCEDURE WAS AND BRING THIS TO YOUR FOLLOW UP VISIT.  IF YOU EXPERIENCE PROLONGED WEAKNESS LONGER THAN ONE DAY, BOWEL OR BLADDER INCONTINENCE THEN PLEASE CALL THE PHYSIATRY OFFICE.  Your leg may feel heavy, weak and numb for up to 1-2 days. Be very careful walking.    You may resume normal activities 3 days after procedure.

## 2022-06-06 NOTE — PROGRESS NOTES
"Follow up patient note  Interventional spine and Pain  Physiatry (physical medicine and  Rehabilitation)       Chief complaint:   Chief Complaint   Patient presents with   • Follow-Up     Back pain          HISTORY    Please see new patient note by Dr Ayala,  for more details.     HPI  Patient identification: Barron Hewitt ,  1960,   With Diagnoses of Lumbar spondylosis, Lumbar radiculopathy, stable after epidural, and Chronic bilateral low back pain without sciatica were pertinent to this visit.     procedures   2/15/2022 left Lumbar Transforaminal Epidural Steroid  at the L3-4 and L4-5 levels.   3/22/2022 left Lumbar Transforaminal Epidural Steroid  at the L3-4 and L4-5 levels 80% improved at the follow-up visit    The patient's leg pain improved significantly.  After the epidural steroid injection he continues to have pain relief in the legs.  However the patient is having axial low back pain bilateral 8 out of 10 in intensity worse with lumbar extension and extension rotation maneuver.  Worse with lifting causing functional deficits.  He is unable to play golf and do other exercises because of this pain.      Medications tried include zanaflex, nsaids, tramadol, norco     ROS Red Flags :   Fever, Chills, Sweats: Denies  Involuntary Weight Loss: Denies  Bowel/Bladder Incontinence: Denies  Saddle Anesthesia: Denies        PMHx:   Past Medical History:   Diagnosis Date   • Abnormal nuclear cardiac imaging test 2014   • Allergy    • Anesthesia     PONV   • Anginal syndrome (HCC)     \"myocardial bridging\"   • ASTHMA    • CHEST PAIN 6/15/2011   • Chest pain at rest 2014   • Coronary-myocardial bridge 2012   • Diabetes 2009    insulin and oral meds   • High cholesterol    • Hyperlipidemia    • Hypertension    • Mild intermittent asthma without complication 2017   • Myocardial bridge     cardiologist, Dr. Valverde   • PONV (postoperative nausea and vomiting)    • Sleep apnea  "    has CPAP   • Snoring        PSHx:   Past Surgical History:   Procedure Laterality Date   • FL TRANSURETHRAL ELEC-SURG PROSTATECTOM N/A 5/27/2022    Procedure: TURP, USING BUTTON ELECTRODE;  Surgeon: Lee Mckee M.D.;  Location: [facility];  Service: Urology   • BLOCK EPIDURAL STEROID INJECTION Left 3/22/2022    Procedure: LEFT L3-4 and L4-5 transforaminal epidural steroid injection with fluoroscopic guidance;  Surgeon: Dragan Ayala M.D.;  [facility];  Service: Pain Management   • FL NJX AA&/STRD TFRML EPI LUMBAR/SACRAL 1 LEVEL Left 2/15/2022    Procedure: LEFT L3-4 and L4-5 transforaminal epidural steroid injection with fluoroscopic guidance;  Surgeon: Dragan Ayala M.D.;  Location: [facility];  Service: Pain Management   • SHOULDER DECOMPRESSION ARTHROSCOPIC Left 1/4/2018    Procedure: SHOULDER DECOMPRESSION ARTHROSCOPIC - SUBACROMIAL;  Surgeon: Linwood Grover M.D.;  Location: [facility];  Service: Orthopedics   • SHOULDER ARTHROSCOPY W/ BICIPITAL TENODESIS REPAIR Left 1/4/2018    Procedure: SHOULDER ARTHROSCOPY W/ BICIPITAL Tenotomy REPAIR;  Surgeon: Linwood Grover M.D.;  Location: [facility];  Service: Orthopedics   • CLAVICLE DISTAL EXCISION Left 1/4/2018    Procedure: CLAVICLE DISTAL EXCISION - POSSIBLE;  Surgeon: Linwood Grover M.D.;  Location: [facility];  Service: Orthopedics   • RECOVERY  4/8/2016    Procedure: CATH LAB [facility] WITH POSSIBLE NAVIN;  Surgeon: Recoveryons[facility]ino Surgery;  Location: SURG[facility];  Service:    • VENTRAL HERNIA REPAIR LAPAROSCOPIC  11/1/2012    Performed by Marcos Ramírez M.D. at [facility]   • KNEE ARTHROSCOPY  1990's    right   • SHOULDER ARTHROSCOPY  1990's    right   • SINUSOTOMIES  1990's    times two surgeries   • TUMOR EXCISION WITH BIOPSY  1990's    right hand - thumb       Family history   Family History    Problem Relation Age of Onset   • Breast Cancer Mother    • Hypertension Mother    • Cancer Mother         breast   • Heart Disease Mother         hx of bypass   • Hypertension Father    • Arthritis Father    • Diabetes Father         prediabetes   • No Known Problems Sister    • Arthritis Brother    • Heart Disease Other    • Hypertension Other    • Cancer Other    • No Known Problems Brother          Medications:   Outpatient Medications Marked as Taking for the 6/6/22 encounter (Office Visit) with Dragan Ayala M.D.   Medication Sig Dispense Refill   • docusate sodium (COLACE) 100 MG Cap Take 1 Capsule by mouth 2 times a day. 30 Capsule 0   • polyethylene glycol/lytes (MIRALAX) 17 g Pack Take 1 Packet by mouth every day. Please take to prevent constipation following your procedure.  Hold if loose stools 14 Each 0   • phenazopyridine (PYRIDIUM) 100 MG Tab Take 1 Tablet by mouth 3 times a day as needed (bladder pain, dysuria). 6 Tablet 0   • DILTIAZem CD (CARDIZEM CD) 180 MG CAPSULE SR 24 HR TAKE 1 CAPSULE DAILY 90 Capsule 3   • metFORMIN (GLUCOPHAGE) 500 MG Tab Take 1 Tablet by mouth 2 times a day with meals. 180 Tablet 2   • gabapentin (NEURONTIN) 100 MG Cap Take 100 mg by mouth 2 times a day as needed.     • albuterol 108 (90 Base) MCG/ACT Aero Soln inhalation aerosol Inhale 2 Puffs every four hours as needed for Shortness of Breath. Pt prefers ventolin if possible. Thank you 3 Each 3   • levothyroxine (SYNTHROID) 50 MCG Tab Take one pill 6 days per week on empty stomach.  75 mcg on 7th day. 90 Tablet 3   • fluticasone-salmeterol (ADVAIR DISKUS) 500-50 MCG/DOSE AEROSOL POWDER, BREATH ACTIVATED Inhale 1 Puff every 12 hours. (Patient taking differently: Inhale every 12 hours.) 3 Each 3   • meloxicam (MOBIC) 15 MG tablet Take 1 Tablet by mouth 1 time a day as needed (pain). Do not take other NSAIDs. No refills. 60 Tablet 0   • benazepril (LOTENSIN) 20 MG Tab TAKE 1 TABLET DAILY 90 Tablet 3   • finasteride  (PROSCAR) 5 MG Tab Take 1 Tablet by mouth every day. 90 Tablet 3   • levothyroxine (SYNTHROID) 75 MCG Tab Take 1 Tablet by mouth every morning on an empty stomach. Taking only one d per week.  50 mcg all other days. 12 Tablet 3   • atorvastatin (LIPITOR) 80 MG tablet Take 1 Tablet by mouth every evening. 90 Tablet 3   • tizanidine (ZANAFLEX) 4 MG Tab Take 1 Tablet by mouth every 6 hours as needed (muscle spasms). 30 Tablet 2   • EPINEPHrine (EPIPEN) 0.3 MG/0.3ML Solution Auto-injector solution for injection Inject 0.3 mL into the thigh one time for 1 dose. 1 Each 0   • Continuous Blood Gluc Sensor (FREESTYLE LUNA 14 DAY SENSOR) Misc 1 Application every 14 days. 6 Each 3   • Dulaglutide (TRULICITY) 3 MG/0.5ML Solution Pen-injector Inject 1 Dose under the skin every 7 days. 2 mL 11   • montelukast (SINGULAIR) 10 MG Tab Take 1 tablet by mouth every evening. 90 tablet 3   • Canagliflozin (INVOKANA) 300 MG Tab Take 1 tablet by mouth every day. 90 tablet 3   • tamsulosin (FLOMAX) 0.4 MG capsule Take 1 capsule by mouth 1/2 hour after breakfast. 30 capsule 4   • NON SPECIFIED CPAP supplies through [facility] 1 Each 1   • ALPHA LIPOIC ACID PO Take 600 mg by mouth 2 Times a Day.     • Omega-3 Fatty Acids (FISH OIL) 1200 MG CAPS Take  by mouth.     • Cinnamon 500 MG CAPS Take 1,000 mg by mouth.     • aspirin EC (ECOTRIN) 81 MG TBEC Take 81 mg by mouth every day.       • pantoprazole (PROTONIX) 40 MG TBEC Take 40 mg by mouth every day.       • Coenzyme Q10 200 MG Cap Take  by mouth every day.       • Cholecalciferol (VITAMIN D) 2000 UNIT TABS Take  by mouth 2 times a day.     • cetirizine (ZYRTEC) 10 MG Tab Take 10 mg by mouth every day.          Current Outpatient Medications on File Prior to Visit   Medication Sig Dispense Refill   • docusate sodium (COLACE) 100 MG Cap Take 1 Capsule by mouth 2 times a day. 30 Capsule 0   • polyethylene glycol/lytes (MIRALAX) 17 g Pack Take 1 Packet by mouth every day. Please take  to prevent constipation following your procedure.  Hold if loose stools 14 Each 0   • phenazopyridine (PYRIDIUM) 100 MG Tab Take 1 Tablet by mouth 3 times a day as needed (bladder pain, dysuria). 6 Tablet 0   • DILTIAZem CD (CARDIZEM CD) 180 MG CAPSULE SR 24 HR TAKE 1 CAPSULE DAILY 90 Capsule 3   • metFORMIN (GLUCOPHAGE) 500 MG Tab Take 1 Tablet by mouth 2 times a day with meals. 180 Tablet 2   • gabapentin (NEURONTIN) 100 MG Cap Take 100 mg by mouth 2 times a day as needed.     • albuterol 108 (90 Base) MCG/ACT Aero Soln inhalation aerosol Inhale 2 Puffs every four hours as needed for Shortness of Breath. Pt prefers ventolin if possible. Thank you 3 Each 3   • levothyroxine (SYNTHROID) 50 MCG Tab Take one pill 6 days per week on empty stomach.  75 mcg on 7th day. 90 Tablet 3   • fluticasone-salmeterol (ADVAIR DISKUS) 500-50 MCG/DOSE AEROSOL POWDER, BREATH ACTIVATED Inhale 1 Puff every 12 hours. (Patient taking differently: Inhale every 12 hours.) 3 Each 3   • meloxicam (MOBIC) 15 MG tablet Take 1 Tablet by mouth 1 time a day as needed (pain). Do not take other NSAIDs. No refills. 60 Tablet 0   • benazepril (LOTENSIN) 20 MG Tab TAKE 1 TABLET DAILY 90 Tablet 3   • finasteride (PROSCAR) 5 MG Tab Take 1 Tablet by mouth every day. 90 Tablet 3   • levothyroxine (SYNTHROID) 75 MCG Tab Take 1 Tablet by mouth every morning on an empty stomach. Taking only one d per week.  50 mcg all other days. 12 Tablet 3   • atorvastatin (LIPITOR) 80 MG tablet Take 1 Tablet by mouth every evening. 90 Tablet 3   • tizanidine (ZANAFLEX) 4 MG Tab Take 1 Tablet by mouth every 6 hours as needed (muscle spasms). 30 Tablet 2   • EPINEPHrine (EPIPEN) 0.3 MG/0.3ML Solution Auto-injector solution for injection Inject 0.3 mL into the thigh one time for 1 dose. 1 Each 0   • Continuous Blood Gluc Sensor (FREESTYLE LUNA 14 DAY SENSOR) Misc 1 Application every 14 days. 6 Each 3   • Dulaglutide (TRULICITY) 3 MG/0.5ML Solution Pen-injector Inject 1 Dose  under the skin every 7 days. 2 mL 11   • montelukast (SINGULAIR) 10 MG Tab Take 1 tablet by mouth every evening. 90 tablet 3   • Canagliflozin (INVOKANA) 300 MG Tab Take 1 tablet by mouth every day. 90 tablet 3   • tamsulosin (FLOMAX) 0.4 MG capsule Take 1 capsule by mouth 1/2 hour after breakfast. 30 capsule 4   • NON SPECIFIED CPAP supplies through [facility] 1 Each 1   • ALPHA LIPOIC ACID PO Take 600 mg by mouth 2 Times a Day.     • Omega-3 Fatty Acids (FISH OIL) 1200 MG CAPS Take  by mouth.     • Cinnamon 500 MG CAPS Take 1,000 mg by mouth.     • aspirin EC (ECOTRIN) 81 MG TBEC Take 81 mg by mouth every day.       • pantoprazole (PROTONIX) 40 MG TBEC Take 40 mg by mouth every day.       • Coenzyme Q10 200 MG Cap Take  by mouth every day.       • Cholecalciferol (VITAMIN D) 2000 UNIT TABS Take  by mouth 2 times a day.     • cetirizine (ZYRTEC) 10 MG Tab Take 10 mg by mouth every day.       No current facility-administered medications on file prior to visit.         Allergies:   Allergies   Allergen Reactions   • Kiwi Extract Anaphylaxis   • Shellfish Allergy Anaphylaxis   • Strawberry Anaphylaxis   • Ozempic (0.25 Or 0.5 Mg-Dose) [Semaglutide] Nausea     Pt does not recall any reaction   • Sulfa Drugs Rash and Unspecified     rash   • Testosterone Rash     rash       Social Hx:   Social History     Socioeconomic History   • Marital status:      Spouse name: Not on file   • Number of children: Not on file   • Years of education: Not on file   • Highest education level: Not on file   Occupational History   • Not on file   Tobacco Use   • Smoking status: Former Smoker     Packs/day: 0.00     Quit date:      Years since quittin.4   • Smokeless tobacco: Never Used   • Tobacco comment: occasional cigars   Vaping Use   • Vaping Use: Never used   Substance and Sexual Activity   • Alcohol use: Yes     Comment: rare - 1-2 per month (social)   • Drug use: Yes     Types: Marijuana, Inhaled, Oral      "Comment: THC   • Sexual activity: Not on file     Comment: ; 2 biological kids (2 of his wife's); 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   Other Topics Concern   • Not on file   Social History Narrative   • Not on file     Social Determinants of Health     Financial Resource Strain: Not on file   Food Insecurity: Not on file   Transportation Needs: Not on file   Physical Activity: Not on file   Stress: Not on file   Social Connections: Not on file   Intimate Partner Violence: Not on file   Housing Stability: Not on file         EXAMINATION     Physical Exam:   Vitals: /62 (BP Location: Right arm, Patient Position: Sitting, BP Cuff Size: Adult)   Pulse 71   Temp 36.2 °C (97.1 °F) (Temporal)   Ht 1.753 m (5' 9\")   Wt 76.4 kg (168 lb 6.9 oz)   SpO2 96%     Constitutional:   Body Habitus: Body mass index is 24.87 kg/m².  Cooperation: Fully cooperates with exam  Appearance: Well-groomed no disheveled    Respiratory-  breathing comfortable on room air, no audible wheezing  Cardiovascular- capillary refills less than 2 seconds. No lower extremity edema is noted.   Psychiatric- alert and oriented ×3. Normal affect.    MSK and Neuro: -      Thoracic/Lumbar Spine/Sacral Spine/Hips   There are no signs of infection around the injection sites.   decreased active range of motion with flexion, lateral flexion, and rotation bilaterally.   There is decreased active range of motion with lumbar extension.    There is  pain with lumbar extension.   There is pain with facet loading maneuver (extension rotation) with axial low back pain on the BILATERAL sides around L3-S1    Palpation:   No tenderness to palpation in midline at T1-T12 levels. No tenderness to palpation in the left and right of the midline T1-L5, NEGATIVE for tenderness to palpation to the para-midline region in the lower lumbar levels.  palpation over SI joint: negative bilaterally    palpation in hip or over the gluteus medius tendon " insertion: negative bilaterally      Lumbar spine Special tests  Neuro tension  Straight leg test negative bilaterally    Slump test negative bilaterally      Key points for the international standards for neurological classification of spinal cord injury (ISNCSCI) to light touch.     Dermatome R L                                      L2 2 2   L3 2 2   L4 2 2   L5 2 2   S1 2 2   S2 2 2         Motor Exam Lower Extremities    ? Myotome R L   Hip flexion L2 5 5   Knee extension L3 5 5   Ankle dorsiflexion L4 5 5   Toe extension L5 5 5   Ankle plantarflexion S1 5 5                 MEDICAL DECISION MAKING    DATA    Labs:   Lab Results   Component Value Date/Time    SODIUM 139 05/02/2022 12:25 PM    POTASSIUM 3.8 05/02/2022 12:25 PM    CHLORIDE 101 05/02/2022 12:25 PM    CO2 22 05/02/2022 12:25 PM    GLUCOSE 111 (H) 05/02/2022 12:25 PM    BUN 8 05/02/2022 12:25 PM    CREATININE 0.62 05/02/2022 12:25 PM    CREATININE 1.1 07/10/2008 09:25 AM        Lab Results   Component Value Date/Time    PROTHROMBTM 13.6 05/02/2022 12:25 PM    INR 1.13 05/02/2022 12:25 PM        Lab Results   Component Value Date/Time    WBC 10.6 05/02/2022 12:25 PM    RBC 5.62 05/02/2022 12:25 PM    HEMOGLOBIN 17.0 05/02/2022 12:25 PM    HEMATOCRIT 50.9 05/02/2022 12:25 PM    MCV 90.6 05/02/2022 12:25 PM    MCH 30.2 05/02/2022 12:25 PM    MCHC 33.4 (L) 05/02/2022 12:25 PM    MPV 10.3 05/02/2022 12:25 PM    NEUTSPOLYS 67.60 06/04/2020 02:18 PM    LYMPHOCYTES 17.60 (L) 06/04/2020 02:18 PM    MONOCYTES 9.80 06/04/2020 02:18 PM    EOSINOPHILS 3.50 06/04/2020 02:18 PM    BASOPHILS 1.20 06/04/2020 02:18 PM    HYPOCHROMIA 1+ 08/27/2013 07:13 AM        Lab Results   Component Value Date/Time    HBA1C 7.2 (A) 11/16/2021 02:36 PM          Imaging:   I personally reviewed following images    MRI lumbar spine 7/27/2021 with moderate left neuroforaminal stenosis at L3-4 and L4-5 with mild right neuroforaminal stenosis at L3-4 and L4-5.  Mild to moderate bilateral  neuroforaminal stenosis at L5-S1.  There is facet arthropathy bilaterally at L3-4, L4-5, L5-S1        I reviewed the following radiology reports                         Results for orders placed during the hospital encounter of 07/27/21    MR-LUMBAR SPINE-W/O    Impression  1.  L4-5 annular disc bulge with a central disc protrusion and bilateral facet degeneration. There is borderline central canal narrowing. There is moderate mild right neural foraminal narrowing.    2.  L3-4 annular disc bulge with a left lateral disc protrusion. There is moderate to severe left and mild right neural foraminal narrowing again seen.    3.  L5-S1 degenerative disc disease and facet degeneration results in moderate bilateral neuroforaminal narrowing.        Results for orders placed during the hospital encounter of 07/27/21    MR-LUMBAR SPINE-W/O    Impression  1.  L4-5 annular disc bulge with a central disc protrusion and bilateral facet degeneration. There is borderline central canal narrowing. There is moderate mild right neural foraminal narrowing.    2.  L3-4 annular disc bulge with a left lateral disc protrusion. There is moderate to severe left and mild right neural foraminal narrowing again seen.    3.  L5-S1 degenerative disc disease and facet degeneration results in moderate bilateral neuroforaminal narrowing.      Results for orders placed during the hospital encounter of 07/27/21    MR-MRA NECK-W/O    Impression  There is no significant stenosis in the visualized extracranial neck arteries.                                                                   Results for orders placed during the hospital encounter of 05/01/17    CT-ABDOMEN-PELVIS WITH    Impression  1.  Findings suggestive of early or low grade obstruction in the mid to distal small bowel. Enteritis with associated ileus could also give a similar appearance.  2.  LEFT calyceal stone                       Results for orders placed during the hospital encounter  of 17    DX-CHEST-2 VIEWS    Impression  Negative two views of the chest.                          Electrodiagnostics   2022 EMG  IMPRESSION:  This is an abnormal electrodiagnostic study due to evidence of chronic reinnervation changes in the left vastus lateralis/medialis with mild active denervation changes noted.  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       There is no strong electrodiagnostic evidence of a an active right lumbosacral plexopathy though given some responses are unobtainable a mild lumbar plexopathy/femoral neuropathy may be present.  There is no EMG evidence of a right lumbosacral radiculopathy.                                         DIAGNOSIS   Visit Diagnoses     ICD-10-CM   1. Lumbar spondylosis  M47.816   2. Lumbar radiculopathy, stable after epidural  M54.16   3. Chronic bilateral low back pain without sciatica  M54.50    G89.29         ASSESSMENT and PLAN:     Barron Fernándezmee  1960 male      Barron was seen today for follow-up.    Diagnoses and all orders for this visit:    Lumbar spondylosis  -     Referral to Physical Medicine Rehab  -     Referral to Physical Medicine Rehab    Lumbar radiculopathy, stable after epidural    Chronic bilateral low back pain without sciatica       The patient is failed conservative treatments with medication management, home exercise program from the direction of physical therapy/physician including the past 3 months .  I have ordered a BILATERAL diagnostic medial branch block targeting the L3-4, L4-5 and L5-S1 facet joints #1 and #2.  Procedure #2 is only to be done if #1 is a positive block.    The risks benefits and alternatives to this procedure were discussed and the patient wishes to proceed with the procedure. Risks include but are not limited to  damage to surrounding structures, infection, bleeding, worsening of pain which can be permanent, weakness which can be permanent. Benefits include pain relief, improved function. Alternatives includes not doing the procedure.   If there are 2 positive blocks I would consider the patient for radiofrequency neurotomy of the previously blocked nerves.      Continue gabapentin PRN    Continue Zanaflex as needed    Follow up: after the above conservative treatments.     Thank you for allowing me to participate in the care of this patient. If you have any questions please not hesitate to contact me.             Please note that this dictation was created using voice recognition software. I have made every reasonable attempt to correct obvious errors but there may be errors of grammar and content that I may have overlooked prior to finalization of this note.      Dragan Ayala MD  Interventional Spine and Sports Physiatry  Physical Medicine and Rehabilitation  [facility]

## 2022-06-06 NOTE — H&P (VIEW-ONLY)
"Follow up patient note  Interventional spine and Pain  Physiatry (physical medicine and  Rehabilitation)       Chief complaint:   Chief Complaint   Patient presents with   • Follow-Up     Back pain          HISTORY    Please see new patient note by Dr Ayala,  for more details.     HPI  Patient identification: Barron Hewitt ,  1960,   With Diagnoses of Lumbar spondylosis, Lumbar radiculopathy, stable after epidural, and Chronic bilateral low back pain without sciatica were pertinent to this visit.     procedures   2/15/2022 left Lumbar Transforaminal Epidural Steroid  at the L3-4 and L4-5 levels.   3/22/2022 left Lumbar Transforaminal Epidural Steroid  at the L3-4 and L4-5 levels 80% improved at the follow-up visit    The patient's leg pain improved significantly.  After the epidural steroid injection he continues to have pain relief in the legs.  However the patient is having axial low back pain bilateral 8 out of 10 in intensity worse with lumbar extension and extension rotation maneuver.  Worse with lifting causing functional deficits.  He is unable to play golf and do other exercises because of this pain.      Medications tried include zanaflex, nsaids, tramadol, norco     ROS Red Flags :   Fever, Chills, Sweats: Denies  Involuntary Weight Loss: Denies  Bowel/Bladder Incontinence: Denies  Saddle Anesthesia: Denies        PMHx:   Past Medical History:   Diagnosis Date   • Abnormal nuclear cardiac imaging test 2014   • Allergy    • Anesthesia     PONV   • Anginal syndrome (HCC)     \"myocardial bridging\"   • ASTHMA    • CHEST PAIN 6/15/2011   • Chest pain at rest 2014   • Coronary-myocardial bridge 2012   • Diabetes 2009    insulin and oral meds   • High cholesterol    • Hyperlipidemia    • Hypertension    • Mild intermittent asthma without complication 2017   • Myocardial bridge     cardiologist, Dr. Valverde   • PONV (postoperative nausea and vomiting)    • Sleep apnea  "    has CPAP   • Snoring        PSHx:   Past Surgical History:   Procedure Laterality Date   • HI TRANSURETHRAL ELEC-SURG PROSTATECTOM N/A 5/27/2022    Procedure: TURP, USING BUTTON ELECTRODE;  Surgeon: Lee Mckee M.D.;  Location: [facility];  Service: Urology   • BLOCK EPIDURAL STEROID INJECTION Left 3/22/2022    Procedure: LEFT L3-4 and L4-5 transforaminal epidural steroid injection with fluoroscopic guidance;  Surgeon: Dragan Ayala M.D.;  [facility];  Service: Pain Management   • HI NJX AA&/STRD TFRML EPI LUMBAR/SACRAL 1 LEVEL Left 2/15/2022    Procedure: LEFT L3-4 and L4-5 transforaminal epidural steroid injection with fluoroscopic guidance;  Surgeon: Dragan Ayala M.D.;  Location: [facility];  Service: Pain Management   • SHOULDER DECOMPRESSION ARTHROSCOPIC Left 1/4/2018    Procedure: SHOULDER DECOMPRESSION ARTHROSCOPIC - SUBACROMIAL;  Surgeon: Linwood Grover M.D.;  Location: [facility];  Service: Orthopedics   • SHOULDER ARTHROSCOPY W/ BICIPITAL TENODESIS REPAIR Left 1/4/2018    Procedure: SHOULDER ARTHROSCOPY W/ BICIPITAL Tenotomy REPAIR;  Surgeon: Linwood Grover M.D.;  Location: [facility];  Service: Orthopedics   • CLAVICLE DISTAL EXCISION Left 1/4/2018    Procedure: CLAVICLE DISTAL EXCISION - POSSIBLE;  Surgeon: Linwood Grover M.D.;  Location: [facility];  Service: Orthopedics   • RECOVERY  4/8/2016    Procedure: CATH LAB [facility] WITH POSSIBLE NAVIN;  Surgeon: [facility]ons[facility];  Location: SURGERY PRE-POST PROC UNIT [facility];  Service:    • VENTRAL HERNIA REPAIR LAPAROSCOPIC  11/1/2012    Performed by Marcos Ramírez M.D. at [facility]   • KNEE ARTHROSCOPY  1990's    right   • SHOULDER ARTHROSCOPY  1990's    right   • SINUSOTOMIES  1990's    times two surgeries   • TUMOR EXCISION WITH BIOPSY  1990's    right hand - thumb       Family history   Family History    Problem Relation Age of Onset   • Breast Cancer Mother    • Hypertension Mother    • Cancer Mother         breast   • Heart Disease Mother         hx of bypass   • Hypertension Father    • Arthritis Father    • Diabetes Father         prediabetes   • No Known Problems Sister    • Arthritis Brother    • Heart Disease Other    • Hypertension Other    • Cancer Other    • No Known Problems Brother          Medications:   Outpatient Medications Marked as Taking for the 6/6/22 encounter (Office Visit) with Dragan Ayala M.D.   Medication Sig Dispense Refill   • docusate sodium (COLACE) 100 MG Cap Take 1 Capsule by mouth 2 times a day. 30 Capsule 0   • polyethylene glycol/lytes (MIRALAX) 17 g Pack Take 1 Packet by mouth every day. Please take to prevent constipation following your procedure.  Hold if loose stools 14 Each 0   • phenazopyridine (PYRIDIUM) 100 MG Tab Take 1 Tablet by mouth 3 times a day as needed (bladder pain, dysuria). 6 Tablet 0   • DILTIAZem CD (CARDIZEM CD) 180 MG CAPSULE SR 24 HR TAKE 1 CAPSULE DAILY 90 Capsule 3   • metFORMIN (GLUCOPHAGE) 500 MG Tab Take 1 Tablet by mouth 2 times a day with meals. 180 Tablet 2   • gabapentin (NEURONTIN) 100 MG Cap Take 100 mg by mouth 2 times a day as needed.     • albuterol 108 (90 Base) MCG/ACT Aero Soln inhalation aerosol Inhale 2 Puffs every four hours as needed for Shortness of Breath. Pt prefers ventolin if possible. Thank you 3 Each 3   • levothyroxine (SYNTHROID) 50 MCG Tab Take one pill 6 days per week on empty stomach.  75 mcg on 7th day. 90 Tablet 3   • fluticasone-salmeterol (ADVAIR DISKUS) 500-50 MCG/DOSE AEROSOL POWDER, BREATH ACTIVATED Inhale 1 Puff every 12 hours. (Patient taking differently: Inhale every 12 hours.) 3 Each 3   • meloxicam (MOBIC) 15 MG tablet Take 1 Tablet by mouth 1 time a day as needed (pain). Do not take other NSAIDs. No refills. 60 Tablet 0   • benazepril (LOTENSIN) 20 MG Tab TAKE 1 TABLET DAILY 90 Tablet 3   • finasteride  (PROSCAR) 5 MG Tab Take 1 Tablet by mouth every day. 90 Tablet 3   • levothyroxine (SYNTHROID) 75 MCG Tab Take 1 Tablet by mouth every morning on an empty stomach. Taking only one d per week.  50 mcg all other days. 12 Tablet 3   • atorvastatin (LIPITOR) 80 MG tablet Take 1 Tablet by mouth every evening. 90 Tablet 3   • tizanidine (ZANAFLEX) 4 MG Tab Take 1 Tablet by mouth every 6 hours as needed (muscle spasms). 30 Tablet 2   • EPINEPHrine (EPIPEN) 0.3 MG/0.3ML Solution Auto-injector solution for injection Inject 0.3 mL into the thigh one time for 1 dose. 1 Each 0   • Continuous Blood Gluc Sensor (FREESTYLE LUNA 14 DAY SENSOR) Misc 1 Application every 14 days. 6 Each 3   • Dulaglutide (TRULICITY) 3 MG/0.5ML Solution Pen-injector Inject 1 Dose under the skin every 7 days. 2 mL 11   • montelukast (SINGULAIR) 10 MG Tab Take 1 tablet by mouth every evening. 90 tablet 3   • Canagliflozin (INVOKANA) 300 MG Tab Take 1 tablet by mouth every day. 90 tablet 3   • tamsulosin (FLOMAX) 0.4 MG capsule Take 1 capsule by mouth 1/2 hour after breakfast. 30 capsule 4   • NON SPECIFIED CPAP supplies through [facility] 1 Each 1   • ALPHA LIPOIC ACID PO Take 600 mg by mouth 2 Times a Day.     • Omega-3 Fatty Acids (FISH OIL) 1200 MG CAPS Take  by mouth.     • Cinnamon 500 MG CAPS Take 1,000 mg by mouth.     • aspirin EC (ECOTRIN) 81 MG TBEC Take 81 mg by mouth every day.       • pantoprazole (PROTONIX) 40 MG TBEC Take 40 mg by mouth every day.       • Coenzyme Q10 200 MG Cap Take  by mouth every day.       • Cholecalciferol (VITAMIN D) 2000 UNIT TABS Take  by mouth 2 times a day.     • cetirizine (ZYRTEC) 10 MG Tab Take 10 mg by mouth every day.          Current Outpatient Medications on File Prior to Visit   Medication Sig Dispense Refill   • docusate sodium (COLACE) 100 MG Cap Take 1 Capsule by mouth 2 times a day. 30 Capsule 0   • polyethylene glycol/lytes (MIRALAX) 17 g Pack Take 1 Packet by mouth every day. Please take  to prevent constipation following your procedure.  Hold if loose stools 14 Each 0   • phenazopyridine (PYRIDIUM) 100 MG Tab Take 1 Tablet by mouth 3 times a day as needed (bladder pain, dysuria). 6 Tablet 0   • DILTIAZem CD (CARDIZEM CD) 180 MG CAPSULE SR 24 HR TAKE 1 CAPSULE DAILY 90 Capsule 3   • metFORMIN (GLUCOPHAGE) 500 MG Tab Take 1 Tablet by mouth 2 times a day with meals. 180 Tablet 2   • gabapentin (NEURONTIN) 100 MG Cap Take 100 mg by mouth 2 times a day as needed.     • albuterol 108 (90 Base) MCG/ACT Aero Soln inhalation aerosol Inhale 2 Puffs every four hours as needed for Shortness of Breath. Pt prefers ventolin if possible. Thank you 3 Each 3   • levothyroxine (SYNTHROID) 50 MCG Tab Take one pill 6 days per week on empty stomach.  75 mcg on 7th day. 90 Tablet 3   • fluticasone-salmeterol (ADVAIR DISKUS) 500-50 MCG/DOSE AEROSOL POWDER, BREATH ACTIVATED Inhale 1 Puff every 12 hours. (Patient taking differently: Inhale every 12 hours.) 3 Each 3   • meloxicam (MOBIC) 15 MG tablet Take 1 Tablet by mouth 1 time a day as needed (pain). Do not take other NSAIDs. No refills. 60 Tablet 0   • benazepril (LOTENSIN) 20 MG Tab TAKE 1 TABLET DAILY 90 Tablet 3   • finasteride (PROSCAR) 5 MG Tab Take 1 Tablet by mouth every day. 90 Tablet 3   • levothyroxine (SYNTHROID) 75 MCG Tab Take 1 Tablet by mouth every morning on an empty stomach. Taking only one d per week.  50 mcg all other days. 12 Tablet 3   • atorvastatin (LIPITOR) 80 MG tablet Take 1 Tablet by mouth every evening. 90 Tablet 3   • tizanidine (ZANAFLEX) 4 MG Tab Take 1 Tablet by mouth every 6 hours as needed (muscle spasms). 30 Tablet 2   • EPINEPHrine (EPIPEN) 0.3 MG/0.3ML Solution Auto-injector solution for injection Inject 0.3 mL into the thigh one time for 1 dose. 1 Each 0   • Continuous Blood Gluc Sensor (FREESTYLE LUNA 14 DAY SENSOR) Misc 1 Application every 14 days. 6 Each 3   • Dulaglutide (TRULICITY) 3 MG/0.5ML Solution Pen-injector Inject 1 Dose  under the skin every 7 days. 2 mL 11   • montelukast (SINGULAIR) 10 MG Tab Take 1 tablet by mouth every evening. 90 tablet 3   • Canagliflozin (INVOKANA) 300 MG Tab Take 1 tablet by mouth every day. 90 tablet 3   • tamsulosin (FLOMAX) 0.4 MG capsule Take 1 capsule by mouth 1/2 hour after breakfast. 30 capsule 4   • NON SPECIFIED CPAP supplies through [facility] 1 Each 1   • ALPHA LIPOIC ACID PO Take 600 mg by mouth 2 Times a Day.     • Omega-3 Fatty Acids (FISH OIL) 1200 MG CAPS Take  by mouth.     • Cinnamon 500 MG CAPS Take 1,000 mg by mouth.     • aspirin EC (ECOTRIN) 81 MG TBEC Take 81 mg by mouth every day.       • pantoprazole (PROTONIX) 40 MG TBEC Take 40 mg by mouth every day.       • Coenzyme Q10 200 MG Cap Take  by mouth every day.       • Cholecalciferol (VITAMIN D) 2000 UNIT TABS Take  by mouth 2 times a day.     • cetirizine (ZYRTEC) 10 MG Tab Take 10 mg by mouth every day.       No current facility-administered medications on file prior to visit.         Allergies:   Allergies   Allergen Reactions   • Kiwi Extract Anaphylaxis   • Shellfish Allergy Anaphylaxis   • Strawberry Anaphylaxis   • Ozempic (0.25 Or 0.5 Mg-Dose) [Semaglutide] Nausea     Pt does not recall any reaction   • Sulfa Drugs Rash and Unspecified     rash   • Testosterone Rash     rash       Social Hx:   Social History     Socioeconomic History   • Marital status:      Spouse name: Not on file   • Number of children: Not on file   • Years of education: Not on file   • Highest education level: Not on file   Occupational History   • Not on file   Tobacco Use   • Smoking status: Former Smoker     Packs/day: 0.00     Quit date:      Years since quittin.4   • Smokeless tobacco: Never Used   • Tobacco comment: occasional cigars   Vaping Use   • Vaping Use: Never used   Substance and Sexual Activity   • Alcohol use: Yes     Comment: rare - 1-2 per month (social)   • Drug use: Yes     Types: Marijuana, Inhaled, Oral      "Comment: THC   • Sexual activity: Not on file     Comment: ; 2 biological kids (2 of his wife's); 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   Other Topics Concern   • Not on file   Social History Narrative   • Not on file     Social Determinants of Health     Financial Resource Strain: Not on file   Food Insecurity: Not on file   Transportation Needs: Not on file   Physical Activity: Not on file   Stress: Not on file   Social Connections: Not on file   Intimate Partner Violence: Not on file   Housing Stability: Not on file         EXAMINATION     Physical Exam:   Vitals: /62 (BP Location: Right arm, Patient Position: Sitting, BP Cuff Size: Adult)   Pulse 71   Temp 36.2 °C (97.1 °F) (Temporal)   Ht 1.753 m (5' 9\")   Wt 76.4 kg (168 lb 6.9 oz)   SpO2 96%     Constitutional:   Body Habitus: Body mass index is 24.87 kg/m².  Cooperation: Fully cooperates with exam  Appearance: Well-groomed no disheveled    Respiratory-  breathing comfortable on room air, no audible wheezing  Cardiovascular- capillary refills less than 2 seconds. No lower extremity edema is noted.   Psychiatric- alert and oriented ×3. Normal affect.    MSK and Neuro: -      Thoracic/Lumbar Spine/Sacral Spine/Hips   There are no signs of infection around the injection sites.   decreased active range of motion with flexion, lateral flexion, and rotation bilaterally.   There is decreased active range of motion with lumbar extension.    There is  pain with lumbar extension.   There is pain with facet loading maneuver (extension rotation) with axial low back pain on the BILATERAL sides around L3-S1    Palpation:   No tenderness to palpation in midline at T1-T12 levels. No tenderness to palpation in the left and right of the midline T1-L5, NEGATIVE for tenderness to palpation to the para-midline region in the lower lumbar levels.  palpation over SI joint: negative bilaterally    palpation in hip or over the gluteus medius tendon " insertion: negative bilaterally      Lumbar spine Special tests  Neuro tension  Straight leg test negative bilaterally    Slump test negative bilaterally      Key points for the international standards for neurological classification of spinal cord injury (ISNCSCI) to light touch.     Dermatome R L                                      L2 2 2   L3 2 2   L4 2 2   L5 2 2   S1 2 2   S2 2 2         Motor Exam Lower Extremities    ? Myotome R L   Hip flexion L2 5 5   Knee extension L3 5 5   Ankle dorsiflexion L4 5 5   Toe extension L5 5 5   Ankle plantarflexion S1 5 5                 MEDICAL DECISION MAKING    DATA    Labs:   Lab Results   Component Value Date/Time    SODIUM 139 05/02/2022 12:25 PM    POTASSIUM 3.8 05/02/2022 12:25 PM    CHLORIDE 101 05/02/2022 12:25 PM    CO2 22 05/02/2022 12:25 PM    GLUCOSE 111 (H) 05/02/2022 12:25 PM    BUN 8 05/02/2022 12:25 PM    CREATININE 0.62 05/02/2022 12:25 PM    CREATININE 1.1 07/10/2008 09:25 AM        Lab Results   Component Value Date/Time    PROTHROMBTM 13.6 05/02/2022 12:25 PM    INR 1.13 05/02/2022 12:25 PM        Lab Results   Component Value Date/Time    WBC 10.6 05/02/2022 12:25 PM    RBC 5.62 05/02/2022 12:25 PM    HEMOGLOBIN 17.0 05/02/2022 12:25 PM    HEMATOCRIT 50.9 05/02/2022 12:25 PM    MCV 90.6 05/02/2022 12:25 PM    MCH 30.2 05/02/2022 12:25 PM    MCHC 33.4 (L) 05/02/2022 12:25 PM    MPV 10.3 05/02/2022 12:25 PM    NEUTSPOLYS 67.60 06/04/2020 02:18 PM    LYMPHOCYTES 17.60 (L) 06/04/2020 02:18 PM    MONOCYTES 9.80 06/04/2020 02:18 PM    EOSINOPHILS 3.50 06/04/2020 02:18 PM    BASOPHILS 1.20 06/04/2020 02:18 PM    HYPOCHROMIA 1+ 08/27/2013 07:13 AM        Lab Results   Component Value Date/Time    HBA1C 7.2 (A) 11/16/2021 02:36 PM          Imaging:   I personally reviewed following images    MRI lumbar spine 7/27/2021 with moderate left neuroforaminal stenosis at L3-4 and L4-5 with mild right neuroforaminal stenosis at L3-4 and L4-5.  Mild to moderate bilateral  neuroforaminal stenosis at L5-S1.  There is facet arthropathy bilaterally at L3-4, L4-5, L5-S1        I reviewed the following radiology reports                         Results for orders placed during the hospital encounter of 07/27/21    MR-LUMBAR SPINE-W/O    Impression  1.  L4-5 annular disc bulge with a central disc protrusion and bilateral facet degeneration. There is borderline central canal narrowing. There is moderate mild right neural foraminal narrowing.    2.  L3-4 annular disc bulge with a left lateral disc protrusion. There is moderate to severe left and mild right neural foraminal narrowing again seen.    3.  L5-S1 degenerative disc disease and facet degeneration results in moderate bilateral neuroforaminal narrowing.        Results for orders placed during the hospital encounter of 07/27/21    MR-LUMBAR SPINE-W/O    Impression  1.  L4-5 annular disc bulge with a central disc protrusion and bilateral facet degeneration. There is borderline central canal narrowing. There is moderate mild right neural foraminal narrowing.    2.  L3-4 annular disc bulge with a left lateral disc protrusion. There is moderate to severe left and mild right neural foraminal narrowing again seen.    3.  L5-S1 degenerative disc disease and facet degeneration results in moderate bilateral neuroforaminal narrowing.      Results for orders placed during the hospital encounter of 07/27/21    MR-MRA NECK-W/O    Impression  There is no significant stenosis in the visualized extracranial neck arteries.                                                                   Results for orders placed during the hospital encounter of 05/01/17    CT-ABDOMEN-PELVIS WITH    Impression  1.  Findings suggestive of early or low grade obstruction in the mid to distal small bowel. Enteritis with associated ileus could also give a similar appearance.  2.  LEFT calyceal stone                       Results for orders placed during the hospital encounter  of 17    DX-CHEST-2 VIEWS    Impression  Negative two views of the chest.                          Electrodiagnostics   2022 EMG  IMPRESSION:  This is an abnormal electrodiagnostic study due to evidence of chronic reinnervation changes in the left vastus lateralis/medialis with mild active denervation changes noted.  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       There is no strong electrodiagnostic evidence of a an active right lumbosacral plexopathy though given some responses are unobtainable a mild lumbar plexopathy/femoral neuropathy may be present.  There is no EMG evidence of a right lumbosacral radiculopathy.                                         DIAGNOSIS   Visit Diagnoses     ICD-10-CM   1. Lumbar spondylosis  M47.816   2. Lumbar radiculopathy, stable after epidural  M54.16   3. Chronic bilateral low back pain without sciatica  M54.50    G89.29         ASSESSMENT and PLAN:     Barron Fernándezmee  1960 male      Barron was seen today for follow-up.    Diagnoses and all orders for this visit:    Lumbar spondylosis  -     Referral to Physical Medicine Rehab  -     Referral to Physical Medicine Rehab    Lumbar radiculopathy, stable after epidural    Chronic bilateral low back pain without sciatica       The patient is failed conservative treatments with medication management, home exercise program from the direction of physical therapy/physician including the past 3 months .  I have ordered a BILATERAL diagnostic medial branch block targeting the L3-4, L4-5 and L5-S1 facet joints #1 and #2.  Procedure #2 is only to be done if #1 is a positive block.    The risks benefits and alternatives to this procedure were discussed and the patient wishes to proceed with the procedure. Risks include but are not limited to  damage to surrounding structures, infection, bleeding, worsening of pain which can be permanent, weakness which can be permanent. Benefits include pain relief, improved function. Alternatives includes not doing the procedure.   If there are 2 positive blocks I would consider the patient for radiofrequency neurotomy of the previously blocked nerves.      Continue gabapentin PRN    Continue Zanaflex as needed    Follow up: after the above conservative treatments.     Thank you for allowing me to participate in the care of this patient. If you have any questions please not hesitate to contact me.             Please note that this dictation was created using voice recognition software. I have made every reasonable attempt to correct obvious errors but there may be errors of grammar and content that I may have overlooked prior to finalization of this note.      Dragan Ayala MD  Interventional Spine and Sports Physiatry  Physical Medicine and Rehabilitation  [facility]

## 2022-06-08 ENCOUNTER — PATIENT MESSAGE (OUTPATIENT)
Dept: PHYSICAL MEDICINE AND REHAB | Facility: MEDICAL CENTER | Age: 62
End: 2022-06-08
Payer: COMMERCIAL

## 2022-06-09 ENCOUNTER — PATIENT MESSAGE (OUTPATIENT)
Dept: PHYSICAL MEDICINE AND REHAB | Facility: MEDICAL CENTER | Age: 62
End: 2022-06-09
Payer: COMMERCIAL

## 2022-06-09 NOTE — TELEPHONE ENCOUNTER
Thank you Aly.  We did discuss this at the last visit also.  Your back pain may be multifactorial.  I believe a significant portion of your pain may have been coming from the prostate and I am glad that that is improved.  I also think you have pain coming from your facet joints in your back.  We will be working on the facet joints with the nerve blocks at the upcoming visit.    Dragan Ayala MD

## 2022-06-14 ENCOUNTER — HOSPITAL ENCOUNTER (OUTPATIENT)
Facility: REHABILITATION | Age: 62
End: 2022-06-14
Attending: PHYSICAL MEDICINE & REHABILITATION | Admitting: PHYSICAL MEDICINE & REHABILITATION
Payer: COMMERCIAL

## 2022-06-14 ENCOUNTER — APPOINTMENT (OUTPATIENT)
Dept: RADIOLOGY | Facility: REHABILITATION | Age: 62
End: 2022-06-14
Attending: PHYSICAL MEDICINE & REHABILITATION
Payer: COMMERCIAL

## 2022-06-14 VITALS
RESPIRATION RATE: 16 BRPM | TEMPERATURE: 97.9 F | SYSTOLIC BLOOD PRESSURE: 135 MMHG | OXYGEN SATURATION: 97 % | HEIGHT: 69 IN | HEART RATE: 72 BPM | BODY MASS INDEX: 24.49 KG/M2 | WEIGHT: 165.34 LBS | DIASTOLIC BLOOD PRESSURE: 85 MMHG

## 2022-06-14 PROCEDURE — 64495 INJ PARAVERT F JNT L/S 3 LEV: CPT

## 2022-06-14 PROCEDURE — 64493 INJ PARAVERT F JNT L/S 1 LEV: CPT

## 2022-06-14 PROCEDURE — 700111 HCHG RX REV CODE 636 W/ 250 OVERRIDE (IP)

## 2022-06-14 PROCEDURE — 64494 INJ PARAVERT F JNT L/S 2 LEV: CPT

## 2022-06-14 PROCEDURE — 700101 HCHG RX REV CODE 250

## 2022-06-14 RX ORDER — LIDOCAINE HYDROCHLORIDE 10 MG/ML
INJECTION, SOLUTION EPIDURAL; INFILTRATION; INTRACAUDAL; PERINEURAL
Status: COMPLETED
Start: 2022-06-14 | End: 2022-06-14

## 2022-06-14 RX ORDER — LIDOCAINE HYDROCHLORIDE 20 MG/ML
INJECTION, SOLUTION EPIDURAL; INFILTRATION; INTRACAUDAL; PERINEURAL
Status: COMPLETED
Start: 2022-06-14 | End: 2022-06-14

## 2022-06-14 RX ADMIN — LIDOCAINE HYDROCHLORIDE 10 ML: 10 INJECTION, SOLUTION EPIDURAL; INFILTRATION; INTRACAUDAL; PERINEURAL at 10:05

## 2022-06-14 RX ADMIN — LIDOCAINE HYDROCHLORIDE 10 ML: 10 INJECTION, SOLUTION EPIDURAL; INFILTRATION; INTRACAUDAL; PERINEURAL at 10:04

## 2022-06-14 RX ADMIN — LIDOCAINE HYDROCHLORIDE 10 ML: 20 INJECTION, SOLUTION EPIDURAL; INFILTRATION; INTRACAUDAL at 10:04

## 2022-06-14 ASSESSMENT — FIBROSIS 4 INDEX: FIB4 SCORE: 0.63

## 2022-06-14 NOTE — OP REPORT
Date of Service: 6/14/2022     Patient: Barron Hewitt 61 y.o. male     MRN: [id number]     Physician/s: Dragan Ayala MD    Pre-operative Diagnosis: Lumbosacral spondylosis, facet arthropathy. The patient was NOT seen for lumbar radiculopathy today.     Post-operative Diagnosis: Lumbosacral spondylosis, facet arthropathy. The patient was NOT seen for lumbar radiculopathy today.     Procedure: Medial Branch Blocks targeting the bilateral  L3-4, L4-5 and L5-S1  facet joints     Description of procedure:    The risks, benefits, and alternatives of the procedure were reviewed and discussed with the patient.  Written informed consent was freely obtained. A pre-procedural time-out was conducted by the physician verifying patient’s identity, procedure to be performed, procedure site and side, and allergy verification. Appropriate equipment was determined to be in place for the procedure.       In the fluoroscopy suite the patient was placed in a prone position and the skin was prepped and draped in the usual sterile fashion. The fluoroscope was placed over the lower back at the appropriate angles, and the targets for injection were marked. A 27g needle was placed into each of the markings at the levels below, and approx 1cc of 1% Lidocaine was injected subcutaneously into the epidermal and dermal layers. The needle was removed intact.     Using an oblique view, A 25g 3.5 inch needle was then placed at the intersection of the transverse process and superior articular process at the S1 facet where the L5 dorsal ramus runs on the right side. The needle tips were then verified by AP, oblique, and lateral views.     Using an oblique view, A 25g 3.5 inch needle was then placed at the intersection of the transverse process and superior articular process at the L5-S1 facet joint where the L4 medial branch runs  on the right side. The needle tips were then verified by AP, oblique, and lateral views.     Using an oblique  view, A 25g 3.5 inch needle was then placed at the intersection of the transverse process and superior articular process at the L4-5 facet joint where the L3 medial branch runs  on the right side. The needle tips were then verified by AP, oblique, and lateral views.     Using an oblique view, A 25g 3.5 inch needle was then placed at the intersection of the transverse process and superior articular process at the L3-4 facet joint where the L2 medial branch runs  on the right side. The needle tips were then verified by AP, oblique, and lateral views.       Using an oblique view, A 25g 3.5 inch needle was then placed at the intersection of the transverse process and superior articular process at the S1 facet where the L5 dorsal ramus runs  on the left side. The needle tips were then verified by AP, oblique, and lateral views.     Using an oblique view, A 25g 3.5 inch needle was then placed at the intersection of the transverse process and superior articular process at the L5-S1 facet joint where the L4 medial branch runs  on the left side. The needle tips were then verified by AP, oblique, and lateral views.     Using an oblique view, A 25g 3.5 inch needle was then placed at the intersection of the transverse process and superior articular process at the L4-5 facet joint where the L3 medial branch runs  on the left side. The needle tips were then verified by AP, oblique, and lateral views.     Using an oblique view, A 25g 3.5 inch needle was then placed at the intersection of the transverse process and superior articular process at the L3-4 facet joint where the L2 medial branch runs  on the left side. The needle tips were then verified by AP, oblique, and lateral views.      In the AP view, less than 1 cc of contrast dye was used to highlight the medial branch while the fluoroscope was running live at the levels above. Following negative aspiration, approximately 1mL of 2% lidocaine  was then injected at the above  levels, and the needles were removed intact after restyleted. The patient's back was covered with a 4x4 gauze, the area was cleansed with sterile normal saline, and a dressing was applied.     There were no complications noted, the patient was hemodynamically stable, and tolerated the procedure well.     The patient had 100% pain relief of the index pain minutes post procedure.      Follow-up as scheduled      Dragan Ayala MD  Physical Medicine and Rehabilitation  Interventional Spine and Sports Physiatry  [facility]            CPT  Intraarticular joint or medial branch block (MBB) - lumbar or sacral (1st level):  48500-51-cv  Intraarticular joint or medial branch block (MBB) - lumbar or sacral (2nd level):  41540-16-dw  Intraarticular joint or medial branch block (MBB) - lumbar or sacral (3rd level):  97123-35-ka

## 2022-06-14 NOTE — PROGRESS NOTES
0915:  Patient admitted to pre-op, health assessment complete, HX reviewed (HTN, CAD, ISELA, thyroid disease, asthma, DM), current medications reviewed with patient, see MAR, pt denies taking blood thinners (ASA & Aleve) for 5 days.  Patient has a ride post procedure.  Printed and verbal discharge instructions given with patient understanding, FSBS 98 according to pt's Continuous glucose monitor,  0954: hand-off given to Pennie MARTÍNEZ.

## 2022-06-14 NOTE — INTERVAL H&P NOTE
H&P reviewed. The patient was examined and there are no changes to the H&P     Lungs clear to auscultation bilaterally.  No abdominal tenderness.  Heart regular rate and rhythm.  Vitals reviewed.    Proceed with the originally scheduled procedure.  The risks, benefits and alternatives were discussed.  The patient understands these.    Pre-Op Diagnosis Codes:     * Lumbar spondylosis [M47.816]  Procedure(s):  Diagnostic medial branch blocks targeting the BILATERAL L3-4, L4-5 and L5-S1 facet joints with fluoroscopic guidance #1  .  .    Dragan Ayala MD  Physical Medicine and Rehabilitation  Interventional Spine and Sports Physiatry  [facility]

## 2022-06-14 NOTE — PROGRESS NOTES
1017: Rec'd pt from OR, report rec'd from Pennie, procedure RN, pt ambulatory to chair, VSS, tolerating liquids with no nausea.  Pain assessed.  Dressing CDI.  Ice pack placed to incision site.  Dr. Ayala in to see patient.    1023: Patient ambulatory and meets D/C criteria.    Patient d/c'd to designated .

## 2022-06-14 NOTE — PROGRESS NOTES
Received report from pre-procedure RN..  Pre-assessment completed.  Blood pressure and pulse ox connected to pt by surg tech and RN.  Pt hx reviewed (HTN, CAD, Asthma, Diabetes, ISELA no CPAP).  Pt identified and time out performed with team agreeable.

## 2022-06-15 ENCOUNTER — TELEPHONE (OUTPATIENT)
Dept: PHYSICAL MEDICINE AND REHAB | Facility: MEDICAL CENTER | Age: 62
End: 2022-06-15
Payer: COMMERCIAL

## 2022-06-15 NOTE — TELEPHONE ENCOUNTER
Pre Op: LVM to call us back to [facility] SP that is sched on 6/21/22 with Dr. Ayala.

## 2022-06-15 NOTE — TELEPHONE ENCOUNTER
Pt called me back and he mentioned that he got some relief from the SP yesterday just a soreness on the area.

## 2022-06-15 NOTE — TELEPHONE ENCOUNTER
Post Op: LVM to call us back in regards to his SP that was done with Dr. Ayala dated 6/14/22 for his Diagnostic medial branch blocks targeting the BILATERAL L3-4, L4-5 and L5-S1 facet joints with fluoroscopic guidance #1.

## 2022-06-16 DIAGNOSIS — J30.2 SEASONAL ALLERGIES: ICD-10-CM

## 2022-06-17 RX ORDER — MONTELUKAST SODIUM 10 MG/1
TABLET ORAL
Qty: 90 TABLET | Refills: 3 | Status: SHIPPED | OUTPATIENT
Start: 2022-06-17 | End: 2023-01-03 | Stop reason: SDUPTHER

## 2022-06-21 ENCOUNTER — APPOINTMENT (OUTPATIENT)
Dept: RADIOLOGY | Facility: REHABILITATION | Age: 62
End: 2022-06-21
Attending: PHYSICAL MEDICINE & REHABILITATION
Payer: COMMERCIAL

## 2022-06-21 ENCOUNTER — HOSPITAL ENCOUNTER (OUTPATIENT)
Facility: REHABILITATION | Age: 62
End: 2022-06-21
Attending: PHYSICAL MEDICINE & REHABILITATION | Admitting: PHYSICAL MEDICINE & REHABILITATION
Payer: COMMERCIAL

## 2022-06-21 VITALS
TEMPERATURE: 97.7 F | DIASTOLIC BLOOD PRESSURE: 79 MMHG | BODY MASS INDEX: 24.39 KG/M2 | OXYGEN SATURATION: 98 % | SYSTOLIC BLOOD PRESSURE: 125 MMHG | HEIGHT: 69 IN | WEIGHT: 164.68 LBS | RESPIRATION RATE: 16 BRPM | HEART RATE: 82 BPM

## 2022-06-21 PROCEDURE — 64494 INJ PARAVERT F JNT L/S 2 LEV: CPT

## 2022-06-21 PROCEDURE — 700101 HCHG RX REV CODE 250

## 2022-06-21 PROCEDURE — 700111 HCHG RX REV CODE 636 W/ 250 OVERRIDE (IP)

## 2022-06-21 PROCEDURE — 64495 INJ PARAVERT F JNT L/S 3 LEV: CPT

## 2022-06-21 PROCEDURE — 64493 INJ PARAVERT F JNT L/S 1 LEV: CPT

## 2022-06-21 RX ORDER — LIDOCAINE HYDROCHLORIDE 10 MG/ML
INJECTION, SOLUTION EPIDURAL; INFILTRATION; INTRACAUDAL; PERINEURAL
Status: COMPLETED
Start: 2022-06-21 | End: 2022-06-21

## 2022-06-21 RX ORDER — LIDOCAINE HYDROCHLORIDE 20 MG/ML
INJECTION, SOLUTION EPIDURAL; INFILTRATION; INTRACAUDAL; PERINEURAL
Status: COMPLETED
Start: 2022-06-21 | End: 2022-06-21

## 2022-06-21 RX ADMIN — LIDOCAINE HYDROCHLORIDE 10 ML: 10 INJECTION, SOLUTION EPIDURAL; INFILTRATION; INTRACAUDAL; PERINEURAL at 08:20

## 2022-06-21 RX ADMIN — LIDOCAINE HYDROCHLORIDE 10 ML: 20 INJECTION, SOLUTION EPIDURAL; INFILTRATION; INTRACAUDAL at 08:20

## 2022-06-21 ASSESSMENT — PAIN DESCRIPTION - PAIN TYPE
TYPE: CHRONIC PAIN

## 2022-06-21 ASSESSMENT — FIBROSIS 4 INDEX: FIB4 SCORE: 0.63

## 2022-06-21 NOTE — PROGRESS NOTES
Preprocedure assessment completed.  Pertinent health information(ISELA,, Asthma, DM, HTN,) communicated to physician and staff prior to time out.  Patient positioned preprocedure by RN, CSTand XRAY.  Foam wedge under ankles for support.

## 2022-06-21 NOTE — INTERVAL H&P NOTE
H&P reviewed.  Patient had 100% pain relief during the diagnostic phase after diagnostic medial branch block #1.  Here for diagnostic medial branch block #2 targeting same levels.  The patient was examined and there are no changes to the H&P     Lungs clear to auscultation bilaterally.  No abdominal tenderness.  Heart regular rate and rhythm.  Vitals reviewed.    Proceed with the originally scheduled procedure.  The risks, benefits and alternatives were discussed.  The patient understands these.    Pre-Op Diagnosis Codes:     * Lumbar spondylosis [M47.816]  Procedure(s):  Diagnostic medial branch blocks targeting the BILATERAL L3-4, L4-5 and L5-S1 facet joints with fluoroscopic guidance #2  .  .    Dragan Ayala MD  Physical Medicine and Rehabilitation  Interventional Spine and Sports Physiatry  [facility]

## 2022-06-21 NOTE — PROGRESS NOTES
0718 Pt admitted to Pre Procedure area.  Consent signed and post op instructions reviewed with pt, all questions answered.  Medical hx, medications and allergies reviewed.  Pre-procedure assessment completed.  VSS.  Blood sugar taken by pt at 0645, results 142.  Dr. Ayala aware.  Hand off given to procedural RN prior to procedure.     0755 Dr. Ayala in to see pt.    0830 Pt received to Post Procedure area, report received from RN.  Vital signs taken, pt provided with snack.  Bandage intact, no drainage.  Ice pack offered.    0831 Pt seen by Dr. Ayala post procedure, orders received for discharge.    0840 Pt ambulated without difficulty, accompanied to car.  Discharged to designated .

## 2022-06-21 NOTE — OP REPORT
Date of Service: 6/21/2022     Patient: Barron Hewitt 61 y.o. male     MRN: [id number]     Physician/s: Dragan Ayala MD    Pre-operative Diagnosis: Lumbosacral spondylosis, facet arthropathy. The patient was NOT seen for lumbar radiculopathy today.     Post-operative Diagnosis: Lumbosacral spondylosis, facet arthropathy. The patient was NOT seen for lumbar radiculopathy today.     Procedure: Medial Branch Blocks targeting the bilateral  L3-4, L4-5 and L5-S1  facet joints     Description of procedure:    The risks, benefits, and alternatives of the procedure were reviewed and discussed with the patient.  Written informed consent was freely obtained. A pre-procedural time-out was conducted by the physician verifying patient’s identity, procedure to be performed, procedure site and side, and allergy verification. Appropriate equipment was determined to be in place for the procedure.         In the fluoroscopy suite the patient was placed in a prone position and the skin was prepped and draped in the usual sterile fashion. The fluoroscope was placed over the lower back at the appropriate angles, and the targets for injection were marked. A 27g needle was placed into each of the markings at the levels below, and approx 1cc of 1% Lidocaine was injected subcutaneously into the epidermal and dermal layers. The needle was removed intact.     Using an oblique view, A 25g 3.5 inch needle was then placed at the intersection of the transverse process and superior articular process at the L3-4 facet joint where the L2 medial branch runs on the left side. The needle tips were then verified by AP, oblique, and lateral views.     Using an oblique view, A 25g 3.5 inch needle was then placed at the intersection of the transverse process and superior articular process at the L4-5 facet joint where the L3 medial branch runs  on the left side. The needle tips were then verified by AP, oblique, and lateral views.     Using an  oblique view, A 25g 3.5 inch needle was then placed at the intersection of the transverse process and superior articular process at the L5-S1 facet joint where the L4 medial branch runs  on the left side. The needle tips were then verified by AP, oblique, and lateral views.     Using an oblique view, A 25g 3.5 inch needle was then placed at the intersection of the transverse process and superior articular process at the S1 facet where the L5 dorsal ramus runs  on the left side. The needle tips were then verified by AP, oblique, and lateral views.       Using an oblique view, A 25g 3.5 inch needle was then placed at the intersection of the transverse process and superior articular process at the L3-4 facet joint where the L2 medial branch runs  on the right side. The needle tips were then verified by AP, oblique, and lateral views.     Using an oblique view, A 25g 3.5 inch needle was then placed at the intersection of the transverse process and superior articular process at the L4-5 facet joint where the L3 medial branch runs  on the right side. The needle tips were then verified by AP, oblique, and lateral views.     Using an oblique view, A 25g 3.5 inch needle was then placed at the intersection of the transverse process and superior articular process at the L5-S1 facet joint where the L4 medial branch runs  on the right side. The needle tips were then verified by AP, oblique, and lateral views.     Using an oblique view, A 25g 3.5 inch needle was then placed at the intersection of the transverse process and superior articular process at the S1 facet where the L5 dorsal ramus runs  on the right side. The needle tips were then verified by AP, oblique, and lateral views.      No contrast was used because of the patient's shellfish allergy.. Following negative aspiration, approximately 1mL of 2% lidocaine  was then injected at the above levels, and the needles were removed intact after restyleted. The patient's back  was covered with a 4x4 gauze, the area was cleansed with sterile normal saline, and a dressing was applied.     There were no complications noted, the patient was hemodynamically stable, and tolerated the procedure well.     The patient had 100% pain relief of the index pain minutes post procedure.      Follow-up as scheduled      Dragan Ayala MD  Physical Medicine and Rehabilitation  Interventional Spine and Sports Physiatry  [facility]            CPT  Intraarticular joint or medial branch block (MBB) - lumbar or sacral (1st level):  46865-23-ql  Intraarticular joint or medial branch block (MBB) - lumbar or sacral (2nd level):  70708-15-[id number]  Intraarticular joint or medial branch block (MBB) - lumbar or sacral (3rd level):  58117-86-lq

## 2022-06-22 ENCOUNTER — PATIENT MESSAGE (OUTPATIENT)
Dept: PHYSICAL MEDICINE AND REHAB | Facility: MEDICAL CENTER | Age: 62
End: 2022-06-22
Payer: COMMERCIAL

## 2022-06-22 NOTE — TELEPHONE ENCOUNTER
The area can be sore after the trial.  That is normal.  We will discuss neck steps at the scheduled follow-up visit.    Dr. Ayala

## 2022-06-23 ENCOUNTER — APPOINTMENT (OUTPATIENT)
Dept: PHYSICAL THERAPY | Facility: MEDICAL CENTER | Age: 62
End: 2022-06-23
Attending: PHYSICAL MEDICINE & REHABILITATION
Payer: COMMERCIAL

## 2022-06-23 RX ORDER — CANAGLIFLOZIN 300 MG/1
TABLET, FILM COATED ORAL
Qty: 90 TABLET | Refills: 0 | Status: SHIPPED | OUTPATIENT
Start: 2022-06-23 | End: 2022-07-29 | Stop reason: SDUPTHER

## 2022-06-27 ENCOUNTER — OFFICE VISIT (OUTPATIENT)
Dept: PHYSICAL MEDICINE AND REHAB | Facility: MEDICAL CENTER | Age: 62
End: 2022-06-27
Payer: COMMERCIAL

## 2022-06-27 VITALS
TEMPERATURE: 97.6 F | OXYGEN SATURATION: 97 % | BODY MASS INDEX: 25.63 KG/M2 | HEIGHT: 69 IN | HEART RATE: 80 BPM | WEIGHT: 173.06 LBS | SYSTOLIC BLOOD PRESSURE: 118 MMHG | DIASTOLIC BLOOD PRESSURE: 68 MMHG

## 2022-06-27 DIAGNOSIS — M47.816 LUMBAR SPONDYLOSIS: ICD-10-CM

## 2022-06-27 DIAGNOSIS — G89.29 CHRONIC BILATERAL LOW BACK PAIN WITHOUT SCIATICA: ICD-10-CM

## 2022-06-27 DIAGNOSIS — M54.50 CHRONIC BILATERAL LOW BACK PAIN WITHOUT SCIATICA: ICD-10-CM

## 2022-06-27 DIAGNOSIS — R26.89 POOR BALANCE: ICD-10-CM

## 2022-06-27 DIAGNOSIS — M54.16 LUMBAR RADICULOPATHY: ICD-10-CM

## 2022-06-27 PROCEDURE — 99214 OFFICE O/P EST MOD 30 MIN: CPT | Performed by: PHYSICAL MEDICINE & REHABILITATION

## 2022-06-27 RX ORDER — MELOXICAM 15 MG/1
15 TABLET ORAL
Qty: 60 TABLET | Refills: 0 | Status: SHIPPED | OUTPATIENT
Start: 2022-06-27 | End: 2023-04-10

## 2022-06-27 ASSESSMENT — PAIN SCALES - GENERAL: PAINLEVEL: 1=MINIMAL PAIN

## 2022-06-27 ASSESSMENT — FIBROSIS 4 INDEX: FIB4 SCORE: 0.63

## 2022-06-27 ASSESSMENT — PATIENT HEALTH QUESTIONNAIRE - PHQ9: CLINICAL INTERPRETATION OF PHQ2 SCORE: 0

## 2022-06-27 NOTE — H&P (VIEW-ONLY)
Follow up patient note  Interventional spine and Pain  Physiatry (physical medicine and  Rehabilitation)       Chief complaint:   Chief Complaint   Patient presents with   • Follow-Up     Back pain          HISTORY    Please see new patient note by Dr Ayala,  for more details.     HPI  Patient identification: Barron Hewitt ,  1960,   With Diagnoses of Lumbar spondylosis, Lumbar radiculopathy, stable after epidural, Chronic bilateral low back pain without sciatica, and Poor balance were pertinent to this visit.     procedures   2/15/2022 left Lumbar Transforaminal Epidural Steroid  at the L3-4 and L4-5 levels.   3/22/2022 left Lumbar Transforaminal Epidural Steroid  at the L3-4 and L4-5 levels 80% improved at the follow-up visit   medial branch block targeting the bilateral L3-4, L4-5, L5-S1 facet joints with 100% pain relief during the diagnostic phase  2022 medial branch block targeting the bilateral L3-4, L4-5, L5-S1 facet joints with 100% pain relief during the diagnostic phase    During the diagnostic phase the patient had excellent pain relief after the diagnostic medial branch blocks.  He had 100% pain relief and functional improvements with improved bending, lifting ability during the diagnostic phase.  After the diagnostic phase the pain returned back towards his baseline.  He is having axial low back pain worse with lumbar extension and extension rotation maneuver 7 out of 10 intensity aching quality nonradiating.  He is unable to play golf and do all of his other exercises because of the pain.      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 Valverde   • PONV (postoperative nausea and vomiting)    • Sleep apnea     has CPAP   • Snoring        PSHx:   Past Surgical History:   Procedure Laterality Date   • OR INJ DX/THER AGNT PARAVERT FACET JOINT, DEVON* Bilateral 6/21/2022    Procedure: Diagnostic medial branch blocks targeting the BILATERAL L3-4, L4-5 and L5-S1 facet joints with fluoroscopic guidance #2;  Surgeon: Dragan Ayala M.D.;  Location[facility];  Service: Pain Management   • LUMBAR MEDIAL BRANCH BLOCKS Bilateral 6/21/2022    Procedure: .;  Surgeon: Dragan Ayala M.D.;  Location: [facility];  Service: Pain Management   • CONSCIOUS SEDATION Bilateral 6/21/2022    Procedure: .;  Surgeon: Dragan Ayala M.D.;  Location: [facility];  Service: Pain Management   • LUMBAR MEDIAL BRANCH BLOCKS Bilateral 6/14/2022    Procedure: Diagnostic medial branch blocks targeting the BILATERAL L3-4, L4-5 and L5-S1 facet joints with fluoroscopic guidance #1;  Surgeon: Dragan Ayala M.D.;  Location: [facility];  Service: Pain Management   • OR TRANSURETHRAL ELEC-SURG PROSTATECTOM N/A 5/27/2022    Procedure: TURP, USING BUTTON ELECTRODE;  Surgeon: Lee Mckee M.D.;  Location: SURGERY [facility];  Service: Urology   • BLOCK EPIDURAL STEROID INJECTION Left 3/22/2022    Procedure: LEFT L3-4 and L4-5 transforaminal epidural steroid injection with fluoroscopic guidance;  Surgeon: Dragan Ayala M.D.;  Location: [facility];  Service: Pain Management   • OR NJX AA&/STRD TFRML EPI LUMBAR/SACRAL 1 LEVEL Left 2/15/2022    " Procedure: LEFT L3-4 and L4-5 transforaminal epidural steroid injection with fluoroscopic guidance;  Surgeon: Dragan Ayala M.D.;  Location: [facility];  Service: Pain Management   • SHOULDER DECOMPRESSION ARTHROSCOPIC Left 1/4/2018    Procedure: SHOULDER DECOMPRESSION ARTHROSCOPIC - SUBACROMIAL;  Surgeon: Linwood Grover M.D.;  Location: [facility];  Service: Orthopedics   • SHOULDER ARTHROSCOPY W/ BICIPITAL TENODESIS REPAIR Left 1/4/2018    Procedure: SHOULDER ARTHROSCOPY W/ BICIPITAL Tenotomy REPAIR;  Surgeon: Linwood Grover M.D.;  Location: SURGERY [facility];  Service: Orthopedics   • CLAVICLE DISTAL EXCISION Left 1/4/2018    Procedure: CLAVICLE DISTAL EXCISION - POSSIBLE;  Surgeon: Linwood Grover M.D.;  Location: [facility];  Service: Orthopedics   • RECOVERY  4/8/2016    Procedure: CATH LAB [facility] WITH POSSIBLE NAVIN;  Surgeon: Recoveryonly Surgery;  Location: SURGERY PRE-POST PROC UNIT [facility];  Service:    • VENTRAL HERNIA REPAIR LAPAROSCOPIC  11/1/2012    Performed by Marcos Ramírez M.D. at [facility]   • KNEE ARTHROSCOPY  1990's    right   • SHOULDER ARTHROSCOPY  1990's    right   • SINUSOTOMIES  1990's    times two surgeries   • TUMOR EXCISION WITH BIOPSY  1990's    right hand - thumb       Family history   Family History   Problem Relation Age of Onset   • Breast Cancer Mother    • Hypertension Mother    • Cancer Mother         breast   • Heart Disease Mother         hx of bypass   • Hypertension Father    • Arthritis Father    • Diabetes Father         prediabetes   • No Known Problems Sister    • Arthritis Brother    • Heart Disease Other    • Hypertension Other    • Cancer Other    • No Known Problems Brother          Medications:   Outpatient Medications Marked as Taking for the 6/27/22 encounter (Office Visit) with Dragan Ayala M.D.   Medication Sig Dispense Refill   • meloxicam (MOBIC) 15 MG tablet Take 1  Tablet by mouth 1 time a day as needed (pain). Do not take other NSAIDs. No refills. 60 Tablet 0   • INVOKANA 300 MG Tab Take 1 tablet by mouth once daily 90 Tablet 0   • montelukast (SINGULAIR) 10 MG Tab TAKE 1 TABLET EVERY EVENING 90 Tablet 3   • docusate sodium (COLACE) 100 MG Cap Take 1 Capsule by mouth 2 times a day. 30 Capsule 0   • polyethylene glycol/lytes (MIRALAX) 17 g Pack Take 1 Packet by mouth every day. Please take to prevent constipation following your procedure.  Hold if loose stools 14 Each 0   • phenazopyridine (PYRIDIUM) 100 MG Tab Take 1 Tablet by mouth 3 times a day as needed (bladder pain, dysuria). 6 Tablet 0   • DILTIAZem CD (CARDIZEM CD) 180 MG CAPSULE SR 24 HR TAKE 1 CAPSULE DAILY 90 Capsule 3   • metFORMIN (GLUCOPHAGE) 500 MG Tab Take 1 Tablet by mouth 2 times a day with meals. 180 Tablet 2   • gabapentin (NEURONTIN) 100 MG Cap Take 100 mg by mouth 2 times a day as needed.     • albuterol 108 (90 Base) MCG/ACT Aero Soln inhalation aerosol Inhale 2 Puffs every four hours as needed for Shortness of Breath. Pt prefers ventolin if possible. Thank you 3 Each 3   • levothyroxine (SYNTHROID) 50 MCG Tab Take one pill 6 days per week on empty stomach.  75 mcg on 7th day. 90 Tablet 3   • fluticasone-salmeterol (ADVAIR DISKUS) 500-50 MCG/DOSE AEROSOL POWDER, BREATH ACTIVATED Inhale 1 Puff every 12 hours. (Patient taking differently: Inhale every 12 hours.) 3 Each 3   • benazepril (LOTENSIN) 20 MG Tab TAKE 1 TABLET DAILY 90 Tablet 3   • finasteride (PROSCAR) 5 MG Tab Take 1 Tablet by mouth every day. 90 Tablet 3   • levothyroxine (SYNTHROID) 75 MCG Tab Take 1 Tablet by mouth every morning on an empty stomach. Taking only one d per week.  50 mcg all other days. 12 Tablet 3   • atorvastatin (LIPITOR) 80 MG tablet Take 1 Tablet by mouth every evening. 90 Tablet 3   • tizanidine (ZANAFLEX) 4 MG Tab Take 1 Tablet by mouth every 6 hours as needed (muscle spasms). 30 Tablet 2   • EPINEPHrine (EPIPEN) 0.3  MG/0.3ML Solution Auto-injector solution for injection Inject 0.3 mL into the thigh one time for 1 dose. 1 Each 0   • Continuous Blood Gluc Sensor (FREESTYLE LUNA 14 DAY SENSOR) Misc 1 Application every 14 days. 6 Each 3   • Dulaglutide (TRULICITY) 3 MG/0.5ML Solution Pen-injector Inject 1 Dose under the skin every 7 days. 2 mL 11   • tamsulosin (FLOMAX) 0.4 MG capsule Take 1 capsule by mouth 1/2 hour after breakfast. 30 capsule 4   • NON SPECIFIED CPAP supplies through [facility] 1 Each 1   • ALPHA LIPOIC ACID PO Take 600 mg by mouth 2 Times a Day.     • Omega-3 Fatty Acids (FISH OIL) 1200 MG CAPS Take  by mouth.     • Cinnamon 500 MG CAPS Take 1,000 mg by mouth.     • aspirin EC (ECOTRIN) 81 MG TBEC Take 81 mg by mouth every day.       • pantoprazole (PROTONIX) 40 MG TBEC Take 40 mg by mouth every day.       • Coenzyme Q10 200 MG Cap Take  by mouth every day.       • Cholecalciferol (VITAMIN D) 2000 UNIT TABS Take  by mouth 2 times a day.     • cetirizine (ZYRTEC) 10 MG Tab Take 10 mg by mouth every day.          Current Outpatient Medications on File Prior to Visit   Medication Sig Dispense Refill   • INVOKANA 300 MG Tab Take 1 tablet by mouth once daily 90 Tablet 0   • montelukast (SINGULAIR) 10 MG Tab TAKE 1 TABLET EVERY EVENING 90 Tablet 3   • docusate sodium (COLACE) 100 MG Cap Take 1 Capsule by mouth 2 times a day. 30 Capsule 0   • polyethylene glycol/lytes (MIRALAX) 17 g Pack Take 1 Packet by mouth every day. Please take to prevent constipation following your procedure.  Hold if loose stools 14 Each 0   • phenazopyridine (PYRIDIUM) 100 MG Tab Take 1 Tablet by mouth 3 times a day as needed (bladder pain, dysuria). 6 Tablet 0   • DILTIAZem CD (CARDIZEM CD) 180 MG CAPSULE SR 24 HR TAKE 1 CAPSULE DAILY 90 Capsule 3   • metFORMIN (GLUCOPHAGE) 500 MG Tab Take 1 Tablet by mouth 2 times a day with meals. 180 Tablet 2   • gabapentin (NEURONTIN) 100 MG Cap Take 100 mg by mouth 2 times a day as needed.      • albuterol 108 (90 Base) MCG/ACT Aero Soln inhalation aerosol Inhale 2 Puffs every four hours as needed for Shortness of Breath. Pt prefers ventolin if possible. Thank you 3 Each 3   • levothyroxine (SYNTHROID) 50 MCG Tab Take one pill 6 days per week on empty stomach.  75 mcg on 7th day. 90 Tablet 3   • fluticasone-salmeterol (ADVAIR DISKUS) 500-50 MCG/DOSE AEROSOL POWDER, BREATH ACTIVATED Inhale 1 Puff every 12 hours. (Patient taking differently: Inhale every 12 hours.) 3 Each 3   • benazepril (LOTENSIN) 20 MG Tab TAKE 1 TABLET DAILY 90 Tablet 3   • finasteride (PROSCAR) 5 MG Tab Take 1 Tablet by mouth every day. 90 Tablet 3   • levothyroxine (SYNTHROID) 75 MCG Tab Take 1 Tablet by mouth every morning on an empty stomach. Taking only one d per week.  50 mcg all other days. 12 Tablet 3   • atorvastatin (LIPITOR) 80 MG tablet Take 1 Tablet by mouth every evening. 90 Tablet 3   • tizanidine (ZANAFLEX) 4 MG Tab Take 1 Tablet by mouth every 6 hours as needed (muscle spasms). 30 Tablet 2   • EPINEPHrine (EPIPEN) 0.3 MG/0.3ML Solution Auto-injector solution for injection Inject 0.3 mL into the thigh one time for 1 dose. 1 Each 0   • Continuous Blood Gluc Sensor (FREESTYLE LUNA 14 DAY SENSOR) Misc 1 Application every 14 days. 6 Each 3   • Dulaglutide (TRULICITY) 3 MG/0.5ML Solution Pen-injector Inject 1 Dose under the skin every 7 days. 2 mL 11   • tamsulosin (FLOMAX) 0.4 MG capsule Take 1 capsule by mouth 1/2 hour after breakfast. 30 capsule 4   • NON SPECIFIED CPAP supplies through [facility] 1 Each 1   • ALPHA LIPOIC ACID PO Take 600 mg by mouth 2 Times a Day.     • Omega-3 Fatty Acids (FISH OIL) 1200 MG CAPS Take  by mouth.     • Cinnamon 500 MG CAPS Take 1,000 mg by mouth.     • aspirin EC (ECOTRIN) 81 MG TBEC Take 81 mg by mouth every day.       • pantoprazole (PROTONIX) 40 MG TBEC Take 40 mg by mouth every day.       • Coenzyme Q10 200 MG Cap Take  by mouth every day.       • Cholecalciferol (VITAMIN  "D) 2000 UNIT TABS Take  by mouth 2 times a day.     • cetirizine (ZYRTEC) 10 MG Tab Take 10 mg by mouth every day.       No current facility-administered medications on file prior to visit.         Allergies:   Allergies   Allergen Reactions   • Kiwi Extract Anaphylaxis   • Shellfish Allergy Anaphylaxis   • Strawberry Anaphylaxis   • Ozempic (0.25 Or 0.5 Mg-Dose) [Semaglutide] Nausea     Pt does not recall any reaction   • Sulfa Drugs Rash and Unspecified     rash   • Testosterone Rash     rash       Social Hx:   Social History     Socioeconomic History   • Marital status:      Spouse name: Not on file   • Number of children: Not on file   • Years of education: Not on file   • Highest education level: Not on file   Occupational History   • Not on file   Tobacco Use   • Smoking status: Former Smoker     Packs/day: 0.00     Quit date:      Years since quittin.4   • Smokeless tobacco: Never Used   • Tobacco comment: occasional cigars   Vaping Use   • Vaping Use: Never used   Substance and Sexual Activity   • Alcohol use: Yes     Comment: rare - 1-2 per month (social)   • Drug use: Yes     Types: Marijuana, Inhaled, Oral     Comment: THC   • Sexual activity: Not on file     Comment: ; 2 biological kids (2 of his wife's); 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   Other Topics Concern   • Not on file   Social History Narrative   • Not on file     Social Determinants of Health     Financial Resource Strain: Not on file   Food Insecurity: Not on file   Transportation Needs: Not on file   Physical Activity: Not on file   Stress: Not on file   Social Connections: Not on file   Intimate Partner Violence: Not on file   Housing Stability: Not on file         EXAMINATION     Physical Exam:   Vitals: /68 (BP Location: Right arm, Patient Position: Sitting, BP Cuff Size: Adult)   Pulse 80   Temp 36.4 °C (97.6 °F) (Temporal)   Ht 1.753 m (5' 9\")   Wt 78.5 kg (173 lb 1 oz)   SpO2 97% "     Constitutional:   Body Habitus: Body mass index is 25.56 kg/m².  Cooperation: Fully cooperates with exam  Appearance: Well-groomed no disheveled    Respiratory-  breathing comfortable on room air, no audible wheezing  Cardiovascular- capillary refills less than 2 seconds. No lower extremity edema is noted.   Psychiatric- alert and oriented ×3. Normal affect.    MSK and Neuro: -      Thoracic/Lumbar Spine/Sacral Spine/Hips   There are no signs of infection around the injection sites.   full  active range of motion with flexion, lateral flexion, and rotation bilaterally.   There is decreased active range of motion with lumbar extension.    There is  pain with lumbar extension.   There is pain with facet loading maneuver (extension rotation) with axial low back pain on the BILATERAL side(s)    Palpation:   No tenderness to palpation in midline at T1-T12 levels. No tenderness to palpation in the left and right of the midline T1-L5, NEGATIVE for tenderness to palpation to the para-midline region in the lower lumbar levels.  palpation over SI joint: negative bilaterally    palpation in hip or over the gluteus medius tendon insertion: negative bilaterally      Lumbar spine Special tests  Neuro tension  Straight leg test negative bilaterally    Slump test negative bilaterally      Key points for the international standards for neurological classification of spinal cord injury (ISNCSCI) to light touch.     Dermatome R L                                      L2 2 2   L3 2 2   L4 2 2   L5 2 2   S1 2 2   S2 2 2         Motor Exam Lower Extremities    ? Myotome R L   Hip flexion L2 5 5   Knee extension L3 5 5   Ankle dorsiflexion L4 5 5   Toe extension L5 5 5   Ankle plantarflexion S1 5 5               MEDICAL DECISION MAKING    DATA    Labs:   Lab Results   Component Value Date/Time    SODIUM 139 05/02/2022 12:25 PM    POTASSIUM 3.8 05/02/2022 12:25 PM    CHLORIDE 101 05/02/2022 12:25 PM    CO2 22 05/02/2022 12:25 PM     GLUCOSE 111 (H) 05/02/2022 12:25 PM    BUN 8 05/02/2022 12:25 PM    CREATININE 0.62 05/02/2022 12:25 PM    CREATININE 1.1 07/10/2008 09:25 AM        Lab Results   Component Value Date/Time    PROTHROMBTM 13.6 05/02/2022 12:25 PM    INR 1.13 05/02/2022 12:25 PM        Lab Results   Component Value Date/Time    WBC 10.6 05/02/2022 12:25 PM    RBC 5.62 05/02/2022 12:25 PM    HEMOGLOBIN 17.0 05/02/2022 12:25 PM    HEMATOCRIT 50.9 05/02/2022 12:25 PM    MCV 90.6 05/02/2022 12:25 PM    MCH 30.2 05/02/2022 12:25 PM    MCHC 33.4 (L) 05/02/2022 12:25 PM    MPV 10.3 05/02/2022 12:25 PM    NEUTSPOLYS 67.60 06/04/2020 02:18 PM    LYMPHOCYTES 17.60 (L) 06/04/2020 02:18 PM    MONOCYTES 9.80 06/04/2020 02:18 PM    EOSINOPHILS 3.50 06/04/2020 02:18 PM    BASOPHILS 1.20 06/04/2020 02:18 PM    HYPOCHROMIA 1+ 08/27/2013 07:13 AM        Lab Results   Component Value Date/Time    HBA1C 7.2 (A) 11/16/2021 02:36 PM          Imaging:   I personally reviewed following images    MRI lumbar spine 7/27/2021 with moderate left neuroforaminal stenosis at L3-4 and L4-5 with mild right neuroforaminal stenosis at L3-4 and L4-5.  Mild to moderate bilateral neuroforaminal stenosis at L5-S1.  There is facet arthropathy bilaterally at L3-4, L4-5, L5-S1        I reviewed the following radiology reports                         Results for orders placed during the hospital encounter of 07/27/21    MR-LUMBAR SPINE-W/O    Impression  1.  L4-5 annular disc bulge with a central disc protrusion and bilateral facet degeneration. There is borderline central canal narrowing. There is moderate mild right neural foraminal narrowing.    2.  L3-4 annular disc bulge with a left lateral disc protrusion. There is moderate to severe left and mild right neural foraminal narrowing again seen.    3.  L5-S1 degenerative disc disease and facet degeneration results in moderate bilateral neuroforaminal narrowing.        Results for orders placed during the hospital encounter of  07/27/21    MR-LUMBAR SPINE-W/O    Impression  1.  L4-5 annular disc bulge with a central disc protrusion and bilateral facet degeneration. There is borderline central canal narrowing. There is moderate mild right neural foraminal narrowing.    2.  L3-4 annular disc bulge with a left lateral disc protrusion. There is moderate to severe left and mild right neural foraminal narrowing again seen.    3.  L5-S1 degenerative disc disease and facet degeneration results in moderate bilateral neuroforaminal narrowing.      Results for orders placed during the hospital encounter of 07/27/21    MR-MRA NECK-W/O    Impression  There is no significant stenosis in the visualized extracranial neck arteries.                                                                   Results for orders placed during the hospital encounter of 05/01/17    CT-ABDOMEN-PELVIS WITH    Impression  1.  Findings suggestive of early or low grade obstruction in the mid to distal small bowel. Enteritis with associated ileus could also give a similar appearance.  2.  LEFT calyceal stone                       Results for orders placed during the hospital encounter of 12/22/17    DX-CHEST-2 VIEWS    Impression  Negative two views of the chest.                          Electrodiagnostics   1/18/2022 EMG  IMPRESSION:  This is an abnormal electrodiagnostic study due to evidence of chronic reinnervation changes in the left vastus lateralis/medialis with mild active denervation changes noted.  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       There is no strong electrodiagnostic evidence of a an active right lumbosacral plexopathy though given some responses are unobtainable a mild lumbar plexopathy/femoral neuropathy may be present.  There is no EMG evidence of a right lumbosacral  radiculopathy.                                         DIAGNOSIS   Visit Diagnoses     ICD-10-CM   1. Lumbar spondylosis  M47.816   2. Lumbar radiculopathy, stable after epidural  M54.16   3. Chronic bilateral low back pain without sciatica  M54.50    G89.29   4. Poor balance  R26.89         ASSESSMENT and PLAN:     Barron Hewitt  1960 male      Barron was seen today for follow-up.    Diagnoses and all orders for this visit:    Lumbar spondylosis  -     Referral to Physical Medicine Rehab; Future  -     meloxicam (MOBIC) 15 MG tablet; Take 1 Tablet by mouth 1 time a day as needed (pain). Do not take other NSAIDs. No refills.    Lumbar radiculopathy, stable after epidural  -     meloxicam (MOBIC) 15 MG tablet; Take 1 Tablet by mouth 1 time a day as needed (pain). Do not take other NSAIDs. No refills.    Chronic bilateral low back pain without sciatica  -     meloxicam (MOBIC) 15 MG tablet; Take 1 Tablet by mouth 1 time a day as needed (pain). Do not take other NSAIDs. No refills.    Poor balance           Status post diagnostic medial branch blocks x2 targeting the BILATERAL L3-4, L4-5 and L5-S1 facet joints with greater than 100% pain relief during the diagnostic phase.  This represents a positive diagnostic block.    I believe the patient is a good candidate for BILATERAL radiofrequency neurotomy targeting the medial branches innervating the L3-4, L4-5 and L5-S1 facet joints    The risks benefits and alternatives to this procedure were discussed and the patient wishes to proceed with the procedure. Risks include but are not limited to damage to surrounding structures, infection, bleeding, worsening of pain which can be permanent, weakness which can be permanent. Benefits include pain relief, improved function. Alternatives includes not doing the procedure.           Follow up: After the above procedure    Thank you for allowing me to participate in the care of this patient. If you have any  questions please not hesitate to contact me.             Please note that this dictation was created using voice recognition software. I have made every reasonable attempt to correct obvious errors but there may be errors of grammar and content that I may have overlooked prior to finalization of this note.      Dragan Ayala MD  Interventional Spine and Sports Physiatry  Physical Medicine and Rehabilitation  [facility]

## 2022-06-27 NOTE — PROGRESS NOTES
Follow up patient note  Interventional spine and Pain  Physiatry (physical medicine and  Rehabilitation)       Chief complaint:   Chief Complaint   Patient presents with   • Follow-Up     Back pain          HISTORY    Please see new patient note by Dr Ayala,  for more details.     HPI  Patient identification: Barron Hewitt ,  1960,   With Diagnoses of Lumbar spondylosis, Lumbar radiculopathy, stable after epidural, Chronic bilateral low back pain without sciatica, and Poor balance were pertinent to this visit.     procedures   2/15/2022 left Lumbar Transforaminal Epidural Steroid  at the L3-4 and L4-5 levels.   3/22/2022 left Lumbar Transforaminal Epidural Steroid  at the L3-4 and L4-5 levels 80% improved at the follow-up visit   medial branch block targeting the bilateral L3-4, L4-5, L5-S1 facet joints with 100% pain relief during the diagnostic phase  2022 medial branch block targeting the bilateral L3-4, L4-5, L5-S1 facet joints with 100% pain relief during the diagnostic phase    During the diagnostic phase the patient had excellent pain relief after the diagnostic medial branch blocks.  He had 100% pain relief and functional improvements with improved bending, lifting ability during the diagnostic phase.  After the diagnostic phase the pain returned back towards his baseline.  He is having axial low back pain worse with lumbar extension and extension rotation maneuver 7 out of 10 intensity aching quality nonradiating.  He is unable to play golf and do all of his other exercises because of the pain.      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 Valverde   • PONV (postoperative nausea and vomiting)    • Sleep apnea     has CPAP   • Snoring        PSHx:   Past Surgical History:   Procedure Laterality Date   • WA INJ DX/THER AGNT PARAVERT FACET JOINT, DEVON* Bilateral 6/21/2022    Procedure: Diagnostic medial branch blocks targeting the BILATERAL L3-4, L4-5 and L5-S1 facet joints with fluoroscopic guidance #2;  Surgeon: Dragan Ayala M.D.;  Location: [facility];  Service: Pain Management   • LUMBAR MEDIAL BRANCH BLOCKS Bilateral 6/21/2022    Procedure: .;  Surgeon: Dragan Ayala M.D.;  Location: [facility];  Service: Pain Management   • CONSCIOUS SEDATION Bilateral 6/21/2022    Procedure: .;  Surgeon: Dragan Ayala M.D.;  Location: [facility];  Service: Pain Management   • LUMBAR MEDIAL BRANCH BLOCKS Bilateral 6/14/2022    Procedure: Diagnostic medial branch blocks targeting the BILATERAL L3-4, L4-5 and L5-S1 facet joints with fluoroscopic guidance #1;  Surgeon: Dragan Ayala M.D.;  Location: [facility];  Service: Pain Management   • WA TRANSURETHRAL ELEC-SURG PROSTATECTOM N/A 5/27/2022    Procedure: TURP, USING BUTTON ELECTRODE;  Surgeon: Lee Mckee M.D.;  Location: SURGERY [facility];  Service: Urology   • BLOCK EPIDURAL STEROID INJECTION Left 3/22/2022    Procedure: LEFT L3-4 and L4-5 transforaminal epidural steroid injection with fluoroscopic guidance;  Surgeon: Dragan Ayala M.D.;  Location: [facility];  Service: Pain Management   • WA NJX AA&/STRD TFRML EPI LUMBAR/SACRAL 1 LEVEL Left 2/15/2022    " Procedure: LEFT L3-4 and L4-5 transforaminal epidural steroid injection with fluoroscopic guidance;  Surgeon: Dragan Ayala M.D.;  Location: [facility];  Service: Pain Management   • SHOULDER DECOMPRESSION ARTHROSCOPIC Left 1/4/2018    Procedure: SHOULDER DECOMPRESSION ARTHROSCOPIC - SUBACROMIAL;  Surgeon: Linwood Grover M.D.;  Location: [facility];  Service: Orthopedics   • SHOULDER ARTHROSCOPY W/ BICIPITAL TENODESIS REPAIR Left 1/4/2018    Procedure: SHOULDER ARTHROSCOPY W/ BICIPITAL Tenotomy REPAIR;  Surgeon: Linwood Grover M.D.;  Location: SURGERY [facility];  Service: Orthopedics   • CLAVICLE DISTAL EXCISION Left 1/4/2018    Procedure: CLAVICLE DISTAL EXCISION - POSSIBLE;  Surgeon: Linwood Grover M.D.;  Location: [facility];  Service: Orthopedics   • RECOVERY  4/8/2016    Procedure: CATH LAB [facility] WITH POSSIBLE NAVIN;  Surgeon: Recoveryonly Surgery;  Location: SURGERY PRE-POST PROC UNIT [facility];  Service:    • VENTRAL HERNIA REPAIR LAPAROSCOPIC  11/1/2012    Performed by Marcos Ramírez M.D. at [facility]   • KNEE ARTHROSCOPY  1990's    right   • SHOULDER ARTHROSCOPY  1990's    right   • SINUSOTOMIES  1990's    times two surgeries   • TUMOR EXCISION WITH BIOPSY  1990's    right hand - thumb       Family history   Family History   Problem Relation Age of Onset   • Breast Cancer Mother    • Hypertension Mother    • Cancer Mother         breast   • Heart Disease Mother         hx of bypass   • Hypertension Father    • Arthritis Father    • Diabetes Father         prediabetes   • No Known Problems Sister    • Arthritis Brother    • Heart Disease Other    • Hypertension Other    • Cancer Other    • No Known Problems Brother          Medications:   Outpatient Medications Marked as Taking for the 6/27/22 encounter (Office Visit) with Dragan Ayala M.D.   Medication Sig Dispense Refill   • meloxicam (MOBIC) 15 MG tablet Take 1  Tablet by mouth 1 time a day as needed (pain). Do not take other NSAIDs. No refills. 60 Tablet 0   • INVOKANA 300 MG Tab Take 1 tablet by mouth once daily 90 Tablet 0   • montelukast (SINGULAIR) 10 MG Tab TAKE 1 TABLET EVERY EVENING 90 Tablet 3   • docusate sodium (COLACE) 100 MG Cap Take 1 Capsule by mouth 2 times a day. 30 Capsule 0   • polyethylene glycol/lytes (MIRALAX) 17 g Pack Take 1 Packet by mouth every day. Please take to prevent constipation following your procedure.  Hold if loose stools 14 Each 0   • phenazopyridine (PYRIDIUM) 100 MG Tab Take 1 Tablet by mouth 3 times a day as needed (bladder pain, dysuria). 6 Tablet 0   • DILTIAZem CD (CARDIZEM CD) 180 MG CAPSULE SR 24 HR TAKE 1 CAPSULE DAILY 90 Capsule 3   • metFORMIN (GLUCOPHAGE) 500 MG Tab Take 1 Tablet by mouth 2 times a day with meals. 180 Tablet 2   • gabapentin (NEURONTIN) 100 MG Cap Take 100 mg by mouth 2 times a day as needed.     • albuterol 108 (90 Base) MCG/ACT Aero Soln inhalation aerosol Inhale 2 Puffs every four hours as needed for Shortness of Breath. Pt prefers ventolin if possible. Thank you 3 Each 3   • levothyroxine (SYNTHROID) 50 MCG Tab Take one pill 6 days per week on empty stomach.  75 mcg on 7th day. 90 Tablet 3   • fluticasone-salmeterol (ADVAIR DISKUS) 500-50 MCG/DOSE AEROSOL POWDER, BREATH ACTIVATED Inhale 1 Puff every 12 hours. (Patient taking differently: Inhale every 12 hours.) 3 Each 3   • benazepril (LOTENSIN) 20 MG Tab TAKE 1 TABLET DAILY 90 Tablet 3   • finasteride (PROSCAR) 5 MG Tab Take 1 Tablet by mouth every day. 90 Tablet 3   • levothyroxine (SYNTHROID) 75 MCG Tab Take 1 Tablet by mouth every morning on an empty stomach. Taking only one d per week.  50 mcg all other days. 12 Tablet 3   • atorvastatin (LIPITOR) 80 MG tablet Take 1 Tablet by mouth every evening. 90 Tablet 3   • tizanidine (ZANAFLEX) 4 MG Tab Take 1 Tablet by mouth every 6 hours as needed (muscle spasms). 30 Tablet 2   • EPINEPHrine (EPIPEN) 0.3  MG/0.3ML Solution Auto-injector solution for injection Inject 0.3 mL into the thigh one time for 1 dose. 1 Each 0   • Continuous Blood Gluc Sensor (FREESTYLE LUNA 14 DAY SENSOR) Misc 1 Application every 14 days. 6 Each 3   • Dulaglutide (TRULICITY) 3 MG/0.5ML Solution Pen-injector Inject 1 Dose under the skin every 7 days. 2 mL 11   • tamsulosin (FLOMAX) 0.4 MG capsule Take 1 capsule by mouth 1/2 hour after breakfast. 30 capsule 4   • NON SPECIFIED CPAP supplies through [facility] 1 Each 1   • ALPHA LIPOIC ACID PO Take 600 mg by mouth 2 Times a Day.     • Omega-3 Fatty Acids (FISH OIL) 1200 MG CAPS Take  by mouth.     • Cinnamon 500 MG CAPS Take 1,000 mg by mouth.     • aspirin EC (ECOTRIN) 81 MG TBEC Take 81 mg by mouth every day.       • pantoprazole (PROTONIX) 40 MG TBEC Take 40 mg by mouth every day.       • Coenzyme Q10 200 MG Cap Take  by mouth every day.       • Cholecalciferol (VITAMIN D) 2000 UNIT TABS Take  by mouth 2 times a day.     • cetirizine (ZYRTEC) 10 MG Tab Take 10 mg by mouth every day.          Current Outpatient Medications on File Prior to Visit   Medication Sig Dispense Refill   • INVOKANA 300 MG Tab Take 1 tablet by mouth once daily 90 Tablet 0   • montelukast (SINGULAIR) 10 MG Tab TAKE 1 TABLET EVERY EVENING 90 Tablet 3   • docusate sodium (COLACE) 100 MG Cap Take 1 Capsule by mouth 2 times a day. 30 Capsule 0   • polyethylene glycol/lytes (MIRALAX) 17 g Pack Take 1 Packet by mouth every day. Please take to prevent constipation following your procedure.  Hold if loose stools 14 Each 0   • phenazopyridine (PYRIDIUM) 100 MG Tab Take 1 Tablet by mouth 3 times a day as needed (bladder pain, dysuria). 6 Tablet 0   • DILTIAZem CD (CARDIZEM CD) 180 MG CAPSULE SR 24 HR TAKE 1 CAPSULE DAILY 90 Capsule 3   • metFORMIN (GLUCOPHAGE) 500 MG Tab Take 1 Tablet by mouth 2 times a day with meals. 180 Tablet 2   • gabapentin (NEURONTIN) 100 MG Cap Take 100 mg by mouth 2 times a day as needed.      • albuterol 108 (90 Base) MCG/ACT Aero Soln inhalation aerosol Inhale 2 Puffs every four hours as needed for Shortness of Breath. Pt prefers ventolin if possible. Thank you 3 Each 3   • levothyroxine (SYNTHROID) 50 MCG Tab Take one pill 6 days per week on empty stomach.  75 mcg on 7th day. 90 Tablet 3   • fluticasone-salmeterol (ADVAIR DISKUS) 500-50 MCG/DOSE AEROSOL POWDER, BREATH ACTIVATED Inhale 1 Puff every 12 hours. (Patient taking differently: Inhale every 12 hours.) 3 Each 3   • benazepril (LOTENSIN) 20 MG Tab TAKE 1 TABLET DAILY 90 Tablet 3   • finasteride (PROSCAR) 5 MG Tab Take 1 Tablet by mouth every day. 90 Tablet 3   • levothyroxine (SYNTHROID) 75 MCG Tab Take 1 Tablet by mouth every morning on an empty stomach. Taking only one d per week.  50 mcg all other days. 12 Tablet 3   • atorvastatin (LIPITOR) 80 MG tablet Take 1 Tablet by mouth every evening. 90 Tablet 3   • tizanidine (ZANAFLEX) 4 MG Tab Take 1 Tablet by mouth every 6 hours as needed (muscle spasms). 30 Tablet 2   • EPINEPHrine (EPIPEN) 0.3 MG/0.3ML Solution Auto-injector solution for injection Inject 0.3 mL into the thigh one time for 1 dose. 1 Each 0   • Continuous Blood Gluc Sensor (FREESTYLE LUNA 14 DAY SENSOR) Misc 1 Application every 14 days. 6 Each 3   • Dulaglutide (TRULICITY) 3 MG/0.5ML Solution Pen-injector Inject 1 Dose under the skin every 7 days. 2 mL 11   • tamsulosin (FLOMAX) 0.4 MG capsule Take 1 capsule by mouth 1/2 hour after breakfast. 30 capsule 4   • NON SPECIFIED CPAP supplies through [facility] 1 Each 1   • ALPHA LIPOIC ACID PO Take 600 mg by mouth 2 Times a Day.     • Omega-3 Fatty Acids (FISH OIL) 1200 MG CAPS Take  by mouth.     • Cinnamon 500 MG CAPS Take 1,000 mg by mouth.     • aspirin EC (ECOTRIN) 81 MG TBEC Take 81 mg by mouth every day.       • pantoprazole (PROTONIX) 40 MG TBEC Take 40 mg by mouth every day.       • Coenzyme Q10 200 MG Cap Take  by mouth every day.       • Cholecalciferol (VITAMIN  "D) 2000 UNIT TABS Take  by mouth 2 times a day.     • cetirizine (ZYRTEC) 10 MG Tab Take 10 mg by mouth every day.       No current facility-administered medications on file prior to visit.         Allergies:   Allergies   Allergen Reactions   • Kiwi Extract Anaphylaxis   • Shellfish Allergy Anaphylaxis   • Strawberry Anaphylaxis   • Ozempic (0.25 Or 0.5 Mg-Dose) [Semaglutide] Nausea     Pt does not recall any reaction   • Sulfa Drugs Rash and Unspecified     rash   • Testosterone Rash     rash       Social Hx:   Social History     Socioeconomic History   • Marital status:      Spouse name: Not on file   • Number of children: Not on file   • Years of education: Not on file   • Highest education level: Not on file   Occupational History   • Not on file   Tobacco Use   • Smoking status: Former Smoker     Packs/day: 0.00     Quit date:      Years since quittin.4   • Smokeless tobacco: Never Used   • Tobacco comment: occasional cigars   Vaping Use   • Vaping Use: Never used   Substance and Sexual Activity   • Alcohol use: Yes     Comment: rare - 1-2 per month (social)   • Drug use: Yes     Types: Marijuana, Inhaled, Oral     Comment: THC   • Sexual activity: Not on file     Comment: ; 2 biological kids (2 of his wife's); 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   Other Topics Concern   • Not on file   Social History Narrative   • Not on file     Social Determinants of Health     Financial Resource Strain: Not on file   Food Insecurity: Not on file   Transportation Needs: Not on file   Physical Activity: Not on file   Stress: Not on file   Social Connections: Not on file   Intimate Partner Violence: Not on file   Housing Stability: Not on file         EXAMINATION     Physical Exam:   Vitals: /68 (BP Location: Right arm, Patient Position: Sitting, BP Cuff Size: Adult)   Pulse 80   Temp 36.4 °C (97.6 °F) (Temporal)   Ht 1.753 m (5' 9\")   Wt 78.5 kg (173 lb 1 oz)   SpO2 97% "     Constitutional:   Body Habitus: Body mass index is 25.56 kg/m².  Cooperation: Fully cooperates with exam  Appearance: Well-groomed no disheveled    Respiratory-  breathing comfortable on room air, no audible wheezing  Cardiovascular- capillary refills less than 2 seconds. No lower extremity edema is noted.   Psychiatric- alert and oriented ×3. Normal affect.    MSK and Neuro: -      Thoracic/Lumbar Spine/Sacral Spine/Hips   There are no signs of infection around the injection sites.   full  active range of motion with flexion, lateral flexion, and rotation bilaterally.   There is decreased active range of motion with lumbar extension.    There is  pain with lumbar extension.   There is pain with facet loading maneuver (extension rotation) with axial low back pain on the BILATERAL side(s)    Palpation:   No tenderness to palpation in midline at T1-T12 levels. No tenderness to palpation in the left and right of the midline T1-L5, NEGATIVE for tenderness to palpation to the para-midline region in the lower lumbar levels.  palpation over SI joint: negative bilaterally    palpation in hip or over the gluteus medius tendon insertion: negative bilaterally      Lumbar spine Special tests  Neuro tension  Straight leg test negative bilaterally    Slump test negative bilaterally      Key points for the international standards for neurological classification of spinal cord injury (ISNCSCI) to light touch.     Dermatome R L                                      L2 2 2   L3 2 2   L4 2 2   L5 2 2   S1 2 2   S2 2 2         Motor Exam Lower Extremities    ? Myotome R L   Hip flexion L2 5 5   Knee extension L3 5 5   Ankle dorsiflexion L4 5 5   Toe extension L5 5 5   Ankle plantarflexion S1 5 5               MEDICAL DECISION MAKING    DATA    Labs:   Lab Results   Component Value Date/Time    SODIUM 139 05/02/2022 12:25 PM    POTASSIUM 3.8 05/02/2022 12:25 PM    CHLORIDE 101 05/02/2022 12:25 PM    CO2 22 05/02/2022 12:25 PM     GLUCOSE 111 (H) 05/02/2022 12:25 PM    BUN 8 05/02/2022 12:25 PM    CREATININE 0.62 05/02/2022 12:25 PM    CREATININE 1.1 07/10/2008 09:25 AM        Lab Results   Component Value Date/Time    PROTHROMBTM 13.6 05/02/2022 12:25 PM    INR 1.13 05/02/2022 12:25 PM        Lab Results   Component Value Date/Time    WBC 10.6 05/02/2022 12:25 PM    RBC 5.62 05/02/2022 12:25 PM    HEMOGLOBIN 17.0 05/02/2022 12:25 PM    HEMATOCRIT 50.9 05/02/2022 12:25 PM    MCV 90.6 05/02/2022 12:25 PM    MCH 30.2 05/02/2022 12:25 PM    MCHC 33.4 (L) 05/02/2022 12:25 PM    MPV 10.3 05/02/2022 12:25 PM    NEUTSPOLYS 67.60 06/04/2020 02:18 PM    LYMPHOCYTES 17.60 (L) 06/04/2020 02:18 PM    MONOCYTES 9.80 06/04/2020 02:18 PM    EOSINOPHILS 3.50 06/04/2020 02:18 PM    BASOPHILS 1.20 06/04/2020 02:18 PM    HYPOCHROMIA 1+ 08/27/2013 07:13 AM        Lab Results   Component Value Date/Time    HBA1C 7.2 (A) 11/16/2021 02:36 PM          Imaging:   I personally reviewed following images    MRI lumbar spine 7/27/2021 with moderate left neuroforaminal stenosis at L3-4 and L4-5 with mild right neuroforaminal stenosis at L3-4 and L4-5.  Mild to moderate bilateral neuroforaminal stenosis at L5-S1.  There is facet arthropathy bilaterally at L3-4, L4-5, L5-S1        I reviewed the following radiology reports                         Results for orders placed during the hospital encounter of 07/27/21    MR-LUMBAR SPINE-W/O    Impression  1.  L4-5 annular disc bulge with a central disc protrusion and bilateral facet degeneration. There is borderline central canal narrowing. There is moderate mild right neural foraminal narrowing.    2.  L3-4 annular disc bulge with a left lateral disc protrusion. There is moderate to severe left and mild right neural foraminal narrowing again seen.    3.  L5-S1 degenerative disc disease and facet degeneration results in moderate bilateral neuroforaminal narrowing.        Results for orders placed during the hospital encounter of  07/27/21    MR-LUMBAR SPINE-W/O    Impression  1.  L4-5 annular disc bulge with a central disc protrusion and bilateral facet degeneration. There is borderline central canal narrowing. There is moderate mild right neural foraminal narrowing.    2.  L3-4 annular disc bulge with a left lateral disc protrusion. There is moderate to severe left and mild right neural foraminal narrowing again seen.    3.  L5-S1 degenerative disc disease and facet degeneration results in moderate bilateral neuroforaminal narrowing.      Results for orders placed during the hospital encounter of 07/27/21    MR-MRA NECK-W/O    Impression  There is no significant stenosis in the visualized extracranial neck arteries.                                                                   Results for orders placed during the hospital encounter of 05/01/17    CT-ABDOMEN-PELVIS WITH    Impression  1.  Findings suggestive of early or low grade obstruction in the mid to distal small bowel. Enteritis with associated ileus could also give a similar appearance.  2.  LEFT calyceal stone                       Results for orders placed during the hospital encounter of 12/22/17    DX-CHEST-2 VIEWS    Impression  Negative two views of the chest.                          Electrodiagnostics   1/18/2022 EMG  IMPRESSION:  This is an abnormal electrodiagnostic study due to evidence of chronic reinnervation changes in the left vastus lateralis/medialis with mild active denervation changes noted.  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       There is no strong electrodiagnostic evidence of a an active right lumbosacral plexopathy though given some responses are unobtainable a mild lumbar plexopathy/femoral neuropathy may be present.  There is no EMG evidence of a right lumbosacral  radiculopathy.                                         DIAGNOSIS   Visit Diagnoses     ICD-10-CM   1. Lumbar spondylosis  M47.816   2. Lumbar radiculopathy, stable after epidural  M54.16   3. Chronic bilateral low back pain without sciatica  M54.50    G89.29   4. Poor balance  R26.89         ASSESSMENT and PLAN:     Barron Hewitt  1960 male      Barron was seen today for follow-up.    Diagnoses and all orders for this visit:    Lumbar spondylosis  -     Referral to Physical Medicine Rehab; Future  -     meloxicam (MOBIC) 15 MG tablet; Take 1 Tablet by mouth 1 time a day as needed (pain). Do not take other NSAIDs. No refills.    Lumbar radiculopathy, stable after epidural  -     meloxicam (MOBIC) 15 MG tablet; Take 1 Tablet by mouth 1 time a day as needed (pain). Do not take other NSAIDs. No refills.    Chronic bilateral low back pain without sciatica  -     meloxicam (MOBIC) 15 MG tablet; Take 1 Tablet by mouth 1 time a day as needed (pain). Do not take other NSAIDs. No refills.    Poor balance           Status post diagnostic medial branch blocks x2 targeting the BILATERAL L3-4, L4-5 and L5-S1 facet joints with greater than 100% pain relief during the diagnostic phase.  This represents a positive diagnostic block.    I believe the patient is a good candidate for BILATERAL radiofrequency neurotomy targeting the medial branches innervating the L3-4, L4-5 and L5-S1 facet joints    The risks benefits and alternatives to this procedure were discussed and the patient wishes to proceed with the procedure. Risks include but are not limited to damage to surrounding structures, infection, bleeding, worsening of pain which can be permanent, weakness which can be permanent. Benefits include pain relief, improved function. Alternatives includes not doing the procedure.           Follow up: After the above procedure    Thank you for allowing me to participate in the care of this patient. If you have any  questions please not hesitate to contact me.             Please note that this dictation was created using voice recognition software. I have made every reasonable attempt to correct obvious errors but there may be errors of grammar and content that I may have overlooked prior to finalization of this note.      Dragan Ayala MD  Interventional Spine and Sports Physiatry  Physical Medicine and Rehabilitation  [facility]

## 2022-06-28 ENCOUNTER — TELEPHONE (OUTPATIENT)
Dept: PHYSICAL MEDICINE AND REHAB | Facility: MEDICAL CENTER | Age: 62
End: 2022-06-28
Payer: COMMERCIAL

## 2022-06-28 NOTE — TELEPHONE ENCOUNTER
Spoke to Pt and cnfrm SP that is sched on 7/5/22 with Dr. Ayala and Pt don't have any question in regards to his instructions.

## 2022-06-29 ENCOUNTER — APPOINTMENT (OUTPATIENT)
Dept: PHYSICAL THERAPY | Facility: MEDICAL CENTER | Age: 62
End: 2022-06-29
Attending: PHYSICAL MEDICINE & REHABILITATION
Payer: COMMERCIAL

## 2022-06-30 ENCOUNTER — APPOINTMENT (OUTPATIENT)
Dept: PHYSICAL MEDICINE AND REHAB | Facility: REHABILITATION | Age: 62
End: 2022-06-30
Payer: COMMERCIAL

## 2022-07-05 ENCOUNTER — TELEPHONE (OUTPATIENT)
Dept: PHYSICAL MEDICINE AND REHAB | Facility: MEDICAL CENTER | Age: 62
End: 2022-07-05
Payer: COMMERCIAL

## 2022-07-05 NOTE — TELEPHONE ENCOUNTER
As per Sushma this Pt already been notified that Auth still pending and they are communicating with the Insurance to check the status of the Auth.       Routine observation Routine observation Routine observation Routine observation Routine observation Routine observation Routine observation Routine observation Routine observation Routine observation Routine observation Routine observation Routine observation Routine observation Routine observation Routine observation Routine observation

## 2022-07-05 NOTE — PROGRESS NOTES
The patient did not have authorization for the scheduled procedure.  This will be rescheduled when we have authorization.    Dragan Ayala MD

## 2022-07-06 ENCOUNTER — APPOINTMENT (OUTPATIENT)
Dept: RADIOLOGY | Facility: REHABILITATION | Age: 62
End: 2022-07-06
Attending: PHYSICAL MEDICINE & REHABILITATION
Payer: COMMERCIAL

## 2022-07-06 ENCOUNTER — HOSPITAL ENCOUNTER (OUTPATIENT)
Facility: REHABILITATION | Age: 62
End: 2022-07-06
Attending: PHYSICAL MEDICINE & REHABILITATION | Admitting: PHYSICAL MEDICINE & REHABILITATION
Payer: COMMERCIAL

## 2022-07-06 VITALS
TEMPERATURE: 98.5 F | WEIGHT: 165.34 LBS | HEIGHT: 69 IN | HEART RATE: 77 BPM | DIASTOLIC BLOOD PRESSURE: 88 MMHG | BODY MASS INDEX: 24.49 KG/M2 | OXYGEN SATURATION: 98 % | SYSTOLIC BLOOD PRESSURE: 132 MMHG | RESPIRATION RATE: 18 BRPM

## 2022-07-06 PROCEDURE — 64636 DESTROY L/S FACET JNT ADDL: CPT

## 2022-07-06 PROCEDURE — 700111 HCHG RX REV CODE 636 W/ 250 OVERRIDE (IP)

## 2022-07-06 PROCEDURE — 99152 MOD SED SAME PHYS/QHP 5/>YRS: CPT

## 2022-07-06 PROCEDURE — 99153 MOD SED SAME PHYS/QHP EA: CPT

## 2022-07-06 PROCEDURE — 64635 DESTROY LUMB/SAC FACET JNT: CPT

## 2022-07-06 RX ORDER — MIDAZOLAM HYDROCHLORIDE 1 MG/ML
INJECTION INTRAMUSCULAR; INTRAVENOUS
Status: COMPLETED
Start: 2022-07-06 | End: 2022-07-06

## 2022-07-06 RX ORDER — LIDOCAINE HYDROCHLORIDE 10 MG/ML
INJECTION, SOLUTION EPIDURAL; INFILTRATION; INTRACAUDAL; PERINEURAL
Status: COMPLETED
Start: 2022-07-06 | End: 2022-07-06

## 2022-07-06 RX ADMIN — LIDOCAINE HYDROCHLORIDE 5 ML: 10 INJECTION, SOLUTION EPIDURAL; INFILTRATION; INTRACAUDAL; PERINEURAL at 08:29

## 2022-07-06 RX ADMIN — MIDAZOLAM HYDROCHLORIDE 0.5 MG: 1 INJECTION, SOLUTION INTRAMUSCULAR; INTRAVENOUS at 08:25

## 2022-07-06 RX ADMIN — MIDAZOLAM HYDROCHLORIDE 1 MG: 1 INJECTION, SOLUTION INTRAMUSCULAR; INTRAVENOUS at 08:15

## 2022-07-06 RX ADMIN — LIDOCAINE HYDROCHLORIDE 30 ML: 10 INJECTION, SOLUTION EPIDURAL; INFILTRATION; INTRACAUDAL; PERINEURAL at 08:20

## 2022-07-06 RX ADMIN — FENTANYL CITRATE 50 MCG: 50 INJECTION, SOLUTION INTRAMUSCULAR; INTRAVENOUS at 08:15

## 2022-07-06 RX ADMIN — FENTANYL CITRATE 25 MCG: 50 INJECTION, SOLUTION INTRAMUSCULAR; INTRAVENOUS at 08:25

## 2022-07-06 ASSESSMENT — PAIN DESCRIPTION - PAIN TYPE
TYPE: CHRONIC PAIN

## 2022-07-06 ASSESSMENT — FIBROSIS 4 INDEX: FIB4 SCORE: 0.63

## 2022-07-06 NOTE — PROGRESS NOTES
0729   Pt received to pre procedure area. ID band and allergies verified. Vital signs taken and stable. Pertinent medical history (Asthma, Diabetes, HTN,, Sleep Apnea) reviewed and communicated to procedure RN. Verified that patient has not taken NSAIDS, anticoagulants or blood thinners in past 5 days. Reviewed post op instructions with patient, questions answered, verbalized understanding.  Blood sugar currently = 128 per pt on continuous glucose monitor.

## 2022-07-06 NOTE — OP REPORT
Patient: Barron Hewitt 61 y.o. male MRN: [id number]     Date of Service: 7/6/2022     Physician/s: Dragan Ayala MD    Pre-operative Diagnosis: Lumbar spondylosis, facet arthropathy.      Post-operative Diagnosis: Lumbar spondylosis, facet arthropathy.     Procedure: Medial Branch Radiofrequency neurotomy targeting the bilateral L3-4, L4-5 and L5-S1 facet joint(s) with sedation.     Description of procedure:    The patient was not treated for radiculopathy at this time    The risks, benefits, and alternatives of the procedure were reviewed and discussed with the patient.  Written informed consent was freely obtained. A pre-procedural time-out was conducted by the physician verifying patient’s identity, procedure to be performed, procedure site and side, and allergy verification. Appropriate equipment was determined to be in place for the procedure.     Moderation sedation was achieved with Versed (1.5mg) and Fentanyl (75mcg). Monitoring of the patients vital signs and respiratory status was provided by trained independent registered nurse during the entire course of the procedures and under my supervision and recoded in the patient’s medical record. The duration of sedation was over 10 minutes.     The patient's vital signs were carefully monitored before, throughout, and after the procedure.     In the fluoroscopy suite the patient was placed in a prone position, and a pillow was placed underneath the level of the umbilicus. The skin was prepped and draped in the usual sterile fashion. The fluoroscope was placed over the low back at the appropriate angles, and the targets for needle/probe placement were marked. A 25g needle was placed into each of the markings at three levels, and approx 1cc of 1% Lidocaine was injected subcutaneously into the epidermal and dermal layers. The needle was removed.     A 18 guage, 10 cm RFN cannula with a 10 mm active tip was then placed into the skin using fluoroscopic  guidance and advanced with an oblique view towards the intersection of the transverse process and superior articular process L3-4 facet joint where the L2 medial branch runs  levels on the left side, where the medial branch runs. The needle/probe tips were then verified by AP, oblique, and lateral views.     A 18 guage, 10 cm RFN cannula with a 10 mm active tip was then placed into the skin using fluoroscopic guidance and advanced with an oblique view towards the intersection of the transverse process and superior articular process L4-5 facet joint where the L3 medial branch runs levels on the left side, where the medial branch runs. The needle/probe tips were then verified by AP, oblique, and lateral views.     Then A 18 guage, 10 cm RFN cannula with a 10 mm active tip was then placed into the skin using fluoroscopic guidance and advanced with an oblique view towards the intersection of the transverse process and superior articular process L5-S1 facet joint where the L4 medial branch runs  levels on the left side, where the medial branch runs. The needle/probe tips were then verified by AP, oblique, and lateral views.     Then A 18 guage, 10 cm RFN cannula with a 10 mm active tip was then placed into the skin using fluoroscopic guidance and advanced with an oblique view towards the intersection of the transverse process and superior articular process on the S1 facet where the L5 dorsal ramus runs on the left side, where the medial branch runs. The needle/probe tips were then verified by AP, oblique, and lateral views.       Motor stimulation is used as an extra precaution to ensure the needle tips are off the lumbar nerve roots prior to each lesion. Following negative aspiration, a syringe was prepared with 10 mL of 1% lidocaine.  2mL of this syringe was injected at each level.  The needles are not moved, but fluoroscope guidance is used to ensure the needles have not moved. After a wait period of approximately 2  minutes, a radiofrequency lesion was then created at each level with a temperature of 80 degrees centigrade for 90 seconds.     The probes were adjusted to a 2nd location and images were saved in 2+ views views. Motor testing was done which confirmed no twitching in the leg.  And another radiofrequency lesion was made of 80 °C for 90 seconds. A 2nd radiofrequency lesion was made with 80°C for 90 seconds.      The cannulas were restyletted, and were then removed intact.     Fluoroscopic images in AP and lateral view were saved prior to each radiofrequency neurotomy.      A 18 guage, 10 cm RFN cannula with a 10 mm active tip was then placed into the skin using fluoroscopic guidance and advanced with an oblique view towards the intersection of the transverse process and superior articular process L3-4 facet joint where the L2 medial branch runs  levels on the right side, where the medial branch runs. The needle/probe tips were then verified by AP, oblique, and lateral views.     A 18 guage, 10 cm RFN cannula with a 10 mm active tip was then placed into the skin using fluoroscopic guidance and advanced with an oblique view towards the intersection of the transverse process and superior articular process L4-5 facet joint where the L3 medial branch runs levels on the right side, where the medial branch runs. The needle/probe tips were then verified by AP, oblique, and lateral views.     Then A 18 guage, 10 cm RFN cannula with a 10 mm active tip was then placed into the skin using fluoroscopic guidance and advanced with an oblique view towards the intersection of the transverse process and superior articular process L5-S1 facet joint where the L4 medial branch runs  levels on the right side, where the medial branch runs. The needle/probe tips were then verified by AP, oblique, and lateral views.     Then A 18 guage, 10 cm RFN cannula with a 10 mm active tip was then placed into the skin using fluoroscopic guidance and  advanced with an oblique view towards the intersection of the transverse process and superior articular process on the S1 facet where the L5 dorsal ramus runs on the right side, where the medial branch runs. The needle/probe tips were then verified by AP, oblique, and lateral views.       Motor stimulation is used as an extra precaution to ensure the needle tips are off the lumbar nerve roots prior to each lesion. Following negative aspiration, a syringe was prepared with 10 mL of 1% lidocaine.  2mL of this syringe was injected at each level.  The needles are not moved, but fluoroscope guidance is used to ensure the needles have not moved. After a wait period of approximately 2 minutes, a radiofrequency lesion was then created at each level with a temperature of 80 degrees centigrade for 90 seconds.     The probes were adjusted to a 2nd location and images were saved in 2+ views views. Motor testing was done which confirmed no twitching in the leg.  And another radiofrequency lesion was made of 80 °C for 90 seconds. A 2nd radiofrequency lesion was made with 80°C for 90 seconds.       The patient's back was covered with a 4x4 gauze, the area was cleansed with sterile normal saline, and a dressing was applied. There were no complications noted, the patient remained hemodynamically stable, and the patient tolerated the procedure well. The patient was examined in the postoperative area and his strength exam was identical as prior to the procedure.    Follow-up as scheduled    Dragan Ayala MD  Interventional Spine and Pain  Physical Medicine and Rehabilitation  [facility]            CPT  Radiofrequency ablation (RFA) - lumbar or sacral (1st joint):  99510-74  Radiofrequency ablation (RFA) - lumbar or sacral (each additional joint):  64636-50 x 2   moderate procedural sedation first 15 minutes: [id number]

## 2022-07-06 NOTE — PROGRESS NOTES
0845  Pt received to recovery area, report received from procedure RN Nisreen . Vitals taken and stable. Patient tolerated po fluids and snack without difficulty. Dressing clean, dry and intact. Ice pack applied over dressing. No bleeding noted on band aids low back.    0910  Pt seen by Dr. Ayala post procedure, orders received for discharge.  Patient ambulatory without difficulty. Pt discharged to designated .

## 2022-07-06 NOTE — INTERVAL H&P NOTE
H&P reviewed. The patient was examined and there are no changes to the H&P     Lungs clear to auscultation bilaterally.  No abdominal tenderness.  Heart regular rate and rhythm.  Vitals reviewed.    Proceed with the originally scheduled procedure.  The risks, benefits and alternatives were discussed.  The patient understands these.    Pre-Op Diagnosis Codes:     * Lumbar spondylosis [M47.816]  Procedure(s):  BILATERAL radiofrequency neurotomies medial branch targeting the L3-4, L4-5 and L5-S1 facet joints with fluoroscopic guidance and sedation.     Dragan Ayala MD  Physical Medicine and Rehabilitation  Interventional Spine and Sports Physiatry  [facility]

## 2022-07-06 NOTE — PROGRESS NOTES
Handoff received from pre-procedure RN. Pre assessment complete with pertinent history reviewed (DM 2, syncope, HTN, PONV, asthma).  Allergies reviewed, stop bang = ISELA no CPAP.  Pt positioned prone by CST, RN, XRT, ankles resting on pillow, hands resting on stool under head of procedure table.

## 2022-07-08 ENCOUNTER — TELEPHONE (OUTPATIENT)
Dept: PHYSICAL MEDICINE AND REHAB | Facility: MEDICAL CENTER | Age: 62
End: 2022-07-08

## 2022-07-08 ENCOUNTER — OFFICE VISIT (OUTPATIENT)
Dept: CARDIOLOGY | Facility: MEDICAL CENTER | Age: 62
End: 2022-07-08
Payer: COMMERCIAL

## 2022-07-08 VITALS
BODY MASS INDEX: 25.48 KG/M2 | OXYGEN SATURATION: 95 % | HEART RATE: 80 BPM | SYSTOLIC BLOOD PRESSURE: 110 MMHG | WEIGHT: 172 LBS | HEIGHT: 69 IN | RESPIRATION RATE: 14 BRPM | DIASTOLIC BLOOD PRESSURE: 60 MMHG

## 2022-07-08 DIAGNOSIS — I10 ESSENTIAL HYPERTENSION, BENIGN: ICD-10-CM

## 2022-07-08 DIAGNOSIS — I25.10 CORONARY ARTERY DISEASE, NON-OCCLUSIVE: ICD-10-CM

## 2022-07-08 DIAGNOSIS — E78.5 DYSLIPIDEMIA: ICD-10-CM

## 2022-07-08 DIAGNOSIS — Q24.5 CORONARY-MYOCARDIAL BRIDGE: ICD-10-CM

## 2022-07-08 PROCEDURE — 99214 OFFICE O/P EST MOD 30 MIN: CPT | Performed by: INTERNAL MEDICINE

## 2022-07-08 ASSESSMENT — ENCOUNTER SYMPTOMS
DIZZINESS: 0
PALPITATIONS: 0
COUGH: 0
MYALGIAS: 0
SHORTNESS OF BREATH: 0
LOSS OF CONSCIOUSNESS: 0

## 2022-07-08 ASSESSMENT — FIBROSIS 4 INDEX: FIB4 SCORE: 0.63

## 2022-07-08 NOTE — PROGRESS NOTES
"Chief Complaint   Patient presents with    Other     F/V DX: Angina pectoris (HCC)       Subjective     Aly Hewitt is a 61 y.o. male who presents today or follow up evaluation of CAD by catheterization, hypertension, hyperlipidemia and myocardial bridge    Since 6/17/2021 appointment the patient has had no cardiac symptoms including chest pain, palpitations, shortness of breath.  He had lumbar ablation procedure yesterday.  He underwent successful TURP 3 weeks ago, no cancer, off medications.  No perioperative cardiac events.  Saw podiatrist for dorsal foot swelling, dependent, x-rays negative, no diagnosis.     Past Medical History  In 2012 he had an abdominal hernia repair without complications.  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 Nicolle TEMPLE (postoperative nausea and vomiting)     Sleep apnea     has CPAP    Snoring      Past Surgical History:   Procedure Laterality Date    RADIO FREQUENCY ABLATION ADDITIONAL LEVEL Bilateral 7/6/2022    Procedure: BILATERAL radiofrequency neurotomies medial branch targeting the L3-4, L4-5 and L5-S1 facet joints with fluoroscopic guidance and sedation. Plan for 80 degree C for 90 seconds for each neurotomy.;  Surgeon: Dragan Ayala M.D.;  [facility];  Service: Pain Management    DE INJ DX/THER AGNT PARAVERT FACET JOINT, DEVON* Bilateral 6/21/2022    Procedure: Diagnostic medial branch blocks targeting the BILATERAL L3-4, L4-5 and " L5-S1 facet joints with fluoroscopic guidance #2;  Surgeon: Dragan Ayala M.D.;  [facility];  Service: Pain Management    LUMBAR MEDIAL BRANCH BLOCKS Bilateral 6/21/2022    Procedure: .;  Surgeon: Dragan Ayala M.D.;  [facility];  Service: Pain Management    CONSCIOUS SEDATION Bilateral 6/21/2022    Procedure: .;  Surgeon: Dragan Ayala M.D.;  [facility];  Service: Pain Management    LUMBAR MEDIAL BRANCH BLOCKS Bilateral 6/14/2022    Procedure: Diagnostic medial branch blocks targeting the BILATERAL L3-4, L4-5 and L5-S1 facet joints with fluoroscopic guidance #1;  Surgeon: Dragan Ayala M.D.;  [facility];  Service: Pain Management    MN TRANSURETHRAL ELEC-SURG PROSTATECTOM N/A 5/27/2022    Procedure: TURP, USING BUTTON ELECTRODE;  Surgeon: Lee Mckee M.D.;  Location: [facility];  Service: Urology    BLOCK EPIDURAL STEROID INJECTION Left 3/22/2022    Procedure: LEFT L3-4 and L4-5 transforaminal epidural steroid injection with fluoroscopic guidance;  Surgeon: Dragan Ayala M.D.;  [facility];  Service: Pain Management    MN NJX AA&/STRD TFRML EPI LUMBAR/SACRAL 1 LEVEL Left 2/15/2022    Procedure: LEFT L3-4 and L4-5 transforaminal epidural steroid injection with fluoroscopic guidance;  Surgeon: Dragan Ayala M.D.;  [facility];  Service: Pain Management    SHOULDER DECOMPRESSION ARTHROSCOPIC Left 1/4/2018    Procedure: SHOULDER DECOMPRESSION ARTHROSCOPIC - SUBACROMIAL;  Surgeon: Linwood Grover M.D.;  Location: [facility];  Service: Orthopedics    SHOULDER ARTHROSCOPY W/ BICIPITAL TENODESIS REPAIR Left 1/4/2018    Procedure: SHOULDER ARTHROSCOPY W/ BICIPITAL Tenotomy REPAIR;  Surgeon: Linwood Grover M.D.;  Location: [facility];  Service: Orthopedics    CLAVICLE DISTAL  EXCISION Left 2018    Procedure: CLAVICLE DISTAL EXCISION - POSSIBLE;  Surgeon: Linwood Grover M.D.;  Location: SURGERY [facility];  Service: Orthopedics    RECOVERY  2016    Procedure: CATH LAB C WITH POSSIBLE NAVIN;  Surgeon: [facility]only Surgery;  [place] [facility]:     VENTRAL HERNIA REPAIR LAPAROSCOPIC  2012    Performed by Marcos Ramírez M.D. at SURGERY [facility]    KNEE ARTHROSCOPY      right    SHOULDER ARTHROSCOPY      right    SINUSOTOMIES      times two surgeries    TUMOR EXCISION WITH BIOPSY      right hand - thumb     Family History   Problem Relation Age of Onset    Breast Cancer Mother     Hypertension Mother     Cancer Mother         breast    Heart Disease Mother         hx of bypass    Hypertension Father     Arthritis Father     Diabetes Father         prediabetes    No Known Problems Sister     Arthritis Brother     Heart Disease Other     Hypertension Other     Cancer Other     No Known Problems Brother      Social History     Socioeconomic History    Marital status:      Spouse name: Not on file    Number of children: Not on file    Years of education: Not on file    Highest education level: Not on file   Occupational History    Not on file   Tobacco Use    Smoking status: Former Smoker     Packs/day: 0.00     Quit date:      Years since quittin.5    Smokeless tobacco: Never Used    Tobacco comment: occasional cigars   Vaping Use    Vaping Use: Never used   Substance and Sexual Activity    Alcohol use: Not Currently     Comment: rare - 1-2 per month (social)    Drug use: Not Currently     Types: Marijuana, Inhaled, Oral     Comment: THC/ Occ    Sexual activity: Not on file     Comment: ; 2 biological kids (2 of his wife's); 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   Other Topics Concern    Not on file   Social History Narrative    Not on file     Social Determinants of  Health     Financial Resource Strain: Not on file   Food Insecurity: Not on file   Transportation Needs: Not on file   Physical Activity: Not on file   Stress: Not on file   Social Connections: Not on file   Intimate Partner Violence: Not on file   Housing Stability: Not on file     Allergies   Allergen Reactions    Kiwi Extract Anaphylaxis    Shellfish Allergy Anaphylaxis    Strawberry Anaphylaxis    Ozempic (0.25 Or 0.5 Mg-Dose) [Semaglutide] Nausea     Pt does not recall any reaction    Sulfa Drugs Rash and Unspecified     rash    Testosterone Rash     rash     Outpatient Encounter Medications as of 7/8/2022   Medication Sig Dispense Refill    meloxicam (MOBIC) 15 MG tablet Take 1 Tablet by mouth 1 time a day as needed (pain). Do not take other NSAIDs. No refills. 60 Tablet 0    INVOKANA 300 MG Tab Take 1 tablet by mouth once daily 90 Tablet 0    montelukast (SINGULAIR) 10 MG Tab TAKE 1 TABLET EVERY EVENING 90 Tablet 3    docusate sodium (COLACE) 100 MG Cap Take 1 Capsule by mouth 2 times a day. 30 Capsule 0    polyethylene glycol/lytes (MIRALAX) 17 g Pack Take 1 Packet by mouth every day. Please take to prevent constipation following your procedure.  Hold if loose stools 14 Each 0    phenazopyridine (PYRIDIUM) 100 MG Tab Take 1 Tablet by mouth 3 times a day as needed (bladder pain, dysuria). 6 Tablet 0    DILTIAZem CD (CARDIZEM CD) 180 MG CAPSULE SR 24 HR TAKE 1 CAPSULE DAILY 90 Capsule 3    metFORMIN (GLUCOPHAGE) 500 MG Tab Take 1 Tablet by mouth 2 times a day with meals. 180 Tablet 2    gabapentin (NEURONTIN) 100 MG Cap Take 100 mg by mouth 2 times a day as needed.      albuterol 108 (90 Base) MCG/ACT Aero Soln inhalation aerosol Inhale 2 Puffs every four hours as needed for Shortness of Breath. Pt prefers ventolin if possible. Thank you 3 Each 3    levothyroxine (SYNTHROID) 50 MCG Tab Take one pill 6 days per week on empty stomach.  75 mcg on 7th day. 90 Tablet 3    fluticasone-salmeterol (ADVAIR DISKUS)  500-50 MCG/DOSE AEROSOL POWDER, BREATH ACTIVATED Inhale 1 Puff every 12 hours. (Patient taking differently: Inhale every 12 hours.) 3 Each 3    benazepril (LOTENSIN) 20 MG Tab TAKE 1 TABLET DAILY 90 Tablet 3    levothyroxine (SYNTHROID) 75 MCG Tab Take 1 Tablet by mouth every morning on an empty stomach. Taking only one d per week.  50 mcg all other days. 12 Tablet 3    atorvastatin (LIPITOR) 80 MG tablet Take 1 Tablet by mouth every evening. 90 Tablet 3    tizanidine (ZANAFLEX) 4 MG Tab Take 1 Tablet by mouth every 6 hours as needed (muscle spasms). 30 Tablet 2    EPINEPHrine (EPIPEN) 0.3 MG/0.3ML Solution Auto-injector solution for injection Inject 0.3 mL into the thigh one time for 1 dose. 1 Each 0    Continuous Blood Gluc Sensor (FREESTYLE LUNA 14 DAY SENSOR) Misc 1 Application every 14 days. 6 Each 3    Dulaglutide (TRULICITY) 3 MG/0.5ML Solution Pen-injector Inject 1 Dose under the skin every 7 days. 2 mL 11    NON SPECIFIED CPAP supplies through [facility] 1 Each 1    ALPHA LIPOIC ACID PO Take 600 mg by mouth 2 Times a Day.      Omega-3 Fatty Acids (FISH OIL) 1200 MG CAPS Take  by mouth.      Cinnamon 500 MG CAPS Take 1,000 mg by mouth.      aspirin EC (ECOTRIN) 81 MG TBEC Take 81 mg by mouth every day.        pantoprazole (PROTONIX) 40 MG TBEC Take 40 mg by mouth every day.        Coenzyme Q10 200 MG Cap Take  by mouth every day.        Cholecalciferol (VITAMIN D) 2000 UNIT TABS Take  by mouth 2 times a day.      cetirizine (ZYRTEC) 10 MG Tab Take 10 mg by mouth every day.       No facility-administered encounter medications on file as of 7/8/2022.     Review of Systems   Respiratory:  Negative for cough and shortness of breath.    Cardiovascular:  Negative for chest pain and palpitations.   Musculoskeletal:  Negative for myalgias.   Neurological:  Negative for dizziness and loss of consciousness.            Objective     /60 (BP Location: Left arm, Patient Position: Sitting, BP Cuff Size:  "Adult)   Pulse 80   Resp 14   Ht 1.753 m (5' 9\")   Wt 78 kg (172 lb)   SpO2 95%   BMI 25.40 kg/m²     Physical Exam  Constitutional:       Appearance: He is well-developed.   Eyes:      Conjunctiva/sclera: Conjunctivae normal.      Pupils: Pupils are equal, round, and reactive to light.   Neck:      Vascular: No JVD.   Cardiovascular:      Rate and Rhythm: Normal rate and regular rhythm.      Pulses:           Radial pulses are 2+ on the right side and 2+ on the left side.        Posterior tibial pulses are 2+ on the right side and 2+ on the left side.      Heart sounds: Normal heart sounds.   Pulmonary:      Effort: Pulmonary effort is normal. No accessory muscle usage or respiratory distress.      Breath sounds: Normal breath sounds. No wheezing or rales.   Musculoskeletal:      Cervical back: Normal range of motion and neck supple.      Comments: Left distal dorsal foot swelling  Intact motor and sensory function   Skin:     General: Skin is warm and dry.      Findings: No rash.      Nails: There is no clubbing.   Neurological:      Mental Status: He is alert and oriented to person, place, and time.   Psychiatric:         Behavior: Behavior normal.            03/27/2008 Standard exercise stress test  demonstrated  asymptomatic upsloping horizontal 1 mm ST-segment depression in the  inferolateral leads.       05/22/2008 myocardial perfusion stress test     Demonstrated the 1 to 1-1/2 mm  horizontal/upsloping ST-segment depression in inferolateral leads  after achieving 91% of maximum age predicted heart rate of 155 with  the perfusion scan suggesting \"ischemia in the left anterior  descending distribution\", ejection fraction was 69%.   3/22/2016 TREADMILL STRESS TEST  Stress ECG: Ischemic 2 mm ST depression in the inferior and  lateral leads with exercise.to exercise  Impression: Ischemic ECG changes at a  fair workload    07/11/2008 Cardiac Catheterization:  EF 73%.  Normal left ventricular wall " motion.  Mid LAD mild myocardial bridging.      04/06/2016 CARDIAC CATHETERIZATION  1.  Normal left ventricular end-diastolic pressure.  2.  Normal left ventricular systolic function. EF 75%.  3.  Eccentric 60-70% lesion at the origin of the second LAD diagonal with FFR  across this lesion of 0.89.    Assessment & Plan     1. Coronary artery disease, non-occlusive     2. Coronary-myocardial bridge     3. Dyslipidemia         Medical Decision Making: Today's Assessment/Status/Plan:   Assessment  1.  Angina pectoris.  2.  CAD, cardiac catheterization 4/6/2016  3.  Myocardial bridging.  2008 angiogram.  4.  Dyslipidemia.  5.  Hypertension.    6.  Diabetes mellitus.  7.  Peripheral neuropathy with balance problems.  8.  Lumbar degenerative disc disease.  9.  S/P TURP 6/2022    Recommendation Discussion  1.  The patient is stable from a cardiac standpoint concerning his CAD, hypertension and dyslipidemia.  2.  BP normal, at goal, continue benazepril.  3.  Dyslipidemia: Last LDL 84, LDL goal less than 70, continue atorvastatin 80 mg daily, recheck CMP and lipid profile, if not at goal we will add ezetimibe.  4.  Diabetes mellitus per endocrinology, last A1c 7.2%, goal less than 7%, on metformin, Invokana, Trulicity  5.  RTC 1 year.

## 2022-07-08 NOTE — TELEPHONE ENCOUNTER
Called for post-sp check-up. Pt reported the following regarding the procedure site:    Change in pain?: Sore the first two days, but it is now better.    Iced area?: Yes, effective.    Concerns?: No.    Confirmed [facility] appt?: Yes, 8/3/22 at 1pm.

## 2022-07-18 DIAGNOSIS — E11.9 TYPE 2 DIABETES MELLITUS WITHOUT COMPLICATION, WITHOUT LONG-TERM CURRENT USE OF INSULIN (HCC): ICD-10-CM

## 2022-07-18 RX ORDER — DULAGLUTIDE 3 MG/.5ML
1 INJECTION, SOLUTION SUBCUTANEOUS
Qty: 6 ML | Refills: 3 | Status: SHIPPED | OUTPATIENT
Start: 2022-07-18 | End: 2023-01-03

## 2022-07-18 NOTE — TELEPHONE ENCOUNTER
Received request via: Pharmacy    Was the patient seen in the last year in this department? Yes    Does the patient have an active prescription (recently filled or refills available) for medication(s) requested? No     Upcoming appt 7/29

## 2022-07-22 ENCOUNTER — HOSPITAL ENCOUNTER (OUTPATIENT)
Dept: LAB | Facility: MEDICAL CENTER | Age: 62
End: 2022-07-22
Attending: INTERNAL MEDICINE
Payer: COMMERCIAL

## 2022-07-22 DIAGNOSIS — E78.5 DYSLIPIDEMIA: ICD-10-CM

## 2022-07-22 LAB
ALBUMIN SERPL BCP-MCNC: 4.5 G/DL (ref 3.2–4.9)
ALBUMIN/GLOB SERPL: 2 G/DL
ALP SERPL-CCNC: 69 U/L (ref 30–99)
ALT SERPL-CCNC: 17 U/L (ref 2–50)
ANION GAP SERPL CALC-SCNC: 13 MMOL/L (ref 7–16)
AST SERPL-CCNC: 15 U/L (ref 12–45)
BILIRUB SERPL-MCNC: 0.8 MG/DL (ref 0.1–1.5)
BUN SERPL-MCNC: 14 MG/DL (ref 8–22)
CALCIUM SERPL-MCNC: 9.4 MG/DL (ref 8.5–10.5)
CHLORIDE SERPL-SCNC: 101 MMOL/L (ref 96–112)
CHOLEST SERPL-MCNC: 126 MG/DL (ref 100–199)
CO2 SERPL-SCNC: 26 MMOL/L (ref 20–33)
CREAT SERPL-MCNC: 0.78 MG/DL (ref 0.5–1.4)
FASTING STATUS PATIENT QL REPORTED: NORMAL
GFR SERPLBLD CREATININE-BSD FMLA CKD-EPI: 100 ML/MIN/1.73 M 2
GLOBULIN SER CALC-MCNC: 2.3 G/DL (ref 1.9–3.5)
GLUCOSE SERPL-MCNC: 121 MG/DL (ref 65–99)
HDLC SERPL-MCNC: 34 MG/DL
LDLC SERPL CALC-MCNC: 73 MG/DL
POTASSIUM SERPL-SCNC: 4.2 MMOL/L (ref 3.6–5.5)
PROT SERPL-MCNC: 6.8 G/DL (ref 6–8.2)
SODIUM SERPL-SCNC: 140 MMOL/L (ref 135–145)
TRIGL SERPL-MCNC: 97 MG/DL (ref 0–149)

## 2022-07-22 PROCEDURE — 80061 LIPID PANEL: CPT

## 2022-07-22 PROCEDURE — 36415 COLL VENOUS BLD VENIPUNCTURE: CPT

## 2022-07-22 PROCEDURE — 80053 COMPREHEN METABOLIC PANEL: CPT

## 2022-07-25 ENCOUNTER — OFFICE VISIT (OUTPATIENT)
Dept: PHYSICAL MEDICINE AND REHAB | Facility: REHABILITATION | Age: 62
End: 2022-07-25
Payer: COMMERCIAL

## 2022-07-25 VITALS
BODY MASS INDEX: 24.44 KG/M2 | HEIGHT: 69 IN | DIASTOLIC BLOOD PRESSURE: 66 MMHG | RESPIRATION RATE: 17 BRPM | WEIGHT: 165 LBS | SYSTOLIC BLOOD PRESSURE: 118 MMHG | TEMPERATURE: 98.1 F | OXYGEN SATURATION: 98 % | HEART RATE: 81 BPM

## 2022-07-25 DIAGNOSIS — M79.605 PAIN OF LEFT LOWER EXTREMITY: ICD-10-CM

## 2022-07-25 DIAGNOSIS — R29.898 WEAKNESS OF BOTH LOWER EXTREMITIES: ICD-10-CM

## 2022-07-25 DIAGNOSIS — E11.44 DIABETIC AMYOTROPHY ASSOCIATED WITH TYPE 2 DIABETES MELLITUS (HCC): Primary | ICD-10-CM

## 2022-07-25 PROCEDURE — 99214 OFFICE O/P EST MOD 30 MIN: CPT | Performed by: PHYSICAL MEDICINE & REHABILITATION

## 2022-07-25 RX ORDER — TIZANIDINE 4 MG/1
4 TABLET ORAL EVERY 6 HOURS PRN
Qty: 30 TABLET | Refills: 2 | Status: SHIPPED | OUTPATIENT
Start: 2022-07-25 | End: 2022-10-11 | Stop reason: SDUPTHER

## 2022-07-25 ASSESSMENT — FIBROSIS 4 INDEX: FIB4 SCORE: 0.61

## 2022-07-25 NOTE — PROGRESS NOTES
[facility]  PM&R Neuro Rehabilitation Clinic  [facility] [place] [address]  Ph: [phone]    FOLLOW UP PATIENT EVALUATION    Patient Name: Barron Hewitt   Patient : 1960  Patient Age: 61 y.o.     Examining Physician: Dr. Myah Blandon DO    SUBJECTIVE:   Patient Identification: Barron Hewitt is a 61 y.o. male with PMH significant for CAD, ISELA, asthma, hypertension, dyslipidemia, seasonal allergies, type 2 diabetes mellitus, history of SBO 2018, hypothyroidism and rehabilitation history significant for muscle weakness, pain and is presenting to PM&R clinic for a FOLLOW UP OUTPATIENT evaluation with the following chief complaint/s:    Chief Complaint: Leg pain follow up    History of Present Illness:   - Records reviewed: S/p Medial Branch Radiofrequency neurotomy targeting the bilateral L3-4, L4-5 and L5-S1 facet joint(s) 22  - Has been having improvement from his RFA which was done recently.   - Muscles tend to lock up in his paraspinals in the low back and this seems to be what triggers his pain.   - When they lock up it is tight and then leads to pain.   - Has been in PT and that is helpful but would prefer to do HEP.   - Has tried Tizanidine. Helps with the leg cramps.   - Is taking Gabapentin 300mg at night.   - Had prostate surgery and had reduction in his pain.   - Hasnt had a lot of leg cramps until the past 2 nights and took Tizanidine which helped.   - Will see [facility] to get an second opinion by Dr. Cochran.     Review of Systems:  All other pertinent positive review of systems are noted above in HPI.   All other systems reviewed and are negative.    Past Medical History:  Past Medical History:   Diagnosis Date   • Mild intermittent asthma without complication 2017   • Abnormal nuclear cardiac imaging test 2014   • Chest pain at rest 2014   • Coronary-myocardial bridge 2012   • CHEST PAIN 6/15/2011   • Diabetes 2009    insulin and oral  "meds   • Allergy    • Anesthesia     PONV   • Anginal syndrome (HCC)     \"myocardial bridging\"   • ASTHMA    • High cholesterol    • Hyperlipidemia    • Hypertension    • Myocardial bridge     cardiologist, Dr. Valverde   • PONV (postoperative nausea and vomiting)    • Sleep apnea     has CPAP   • Snoring       Past Surgical History:   Procedure Laterality Date   • RADIO FREQUENCY ABLATION ADDITIONAL LEVEL Bilateral 7/6/2022    Procedure: BILATERAL radiofrequency neurotomies medial branch targeting the L3-4, L4-5 and L5-S1 facet joints with fluoroscopic guidance and sedation. Plan for 80 degree C for 90 seconds for each neurotomy.;  Surgeon: Dragan Ayala M.D.;  Location: [facility];  Service: Pain Management   • SC INJ DX/THER AGNT PARAVERT FACET JOINT, DEVON* Bilateral 6/21/2022    Procedure: Diagnostic medial branch blocks targeting the BILATERAL L3-4, L4-5 and L5-S1 facet joints with fluoroscopic guidance #2;  Surgeon: rDagan Ayala M.D.;  Location: [facility];  Service: Pain Management   • LUMBAR MEDIAL BRANCH BLOCKS Bilateral 6/21/2022    Procedure: .;  Surgeon: Dragan Ayala M.D.;  Location: [facility];  Service: Pain Management   • CONSCIOUS SEDATION Bilateral 6/21/2022    Procedure: .;  Surgeon: Dragan Ayala M.D.;  Location: [facility];  Service: Pain Management   • LUMBAR MEDIAL BRANCH BLOCKS Bilateral 6/14/2022    Procedure: Diagnostic medial branch blocks targeting the BILATERAL L3-4, L4-5 and L5-S1 facet joints with fluoroscopic guidance #1;  Surgeon: Dragan Ayala M.D.;  Location: [facility];  Service: Pain Management   • SC TRANSURETHRAL ELEC-SURG PROSTATECTOM N/A 5/27/2022    Procedure: TURP, USING BUTTON ELECTRODE;  Surgeon: Lee Mckee M.D.;  Location: SURGERY [facility];  Service: Urology   • BLOCK EPIDURAL STEROID INJECTION Left 3/22/2022    Procedure: LEFT L3-4 and L4-5 " transforaminal epidural steroid injection with fluoroscopic guidance;  Surgeon: Dragan Ayala M.D.;  Location: [facility];  Service: Pain Management   • KS NJX AA&/STRD TFRML EPI LUMBAR/SACRAL 1 LEVEL Left 2/15/2022    Procedure: LEFT L3-4 and L4-5 transforaminal epidural steroid injection with fluoroscopic guidance;  Surgeon: Dragan Ayala M.D.;  Location: [facility];  Service: Pain Management   • SHOULDER DECOMPRESSION ARTHROSCOPIC Left 1/4/2018    Procedure: SHOULDER DECOMPRESSION ARTHROSCOPIC - SUBACROMIAL;  Surgeon: Linwood Grover M.D.;  Location: SURGERY [facility];  Service: Orthopedics   • SHOULDER ARTHROSCOPY W/ BICIPITAL TENODESIS REPAIR Left 1/4/2018    Procedure: SHOULDER ARTHROSCOPY W/ BICIPITAL Tenotomy REPAIR;  Surgeon: Linwood Grover M.D.;  Location: [facility];  Service: Orthopedics   • CLAVICLE DISTAL EXCISION Left 1/4/2018    Procedure: CLAVICLE DISTAL EXCISION - POSSIBLE;  Surgeon: Linwood Grover M.D.;  Location: [facility];  Service: Orthopedics   • RECOVERY  4/8/2016    Procedure: CATH LAB [facility] WITH POSSIBLE NAVIN;  Surgeon: [facility];  [place];  Service:    • VENTRAL HERNIA REPAIR LAPAROSCOPIC  11/1/2012    Performed by Marcos Ramírez M.D. at [facility]   • KNEE ARTHROSCOPY  1990's    right   • SHOULDER ARTHROSCOPY  1990's    right   • SINUSOTOMIES  1990's    times two surgeries   • TUMOR EXCISION WITH BIOPSY  1990's    right hand - thumb        Current Outpatient Medications:   •  tizanidine (ZANAFLEX) 4 MG Tab, Take 1 Tablet by mouth every 6 hours as needed (muscle spasms)., Disp: 30 Tablet, Rfl: 2  •  Dulaglutide (TRULICITY) 3 MG/0.5ML Solution Pen-injector, Inject 1 Dose under the skin every 7 days., Disp: 6 mL, Rfl: 3  •  meloxicam (MOBIC) 15 MG tablet, Take 1 Tablet by mouth 1 time a day as needed (pain). Do not take other  NSAIDs. No refills., Disp: 60 Tablet, Rfl: 0  •  INVOKANA 300 MG Tab, Take 1 tablet by mouth once daily, Disp: 90 Tablet, Rfl: 0  •  montelukast (SINGULAIR) 10 MG Tab, TAKE 1 TABLET EVERY EVENING, Disp: 90 Tablet, Rfl: 3  •  DILTIAZem CD (CARDIZEM CD) 180 MG CAPSULE SR 24 HR, TAKE 1 CAPSULE DAILY, Disp: 90 Capsule, Rfl: 3  •  metFORMIN (GLUCOPHAGE) 500 MG Tab, Take 1 Tablet by mouth 2 times a day with meals., Disp: 180 Tablet, Rfl: 2  •  gabapentin (NEURONTIN) 100 MG Cap, Take 100 mg by mouth 2 times a day as needed., Disp: , Rfl:   •  albuterol 108 (90 Base) MCG/ACT Aero Soln inhalation aerosol, Inhale 2 Puffs every four hours as needed for Shortness of Breath. Pt prefers ventolin if possible. Thank you, Disp: 3 Each, Rfl: 3  •  levothyroxine (SYNTHROID) 50 MCG Tab, Take one pill 6 days per week on empty stomach.  75 mcg on 7th day., Disp: 90 Tablet, Rfl: 3  •  fluticasone-salmeterol (ADVAIR DISKUS) 500-50 MCG/DOSE AEROSOL POWDER, BREATH ACTIVATED, Inhale 1 Puff every 12 hours. (Patient taking differently: Inhale every 12 hours.), Disp: 3 Each, Rfl: 3  •  benazepril (LOTENSIN) 20 MG Tab, TAKE 1 TABLET DAILY, Disp: 90 Tablet, Rfl: 3  •  levothyroxine (SYNTHROID) 75 MCG Tab, Take 1 Tablet by mouth every morning on an empty stomach. Taking only one d per week.  50 mcg all other days., Disp: 12 Tablet, Rfl: 3  •  atorvastatin (LIPITOR) 80 MG tablet, Take 1 Tablet by mouth every evening., Disp: 90 Tablet, Rfl: 3  •  EPINEPHrine (EPIPEN) 0.3 MG/0.3ML Solution Auto-injector solution for injection, Inject 0.3 mL into the thigh one time for 1 dose., Disp: 1 Each, Rfl: 0  •  Continuous Blood Gluc Sensor (FREESTYLE LUNA 14 DAY SENSOR) Misc, 1 Application every 14 days., Disp: 6 Each, Rfl: 3  •  NON SPECIFIED, CPAP supplies through [facility], Disp: 1 Each, Rfl: 1  •  ALPHA LIPOIC ACID PO, Take 600 mg by mouth 2 Times a Day., Disp: , Rfl:   •  Omega-3 Fatty Acids (FISH OIL) 1200 MG CAPS, Take  by mouth., Disp: ,  Rfl:   •  Cinnamon 500 MG CAPS, Take 1,000 mg by mouth., Disp: , Rfl:   •  aspirin EC (ECOTRIN) 81 MG TBEC, Take 81 mg by mouth every day.  , Disp: , Rfl:   •  pantoprazole (PROTONIX) 40 MG TBEC, Take 40 mg by mouth every day.  , Disp: , Rfl:   •  Coenzyme Q10 200 MG Cap, Take  by mouth every day.  , Disp: , Rfl:   •  Cholecalciferol (VITAMIN D) 2000 UNIT TABS, Take  by mouth 2 times a day., Disp: , Rfl:   •  cetirizine (ZYRTEC) 10 MG Tab, Take 10 mg by mouth every day., Disp: , Rfl:   •  phenazopyridine (PYRIDIUM) 100 MG Tab, Take 1 Tablet by mouth 3 times a day as needed (bladder pain, dysuria). (Patient not taking: No sig reported), Disp: 6 Tablet, Rfl: 0  Allergies   Allergen Reactions   • Kiwi Extract Anaphylaxis   • Shellfish Allergy Anaphylaxis   • Strawberry Anaphylaxis   • Ozempic (0.25 Or 0.5 Mg-Dose) [Semaglutide] Nausea     Pt does not recall any reaction   • Sulfa Drugs Rash and Unspecified     rash   • Testosterone Rash     rash        Past Social History:  Social History     Socioeconomic History   • Marital status:      Spouse name: Not on file   • Number of children: Not on file   • Years of education: Not on file   • Highest education level: Not on file   Occupational History   • Not on file   Tobacco Use   • Smoking status: Former Smoker     Packs/day: 0.00     Quit date:      Years since quittin.5   • Smokeless tobacco: Never Used   • Tobacco comment: occasional cigars   Vaping Use   • Vaping Use: Never used   Substance and Sexual Activity   • Alcohol use: Not Currently     Comment: rare - 1-2 per month (social)   • Drug use: Not Currently     Types: Marijuana, Inhaled, Oral     Comment: THC/ Occ   • Sexual activity: Not on file     Comment: ; 2 biological kids (2 of his wife's); 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   Other Topics Concern   • Not on file   Social History Narrative   • Not on file     Social Determinants of Health     Financial Resource Strain: Not  on file   Food Insecurity: Not on file   Transportation Needs: Not on file   Physical Activity: Not on file   Stress: Not on file   Social Connections: Not on file   Intimate Partner Violence: Not on file   Housing Stability: Not on file        Family History:  Family History   Problem Relation Age of Onset   • Breast Cancer Mother    • Hypertension Mother    • Cancer Mother         breast   • Heart Disease Mother         hx of bypass   • Hypertension Father    • Arthritis Father    • Diabetes Father         prediabetes   • No Known Problems Sister    • Arthritis Brother    • Heart Disease Other    • Hypertension Other    • Cancer Other    • No Known Problems Brother        Depression and Opioid Screening  PHQ-9:  Depression Screen (PHQ-2/PHQ-9) 4/4/2022 6/6/2022 6/27/2022   PHQ-2 Total Score 0 0 0   PHQ-9 Total Score - - -     Interpretation of PHQ-9 Total Score   Score Severity   1-4 No Depression   5-9 Mild Depression   10-14 Moderate Depression   15-19 Moderately Severe Depression   20-27 Severe Depression     OBJECTIVE:   Vital Signs:  Vitals:    07/25/22 1549   BP: 118/66   Pulse: 81   Resp: 17   Temp: 36.7 °C (98.1 °F)   SpO2: 98%        Pertinent Labs:  Lab Results   Component Value Date/Time    SODIUM 140 07/22/2022 07:00 AM    POTASSIUM 4.2 07/22/2022 07:00 AM    CHLORIDE 101 07/22/2022 07:00 AM    CO2 26 07/22/2022 07:00 AM    GLUCOSE 121 (H) 07/22/2022 07:00 AM    BUN 14 07/22/2022 07:00 AM    CREATININE 0.78 07/22/2022 07:00 AM    CREATININE 1.1 07/10/2008 09:25 AM       Lab Results   Component Value Date/Time    HBA1C 7.2 (A) 11/16/2021 02:36 PM       Lab Results   Component Value Date/Time    WBC 10.6 05/02/2022 12:25 PM    RBC 5.62 05/02/2022 12:25 PM    HEMOGLOBIN 17.0 05/02/2022 12:25 PM    HEMATOCRIT 50.9 05/02/2022 12:25 PM    MCV 90.6 05/02/2022 12:25 PM    MCH 30.2 05/02/2022 12:25 PM    MCHC 33.4 (L) 05/02/2022 12:25 PM    MPV 10.3 05/02/2022 12:25 PM    NEUTSPOLYS 67.60 06/04/2020 02:18 PM     LYMPHOCYTES 17.60 (L) 06/04/2020 02:18 PM    MONOCYTES 9.80 06/04/2020 02:18 PM    EOSINOPHILS 3.50 06/04/2020 02:18 PM    BASOPHILS 1.20 06/04/2020 02:18 PM    HYPOCHROMIA 1+ 08/27/2013 07:13 AM       Lab Results   Component Value Date/Time    ASTSGOT 15 07/22/2022 07:00 AM    ALTSGPT 17 07/22/2022 07:00 AM        Physical Exam:   GEN: No apparent distress  HEENT: Head normocephalic, atraumatic.  Sclera nonicteric bilaterally, no ocular discharge appreciated bilaterally.  CV: Extremities warm and well-perfused, no peripheral edema appreciated bilaterally.  PULMONARY: Breathing nonlabored on room air, no respiratory accessory muscle use.  Not requiring supplemental oxygen.  SKIN: No appreciable skin breakdown on exposed areas of skin.  PSYCH: Mood and affect within normal limits.  NEURO: Awake alert.  Conversational.  Logical thought content.  Ambulatory without assistive device.      ASSESSMENT/PLAN: Barron Hewitt  is a 61 y.o. male with rehabilitation history significant for muscle weakness, pain, here for evaluation. The following plan was discussed with the patient who is in agreement.     Visit Diagnoses     ICD-10-CM   1. 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)  E11.44   2. Weakness of both lower extremities  R29.898   3. Pain of left lower extremity  M79.605        Rehab/Neuro:   1. Muscle weakness  2. Left lumbar radiculitis: MRI positive for left foraminal narrowing at L3-4, L4-5 s/p epidural steroid injections with improved pain.  3. Diabetic amyotrophy/weakness-Per neuro  4. Lower extremity cramping  -Exercise: Very adherent to home exercise program and home stretching program.  -CAM: Highly recommend engaging in massage for lower back if able.  -High risk med management: Significant relief of spasms if he takes tizanidine 4 mg nightly.  Prescriptions for tizanidine 4 mg nightly as needed.  -Counseled on utility of different neuropathic pain agents for lower extremity  neuropathic pain  - We will establish with [facility] neurosurgery, Dr. Cochran, for second opinion.  - Continue meloxicam as needed and gabapentin at night as needed.    Follow up: As needed if pain worsens     Please note that this dictation was created using voice recognition software. I have made every reasonable attempt to correct obvious errors but there may be errors of grammar and content that I may have overlooked prior to finalization of this note.    Dr. Myah Blandon DO, MS  Department of Physical Medicine & Rehabilitation  Neuro Rehabilitation Clinic  [facility]

## 2022-07-29 ENCOUNTER — HOSPITAL ENCOUNTER (OUTPATIENT)
Facility: MEDICAL CENTER | Age: 62
End: 2022-07-29
Attending: INTERNAL MEDICINE
Payer: COMMERCIAL

## 2022-07-29 ENCOUNTER — OFFICE VISIT (OUTPATIENT)
Dept: ENDOCRINOLOGY | Facility: MEDICAL CENTER | Age: 62
End: 2022-07-29
Attending: INTERNAL MEDICINE
Payer: COMMERCIAL

## 2022-07-29 VITALS
HEIGHT: 69 IN | HEART RATE: 85 BPM | WEIGHT: 173 LBS | BODY MASS INDEX: 25.62 KG/M2 | SYSTOLIC BLOOD PRESSURE: 114 MMHG | OXYGEN SATURATION: 98 % | DIASTOLIC BLOOD PRESSURE: 60 MMHG

## 2022-07-29 DIAGNOSIS — Z79.4 TYPE 2 DIABETES MELLITUS WITHOUT COMPLICATION, WITH LONG-TERM CURRENT USE OF INSULIN (HCC): ICD-10-CM

## 2022-07-29 DIAGNOSIS — E11.9 TYPE 2 DIABETES MELLITUS WITHOUT COMPLICATION, WITH LONG-TERM CURRENT USE OF INSULIN (HCC): ICD-10-CM

## 2022-07-29 DIAGNOSIS — E78.5 DYSLIPIDEMIA: ICD-10-CM

## 2022-07-29 DIAGNOSIS — E03.9 ACQUIRED HYPOTHYROIDISM: ICD-10-CM

## 2022-07-29 DIAGNOSIS — E11.9 CONTROLLED TYPE 2 DIABETES MELLITUS WITHOUT COMPLICATION, WITHOUT LONG-TERM CURRENT USE OF INSULIN (HCC): ICD-10-CM

## 2022-07-29 DIAGNOSIS — Z79.84 LONG TERM (CURRENT) USE OF ORAL HYPOGLYCEMIC DRUGS: ICD-10-CM

## 2022-07-29 LAB
HBA1C MFR BLD: 7.1 % (ref 0–5.6)
INT CON NEG: ABNORMAL
INT CON POS: ABNORMAL

## 2022-07-29 PROCEDURE — 83036 HEMOGLOBIN GLYCOSYLATED A1C: CPT | Performed by: INTERNAL MEDICINE

## 2022-07-29 PROCEDURE — 99214 OFFICE O/P EST MOD 30 MIN: CPT | Performed by: INTERNAL MEDICINE

## 2022-07-29 PROCEDURE — 82043 UR ALBUMIN QUANTITATIVE: CPT

## 2022-07-29 PROCEDURE — 82570 ASSAY OF URINE CREATININE: CPT

## 2022-07-29 PROCEDURE — 99212 OFFICE O/P EST SF 10 MIN: CPT | Performed by: INTERNAL MEDICINE

## 2022-07-29 RX ORDER — CANAGLIFLOZIN 300 MG/1
1 TABLET, FILM COATED ORAL DAILY
Qty: 90 TABLET | Refills: 1 | Status: SHIPPED | OUTPATIENT
Start: 2022-07-29 | End: 2022-10-11 | Stop reason: SDUPTHER

## 2022-07-29 RX ORDER — BLOOD SUGAR DIAGNOSTIC
1 STRIP MISCELLANEOUS 3 TIMES DAILY
Qty: 100 STRIP | Refills: 11 | Status: SHIPPED | OUTPATIENT
Start: 2022-07-29 | End: 2023-09-26

## 2022-07-29 ASSESSMENT — FIBROSIS 4 INDEX: FIB4 SCORE: 0.61

## 2022-07-29 NOTE — PROGRESS NOTES
CHIEF COMPLAINT: Patient is here for follow up of Type 2 Diabetes Mellitus    HPI:     Barron Hewitt is a 61 y.o. male with Type 2 Diabetes Mellitus here for follow up.    Labs from 7/29/2022 HbA1c is 7.1%    He was previously seen by Dr. Sandoval and then [place] had been by Lizett Mi and this is my first time meeting him      He is currently on metformin 1000 mg twice a day,  Invokana 300 mg daily  Trulicity 3 mg weekly    He has been wearing a therapeutic CGM/rubia GEN 1 CGM for the past 6 months  He failed to bring his reader so we were not able to download his sensor    Interestingly he reports hypoglycemic episodes mostly nocturnal with symptoms such as sweating      We discussed that we need to get updated labs for his thyroid and also make sure that he does not have Uintah's disease because of these hypoglycemic episodes      He has hyperlipidemia and is on atorvastatin  LDL cholesterol 73 on July 2022    He sees Dr. Huerta as well  He is on diltiazem  He is not on an ACE inhibitor or ARB      We will have an updated urine microalbumin  And we obtained a urine microalbumin sample in the office today      His last eye exam was on November 2021      He has equivocal symptoms that may be from diabetic neuropathy versus lumbar radiculopathy  He reports left-sided shooting pain down his leg which is more consistent with lumbar radiculopathy  His MRI on July 2021 showed L4-L5 annular disc bulge and L3-L4 left lateral disc protrusion with neural foraminal narrowing  I suspect that this is the source of his radicular symptoms    He has an appointment to see Dr. Bryson  We also talked about metanx for treatment of diabetic neuropathy      Finally he has primary hypothyroidism and is on levothyroxine 50 every day except 75 mcg on Sunday  We do not have updated thyroid labs  He denies constipation and cold intolerance        BG Diary:07/29/22  CGM was not downloaded or reviewed    Weight has been  stable    Diabetes Complications   Retinopathy: No known retinopathy.  Last eye exam: November 2021  Neuropathy: Denies paresthesias or numbness in hands or feet. Denies any foot wounds.  Exercise: Minimal.  Diet: Fair.  Patient's medications, allergies, and social histories were reviewed and updated as appropriate.    ROS:     CONS:     No fever, no chills   EYES:     No diplopia, no blurry vision   CV:           No chest pain, no palpitations   PULM:     No SOB, no cough, no hemoptysis.   GI:            No nausea, no vomiting, no diarrhea, no constipation   ENDO:     No polyuria, no polydipsia, no heat intolerance, no cold intolerance       Past Medical History:  Problem List:  2022-05: Benign prostatic hyperplasia with urinary frequency  2021-06: Diabetic amyotrophy associated with type 2 diabetes mellitus   (Formerly [facility])  2021-05: Neuropathy - proximal, diabetic  2020-08: Acquired hypothyroidism  2019-11: Type 2 diabetes mellitus without complication, with long-  term current use of insulin (Formerly [facility])   2019-11: History of small bowel obstruction - 2018 2019-11: Syncope and collapse  2019-02: Seasonal allergies  2018-08: Other insomnia  2017-10: Essential hypertension, benign  2017-10: Dyslipidemia  2017-07: Mild intermittent asthma without complication  2017-05: Ileus ([facility])  2017-05: Erythrocytosis  2017-05: ISELA on CPAP - [facility]  2017-05: HTN (hypertension)  2017-05: Enteritis  2016-04: Coronary artery disease, non-occlusive  2016-03: Angina pectoris ([facility])  2014-01: Chest pain at rest  2014-01: Abnormal radionuclide heart study  2014-01: DM (diabetes mellitus) ([facility])  2014-01: Hyperlipidemia  2014-01: Hypertension  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 Plan for 80 degree C for 90 seconds for each neurotomy.;  Surgeon: Dragan Ayala M.D.;  [facility];  Service: Pain Management   • IN INJ DX/THER AGNT PARAVERT FACET JOINT, DEVON* Bilateral 6/21/2022    Procedure: Diagnostic medial branch blocks targeting the BILATERAL L3-4, L4-5 and L5-S1 facet joints with fluoroscopic guidance #2;  Surgeon: Dragan Ayala M.D.;  [facility];  Service: Pain Management   • LUMBAR MEDIAL BRANCH BLOCKS Bilateral 6/21/2022    Procedure: .;  Surgeon: Dragan Ayala M.D.;  [facility]: [facility];  Service: Pain Management   • CONSCIOUS SEDATION Bilateral 6/21/2022    Procedure: .;  Surgeon: Dragan Ayala M.D.;  [facility];  Service: Pain Management   • LUMBAR MEDIAL BRANCH BLOCKS Bilateral 6/14/2022    Procedure: Diagnostic medial branch blocks targeting the BILATERAL L3-4, L4-5 and L5-S1 facet joints with fluoroscopic guidance #1;  Surgeon: Dragan Ayala M.D.;  [facility];  Service: Pain Management   • IN TRANSURETHRAL ELEC-SURG PROSTATECTOM N/A 5/27/2022    Procedure: TURP, USING BUTTON ELECTRODE;  Surgeon: Lee Mckee M.D.;  Location: SURGERY [facility];  Service: Urology   • BLOCK EPIDURAL STEROID INJECTION Left 3/22/2022    Procedure: LEFT L3-4 and L4-5 transforaminal epidural steroid injection with fluoroscopic guidance;  Surgeon: Dragan Ayala M.D.;  Location: [facility];  Service: Pain Management   • IN NJX AA&/STRD TFRML EPI LUMBAR/SACRAL 1 LEVEL Left 2/15/2022    Procedure: LEFT L3-4 and L4-5 transforaminal epidural steroid injection with fluoroscopic guidance;  Surgeon: Dragan Ayala M.D.;  Location[facility];  Service: Pain Management   • SHOULDER DECOMPRESSION  ARTHROSCOPIC Left 2018    Procedure: SHOULDER DECOMPRESSION ARTHROSCOPIC - SUBACROMIAL;  Surgeon: Linwood Grover M.D.;  Location: SURGERY [facility];  Service: Orthopedics   • SHOULDER ARTHROSCOPY W/ BICIPITAL TENODESIS REPAIR Left 2018    Procedure: SHOULDER ARTHROSCOPY W/ BICIPITAL Tenotomy REPAIR;  Surgeon: Linwood Grover M.D.;  Location: SURGERY [facility];  Service: Orthopedics   • CLAVICLE DISTAL EXCISION Left 2018    Procedure: CLAVICLE DISTAL EXCISION - POSSIBLE;  Surgeon: Linwood Grover M.D.;  Location: SURGERY [facility];  Service: Orthopedics   • RECOVERY  2016    Procedure: CATH LAB [facility] WITH POSSIBLE NAVIN;  Surgeon: [facility]ons[facility];  Location: SURGERY PRE-POST PROC UNIT [facility];  Service:    • VENTRAL HERNIA REPAIR LAPAROSCOPIC  2012    Performed by Marcos Ramírez M.D. at [facility]   • KNEE ARTHROSCOPY      right   • SHOULDER ARTHROSCOPY      right   • SINUSOTOMIES      times two surgeries   • TUMOR EXCISION WITH BIOPSY      right hand - thumb        Allergies:  Kiwi extract, Shellfish allergy, Strawberry, Ozempic (0.25 or 0.5 mg-dose) [semaglutide], Sulfa drugs, and Testosterone     Social History:  Social History     Tobacco Use   • Smoking status: Former Smoker     Packs/day: 0.00     Quit date:      Years since quittin.5   • Smokeless tobacco: Never Used   • Tobacco comment: occasional cigars   Vaping Use   • Vaping Use: Never used   Substance Use Topics   • Alcohol use: Yes     Comment: rare - 1-2 per month (social)   • Drug use: Yes     Types: Marijuana, Inhaled, Oral     Comment: THC/ Occ        Family History:   family history includes Arthritis in his brother and father; Breast Cancer in his mother; Cancer in his mother and another family member; Diabetes in his father; Heart Disease in his mother and another family member; Hypertension in his father, mother, and another family  "member; No Known Problems in his brother and sister.      PHYSICAL EXAM:   OBJECTIVE:  Vital signs: /60 (BP Location: Left arm, Patient Position: Sitting, BP Cuff Size: Adult)   Pulse 85   Ht 1.753 m (5' 9\")   Wt 78.5 kg (173 lb)   SpO2 98%   BMI 25.55 kg/m²   GENERAL: Well-developed, well-nourished in no apparent distress.   EYE:  No ocular asymmetry, PERRLA  HENT: Pink, moist mucous membranes.    NECK: No thyromegaly.   CARDIOVASCULAR:  No murmurs  LUNGS: Clear breath sounds  ABDOMEN: Soft, nontender   EXTREMITIES: No clubbing, cyanosis, or edema.   NEUROLOGICAL: No gross focal motor abnormalities   LYMPH: No cervical adenopathy palpated.   SKIN: No rashes, lesions.     Labs:  Lab Results   Component Value Date/Time    HBA1C 7.1 (A) 07/29/2022 10:16 AM        Lab Results   Component Value Date/Time    WBC 10.6 05/02/2022 12:25 PM    RBC 5.62 05/02/2022 12:25 PM    HEMOGLOBIN 17.0 05/02/2022 12:25 PM    MCV 90.6 05/02/2022 12:25 PM    MCH 30.2 05/02/2022 12:25 PM    MCHC 33.4 (L) 05/02/2022 12:25 PM    RDW 45.7 05/02/2022 12:25 PM    MPV 10.3 05/02/2022 12:25 PM       Lab Results   Component Value Date/Time    SODIUM 140 07/22/2022 07:00 AM    POTASSIUM 4.2 07/22/2022 07:00 AM    CHLORIDE 101 07/22/2022 07:00 AM    CO2 26 07/22/2022 07:00 AM    ANION 13.0 07/22/2022 07:00 AM    GLUCOSE 121 (H) 07/22/2022 07:00 AM    BUN 14 07/22/2022 07:00 AM    CREATININE 0.78 07/22/2022 07:00 AM    CREATININE 1.1 07/10/2008 09:25 AM    CALCIUM 9.4 07/22/2022 07:00 AM    ASTSGOT 15 07/22/2022 07:00 AM    ALTSGPT 17 07/22/2022 07:00 AM    TBILIRUBIN 0.8 07/22/2022 07:00 AM    ALBUMIN 4.5 07/22/2022 07:00 AM    TOTPROTEIN 6.8 07/22/2022 07:00 AM    GLOBULIN 2.3 07/22/2022 07:00 AM    AGRATIO 2.0 07/22/2022 07:00 AM       Lab Results   Component Value Date/Time    CHOLSTRLTOT 126 07/22/2022 0700    TRIGLYCERIDE 97 07/22/2022 0700    HDL 34 (A) 07/22/2022 0700    LDL 73 07/22/2022 0700       Lab Results   Component Value " Date/Time    MALBCRT see below 01/07/2021 06:14 AM    MICROALBUR <1.2 01/07/2021 06:14 AM        Lab Results   Component Value Date/Time    TSHULTRASEN 2.130 06/09/2021 1313     No results found for: FREEDIR  Lab Results   Component Value Date/Time    FREET3 3.59 12/22/2017 0926     No results found for: THYSTIMIG        ASSESSMENT/PLAN:     1. Controlled type 2 diabetes mellitus without complication, without long-term current use of insulin (HCC)  Fair control  Explained patient that his A1c is fair at 7.1%  I am curious as to why he has hypoglycemia as none of his oral hypoglycemic agents should cause hypoglycemia  We are not making any changes to his regimen at this time because of his reports of hypoglycemia  And we are conducting a work-up to rule out adrenal insufficiency because of his hypoglycemia  I will update him on the test results  In the meantime I want him to get updated labs  I want him to follow-up in 4 months with repeat of his A1c    2. Acquired hypothyroidism  Controlled  Continue levothyroxine 50 every day with 75 on Sunday  Repeat thyroid labs this week    3. Dyslipidemia  Controlled  Continue atorvastatin  Repeat fasting lipids in 12 months    4. Long term (current) use of oral hypoglycemic drugs  Patient is on oral agents for type 2 diabetes management      Return in about 4 months (around 11/29/2022).      Thank you kindly for allowing me to participate in the diabetes care plan for this patient.    Seth Vizcarra MD, FACE, [facility]  07/29/22    CC:   NIRANJAN Butts

## 2022-07-30 LAB
CREAT UR-MCNC: 60.22 MG/DL
MICROALBUMIN UR-MCNC: 1.7 MG/DL
MICROALBUMIN/CREAT UR: 28 MG/G (ref 0–30)

## 2022-08-01 ENCOUNTER — TELEPHONE (OUTPATIENT)
Dept: MEDICAL GROUP | Facility: LAB | Age: 62
End: 2022-08-01
Payer: COMMERCIAL

## 2022-08-01 DIAGNOSIS — E11.9 CONTROLLED TYPE 2 DIABETES MELLITUS WITHOUT COMPLICATION, WITHOUT LONG-TERM CURRENT USE OF INSULIN (HCC): ICD-10-CM

## 2022-08-01 DIAGNOSIS — E03.9 ACQUIRED HYPOTHYROIDISM: ICD-10-CM

## 2022-08-03 ENCOUNTER — OFFICE VISIT (OUTPATIENT)
Dept: PHYSICAL MEDICINE AND REHAB | Facility: MEDICAL CENTER | Age: 62
End: 2022-08-03
Payer: COMMERCIAL

## 2022-08-03 ENCOUNTER — TELEPHONE (OUTPATIENT)
Dept: MEDICAL GROUP | Facility: LAB | Age: 62
End: 2022-08-03

## 2022-08-03 VITALS
TEMPERATURE: 97.2 F | DIASTOLIC BLOOD PRESSURE: 62 MMHG | HEIGHT: 69 IN | SYSTOLIC BLOOD PRESSURE: 118 MMHG | BODY MASS INDEX: 25.37 KG/M2 | WEIGHT: 171.3 LBS | OXYGEN SATURATION: 97 % | HEART RATE: 88 BPM

## 2022-08-03 DIAGNOSIS — G89.29 CHRONIC BILATERAL LOW BACK PAIN WITHOUT SCIATICA: ICD-10-CM

## 2022-08-03 DIAGNOSIS — M47.816 LUMBAR SPONDYLOSIS: ICD-10-CM

## 2022-08-03 DIAGNOSIS — M54.16 LUMBAR RADICULOPATHY: ICD-10-CM

## 2022-08-03 DIAGNOSIS — M54.50 CHRONIC BILATERAL LOW BACK PAIN WITHOUT SCIATICA: ICD-10-CM

## 2022-08-03 DIAGNOSIS — E11.44 DIABETIC AMYOTROPHY ASSOCIATED WITH TYPE 2 DIABETES MELLITUS (HCC): ICD-10-CM

## 2022-08-03 PROCEDURE — 99214 OFFICE O/P EST MOD 30 MIN: CPT | Performed by: PHYSICAL MEDICINE & REHABILITATION

## 2022-08-03 RX ORDER — LOSARTAN POTASSIUM 25 MG/1
25 TABLET ORAL DAILY
Qty: 30 TABLET | Refills: 5 | Status: SHIPPED | OUTPATIENT
Start: 2022-08-03 | End: 2022-08-29

## 2022-08-03 RX ORDER — METOPROLOL SUCCINATE 25 MG/1
25 TABLET, EXTENDED RELEASE ORAL DAILY
Qty: 30 TABLET | Refills: 5 | Status: SHIPPED | OUTPATIENT
Start: 2022-08-03 | End: 2023-01-23

## 2022-08-03 ASSESSMENT — FIBROSIS 4 INDEX: FIB4 SCORE: 0.61

## 2022-08-03 ASSESSMENT — PAIN SCALES - GENERAL: PAINLEVEL: 2=MINIMAL-SLIGHT

## 2022-08-03 ASSESSMENT — PATIENT HEALTH QUESTIONNAIRE - PHQ9: CLINICAL INTERPRETATION OF PHQ2 SCORE: 0

## 2022-08-03 NOTE — PROGRESS NOTES
Follow up patient note  Interventional spine and Pain  Physiatry (physical medicine and  Rehabilitation)       Chief complaint:   Chief Complaint   Patient presents with   • Follow-Up     Back pain          HISTORY    Please see new patient note by Dr Ayala,  for more details.     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     procedures   2/15/2022 left Lumbar Transforaminal Epidural Steroid  at the L3-4 and L4-5 levels.   3/22/2022 left Lumbar Transforaminal Epidural Steroid  at the L3-4 and L4-5 levels 80% improved at the follow-up visit   medial branch block targeting the bilateral L3-4, L4-5, L5-S1 facet joints with 100% pain relief during the diagnostic phase   medial branch block targeting the bilateral L3-4, L4-5, L5-S1 facet joints with 100% pain relief during the diagnostic phase  2022 medial branch radiofrequency neurotomy targeting the bilateral L3-4, L4-5, L5-S1 facet joints with sedation 90% improved post procedure    The patient is very happy after the medial branch radiofrequency neurotomy as above.  He has significantly improved range of motion.  He can do all of his ADLs.  He has returned to his exercises.  Preprocedure pain was 7 out of 10 intensity.  Postprocedure pain is 1 out of 10 intensity.  He denies radiating pain.  He denies numbness tingling and weakness.      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 Valverde   • PONV (postoperative nausea and vomiting)    • Sleep apnea     has CPAP   • Snoring        PSHx:   Past Surgical History:   Procedure Laterality Date   • RADIO FREQUENCY ABLATION ADDITIONAL LEVEL Bilateral 7/6/2022    Procedure: BILATERAL radiofrequency neurotomies medial branch targeting the L3-4, L4-5 and L5-S1 facet joints with fluoroscopic guidance and sedation. Plan for 80 degree C for 90 seconds for each neurotomy.;  Surgeon: Dragan Ayala M.D.;  [facility];  Service: Pain Management   • DE INJ DX/THER AGNT PARAVERT FACET JOINT, DEVON* Bilateral 6/21/2022    Procedure: Diagnostic medial branch blocks targeting the BILATERAL L3-4, L4-5 and L5-S1 facet joints with fluoroscopic guidance #2;  Surgeon: Dragan Ayala M.D.;  Location: [facility];  Service: Pain Management   • LUMBAR MEDIAL BRANCH BLOCKS Bilateral 6/21/2022    Procedure: .;  Surgeon: Dragan Ayala M.D.;  [facility]: [facility];  Service: Pain Management   • CONSCIOUS SEDATION Bilateral 6/21/2022    Procedure: .;  Surgeon: Dragan Ayala M.D.;  [facility]: [facility];  Service: Pain Management   • LUMBAR MEDIAL BRANCH BLOCKS Bilateral 6/14/2022    Procedure: Diagnostic medial branch blocks targeting the BILATERAL L3-4, L4-5 and L5-S1 facet joints with fluoroscopic guidance #1;  Surgeon: Dragan Ayala M.D.;  [facility]: [facility];  Service: Pain Management   • DE TRANSURETHRAL ELEC-SURG PROSTATECTOM N/A 5/27/2022    Procedure: TURP, USING BUTTON ELECTRODE;  " Surgeon: Lee Mckee M.D.;  Location: [facility];  Service: Urology   • BLOCK EPIDURAL STEROID INJECTION Left 3/22/2022    Procedure: LEFT L3-4 and L4-5 transforaminal epidural steroid injection with fluoroscopic guidance;  Surgeon: Dragan Ayala M.D.;  [facility]: [facility];  Service: Pain Management   • IA NJX AA&/STRD TFRML EPI LUMBAR/SACRAL 1 LEVEL Left 2/15/2022    Procedure: LEFT L3-4 and L4-5 transforaminal epidural steroid injection with fluoroscopic guidance;  Surgeon: Dragan Ayala M.D.;  Location: [facility];  Service: Pain Management   • SHOULDER DECOMPRESSION ARTHROSCOPIC Left 1/4/2018    Procedure: SHOULDER DECOMPRESSION ARTHROSCOPIC - SUBACROMIAL;  Surgeon: Linwood Grover M.D.;  Location: [facility];  Service: Orthopedics   • SHOULDER ARTHROSCOPY W/ BICIPITAL TENODESIS REPAIR Left 1/4/2018    Procedure: SHOULDER ARTHROSCOPY W/ BICIPITAL Tenotomy REPAIR;  Surgeon: Linwood Grover M.D.;  Location: [facility];  Service: Orthopedics   • CLAVICLE DISTAL EXCISION Left 1/4/2018    Procedure: CLAVICLE DISTAL EXCISION - POSSIBLE;  Surgeon: Linwood Grover M.D.;  Location: [facility];  Service: Orthopedics   • RECOVERY  4/8/2016    Procedure: CATH LAB [facility] WITH POSSIBLE NAVIN;  Surgeon: [facility];  Location: SURGERY [facility]-POST PROC UNIT [facility];  Service:    • VENTRAL HERNIA REPAIR LAPAROSCOPIC  11/1/2012    Performed by Marcos Ramírez M.D. at [facility]   • KNEE ARTHROSCOPY  1990's    right   • SHOULDER ARTHROSCOPY  1990's    right   • SINUSOTOMIES  1990's    times two surgeries   • TUMOR EXCISION WITH BIOPSY  1990's    right hand - thumb       Family history   Family History   Problem Relation Age of Onset   • Breast Cancer Mother    • Hypertension Mother    • Cancer Mother         breast   • Heart Disease Mother         hx of bypass   • Hypertension Father    •  Arthritis Father    • Diabetes Father         prediabetes   • No Known Problems Sister    • Arthritis Brother    • Heart Disease Other    • Hypertension Other    • Cancer Other    • No Known Problems Brother          Medications:   Outpatient Medications Marked as Taking for the 8/3/22 encounter (Office Visit) with Dragan Ayala M.D.   Medication Sig Dispense Refill   • Canagliflozin (INVOKANA) 300 MG Tab Take 1 Tablet by mouth every day. 90 Tablet 1   • glucose blood (FREESTYLE PRECISION CHOLO TEST) strip 1 Strip by Other route in the morning, at noon, and at bedtime. 100 Strip 11   • Lancet Devices Misc 1 Applicator 3 times a day. 100 Each 11   • tizanidine (ZANAFLEX) 4 MG Tab Take 1 Tablet by mouth every 6 hours as needed (muscle spasms). 30 Tablet 2   • Dulaglutide (TRULICITY) 3 MG/0.5ML Solution Pen-injector Inject 1 Dose under the skin every 7 days. 6 mL 3   • meloxicam (MOBIC) 15 MG tablet Take 1 Tablet by mouth 1 time a day as needed (pain). Do not take other NSAIDs. No refills. 60 Tablet 0   • montelukast (SINGULAIR) 10 MG Tab TAKE 1 TABLET EVERY EVENING 90 Tablet 3   • [DISCONTINUED] DILTIAZem CD (CARDIZEM CD) 180 MG CAPSULE SR 24 HR TAKE 1 CAPSULE DAILY 90 Capsule 3   • metFORMIN (GLUCOPHAGE) 500 MG Tab Take 1 Tablet by mouth 2 times a day with meals. 180 Tablet 2   • gabapentin (NEURONTIN) 100 MG Cap Take 100 mg by mouth 2 times a day as needed.     • albuterol 108 (90 Base) MCG/ACT Aero Soln inhalation aerosol Inhale 2 Puffs every four hours as needed for Shortness of Breath. Pt prefers ventolin if possible. Thank you 3 Each 3   • levothyroxine (SYNTHROID) 50 MCG Tab Take one pill 6 days per week on empty stomach.  75 mcg on 7th day. 90 Tablet 3   • fluticasone-salmeterol (ADVAIR DISKUS) 500-50 MCG/DOSE AEROSOL POWDER, BREATH ACTIVATED Inhale 1 Puff every 12 hours. (Patient taking differently: Inhale every 12 hours.) 3 Each 3   • benazepril (LOTENSIN) 20 MG Tab TAKE 1 TABLET DAILY 90 Tablet 3   •  levothyroxine (SYNTHROID) 75 MCG Tab Take 1 Tablet by mouth every morning on an empty stomach. Taking only one d per week.  50 mcg all other days. 12 Tablet 3   • atorvastatin (LIPITOR) 80 MG tablet Take 1 Tablet by mouth every evening. 90 Tablet 3   • EPINEPHrine (EPIPEN) 0.3 MG/0.3ML Solution Auto-injector solution for injection Inject 0.3 mL into the thigh one time for 1 dose. 1 Each 0   • Continuous Blood Gluc Sensor (FREESTYLE LUNA 14 DAY SENSOR) Misc 1 Application every 14 days. 6 Each 3   • NON SPECIFIED CPAP supplies through [facility] 1 Each 1   • ALPHA LIPOIC ACID PO Take 600 mg by mouth 2 Times a Day.     • Omega-3 Fatty Acids (FISH OIL) 1200 MG CAPS Take  by mouth.     • Cinnamon 500 MG CAPS Take 1,000 mg by mouth.     • pantoprazole (PROTONIX) 40 MG TBEC Take 40 mg by mouth every day.       • Coenzyme Q10 200 MG Cap Take  by mouth every day.       • Cholecalciferol (VITAMIN D) 2000 UNIT TABS Take  by mouth 2 times a day.     • cetirizine (ZYRTEC) 10 MG Tab Take 10 mg by mouth every day.          Current Outpatient Medications on File Prior to Visit   Medication Sig Dispense Refill   • Canagliflozin (INVOKANA) 300 MG Tab Take 1 Tablet by mouth every day. 90 Tablet 1   • glucose blood (FREESTYLE PRECISION CHOLO TEST) strip 1 Strip by Other route in the morning, at noon, and at bedtime. 100 Strip 11   • Lancet Devices Misc 1 Applicator 3 times a day. 100 Each 11   • tizanidine (ZANAFLEX) 4 MG Tab Take 1 Tablet by mouth every 6 hours as needed (muscle spasms). 30 Tablet 2   • Dulaglutide (TRULICITY) 3 MG/0.5ML Solution Pen-injector Inject 1 Dose under the skin every 7 days. 6 mL 3   • meloxicam (MOBIC) 15 MG tablet Take 1 Tablet by mouth 1 time a day as needed (pain). Do not take other NSAIDs. No refills. 60 Tablet 0   • montelukast (SINGULAIR) 10 MG Tab TAKE 1 TABLET EVERY EVENING 90 Tablet 3   • metFORMIN (GLUCOPHAGE) 500 MG Tab Take 1 Tablet by mouth 2 times a day with meals. 180 Tablet 2   •  gabapentin (NEURONTIN) 100 MG Cap Take 100 mg by mouth 2 times a day as needed.     • albuterol 108 (90 Base) MCG/ACT Aero Soln inhalation aerosol Inhale 2 Puffs every four hours as needed for Shortness of Breath. Pt prefers ventolin if possible. Thank you 3 Each 3   • levothyroxine (SYNTHROID) 50 MCG Tab Take one pill 6 days per week on empty stomach.  75 mcg on 7th day. 90 Tablet 3   • fluticasone-salmeterol (ADVAIR DISKUS) 500-50 MCG/DOSE AEROSOL POWDER, BREATH ACTIVATED Inhale 1 Puff every 12 hours. (Patient taking differently: Inhale every 12 hours.) 3 Each 3   • benazepril (LOTENSIN) 20 MG Tab TAKE 1 TABLET DAILY 90 Tablet 3   • levothyroxine (SYNTHROID) 75 MCG Tab Take 1 Tablet by mouth every morning on an empty stomach. Taking only one d per week.  50 mcg all other days. 12 Tablet 3   • atorvastatin (LIPITOR) 80 MG tablet Take 1 Tablet by mouth every evening. 90 Tablet 3   • EPINEPHrine (EPIPEN) 0.3 MG/0.3ML Solution Auto-injector solution for injection Inject 0.3 mL into the thigh one time for 1 dose. 1 Each 0   • Continuous Blood Gluc Sensor (FREESTYLE LUNA 14 DAY SENSOR) Misc 1 Application every 14 days. 6 Each 3   • NON SPECIFIED CPAP supplies through [facility] 1 Each 1   • ALPHA LIPOIC ACID PO Take 600 mg by mouth 2 Times a Day.     • Omega-3 Fatty Acids (FISH OIL) 1200 MG CAPS Take  by mouth.     • Cinnamon 500 MG CAPS Take 1,000 mg by mouth.     • pantoprazole (PROTONIX) 40 MG TBEC Take 40 mg by mouth every day.       • Coenzyme Q10 200 MG Cap Take  by mouth every day.       • Cholecalciferol (VITAMIN D) 2000 UNIT TABS Take  by mouth 2 times a day.     • cetirizine (ZYRTEC) 10 MG Tab Take 10 mg by mouth every day.     • aspirin EC (ECOTRIN) 81 MG TBEC Take 81 mg by mouth every day.         No current facility-administered medications on file prior to visit.         Allergies:   Allergies   Allergen Reactions   • Kiwi Extract Anaphylaxis   • Shellfish Allergy Anaphylaxis   • [place]  "Anaphylaxis   • Ozempic (0.25 Or 0.5 Mg-Dose) [Semaglutide] Nausea     Pt does not recall any reaction   • Sulfa Drugs Rash and Unspecified     rash   • Testosterone Rash     rash       Social Hx:   Social History     Socioeconomic History   • Marital status:      Spouse name: Not on file   • Number of children: Not on file   • Years of education: Not on file   • Highest education level: Not on file   Occupational History   • Not on file   Tobacco Use   • Smoking status: Former Smoker     Packs/day: 0.00     Quit date:      Years since quittin.5   • Smokeless tobacco: Never Used   • Tobacco comment: occasional cigars   Vaping Use   • Vaping Use: Never used   Substance and Sexual Activity   • Alcohol use: Yes     Comment: rare - 1-2 per month (social)   • Drug use: Yes     Types: Marijuana, Inhaled, Oral     Comment: THC/ Occ   • Sexual activity: Not on file     Comment: ; 2 biological kids (2 of his wife's); 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   Other Topics Concern   • Not on file   Social History Narrative   • Not on file     Social Determinants of Health     Financial Resource Strain: Not on file   Food Insecurity: Not on file   Transportation Needs: Not on file   Physical Activity: Not on file   Stress: Not on file   Social Connections: Not on file   Intimate Partner Violence: Not on file   Housing Stability: Not on file         EXAMINATION     Physical Exam:   Vitals: /62 (BP Location: Left arm, Patient Position: Sitting, BP Cuff Size: Adult)   Pulse 88   Temp 36.2 °C (97.2 °F) (Temporal)   Ht 1.753 m (5' 9\")   Wt 77.7 kg (171 lb 4.8 oz)   SpO2 97%     Constitutional:   Body Habitus: Body mass index is 25.3 kg/m².  Cooperation: Fully cooperates with exam  Appearance: Well-groomed no disheveled    Respiratory-  breathing comfortable on room air, no audible wheezing  Cardiovascular- capillary refills less than 2 seconds. No lower extremity edema is noted. "   Psychiatric- alert and oriented ×3. Normal affect.    MSK and Neuro: -      Thoracic/Lumbar Spine/Sacral Spine/Hips   There are no signs of infection around the injection sites.   full  active range of motion with flexion, lateral flexion, and rotation bilaterally.   There is full  active range of motion with lumbar extension.    There is no  pain with lumbar extension.   There is no pain with facet loading maneuver (extension rotation) with axial low back pain on the BILATERAL side(s)    Palpation:   No tenderness to palpation in midline at T1-T12 levels. No tenderness to palpation in the left and right of the midline T1-L5, NEGATIVE for tenderness to palpation to the para-midline region in the lower lumbar levels.  palpation over SI joint: negative bilaterally    palpation in hip or over the gluteus medius tendon insertion: negative bilaterally      Lumbar spine Special tests  Neuro tension  Straight leg test negative bilaterally    Slump test negative bilaterally      Key points for the international standards for neurological classification of spinal cord injury (ISNCSCI) to light touch.     Dermatome R L                                      L2 2 2   L3 2 2   L4 2 2   L5 2 2   S1 2 2   S2 2 2         Motor Exam Lower Extremities    ? Myotome R L   Hip flexion L2 5 5   Knee extension L3 5 5   Ankle dorsiflexion L4 5 5   Toe extension L5 5 5   Ankle plantarflexion S1 5 5             MEDICAL DECISION MAKING    DATA    Labs:   Lab Results   Component Value Date/Time    SODIUM 140 07/22/2022 07:00 AM    POTASSIUM 4.2 07/22/2022 07:00 AM    CHLORIDE 101 07/22/2022 07:00 AM    CO2 26 07/22/2022 07:00 AM    GLUCOSE 121 (H) 07/22/2022 07:00 AM    BUN 14 07/22/2022 07:00 AM    CREATININE 0.78 07/22/2022 07:00 AM    CREATININE 1.1 07/10/2008 09:25 AM        Lab Results   Component Value Date/Time    PROTHROMBTM 13.6 05/02/2022 12:25 PM    INR 1.13 05/02/2022 12:25 PM        Lab Results   Component Value Date/Time    WBC  10.6 05/02/2022 12:25 PM    RBC 5.62 05/02/2022 12:25 PM    HEMOGLOBIN 17.0 05/02/2022 12:25 PM    HEMATOCRIT 50.9 05/02/2022 12:25 PM    MCV 90.6 05/02/2022 12:25 PM    MCH 30.2 05/02/2022 12:25 PM    MCHC 33.4 (L) 05/02/2022 12:25 PM    MPV 10.3 05/02/2022 12:25 PM    NEUTSPOLYS 67.60 06/04/2020 02:18 PM    LYMPHOCYTES 17.60 (L) 06/04/2020 02:18 PM    MONOCYTES 9.80 06/04/2020 02:18 PM    EOSINOPHILS 3.50 06/04/2020 02:18 PM    BASOPHILS 1.20 06/04/2020 02:18 PM    HYPOCHROMIA 1+ 08/27/2013 07:13 AM        Lab Results   Component Value Date/Time    HBA1C 7.1 (A) 07/29/2022 10:16 AM          Imaging:   I personally reviewed following images    MRI lumbar spine 7/27/2021 with moderate left neuroforaminal stenosis at L3-4 and L4-5 with mild right neuroforaminal stenosis at L3-4 and L4-5.  Mild to moderate bilateral neuroforaminal stenosis at L5-S1.  There is facet arthropathy bilaterally at L3-4, L4-5, L5-S1        I reviewed the following radiology reports                         Results for orders placed during the hospital encounter of 07/27/21    MR-LUMBAR SPINE-W/O    Impression  1.  L4-5 annular disc bulge with a central disc protrusion and bilateral facet degeneration. There is borderline central canal narrowing. There is moderate mild right neural foraminal narrowing.    2.  L3-4 annular disc bulge with a left lateral disc protrusion. There is moderate to severe left and mild right neural foraminal narrowing again seen.    3.  L5-S1 degenerative disc disease and facet degeneration results in moderate bilateral neuroforaminal narrowing.        Results for orders placed during the hospital encounter of 07/27/21    MR-LUMBAR SPINE-W/O    Impression  1.  L4-5 annular disc bulge with a central disc protrusion and bilateral facet degeneration. There is borderline central canal narrowing. There is moderate mild right neural foraminal narrowing.    2.  L3-4 annular disc bulge with a left lateral disc protrusion.  There is moderate to severe left and mild right neural foraminal narrowing again seen.    3.  L5-S1 degenerative disc disease and facet degeneration results in moderate bilateral neuroforaminal narrowing.      Results for orders placed during the hospital encounter of 07/27/21    MR-MRA NECK-W/O    Impression  There is no significant stenosis in the visualized extracranial neck arteries.                                                                   Results for orders placed during the hospital encounter of 05/01/17    CT-ABDOMEN-PELVIS WITH    Impression  1.  Findings suggestive of early or low grade obstruction in the mid to distal small bowel. Enteritis with associated ileus could also give a similar appearance.  2.  LEFT calyceal stone                       Results for orders placed during the hospital encounter of 12/22/17    DX-CHEST-2 VIEWS    Impression  Negative two views of the chest.                          Electrodiagnostics   1/18/2022 EMG  IMPRESSION:  This is an abnormal electrodiagnostic study due to evidence of chronic reinnervation changes in the left vastus lateralis/medialis with mild active denervation changes noted.  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       There is no strong electrodiagnostic evidence of a an active right lumbosacral plexopathy though given some responses are unobtainable a mild lumbar plexopathy/femoral neuropathy may be present.  There is no EMG evidence of a right lumbosacral radiculopathy.                                         DIAGNOSIS   Visit Diagnoses     ICD-10-CM   1. Lumbar spondylosis  M47.816   2. Lumbar radiculopathy, stable after epidural  M54.16   3. Chronic bilateral low back pain without sciatica  M54.50    G89.29   4. Diabetic amyotrophy associated with type 2 diabetes  mellitus (HCC)  E11.44         ASSESSMENT and PLAN:     Barron Hewitt  1960 male      Barron was seen today for follow-up.    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  He is very happy with the results of this.  We discussed additions to the home exercise program.     Medications: Stop Zanaflex and Mobic for daily use.  He can use these during flares of pain.    Follow up: As needed with me    Thank you for allowing me to participate in the care of this patient. If you have any questions please not hesitate to contact me.             Please note that this dictation was created using voice recognition software. I have made every reasonable attempt to correct obvious errors but there may be errors of grammar and content that I may have overlooked prior to finalization of this note.      Dragan Ayala MD  Interventional Spine and Sports Physiatry  Physical Medicine and Rehabilitation  [facility]

## 2022-08-03 NOTE — TELEPHONE ENCOUNTER
----- Message from Marcos Carlin M.D. sent at 8/3/2022  7:30 AM PDT -----  Regarding: Medications changes  He has had angina in the past related to his myocardial bridging  If there is now concern about calcium channel blocker related foot swelling would switch Cardizem to metoprolol SR 25 mg daily  Adding losartan is not a problem from a cardiac standpoint  Thank you    ----- Message -----  From: NIRANJAN Butts  Sent: 8/3/2022   6:55 AM PDT  To: Marcos Carlin M.D.    Hi Dr. Carlin,  Aly's endocrinologist suggested that we replace his diltiazem with losartan both for renal protection with DM and b/c of foot swelling.  Are you okay with that suggestion?  Thanks,  Ruthie Unger, patient's PCP.

## 2022-08-04 ENCOUNTER — PATIENT MESSAGE (OUTPATIENT)
Dept: PHYSICAL MEDICINE AND REHAB | Facility: MEDICAL CENTER | Age: 62
End: 2022-08-04
Payer: COMMERCIAL

## 2022-08-06 NOTE — TELEPHONE ENCOUNTER
Thank you for the update Mr Hewitt.  I think it is reasonable to continue the gabapentin.  I think you have improved significantly and you will continue to improve.    Dr. Ayala

## 2022-08-08 ENCOUNTER — PATIENT MESSAGE (OUTPATIENT)
Dept: PHYSICAL MEDICINE AND REHAB | Facility: MEDICAL CENTER | Age: 62
End: 2022-08-08
Payer: COMMERCIAL

## 2022-08-09 ENCOUNTER — HOSPITAL ENCOUNTER (OUTPATIENT)
Dept: LAB | Facility: MEDICAL CENTER | Age: 62
End: 2022-08-09
Attending: INTERNAL MEDICINE
Payer: COMMERCIAL

## 2022-08-09 DIAGNOSIS — E78.5 DYSLIPIDEMIA: ICD-10-CM

## 2022-08-09 DIAGNOSIS — E11.9 CONTROLLED TYPE 2 DIABETES MELLITUS WITHOUT COMPLICATION, WITHOUT LONG-TERM CURRENT USE OF INSULIN (HCC): ICD-10-CM

## 2022-08-09 DIAGNOSIS — E03.9 ACQUIRED HYPOTHYROIDISM: ICD-10-CM

## 2022-08-09 DIAGNOSIS — E11.9 TYPE 2 DIABETES MELLITUS WITHOUT COMPLICATION, WITH LONG-TERM CURRENT USE OF INSULIN (HCC): ICD-10-CM

## 2022-08-09 DIAGNOSIS — Z79.4 TYPE 2 DIABETES MELLITUS WITHOUT COMPLICATION, WITH LONG-TERM CURRENT USE OF INSULIN (HCC): ICD-10-CM

## 2022-08-09 DIAGNOSIS — Z79.84 LONG TERM (CURRENT) USE OF ORAL HYPOGLYCEMIC DRUGS: ICD-10-CM

## 2022-08-09 LAB
ALBUMIN SERPL BCP-MCNC: 4.6 G/DL (ref 3.2–4.9)
ALBUMIN/GLOB SERPL: 2.1 G/DL
ALP SERPL-CCNC: 74 U/L (ref 30–99)
ALT SERPL-CCNC: 23 U/L (ref 2–50)
ANION GAP SERPL CALC-SCNC: 12 MMOL/L (ref 7–16)
AST SERPL-CCNC: 18 U/L (ref 12–45)
BILIRUB SERPL-MCNC: 0.6 MG/DL (ref 0.1–1.5)
BUN SERPL-MCNC: 8 MG/DL (ref 8–22)
CALCIUM SERPL-MCNC: 9.5 MG/DL (ref 8.5–10.5)
CHLORIDE SERPL-SCNC: 103 MMOL/L (ref 96–112)
CO2 SERPL-SCNC: 26 MMOL/L (ref 20–33)
CORTIS SERPL-MCNC: 15.9 UG/DL (ref 0–23)
CREAT SERPL-MCNC: 0.72 MG/DL (ref 0.5–1.4)
GFR SERPLBLD CREATININE-BSD FMLA CKD-EPI: 102 ML/MIN/1.73 M 2
GLOBULIN SER CALC-MCNC: 2.2 G/DL (ref 1.9–3.5)
GLUCOSE SERPL-MCNC: 138 MG/DL (ref 65–99)
POTASSIUM SERPL-SCNC: 4.2 MMOL/L (ref 3.6–5.5)
PROT SERPL-MCNC: 6.8 G/DL (ref 6–8.2)
SODIUM SERPL-SCNC: 141 MMOL/L (ref 135–145)
T4 FREE SERPL-MCNC: 1.26 NG/DL (ref 0.93–1.7)
TSH SERPL DL<=0.005 MIU/L-ACNC: 1.42 UIU/ML (ref 0.38–5.33)

## 2022-08-09 PROCEDURE — 84439 ASSAY OF FREE THYROXINE: CPT

## 2022-08-09 PROCEDURE — 83516 IMMUNOASSAY NONANTIBODY: CPT

## 2022-08-09 PROCEDURE — 82024 ASSAY OF ACTH: CPT

## 2022-08-09 PROCEDURE — 82533 TOTAL CORTISOL: CPT

## 2022-08-09 PROCEDURE — 80053 COMPREHEN METABOLIC PANEL: CPT

## 2022-08-09 PROCEDURE — 36415 COLL VENOUS BLD VENIPUNCTURE: CPT

## 2022-08-09 PROCEDURE — 84443 ASSAY THYROID STIM HORMONE: CPT

## 2022-08-09 RX ORDER — FLASH GLUCOSE SENSOR
1 KIT MISCELLANEOUS
Qty: 6 EACH | Refills: 3 | Status: SHIPPED | OUTPATIENT
Start: 2022-08-09 | End: 2023-05-16 | Stop reason: SDUPTHER

## 2022-08-11 LAB — ACTH PLAS-MCNC: 21.3 PG/ML (ref 7.2–63.3)

## 2022-08-16 ENCOUNTER — APPOINTMENT (OUTPATIENT)
Dept: PHYSICAL MEDICINE AND REHAB | Facility: MEDICAL CENTER | Age: 62
End: 2022-08-16
Payer: COMMERCIAL

## 2022-08-17 LAB — 21HYDROXYLASE AB SER QL: NEGATIVE

## 2022-08-31 DIAGNOSIS — Z79.4 TYPE 2 DIABETES MELLITUS WITHOUT COMPLICATION, WITH LONG-TERM CURRENT USE OF INSULIN (HCC): ICD-10-CM

## 2022-08-31 DIAGNOSIS — E11.9 TYPE 2 DIABETES MELLITUS WITHOUT COMPLICATION, WITH LONG-TERM CURRENT USE OF INSULIN (HCC): ICD-10-CM

## 2022-08-31 RX ORDER — LANCETS 30 GAUGE
EACH MISCELLANEOUS
Qty: 100 EACH | Refills: 2 | Status: SHIPPED | OUTPATIENT
Start: 2022-08-31 | End: 2022-12-02

## 2022-09-22 ENCOUNTER — HOSPITAL ENCOUNTER (OUTPATIENT)
Dept: RADIOLOGY | Facility: MEDICAL CENTER | Age: 62
End: 2022-09-22
Attending: NEUROLOGICAL SURGERY
Payer: COMMERCIAL

## 2022-09-22 DIAGNOSIS — M47.812 SPONDYLOSIS OF CERVICAL REGION WITHOUT MYELOPATHY OR RADICULOPATHY: ICD-10-CM

## 2022-09-22 DIAGNOSIS — G45.0 VERTEBRO-BASILAR ARTERY SYNDROME: ICD-10-CM

## 2022-09-22 PROCEDURE — 72141 MRI NECK SPINE W/O DYE: CPT

## 2022-09-22 PROCEDURE — A9576 INJ PROHANCE MULTIPACK: HCPCS | Performed by: NEUROLOGICAL SURGERY

## 2022-09-22 PROCEDURE — 700117 HCHG RX CONTRAST REV CODE 255: Performed by: NEUROLOGICAL SURGERY

## 2022-09-22 PROCEDURE — 70549 MR ANGIOGRAPH NECK W/O&W/DYE: CPT

## 2022-09-22 RX ADMIN — GADOTERIDOL 20 ML: 279.3 INJECTION, SOLUTION INTRAVENOUS at 09:16

## 2022-10-11 ENCOUNTER — PATIENT MESSAGE (OUTPATIENT)
Dept: MEDICAL GROUP | Facility: LAB | Age: 62
End: 2022-10-11
Payer: COMMERCIAL

## 2022-10-11 DIAGNOSIS — E11.9 CONTROLLED TYPE 2 DIABETES MELLITUS WITHOUT COMPLICATION, WITHOUT LONG-TERM CURRENT USE OF INSULIN (HCC): ICD-10-CM

## 2022-10-11 DIAGNOSIS — R29.898 WEAKNESS OF BOTH LOWER EXTREMITIES: ICD-10-CM

## 2022-10-11 DIAGNOSIS — E11.44 DIABETIC AMYOTROPHY ASSOCIATED WITH TYPE 2 DIABETES MELLITUS (HCC): ICD-10-CM

## 2022-10-11 DIAGNOSIS — M79.605 PAIN OF LEFT LOWER EXTREMITY: ICD-10-CM

## 2022-10-11 RX ORDER — TIZANIDINE 4 MG/1
4 TABLET ORAL EVERY 6 HOURS PRN
Qty: 90 TABLET | Refills: 0 | Status: ON HOLD | OUTPATIENT
Start: 2022-10-11 | End: 2023-05-27 | Stop reason: SDUPTHER

## 2022-10-11 RX ORDER — CANAGLIFLOZIN 300 MG/1
1 TABLET, FILM COATED ORAL DAILY
Qty: 90 TABLET | Refills: 1 | Status: SHIPPED | OUTPATIENT
Start: 2022-10-11 | End: 2023-03-21 | Stop reason: CLARIF

## 2022-10-18 RX ORDER — GABAPENTIN 300 MG/1
CAPSULE ORAL
COMMUNITY
Start: 2022-09-06 | End: 2023-04-10

## 2022-10-18 RX ORDER — BLOOD-GLUCOSE METER
KIT MISCELLANEOUS
COMMUNITY
Start: 2022-09-02

## 2022-11-03 DIAGNOSIS — E78.5 DYSLIPIDEMIA: ICD-10-CM

## 2022-11-04 NOTE — TELEPHONE ENCOUNTER
Received request via: Pharmacy    Was the patient seen in the last year in this department? Yes  4/28/22  Does the patient have an active prescription (recently filled or refills available) for medication(s) requested? No

## 2022-11-05 RX ORDER — ATORVASTATIN CALCIUM 80 MG/1
TABLET, FILM COATED ORAL
Qty: 90 TABLET | Refills: 3 | Status: SHIPPED | OUTPATIENT
Start: 2022-11-05 | End: 2023-09-26

## 2022-12-02 ENCOUNTER — OFFICE VISIT (OUTPATIENT)
Dept: ENDOCRINOLOGY | Facility: MEDICAL CENTER | Age: 62
End: 2022-12-02
Attending: INTERNAL MEDICINE
Payer: COMMERCIAL

## 2022-12-02 VITALS
OXYGEN SATURATION: 96 % | DIASTOLIC BLOOD PRESSURE: 64 MMHG | WEIGHT: 172 LBS | HEART RATE: 72 BPM | HEIGHT: 69 IN | BODY MASS INDEX: 25.48 KG/M2 | SYSTOLIC BLOOD PRESSURE: 122 MMHG

## 2022-12-02 DIAGNOSIS — E78.5 DYSLIPIDEMIA: ICD-10-CM

## 2022-12-02 DIAGNOSIS — Z79.84 LONG TERM (CURRENT) USE OF ORAL HYPOGLYCEMIC DRUGS: ICD-10-CM

## 2022-12-02 DIAGNOSIS — E03.9 ACQUIRED HYPOTHYROIDISM: ICD-10-CM

## 2022-12-02 DIAGNOSIS — E55.9 VITAMIN D DEFICIENCY: ICD-10-CM

## 2022-12-02 DIAGNOSIS — E11.9 TYPE 2 DIABETES MELLITUS WITHOUT COMPLICATION, WITH LONG-TERM CURRENT USE OF INSULIN (HCC): ICD-10-CM

## 2022-12-02 DIAGNOSIS — E11.9 CONTROLLED TYPE 2 DIABETES MELLITUS WITHOUT COMPLICATION, WITHOUT LONG-TERM CURRENT USE OF INSULIN (HCC): ICD-10-CM

## 2022-12-02 DIAGNOSIS — Z79.4 TYPE 2 DIABETES MELLITUS WITHOUT COMPLICATION, WITH LONG-TERM CURRENT USE OF INSULIN (HCC): ICD-10-CM

## 2022-12-02 LAB
HBA1C MFR BLD: 7.2 % (ref 0–5.6)
INT CON NEG: ABNORMAL
INT CON POS: ABNORMAL

## 2022-12-02 PROCEDURE — 99213 OFFICE O/P EST LOW 20 MIN: CPT | Performed by: INTERNAL MEDICINE

## 2022-12-02 PROCEDURE — 99214 OFFICE O/P EST MOD 30 MIN: CPT | Performed by: INTERNAL MEDICINE

## 2022-12-02 PROCEDURE — 92250 FUNDUS PHOTOGRAPHY W/I&R: CPT | Performed by: INTERNAL MEDICINE

## 2022-12-02 PROCEDURE — 83036 HEMOGLOBIN GLYCOSYLATED A1C: CPT | Performed by: INTERNAL MEDICINE

## 2022-12-02 ASSESSMENT — FIBROSIS 4 INDEX: FIB4 SCORE: 0.64

## 2022-12-02 NOTE — PROGRESS NOTES
"RN-CDE Note    Subjective:   Endocrinology Clinic Progress Note  PCP: NIRANJAN Butts    HPI:  Barron Hewitt is a 62 y.o. old patient who is seen today by the Diabetes Nurse Specialist for review of Type 2 Diabetes.  Recent changes in health: Health good except pain in his feet and neuropathy from feet to hip.  DM:   Last A1c:   Lab Results   Component Value Date/Time    HBA1C 7.2 (A) 12/02/2022 12:37 PM      Previous A1c was 7.1 on 7/29/22  A1C GOAL: < 7    Diabetes Medications:   Invokana 300 mg daily  Trulicity 3 mg weekly  Metformin 500 mg BID      Exercise: Walking and stretching  Diet: \"healthy\" diet  in general  Patient's body mass index is 25.4 kg/m². Exercise and nutrition counseling were performed at this visit.    Glucose monitoring frequency: See Lucy download    Hypoglycemic episodes: no  Last Retinal Exam:  Needs  Daily Foot Exam: Yes     Monofilament: done  Monofilament testing with a 10 gram force: sensation intact: intact bilaterally  Visual Inspection: Feet without maceration, ulcers, fissures.  Pedal pulses: intact bilaterally   .foot  Lab Results   Component Value Date/Time    MALBCRT 28 07/29/2022 10:20 AM    MICROALBUR 1.7 07/29/2022 10:20 AM        ACR Albumin/Creatinine Ratio goal <30     HTN:   Blood pressure goal <140/<80 .   Currently Rx ACE/ARB: Yes     Dyslipidemia:    Lab Results   Component Value Date/Time    CHOLSTRLTOT 126 07/22/2022 07:00 AM    LDL 73 07/22/2022 07:00 AM    HDL 34 (A) 07/22/2022 07:00 AM    TRIGLYCERIDE 97 07/22/2022 07:00 AM         Currently Rx Statin: Yes     He  reports that he quit smoking about 2 years ago. He has never used smokeless tobacco.      Plan:     Discussed and educated on:   - All medications, side effects and compliance (discussed carefully)  - Annual eye examinations at Ophthalmology  - Home glucose monitoring emphasized  - Weight control and daily exercise    Recommended medication changes: No medication changes  "

## 2022-12-02 NOTE — PROGRESS NOTES
CHIEF COMPLAINT: Patient is here for follow up of Type 2 Diabetes Mellitus    HPI:     Barron Hewitt is a 62 y.o. male with Type 2 Diabetes Mellitus here for follow up.      Labs from December 2, 2022 show A1c is 7.2%  Labs from 7/29/2022 HbA1c was 7.1%    He was previously seen by Dr. Sandoval and then Yoseph Patel.  He then saw Lizett Mi NP and this is my second time meeting him        He is currently on Metformin 500 mg twice a day,  Invokana 300 mg daily  Trulicity 3 mg weekly        He has been wearing a Lucy GEN 1 CGM for the past 6 months  CGM was downloaded and reviewed today  Average BG was 131 with time in range of 97%        He reports less lows since reducing metformin from 1000 mg twice a day to 500 mg twice a day  Adrenal insufficiency work up was negative   His morning cortisol was 15          He has hyperlipidemia and is on atorvastatin  He denies muscle aches and muscle weakness  LDL cholesterol 73 on July 2022      He sees Dr. Huerta as well  He is no longer on diltiazem and   He is now on Toproil XL and Benazepril   He doesn't have diabetic kidney disease  UACR was < 30 on July 2022      His last eye exam was on November 2021  We are getting an eye exam today in the office        Finally he has primary hypothyroidism and is on Levothyroxine 50 every day except 75 mcg on Sunday  He denies constipation and cold intolerance  TSH is 1.4 on 8/2022          BG Diary:  CGM was downloaded and reviewed    Weight has been stable    Diabetes Complications   Retinopathy: No known retinopathy.  Last eye exam: Point-of-care eye exam today  Neuropathy: Denies paresthesias or numbness in hands or feet. Denies any foot wounds.  Exercise: Minimal.  Diet: Fair.  Patient's medications, allergies, and social histories were reviewed and updated as appropriate.    ROS:     CONS:     No fever, no chills   EYES:     No diplopia, no blurry vision   CV:           No chest pain, no palpitations   PULM:      No SOB, no cough, no hemoptysis.   GI:            No nausea, no vomiting, no diarrhea, no constipation   ENDO:     No polyuria, no polydipsia, no heat intolerance, no cold intolerance       Past Medical History:  Problem List:  2022-07: Long term (current) use of oral hypoglycemic drugs  2022-05: Benign prostatic hyperplasia with urinary frequency  2021-06: Diabetic amyotrophy associated with type 2 diabetes mellitus   ([facility])  2021-05: Neuropathy - proximal, diabetic  2020-08: Acquired hypothyroidism  2019-11: Controlled type 2 diabetes mellitus without complication,   without long-term current use of insulin ([facility])  2019-11: History of small bowel obstruction - 2018 2019-11: Syncope and collapse  2019-02: Seasonal allergies  2018-08: Other insomnia  2017-10: Essential hypertension, benign  2017-10: Dyslipidemia  2017-07: Mild intermittent asthma without complication  2017-05: Ileus ([facility])  2017-05: Erythrocytosis  2017-05: I[facility]LA [facility]  2017-05: HTN (hypertension)  2017-05: Enteritis  2016-04: Coronary artery disease, non-occlusive  2016-03: Angina pectoris ([facility])  2014-01: Chest pain at rest  2014-01: Abnormal radionuclide heart study  2014-01: DM (diabetes mellitus) ([facility])  2014-01: Hyperlipidemia  2014-01: Hypertension  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 Plan for 80 degree C for 90 seconds for each neurotomy.;  Surgeon: Dragan Ayala M.D.;  [facility];  Service: Pain Management    KY INJ DX/THER AGNT PARAVERT FACET JOINT, DEVON* Bilateral 6/21/2022    Procedure: Diagnostic medial branch blocks targeting  the BILATERAL L3-4, L4-5 and L5-S1 facet joints with fluoroscopic guidance #2;  Surgeon: Dragan Ayala M.D.;  Location: [facility];  Service: Pain Management    LUMBAR MEDIAL BRANCH BLOCKS Bilateral 6/21/2022    Procedure: .;  Surgeon: Dragan Ayala M.D.;  Location: [facility];  Service: Pain Management    CONSCIOUS SEDATION Bilateral 6/21/2022    Procedure: .;  Surgeon: Dragan Ayala M.D.;  Location: [facility];  Service: Pain Management    LUMBAR MEDIAL BRANCH BLOCKS Bilateral 6/14/2022    Procedure: Diagnostic medial branch blocks targeting the BILATERAL L3-4, L4-5 and L5-S1 facet joints with fluoroscopic guidance #1;  Surgeon: Dragan Ayala M.D.;  Location: [facility];  Service: Pain Management    UT TRANSURETHRAL ELEC-SURG PROSTATECTOM N/A 5/27/2022    Procedure: TURP, USING BUTTON ELECTRODE;  Surgeon: Lee Mckee M.D.;  Location: [facility];  Service: Urology    BLOCK EPIDURAL STEROID INJECTION Left 3/22/2022    Procedure: LEFT L3-4 and L4-5 transforaminal epidural steroid injection with fluoroscopic guidance;  Surgeon: Dragan Ayala M.D.;  Location: [facility];  Service: Pain Management    UT NJX AA&/STRD TFRML EPI LUMBAR/SACRAL 1 LEVEL Left 2/15/2022    Procedure: LEFT L3-4 and L4-5 transforaminal epidural steroid injection with fluoroscopic guidance;  Surgeon: Dragan Ayala M.D.;  Location: [facility];  Service: Pain Management    SHOULDER DECOMPRESSION ARTHROSCOPIC Left 1/4/2018    Procedure: SHOULDER DECOMPRESSION ARTHROSCOPIC - SUBACROMIAL;  Surgeon: Linwood Grover M.D.;  Location: [facility];  Service: Orthopedics    SHOULDER ARTHROSCOPY W/ BICIPITAL TENODESIS REPAIR Left 1/4/2018    Procedure: SHOULDER ARTHROSCOPY W/ BICIPITAL Tenotomy REPAIR;  Surgeon: Linwood Grover M.D.;  Location: [facility];  Service:  "Orthopedics    CLAVICLE DISTAL EXCISION Left 2018    Procedure: CLAVICLE DISTAL EXCISION - POSSIBLE;  Surgeon: Linwood Grover M.D.;  Location: SURGERY [facility];  Service: Orthopedics    RECOVERY  2016    Procedure: CATH LAB [facility] WITH POSSIBLE NAIVN;  Surgeon: Recoveryonly Surgery;  Location: SURGERY PRE-POST PROC UNIT [facility];  Service:     VENTRAL HERNIA REPAIR LAPAROSCOPIC  2012    Performed by Marcos Ramírez M.D. at SURGERY [facility]    KNEE ARTHROSCOPY      right    SHOULDER ARTHROSCOPY      right    SINUSOTOMIES      times two surgeries    TUMOR EXCISION WITH BIOPSY      right hand - thumb        Allergies:  Kiwi extract, Shellfish allergy, Strawberry, Ozempic (0.25 or 0.5 mg-dose) [semaglutide], Sulfa drugs, and Testosterone     Social History:  Social History     Tobacco Use    Smoking status: Former     Packs/day: 0.00     Types: Cigarettes     Quit date: 2020     Years since quittin.9    Smokeless tobacco: Never    Tobacco comments:     occasional cigars   Vaping Use    Vaping Use: Never used   Substance Use Topics    Alcohol use: Yes     Comment: rare - 1-2 per month (social)    Drug use: Yes     Types: Marijuana, Inhaled, Oral     Comment: THC/ Occ        Family History:   family history includes Arthritis in his brother and father; Breast Cancer in his mother; Cancer in his mother and another family member; Diabetes in his father; Heart Disease in his mother and another family member; Hypertension in his father, mother, and another family member; No Known Problems in his brother and sister.      PHYSICAL EXAM:   OBJECTIVE:  Vital signs: /64   Pulse 72   Ht 1.753 m (5' 9\")   Wt 78 kg (172 lb)   SpO2 96%   BMI 25.40 kg/m²   GENERAL: Well-developed, well-nourished in no apparent distress.   EYE:  No ocular asymmetry, PERRLA  HENT: Pink, moist mucous membranes.    NECK: No thyromegaly.   CARDIOVASCULAR:  No murmurs  LUNGS: Clear breath " sounds  ABDOMEN: Soft, nontender   EXTREMITIES: No clubbing, cyanosis, or edema.   NEUROLOGICAL: No gross focal motor abnormalities   LYMPH: No cervical adenopathy palpated.   SKIN: No rashes, lesions.     Labs:  Lab Results   Component Value Date/Time    HBA1C 7.2 (A) 12/02/2022 12:37 PM        Lab Results   Component Value Date/Time    WBC 10.6 05/02/2022 12:25 PM    RBC 5.62 05/02/2022 12:25 PM    HEMOGLOBIN 17.0 05/02/2022 12:25 PM    MCV 90.6 05/02/2022 12:25 PM    MCH 30.2 05/02/2022 12:25 PM    MCHC 33.4 (L) 05/02/2022 12:25 PM    RDW 45.7 05/02/2022 12:25 PM    MPV 10.3 05/02/2022 12:25 PM       Lab Results   Component Value Date/Time    SODIUM 141 08/09/2022 06:15 AM    POTASSIUM 4.2 08/09/2022 06:15 AM    CHLORIDE 103 08/09/2022 06:15 AM    CO2 26 08/09/2022 06:15 AM    ANION 12.0 08/09/2022 06:15 AM    GLUCOSE 138 (H) 08/09/2022 06:15 AM    BUN 8 08/09/2022 06:15 AM    CREATININE 0.72 08/09/2022 06:15 AM    CREATININE 1.1 07/10/2008 09:25 AM    CALCIUM 9.5 08/09/2022 06:15 AM    ASTSGOT 18 08/09/2022 06:15 AM    ALTSGPT 23 08/09/2022 06:15 AM    TBILIRUBIN 0.6 08/09/2022 06:15 AM    ALBUMIN 4.6 08/09/2022 06:15 AM    TOTPROTEIN 6.8 08/09/2022 06:15 AM    GLOBULIN 2.2 08/09/2022 06:15 AM    AGRATIO 2.1 08/09/2022 06:15 AM       Lab Results   Component Value Date/Time    CHOLSTRLTOT 126 07/22/2022 0700    TRIGLYCERIDE 97 07/22/2022 0700    HDL 34 (A) 07/22/2022 0700    LDL 73 07/22/2022 0700       Lab Results   Component Value Date/Time    MALBCRT 28 07/29/2022 10:20 AM    MICROALBUR 1.7 07/29/2022 10:20 AM        Lab Results   Component Value Date/Time    TSHULTRASEN 2.130 06/09/2021 1313     No results found for: FREEDIR  Lab Results   Component Value Date/Time    FREET3 3.59 12/22/2017 0926     No results found for: THYSTIMIG        ASSESSMENT/PLAN:     1. Controlled type 2 diabetes mellitus without complication, without long-term current use of insulin (HCC)  Fair control  Explained patient that his A1c  is fair at 7.2%  He feels better and has less hypoglycemia  Continue metformin 500 mg twice a day  Continue Farxiga 10 mg daily  Continue Trulicity 3 mg weekly  He is updated with his labs  We are getting an eye exam today  Follow-up in 4 months with Viola with complete labs      2. Acquired hypothyroidism  Controlled  Continue Levothyroxine 50 every day with 75 on Sunday  Reviewed proper administration of thyroid hormone  Patient should take thyroid hormone 30-60 minutes before breakfast on an empty stomach plain water and not take it together with food, iron, calcium, and antacids.  Iron, calcium, and antacids should be taken at least 4 hours apart from thyroid hormone.  Repeat TSH in 3 to 6 months    3. Dyslipidemia  Controlled  Continue atorvastatin  Repeat fasting lipids in 3-6  months    4. Long term (current) use of oral hypoglycemic drugs  Patient is on oral agents for type 2 diabetes management    5. Vitamin D deficiency  Stable  We will check calcium vitamin D with future labs      Return in about 4 months (around 4/2/2023).      Thank you kindly for allowing me to participate in the diabetes care plan for this patient.    Seth Vizcarra MD, FACE, [facility]      CC:   MARTHA Butts.

## 2022-12-06 DIAGNOSIS — I10 BENIGN ESSENTIAL HTN: ICD-10-CM

## 2022-12-06 LAB — RETINAL SCREEN: NEGATIVE

## 2022-12-07 RX ORDER — BENAZEPRIL HYDROCHLORIDE 20 MG/1
TABLET ORAL
Qty: 90 TABLET | Refills: 3 | Status: ON HOLD | OUTPATIENT
Start: 2022-12-07 | End: 2023-05-26

## 2022-12-07 NOTE — TELEPHONE ENCOUNTER
Received request via: Pharmacy  4/28/22  Was the patient seen in the last year in this department? Yes    Does the patient have an active prescription (recently filled or refills available) for medication(s) requested? No    Does the patient have skilled nursing Plus and need 100 day supply (blood pressure, diabetes and cholesterol meds only)? Patient does not have SCP

## 2022-12-13 DIAGNOSIS — E03.9 ACQUIRED HYPOTHYROIDISM: ICD-10-CM

## 2022-12-14 RX ORDER — LEVOTHYROXINE SODIUM 0.05 MG/1
TABLET ORAL
Qty: 90 TABLET | Refills: 3 | Status: SHIPPED | OUTPATIENT
Start: 2022-12-14 | End: 2023-12-11

## 2022-12-14 NOTE — TELEPHONE ENCOUNTER
Received request via: Pharmacy  MAIL ORDER  Was the patient seen in the last year in this department? Yes  4/28/22  Does the patient have an active prescription (recently filled or refills available) for medication(s) requested? No    Does the patient have [facility] Plus and need 100 day supply (blood pressure, diabetes and cholesterol meds only)? Patient does not have SCP

## 2022-12-15 ENCOUNTER — OFFICE VISIT (OUTPATIENT)
Dept: MEDICAL GROUP | Facility: LAB | Age: 62
End: 2022-12-15
Payer: COMMERCIAL

## 2022-12-15 VITALS
BODY MASS INDEX: 24.44 KG/M2 | TEMPERATURE: 96.4 F | RESPIRATION RATE: 18 BRPM | WEIGHT: 165 LBS | OXYGEN SATURATION: 95 % | HEART RATE: 88 BPM | DIASTOLIC BLOOD PRESSURE: 76 MMHG | SYSTOLIC BLOOD PRESSURE: 124 MMHG | HEIGHT: 69 IN

## 2022-12-15 DIAGNOSIS — G47.33 OSA (OBSTRUCTIVE SLEEP APNEA): ICD-10-CM

## 2022-12-15 DIAGNOSIS — E11.9 CONTROLLED TYPE 2 DIABETES MELLITUS WITHOUT COMPLICATION, WITHOUT LONG-TERM CURRENT USE OF INSULIN (HCC): ICD-10-CM

## 2022-12-15 DIAGNOSIS — Z23 NEED FOR PNEUMOCOCCAL VACCINATION: ICD-10-CM

## 2022-12-15 DIAGNOSIS — M48.02 CERVICAL SPINAL STENOSIS: ICD-10-CM

## 2022-12-15 DIAGNOSIS — J01.90 ACUTE SINUSITIS, RECURRENCE NOT SPECIFIED, UNSPECIFIED LOCATION: ICD-10-CM

## 2022-12-15 PROCEDURE — 90471 IMMUNIZATION ADMIN: CPT | Performed by: NURSE PRACTITIONER

## 2022-12-15 PROCEDURE — 99214 OFFICE O/P EST MOD 30 MIN: CPT | Mod: 25 | Performed by: NURSE PRACTITIONER

## 2022-12-15 PROCEDURE — 90677 PCV20 VACCINE IM: CPT | Performed by: NURSE PRACTITIONER

## 2022-12-15 RX ORDER — AMOXICILLIN AND CLAVULANATE POTASSIUM 875; 125 MG/1; MG/1
1 TABLET, FILM COATED ORAL 2 TIMES DAILY
Qty: 14 TABLET | Refills: 0 | Status: SHIPPED | OUTPATIENT
Start: 2022-12-15 | End: 2022-12-22

## 2022-12-15 RX ORDER — ZOLPIDEM TARTRATE 5 MG/1
5 TABLET ORAL NIGHTLY PRN
Qty: 30 TABLET | Refills: 2 | Status: SHIPPED | OUTPATIENT
Start: 2022-12-15 | End: 2023-01-14

## 2022-12-15 ASSESSMENT — FIBROSIS 4 INDEX: FIB4 SCORE: 0.64

## 2022-12-15 NOTE — PROGRESS NOTES
"Chief Complaint   Patient presents with    Sinus Problem     Mucus is green and white, pressure in the eyes     HPI:  Aly is a 63 yo est male here with c/o new onset URI symptoms.  8 d of congestion, sinus pressure / pain and not feeling well.  Mucus:  green / white from nose / cough.  Hx of recurrent sinusitis.  Otc: mucinex only.  Symptoms persistent, not worsening / improving.  Nonsmoker.      Low back pain: known DDD. Had nerve ablation which helped, about 3 mo ago.  Now seeing Dr. Cochran re: cervical spine today which also shows severe DDD with spinal stenosis.   Taking gabapentin as needed which helps but makes him groggy.     ISELA:  uses ambien after 2-3 nights of poor sleep and needs a refill. Denies negative side effects of ambien.      Type 2 diabetes: Chronic issue.  Followed by endocrinology, Dr. Lyon.  Blood sugars have been doing really well.  Last A1c was around 7.  No recent medication changes.  Has lost healthy amount of weight and feeling well.    Exam:  /76 (BP Location: Left arm, Patient Position: Sitting, BP Cuff Size: Adult)   Pulse 88   Temp (!) 35.8 °C (96.4 °F) (Temporal)   Resp 18   Ht 1.753 m (5' 9\")   Wt 74.8 kg (165 lb)   SpO2 95%   Gen: NAD  Resp: nonlabored.  Able to speak in full sentences  Psy: pleasant / cooperative.   Neuro:  Alert and oriented x 3    A/P:  1. Controlled type 2 diabetes mellitus without complication, without long-term current use of insulin (HCC)  -stable and followed by endo.  Updated pcv 20 today.     2. Acute sinusitis, recurrence not specified, unspecified location  - amoxicillin-clavulanate (AUGMENTIN) 875-125 MG Tab; Take 1 Tablet by mouth 2 times a day for 7 days.  Dispense: 14 Tablet; Refill: 0  -Okay to continue Mucinex.  Discussed high water intake.  Encouraged sinus irrigation.  Follow-up 7 to 10 days via MyChart if not improved, sooner with worsening.  3. Cervical spinal stenosis  -seeing Dr. Cochran today re: this.  Has gabapentin if " needed but fortunately is not in significant pain.      4. ISELA (obstructive sleep apnea)  -stable with prn use of ambien.    - zolpidem (AMBIEN) 5 MG Tab; Take 1 Tablet by mouth at bedtime as needed for Sleep for up to 30 days.  Dispense: 30 Tablet; Refill: 2    5. Need for pneumococcal vaccination  - Pneumococcal Conjugate Vaccine 20-Valent (19 yrs+)

## 2022-12-15 NOTE — LETTER
December 15, 2022       Patient: Barron Hewitt   YOB: 1960   Date of Visit: 12/15/2022         To Whom It May Concern:    In my medical opinion, I recommend that Barron Hewitt remain out of work from 12/12 - 12/16/2022 due to illness.     If you have any questions or concerns, please don't hesitate to call [phone]          Sincerely,          JOSE ENRIQUE Butts.P.N.  Electronically Signed

## 2023-01-03 ENCOUNTER — TELEMEDICINE (OUTPATIENT)
Dept: MEDICAL GROUP | Facility: LAB | Age: 63
End: 2023-01-03
Payer: COMMERCIAL

## 2023-01-03 VITALS — WEIGHT: 160 LBS | BODY MASS INDEX: 23.63 KG/M2

## 2023-01-03 DIAGNOSIS — R63.4 WEIGHT LOSS: ICD-10-CM

## 2023-01-03 DIAGNOSIS — J30.2 SEASONAL ALLERGIES: ICD-10-CM

## 2023-01-03 DIAGNOSIS — E11.9 TYPE 2 DIABETES MELLITUS WITHOUT COMPLICATION, WITHOUT LONG-TERM CURRENT USE OF INSULIN (HCC): ICD-10-CM

## 2023-01-03 DIAGNOSIS — J32.9 RECURRENT SINUS INFECTIONS: ICD-10-CM

## 2023-01-03 PROCEDURE — 99214 OFFICE O/P EST MOD 30 MIN: CPT | Mod: 95 | Performed by: NURSE PRACTITIONER

## 2023-01-03 RX ORDER — MONTELUKAST SODIUM 10 MG/1
10 TABLET ORAL EVERY EVENING
Qty: 30 TABLET | Refills: 2 | Status: SHIPPED | OUTPATIENT
Start: 2023-01-03 | End: 2023-09-05

## 2023-01-03 RX ORDER — MONTELUKAST SODIUM 10 MG/1
10 TABLET ORAL EVERY EVENING
Qty: 90 TABLET | Refills: 0 | Status: SHIPPED | OUTPATIENT
Start: 2023-01-03 | End: 2023-01-03 | Stop reason: SDUPTHER

## 2023-01-03 RX ORDER — DOXYCYCLINE HYCLATE 100 MG
100 TABLET ORAL 2 TIMES DAILY
Qty: 14 TABLET | Refills: 0 | Status: SHIPPED | OUTPATIENT
Start: 2023-01-03 | End: 2023-01-10

## 2023-01-03 ASSESSMENT — FIBROSIS 4 INDEX: FIB4 SCORE: 0.64

## 2023-01-03 NOTE — TELEPHONE ENCOUNTER
Received request via: Patient    Was the patient seen in the last year in this department? Yes  12/15/22  Does the patient have an active prescription (recently filled or refills available) for medication(s) requested? No    Does the patient have group home Plus and need 100 day supply (blood pressure, diabetes and cholesterol meds only)? Medication is not for cholesterol, blood pressure or diabetes

## 2023-01-04 NOTE — PROGRESS NOTES
"Virtual Visit: Established Patient   This visit was conducted via Zoom using secure and encrypted videoconferencing technology.   The patient was in their home in the state of [place].    The patient's identity was confirmed and verbal consent was obtained for this virtual visit.    Subjective:   CC:   F/u    HPI:  Aly is a 62 y.o. male presenting for evaluation and management of:    Sinusitis symptoms:  tx Augmentin 12/15/2022 and felt \"really good,\" for a week but symptoms returned about a week ago.  Mucus currently clear to green, pain behind eyes / head, right ear pain.  Taking zyrtec / mucinex daily.  Hx of recurrent sinusitis with surgery in 1990s.  ENT is Dr. Stern but struggles getting in there.      Asthma:  taking singular 10 mg along with advair bid and asthma has been well controlled lately with exception of \"tightening up of larynx,\" periodically.  Denies wheezing / SOB at rest.  Needs a refill of Singulair at his local pharmacy as his mail order is behind on sending this.    Weight loss:  down to 160 lbs which makes sense to him with lower appetite than normal along with taking 2 medications that cause him to lose weight -Invokana and Trulicity.  A1c 7.2% recently.  Full easily.  Colonoscopy UTD.       Chronic low back pain:  known lumbar ddd but not significant spinal stenosis on lumbar spine MRI 7/2021.  Had a consult with Dr. Cochran regarding neck and wants another opinion regarding neck and low back as neck surgery was suggested.  Also sees Dr. Ayala as a physiatrist - ablation helped in the past and understands they don't last forever.      Current medicines (including changes today)  Current Outpatient Medications   Medication Sig Dispense Refill    benazepril (LOTENSIN) 20 MG Tab TAKE 1 TABLET DAILY 90 Tablet 3    montelukast (SINGULAIR) 10 MG Tab Take 1 Tablet by mouth every evening. 90 Tablet 0    ADVAIR DISKUS 500-50 MCG/ACT AEROSOL POWDER, BREATH ACTIVATED USE 1 INHALATION EVERY 12 HOURS " 14 Each 0    zolpidem (AMBIEN) 5 MG Tab Take 1 Tablet by mouth at bedtime as needed for Sleep for up to 30 days. 30 Tablet 2    levothyroxine (SYNTHROID) 50 MCG Tab TAKE 1 TABLET 6 DAYS PER WEEK ON AN EMPTY STOMACH, 75 MCG ON 7TH DAY. 90 Tablet 3    atorvastatin (LIPITOR) 80 MG tablet TAKE 1 TABLET EVERY EVENING 90 Tablet 3    gabapentin (NEURONTIN) 300 MG Cap TAKE 1 CAPSULE BY MOUTH THREE TIMES DAILY AS NEEDED FOR PAIN      Blood Glucose Monitoring Suppl (FREESTYLE LITE) w/Device Kit USE DAILY AS DIRECTED      Canagliflozin (INVOKANA) 300 MG Tab Take 1 Tablet by mouth every day. 90 Tablet 1    tizanidine (ZANAFLEX) 4 MG Tab Take 1 Tablet by mouth every 6 hours as needed (muscle spasms). 90 Tablet 0    Blood Glucose Test Strips Test strips order:  Freestyle Freedom Lite test strips  testing one time per day 100 Strip 2    Continuous Blood Gluc Sensor (FREESTYLE LUNA 14 DAY SENSOR) Misc 1 Application every 14 days. 6 Each 3    metoprolol SR (TOPROL XL) 25 MG TABLET SR 24 HR Take 1 Tablet by mouth every day. At night for BP 30 Tablet 5    glucose blood (FREESTYLE PRECISION CHOLO TEST) strip 1 Strip by Other route in the morning, at noon, and at bedtime. 100 Strip 11    Lancet Devices Misc 1 Applicator 3 times a day. 100 Each 11    Dulaglutide (TRULICITY) 3 MG/0.5ML Solution Pen-injector Inject 1 Dose under the skin every 7 days. 6 mL 3    meloxicam (MOBIC) 15 MG tablet Take 1 Tablet by mouth 1 time a day as needed (pain). Do not take other NSAIDs. No refills. 60 Tablet 0    metFORMIN (GLUCOPHAGE) 500 MG Tab Take 1 Tablet by mouth 2 times a day with meals. 180 Tablet 2    albuterol 108 (90 Base) MCG/ACT Aero Soln inhalation aerosol Inhale 2 Puffs every four hours as needed for Shortness of Breath. Pt prefers ventolin if possible. Thank you 3 Each 3    levothyroxine (SYNTHROID) 75 MCG Tab Take 1 Tablet by mouth every morning on an empty stomach. Taking only one d per week.  50 mcg all other days. 12 Tablet 3     EPINEPHrine (EPIPEN) 0.3 MG/0.3ML Solution Auto-injector solution for injection Inject 0.3 mL into the thigh one time for 1 dose. 1 Each 0    NON SPECIFIED CPAP supplies through [facility] 1 Each 1    ALPHA LIPOIC ACID PO Take 600 mg by mouth 2 Times a Day.      Omega-3 Fatty Acids (FISH OIL) 1200 MG CAPS Take  by mouth.      Cinnamon 500 MG CAPS Take 1,000 mg by mouth.      aspirin EC (ECOTRIN) 81 MG TBEC Take 81 mg by mouth every day.        pantoprazole (PROTONIX) 40 MG TBEC Take 40 mg by mouth every day.        Coenzyme Q10 200 MG Cap Take  by mouth every day.        Cholecalciferol (VITAMIN D) 2000 UNIT TABS Take  by mouth 2 times a day.      cetirizine (ZYRTEC) 10 MG Tab Take 10 mg by mouth every day.       No current facility-administered medications for this visit.       Patient Active Problem List    Diagnosis Date Noted    Cervical spinal stenosis 12/15/2022    Long term (current) use of oral hypoglycemic drugs 07/29/2022    Benign prostatic hyperplasia with urinary frequency 05/27/2022    PONV (postoperative nausea and vomiting)     Asthma     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) 06/09/2021    Neuropathy - proximal, diabetic 05/03/2021    Acquired hypothyroidism 08/25/2020    Controlled type 2 diabetes mellitus without complication, without long-term current use of insulin ([facility]) 11/07/2019    History of small bowel obstruction - 2018 11/07/2019    Syncope and collapse 11/07/2019    Seasonal allergies 02/28/2019    Other insomnia 08/17/2018    Essential hypertension, benign 10/09/2017    Dyslipidemia 10/09/2017    Mild intermittent asthma without complication 07/27/2017    Erythrocytosis 05/01/2017    ISELA on [facility] - [facility] 05/01/2017    Coronary artery disease, non-occlusive 04/15/2016    Angina pectoris (HCC) 03/11/2016    Abnormal radionuclide heart study 01/31/2014    Coronary-myocardial bridge 11/28/2012        Objective:   There were no vitals taken  "for this visit.    Physical Exam:  Gen: NAD  Resp: nonlabored.  Able to speak in full sentences  Psy: pleasant / cooperative.   Neuro:  Alert and oriented x 3      Assessment and Plan:   The following treatment plan was discussed:   \"  1. Seasonal allergies  montelukast (SINGULAIR) 10 MG Tab      2. Type 2 diabetes mellitus without complication, without long-term current use of insulin (HCC)  Dulaglutide 1.5 MG/0.5ML Solution Pen-injector      3. Weight loss        4. Recurrent sinus infections  doxycycline (VIBRAMYCIN) 100 MG Tab      \"  Treated with doxycycline for recurrent sinusitis symptoms and if symptoms persist beyond the next 2 to 3 weeks, recommend sinus imaging and possible ENT consult.    Refilled Singulair which works well for allergies and asthma for him.    Reduced Trulicity to 1.5 mg weekly as he prefers not to lose anymore weight.  If weight loss does not slow down, consider reducing Invokana to 150 mg.  He will keep an eye on his blood sugars and notify us if blood sugar readings are consistently climbing above 150.    Encouraged him to consult Dr. Carmona regarding neck/back and second opinion.           "

## 2023-01-09 ENCOUNTER — OFFICE VISIT (OUTPATIENT)
Dept: PHYSICAL MEDICINE AND REHAB | Facility: MEDICAL CENTER | Age: 63
End: 2023-01-09
Payer: COMMERCIAL

## 2023-01-09 VITALS
BODY MASS INDEX: 24.72 KG/M2 | WEIGHT: 166.89 LBS | OXYGEN SATURATION: 96 % | TEMPERATURE: 96.5 F | HEART RATE: 80 BPM | DIASTOLIC BLOOD PRESSURE: 72 MMHG | HEIGHT: 69 IN | SYSTOLIC BLOOD PRESSURE: 112 MMHG

## 2023-01-09 DIAGNOSIS — G89.29 CHRONIC NECK PAIN: ICD-10-CM

## 2023-01-09 DIAGNOSIS — M54.50 CHRONIC BILATERAL LOW BACK PAIN WITHOUT SCIATICA: ICD-10-CM

## 2023-01-09 DIAGNOSIS — M48.02 CERVICAL STENOSIS OF SPINAL CANAL: ICD-10-CM

## 2023-01-09 DIAGNOSIS — M54.12 CERVICAL RADICULOPATHY: ICD-10-CM

## 2023-01-09 DIAGNOSIS — M47.816 LUMBAR SPONDYLOSIS: ICD-10-CM

## 2023-01-09 DIAGNOSIS — M54.2 CHRONIC NECK PAIN: ICD-10-CM

## 2023-01-09 DIAGNOSIS — S86.112A STRAIN OF GASTROCNEMIUS MUSCLE OF LEFT LOWER EXTREMITY, INITIAL ENCOUNTER: ICD-10-CM

## 2023-01-09 DIAGNOSIS — G89.29 CHRONIC BILATERAL LOW BACK PAIN WITHOUT SCIATICA: ICD-10-CM

## 2023-01-09 DIAGNOSIS — S76.012A STRAIN OF LEFT HIP ADDUCTOR MUSCLE, INITIAL ENCOUNTER: ICD-10-CM

## 2023-01-09 DIAGNOSIS — M54.16 LUMBAR RADICULOPATHY: ICD-10-CM

## 2023-01-09 PROCEDURE — 99214 OFFICE O/P EST MOD 30 MIN: CPT | Performed by: PHYSICAL MEDICINE & REHABILITATION

## 2023-01-09 ASSESSMENT — PAIN SCALES - GENERAL: PAINLEVEL: 6=MODERATE PAIN

## 2023-01-09 ASSESSMENT — FIBROSIS 4 INDEX: FIB4 SCORE: 0.64

## 2023-01-09 ASSESSMENT — PATIENT HEALTH QUESTIONNAIRE - PHQ9: CLINICAL INTERPRETATION OF PHQ2 SCORE: 0

## 2023-01-09 NOTE — PROGRESS NOTES
Follow up patient note  Interventional spine and Pain  Physiatry (physical medicine and  Rehabilitation)       Chief complaint:   Chief Complaint   Patient presents with    Follow-Up     Lumbar spondylosis          HISTORY    Please see new patient note by Dr Ayala,  for more details.     HPI  Patient identification: Barron Hewitt ,  1960,   With Diagnoses of Strain of left hip adductor muscle, initial encounter, Strain of gastrocnemius muscle of left lower extremity, initial encounter, Lumbar radiculopathy, Lumbar spondylosis, Chronic bilateral low back pain without sciatica, Cervical radiculopathy, Cervical stenosis of spinal canal, and Chronic neck pain were pertinent to this visit.     procedures   2/15/2022 left Lumbar Transforaminal Epidural Steroid  at the L3-4 and L4-5 levels.   3/22/2022 left Lumbar Transforaminal Epidural Steroid  at the L3-4 and L4-5 levels 80% improved at the follow-up visit   medial branch block targeting the bilateral L3-4, L4-5, L5-S1 facet joints with 100% pain relief during the diagnostic phase   medial branch block targeting the bilateral L3-4, L4-5, L5-S1 facet joints with 100% pain relief during the diagnostic phase  2022 medial branch radiofrequency neurotomy targeting the bilateral L3-4, L4-5, L5-S1 facet joints with sedation 90% improved post procedure    Low back pain stable and improved after RFN. There is left leg pain on the anterior aspect of the left leg towards the knee as well as pain radiating down the left leg to the gastrocnemius region aching and spasms. Some right sided symptoms present but minimal on the left. 6-10/10 in intensity.     For many years patient has had chronic low-grade bilateral neck pain which can radiate down the left arm worse than the right.  When this does radiate down the arm it is associated with numbness tingling sensation.  This radiates towards the dorsal aspect of the first second and third  "digits.  This is not causing functional deficits.  The patient is able to play guitar.  He does all his ADLs in regards to his neck and bilateral upper extremities.  He denies weakness.  Denies trauma to the cervical spine.      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 Nicolle TEMPLE (postoperative nausea and vomiting)     Sleep apnea     has CPAP    Snoring        PSHx:   Past Surgical History:   Procedure Laterality Date    RADIO FREQUENCY ABLATION ADDITIONAL LEVEL Bilateral 7/6/2022    Procedure: BILATERAL radiofrequency neurotomies medial branch targeting the L3-4, L4-5 and L5-S1 facet joints with fluoroscopic guidance and sedation. Plan for 80 degree C for 90 seconds for each neurotomy.;  Surgeon: Dragna Ayala M.D.;  [facility];  Service: Pain Management    WV INJ DX/THER AGNT PARAVERT FACET JOINT, DEVON* Bilateral 6/21/2022    Procedure: Diagnostic medial branch blocks targeting the BILATERAL L3-4, L4-5 and L5-S1 facet joints with fluoroscopic guidance #2;  Surgeon: Dragan Ayala M.D.;  Location: [facility];  Service: Pain Management    LUMBAR MEDIAL BRANCH BLOCKS Bilateral 6/21/2022    Procedure: .;  Surgeon: Dragan Ayala M.D.;  Location: [facility];  Service: Pain Management "    CONSCIOUS SEDATION Bilateral 6/21/2022    Procedure: .;  Surgeon: Dragan Ayala M.D.;  Location: [facility];  Service: Pain Management    LUMBAR MEDIAL BRANCH BLOCKS Bilateral 6/14/2022    Procedure: Diagnostic medial branch blocks targeting the BILATERAL L3-4, L4-5 and L5-S1 facet joints with fluoroscopic guidance #1;  Surgeon: Dragan Ayala M.D.;  Location: [facility];  Service: Pain Management    WV TRANSURETHRAL ELEC-SURG PROSTATECTOM N/A 5/27/2022    Procedure: TURP, USING BUTTON ELECTRODE;  Surgeon: Lee Mckee M.D.;  Location: [facility];  Service: Urology    BLOCK EPIDURAL STEROID INJECTION Left 3/22/2022    Procedure: LEFT L3-4 and L4-5 transforaminal epidural steroid injection with fluoroscopic guidance;  Surgeon: Dragan Ayala M.D.;  Location: [facility];  Service: Pain Management    WV NJX AA&/STRD TFRML EPI LUMBAR/SACRAL 1 LEVEL Left 2/15/2022    Procedure: LEFT L3-4 and L4-5 transforaminal epidural steroid injection with fluoroscopic guidance;  Surgeon: Dragan Ayala M.D.;  Location: [facility];  Service: Pain Management    SHOULDER DECOMPRESSION ARTHROSCOPIC Left 1/4/2018    Procedure: SHOULDER DECOMPRESSION ARTHROSCOPIC - SUBACROMIAL;  Surgeon: Linwood Grover M.D.;  Location: [facility];  Service: Orthopedics    SHOULDER ARTHROSCOPY W/ BICIPITAL TENODESIS REPAIR Left 1/4/2018    Procedure: SHOULDER ARTHROSCOPY W/ BICIPITAL Tenotomy REPAIR;  Surgeon: Linwood Grover M.D.;  Location: [facility];  Service: Orthopedics    CLAVICLE DISTAL EXCISION Left 1/4/2018    Procedure: CLAVICLE DISTAL EXCISION - POSSIBLE;  Surgeon: Linwood Grover M.D.;  Location: [facility];  Service: Orthopedics    RECOVERY  4/8/2016    Procedure: CATH LAB [facility] WITH POSSIBLE NAVIN;  Surgeon: Recovery[facility] Surgery;  Location: [facility]ERY [facility] PROC [facility];   Service:     VENTRAL HERNIA REPAIR LAPAROSCOPIC  11/1/2012    Performed by Marcos Ramírez M.D. at SURGERY [facility]    KNEE ARTHROSCOPY  1990's    right    SHOULDER ARTHROSCOPY  1990's    right    SINUSOTOMIES  1990's    times two surgeries    TUMOR EXCISION WITH BIOPSY  1990's    right hand - thumb       Family history   Family History   Problem Relation Age of Onset    Breast Cancer Mother     Hypertension Mother     Cancer Mother         breast    Heart Disease Mother         hx of bypass    Hypertension Father     Arthritis Father     Diabetes Father         prediabetes    No Known Problems Sister     Arthritis Brother     Heart Disease Other     Hypertension Other     Cancer Other     No Known Problems Brother          Medications:   Outpatient Medications Marked as Taking for the 1/9/23 encounter (Office Visit) with Dragan Ayala M.D.   Medication Sig Dispense Refill    doxycycline (VIBRAMYCIN) 100 MG Tab Take 1 Tablet by mouth 2 times a day for 7 days. 14 Tablet 0    montelukast (SINGULAIR) 10 MG Tab Take 1 Tablet by mouth every evening. 30 Tablet 2    Dulaglutide 1.5 MG/0.5ML Solution Pen-injector Inject 0.5 mL under the skin every 7 days. 6 mL 5    ADVAIR DISKUS 500-50 MCG/ACT AEROSOL POWDER, BREATH ACTIVATED USE 1 INHALATION EVERY 12 HOURS 14 Each 0    zolpidem (AMBIEN) 5 MG Tab Take 1 Tablet by mouth at bedtime as needed for Sleep for up to 30 days. 30 Tablet 2    levothyroxine (SYNTHROID) 50 MCG Tab TAKE 1 TABLET 6 DAYS PER WEEK ON AN EMPTY STOMACH, 75 MCG ON 7TH DAY. 90 Tablet 3    benazepril (LOTENSIN) 20 MG Tab TAKE 1 TABLET DAILY 90 Tablet 3    atorvastatin (LIPITOR) 80 MG tablet TAKE 1 TABLET EVERY EVENING 90 Tablet 3    gabapentin (NEURONTIN) 300 MG Cap TAKE 1 CAPSULE BY MOUTH THREE TIMES DAILY AS NEEDED FOR PAIN      Blood Glucose Monitoring Suppl (FREESTYLE LITE) w/Device Kit USE DAILY AS DIRECTED      Canagliflozin (INVOKANA) 300 MG Tab Take 1 Tablet by mouth every day. 90 Tablet 1     tizanidine (ZANAFLEX) 4 MG Tab Take 1 Tablet by mouth every 6 hours as needed (muscle spasms). 90 Tablet 0    Blood Glucose Test Strips Test strips order:  Freestyle Freedom Lite test strips  testing one time per day 100 Strip 2    Continuous Blood Gluc Sensor (FREESTYLE LUNA 14 DAY SENSOR) Misc 1 Application every 14 days. 6 Each 3    metoprolol SR (TOPROL XL) 25 MG TABLET SR 24 HR Take 1 Tablet by mouth every day. At night for BP 30 Tablet 5    glucose blood (FREESTYLE PRECISION CHOLO TEST) strip 1 Strip by Other route in the morning, at noon, and at bedtime. 100 Strip 11    Lancet Devices Misc 1 Applicator 3 times a day. 100 Each 11    meloxicam (MOBIC) 15 MG tablet Take 1 Tablet by mouth 1 time a day as needed (pain). Do not take other NSAIDs. No refills. 60 Tablet 0    metFORMIN (GLUCOPHAGE) 500 MG Tab Take 1 Tablet by mouth 2 times a day with meals. 180 Tablet 2    albuterol 108 (90 Base) MCG/ACT Aero Soln inhalation aerosol Inhale 2 Puffs every four hours as needed for Shortness of Breath. Pt prefers ventolin if possible. Thank you 3 Each 3    EPINEPHrine (EPIPEN) 0.3 MG/0.3ML Solution Auto-injector solution for injection Inject 0.3 mL into the thigh one time for 1 dose. 1 Each 0    NON SPECIFIED CPAP supplies through [facility] 1 Each 1    ALPHA LIPOIC ACID PO Take 600 mg by mouth 2 Times a Day.      Omega-3 Fatty Acids (FISH OIL) 1200 MG CAPS Take  by mouth.      Cinnamon 500 MG CAPS Take 1,000 mg by mouth.      aspirin EC (ECOTRIN) 81 MG TBEC Take 81 mg by mouth every day.        pantoprazole (PROTONIX) 40 MG TBEC Take 40 mg by mouth every day.        Coenzyme Q10 200 MG Cap Take  by mouth every day.        Cholecalciferol (VITAMIN D) 2000 UNIT TABS Take  by mouth 2 times a day.      cetirizine (ZYRTEC) 10 MG Tab Take 10 mg by mouth every day.          Current Outpatient Medications on File Prior to Visit   Medication Sig Dispense Refill    doxycycline (VIBRAMYCIN) 100 MG Tab Take 1 Tablet by  mouth 2 times a day for 7 days. 14 Tablet 0    montelukast (SINGULAIR) 10 MG Tab Take 1 Tablet by mouth every evening. 30 Tablet 2    Dulaglutide 1.5 MG/0.5ML Solution Pen-injector Inject 0.5 mL under the skin every 7 days. 6 mL 5    ADVAIR DISKUS 500-50 MCG/ACT AEROSOL POWDER, BREATH ACTIVATED USE 1 INHALATION EVERY 12 HOURS 14 Each 0    zolpidem (AMBIEN) 5 MG Tab Take 1 Tablet by mouth at bedtime as needed for Sleep for up to 30 days. 30 Tablet 2    levothyroxine (SYNTHROID) 50 MCG Tab TAKE 1 TABLET 6 DAYS PER WEEK ON AN EMPTY STOMACH, 75 MCG ON 7TH DAY. 90 Tablet 3    benazepril (LOTENSIN) 20 MG Tab TAKE 1 TABLET DAILY 90 Tablet 3    atorvastatin (LIPITOR) 80 MG tablet TAKE 1 TABLET EVERY EVENING 90 Tablet 3    gabapentin (NEURONTIN) 300 MG Cap TAKE 1 CAPSULE BY MOUTH THREE TIMES DAILY AS NEEDED FOR PAIN      Blood Glucose Monitoring Suppl (FREESTYLE LITE) w/Device Kit USE DAILY AS DIRECTED      Canagliflozin (INVOKANA) 300 MG Tab Take 1 Tablet by mouth every day. 90 Tablet 1    tizanidine (ZANAFLEX) 4 MG Tab Take 1 Tablet by mouth every 6 hours as needed (muscle spasms). 90 Tablet 0    Blood Glucose Test Strips Test strips order:  Freestyle Freedom Lite test strips  testing one time per day 100 Strip 2    Continuous Blood Gluc Sensor (FREESTYLE LUNA 14 DAY SENSOR) Misc 1 Application every 14 days. 6 Each 3    metoprolol SR (TOPROL XL) 25 MG TABLET SR 24 HR Take 1 Tablet by mouth every day. At night for BP 30 Tablet 5    glucose blood (FREESTYLE PRECISION CHOLO TEST) strip 1 Strip by Other route in the morning, at noon, and at bedtime. 100 Strip 11    Lancet Devices Misc 1 Applicator 3 times a day. 100 Each 11    meloxicam (MOBIC) 15 MG tablet Take 1 Tablet by mouth 1 time a day as needed (pain). Do not take other NSAIDs. No refills. 60 Tablet 0    metFORMIN (GLUCOPHAGE) 500 MG Tab Take 1 Tablet by mouth 2 times a day with meals. 180 Tablet 2    albuterol 108 (90 Base) MCG/ACT Aero Soln inhalation aerosol Inhale 2  Puffs every four hours as needed for Shortness of Breath. Pt prefers ventolin if possible. Thank you 3 Each 3    EPINEPHrine (EPIPEN) 0.3 MG/0.3ML Solution Auto-injector solution for injection Inject 0.3 mL into the thigh one time for 1 dose. 1 Each 0    NON SPECIFIED CPAP supplies through [facility] 1 Each 1    ALPHA LIPOIC ACID PO Take 600 mg by mouth 2 Times a Day.      Omega-3 Fatty Acids (FISH OIL) 1200 MG CAPS Take  by mouth.      Cinnamon 500 MG CAPS Take 1,000 mg by mouth.      aspirin EC (ECOTRIN) 81 MG TBEC Take 81 mg by mouth every day.        pantoprazole (PROTONIX) 40 MG TBEC Take 40 mg by mouth every day.        Coenzyme Q10 200 MG Cap Take  by mouth every day.        Cholecalciferol (VITAMIN D) 2000 UNIT TABS Take  by mouth 2 times a day.      cetirizine (ZYRTEC) 10 MG Tab Take 10 mg by mouth every day.      levothyroxine (SYNTHROID) 75 MCG Tab Take 1 Tablet by mouth every morning on an empty stomach. Taking only one d per week.  50 mcg all other days. 12 Tablet 3     No current facility-administered medications on file prior to visit.         Allergies:   Allergies   Allergen Reactions    Kiwi Extract Anaphylaxis    Shellfish Allergy Anaphylaxis    Strawberry Anaphylaxis    Ozempic (0.25 Or 0.5 Mg-Dose) [Semaglutide] Nausea     Pt does not recall any reaction    Sulfa Drugs Rash and Unspecified     rash    Testosterone Rash     rash       Social Hx:   Social History     Socioeconomic History    Marital status:      Spouse name: Not on file    Number of children: Not on file    Years of education: Not on file    Highest education level: Not on file   Occupational History    Not on file   Tobacco Use    Smoking status: Former     Packs/day: 0.00     Types: Cigarettes     Quit date: 2020     Years since quitting: 3.0    Smokeless tobacco: Never    Tobacco comments:     occasional cigars   Vaping Use    Vaping Use: Never used   Substance and Sexual Activity    Alcohol use: Yes      "Comment: rare - 1-2 per month (social)    Drug use: Yes     Types: Marijuana, Inhaled, Oral     Comment: THC/ Occ    Sexual activity: Not on file     Comment: ; 2 biological kids (2 of his wife's); 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   Other Topics Concern    Not on file   Social History Narrative    Not on file     Social Determinants of Health     Financial Resource Strain: Not on file   Food Insecurity: Not on file   Transportation Needs: Not on file   Physical Activity: Not on file   Stress: Not on file   Social Connections: Not on file   Intimate Partner Violence: Not on file   Housing Stability: Not on file         EXAMINATION     Physical Exam:   Vitals: /72 (BP Location: Right arm, Patient Position: Sitting, BP Cuff Size: Adult)   Pulse 80   Temp 35.8 °C (96.5 °F) (Temporal)   Ht 1.753 m (5' 9\")   Wt 75.7 kg (166 lb 14.2 oz)   SpO2 96%     Constitutional:   Body Habitus: Body mass index is 24.65 kg/m².  Cooperation: Fully cooperates with exam  Appearance: Well-groomed no disheveled    Respiratory-  breathing comfortable on room air, no audible wheezing  Cardiovascular- capillary refills less than 2 seconds. No lower extremity edema is noted.   Psychiatric- alert and oriented ×3. Normal affect.    MSK and Neuro: -    Cervical spine  There are no signs of infection around the injection sites.      decreased active range of motion with flexion, lateral flexion, and rotation bilaterally.   There is decreased active range of motion with cervical extension.      Palpation:   Tenderness to palpation throughout the cervical spine: negative bilaterally        Cervical spine Special tests  Neuro tension  Spurling's maneuver negative bilaterally           Key points for the international standards for neurological classification of spinal cord injury (ISNCSCI) to light touch.     Dermatome R L   C4 2 2   C5 2 2   C6 2 2   C7 2 2   C8 2 2   T1 2 2   T2 2 2                               "           Motor Exam Upper Extremities   ? Myotome R L   Shoulder flexion C5 5 5   Elbow flexion C5 5 5   Wrist extension C6 5 5   Elbow extension C7 5 5   Finger flexion C8 5 5   Finger abduction T1 5 5         Thoracic/Lumbar Spine/Sacral Spine/Hips   There are no signs of infection around the injection sites.   full  active range of motion with flexion, lateral flexion, and rotation bilaterally.   There is full  active range of motion with lumbar extension.    There is no  pain with lumbar extension.   There is no pain with facet loading maneuver (extension rotation) with axial low back pain on the BILATERAL side(s)       Palpation:   No tenderness to palpation in midline at T1-T12 levels. No tenderness to palpation in the left and right of the midline T1-L5, NEGATIVE for tenderness to palpation to the para-midline region in the lower lumbar levels.  palpation over SI joint: negative bilaterally    palpation in hip or over the gluteus medius tendon insertion: negative bilaterally    Positive for tenderness palpation with spasm of the abductors of the left leg.  Mild tenderness palpation of the left gastrocnemius muscle.    Lumbar spine Special tests  Neuro tension  Straight leg test negative right, positive left    Slump test negative right, positive left      Key points for the international standards for neurological classification of spinal cord injury (ISNCSCI) to light touch.     Dermatome R L                                      L2 2 2   L3 2 2   L4 2 1   L5 2 2   S1 2 2   S2 2 2         Motor Exam Lower Extremities    ? Myotome R L   Hip flexion L2 5 5   Knee extension L3 5 5   Ankle dorsiflexion L4 5 5   Toe extension L5 5- 5-   Ankle plantarflexion S1 5 5                 MEDICAL DECISION MAKING    DATA    Labs:   Lab Results   Component Value Date/Time    SODIUM 141 08/09/2022 06:15 AM    POTASSIUM 4.2 08/09/2022 06:15 AM    CHLORIDE 103 08/09/2022 06:15 AM    CO2 26 08/09/2022 06:15 AM    GLUCOSE 138  (H) 08/09/2022 06:15 AM    BUN 8 08/09/2022 06:15 AM    CREATININE 0.72 08/09/2022 06:15 AM    CREATININE 1.1 07/10/2008 09:25 AM        Lab Results   Component Value Date/Time    PROTHROMBTM 13.6 05/02/2022 12:25 PM    INR 1.13 05/02/2022 12:25 PM        Lab Results   Component Value Date/Time    WBC 10.6 05/02/2022 12:25 PM    RBC 5.62 05/02/2022 12:25 PM    HEMOGLOBIN 17.0 05/02/2022 12:25 PM    HEMATOCRIT 50.9 05/02/2022 12:25 PM    MCV 90.6 05/02/2022 12:25 PM    MCH 30.2 05/02/2022 12:25 PM    MCHC 33.4 (L) 05/02/2022 12:25 PM    MPV 10.3 05/02/2022 12:25 PM    NEUTSPOLYS 67.60 06/04/2020 02:18 PM    LYMPHOCYTES 17.60 (L) 06/04/2020 02:18 PM    MONOCYTES 9.80 06/04/2020 02:18 PM    EOSINOPHILS 3.50 06/04/2020 02:18 PM    BASOPHILS 1.20 06/04/2020 02:18 PM    HYPOCHROMIA 1+ 08/27/2013 07:13 AM        Lab Results   Component Value Date/Time    HBA1C 7.2 (A) 12/02/2022 12:37 PM          Imaging:   I personally reviewed following images    MRI lumbar spine 7/27/2021 with moderate left neuroforaminal stenosis at L3-4 and L4-5 with mild right neuroforaminal stenosis at L3-4 and L4-5.  Mild to moderate bilateral neuroforaminal stenosis at L5-S1.  There is facet arthropathy bilaterally at L3-4, L4-5, L5-S1        I reviewed the following radiology reports      Cervical spine 9/22/2022  IMPRESSION:     1.  Multilevel degenerative changes most notable at C5-C6 with disc/osteophyte change with mild central stenosis, moderate right lateral recess stenosis, and right-sided uncinate spondylosis with severe right foraminal stenosis. Moderate left foraminal   stenosis.  2.  Additional details for each level above in the body of the report.  3.  No myelopathic cord signal.                         Results for orders placed during the hospital encounter of 07/27/21    MR-LUMBAR SPINE-W/O    Impression  1.  L4-5 annular disc bulge with a central disc protrusion and bilateral facet degeneration. There is borderline central canal  narrowing. There is moderate mild right neural foraminal narrowing.    2.  L3-4 annular disc bulge with a left lateral disc protrusion. There is moderate to severe left and mild right neural foraminal narrowing again seen.    3.  L5-S1 degenerative disc disease and facet degeneration results in moderate bilateral neuroforaminal narrowing.        Results for orders placed during the hospital encounter of 07/27/21    MR-LUMBAR SPINE-W/O    Impression  1.  L4-5 annular disc bulge with a central disc protrusion and bilateral facet degeneration. There is borderline central canal narrowing. There is moderate mild right neural foraminal narrowing.    2.  L3-4 annular disc bulge with a left lateral disc protrusion. There is moderate to severe left and mild right neural foraminal narrowing again seen.    3.  L5-S1 degenerative disc disease and facet degeneration results in moderate bilateral neuroforaminal narrowing.      Results for orders placed during the hospital encounter of 07/27/21    MR-MRA NECK-W/O    Impression  There is no significant stenosis in the visualized extracranial neck arteries.                                                                   Results for orders placed during the hospital encounter of 05/01/17    CT-ABDOMEN-PELVIS WITH    Impression  1.  Findings suggestive of early or low grade obstruction in the mid to distal small bowel. Enteritis with associated ileus could also give a similar appearance.  2.  LEFT calyceal stone                       Results for orders placed during the hospital encounter of 12/22/17    DX-CHEST-2 VIEWS    Impression  Negative two views of the chest.                          Electrodiagnostics   1/18/2022 EMG  IMPRESSION:  This is an abnormal electrodiagnostic study due to evidence of chronic reinnervation changes in the left vastus lateralis/medialis with mild active denervation changes noted.  This is consistent with patient's prior history of diabetic amyotrophy  with primary involvement of the left femoral nerve though differential does include an isolated left femoral neuropathy and L2-4 radiculopathy.       There is no strong electrodiagnostic evidence of a an active right lumbosacral plexopathy though given some responses are unobtainable a mild lumbar plexopathy/femoral neuropathy may be present.  There is no EMG evidence of a right lumbosacral radiculopathy.                                         DIAGNOSIS   Visit Diagnoses     ICD-10-CM   1. Strain of left hip adductor muscle, initial encounter  S76.012A   2. Strain of gastrocnemius muscle of left lower extremity, initial encounter  S86.112A   3. Lumbar radiculopathy  M54.16   4. Lumbar spondylosis  M47.816   5. Chronic bilateral low back pain without sciatica  M54.50    G89.29   6. Cervical radiculopathy  M54.12   7. Cervical stenosis of spinal canal  M48.02   8. Chronic neck pain  M54.2    G89.29           ASSESSMENT and PLAN:     Barron Fernándezmee  1960 male      Barron was seen today for follow-up.    Diagnoses and all orders for this visit:    Strain of left hip adductor muscle, initial encounter    Strain of gastrocnemius muscle of left lower extremity, initial encounter    Lumbar radiculopathy    Lumbar spondylosis    Chronic bilateral low back pain without sciatica    Cervical radiculopathy    Cervical stenosis of spinal canal    Chronic neck pain         Discussed multiple options with the patient.  He likely has a strain of the left hip adductor muscles with some spasm of the muscle as well as left gastrocnemius muscle.  This is superimposed on likely lumbar radiculopathy.  We discussed options and decided proceed conservatively first with physical therapy in these muscles.  If there is no significant improvement I would consider the patient for a left L3-4 and L4-5 transforaminal epidural steroid injection.    In regards to the patient's chronic neck pain this is typically low-grade and he is  neurologically intact on today's exam.  He does have intermittent numbness and tingling mainly down the left arm greater than the right however this is not causing any functional deficits at this point.  We discussed spinal stenosis and theoretical risks.  We discussed emergency precautions.  If symptoms worsen for the neck pain or arm pain then I would consider the patient for medial branch blocks versus epidural steroid injection depending on his symptoms at the time.    I reviewed the MRI cervical spine images with the patient today    I discussed the case with the patients wife Anastasia at todays visit.     Medications continue gabapentin    Follow up: 3 months or sooner as needed    Thank you for allowing me to participate in the care of this patient. If you have any questions please not hesitate to contact me.             Please note that this dictation was created using voice recognition software. I have made every reasonable attempt to correct obvious errors but there may be errors of grammar and content that I may have overlooked prior to finalization of this note.      Dragan Ayala MD  Interventional Spine and Sports Physiatry  Physical Medicine and Rehabilitation  [facility]

## 2023-01-27 DIAGNOSIS — E11.9 TYPE 2 DIABETES MELLITUS WITHOUT COMPLICATION, WITH LONG-TERM CURRENT USE OF INSULIN (HCC): ICD-10-CM

## 2023-01-27 DIAGNOSIS — Z79.4 TYPE 2 DIABETES MELLITUS WITHOUT COMPLICATION, WITH LONG-TERM CURRENT USE OF INSULIN (HCC): ICD-10-CM

## 2023-01-28 NOTE — TELEPHONE ENCOUNTER
Received request via: Pharmacy    Was the patient seen in the last year in this department? Yes    Does the patient have an active prescription (recently filled or refills available) for medication(s) requested? No    Does the patient have skilled nursing Plus and need 100 day supply (blood pressure, diabetes and cholesterol meds only)? Patient does not have SCP

## 2023-03-09 ENCOUNTER — TELEPHONE (OUTPATIENT)
Dept: MEDICAL GROUP | Facility: LAB | Age: 63
End: 2023-03-09
Payer: COMMERCIAL

## 2023-03-09 NOTE — TELEPHONE ENCOUNTER
DOCUMENTATION OF PAR STATUS:    1. Name of Medication & Dose:  Canagliflozin (INVOKANA) 300 MG Tab    2. Name of Prescription Coverage Company & phone #: [employer] [id number]    3. Date Prior Auth Submitted: 3/9/23    4. What information was given to obtain insurance decision? OV NOTES FAXED    5. Prior Auth Status? DENIED BY INSURANCE MUST TRY AND FAIL JARDIANCE AND FARXIGA    6. Patient Notified: yes

## 2023-03-16 ENCOUNTER — TELEPHONE (OUTPATIENT)
Dept: MEDICAL GROUP | Facility: LAB | Age: 63
End: 2023-03-16
Payer: COMMERCIAL

## 2023-03-16 ENCOUNTER — HOSPITAL ENCOUNTER (OUTPATIENT)
Dept: LAB | Facility: MEDICAL CENTER | Age: 63
End: 2023-03-16
Attending: INTERNAL MEDICINE
Payer: COMMERCIAL

## 2023-03-16 DIAGNOSIS — E11.9 CONTROLLED TYPE 2 DIABETES MELLITUS WITHOUT COMPLICATION, WITHOUT LONG-TERM CURRENT USE OF INSULIN (HCC): ICD-10-CM

## 2023-03-16 DIAGNOSIS — E55.9 VITAMIN D DEFICIENCY: ICD-10-CM

## 2023-03-16 DIAGNOSIS — E78.5 DYSLIPIDEMIA: ICD-10-CM

## 2023-03-16 DIAGNOSIS — Z79.84 LONG TERM (CURRENT) USE OF ORAL HYPOGLYCEMIC DRUGS: ICD-10-CM

## 2023-03-16 DIAGNOSIS — E03.9 ACQUIRED HYPOTHYROIDISM: ICD-10-CM

## 2023-03-16 DIAGNOSIS — E11.44 DIABETIC AMYOTROPHY ASSOCIATED WITH TYPE 2 DIABETES MELLITUS (HCC): ICD-10-CM

## 2023-03-16 LAB
25(OH)D3 SERPL-MCNC: 39 NG/ML (ref 30–100)
ALBUMIN SERPL BCP-MCNC: 4.6 G/DL (ref 3.2–4.9)
ALBUMIN/GLOB SERPL: 2 G/DL
ALP SERPL-CCNC: 64 U/L (ref 30–99)
ALT SERPL-CCNC: 18 U/L (ref 2–50)
ANION GAP SERPL CALC-SCNC: 13 MMOL/L (ref 7–16)
AST SERPL-CCNC: 18 U/L (ref 12–45)
BILIRUB SERPL-MCNC: 0.7 MG/DL (ref 0.1–1.5)
BUN SERPL-MCNC: 16 MG/DL (ref 8–22)
CALCIUM ALBUM COR SERPL-MCNC: 9.3 MG/DL (ref 8.5–10.5)
CALCIUM SERPL-MCNC: 9.8 MG/DL (ref 8.5–10.5)
CHLORIDE SERPL-SCNC: 101 MMOL/L (ref 96–112)
CHOLEST SERPL-MCNC: 114 MG/DL (ref 100–199)
CO2 SERPL-SCNC: 25 MMOL/L (ref 20–33)
CREAT SERPL-MCNC: 0.79 MG/DL (ref 0.5–1.4)
CREAT UR-MCNC: 96.19 MG/DL
FASTING STATUS PATIENT QL REPORTED: NORMAL
GFR SERPLBLD CREATININE-BSD FMLA CKD-EPI: 99 ML/MIN/1.73 M 2
GLOBULIN SER CALC-MCNC: 2.3 G/DL (ref 1.9–3.5)
GLUCOSE SERPL-MCNC: 132 MG/DL (ref 65–99)
HDLC SERPL-MCNC: 39 MG/DL
LDLC SERPL CALC-MCNC: 62 MG/DL
MICROALBUMIN UR-MCNC: <1.2 MG/DL
MICROALBUMIN/CREAT UR: NORMAL MG/G (ref 0–30)
POTASSIUM SERPL-SCNC: 4.1 MMOL/L (ref 3.6–5.5)
PROT SERPL-MCNC: 6.9 G/DL (ref 6–8.2)
SODIUM SERPL-SCNC: 139 MMOL/L (ref 135–145)
T4 FREE SERPL-MCNC: 1.35 NG/DL (ref 0.93–1.7)
TRIGL SERPL-MCNC: 66 MG/DL (ref 0–149)
TSH SERPL DL<=0.005 MIU/L-ACNC: 1.47 UIU/ML (ref 0.38–5.33)

## 2023-03-16 PROCEDURE — 80053 COMPREHEN METABOLIC PANEL: CPT

## 2023-03-16 PROCEDURE — 80061 LIPID PANEL: CPT

## 2023-03-16 PROCEDURE — 82306 VITAMIN D 25 HYDROXY: CPT

## 2023-03-16 PROCEDURE — 82570 ASSAY OF URINE CREATININE: CPT

## 2023-03-16 PROCEDURE — 82043 UR ALBUMIN QUANTITATIVE: CPT

## 2023-03-16 PROCEDURE — 84443 ASSAY THYROID STIM HORMONE: CPT

## 2023-03-16 PROCEDURE — 36415 COLL VENOUS BLD VENIPUNCTURE: CPT

## 2023-03-16 PROCEDURE — 84439 ASSAY OF FREE THYROXINE: CPT

## 2023-03-16 NOTE — TELEPHONE ENCOUNTER
Will you ask patient if he is okay with me changing him to Jardiance or Farxiga, these are identical to Invokana.

## 2023-03-16 NOTE — TELEPHONE ENCOUNTER
He does not need a referral.  Conversation is ongoing with neurology and it looks like  is trying to help him find a sooner appointment than May.    Also with changing from Invokana to Farxiga or Jardiance, I see that you guys are discussing this.  You could let him know that he can just wait to discuss the change with Dr. Lyon next Tuesday if he has enough Invokana to get him through that.

## 2023-03-20 ENCOUNTER — TELEPHONE (OUTPATIENT)
Dept: ENDOCRINOLOGY | Facility: MEDICAL CENTER | Age: 63
End: 2023-03-20

## 2023-03-20 NOTE — TELEPHONE ENCOUNTER
For his appt tomorrow he'd like to talk to you about farxiga and jaradiance vs invokana. He wanted me to let you know

## 2023-03-21 ENCOUNTER — OFFICE VISIT (OUTPATIENT)
Dept: ENDOCRINOLOGY | Facility: MEDICAL CENTER | Age: 63
End: 2023-03-21
Attending: INTERNAL MEDICINE
Payer: COMMERCIAL

## 2023-03-21 VITALS
WEIGHT: 172 LBS | OXYGEN SATURATION: 96 % | HEIGHT: 69 IN | HEART RATE: 92 BPM | DIASTOLIC BLOOD PRESSURE: 68 MMHG | BODY MASS INDEX: 25.48 KG/M2 | SYSTOLIC BLOOD PRESSURE: 116 MMHG

## 2023-03-21 DIAGNOSIS — E11.42 CONTROLLED TYPE 2 DIABETES MELLITUS WITH DIABETIC POLYNEUROPATHY, WITHOUT LONG-TERM CURRENT USE OF INSULIN (HCC): ICD-10-CM

## 2023-03-21 DIAGNOSIS — E78.5 DYSLIPIDEMIA: ICD-10-CM

## 2023-03-21 DIAGNOSIS — E55.9 VITAMIN D DEFICIENCY: ICD-10-CM

## 2023-03-21 DIAGNOSIS — Z79.84 LONG TERM (CURRENT) USE OF ORAL HYPOGLYCEMIC DRUGS: ICD-10-CM

## 2023-03-21 DIAGNOSIS — E03.9 ACQUIRED HYPOTHYROIDISM: ICD-10-CM

## 2023-03-21 LAB
HBA1C MFR BLD: 7.2 % (ref ?–5.8)
POCT INT CON NEG: NEGATIVE
POCT INT CON POS: POSITIVE

## 2023-03-21 PROCEDURE — 99212 OFFICE O/P EST SF 10 MIN: CPT | Performed by: INTERNAL MEDICINE

## 2023-03-21 PROCEDURE — 83036 HEMOGLOBIN GLYCOSYLATED A1C: CPT | Performed by: INTERNAL MEDICINE

## 2023-03-21 PROCEDURE — 99214 OFFICE O/P EST MOD 30 MIN: CPT | Performed by: INTERNAL MEDICINE

## 2023-03-21 RX ORDER — EMPAGLIFLOZIN 25 MG/1
1 TABLET, FILM COATED ORAL DAILY
Qty: 90 TABLET | Refills: 2 | Status: SHIPPED | OUTPATIENT
Start: 2023-03-21 | End: 2024-01-31

## 2023-03-21 ASSESSMENT — FIBROSIS 4 INDEX: FIB4 SCORE: 0.73

## 2023-03-21 NOTE — PROGRESS NOTES
CHIEF COMPLAINT: Patient is here for follow up of Type 2 Diabetes Mellitus    HPI:     Barron Hewitt is a 62 y.o. male with Type 2 Diabetes Mellitus here for follow up.    Labs from 3/21/2023 show a1c is 7.2%  Labs from 12/2/2022 show A1c was 7.2%  Labs from 7//9/2022 show A1c was 7.1%    He was previously seen by Dr. Sandoval and then Yoseph Patel.  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 Huerta as well  He is now on Toprol XL and Benazepril   He doesn't have diabetic kidney disease  UACR was < 30 on 3/2023        Eye exam was done on 12/2022        Finally he has primary hypothyroidism and is on Levothyroxine 50 every day except 75 mcg on Sunday  He wants to change his thyroid medication to straight levothyroxine 50 mcg daily to avoid confusion with the pharmacy  He denies constipation and cold intolerance  TSH is 1.4 on 3/2023  TSH is 1.4 on 8/2022          BG Diary:  CGM was downloaded and reviewed    Weight has been stable    Diabetes Complications   Retinopathy: No known retinopathy.  Last eye exam: 12/2/2022  Neuropathy: Denies paresthesias or numbness in hands or feet. Denies any foot wounds.  Exercise: Minimal.  Diet: Fair.  Patient's medications, allergies, and social histories were reviewed and updated as appropriate.    ROS:     CONS:      No fever, no chills   EYES:     No diplopia, no blurry vision   CV:           No chest pain, no palpitations   PULM:     No SOB, no cough, no hemoptysis.   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   ([facility])  2021-05: Neuropathy - proximal, diabetic  2020-08: Acquired hypothyroidism  2019-11: Controlled type 2 diabetes mellitus without complication,   without long-term current use of insulin ([facility])  2019-11: History of small bowel obstruction - 2018 2019-11: Syncope and collapse  2019-02: Seasonal allergies  2018-08: Other insomnia  2017-10: Essential hypertension, benign  2017-10: Dyslipidemia  2017-07: Mild intermittent asthma without complication  2017-05: Ileus ([facility])  2017-05: Erythrocytosis  2017-05: ISELA on CPAP - [facility]  2017-05: HTN (hypertension)  2017-05: Enteritis  2016-04: Coronary artery disease, non-occlusive  2016-03: Angina pectoris ([facility])  2014-01: Chest pain at rest  2014-01: Abnormal radionuclide heart study  2014-01: DM (diabetes mellitus) ([facility])  2014-01: Hyperlipidemia  2014-01: Hypertension  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 Plan for 80 degree C for 90 seconds for each neurotomy.;  Surgeon: Dragan Ayala M.D.;  [facility];  Service: Pain Management    AL INJ DX/THER AGNT PARAVERT FACET JOINT, DEVON* Bilateral 6/21/2022    Procedure: Diagnostic medial branch blocks targeting the BILATERAL L3-4, L4-5 and L5-S1 facet joints with fluoroscopic guidance #2;  Surgeon: Dragan Ayala M.D.;  Location: [facility];  Service: Pain Management    LUMBAR MEDIAL BRANCH BLOCKS Bilateral 6/21/2022    Procedure: .;  Surgeon: Dragan Ayala M.D.;  Location: [facility];  Service: Pain Management    CONSCIOUS SEDATION Bilateral 6/21/2022    Procedure: .;  Surgeon: Dragan Ayala M.D.;  Location: [facility];  Service: Pain Management    LUMBAR MEDIAL BRANCH BLOCKS Bilateral 6/14/2022    Procedure: Diagnostic medial branch blocks targeting the BILATERAL L3-4, L4-5 and L5-S1 facet joints with fluoroscopic guidance #1;  Surgeon: Dragan Ayala M.D.;  Location: [facility];  Service: Pain Management    AL TRANSURETHRAL ELEC-SURG PROSTATECTOM N/A 5/27/2022    Procedure: TURP, USING BUTTON ELECTRODE;  Surgeon: Lee Mckee M.D.;  Location: [facility];  Service: Urology    BLOCK EPIDURAL STEROID INJECTION Left 3/22/2022    Procedure: LEFT L3-4 and L4-5 transforaminal epidural steroid injection with fluoroscopic guidance;  Surgeon: Dragan Ayala M.D.;  Location: [facility];  Service: Pain Management    AL NJX AA&/STRD TFRML EPI LUMBAR/SACRAL 1 LEVEL Left 2/15/2022    Procedure: LEFT L3-4 and L4-5 transforaminal epidural steroid injection with fluoroscopic guidance;  Surgeon: Dragan Ayala M.D.;  Location: [facility];  Service: Pain Management    SHOULDER DECOMPRESSION ARTHROSCOPIC Left 1/4/2018    Procedure: SHOULDER DECOMPRESSION ARTHROSCOPIC - SUBACROMIAL;  Surgeon: Linwood Grover M.D.;  Location: [facility];  Service: Orthopedics    SHOULDER ARTHROSCOPY W/ BICIPITAL TENODESIS REPAIR Left  "1/4/2018    Procedure: SHOULDER ARTHROSCOPY W/ BICIPITAL Tenotomy REPAIR;  Surgeon: Linwood Grover M.D.;  Location: SURGERY [facility];  Service: Orthopedics    CLAVICLE DISTAL EXCISION Left 1/4/2018    Procedure: CLAVICLE DISTAL EXCISION - POSSIBLE;  Surgeon: Linwood Grover M.D.;  Location: SURGERY [facility];  Service: Orthopedics    RECOVERY  4/8/2016    Procedure: CATH LAB [facility] WITH POSSIBLE NAVIN;  Surgeon: Recoveryonly Surgery;  Location: SURGERY PRE-POST PROC UNIT [facility];  Service:     VENTRAL HERNIA REPAIR LAPAROSCOPIC  11/1/2012    Performed by Marcos Ramírez M.D. at SURGERY [facility]    KNEE ARTHROSCOPY  1990's    right    SHOULDER ARTHROSCOPY  1990's    right    SINUSOTOMIES  1990's    times two surgeries    TUMOR EXCISION WITH BIOPSY  1990's    right hand - thumb        Allergies:  Kiwi extract, Shellfish allergy, Strawberry, Ozempic (0.25 or 0.5 mg-dose) [semaglutide], Sulfa drugs, and Testosterone     Social History:  Social History     Tobacco Use    Smoking status: Former     Packs/day: 0.00     Types: Cigarettes     Quit date: 2020     Years since quitting: 3.2    Smokeless tobacco: Never    Tobacco comments:     occasional cigars   Vaping Use    Vaping Use: Never used   Substance Use Topics    Alcohol use: Yes     Comment: rare - 1-2 per month (social)    Drug use: Yes     Types: Marijuana, Inhaled, Oral     Comment: THC/ Occ        Family History:   family history includes Arthritis in his brother and father; Breast Cancer in his mother; Cancer in his mother and another family member; Diabetes in his father; Heart Disease in his mother and another family member; Hypertension in his father, mother, and another family member; No Known Problems in his brother and sister.      PHYSICAL EXAM:   OBJECTIVE:  Vital signs: /68 (BP Location: Left arm, Patient Position: Sitting)   Pulse 92   Ht 1.753 m (5' 9\")   Wt 78 kg (172 lb)   SpO2 96%   BMI 25.40 kg/m² "   GENERAL: Well-developed, well-nourished in no apparent distress.   EYE:  No ocular asymmetry, PERRLA  HENT: Pink, moist mucous membranes.    NECK: No thyromegaly.   CARDIOVASCULAR:  No murmurs  LUNGS: Clear breath sounds  ABDOMEN: Soft, nontender   BACK: negative SLR bilaterally  EXTREMITIES: No clubbing, cyanosis, or edema.   NEUROLOGICAL: No gross focal motor abnormalities   LYMPH: No cervical adenopathy palpated.   SKIN: No rashes, lesions.     Labs:  Lab Results   Component Value Date/Time    HBA1C 7.2 (A) 03/21/2023 10:19 AM        Lab Results   Component Value Date/Time    WBC 10.6 05/02/2022 12:25 PM    RBC 5.62 05/02/2022 12:25 PM    HEMOGLOBIN 17.0 05/02/2022 12:25 PM    MCV 90.6 05/02/2022 12:25 PM    MCH 30.2 05/02/2022 12:25 PM    MCHC 33.4 (L) 05/02/2022 12:25 PM    RDW 45.7 05/02/2022 12:25 PM    MPV 10.3 05/02/2022 12:25 PM       Lab Results   Component Value Date/Time    SODIUM 139 03/16/2023 06:48 AM    POTASSIUM 4.1 03/16/2023 06:48 AM    CHLORIDE 101 03/16/2023 06:48 AM    CO2 25 03/16/2023 06:48 AM    ANION 13.0 03/16/2023 06:48 AM    GLUCOSE 132 (H) 03/16/2023 06:48 AM    BUN 16 03/16/2023 06:48 AM    CREATININE 0.79 03/16/2023 06:48 AM    CREATININE 1.1 07/10/2008 09:25 AM    CALCIUM 9.8 03/16/2023 06:48 AM    ASTSGOT 18 03/16/2023 06:48 AM    ALTSGPT 18 03/16/2023 06:48 AM    TBILIRUBIN 0.7 03/16/2023 06:48 AM    ALBUMIN 4.6 03/16/2023 06:48 AM    TOTPROTEIN 6.9 03/16/2023 06:48 AM    GLOBULIN 2.3 03/16/2023 06:48 AM    AGRATIO 2.0 03/16/2023 06:48 AM       Lab Results   Component Value Date/Time    CHOLSTRLTOT 126 07/22/2022 0700    TRIGLYCERIDE 97 07/22/2022 0700    HDL 34 (A) 07/22/2022 0700    LDL 73 07/22/2022 0700       Lab Results   Component Value Date/Time    MALBCRT see below 03/16/2023 06:48 AM    MICROALBUR <1.2 03/16/2023 06:48 AM        Lab Results   Component Value Date/Time    TSHULTRASEN 2.130 06/09/2021 1313     No results found for: FREEDIR  Lab Results   Component Value  Date/Time    FREET3 3.59 12/22/2017 0926     No results found for: THYSTIMIG        ASSESSMENT/PLAN:     1. Controlled type 2 diabetes mellitus without complication, without long-term current use of insulin (HCC)  Fair control  his A1c is fair at 7.2%  He wants to focus on diet and exercise  Continue Metformin 500 mg twice a day  Stop Invokana due to formulary  Start Jardiance 25mg daily  Continue Trulicity 1.5 mg weekly  He is updated with his labs  Follow-up with Viola in 6 months with labs      2. Acquired hypothyroidism  Controlled  Change Levothyroxine 50 every day to avoid confusion with insurance  Reviewed proper administration of thyroid hormone  Patient should take thyroid hormone 30-60 minutes before breakfast on an empty stomach plain water and not take it together with food, iron, calcium, and antacids.  Iron, calcium, and antacids should be taken at least 4 hours apart from thyroid hormone.  Repeat TSH in 6 months    3. Dyslipidemia  Controlled  Continue atorvastatin  Repeat fasting lipids in 3-6  months    4. Vitamin D deficiency  Stable  Continue monitoring     5. Long term (current) use of oral hypoglycemic drugs  Patient is on oral agents for type 2 diabetes management      Return in about 6 months (around 9/21/2023).      Thank you kindly for allowing me to participate in the diabetes care plan for this patient.    Seth Vizcarra MD, THERON, ECNU      CC:   NIRANJAN Butts

## 2023-03-29 ENCOUNTER — TELEPHONE (OUTPATIENT)
Dept: NEUROLOGY | Facility: MEDICAL CENTER | Age: 63
End: 2023-03-29

## 2023-03-29 NOTE — TELEPHONE ENCOUNTER
NEUROLOGY PATIENT PRE-VISIT PLANNING     Patient was contacted to complete PVP.  Note: Patient will not be contacted if there is no indication to call.     Patient Appointment is scheduled as: New Patient     Is visit type and length scheduled correctly? Yes    [facility] Patient Demographics? No    3.   Is referral attached to visit? Yes    4. Were records received from referring provider? Yes    4. Patient was NOT contacted to have someone accompany them to visit.     5. Is this appointment scheduled as a Hospital Follow-Up?  Yes    6. Does the patient require any pre procedure or post procedure follow up? No    7. If any orders were placed at last visit or intended to be done for this visit do we have Results/Consult Notes? Yes  Labs - Labs ordered, completed on 03/23/2023 and results are in chart.  Imaging - Imaging was not ordered at last office visit.  Referrals - Referral ordered, patient has NOT been seen.  Note: If patient appointment is for lab or imaging review and patient did not complete the studies, check with provider if OK to reschedule patient until completed.    8. If patient appointment is for Botox - is order pended for provider? N/A

## 2023-03-31 ENCOUNTER — OFFICE VISIT (OUTPATIENT)
Dept: NEUROLOGY | Facility: MEDICAL CENTER | Age: 63
End: 2023-03-31
Attending: PSYCHIATRY & NEUROLOGY
Payer: COMMERCIAL

## 2023-03-31 VITALS
HEART RATE: 87 BPM | OXYGEN SATURATION: 97 % | TEMPERATURE: 98 F | BODY MASS INDEX: 25.47 KG/M2 | SYSTOLIC BLOOD PRESSURE: 102 MMHG | HEIGHT: 69 IN | DIASTOLIC BLOOD PRESSURE: 62 MMHG | WEIGHT: 171.96 LBS

## 2023-03-31 DIAGNOSIS — E11.44 DIABETIC AMYOTROPHY ASSOCIATED WITH TYPE 2 DIABETES MELLITUS (HCC): Primary | ICD-10-CM

## 2023-03-31 PROBLEM — M50.30 DEGENERATION OF CERVICAL INTERVERTEBRAL DISC: Status: ACTIVE | Noted: 2023-03-30

## 2023-03-31 PROBLEM — M48.061 SPINAL STENOSIS OF LUMBAR REGION: Status: ACTIVE | Noted: 2022-12-15

## 2023-03-31 PROCEDURE — 99214 OFFICE O/P EST MOD 30 MIN: CPT | Performed by: PSYCHIATRY & NEUROLOGY

## 2023-03-31 PROCEDURE — 99212 OFFICE O/P EST SF 10 MIN: CPT | Performed by: PSYCHIATRY & NEUROLOGY

## 2023-03-31 RX ORDER — ZOLPIDEM TARTRATE 5 MG/1
5 TABLET ORAL NIGHTLY PRN
COMMUNITY
End: 2024-03-14 | Stop reason: SDUPTHER

## 2023-03-31 ASSESSMENT — FIBROSIS 4 INDEX: FIB4 SCORE: 0.73

## 2023-04-06 ENCOUNTER — TELEPHONE (OUTPATIENT)
Dept: MEDICAL GROUP | Facility: LAB | Age: 63
End: 2023-04-06
Payer: COMMERCIAL

## 2023-04-06 NOTE — TELEPHONE ENCOUNTER
"Surgical/Dental Clearance paperwork received from [facility]  requiring provider signature.     All appropriate fields completed by Medical Assistant: N/A CMA printed and distributed to MA    Paperwork placed in \"MA to Provider\" folder/basket. Awaiting provider completion/signature.  "

## 2023-04-10 ENCOUNTER — OFFICE VISIT (OUTPATIENT)
Dept: PHYSICAL MEDICINE AND REHAB | Facility: MEDICAL CENTER | Age: 63
End: 2023-04-10
Payer: COMMERCIAL

## 2023-04-10 VITALS
OXYGEN SATURATION: 96 % | HEIGHT: 69 IN | SYSTOLIC BLOOD PRESSURE: 114 MMHG | HEART RATE: 75 BPM | DIASTOLIC BLOOD PRESSURE: 70 MMHG | WEIGHT: 172.84 LBS | TEMPERATURE: 97.3 F | BODY MASS INDEX: 25.6 KG/M2

## 2023-04-10 DIAGNOSIS — M48.02 CERVICAL STENOSIS OF SPINAL CANAL: ICD-10-CM

## 2023-04-10 DIAGNOSIS — M54.50 CHRONIC BILATERAL LOW BACK PAIN WITHOUT SCIATICA: ICD-10-CM

## 2023-04-10 DIAGNOSIS — M54.16 LUMBAR RADICULOPATHY: ICD-10-CM

## 2023-04-10 DIAGNOSIS — M54.12 CERVICAL RADICULOPATHY: ICD-10-CM

## 2023-04-10 DIAGNOSIS — M62.552 ATROPHY OF MUSCLE OF LEFT THIGH: ICD-10-CM

## 2023-04-10 DIAGNOSIS — M47.816 LUMBAR SPONDYLOSIS: ICD-10-CM

## 2023-04-10 DIAGNOSIS — G89.29 CHRONIC BILATERAL LOW BACK PAIN WITHOUT SCIATICA: ICD-10-CM

## 2023-04-10 PROCEDURE — 99214 OFFICE O/P EST MOD 30 MIN: CPT | Performed by: PHYSICAL MEDICINE & REHABILITATION

## 2023-04-10 RX ORDER — GABAPENTIN 300 MG/1
300 CAPSULE ORAL 3 TIMES DAILY PRN
Qty: 90 CAPSULE | Refills: 5 | Status: SHIPPED | OUTPATIENT
Start: 2023-04-10 | End: 2023-12-26

## 2023-04-10 RX ORDER — MELOXICAM 15 MG/1
15 TABLET ORAL
Qty: 60 TABLET | Refills: 0 | Status: ON HOLD | OUTPATIENT
Start: 2023-04-10 | End: 2023-05-27

## 2023-04-10 ASSESSMENT — PATIENT HEALTH QUESTIONNAIRE - PHQ9: CLINICAL INTERPRETATION OF PHQ2 SCORE: 0

## 2023-04-10 ASSESSMENT — PAIN SCALES - GENERAL: PAINLEVEL: 5=MODERATE PAIN

## 2023-04-10 ASSESSMENT — FIBROSIS 4 INDEX: FIB4 SCORE: 0.73

## 2023-04-10 NOTE — PROGRESS NOTES
Follow up patient note  Interventional spine and Pain  Physiatry (physical medicine and  Rehabilitation)       Chief complaint:   Chief Complaint   Patient presents with    Follow-Up     Strain of left hip adductor muscle, initial encounter          HISTORY    Please see new patient note by Dr Ayala,  for more details.     HPI  Patient identification: Barron Hewitt ,  1960,   With Diagnoses of Lumbar radiculopathy, Cervical radiculopathy, Cervical stenosis of spinal canal, Lumbar spondylosis, Atrophy of muscle of left thigh, and Chronic bilateral low back pain without sciatica were pertinent to this visit.     procedures   2/15/2022 left Lumbar Transforaminal Epidural Steroid  at the L3-4 and L4-5 levels.   3/22/2022 left Lumbar Transforaminal Epidural Steroid  at the L3-4 and L4-5 levels 80% improved at the follow-up visit   medial branch block targeting the bilateral L3-4, L4-5, L5-S1 facet joints with 100% pain relief during the diagnostic phase   medial branch block targeting the bilateral L3-4, L4-5, L5-S1 facet joints with 100% pain relief during the diagnostic phase  2022 medial branch radiofrequency neurotomy targeting the bilateral L3-4, L4-5, L5-S1 facet joints with sedation 90% improved post procedure    Overall the patients pain is stable.  He does have improvement in his back pain after the radiofrequency neurotomy.    However he does continue to have weakness in the left leg that was present prior to procedures.  This has not improved.  He also has chronic neck pain that can radiate down the arm with tingling sensation.  He is scheduled for surgery for cervical spine surgery lumbar spine surgery with Dr. Cochran.        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 Valverde    PONV (postoperative nausea and vomiting)     Sleep apnea     has CPAP    Snoring        PSHx:   Past Surgical History:   Procedure Laterality Date    RADIO FREQUENCY ABLATION ADDITIONAL LEVEL Bilateral 7/6/2022    Procedure: BILATERAL radiofrequency neurotomies medial branch targeting the L3-4, L4-5 and L5-S1 facet joints with fluoroscopic guidance and sedation. Plan for 80 degree C for 90 seconds for each neurotomy.;  Surgeon: Dragan Ayala M.D.;  [facility];  Service: Pain Management    NJ INJ DX/THER AGNT PARAVERT FACET JOINT, DEVON* Bilateral 6/21/2022    Procedure: Diagnostic medial branch blocks targeting the BILATERAL L3-4, L4-5 and L5-S1 facet joints with fluoroscopic guidance #2;  Surgeon: Dragan Ayala M.D.;  [facility];  Service: Pain Management    LUMBAR MEDIAL BRANCH BLOCKS Bilateral 6/21/2022    Procedure: .;  Surgeon: Dragan Ayala M.D.;  [facility]: [facility];  Service: Pain Management    CONSCIOUS SEDATION Bilateral 6/21/2022    Procedure: .;  Surgeon: Dragan Ayala M.D.;  [facility];  Service: Pain Management    LUMBAR MEDIAL BRANCH BLOCKS Bilateral 6/14/2022    Procedure: Diagnostic medial branch blocks targeting the BILATERAL L3-4, L4-5 and L5-S1 facet joints with fluoroscopic guidance #1;  Surgeon: Dragan Ayala M.D.;  Location: [facility];  Service: Pain Management    NJ TRANSURETHRAL " ELEC-SURG PROSTATECTOM N/A 5/27/2022    Procedure: TURP, USING BUTTON ELECTRODE;  Surgeon: Lee Mckee M.D.;  Location: [facility];  Service: Urology    BLOCK EPIDURAL STEROID INJECTION Left 3/22/2022    Procedure: LEFT L3-4 and L4-5 transforaminal epidural steroid injection with fluoroscopic guidance;  Surgeon: Dragan Ayala M.D.;  Location: [facility];  Service: Pain Management    RI NJX AA&/STRD TFRML EPI LUMBAR/SACRAL 1 LEVEL Left 2/15/2022    Procedure: LEFT L3-4 and L4-5 transforaminal epidural steroid injection with fluoroscopic guidance;  Surgeon: Dragan Ayala M.D.;  [facility];  Service: Pain Management    SHOULDER DECOMPRESSION ARTHROSCOPIC Left 1/4/2018    Procedure: SHOULDER DECOMPRESSION ARTHROSCOPIC - SUBACROMIAL;  Surgeon: Linwood Grover M.D.;  Location: [facility];  Service: Orthopedics    SHOULDER ARTHROSCOPY W/ BICIPITAL TENODESIS REPAIR Left 1/4/2018    Procedure: SHOULDER ARTHROSCOPY W/ BICIPITAL Tenotomy REPAIR;  Surgeon: Linwood Grover M.D.;  Location: [facility];  Service: Orthopedics    CLAVICLE DISTAL EXCISION Left 1/4/2018    Procedure: CLAVICLE DISTAL EXCISION - POSSIBLE;  Surgeon: Linwood Grover M.D.;  Location: [facility];  Service: Orthopedics    RECOVERY  4/8/2016    Procedure: CATH LAB [facility] WITH POSSIBLE NAVIN;  Surgeon: Recoveryonly Surgery;  Location: [facility];  Service:     VENTRAL HERNIA REPAIR LAPAROSCOPIC  11/1/2012    Performed by Marcos Ramírez M.D. at [facility]    KNEE ARTHROSCOPY  1990's    right    SHOULDER ARTHROSCOPY  1990's    right    SINUSOTOMIES  1990's    times two surgeries    TUMOR EXCISION WITH BIOPSY  1990's    right hand - thumb       Family history   Family History   Problem Relation Age of Onset    Breast Cancer Mother     Hypertension Mother     Cancer Mother         breast    Heart  Disease Mother         hx of bypass    Hypertension Father     Arthritis Father     Diabetes Father         prediabetes    No Known Problems Sister     Arthritis Brother     Heart Disease Other     Hypertension Other     Cancer Other     No Known Problems Brother          Medications:   Outpatient Medications Marked as Taking for the 4/10/23 encounter (Office Visit) with Dragan Ayala M.D.   Medication Sig Dispense Refill    meloxicam (MOBIC) 15 MG tablet Take 1 Tablet by mouth 1 time a day as needed (pain). Do not take other NSAIDs. No refills. 60 Tablet 0    gabapentin (NEURONTIN) 300 MG Cap Take 1 Capsule by mouth 3 times a day as needed (pain). 90 Capsule 5    zolpidem (AMBIEN) 5 MG Tab zolpidem 5 mg tablet   TAKE 1 TABLET BY MOUTH ONCE DAILY AT BEDTIME AS NEEDED FOR SLEEP FOR 30 DAYS      Empagliflozin (JARDIANCE) 25 MG Tab Take 1 Tablet by mouth every day. 90 Tablet 2    metFORMIN (GLUCOPHAGE) 500 MG Tab Take 1 Tablet by mouth 2 times a day with meals. 180 Tablet 2    metoprolol SR (TOPROL XL) 25 MG TABLET SR 24 HR TAKE 1 TABLET BY MOUTH ONCE DAILY AT NIGHT FOR BLOOD PRESSURE 90 Tablet 3    montelukast (SINGULAIR) 10 MG Tab Take 1 Tablet by mouth every evening. 30 Tablet 2    Dulaglutide 1.5 MG/0.5ML Solution Pen-injector Inject 0.5 mL under the skin every 7 days. 6 mL 5    ADVAIR DISKUS 500-50 MCG/ACT AEROSOL POWDER, BREATH ACTIVATED USE 1 INHALATION EVERY 12 HOURS 14 Each 0    levothyroxine (SYNTHROID) 50 MCG Tab TAKE 1 TABLET 6 DAYS PER WEEK ON AN EMPTY STOMACH, 75 MCG ON 7TH DAY. (Patient taking differently: 50mg TABLET DAILY) 90 Tablet 3    benazepril (LOTENSIN) 20 MG Tab TAKE 1 TABLET DAILY 90 Tablet 3    atorvastatin (LIPITOR) 80 MG tablet TAKE 1 TABLET EVERY EVENING 90 Tablet 3    Blood Glucose Monitoring Suppl (FREESTYLE LITE) w/Device Kit USE DAILY AS DIRECTED      tizanidine (ZANAFLEX) 4 MG Tab Take 1 Tablet by mouth every 6 hours as needed (muscle spasms). 90 Tablet 0    Blood Glucose Test  Strips Test strips order:  Freestyle Freedom Lite test strips  testing one time per day 100 Strip 2    Continuous Blood Gluc Sensor (FREESTYLE LUNA 14 DAY SENSOR) Misc 1 Application every 14 days. 6 Each 3    glucose blood (FREESTYLE PRECISION CHOLO TEST) strip 1 Strip by Other route in the morning, at noon, and at bedtime. 100 Strip 11    Lancet Devices Misc 1 Applicator 3 times a day. 100 Each 11    albuterol 108 (90 Base) MCG/ACT Aero Soln inhalation aerosol Inhale 2 Puffs every four hours as needed for Shortness of Breath. Pt prefers ventolin if possible. Thank you 3 Each 3    EPINEPHrine (EPIPEN) 0.3 MG/0.3ML Solution Auto-injector solution for injection Inject 0.3 mL into the thigh one time for 1 dose. 1 Each 0    NON SPECIFIED CPAP supplies through [facility] 1 Each 1    ALPHA LIPOIC ACID PO Take 600 mg by mouth 2 Times a Day.      Omega-3 Fatty Acids (FISH OIL) 1200 MG CAPS Take  by mouth.      Cinnamon 500 MG CAPS Take 1,000 mg by mouth.      aspirin EC (ECOTRIN) 81 MG TBEC Take 81 mg by mouth every day.        pantoprazole (PROTONIX) 40 MG TBEC Take 40 mg by mouth every day.        Coenzyme Q10 200 MG Cap Take  by mouth every day.        Cholecalciferol (VITAMIN D) 2000 UNIT TABS Take  by mouth 2 times a day.      cetirizine (ZYRTEC) 10 MG Tab Take 10 mg by mouth every day.          Current Outpatient Medications on File Prior to Visit   Medication Sig Dispense Refill    zolpidem (AMBIEN) 5 MG Tab zolpidem 5 mg tablet   TAKE 1 TABLET BY MOUTH ONCE DAILY AT BEDTIME AS NEEDED FOR SLEEP FOR 30 DAYS      Empagliflozin (JARDIANCE) 25 MG Tab Take 1 Tablet by mouth every day. 90 Tablet 2    metFORMIN (GLUCOPHAGE) 500 MG Tab Take 1 Tablet by mouth 2 times a day with meals. 180 Tablet 2    metoprolol SR (TOPROL XL) 25 MG TABLET SR 24 HR TAKE 1 TABLET BY MOUTH ONCE DAILY AT NIGHT FOR BLOOD PRESSURE 90 Tablet 3    montelukast (SINGULAIR) 10 MG Tab Take 1 Tablet by mouth every evening. 30 Tablet 2     Dulaglutide 1.5 MG/0.5ML Solution Pen-injector Inject 0.5 mL under the skin every 7 days. 6 mL 5    ADVAIR DISKUS 500-50 MCG/ACT AEROSOL POWDER, BREATH ACTIVATED USE 1 INHALATION EVERY 12 HOURS 14 Each 0    levothyroxine (SYNTHROID) 50 MCG Tab TAKE 1 TABLET 6 DAYS PER WEEK ON AN EMPTY STOMACH, 75 MCG ON 7TH DAY. (Patient taking differently: 50mg TABLET DAILY) 90 Tablet 3    benazepril (LOTENSIN) 20 MG Tab TAKE 1 TABLET DAILY 90 Tablet 3    atorvastatin (LIPITOR) 80 MG tablet TAKE 1 TABLET EVERY EVENING 90 Tablet 3    Blood Glucose Monitoring Suppl (FREESTYLE LITE) w/Device Kit USE DAILY AS DIRECTED      tizanidine (ZANAFLEX) 4 MG Tab Take 1 Tablet by mouth every 6 hours as needed (muscle spasms). 90 Tablet 0    Blood Glucose Test Strips Test strips order:  Freestyle Freedom Lite test strips  testing one time per day 100 Strip 2    Continuous Blood Gluc Sensor (FREESTYLE LUNA 14 DAY SENSOR) Misc 1 Application every 14 days. 6 Each 3    glucose blood (FREESTYLE PRECISION CHOLO TEST) strip 1 Strip by Other route in the morning, at noon, and at bedtime. 100 Strip 11    Lancet Devices Misc 1 Applicator 3 times a day. 100 Each 11    albuterol 108 (90 Base) MCG/ACT Aero Soln inhalation aerosol Inhale 2 Puffs every four hours as needed for Shortness of Breath. Pt prefers ventolin if possible. Thank you 3 Each 3    EPINEPHrine (EPIPEN) 0.3 MG/0.3ML Solution Auto-injector solution for injection Inject 0.3 mL into the thigh one time for 1 dose. 1 Each 0    NON SPECIFIED CPAP supplies through [facility] 1 Each 1    ALPHA LIPOIC ACID PO Take 600 mg by mouth 2 Times a Day.      Omega-3 Fatty Acids (FISH OIL) 1200 MG CAPS Take  by mouth.      Cinnamon 500 MG CAPS Take 1,000 mg by mouth.      aspirin EC (ECOTRIN) 81 MG TBEC Take 81 mg by mouth every day.        pantoprazole (PROTONIX) 40 MG TBEC Take 40 mg by mouth every day.        Coenzyme Q10 200 MG Cap Take  by mouth every day.        Cholecalciferol (VITAMIN D) 2000  "UNIT TABS Take  by mouth 2 times a day.      cetirizine (ZYRTEC) 10 MG Tab Take 10 mg by mouth every day.       No current facility-administered medications on file prior to visit.         Allergies:   Allergies   Allergen Reactions    Kiwi Extract Anaphylaxis    Shellfish Allergy Anaphylaxis    Strawberry Anaphylaxis    Ozempic (0.25 Or 0.5 Mg-Dose) [Semaglutide] Nausea     Pt does not recall any reaction    Sulfa Drugs Rash and Unspecified     rash    Testosterone Rash     rash       Social Hx:   Social History     Socioeconomic History    Marital status:      Spouse name: Not on file    Number of children: Not on file    Years of education: Not on file    Highest education level: Not on file   Occupational History    Not on file   Tobacco Use    Smoking status: Former     Packs/day: 0.00     Types: Cigarettes     Quit date: 2020     Years since quitting: 3.2    Smokeless tobacco: Never    Tobacco comments:     occasional cigars   Vaping Use    Vaping Use: Never used   Substance and Sexual Activity    Alcohol use: Yes     Comment: rare - 1-2 per month (social)    Drug use: Yes     Types: Marijuana, Inhaled, Oral     Comment: THC/ Occ    Sexual activity: Not on file     Comment: ; 2 biological kids (2 of his wife's); 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   Other Topics Concern    Not on file   Social History Narrative    Not on file     Social Determinants of Health     Financial Resource Strain: Not on file   Food Insecurity: Not on file   Transportation Needs: Not on file   Physical Activity: Not on file   Stress: Not on file   Social Connections: Not on file   Intimate Partner Violence: Not on file   Housing Stability: Not on file         EXAMINATION     Physical Exam:   Vitals: /70 (BP Location: Right arm, Patient Position: Sitting, BP Cuff Size: Adult)   Pulse 75   Temp 36.3 °C (97.3 °F) (Temporal)   Ht 1.753 m (5' 9\")   Wt 78.4 kg (172 lb 13.5 oz)   SpO2 96% "     Constitutional:   Body Habitus: Body mass index is 25.52 kg/m².  Cooperation: Fully cooperates with exam  Appearance: Well-groomed no disheveled    Respiratory-  breathing comfortable on room air, no audible wheezing  Cardiovascular- capillary refills less than 2 seconds. No lower extremity edema is noted.   Psychiatric- alert and oriented ×3. Normal affect.    MSK and Neuro: -    Thoracic/Lumbar Spine/Sacral Spine/Hips   There are no signs of infection around the injection sites.   full  active range of motion with flexion, lateral flexion, and rotation bilaterally.   There is full  active range of motion with lumbar extension.    There is no  pain with lumbar extension.   There is no pain with facet loading maneuver (extension rotation) with axial low back pain on the BILATERAL side(s)       Palpation:   No tenderness to palpation in midline at T1-T12 levels. No tenderness to palpation in the left and right of the midline T1-L5, NEGATIVE for tenderness to palpation to the para-midline region in the lower lumbar levels.  palpation over SI joint: negative bilaterally    palpation in hip or over the gluteus medius tendon insertion: negative bilaterally        Lumbar spine Special tests  Neuro tension  Straight leg test negative right, positive left    Slump test negative right, positive left      Key points for the international standards for neurological classification of spinal cord injury (ISNCSCI) to light touch.     Dermatome R L                                      L2 2 2   L3 2 2   L4 2 1   L5 2 2   S1 2 2   S2 2 2         Motor Exam Lower Extremities    ? Myotome R L   Hip flexion L2 5 5   Knee extension L3 5 5   Ankle dorsiflexion L4 5 5   Toe extension L5 5- 5-   Ankle plantarflexion S1 5 5                 MEDICAL DECISION MAKING    DATA    Labs:   Lab Results   Component Value Date/Time    SODIUM 139 03/16/2023 06:48 AM    POTASSIUM 4.1 03/16/2023 06:48 AM    CHLORIDE 101 03/16/2023 06:48 AM    CO2 25  03/16/2023 06:48 AM    GLUCOSE 132 (H) 03/16/2023 06:48 AM    BUN 16 03/16/2023 06:48 AM    CREATININE 0.79 03/16/2023 06:48 AM    CREATININE 1.1 07/10/2008 09:25 AM        Lab Results   Component Value Date/Time    PROTHROMBTM 13.6 05/02/2022 12:25 PM    INR 1.13 05/02/2022 12:25 PM        Lab Results   Component Value Date/Time    WBC 10.6 05/02/2022 12:25 PM    RBC 5.62 05/02/2022 12:25 PM    HEMOGLOBIN 17.0 05/02/2022 12:25 PM    HEMATOCRIT 50.9 05/02/2022 12:25 PM    MCV 90.6 05/02/2022 12:25 PM    MCH 30.2 05/02/2022 12:25 PM    MCHC 33.4 (L) 05/02/2022 12:25 PM    MPV 10.3 05/02/2022 12:25 PM    NEUTSPOLYS 67.60 06/04/2020 02:18 PM    LYMPHOCYTES 17.60 (L) 06/04/2020 02:18 PM    MONOCYTES 9.80 06/04/2020 02:18 PM    EOSINOPHILS 3.50 06/04/2020 02:18 PM    BASOPHILS 1.20 06/04/2020 02:18 PM    HYPOCHROMIA 1+ 08/27/2013 07:13 AM        Lab Results   Component Value Date/Time    HBA1C 7.2 (A) 03/21/2023 10:19 AM          Imaging:   I personally reviewed following images    MRI lumbar spine 7/27/2021 with moderate left neuroforaminal stenosis at L3-4 and L4-5 with mild right neuroforaminal stenosis at L3-4 and L4-5.  Mild to moderate bilateral neuroforaminal stenosis at L5-S1.  There is facet arthropathy bilaterally at L3-4, L4-5, L5-S1        I reviewed the following radiology reports      Cervical spine 9/22/2022  IMPRESSION:     1.  Multilevel degenerative changes most notable at C5-C6 with disc/osteophyte change with mild central stenosis, moderate right lateral recess stenosis, and right-sided uncinate spondylosis with severe right foraminal stenosis. Moderate left foraminal   stenosis.  2.  Additional details for each level above in the body of the report.  3.  No myelopathic cord signal.                         Results for orders placed during the hospital encounter of 07/27/21    MR-LUMBAR SPINE-W/O    Impression  1.  L4-5 annular disc bulge with a central disc protrusion and bilateral facet degeneration.  There is borderline central canal narrowing. There is moderate mild right neural foraminal narrowing.    2.  L3-4 annular disc bulge with a left lateral disc protrusion. There is moderate to severe left and mild right neural foraminal narrowing again seen.    3.  L5-S1 degenerative disc disease and facet degeneration results in moderate bilateral neuroforaminal narrowing.        Results for orders placed during the hospital encounter of 07/27/21    MR-LUMBAR SPINE-W/O    Impression  1.  L4-5 annular disc bulge with a central disc protrusion and bilateral facet degeneration. There is borderline central canal narrowing. There is moderate mild right neural foraminal narrowing.    2.  L3-4 annular disc bulge with a left lateral disc protrusion. There is moderate to severe left and mild right neural foraminal narrowing again seen.    3.  L5-S1 degenerative disc disease and facet degeneration results in moderate bilateral neuroforaminal narrowing.      Results for orders placed during the hospital encounter of 07/27/21    MR-MRA NECK-W/O    Impression  There is no significant stenosis in the visualized extracranial neck arteries.                                                                   Results for orders placed during the hospital encounter of 05/01/17    CT-ABDOMEN-PELVIS WITH    Impression  1.  Findings suggestive of early or low grade obstruction in the mid to distal small bowel. Enteritis with associated ileus could also give a similar appearance.  2.  LEFT calyceal stone                       Results for orders placed during the hospital encounter of 12/22/17    DX-CHEST-2 VIEWS    Impression  Negative two views of the chest.                          Electrodiagnostics   1/18/2022 EMG  IMPRESSION:  This is an abnormal electrodiagnostic study due to evidence of chronic reinnervation changes in the left vastus lateralis/medialis with mild active denervation changes noted.  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       There is no strong electrodiagnostic evidence of a an active right lumbosacral plexopathy though given some responses are unobtainable a mild lumbar plexopathy/femoral neuropathy may be present.  There is no EMG evidence of a right lumbosacral radiculopathy.                                         DIAGNOSIS   Visit Diagnoses     ICD-10-CM   1. Lumbar radiculopathy  M54.16   2. Cervical radiculopathy  M54.12   3. Cervical stenosis of spinal canal  M48.02   4. Lumbar spondylosis  M47.816   5. Atrophy of muscle of left thigh  M62.552   6. Chronic bilateral low back pain without sciatica  M54.50    G89.29           ASSESSMENT and PLAN:     Barron Hewitt  1960 male      Barron was seen today for follow-up.    Diagnoses and all orders for this visit:    Lumbar radiculopathy  -     meloxicam (MOBIC) 15 MG tablet; Take 1 Tablet by mouth 1 time a day as needed (pain). Do not take other NSAIDs. No refills.  -     gabapentin (NEURONTIN) 300 MG Cap; Take 1 Capsule by mouth 3 times a day as needed (pain).    Cervical radiculopathy  -     meloxicam (MOBIC) 15 MG tablet; Take 1 Tablet by mouth 1 time a day as needed (pain). Do not take other NSAIDs. No refills.  -     gabapentin (NEURONTIN) 300 MG Cap; Take 1 Capsule by mouth 3 times a day as needed (pain).    Cervical stenosis of spinal canal  -     meloxicam (MOBIC) 15 MG tablet; Take 1 Tablet by mouth 1 time a day as needed (pain). Do not take other NSAIDs. No refills.  -     gabapentin (NEURONTIN) 300 MG Cap; Take 1 Capsule by mouth 3 times a day as needed (pain).    Lumbar spondylosis  -     meloxicam (MOBIC) 15 MG tablet; Take 1 Tablet by mouth 1 time a day as needed (pain). Do not take other NSAIDs. No refills.  -     gabapentin (NEURONTIN) 300 MG Cap; Take 1 Capsule by  mouth 3 times a day as needed (pain).    Atrophy of muscle of left thigh  -     meloxicam (MOBIC) 15 MG tablet; Take 1 Tablet by mouth 1 time a day as needed (pain). Do not take other NSAIDs. No refills.  -     gabapentin (NEURONTIN) 300 MG Cap; Take 1 Capsule by mouth 3 times a day as needed (pain).    Chronic bilateral low back pain without sciatica  -     meloxicam (MOBIC) 15 MG tablet; Take 1 Tablet by mouth 1 time a day as needed (pain). Do not take other NSAIDs. No refills.  -     gabapentin (NEURONTIN) 300 MG Cap; Take 1 Capsule by mouth 3 times a day as needed (pain).      The patient is scheduled for surgery with Dr. Cochran for both cervical and lumbar spine surgery for decompressions.  He has spinal stenosis.    Postoperative pain management per surgery team in the postoperative state.  After the patient is back to baseline advised him to follow-up with me.     Follow up: 6 months    Thank you for allowing me to participate in the care of this patient. If you have any questions please not hesitate to contact me.             Please note that this dictation was created using voice recognition software. I have made every reasonable attempt to correct obvious errors but there may be errors of grammar and content that I may have overlooked prior to finalization of this note.      Dragan Ayala MD  Interventional Spine and Sports Physiatry  Physical Medicine and Rehabilitation  [facility]       NIRANJAN Rossi MD

## 2023-04-11 ENCOUNTER — TELEPHONE (OUTPATIENT)
Dept: MEDICAL GROUP | Facility: LAB | Age: 63
End: 2023-04-11
Payer: COMMERCIAL

## 2023-04-11 NOTE — TELEPHONE ENCOUNTER
"The drug you are requesting prior authorization for is not covered. Please select an alternative drug from the choices provided and send in a new prescription for that drug. You may also select \"Complete PA\" to obtain a PA for the originally requested drug.   ALBUTEROL HFA INHALER 8.5GM 90MCG ALBUTEROL HFA INHALER 8.5GM 90MCG Complete PA  Has the patient tried one other albuterol containing inhaler? For example, albuterol HFA (ProAir HFA or generics) or albuterol HFA (Proventil HFA or generics)? The preferred alternatives include the following: ALBUTEROL SULFATE HFA?   Yes No      "

## 2023-04-19 ENCOUNTER — APPOINTMENT (OUTPATIENT)
Dept: ADMISSIONS | Facility: MEDICAL CENTER | Age: 63
End: 2023-04-19
Attending: NEUROLOGICAL SURGERY
Payer: COMMERCIAL

## 2023-04-25 DIAGNOSIS — J45.20 MILD INTERMITTENT ASTHMA WITHOUT COMPLICATION: ICD-10-CM

## 2023-04-26 ENCOUNTER — PRE-ADMISSION TESTING (OUTPATIENT)
Dept: ADMISSIONS | Facility: MEDICAL CENTER | Age: 63
End: 2023-04-26
Attending: NEUROLOGICAL SURGERY
Payer: COMMERCIAL

## 2023-04-26 RX ORDER — ALBUTEROL SULFATE 90 UG/1
AEROSOL, METERED RESPIRATORY (INHALATION)
Qty: 54 G | Refills: 3 | Status: SHIPPED | OUTPATIENT
Start: 2023-04-26

## 2023-05-02 ENCOUNTER — HOSPITAL ENCOUNTER (OUTPATIENT)
Dept: RADIOLOGY | Facility: MEDICAL CENTER | Age: 63
End: 2023-05-02
Attending: NEUROLOGICAL SURGERY
Payer: COMMERCIAL

## 2023-05-02 DIAGNOSIS — M48.061 SPINAL STENOSIS, LUMBAR REGION, WITHOUT NEUROGENIC CLAUDICATION: ICD-10-CM

## 2023-05-02 PROCEDURE — 72148 MRI LUMBAR SPINE W/O DYE: CPT

## 2023-05-08 ENCOUNTER — TELEPHONE (OUTPATIENT)
Dept: MEDICAL GROUP | Facility: LAB | Age: 63
End: 2023-05-08
Payer: COMMERCIAL

## 2023-05-10 ENCOUNTER — APPOINTMENT (OUTPATIENT)
Dept: NEUROLOGY | Facility: MEDICAL CENTER | Age: 63
End: 2023-05-10
Attending: PSYCHIATRY & NEUROLOGY
Payer: COMMERCIAL

## 2023-05-11 ENCOUNTER — HOSPITAL ENCOUNTER (OUTPATIENT)
Dept: RADIOLOGY | Facility: MEDICAL CENTER | Age: 63
End: 2023-05-11
Attending: NEUROLOGICAL SURGERY | Admitting: NEUROLOGICAL SURGERY
Payer: COMMERCIAL

## 2023-05-11 ENCOUNTER — PRE-ADMISSION TESTING (OUTPATIENT)
Dept: ADMISSIONS | Facility: MEDICAL CENTER | Age: 63
End: 2023-05-11
Attending: NEUROLOGICAL SURGERY
Payer: COMMERCIAL

## 2023-05-11 DIAGNOSIS — M50.30 DEGENERATION OF CERVICAL INTERVERTEBRAL DISC: ICD-10-CM

## 2023-05-11 DIAGNOSIS — Z01.812 PRE-OPERATIVE LABORATORY EXAMINATION: ICD-10-CM

## 2023-05-11 DIAGNOSIS — Z01.810 PRE-OPERATIVE CARDIOVASCULAR EXAMINATION: ICD-10-CM

## 2023-05-11 LAB
ANION GAP SERPL CALC-SCNC: 12 MMOL/L (ref 7–16)
APPEARANCE UR: CLEAR
APTT PPP: 26.1 SEC (ref 24.7–36)
BASOPHILS # BLD AUTO: 1.4 % (ref 0–1.8)
BASOPHILS # BLD: 0.12 K/UL (ref 0–0.12)
BILIRUB UR QL STRIP.AUTO: NEGATIVE
BUN SERPL-MCNC: 9 MG/DL (ref 8–22)
CALCIUM SERPL-MCNC: 9.2 MG/DL (ref 8.5–10.5)
CHLORIDE SERPL-SCNC: 106 MMOL/L (ref 96–112)
CO2 SERPL-SCNC: 22 MMOL/L (ref 20–33)
COLOR UR: YELLOW
CREAT SERPL-MCNC: 0.68 MG/DL (ref 0.5–1.4)
EKG IMPRESSION: NORMAL
EOSINOPHIL # BLD AUTO: 0.56 K/UL (ref 0–0.51)
EOSINOPHIL NFR BLD: 6.4 % (ref 0–6.9)
ERYTHROCYTE [DISTWIDTH] IN BLOOD BY AUTOMATED COUNT: 44.6 FL (ref 35.9–50)
EST. AVERAGE GLUCOSE BLD GHB EST-MCNC: 157 MG/DL
GFR SERPLBLD CREATININE-BSD FMLA CKD-EPI: 103 ML/MIN/1.73 M 2
GLUCOSE SERPL-MCNC: 139 MG/DL (ref 65–99)
GLUCOSE UR STRIP.AUTO-MCNC: >=1000 MG/DL
HBA1C MFR BLD: 7.1 % (ref 4–5.6)
HCT VFR BLD AUTO: 50 % (ref 42–52)
HGB BLD-MCNC: 16.6 G/DL (ref 14–18)
IMM GRANULOCYTES # BLD AUTO: 0.03 K/UL (ref 0–0.11)
IMM GRANULOCYTES NFR BLD AUTO: 0.3 % (ref 0–0.9)
INR PPP: 1.15 (ref 0.87–1.13)
KETONES UR STRIP.AUTO-MCNC: NEGATIVE MG/DL
LEUKOCYTE ESTERASE UR QL STRIP.AUTO: NEGATIVE
LYMPHOCYTES # BLD AUTO: 1.64 K/UL (ref 1–4.8)
LYMPHOCYTES NFR BLD: 18.7 % (ref 22–41)
MCH RBC QN AUTO: 29.3 PG (ref 27–33)
MCHC RBC AUTO-ENTMCNC: 33.2 G/DL (ref 33.7–35.3)
MCV RBC AUTO: 88.3 FL (ref 81.4–97.8)
MICRO URNS: ABNORMAL
MONOCYTES # BLD AUTO: 0.76 K/UL (ref 0–0.85)
MONOCYTES NFR BLD AUTO: 8.7 % (ref 0–13.4)
NEUTROPHILS # BLD AUTO: 5.66 K/UL (ref 1.82–7.42)
NEUTROPHILS NFR BLD: 64.5 % (ref 44–72)
NITRITE UR QL STRIP.AUTO: NEGATIVE
NRBC # BLD AUTO: 0 K/UL
NRBC BLD-RTO: 0 /100 WBC
PH UR STRIP.AUTO: 6 [PH] (ref 5–8)
PLATELET # BLD AUTO: 304 K/UL (ref 164–446)
PMV BLD AUTO: 10.5 FL (ref 9–12.9)
POTASSIUM SERPL-SCNC: 3.8 MMOL/L (ref 3.6–5.5)
PROT UR QL STRIP: NEGATIVE MG/DL
PROTHROMBIN TIME: 14.6 SEC (ref 12–14.6)
RBC # BLD AUTO: 5.66 M/UL (ref 4.7–6.1)
RBC UR QL AUTO: NEGATIVE
SODIUM SERPL-SCNC: 140 MMOL/L (ref 135–145)
SP GR UR STRIP.AUTO: 1.04
UROBILINOGEN UR STRIP.AUTO-MCNC: 0.2 MG/DL
WBC # BLD AUTO: 8.8 K/UL (ref 4.8–10.8)

## 2023-05-11 PROCEDURE — 93005 ELECTROCARDIOGRAM TRACING: CPT

## 2023-05-11 PROCEDURE — 83036 HEMOGLOBIN GLYCOSYLATED A1C: CPT

## 2023-05-11 PROCEDURE — 71046 X-RAY EXAM CHEST 2 VIEWS: CPT

## 2023-05-11 PROCEDURE — 85730 THROMBOPLASTIN TIME PARTIAL: CPT

## 2023-05-11 PROCEDURE — 36415 COLL VENOUS BLD VENIPUNCTURE: CPT

## 2023-05-11 PROCEDURE — 81003 URINALYSIS AUTO W/O SCOPE: CPT

## 2023-05-11 PROCEDURE — 85025 COMPLETE CBC W/AUTO DIFF WBC: CPT

## 2023-05-11 PROCEDURE — 80048 BASIC METABOLIC PNL TOTAL CA: CPT

## 2023-05-11 PROCEDURE — 85610 PROTHROMBIN TIME: CPT

## 2023-05-11 PROCEDURE — 72110 X-RAY EXAM L-2 SPINE 4/>VWS: CPT

## 2023-05-11 PROCEDURE — 72050 X-RAY EXAM NECK SPINE 4/5VWS: CPT

## 2023-05-11 PROCEDURE — 93010 ELECTROCARDIOGRAM REPORT: CPT | Performed by: INTERNAL MEDICINE

## 2023-05-16 ENCOUNTER — PATIENT MESSAGE (OUTPATIENT)
Dept: HEALTH INFORMATION MANAGEMENT | Facility: OTHER | Age: 63
End: 2023-05-16

## 2023-05-16 DIAGNOSIS — Z79.4 TYPE 2 DIABETES MELLITUS WITHOUT COMPLICATION, WITH LONG-TERM CURRENT USE OF INSULIN (HCC): ICD-10-CM

## 2023-05-16 DIAGNOSIS — E11.9 TYPE 2 DIABETES MELLITUS WITHOUT COMPLICATION, WITH LONG-TERM CURRENT USE OF INSULIN (HCC): ICD-10-CM

## 2023-05-16 RX ORDER — FLASH GLUCOSE SENSOR
1 KIT MISCELLANEOUS
Qty: 6 EACH | Refills: 3 | Status: SHIPPED | OUTPATIENT
Start: 2023-05-16 | End: 2023-11-16

## 2023-05-26 ENCOUNTER — ANESTHESIA EVENT (OUTPATIENT)
Dept: SURGERY | Facility: MEDICAL CENTER | Age: 63
End: 2023-05-26
Payer: COMMERCIAL

## 2023-05-26 ENCOUNTER — APPOINTMENT (OUTPATIENT)
Dept: RADIOLOGY | Facility: MEDICAL CENTER | Age: 63
End: 2023-05-26
Attending: NEUROLOGICAL SURGERY
Payer: COMMERCIAL

## 2023-05-26 ENCOUNTER — HOSPITAL ENCOUNTER (OUTPATIENT)
Facility: MEDICAL CENTER | Age: 63
End: 2023-05-28
Attending: NEUROLOGICAL SURGERY | Admitting: NEUROLOGICAL SURGERY
Payer: COMMERCIAL

## 2023-05-26 ENCOUNTER — ANESTHESIA (OUTPATIENT)
Dept: SURGERY | Facility: MEDICAL CENTER | Age: 63
End: 2023-05-26
Payer: COMMERCIAL

## 2023-05-26 DIAGNOSIS — E11.44 DIABETIC AMYOTROPHY ASSOCIATED WITH TYPE 2 DIABETES MELLITUS (HCC): ICD-10-CM

## 2023-05-26 DIAGNOSIS — M50.30 DEGENERATION OF CERVICAL INTERVERTEBRAL DISC: ICD-10-CM

## 2023-05-26 DIAGNOSIS — M48.061 LUMBAR STENOSIS WITHOUT NEUROGENIC CLAUDICATION: ICD-10-CM

## 2023-05-26 DIAGNOSIS — R29.898 WEAKNESS OF BOTH LOWER EXTREMITIES: ICD-10-CM

## 2023-05-26 DIAGNOSIS — G89.18 POSTOPERATIVE PAIN: ICD-10-CM

## 2023-05-26 DIAGNOSIS — M48.061 SPINAL STENOSIS OF LUMBAR REGION WITHOUT NEUROGENIC CLAUDICATION: ICD-10-CM

## 2023-05-26 DIAGNOSIS — M79.605 PAIN OF LEFT LOWER EXTREMITY: ICD-10-CM

## 2023-05-26 LAB
GLUCOSE BLD STRIP.AUTO-MCNC: 142 MG/DL (ref 65–99)
GLUCOSE BLD STRIP.AUTO-MCNC: 188 MG/DL (ref 65–99)
GLUCOSE BLD STRIP.AUTO-MCNC: 197 MG/DL (ref 65–99)
GLUCOSE BLD STRIP.AUTO-MCNC: 208 MG/DL (ref 65–99)

## 2023-05-26 PROCEDURE — 700101 HCHG RX REV CODE 250: Performed by: NURSE PRACTITIONER

## 2023-05-26 PROCEDURE — 160048 HCHG OR STATISTICAL LEVEL 1-5: Performed by: NEUROLOGICAL SURGERY

## 2023-05-26 PROCEDURE — 502000 HCHG MISC OR IMPLANTS RC 0278: Performed by: NEUROLOGICAL SURGERY

## 2023-05-26 PROCEDURE — 95938 SOMATOSENSORY TESTING: CPT | Performed by: NEUROLOGICAL SURGERY

## 2023-05-26 PROCEDURE — 110371 HCHG SHELL REV 272: Performed by: NEUROLOGICAL SURGERY

## 2023-05-26 PROCEDURE — 700111 HCHG RX REV CODE 636 W/ 250 OVERRIDE (IP): Performed by: NURSE PRACTITIONER

## 2023-05-26 PROCEDURE — 110454 HCHG SHELL REV 250: Performed by: NEUROLOGICAL SURGERY

## 2023-05-26 PROCEDURE — 700105 HCHG RX REV CODE 258: Performed by: NURSE PRACTITIONER

## 2023-05-26 PROCEDURE — 96365 THER/PROPH/DIAG IV INF INIT: CPT

## 2023-05-26 PROCEDURE — 160041 HCHG SURGERY MINUTES - EA ADDL 1 MIN LEVEL 4: Performed by: NEUROLOGICAL SURGERY

## 2023-05-26 PROCEDURE — 700102 HCHG RX REV CODE 250 W/ 637 OVERRIDE(OP): Performed by: NURSE PRACTITIONER

## 2023-05-26 PROCEDURE — 700101 HCHG RX REV CODE 250: Performed by: NEUROLOGICAL SURGERY

## 2023-05-26 PROCEDURE — 95939 C MOTOR EVOKED UPR&LWR LIMBS: CPT | Performed by: NEUROLOGICAL SURGERY

## 2023-05-26 PROCEDURE — 700111 HCHG RX REV CODE 636 W/ 250 OVERRIDE (IP): Performed by: NEUROLOGICAL SURGERY

## 2023-05-26 PROCEDURE — 700101 HCHG RX REV CODE 250: Performed by: ANESTHESIOLOGY

## 2023-05-26 PROCEDURE — C1755 CATHETER, INTRASPINAL: HCPCS | Performed by: NEUROLOGICAL SURGERY

## 2023-05-26 PROCEDURE — 700111 HCHG RX REV CODE 636 W/ 250 OVERRIDE (IP): Performed by: ANESTHESIOLOGY

## 2023-05-26 PROCEDURE — 82962 GLUCOSE BLOOD TEST: CPT

## 2023-05-26 PROCEDURE — G0378 HOSPITAL OBSERVATION PER HR: HCPCS

## 2023-05-26 PROCEDURE — 72040 X-RAY EXAM NECK SPINE 2-3 VW: CPT

## 2023-05-26 PROCEDURE — 160009 HCHG ANES TIME/MIN: Performed by: NEUROLOGICAL SURGERY

## 2023-05-26 PROCEDURE — 95865 NEEDLE EMG LARYNX: CPT | Performed by: NEUROLOGICAL SURGERY

## 2023-05-26 PROCEDURE — C1713 ANCHOR/SCREW BN/BN,TIS/BN: HCPCS | Performed by: NEUROLOGICAL SURGERY

## 2023-05-26 PROCEDURE — 160002 HCHG RECOVERY MINUTES (STAT): Performed by: NEUROLOGICAL SURGERY

## 2023-05-26 PROCEDURE — 96372 THER/PROPH/DIAG INJ SC/IM: CPT

## 2023-05-26 PROCEDURE — 160029 HCHG SURGERY MINUTES - 1ST 30 MINS LEVEL 4: Performed by: NEUROLOGICAL SURGERY

## 2023-05-26 PROCEDURE — 95864 NEEDLE EMG 4 EXTREMITIES: CPT | Performed by: NEUROLOGICAL SURGERY

## 2023-05-26 PROCEDURE — 700105 HCHG RX REV CODE 258: Performed by: NEUROLOGICAL SURGERY

## 2023-05-26 PROCEDURE — 95937 NEUROMUSCULAR JUNCTION TEST: CPT | Performed by: NEUROLOGICAL SURGERY

## 2023-05-26 PROCEDURE — A9270 NON-COVERED ITEM OR SERVICE: HCPCS | Performed by: NURSE PRACTITIONER

## 2023-05-26 PROCEDURE — 700105 HCHG RX REV CODE 258: Performed by: ANESTHESIOLOGY

## 2023-05-26 PROCEDURE — 160035 HCHG PACU - 1ST 60 MINS PHASE I: Performed by: NEUROLOGICAL SURGERY

## 2023-05-26 PROCEDURE — 00670 ANES XTNSV SP&SPI CORD PX: CPT | Performed by: ANESTHESIOLOGY

## 2023-05-26 PROCEDURE — 95940 IONM IN OPERATNG ROOM 15 MIN: CPT | Performed by: NEUROLOGICAL SURGERY

## 2023-05-26 DEVICE — SCREW VARIABLE ANGLE XTEND SELF-DRILLING 4.2MM 16MM (1TCONX10=10): Type: IMPLANTABLE DEVICE | Site: SPINE CERVICAL | Status: FUNCTIONAL

## 2023-05-26 DEVICE — IMPLANTABLE DEVICE: Type: IMPLANTABLE DEVICE | Site: SPINE CERVICAL | Status: FUNCTIONAL

## 2023-05-26 RX ORDER — KETOROLAC TROMETHAMINE 30 MG/ML
INJECTION, SOLUTION INTRAMUSCULAR; INTRAVENOUS PRN
Status: DISCONTINUED | OUTPATIENT
Start: 2023-05-26 | End: 2023-05-26 | Stop reason: SURG

## 2023-05-26 RX ORDER — CEFAZOLIN SODIUM 1 G/3ML
INJECTION, POWDER, FOR SOLUTION INTRAMUSCULAR; INTRAVENOUS
Status: DISCONTINUED | OUTPATIENT
Start: 2023-05-26 | End: 2023-05-26 | Stop reason: HOSPADM

## 2023-05-26 RX ORDER — CETIRIZINE HYDROCHLORIDE 10 MG/1
10 TABLET ORAL DAILY
Status: DISCONTINUED | OUTPATIENT
Start: 2023-05-26 | End: 2023-05-29 | Stop reason: HOSPADM

## 2023-05-26 RX ORDER — ACETAMINOPHEN 325 MG/1
650 TABLET ORAL EVERY 6 HOURS
Status: DISCONTINUED | OUTPATIENT
Start: 2023-05-26 | End: 2023-05-29 | Stop reason: HOSPADM

## 2023-05-26 RX ORDER — KETAMINE HYDROCHLORIDE 50 MG/ML
INJECTION, SOLUTION, CONCENTRATE INTRAMUSCULAR; INTRAVENOUS PRN
Status: DISCONTINUED | OUTPATIENT
Start: 2023-05-26 | End: 2023-05-26 | Stop reason: SURG

## 2023-05-26 RX ORDER — BUPIVACAINE HYDROCHLORIDE AND EPINEPHRINE 2.5; 5 MG/ML; UG/ML
INJECTION, SOLUTION EPIDURAL; INFILTRATION; INTRACAUDAL; PERINEURAL
Status: DISCONTINUED | OUTPATIENT
Start: 2023-05-26 | End: 2023-05-26 | Stop reason: HOSPADM

## 2023-05-26 RX ORDER — ATORVASTATIN CALCIUM 40 MG/1
80 TABLET, FILM COATED ORAL EVERY EVENING
Status: DISCONTINUED | OUTPATIENT
Start: 2023-05-26 | End: 2023-05-29 | Stop reason: HOSPADM

## 2023-05-26 RX ORDER — HALOPERIDOL 5 MG/ML
1 INJECTION INTRAMUSCULAR
Status: DISCONTINUED | OUTPATIENT
Start: 2023-05-26 | End: 2023-05-26 | Stop reason: HOSPADM

## 2023-05-26 RX ORDER — GABAPENTIN 300 MG/1
300 CAPSULE ORAL 3 TIMES DAILY PRN
Status: DISCONTINUED | OUTPATIENT
Start: 2023-05-26 | End: 2023-05-29 | Stop reason: HOSPADM

## 2023-05-26 RX ORDER — TIZANIDINE 4 MG/1
4 TABLET ORAL EVERY 6 HOURS PRN
Status: DISCONTINUED | OUTPATIENT
Start: 2023-05-26 | End: 2023-05-29 | Stop reason: HOSPADM

## 2023-05-26 RX ORDER — SODIUM CHLORIDE, SODIUM GLUCONATE, SODIUM ACETATE, POTASSIUM CHLORIDE AND MAGNESIUM CHLORIDE 526; 502; 368; 37; 30 MG/100ML; MG/100ML; MG/100ML; MG/100ML; MG/100ML
INJECTION, SOLUTION INTRAVENOUS
Status: DISCONTINUED | OUTPATIENT
Start: 2023-05-26 | End: 2023-05-26 | Stop reason: SURG

## 2023-05-26 RX ORDER — DEXAMETHASONE SODIUM PHOSPHATE 4 MG/ML
INJECTION, SOLUTION INTRA-ARTICULAR; INTRALESIONAL; INTRAMUSCULAR; INTRAVENOUS; SOFT TISSUE PRN
Status: DISCONTINUED | OUTPATIENT
Start: 2023-05-26 | End: 2023-05-26 | Stop reason: SURG

## 2023-05-26 RX ORDER — ONDANSETRON 2 MG/ML
4 INJECTION INTRAMUSCULAR; INTRAVENOUS EVERY 4 HOURS PRN
Status: DISCONTINUED | OUTPATIENT
Start: 2023-05-26 | End: 2023-05-29 | Stop reason: HOSPADM

## 2023-05-26 RX ORDER — ONDANSETRON 4 MG/1
4 TABLET, ORALLY DISINTEGRATING ORAL EVERY 4 HOURS PRN
Status: DISCONTINUED | OUTPATIENT
Start: 2023-05-26 | End: 2023-05-29 | Stop reason: HOSPADM

## 2023-05-26 RX ORDER — IPRATROPIUM BROMIDE AND ALBUTEROL SULFATE 2.5; .5 MG/3ML; MG/3ML
3 SOLUTION RESPIRATORY (INHALATION)
Status: DISCONTINUED | OUTPATIENT
Start: 2023-05-26 | End: 2023-05-26 | Stop reason: HOSPADM

## 2023-05-26 RX ORDER — OXYCODONE HCL 5 MG/5 ML
10 SOLUTION, ORAL ORAL
Status: DISCONTINUED | OUTPATIENT
Start: 2023-05-26 | End: 2023-05-26 | Stop reason: HOSPADM

## 2023-05-26 RX ORDER — DIPHENHYDRAMINE HYDROCHLORIDE 50 MG/ML
12.5 INJECTION INTRAMUSCULAR; INTRAVENOUS
Status: DISCONTINUED | OUTPATIENT
Start: 2023-05-26 | End: 2023-05-26 | Stop reason: HOSPADM

## 2023-05-26 RX ORDER — OXYCODONE HYDROCHLORIDE 5 MG/1
5 TABLET ORAL
Status: DISCONTINUED | OUTPATIENT
Start: 2023-05-26 | End: 2023-05-29 | Stop reason: HOSPADM

## 2023-05-26 RX ORDER — AMOXICILLIN 250 MG
1 CAPSULE ORAL
Status: DISCONTINUED | OUTPATIENT
Start: 2023-05-26 | End: 2023-05-29 | Stop reason: HOSPADM

## 2023-05-26 RX ORDER — ZOLPIDEM TARTRATE 5 MG/1
5 TABLET ORAL NIGHTLY PRN
Status: DISCONTINUED | OUTPATIENT
Start: 2023-05-26 | End: 2023-05-29 | Stop reason: HOSPADM

## 2023-05-26 RX ORDER — DIPHENHYDRAMINE HCL 25 MG
25 TABLET ORAL EVERY 6 HOURS PRN
Status: DISCONTINUED | OUTPATIENT
Start: 2023-05-26 | End: 2023-05-29 | Stop reason: HOSPADM

## 2023-05-26 RX ORDER — SODIUM CHLORIDE, SODIUM LACTATE, POTASSIUM CHLORIDE, CALCIUM CHLORIDE 600; 310; 30; 20 MG/100ML; MG/100ML; MG/100ML; MG/100ML
INJECTION, SOLUTION INTRAVENOUS CONTINUOUS
Status: ACTIVE | OUTPATIENT
Start: 2023-05-26 | End: 2023-05-26

## 2023-05-26 RX ORDER — CEFAZOLIN SODIUM 1 G/3ML
INJECTION, POWDER, FOR SOLUTION INTRAMUSCULAR; INTRAVENOUS PRN
Status: DISCONTINUED | OUTPATIENT
Start: 2023-05-26 | End: 2023-05-26 | Stop reason: SURG

## 2023-05-26 RX ORDER — OXYCODONE HYDROCHLORIDE 10 MG/1
10 TABLET ORAL
Status: DISCONTINUED | OUTPATIENT
Start: 2023-05-26 | End: 2023-05-29 | Stop reason: HOSPADM

## 2023-05-26 RX ORDER — ONDANSETRON 2 MG/ML
INJECTION INTRAMUSCULAR; INTRAVENOUS PRN
Status: DISCONTINUED | OUTPATIENT
Start: 2023-05-26 | End: 2023-05-26 | Stop reason: SURG

## 2023-05-26 RX ORDER — DOCUSATE SODIUM 100 MG/1
100 CAPSULE, LIQUID FILLED ORAL 2 TIMES DAILY
Status: DISCONTINUED | OUTPATIENT
Start: 2023-05-26 | End: 2023-05-29 | Stop reason: HOSPADM

## 2023-05-26 RX ORDER — HYDROMORPHONE HYDROCHLORIDE 1 MG/ML
0.5 INJECTION, SOLUTION INTRAMUSCULAR; INTRAVENOUS; SUBCUTANEOUS
Status: DISCONTINUED | OUTPATIENT
Start: 2023-05-26 | End: 2023-05-29 | Stop reason: HOSPADM

## 2023-05-26 RX ORDER — ACETAMINOPHEN 325 MG/1
650 TABLET ORAL EVERY 6 HOURS PRN
Status: DISCONTINUED | OUTPATIENT
Start: 2023-05-31 | End: 2023-05-29 | Stop reason: HOSPADM

## 2023-05-26 RX ORDER — MEPERIDINE HYDROCHLORIDE 25 MG/ML
12.5 INJECTION INTRAMUSCULAR; INTRAVENOUS; SUBCUTANEOUS
Status: DISCONTINUED | OUTPATIENT
Start: 2023-05-26 | End: 2023-05-26 | Stop reason: HOSPADM

## 2023-05-26 RX ORDER — METOPROLOL SUCCINATE 25 MG/1
25 TABLET, EXTENDED RELEASE ORAL EVERY EVENING
Status: DISCONTINUED | OUTPATIENT
Start: 2023-05-26 | End: 2023-05-29 | Stop reason: HOSPADM

## 2023-05-26 RX ORDER — SODIUM CHLORIDE AND POTASSIUM CHLORIDE 150; 900 MG/100ML; MG/100ML
INJECTION, SOLUTION INTRAVENOUS CONTINUOUS
Status: DISCONTINUED | OUTPATIENT
Start: 2023-05-26 | End: 2023-05-29 | Stop reason: HOSPADM

## 2023-05-26 RX ORDER — SODIUM CHLORIDE, SODIUM GLUCONATE, SODIUM ACETATE, POTASSIUM CHLORIDE AND MAGNESIUM CHLORIDE 526; 502; 368; 37; 30 MG/100ML; MG/100ML; MG/100ML; MG/100ML; MG/100ML
500 INJECTION, SOLUTION INTRAVENOUS CONTINUOUS
Status: DISCONTINUED | OUTPATIENT
Start: 2023-05-26 | End: 2023-05-26 | Stop reason: HOSPADM

## 2023-05-26 RX ORDER — OMEPRAZOLE 20 MG/1
20 CAPSULE, DELAYED RELEASE ORAL DAILY
Status: DISCONTINUED | OUTPATIENT
Start: 2023-05-26 | End: 2023-05-29 | Stop reason: HOSPADM

## 2023-05-26 RX ORDER — DIPHENHYDRAMINE HYDROCHLORIDE 50 MG/ML
25 INJECTION INTRAMUSCULAR; INTRAVENOUS EVERY 6 HOURS PRN
Status: DISCONTINUED | OUTPATIENT
Start: 2023-05-26 | End: 2023-05-29 | Stop reason: HOSPADM

## 2023-05-26 RX ORDER — KETAMINE HYDROCHLORIDE 50 MG/ML
INJECTION, SOLUTION INTRAMUSCULAR; INTRAVENOUS
Status: COMPLETED
Start: 2023-05-26 | End: 2023-05-26

## 2023-05-26 RX ORDER — LEVOTHYROXINE SODIUM 0.05 MG/1
50 TABLET ORAL
Status: DISCONTINUED | OUTPATIENT
Start: 2023-05-26 | End: 2023-05-29 | Stop reason: HOSPADM

## 2023-05-26 RX ORDER — ENEMA 19; 7 G/133ML; G/133ML
1 ENEMA RECTAL
Status: DISCONTINUED | OUTPATIENT
Start: 2023-05-26 | End: 2023-05-29 | Stop reason: HOSPADM

## 2023-05-26 RX ORDER — BISACODYL 10 MG
10 SUPPOSITORY, RECTAL RECTAL
Status: DISCONTINUED | OUTPATIENT
Start: 2023-05-26 | End: 2023-05-29 | Stop reason: HOSPADM

## 2023-05-26 RX ORDER — OXYCODONE HCL 5 MG/5 ML
5 SOLUTION, ORAL ORAL
Status: DISCONTINUED | OUTPATIENT
Start: 2023-05-26 | End: 2023-05-26 | Stop reason: HOSPADM

## 2023-05-26 RX ORDER — ONDANSETRON 2 MG/ML
4 INJECTION INTRAMUSCULAR; INTRAVENOUS
Status: DISCONTINUED | OUTPATIENT
Start: 2023-05-26 | End: 2023-05-26 | Stop reason: HOSPADM

## 2023-05-26 RX ORDER — LIDOCAINE HYDROCHLORIDE 40 MG/ML
SOLUTION TOPICAL PRN
Status: DISCONTINUED | OUTPATIENT
Start: 2023-05-26 | End: 2023-05-26 | Stop reason: SURG

## 2023-05-26 RX ORDER — PROMETHAZINE HYDROCHLORIDE 25 MG/1
12.5-25 SUPPOSITORY RECTAL EVERY 4 HOURS PRN
Status: DISCONTINUED | OUTPATIENT
Start: 2023-05-26 | End: 2023-05-29 | Stop reason: HOSPADM

## 2023-05-26 RX ORDER — MIDAZOLAM HYDROCHLORIDE 1 MG/ML
1 INJECTION INTRAMUSCULAR; INTRAVENOUS
Status: DISCONTINUED | OUTPATIENT
Start: 2023-05-26 | End: 2023-05-26 | Stop reason: HOSPADM

## 2023-05-26 RX ORDER — LIDOCAINE HYDROCHLORIDE 20 MG/ML
INJECTION, SOLUTION EPIDURAL; INFILTRATION; INTRACAUDAL; PERINEURAL PRN
Status: DISCONTINUED | OUTPATIENT
Start: 2023-05-26 | End: 2023-05-26 | Stop reason: SURG

## 2023-05-26 RX ORDER — HYDROMORPHONE HYDROCHLORIDE 1 MG/ML
0.2 INJECTION, SOLUTION INTRAMUSCULAR; INTRAVENOUS; SUBCUTANEOUS
Status: DISCONTINUED | OUTPATIENT
Start: 2023-05-26 | End: 2023-05-26 | Stop reason: HOSPADM

## 2023-05-26 RX ORDER — AMOXICILLIN 250 MG
1 CAPSULE ORAL NIGHTLY
Status: DISCONTINUED | OUTPATIENT
Start: 2023-05-26 | End: 2023-05-29 | Stop reason: HOSPADM

## 2023-05-26 RX ORDER — DIAZEPAM 5 MG/1
5 TABLET ORAL EVERY 6 HOURS PRN
Status: DISCONTINUED | OUTPATIENT
Start: 2023-05-26 | End: 2023-05-29 | Stop reason: HOSPADM

## 2023-05-26 RX ORDER — BUPIVACAINE HYDROCHLORIDE AND EPINEPHRINE 5; 5 MG/ML; UG/ML
INJECTION, SOLUTION EPIDURAL; INTRACAUDAL; PERINEURAL
Status: DISCONTINUED | OUTPATIENT
Start: 2023-05-26 | End: 2023-05-26 | Stop reason: HOSPADM

## 2023-05-26 RX ORDER — DIPHENHYDRAMINE HYDROCHLORIDE 50 MG/ML
INJECTION INTRAMUSCULAR; INTRAVENOUS PRN
Status: DISCONTINUED | OUTPATIENT
Start: 2023-05-26 | End: 2023-05-26 | Stop reason: SURG

## 2023-05-26 RX ORDER — POLYETHYLENE GLYCOL 3350 17 G/17G
1 POWDER, FOR SOLUTION ORAL 2 TIMES DAILY PRN
Status: DISCONTINUED | OUTPATIENT
Start: 2023-05-26 | End: 2023-05-29 | Stop reason: HOSPADM

## 2023-05-26 RX ORDER — HYDROMORPHONE HYDROCHLORIDE 2 MG/ML
INJECTION, SOLUTION INTRAMUSCULAR; INTRAVENOUS; SUBCUTANEOUS PRN
Status: DISCONTINUED | OUTPATIENT
Start: 2023-05-26 | End: 2023-05-26 | Stop reason: SURG

## 2023-05-26 RX ORDER — PROMETHAZINE HYDROCHLORIDE 25 MG/1
12.5-25 TABLET ORAL EVERY 4 HOURS PRN
Status: DISCONTINUED | OUTPATIENT
Start: 2023-05-26 | End: 2023-05-29 | Stop reason: HOSPADM

## 2023-05-26 RX ORDER — HYDROMORPHONE HYDROCHLORIDE 1 MG/ML
0.4 INJECTION, SOLUTION INTRAMUSCULAR; INTRAVENOUS; SUBCUTANEOUS
Status: DISCONTINUED | OUTPATIENT
Start: 2023-05-26 | End: 2023-05-26 | Stop reason: HOSPADM

## 2023-05-26 RX ORDER — MONTELUKAST SODIUM 10 MG/1
10 TABLET ORAL EVERY EVENING
Status: DISCONTINUED | OUTPATIENT
Start: 2023-05-26 | End: 2023-05-29 | Stop reason: HOSPADM

## 2023-05-26 RX ORDER — METHYLPREDNISOLONE SODIUM SUCCINATE 125 MG/2ML
INJECTION, POWDER, LYOPHILIZED, FOR SOLUTION INTRAMUSCULAR; INTRAVENOUS
Status: DISCONTINUED | OUTPATIENT
Start: 2023-05-26 | End: 2023-05-26 | Stop reason: HOSPADM

## 2023-05-26 RX ORDER — HYDROMORPHONE HYDROCHLORIDE 1 MG/ML
1 INJECTION, SOLUTION INTRAMUSCULAR; INTRAVENOUS; SUBCUTANEOUS
Status: DISCONTINUED | OUTPATIENT
Start: 2023-05-26 | End: 2023-05-26 | Stop reason: HOSPADM

## 2023-05-26 RX ADMIN — Medication 50 MG: at 09:24

## 2023-05-26 RX ADMIN — TIZANIDINE 4 MG: 4 TABLET ORAL at 18:01

## 2023-05-26 RX ADMIN — MIDAZOLAM 2 MG: 1 INJECTION, SOLUTION INTRAMUSCULAR; INTRAVENOUS at 09:24

## 2023-05-26 RX ADMIN — SODIUM CHLORIDE, POTASSIUM CHLORIDE, SODIUM LACTATE AND CALCIUM CHLORIDE: 600; 310; 30; 20 INJECTION, SOLUTION INTRAVENOUS at 06:38

## 2023-05-26 RX ADMIN — METOPROLOL SUCCINATE 25 MG: 25 TABLET, EXTENDED RELEASE ORAL at 18:00

## 2023-05-26 RX ADMIN — INSULIN HUMAN 1 UNITS: 100 INJECTION, SOLUTION PARENTERAL at 20:56

## 2023-05-26 RX ADMIN — SUGAMMADEX 200 MG: 100 INJECTION, SOLUTION INTRAVENOUS at 09:24

## 2023-05-26 RX ADMIN — MIDAZOLAM 2 MG: 1 INJECTION, SOLUTION INTRAMUSCULAR; INTRAVENOUS at 07:49

## 2023-05-26 RX ADMIN — ONDANSETRON 4 MG: 2 INJECTION INTRAMUSCULAR; INTRAVENOUS at 10:46

## 2023-05-26 RX ADMIN — MONTELUKAST 10 MG: 10 TABLET, FILM COATED ORAL at 17:59

## 2023-05-26 RX ADMIN — METFORMIN HYDROCHLORIDE 500 MG: 500 TABLET ORAL at 17:59

## 2023-05-26 RX ADMIN — FENTANYL CITRATE 100 MCG: 50 INJECTION, SOLUTION INTRAMUSCULAR; INTRAVENOUS at 07:49

## 2023-05-26 RX ADMIN — ACETAMINOPHEN 650 MG: 325 TABLET, FILM COATED ORAL at 13:15

## 2023-05-26 RX ADMIN — LIDOCAINE HYDROCHLORIDE 4 ML: 40 SOLUTION TOPICAL at 07:53

## 2023-05-26 RX ADMIN — CEFAZOLIN 2 G: 1 INJECTION, POWDER, FOR SOLUTION INTRAMUSCULAR; INTRAVENOUS at 07:55

## 2023-05-26 RX ADMIN — LEVOTHYROXINE SODIUM 50 MCG: 0.05 TABLET ORAL at 13:14

## 2023-05-26 RX ADMIN — INSULIN HUMAN 2 UNITS: 100 INJECTION, SOLUTION PARENTERAL at 18:14

## 2023-05-26 RX ADMIN — PROPOFOL 150 MG: 10 INJECTION, EMULSION INTRAVENOUS at 07:53

## 2023-05-26 RX ADMIN — HYDROMORPHONE HYDROCHLORIDE 2 MG: 2 INJECTION INTRAMUSCULAR; INTRAVENOUS; SUBCUTANEOUS at 08:12

## 2023-05-26 RX ADMIN — CEFAZOLIN 2 G: 2 INJECTION, POWDER, FOR SOLUTION INTRAMUSCULAR; INTRAVENOUS at 15:40

## 2023-05-26 RX ADMIN — FAMOTIDINE 20 MG: 10 INJECTION, SOLUTION INTRAVENOUS at 07:49

## 2023-05-26 RX ADMIN — ROCURONIUM BROMIDE 80 MG: 10 INJECTION, SOLUTION INTRAVENOUS at 07:53

## 2023-05-26 RX ADMIN — CETIRIZINE HYDROCHLORIDE 10 MG: 10 TABLET, FILM COATED ORAL at 13:15

## 2023-05-26 RX ADMIN — LIDOCAINE HYDROCHLORIDE 50 MG: 20 INJECTION, SOLUTION EPIDURAL; INFILTRATION; INTRACAUDAL at 07:53

## 2023-05-26 RX ADMIN — OXYCODONE 5 MG: 5 TABLET ORAL at 15:37

## 2023-05-26 RX ADMIN — CEFAZOLIN 2 G: 2 INJECTION, POWDER, FOR SOLUTION INTRAMUSCULAR; INTRAVENOUS at 23:42

## 2023-05-26 RX ADMIN — KETOROLAC TROMETHAMINE 30 MG: 30 INJECTION, SOLUTION INTRAMUSCULAR; INTRAVENOUS at 10:46

## 2023-05-26 RX ADMIN — DIPHENHYDRAMINE HYDROCHLORIDE 25 MG: 50 INJECTION, SOLUTION INTRAMUSCULAR; INTRAVENOUS at 07:57

## 2023-05-26 RX ADMIN — OXYCODONE HYDROCHLORIDE 10 MG: 10 TABLET ORAL at 20:58

## 2023-05-26 RX ADMIN — POTASSIUM CHLORIDE AND SODIUM CHLORIDE: 900; 150 INJECTION, SOLUTION INTRAVENOUS at 13:22

## 2023-05-26 RX ADMIN — ATORVASTATIN CALCIUM 80 MG: 40 TABLET, FILM COATED ORAL at 18:06

## 2023-05-26 RX ADMIN — SODIUM CHLORIDE, SODIUM GLUCONATE, SODIUM ACETATE, POTASSIUM CHLORIDE AND MAGNESIUM CHLORIDE: 526; 502; 368; 37; 30 INJECTION, SOLUTION INTRAVENOUS at 09:35

## 2023-05-26 RX ADMIN — SENNOSIDES AND DOCUSATE SODIUM 1 TABLET: 50; 8.6 TABLET ORAL at 20:58

## 2023-05-26 RX ADMIN — DOCUSATE SODIUM 100 MG: 100 CAPSULE, LIQUID FILLED ORAL at 17:59

## 2023-05-26 RX ADMIN — ACETAMINOPHEN 650 MG: 325 TABLET, FILM COATED ORAL at 23:41

## 2023-05-26 RX ADMIN — DEXAMETHASONE SODIUM PHOSPHATE 8 MG: 4 INJECTION INTRA-ARTICULAR; INTRALESIONAL; INTRAMUSCULAR; INTRAVENOUS; SOFT TISSUE at 07:56

## 2023-05-26 ASSESSMENT — LIFESTYLE VARIABLES
HOW MANY TIMES IN THE PAST YEAR HAVE YOU HAD 5 OR MORE DRINKS IN A DAY: 0
HAVE YOU EVER FELT YOU SHOULD CUT DOWN ON YOUR DRINKING: NO
EVER HAD A DRINK FIRST THING IN THE MORNING TO STEADY YOUR NERVES TO GET RID OF A HANGOVER: NO
TOTAL SCORE: 0
TOTAL SCORE: 0
ALCOHOL_USE: YES
CONSUMPTION TOTAL: NEGATIVE
EVER FELT BAD OR GUILTY ABOUT YOUR DRINKING: NO
DOES PATIENT WANT TO STOP DRINKING: NO
TOTAL SCORE: 0
ON A TYPICAL DAY WHEN YOU DRINK ALCOHOL HOW MANY DRINKS DO YOU HAVE: 2
HAVE PEOPLE ANNOYED YOU BY CRITICIZING YOUR DRINKING: NO
AVERAGE NUMBER OF DAYS PER WEEK YOU HAVE A DRINK CONTAINING ALCOHOL: 0

## 2023-05-26 ASSESSMENT — COGNITIVE AND FUNCTIONAL STATUS - GENERAL
SUGGESTED CMS G CODE MODIFIER DAILY ACTIVITY: CJ
SUGGESTED CMS G CODE MODIFIER MOBILITY: CK
DRESSING REGULAR UPPER BODY CLOTHING: A LITTLE
TURNING FROM BACK TO SIDE WHILE IN FLAT BAD: A LITTLE
MOBILITY SCORE: 18
MOVING TO AND FROM BED TO CHAIR: A LITTLE
DRESSING REGULAR LOWER BODY CLOTHING: A LITTLE
WALKING IN HOSPITAL ROOM: A LITTLE
CLIMB 3 TO 5 STEPS WITH RAILING: A LITTLE
MOVING FROM LYING ON BACK TO SITTING ON SIDE OF FLAT BED: A LITTLE
HELP NEEDED FOR BATHING: A LITTLE
DAILY ACTIVITIY SCORE: 21
STANDING UP FROM CHAIR USING ARMS: A LITTLE

## 2023-05-26 ASSESSMENT — PATIENT HEALTH QUESTIONNAIRE - PHQ9
SUM OF ALL RESPONSES TO PHQ9 QUESTIONS 1 AND 2: 0
1. LITTLE INTEREST OR PLEASURE IN DOING THINGS: NOT AT ALL
2. FEELING DOWN, DEPRESSED, IRRITABLE, OR HOPELESS: NOT AT ALL

## 2023-05-26 ASSESSMENT — PAIN DESCRIPTION - PAIN TYPE
TYPE: SURGICAL PAIN
TYPE: SURGICAL PAIN

## 2023-05-26 ASSESSMENT — PAIN SCALES - GENERAL: PAIN_LEVEL: 0

## 2023-05-26 ASSESSMENT — FIBROSIS 4 INDEX: FIB4 SCORE: 0.89

## 2023-05-26 NOTE — ANESTHESIA TIME REPORT
Anesthesia Start and Stop Event Times     Date Time Event    5/26/2023 0649 Ready for Procedure     0748 Anesthesia Start     1059 Anesthesia Stop        Responsible Staff  05/26/23    Name Role Begin End    Drew Tariq III, M.D. Anesth [id number]        Overtime Reason:  no overtime (within assigned shift)    Comments:

## 2023-05-26 NOTE — ANESTHESIA PREPROCEDURE EVALUATION
Case: [id number] Date/Time: 05/26/23 0645    Procedures:       POSTERIOR L4-S1 LAMINECTOMIES AND THEN AN ANTERIOR C5-7 DISCECTOMY AND FUSION      LAMINECTOMY, SPINE, LUMBAR, WITH DISCECTOMY    Pre-op diagnosis: DEGENERATION OF CERVICAL INTERVERTEBRAL DISC    Location: [facility] OR 05 / SURGERY [facility]    Surgeons: Segun Cochran M.D.          Relevant Problems   ANESTHESIA   (positive) ISELA on CPAP - Preferred home care   (positive) PONV (postoperative nausea and vomiting)      PULMONARY   (positive) Asthma   (positive) Mild intermittent asthma without complication      CARDIAC   (positive) Angina pectoris (HCC)   (positive) Coronary artery disease, non-occlusive   (positive) Essential hypertension, benign      ENDO   (positive) Acquired hypothyroidism   (positive) Controlled type 2 diabetes mellitus with diabetic polyneuropathy, without long-term current use of insulin (HCC)       Physical Exam    Airway   Mallampati: III  TM distance: >3 FB  Neck ROM: limited       Cardiovascular - normal exam  Rhythm: regular  Rate: normal  (-) murmur     Dental - normal exam           Pulmonary - normal exam  Breath sounds clear to auscultation     Abdominal    Neurological - normal exam                 Anesthesia Plan    ASA 3       Plan - general       Airway plan will be ETT    (Intraop neuro monitoring)      Induction: intravenous    Postoperative Plan: Postoperative administration of opioids is intended.    Pertinent diagnostic labs and testing reviewed    Informed Consent:    Anesthetic plan and risks discussed with patient.    Use of blood products discussed with: patient whom consented to blood products.

## 2023-05-26 NOTE — OR NURSING
Pt arrived via gurney from OR w/ RN and anesthesia. VSS. Report received. Orders reviewed and initiated. Beba c/d/I. Hemovac x 2 intact.

## 2023-05-26 NOTE — PROGRESS NOTES
4 Eyes Skin Assessment Completed by TANYA Branch and TANYA Goss.    Head WDL  Ears WDL  Nose WDL  Mouth WDL  Neck incision (anterior) with dressing   CD and I, with hemovac drain  Breast/Chest WDL  Shoulder Blades WDL  Spine Incision silver mepilex, dressing CD and I with hemovac drain  (R) Arm/Elbow/Hand WDL  (L) Arm/Elbow/Hand WDL  Abdomen WDL  Groin WDL  Scrotum/Coccyx/Buttocks WDL  (R) Leg WDL  (L) Leg WDL  (R) Heel/Foot/Toe WDL  (L) Heel/Foot/Toe WDL          Devices In Places Blood Pressure Cuff, Pulse Ox, SCD's, and Nasal Cannula, hemovac drains x2      Interventions In Place NC W/Ear Foams, Pillows, and Pressure Redistribution Mattress    Possible Skin Injury No    Pictures Uploaded Into Epic N/A  Wound Consult Placed N/A  RN Wound Prevention Protocol Ordered No

## 2023-05-26 NOTE — ANESTHESIA PROCEDURE NOTES
Airway    Date/Time: 5/26/2023 7:53 AM    Performed by: Drew Tariq III, M.D.  Authorized by: Drew Tariq III, M.D.    Location:  OR  Urgency:  Elective  Difficult Airway: No    Indications for Airway Management:  Anesthesia      Spontaneous Ventilation: absent    Sedation Level:  Deep  Preoxygenated: Yes    Patient Position:  Sniffing  Final Airway Type:  Endotracheal airway  Final Endotracheal Airway:  ETT (Âµ-GPS Opticstronic NIM EMG)  Cuffed: Yes    Technique Used for Successful ETT Placement:  Direct laryngoscopy    Insertion Site:  Oral  Blade Type:  Holloway  Laryngoscope Blade/Videolaryngoscope Blade Size:  3  ETT Size (mm):  8.0  Measured from:  Lips  ETT to Lips (cm):  23  Placement Verified by: auscultation and capnometry    Cormack-Lehane Classification:  Grade III - view of epiglottis only  Number of Attempts at Approach:  1

## 2023-05-26 NOTE — OR NURSING
Report called to receiving TANYA Branch. Pt taken via [employer] to T301. One bag of personal belongings and glasses sent w/ pt. VSS. No distress. No complaints. Message left for wife.

## 2023-05-26 NOTE — OP REPORT
DATE OF SERVICE:  05/26/2023     PREOPERATIVE DIAGNOSES:  Cervical degenerative disk disease with spondylosis   and radiculopathy, cervical.     POSTOPERATIVE DIAGNOSES:  Cervical degenerative disk disease with spondylosis   and radiculopathy, cervical.     PROCEDURES:  1.  Anterior cervical diskectomy with osteophytectomy and bilateral   foraminotomy, C5-C6.  2.  Anterior cervical diskectomy with osteophytectomy and bilateral   foraminotomy, C6-C7.  3.  Microscope use with microsurgical technique.  4.  Interbody fusion, C5-C6 with use of Hedron C spacer, [id number] mm in height   with 7-degree lordosis.  5.  Interbody fusion, C6-C7 with Hedron C spacer,[id number] mm in height with   7-degree lordosis.  6.  Augmentation of interbody fusion with locally harvested bone graft from   the Hensler bone press.     SURGEON:  Segun Cochran MD     ASSISTANT:  JAMES Steele     ANESTHESIA:  General anesthetic.     ANESTHESIOLOGIST:  Drew Tariq MD     PREPARATION:  The patient had somatosensory evoked potentials, motor evokes,   and EMGs monitored.     REASON FOR ASSISTANCE:  During this procedure, we had to drill under the   operative microscope, the osteophytic spurs.  This required irrigation, as   well as suctioning by my assistant.     During the microsurgical dissection ____, we used the Kerrison to decompress   the thecal sac was necessary in several instances. Retraction during exposure   was also necessary, and extremely helpful.     DESCRIPTION OF PROCEDURE:  The patient was brought to the operating room.  The   patient had been intubated for first part of this operation, which was a   lumbar laminectomy.  This is part two, where he had an anterior cervical   diskectomy and fusion.     He was rolled onto a normal operating room table supine.  A roll was placed   underneath the shoulder blades and his neck was placed in slight extension in   a gel pad.  He was prepared for somatosensory evoked potentials  and EMGs.     His arms were wrapped at the side and secured and the shoulders were taped   down.  Intraoperative x-ray confirmed location of the C6-C7 level.     His neck was prepped and draped in a sterile fashion and proposed incision   site was injected with Marcaine 0.25% with epinephrine.  A transverse incision   was made and carried down to subcutaneous tissue, at C5-C6.  We dissected   superiorly and inferiorly, releasing the fascia and then opened the platysma.    We dissected this also superiorly and inferiorly, and released the platysma   in order to allow us greater exposure.  We dissected medial to the   sternocleidomastoid, just above the omohyoid and identified the prevertebral   fascia medial to the carotid artery.  Through this avascular plane, we opened   the prevertebral fascia, and dissected off the longus colli from the   osteophytic spurs at C5-C6 and C6-C7.  Then, retractors were placed in the   medial, lateral position with hooks underneath the longus colli.  The   retractor was held in place with the Carlos retractor.  Superior and inferior   retraction was also placed, with ____ mm plate.     We identified C5-C6.     We opened the anterior longitudinal ligament and the disk space with monopolar   cautery and curetted the endplates down to bone.  AM-12 was used to drill   back to the osteophytic spur posteriorly.  This osteophytic spur was further   drilled with the AM-8 down to posterior longitudinal ligament.  The spur was   quite large, and extended into the canal and bilateral osteophytes were also   present.  Opened the posterior longitudinal ligament with nerve hook and #1   and #2 Kerrisons, we were able to perform the osteophytectomy and bilateral   foraminotomy.  The osteophyte posterolaterally on the right was quite large   with compression of the nerve root and we had to gently dissect this free, and   used #1 and #2 Kerrisons to remove this.     We measured the opening as a #10,  and used a #10 graft at this point in time,   which was XXL in size.  The Hedron C spacer was [id number] mm with 7-degree   lordosis loaded with locally harvested bone graft from the Hensler bone press.    Tamped into countersunk position, we drilled off the anterior osteophytic   spur at C5-C6.     We replaced our retractors, going down to C6-C7.  Longus colli had been   dissected free and we were able to place the retractors underneath the longus   colli holding it in place.  Superior and inferior retractor was placed as   well.  We opened the disk space at C6-C7 with monopolar cautery, curetted the   endplates down to bone and used the AM-12 to drill back to the osteophytic   spur under the operative microscope.  This was a more difficult disk and under   the scope, we were able to drill the posterior osteophytic spur with the AM-8   for several if not 4 mm.     We were able to open the posterior longitudinal ligament with nerve hook and   #1 and #2 Kerrisons.  Osteophytectomy and bilateral foraminotomy was   performed.  Wide foraminotomy on the right side was performed as this   osteophyte was also very large.  A #9, I trialed, fit very nicely here, so we   used the Hedron C spacer [id number] mm with 7-degree lordosis loaded with locally   harvested bone graft from the Hensler bone press.     We used an Xtend plate 34 mm, and secured this with a 4.2x16 mm screws into   C5, C6 and C7.  Locking screws were secured, area was copiously irrigated.  We   placed a medium size Hemovac and then closed the platysma with 3-0 Vicryl   suture, subcuticular 4-0 Vicryl.  X-ray showed good location of   instrumentation in AP and lateral position prior to closure. We had removed   the scope at the time of placement of our plate and securing the plate in   place.  All sponge and needle counts were correct and estimated blood loss for   the procedure was 10 mL.        ______________________________  HAM SHRESTHA,  MD VASQUEZ/ARACELIS/ISAAK    DD:  05/26/2023 11:41  DT:  05/26/2023 12:30    Job#:  [id number]    CC:FRANDY NAGY    used

## 2023-05-26 NOTE — OP REPORT
DATE OF SERVICE:  05/26/2023     PREOPERATIVE DIAGNOSES:  Lumbar stenosis, L4-5, L5-S1.     POSTOPERATIVE DIAGNOSES:  Lumbar stenosis, L4-5 and L5-S1.     PROCEDURES:  L4-5, L5-S1 lumbar laminectomy with medial facetectomy and   bilateral foraminotomies.     SURGEON:  Segun Cochran MD     ASSISTANT:  JAMES Harley     ANESTHESIA:  General anesthetic.     ANESTHESIOLOGIST:  Drew Tariq MD     PREPARATION:  Routine.     REQUIREMENT FOR ASSISTANCE:  ____, during this operation we had to decompress   the spinal canal.  There was a need for irrigation while we were using the   Midas Juan Diego drill as well as suctioning.  While drilling off the medial facets,   there was protection of the associated dura and surrounding structures.  The   opening and closure was also assisted by having JAMES Harley   available.     DESCRIPTION OF PROCEDURE:  The patient was brought to the operating room and   following induction, he was intubated and placed under general anesthetic.    Rolled prone onto the operating table.  All pressure points were padded.    Sequential stockings were in place.  Back was prepped and draped in sterile   fashion.  Proposed incision site was injected with Marcaine 0.25% with   epinephrine.  Midline incision was made and carried down to subcutaneous   tissue and subperiosteal dissection was carried out bilaterally.  This was   done at L4-5.  Again, we did a time-out first, injected with Marcaine 0.25%   with epinephrine, and then did our exposure at L4-5.  Intraoperative x-ray   confirmed location.  Spinous process of L4 and L5 were removed with Leksell.    We drilled the junction of the lamina facet to a very thin shelf and removed   the center portion of the bone with Leksell.  Kerrisons used to complete the   midline laminectomy.  Drilling the medial facets, we were able to thin them   out.  This required protection of the dura with a Suarez, as we drilled along   the lateral facets.  We  undercut the facet joint, decompress from pedicle of   L4 to the pedicle of L5, and pedicle of L5 to pedicle of S1.     Foraminotomies were performed bilaterally over the L5, L4 and S1 nerve roots.    The area was copiously irrigated.  Meticulous hemostasis was obtained.  We   were able to place an epidural catheter and inject fentanyl 100 mcg and   Solu-Medrol 125 mg into the epidural space.  We closed the fascia with #1   Vicryl suture, subcutaneous tissue with 2-0 Vicryl and subcuticular with 3-0   Vicryl.  All sponge and needle counts were correct.  Estimated blood loss was   approximately 40 mL.        ______________________________  MD PEDRO VALDOVINOS/ANNELIESE/ISAAK    DD:  05/26/2023 11:30  DT:  05/26/2023 12:15    Job#:  [id number]    CC:FRANDY NAGY

## 2023-05-26 NOTE — ANESTHESIA POSTPROCEDURE EVALUATION
Patient: Braron Hewitt    Procedure Summary     Date: 05/26/23 Room / Location: [facility] 05 / SURGERY [facility]    Anesthesia Start: 0748 Anesthesia Stop: 1059    Procedures:       ANTERIOR C5-7 DISCECTOMY AND FUSION (Spine Cervical)      LAMINECTOMY, SPINE, LUMBAR, WITH DISCECTOMY POSTERIOR L4-S1 LAMINECTOMIES (Spine Lumbar) Diagnosis: (DEGENERATION OF CERVICAL INTERVERTEBRAL DISC)    Surgeons: Segun Cochran M.D. Responsible Provider: Drew Tariq III, M.D.    Anesthesia Type: general ASA Status: 3          Final Anesthesia Type: general  Last vitals  BP   Blood Pressure: (!) 142/78    Temp   36 °C (96.8 °F)    Pulse   72   Resp   14    SpO2   94 %      Anesthesia Post Evaluation    Patient location during evaluation: PACU  Patient participation: complete - patient participated  Level of consciousness: awake and alert  Pain score: 0    Airway patency: patent  Anesthetic complications: no  Cardiovascular status: hemodynamically stable  Respiratory status: acceptable  Hydration status: euvolemic    PONV: none          No notable events documented.     Nurse Pain Score: 0 (NPRS)

## 2023-05-27 ENCOUNTER — PHARMACY VISIT (OUTPATIENT)
Dept: PHARMACY | Facility: MEDICAL CENTER | Age: 63
End: 2023-05-27
Payer: COMMERCIAL

## 2023-05-27 LAB
GLUCOSE BLD STRIP.AUTO-MCNC: 130 MG/DL (ref 65–99)
GLUCOSE BLD STRIP.AUTO-MCNC: 145 MG/DL (ref 65–99)
GLUCOSE BLD STRIP.AUTO-MCNC: 181 MG/DL (ref 65–99)
GLUCOSE BLD STRIP.AUTO-MCNC: 217 MG/DL (ref 65–99)

## 2023-05-27 PROCEDURE — 97535 SELF CARE MNGMENT TRAINING: CPT

## 2023-05-27 PROCEDURE — RXOTC WILLOW AMBULATORY OTC CHARGE: Performed by: PHARMACIST

## 2023-05-27 PROCEDURE — 96372 THER/PROPH/DIAG INJ SC/IM: CPT

## 2023-05-27 PROCEDURE — 97165 OT EVAL LOW COMPLEX 30 MIN: CPT

## 2023-05-27 PROCEDURE — A9270 NON-COVERED ITEM OR SERVICE: HCPCS | Performed by: NURSE PRACTITIONER

## 2023-05-27 PROCEDURE — RXMED WILLOW AMBULATORY MEDICATION CHARGE: Performed by: NEUROLOGICAL SURGERY

## 2023-05-27 PROCEDURE — 700101 HCHG RX REV CODE 250: Performed by: NURSE PRACTITIONER

## 2023-05-27 PROCEDURE — G0378 HOSPITAL OBSERVATION PER HR: HCPCS

## 2023-05-27 PROCEDURE — 700111 HCHG RX REV CODE 636 W/ 250 OVERRIDE (IP): Performed by: NURSE PRACTITIONER

## 2023-05-27 PROCEDURE — 97162 PT EVAL MOD COMPLEX 30 MIN: CPT

## 2023-05-27 PROCEDURE — 96375 TX/PRO/DX INJ NEW DRUG ADDON: CPT

## 2023-05-27 PROCEDURE — 82962 GLUCOSE BLOOD TEST: CPT | Mod: 91

## 2023-05-27 PROCEDURE — 96376 TX/PRO/DX INJ SAME DRUG ADON: CPT

## 2023-05-27 PROCEDURE — 700102 HCHG RX REV CODE 250 W/ 637 OVERRIDE(OP): Performed by: NURSE PRACTITIONER

## 2023-05-27 PROCEDURE — 700111 HCHG RX REV CODE 636 W/ 250 OVERRIDE (IP): Performed by: NEUROLOGICAL SURGERY

## 2023-05-27 RX ORDER — DEXAMETHASONE SODIUM PHOSPHATE 4 MG/ML
4 INJECTION, SOLUTION INTRA-ARTICULAR; INTRALESIONAL; INTRAMUSCULAR; INTRAVENOUS; SOFT TISSUE EVERY 6 HOURS
Status: DISCONTINUED | OUTPATIENT
Start: 2023-05-27 | End: 2023-05-29 | Stop reason: HOSPADM

## 2023-05-27 RX ORDER — TIZANIDINE 4 MG/1
4 TABLET ORAL EVERY 6 HOURS PRN
Qty: 30 TABLET | Refills: 0 | Status: SHIPPED | OUTPATIENT
Start: 2023-05-27 | End: 2023-06-26

## 2023-05-27 RX ORDER — TIZANIDINE 4 MG/1
TABLET ORAL
Qty: 90 TABLET | Refills: 0 | Status: SHIPPED | OUTPATIENT
Start: 2023-05-27 | End: 2023-07-18

## 2023-05-27 RX ORDER — TIZANIDINE 4 MG/1
4 TABLET ORAL EVERY 6 HOURS PRN
Qty: 90 TABLET | Refills: 0 | Status: SHIPPED | OUTPATIENT
Start: 2023-05-27 | End: 2023-07-18

## 2023-05-27 RX ORDER — DIAZEPAM 5 MG/1
5 TABLET ORAL EVERY 8 HOURS PRN
Qty: 3 TABLET | Refills: 0 | Status: SHIPPED | OUTPATIENT
Start: 2023-05-27 | End: 2023-05-28

## 2023-05-27 RX ORDER — DIAZEPAM 5 MG/1
TABLET ORAL
Qty: 3 TABLET | Refills: 0 | OUTPATIENT
Start: 2023-05-27 | End: 2023-09-26

## 2023-05-27 RX ORDER — OXYCODONE HYDROCHLORIDE 10 MG/1
TABLET ORAL
Qty: 42 TABLET | Refills: 0 | OUTPATIENT
Start: 2023-05-27 | End: 2023-09-26

## 2023-05-27 RX ORDER — OXYCODONE HYDROCHLORIDE 10 MG/1
10 TABLET ORAL EVERY 4 HOURS PRN
Qty: 42 TABLET | Refills: 0 | Status: SHIPPED | OUTPATIENT
Start: 2023-05-27 | End: 2023-06-03

## 2023-05-27 RX ORDER — DEXAMETHASONE SODIUM PHOSPHATE 4 MG/ML
4 INJECTION, SOLUTION INTRA-ARTICULAR; INTRALESIONAL; INTRAMUSCULAR; INTRAVENOUS; SOFT TISSUE EVERY 6 HOURS
Status: DISCONTINUED | OUTPATIENT
Start: 2023-05-27 | End: 2023-05-27

## 2023-05-27 RX ADMIN — METFORMIN HYDROCHLORIDE 500 MG: 500 TABLET ORAL at 08:11

## 2023-05-27 RX ADMIN — ATORVASTATIN CALCIUM 80 MG: 40 TABLET, FILM COATED ORAL at 17:28

## 2023-05-27 RX ADMIN — METFORMIN HYDROCHLORIDE 500 MG: 500 TABLET ORAL at 17:28

## 2023-05-27 RX ADMIN — ACETAMINOPHEN 650 MG: 325 TABLET, FILM COATED ORAL at 12:37

## 2023-05-27 RX ADMIN — OXYCODONE HYDROCHLORIDE 10 MG: 10 TABLET ORAL at 13:45

## 2023-05-27 RX ADMIN — OXYCODONE HYDROCHLORIDE 10 MG: 10 TABLET ORAL at 04:48

## 2023-05-27 RX ADMIN — DIAZEPAM 5 MG: 5 TABLET ORAL at 12:38

## 2023-05-27 RX ADMIN — SENNOSIDES AND DOCUSATE SODIUM 1 TABLET: 50; 8.6 TABLET ORAL at 20:47

## 2023-05-27 RX ADMIN — DEXAMETHASONE SODIUM PHOSPHATE 4 MG: 4 INJECTION INTRA-ARTICULAR; INTRALESIONAL; INTRAMUSCULAR; INTRAVENOUS; SOFT TISSUE at 23:52

## 2023-05-27 RX ADMIN — ACETAMINOPHEN 650 MG: 325 TABLET, FILM COATED ORAL at 17:27

## 2023-05-27 RX ADMIN — MOMETASONE FUROATE AND FORMOTEROL FUMARATE DIHYDRATE 2 PUFF: 200; 5 AEROSOL RESPIRATORY (INHALATION) at 05:46

## 2023-05-27 RX ADMIN — INSULIN HUMAN 2 UNITS: 100 INJECTION, SOLUTION PARENTERAL at 17:41

## 2023-05-27 RX ADMIN — DEXAMETHASONE SODIUM PHOSPHATE 4 MG: 4 INJECTION INTRA-ARTICULAR; INTRALESIONAL; INTRAMUSCULAR; INTRAVENOUS; SOFT TISSUE at 13:14

## 2023-05-27 RX ADMIN — LEVOTHYROXINE SODIUM 50 MCG: 0.05 TABLET ORAL at 04:49

## 2023-05-27 RX ADMIN — MONTELUKAST 10 MG: 10 TABLET, FILM COATED ORAL at 18:25

## 2023-05-27 RX ADMIN — Medication 1 APPLICATOR: at 17:27

## 2023-05-27 RX ADMIN — OMEPRAZOLE 20 MG: 20 CAPSULE, DELAYED RELEASE ORAL at 04:49

## 2023-05-27 RX ADMIN — DOCUSATE SODIUM 100 MG: 100 CAPSULE, LIQUID FILLED ORAL at 04:48

## 2023-05-27 RX ADMIN — HYDROMORPHONE HYDROCHLORIDE 0.5 MG: 1 INJECTION, SOLUTION INTRAMUSCULAR; INTRAVENOUS; SUBCUTANEOUS at 05:45

## 2023-05-27 RX ADMIN — ACETAMINOPHEN 650 MG: 325 TABLET, FILM COATED ORAL at 23:51

## 2023-05-27 RX ADMIN — POTASSIUM CHLORIDE AND SODIUM CHLORIDE: 900; 150 INJECTION, SOLUTION INTRAVENOUS at 02:55

## 2023-05-27 RX ADMIN — OXYCODONE HYDROCHLORIDE 10 MG: 10 TABLET ORAL at 17:29

## 2023-05-27 RX ADMIN — INSULIN HUMAN 1 UNITS: 100 INJECTION, SOLUTION PARENTERAL at 20:52

## 2023-05-27 RX ADMIN — BENZOCAINE AND MENTHOL 1 LOZENGE: 15; 3.6 LOZENGE ORAL at 17:31

## 2023-05-27 RX ADMIN — METOPROLOL SUCCINATE 25 MG: 25 TABLET, EXTENDED RELEASE ORAL at 17:29

## 2023-05-27 RX ADMIN — DOCUSATE SODIUM 100 MG: 100 CAPSULE, LIQUID FILLED ORAL at 17:28

## 2023-05-27 RX ADMIN — MOMETASONE FUROATE AND FORMOTEROL FUMARATE DIHYDRATE 2 PUFF: 200; 5 AEROSOL RESPIRATORY (INHALATION) at 18:25

## 2023-05-27 RX ADMIN — TIZANIDINE 4 MG: 4 TABLET ORAL at 08:11

## 2023-05-27 RX ADMIN — CETIRIZINE HYDROCHLORIDE 10 MG: 10 TABLET, FILM COATED ORAL at 04:48

## 2023-05-27 RX ADMIN — ACETAMINOPHEN 650 MG: 325 TABLET, FILM COATED ORAL at 04:48

## 2023-05-27 RX ADMIN — ZOLPIDEM TARTRATE 5 MG: 5 TABLET ORAL at 23:52

## 2023-05-27 RX ADMIN — DEXAMETHASONE SODIUM PHOSPHATE 4 MG: 4 INJECTION INTRA-ARTICULAR; INTRALESIONAL; INTRAMUSCULAR; INTRAVENOUS; SOFT TISSUE at 17:30

## 2023-05-27 RX ADMIN — DIAZEPAM 5 MG: 5 TABLET ORAL at 20:47

## 2023-05-27 ASSESSMENT — COGNITIVE AND FUNCTIONAL STATUS - GENERAL
DRESSING REGULAR UPPER BODY CLOTHING: A LITTLE
TURNING FROM BACK TO SIDE WHILE IN FLAT BAD: A LITTLE
CLIMB 3 TO 5 STEPS WITH RAILING: A LITTLE
DAILY ACTIVITIY SCORE: 20
MOBILITY SCORE: 18
MOVING FROM LYING ON BACK TO SITTING ON SIDE OF FLAT BED: A LITTLE
SUGGESTED CMS G CODE MODIFIER MOBILITY: CK
PERSONAL GROOMING: A LITTLE
WALKING IN HOSPITAL ROOM: A LITTLE
HELP NEEDED FOR BATHING: A LITTLE
STANDING UP FROM CHAIR USING ARMS: A LITTLE
MOVING TO AND FROM BED TO CHAIR: A LITTLE
SUGGESTED CMS G CODE MODIFIER DAILY ACTIVITY: CJ
DRESSING REGULAR LOWER BODY CLOTHING: A LITTLE

## 2023-05-27 ASSESSMENT — ACTIVITIES OF DAILY LIVING (ADL): TOILETING: INDEPENDENT

## 2023-05-27 ASSESSMENT — PAIN DESCRIPTION - PAIN TYPE
TYPE: SURGICAL PAIN

## 2023-05-27 ASSESSMENT — GAIT ASSESSMENTS
GAIT LEVEL OF ASSIST: SUPERVISED
ASSISTIVE DEVICE: FRONT WHEEL WALKER
DISTANCE (FEET): 250
DEVIATION: BRADYKINETIC

## 2023-05-27 ASSESSMENT — PATIENT HEALTH QUESTIONNAIRE - PHQ9
1. LITTLE INTEREST OR PLEASURE IN DOING THINGS: NOT AT ALL
SUM OF ALL RESPONSES TO PHQ9 QUESTIONS 1 AND 2: 0
2. FEELING DOWN, DEPRESSED, IRRITABLE, OR HOPELESS: NOT AT ALL

## 2023-05-27 NOTE — DISCHARGE PLANNING
Case Management Discharge Planning    Admission Date: 5/26/2023  GMLOS:    ALOS: 0    6-Clicks ADL Score: 20  6-Clicks Mobility Score: 18      Anticipated Discharge Dispo:      DME Needed: Yes    DME Ordered: Yes    Action(s) Taken: Updated Provider/Nurse on Discharge Plan, Patient Conference, Choice obtained, and Family Conference    Escalations Completed: None    Medically Clear: No    Next Steps: f/u discharge needs    Barriers to Discharge: Medical clearance and DME    Is the patient up for discharge tomorrow: Yes    Is transport arranged for discharge disposition: Yes      Patient is for discharge tomorrow 05/28/2023. FWW has been ordered. Patient's discharge was delayed. Per the nurse the patient is experiencing a lot of drainage and his pain is not well controlled.

## 2023-05-27 NOTE — CARE PLAN
The patient is Stable - Low risk of patient condition declining or worsening    Shift Goals  Clinical Goals: pain control, monitor FSBS and drain, PT and OT Eval, mobility  Patient Goals: pain control, :PT and OT Eval, comfort  Family Goals: no family present    Progress made toward(s) clinical / shift goals:    Problem: Knowledge Deficit - Standard  Goal: Patient and family/care givers will demonstrate understanding of plan of care, disease process/condition, diagnostic tests and medications  Outcome: Progressing  Note: POC discussed with the patient. PT and OT eval. Questions answered. Verbalized understanding.     Problem: Fall Risk  Goal: Patient will remain free from falls    Outcome: Progressing  Note: Fall protocol in effect. Non skid socks in use. Call light within reach. Reminded patient to call for assist. Assessment completed. No distress noted. Plan of care reviewed with the patient. Kept room clutter free. Verbalized understanding.    Problem: Pain - Standard  Goal: Alleviation of pain or a reduction in pain to the patient’s comfort goal  Outcome: Progressing  Note: Educated on pain scale. Encouraged to verbalize pain. Will medicate as per MAR. Ice pack given.    Patient is not progressing towards the following goals:

## 2023-05-27 NOTE — PROGRESS NOTES
1205 Report received from Mor PACU RN.    1220 Received patient from PACU via gurney accompanied by one female transport.    1245 Admit profile completed. Educated on the importance/use of IS at lest 10x every hour while awake, able to reach 3000. Fall protocol in effect. Call light within reach. Reminded patient to call for assist. Assessment completed. No distress noted. Plan of car reviewed with the patient. Verbalized understanding.      1537 Medicated with Oxycodone (see MAR) for c/o's back pain and posterior neck, rates pain 5/10. Ice pack given. Spouse visiting. Aspen collar placed (brought by spouse).    1801 Medicated with Zanaflex (see MAR) for c/o's muscle spasms.    1815 Dr Cochran visited. POC discussed with the patient.    1905 Patient's sitting up in bed. No changes in status. Bedside report given to Rambo GARCIA RN (Kevin).

## 2023-05-27 NOTE — PROGRESS NOTES
0700 Patient's sitting up in bed. Bedside report received from  JOSE Brown RN at the beginning of the shift.    0811 Patient's sitting up in bed.  Aspen collar in use. Educated on the importance/use of IS at lest 10x every hour while awake, able to reach 3000. Rates posterior neck and back pain 2/10, tolerable per patient. Medicated with Zanaflex (see MAR) for c/o's muscle spasms. Fall protocol in effect. Call light within reach. Reminded patient to call for assist. Assessment completed. No distress noted. Plan of car reviewed with the patient. Verbalized understanding.      0920 JOSÉ MIGUEL Echevarria worked with the patient.    1025 ANU Yadav worked/ambulated patient in the hallway with FWW.    1145 FRANDY Hurd visited. POC discussed with the patient and spouse.    1204 New order received and acknowledged for the patient to be discharged.    1238 Medicated with Valium (see MAR) for c/o's muscle spasms. Spouse visiting.     1241 Notified FRANDY Hurd that patient c/o's difficulty swallowing. New order received and acknowledged (see MAR).    1510 Patient's in bed. No distress noted.    1729 Medicated with Oxycodone (see MAR) for c/o's posterior neck and back pain, rates pain 7/10. Ice pack given.    1925 Patient's in bed. No changes in status. Bedside report given to Rambo GARCIA RN (Mary).    1940 Hemovac drain removed from anterior neck as per order. Covered with gauze, steri strips and tegaderm.

## 2023-05-27 NOTE — THERAPY
Physical Therapy   Initial Evaluation     Patient Name: Barron Hewitt  Age:  62 y.o., Sex:  male  Medical Record #: [id number]  Today's Date: 5/27/2023     Precautions  Precautions: Fall Risk;Cervical Collar  ;Spinal / Back Precautions   Comments: collar on AAT    Assessment  Patient is a 62 y.o. male with lumbar stenosis and cervical DDD admitted s/p L4-S1 laminectomy with medial facetectomy and B foraminotomies, and C5-7 ACDF on 5/26. PT eval complete, pt currently presents at his functional baseline and completed all mobility at SPV level with FWW. Pt provided cervical and lumbar packets previously by OT; pt re educated on precautions and he was eliot to follow them appropriately during mobility. Pt is bradykinetic with standing mobility, however he was steady with no LOB and took 1 precautionary break as needed. Anticipate that pt will be able to safely DC home with FWW and support from his wife as needed once medically cleared. Patient will not be actively followed for physical therapy services at this time, however may be seen if requested by physician for 1 more visit within 30 days to address any discharge or equipment needs. Pt encouraged to ambulate with [facility] staff prior to DC.     Plan    Physical Therapy Initial Treatment Plan   Duration: Discharge Needs Only    DC Equipment Recommendations: Front-Wheel Walker (ordered)  Discharge Recommendations: Recommend outpatient physical therapy services to address higher level deficits         Precautions   Precautions Fall Risk;Cervical Collar  ;Spinal / Back Precautions    Comments collar on AAT   Vitals   O2 (LPM) 0   O2 Delivery Device None - Room Air   Pain 0 - 10 Group   Location Back   Location Orientation Lower;Right   Pain Rating Scale (NPRS)   (not quantified)   Description Aching   Therapist Pain Assessment During Activity   Non Verbal Descriptors   Non Verbal Scale  Calm   Prior Living Situation   Prior Services Home-Independent   Housing /  Facility 1 [facility]   Steps Into Home 2   Equipment Owned Single Point Cane   Lives with - Patient's Self Care Capacity Spouse   Comments reports living with his wife who will be there to assist as needed   Prior Level of Functional Mobility   Bed Mobility Independent   Transfer Status Independent   Ambulation Independent   Ambulation Distance community distances   Assistive Devices Used None   Stairs Independent   Cognition    Cognition / Consciousness WDL   Level of Consciousness Alert   Comments pleasant and participatory, receptive to education   Active ROM Lower Body    Active ROM Lower Body  WDL   Comments painful   Strength Lower Body   Lower Body Strength  WDL   Sensation Lower Body   Lower Extremity Sensation   WDL   Comments reports residual mild shooting pain in R anterior thigh   Coordination Lower Body    Coordination Lower Body  WDL   Balance Assessment   Sitting Balance (Static) Good   Sitting Balance (Dynamic) Fair +   Standing Balance (Static) Fair +   Standing Balance (Dynamic) Fair   Weight Shift Sitting Good   Weight Shift Standing Fair   Comments FWW   Bed Mobility    Comments NT, completed with OT previously with no issue   Gait Analysis   Gait Level Of Assist Supervised   Assistive Device Front Wheel Walker   Distance (Feet) 250   # of Times Distance was Traveled 2   Deviation Bradykinetic   # of Stairs Climbed 3   Level of Assist with Stairs Supervised   Weight Bearing Status no restrictions   Comments slow and steady with no LOB. 1 break needed   Functional Mobility   Sit to Stand Supervised   Bed, Chair, Wheelchair Transfer Supervised   Mobility FWW   How much difficulty does the patient currently have...   Turning over in bed (including adjusting bedclothes, sheets and blankets)? 3   Sitting down on and standing up from a chair with arms (e.g., wheelchair, bedside commode, etc.) 3   Moving from lying on back to sitting on the side of the bed? 3   How much help from another person does  the patient currently need...   Moving to and from a bed to a chair (including a wheelchair)? 3   Need to walk in a hospital room? 3   Climbing 3-5 steps with a railing? 3   6 clicks Mobility Score 18   Activity Tolerance   Sitting in Chair pre/post   Standing 10 min   Comments no overt pain, SOB, or fatigue   Education Group   Education Provided Spine Precautions;Cervical Precautions;Role of Physical Therapist   Spine Precautions Patient Response Patient;Acceptance;Explanation;Handout;Verbal Demonstration;Action Demonstration   Cervical Precautions Patient Response Patient;Acceptance;Explanation;Handout;Verbal Demonstration;Action Demonstration   Role of Physical Therapist Patient Response Patient;Acceptance;Explanation;Verbal Demonstration   Additional Comments increased education on following precautions during mobility, safe mobility techniques, activity progression once home   Physical Therapy Initial Treatment Plan    Duration Discharge Needs Only   Problem List    Problems None   Anticipated Discharge Equipment and Recommendations   DC Equipment Recommendations Front-Wheel Walker  (ordered)   Discharge Recommendations Recommend outpatient physical therapy services to address higher level deficits   Interdisciplinary Plan of Care Collaboration   IDT Collaboration with  Nursing   Patient Position at End of Therapy Seated;Call Light within Reach;Tray Table within Reach;Phone within Reach   Collaboration Comments RN updated   Session Information   Date / Session Number  5/27- 1x only (DC needs)

## 2023-05-27 NOTE — PROGRESS NOTES
Neurosurgery Progress Note    Subjective:  No events overnight   LBP, spasm     Exam:  AAO   BUE 5/5 sensation intact   BLE 5/5 sensation intact   HVAC neck 0ml/overnight   HVAC lumbar 10ml/overnight     BP  Min: 110/76  Max: 149/77  Pulse  Av.9  Min: 68  Max: 84  Resp  Av.5  Min: 12  Max: 17  Temp  Av.6 °C (97.8 °F)  Min: 36.1 °C (97 °F)  Max: 36.8 °C (98.2 °F)  Monitored Temp 2  Av.8 °C (98.2 °F)  Min: 36.8 °C (98.2 °F)  Max: 36.8 °C (98.2 °F)  SpO2  Av.9 %  Min: 93 %  Max: 95 %    No data recorded                      Intake/Output                         23 0700 - 23 0659 23 07 - 2359      Total 1900-0659 Total                 Intake    P.O.  390  360 750  360  -- 360    P.O. 390 360 750 360 -- 360    I.V.  1050  402.7 1452.7  --  -- --    Volume (mL) (lactated ringers infusion) 1000 -- 1000 -- -- --    Volume (mL) (electrolyte-A (PLASMALYTE-A) infusion) 50 -- 50 -- -- --    Volume (mL) (0.9 % NaCl with KCl 20 mEq infusion) -- 402.7 402.7 -- -- --    IV Piggyback  --  99.1 99.1  --  -- --    Volume (mL) (ceFAZolin (Ancef) 2 g in  mL IVPB) -- 99.1 99.1 -- -- --    Total Intake 1440 861.8 2301.8 360 -- 360       Output    Urine  --  750 750  --  -- --    Number of Times Voided 1 x 1 x 2 x -- -- --    Urine Void (mL) -- 750 750 -- -- --    Drains  --  20 20  --  -- --    Output (mL) (Closed/Suction Drain 1 Back Hemovac 10 Fr.) -- 20 20 -- -- --    Output (mL) (Closed/Suction Drain 2 Anterior Neck Hemovac 10 Fr.) -- 0 0 -- -- --    Blood  50  -- 50  --  -- --    Est. Blood Loss 50 -- 50 -- -- --    Total Output 50 770 820 -- -- --       Net I/O     1390 91.8 1481.8 360 -- 360              Intake/Output Summary (Last 24 hours) at 2023 1154  Last data filed at 2023 1000  Gross per 24 hour   Intake 2661.77 ml   Output 820 ml   Net 1841.77 ml             mometasone-formoterol  2 Puff BID    atorvastatin  80 mg Q EVENING     cetirizine  10 mg DAILY    gabapentin  300 mg TID PRN    levothyroxine  50 mcg AM ES    metFORMIN  500 mg BID WITH MEALS    metoprolol SR  25 mg Q EVENING    montelukast  10 mg Q EVENING    omeprazole  20 mg DAILY    tizanidine  4 mg Q6HRS PRN    zolpidem  5 mg HS PRN    MD ALERT...DO NOT ADMINISTER NSAIDS or ASPIRIN unless ORDERED By Neurosurgery  1 Each PRN    docusate sodium  100 mg BID    senna-docusate  1 Tablet Nightly    senna-docusate  1 Tablet Q24HRS PRN    polyethylene glycol/lytes  1 Packet BID PRN    magnesium hydroxide  30 mL QDAY PRN    bisacodyl  10 mg Q24HRS PRN    sodium phosphate  1 Each Once PRN    0.9 % NaCl with KCl 20 mEq 1,000 mL   Continuous    acetaminophen  650 mg Q6HRS    Followed by    [START ON 5/31/2023] acetaminophen  650 mg Q6HRS PRN    oxyCODONE immediate-release  5 mg Q3HRS PRN    Or    oxyCODONE immediate-release  10 mg Q3HRS PRN    Or    HYDROmorphone  0.5 mg Q3HRS PRN    diphenhydrAMINE  25 mg Q6HRS PRN    Or    diphenhydrAMINE  25 mg Q6HRS PRN    ondansetron  4 mg Q4HRS PRN    ondansetron  4 mg Q4HRS PRN    promethazine  12.5-25 mg Q4HRS PRN    promethazine  12.5-25 mg Q4HRS PRN    diazePAM  5 mg Q6HRS PRN    benzocaine-menthol  1 Lozenge Q2HRS PRN    insulin regular  1-6 Units 4X/DAY ACHS    And    dextrose bolus  25 g Q15 MIN PRN    Nozin nasal  swab  1 Applicator BID       Assessment and Plan:  Hospital day #2  POD #1 L4-S1 lami, C5-7 ACDF   Prophylactic anticoagulation: no         Start date/time: TBD     Plan  Stable   Lumbar HVAC remains   reinforce dressing   Okay to DC home, PT/OT  cleared   FWW ordered   Rx meds to beds (valium, tizanidine, oxy)    Keep incision clean and dry. No dressing required over incision.   OK to shower and pat dry.   Do not soak incision in bath, hot tub, etc.   Take over the counter stool softener daily with pain medication.   Do not lift anything over 10 pounds. No repetitive bending, lifting, twisting, movements.   No Aspirin or  anticoagulants until cleared by Neurosurgery.   No driving if taking narcotic medication.   Follow up with [facility] in 2 weeks as scheduled,       Or call for an appointment [phone]

## 2023-05-27 NOTE — CARE PLAN
The patient is Stable - Low risk of patient condition declining or worsening    Shift Goals  Clinical Goals: pain control, mobility, monitor drain output  Patient Goals: pain control rest  Family Goals: no family present    Pt AxOx4 with all VSS. Pt did not have much drain output from hemovacs. Pt was able to void. See flowsheet for assessment details. All meds given per MAR. Will endorse to oncoming RN    Problem: Knowledge Deficit - Standard  Goal: Patient and family/care givers will demonstrate understanding of plan of care, disease process/condition, diagnostic tests and medications  Outcome: Progressing     Problem: Fall Risk  Goal: Patient will remain free from falls  Outcome: Progressing     Problem: Pain - Standard  Goal: Alleviation of pain or a reduction in pain to the patient’s comfort goal  Outcome: Progressing

## 2023-05-27 NOTE — DISCHARGE PLANNING
Received Choice form at 1751  Agency/Facility Name: [facility]   Referral sent per Choice form @ 0789

## 2023-05-27 NOTE — THERAPY
Occupational Therapy   Initial Evaluation     Patient Name: Barron Hewitt  Age:  62 y.o., Sex:  male  Medical Record #: [id number]  Today's Date: 5/27/2023     Precautions  Precautions: Fall Risk, Cervical Collar, Spinal / Back Precautions (lumbar and cervical)   Comments: collar on AAT except for showering    Assessment  Patient is 62 y.o. male s/p L4-S1 laminectomy and C5-7 ACDF. Educated on spinal precautions for cervical and lumbar, c-collar mgmt/wear, adaptive techniques for ADL/txfs and IADLs, home safety, return to work, gradual return to activity, UE AROM, and importance of continued OOB activity. Completed ADLs/txfs with Earl-SPV and functional ambulation w/FWW and SPV. Reports lives with his wife who will be able to assist 24/7 the first week, but she will return to work after that and be able to assist before/after. Patient will not be actively followed for occupational therapy services at this time, however may be seen if requested by physician for 1 more visit within 30 days to address any discharge or equipment needs.     Plan    Occupational Therapy Initial Treatment Plan   Duration: Evaluation only    DC Equipment Recommendations: None  Discharge Recommendations: Anticipate that the patient will have no further occupational therapy needs after discharge from the hospital (as long as wife can assist PRN)     Objective     05/27/23 0922   Prior Living Situation   Prior Services Home-Independent   Housing / Facility 1 Story House   Steps Into Home 2   Bathroom Set up Walk In Shower;Shower Chair;Grab Bars   Equipment Owned Single Point Cane;Tub / Shower Seat;Grab Bar(s) In Tub / Shower   Lives with - Patient's Self Care Capacity Spouse   Comments Reports lives with his wife who will be able to assist 24/7 the first week, but she will return to work after that and be able to assist before/after.   Prior Level of ADL Function   Self Feeding Independent   Grooming / Hygiene Independent   Bathing  Independent   Dressing Independent   Toileting Independent   Prior Level of IADL Function   Medication Management Independent   Laundry Independent   Kitchen Mobility Independent   Finances Independent   Home Management Independent   Shopping Independent   Prior Level Of Mobility Independent Without Device in Community;Independent Without Device in Home   Driving / Transportation Driving Independent   Occupation (Pre-Hospital Vocational) Employed Full Time;Requires Sitting, Desk, Computer Tasks   Precautions   Precautions Fall Risk;Cervical Collar  ;Spinal / Back Precautions    Comments c-collar on AAT except showers   Vitals   O2 Delivery Device None - Room Air   Pain 0 - 10 Group   Location Back;Neck   Therapist Pain Assessment Post Activity;During Activity;Nurse Notified  (not quantified)   Cognition    Cognition / Consciousness WDL   Level of Consciousness Alert   Comments very pleasant and cooperative; receptive to education   Passive ROM Upper Body   Passive ROM Upper Body WDL   Active ROM Upper Body   Active ROM Upper Body  WDL   Strength Upper Body   Upper Body Strength  WDL   Balance Assessment   Sitting Balance (Static) Good   Sitting Balance (Dynamic) Fair +   Standing Balance (Static) Fair +   Standing Balance (Dynamic) Fair   Weight Shift Sitting Good   Weight Shift Standing Fair   Comments w/FWW   Bed Mobility    Supine to Sit Contact Guard Assist   Scooting Supervised   Comments left up in chair; v/cs for logroll   ADL Assessment   Eating Supervision   Grooming Supervision;Standing   Upper Body Dressing Minimal Assist  (c-collar)   Lower Body Dressing Minimal Assist   Toileting Supervision   Comments Educated on spinal precautions for cervical and lumbar, c-collar mgmt/wear, adaptive techniques for ADL/txfs and IADLs, home safety, return to work, gradual return to activity, UE AROM, and importance of continued OOB activity.   Functional Mobility   Sit to Stand Supervised   Bed, Chair, Wheelchair  Transfer Supervised   Toilet Transfers Supervised   Transfer Method Stand Step   Mobility w/FWW   Edema / Skin Assessment   Edema / Skin  Not Assessed   Comments hemovacs in place pre/post   Activity Tolerance   Comments left up in chair; limited by pain/fatigue   Education Group   Education Provided Role of Occupational Therapist   Role of Occupational Therapist Patient Response Patient;Acceptance;Explanation;Verbal Demonstration

## 2023-05-28 VITALS
OXYGEN SATURATION: 91 % | HEIGHT: 69 IN | RESPIRATION RATE: 16 BRPM | HEART RATE: 74 BPM | TEMPERATURE: 98.2 F | SYSTOLIC BLOOD PRESSURE: 127 MMHG | BODY MASS INDEX: 25.04 KG/M2 | DIASTOLIC BLOOD PRESSURE: 76 MMHG | WEIGHT: 169.09 LBS

## 2023-05-28 LAB
GLUCOSE BLD STRIP.AUTO-MCNC: 166 MG/DL (ref 65–99)
GLUCOSE BLD STRIP.AUTO-MCNC: 190 MG/DL (ref 65–99)

## 2023-05-28 PROCEDURE — G0378 HOSPITAL OBSERVATION PER HR: HCPCS

## 2023-05-28 PROCEDURE — 700102 HCHG RX REV CODE 250 W/ 637 OVERRIDE(OP): Performed by: NURSE PRACTITIONER

## 2023-05-28 PROCEDURE — A9270 NON-COVERED ITEM OR SERVICE: HCPCS | Performed by: NURSE PRACTITIONER

## 2023-05-28 PROCEDURE — 82962 GLUCOSE BLOOD TEST: CPT

## 2023-05-28 PROCEDURE — 700111 HCHG RX REV CODE 636 W/ 250 OVERRIDE (IP): Performed by: NURSE PRACTITIONER

## 2023-05-28 PROCEDURE — 96372 THER/PROPH/DIAG INJ SC/IM: CPT

## 2023-05-28 PROCEDURE — 700111 HCHG RX REV CODE 636 W/ 250 OVERRIDE (IP): Performed by: NEUROLOGICAL SURGERY

## 2023-05-28 PROCEDURE — 96376 TX/PRO/DX INJ SAME DRUG ADON: CPT

## 2023-05-28 RX ADMIN — INSULIN HUMAN 1 UNITS: 100 INJECTION, SOLUTION PARENTERAL at 13:11

## 2023-05-28 RX ADMIN — HYDROMORPHONE HYDROCHLORIDE 0.5 MG: 1 INJECTION, SOLUTION INTRAMUSCULAR; INTRAVENOUS; SUBCUTANEOUS at 04:30

## 2023-05-28 RX ADMIN — CETIRIZINE HYDROCHLORIDE 10 MG: 10 TABLET, FILM COATED ORAL at 06:07

## 2023-05-28 RX ADMIN — INSULIN HUMAN 1 UNITS: 100 INJECTION, SOLUTION PARENTERAL at 08:39

## 2023-05-28 RX ADMIN — DEXAMETHASONE SODIUM PHOSPHATE 4 MG: 4 INJECTION INTRA-ARTICULAR; INTRALESIONAL; INTRAMUSCULAR; INTRAVENOUS; SOFT TISSUE at 11:24

## 2023-05-28 RX ADMIN — Medication 1 APPLICATOR: at 06:08

## 2023-05-28 RX ADMIN — ACETAMINOPHEN 650 MG: 325 TABLET, FILM COATED ORAL at 06:06

## 2023-05-28 RX ADMIN — DOCUSATE SODIUM 100 MG: 100 CAPSULE, LIQUID FILLED ORAL at 06:09

## 2023-05-28 RX ADMIN — DEXAMETHASONE SODIUM PHOSPHATE 4 MG: 4 INJECTION INTRA-ARTICULAR; INTRALESIONAL; INTRAMUSCULAR; INTRAVENOUS; SOFT TISSUE at 06:10

## 2023-05-28 RX ADMIN — ACETAMINOPHEN 650 MG: 325 TABLET, FILM COATED ORAL at 11:24

## 2023-05-28 RX ADMIN — OXYCODONE HYDROCHLORIDE 10 MG: 10 TABLET ORAL at 13:06

## 2023-05-28 RX ADMIN — METFORMIN HYDROCHLORIDE 500 MG: 500 TABLET ORAL at 08:39

## 2023-05-28 RX ADMIN — OMEPRAZOLE 20 MG: 20 CAPSULE, DELAYED RELEASE ORAL at 06:07

## 2023-05-28 RX ADMIN — LEVOTHYROXINE SODIUM 50 MCG: 0.05 TABLET ORAL at 06:07

## 2023-05-28 RX ADMIN — MOMETASONE FUROATE AND FORMOTEROL FUMARATE DIHYDRATE 2 PUFF: 200; 5 AEROSOL RESPIRATORY (INHALATION) at 06:09

## 2023-05-28 RX ADMIN — OXYCODONE HYDROCHLORIDE 10 MG: 10 TABLET ORAL at 08:43

## 2023-05-28 ASSESSMENT — PAIN DESCRIPTION - PAIN TYPE
TYPE: SURGICAL PAIN
TYPE: SURGICAL PAIN

## 2023-05-28 NOTE — DISCHARGE INSTRUCTIONS
Discharge Instructions from Neurosurgery:  Ok to shower, may remove dressing, let soap and water run over the incision, pat incision dry, leave open to air- no dressing   No Aspirin or NSAIDS (Aleve, Motrin, Advil) until cleared by Dr. Odonnell   No driving for 2 weeks/ No driving while on narcotic medication   Over the counter stool softeners daily while on narcotics   No lifting greater than 15 pounds, no repetitive motion above shoulder level   Follow up at [facility] 2 weeks after surgery   Rx meds to beds (valium, oxy, tizanidine)    Discharge Instructions    Discharged to home by car with escort. Discharged via wheelchair, hospital escort: Yes.  Special equipment needed: Walker    Be sure to schedule a follow-up appointment with your primary care doctor or any specialists as instructed.     Discharge Plan:   Diet Plan: Discussed  Activity Level: Discussed  Confirmed Follow up Appointment: Patient to Call and Schedule Appointment  Confirmed Symptoms Management: Discussed  Medication Reconciliation Updated: Yes    I understand that a diet low in cholesterol, fat, and sodium is recommended for good health. Unless I have been given specific instructions below for another diet, I accept this instruction as my diet prescription.   Other diet: regular soft bite size diet    Special Instructions: None    -Is this patient being discharged with medication to prevent blood clots?  No    Is patient discharged on Warfarin / Coumadin?   No       Cervical Fusion, Care After  This sheet gives you information about how to care for yourself after your procedure. Your health care provider may also give you more specific instructions. If you have problems or questions, contact your health care provider.  What can I expect after the procedure?  After the procedure, it is common to have:  Incision area pain.  Numbness.  Weakness.  Sore throat.  Difficulty swallowing.  Follow these instructions at home:  Medicines  Take  over-the-counter and prescription medicines, including pain medicines, only as told by your health care provider.  If you were prescribed an antibiotic medicine, take it as told by your health care provider. Do not stop taking the antibiotic even if you start to feel better.  If you have a brace:  Wear the brace as told by your health care provider. Remove it only as told by your health care provider.  Keep the brace clean.  Incision care    Follow instructions from your health care provider about how to take care of your incision. Make sure you:  Wash your hands with soap and water before you change your bandage (dressing). If soap and water are not available, use hand .  Change your dressing as told by your health care provider.  Leave stitches (sutures), skin glue, or adhesive strips in place. These skin closures may need to be in place for 2 weeks or longer. If adhesive strip edges start to loosen and curl up, you may trim the loose edges. Do not remove adhesive strips completely unless your health care provider tells you to do that.  Keep your incision clean and dry. Do not take baths, swim, or use a hot tub until your health care provider approves.  Check your incision area every day for signs of infection. Check for:  More redness, swelling, or pain.  More fluid or blood.  Warmth.  Pus or a bad smell.  Activity    Rest and protect your back as much as possible.  Do not lift anything that is heavier than 10 lb (4.5 kg) or the limit that you are told by your health care provider.  Do not twist or bend at the waist until your health care provider approves.  Avoid:  Pushing and pulling motions.  Lifting anything over your head.  Sitting or lying down in the same position for long periods of time.  Do not exercise until your health care provider approves. Once your health care provider has approved exercise, ask him or her what kinds of exercises you can do to make your back stronger (physical  therapy).  Do not drive until your health care provider approves.  Do not drive for 24 hours if you received a medicine to help you relax (sedative).  Do not drive or use heavy machinery while taking prescription pain medicine.  General instructions  Have someone assist you to turn in bed frequently by moving your whole body without twisting your back (log rolling technique).  Wear compression stockings as told by your health care provider. These stockings help to prevent blood clots and reduce swelling in your legs.  Do not use any products that contain nicotine or tobacco, such as cigarettes and e-cigarettes. These can delay bone healing. If you need help quitting, ask your health care provider.  To prevent or treat constipation while you are taking prescription pain medicine, your health care provider may recommend that you:  Drink enough fluid to keep your urine clear or pale yellow.  Take over-the-counter or prescription medicines.  Eat foods that are high in fiber, such as fresh fruits and vegetables, whole grains, and beans.  Limit foods that are high in fat and processed sugars, such as fried and sweet foods.  Keep all follow-up visits as told by your health care provider. This is important.  Contact a health care provider if:  You have pain that gets worse or does not get better with medicine.  You have more redness, swelling, or pain around your incision.  You have more fluid or blood coming from your incision.  Your incision feels warm to the touch.  You have pus or a bad smell coming from your incision.  You have a fever.  You have weakness or numbness in your legs that is new or getting worse.  You have swelling in your calf or leg.  The edges of your incision break open.  You vomit or feel nauseous.  You have trouble controlling urination or bowel movements.  Get help right away if:  You develop shortness of breath or chest pain.  You have trouble swallowing.  You have trouble breathing.  You develop a  cough.  This information is not intended to replace advice given to you by your health care provider. Make sure you discuss any questions you have with your health care provider.  Document Released: 08/01/2005 Document Revised: 11/30/2018 Document Reviewed: 06/28/2017  Elsevier Patient Education © 2020 [employer]      Laminectomy, Care After  This sheet gives you information about how to care for yourself after your procedure. Your health care provider may also give you more specific instructions. If you have problems or questions, contact your health care provider.  What can I expect after the procedure?  After the procedure, it is common to have:  Some pain around your incision area.  Muscle tightening (spasms) across the back.  Follow these instructions at home:  Incision care    Follow instructions from your health care provider about how to take care of your incision area. Make sure you:  Wash your hands with soap and water before and after you apply medicine to the area or change your bandage (dressing). If soap and water are not available, use hand .  Change your dressing as told by your health care provider.  Leave stitches (sutures), skin glue, or adhesive strips in place. These skin closures may need to stay in place for 2 weeks or longer. If adhesive strip edges start to loosen and curl up, you may trim the loose edges. Do not remove adhesive strips completely unless your health care provider tells you to do that.  Check your incision area every day for signs of infection. Check for:  More redness, swelling, or pain.  More fluid or blood.  Warmth.  Pus or a bad smell.  Medicines  Take over-the-counter and prescription medicines only as told by your health care provider.  If you were prescribed an antibiotic medicine, use it as told by your health care provider. Do not stop using the antibiotic even if you start to feel better.  Bathing  Do not take baths, swim, or use a hot tub for 2 weeks, or  until your incision has healed completely.  If your health care provider approves, you may take showers after your dressing has been removed.  Activity    Return to your normal activities as told by your health care provider. Ask your health care provider what activities are safe for you.  Avoid bending or twisting at your waist. Always bend at your knees.  Do not sit for more than 20-30 minutes at a time. Lie down or walk between periods of sitting.  Do not lift anything that is heavier than 10 lb (4.5 kg) or the limit that your health care provider tells you, until he or she says that it is safe.  Do not drive for 2 weeks after your procedure or for as long as your health care provider tells you.  Do not drive or use heavy machinery while taking prescription pain medicine.  General instructions  To prevent or treat constipation while you are taking prescription pain medicine, your health care provider may recommend that you:  Drink enough fluid to keep your urine clear or pale yellow.  Take over-the-counter or prescription medicines.  Eat foods that are high in fiber, such as fresh fruits and vegetables, whole grains, and beans.  Limit foods that are high in fat and processed sugars, such as fried and sweet foods.  Do breathing exercises as told.  Keep all follow-up visits as told by your health care provider. This is important.  Contact a health care provider if:  You have more redness, swelling, or pain around your incision area.  Your incision feels warm to the touch.  You are not able to return to activities or do exercises as told by your health care provider.  Get help right away if:  You have:  More fluid or blood coming from your incision area.  Pus or a bad smell coming from your incision area.  Chills or a fever.  Episodes of dizziness or fainting while standing.  You develop a rash.  You develop shortness of breath or you have difficulty breathing.  You cannot control when you urinate or have a bowel  movement.  You become weak.  You are not able to use your legs.  Summary  After the procedure, it is common to have some pain around your incision area. You may also have muscle tightening (spasms) across the back.  Follow instructions from your health care provider about how to care for your incision.  Do not lift anything that is heavier than 10 lb (4.5 kg) or the limit that your health care provider tells you, until he or she says that it is safe.  Contact your health care provider if you have more redness, swelling, or pain around your incision area or if your incision feels warm to the touch. These can be signs of infection.  This information is not intended to replace advice given to you by your health care provider. Make sure you discuss any questions you have with your health care provider.  Document Released: 07/07/2006 Document Revised: 11/30/2018 Document Reviewed: 06/04/2017  Elsevier Patient Education © 2020 [employer]

## 2023-05-28 NOTE — PROGRESS NOTES
Neurosurgery Progress Note    Subjective:  No events overnight   LBP, spasms better today.  Pain controlled with medication.     Exam:  Awake, alert, sitting up in chair with c-collar on.  BUE 5/5 sensation intact   BLE 5/5 sensation intact   Anterior cervical dressing c/d/I.  Posterior lumbar dressing, clean and dry.    BP  Min: 116/72  Max: 150/78  Pulse  Av.1  Min: 69  Max: 90  Resp  Av.8  Min: 16  Max: 18  Temp  Av.9 °C (98.5 °F)  Min: 36.8 °C (98.2 °F)  Max: 37.1 °C (98.8 °F)  Monitored Temp 2  Av.8 °C (98.3 °F)  Min: 36.6 °C (97.8 °F)  Max: 37.1 °C (98.8 °F)  SpO2  Av.9 %  Min: 91 %  Max: 95 %    No data recorded                      Intake/Output                         23 07 - 23 0659 23 - 23 0659      0470-3132 Total 1959-4494 9734-4948 Total                 Intake    P.O.  600  -- 600  --  -- --    P.O. 600 -- 600 -- -- --    Total Intake 600 -- 600 -- -- --       Output    Urine  700  -- 700  --  -- --    Number of Times Voided 4 x -- 4 x -- -- --    Urine Void (mL) 700 -- 700 -- -- --    Drains  50  45 95  20  -- 20    Output (mL) ([REMOVED] Closed/Suction Drain 1 Back Hemovac 10 Fr. 23 1305) 50 45 95 20 -- 20    Output (mL) ([REMOVED] Closed/Suction Drain 2 Anterior Neck Hemovac 10 Fr. 23 1940) 0 -- 0 -- -- --    Total Output 750 45 795 20 -- 20       Net I/O     -150 -45 -195 -20 -- -20              Intake/Output Summary (Last 24 hours) at 2023 1322  Last data filed at 2023 1128  Gross per 24 hour   Intake 240 ml   Output 765 ml   Net -525 ml               dexamethasone  4 mg Q6HRS    mometasone-formoterol  2 Puff BID    atorvastatin  80 mg Q EVENING    cetirizine  10 mg DAILY    gabapentin  300 mg TID PRN    levothyroxine  50 mcg AM ES    metFORMIN  500 mg BID WITH MEALS    metoprolol SR  25 mg Q EVENING    montelukast  10 mg Q EVENING    omeprazole  20 mg DAILY    tizanidine  4 mg Q6HRS PRN    zolpidem  5 mg HS PRN     MD ALERT...DO NOT ADMINISTER NSAIDS or ASPIRIN unless ORDERED By Neurosurgery  1 Each PRN    docusate sodium  100 mg BID    senna-docusate  1 Tablet Nightly    senna-docusate  1 Tablet Q24HRS PRN    polyethylene glycol/lytes  1 Packet BID PRN    magnesium hydroxide  30 mL QDAY PRN    bisacodyl  10 mg Q24HRS PRN    sodium phosphate  1 Each Once PRN    0.9 % NaCl with KCl 20 mEq 1,000 mL   Continuous    acetaminophen  650 mg Q6HRS    Followed by    [START ON 5/31/2023] acetaminophen  650 mg Q6HRS PRN    oxyCODONE immediate-release  5 mg Q3HRS PRN    Or    oxyCODONE immediate-release  10 mg Q3HRS PRN    Or    HYDROmorphone  0.5 mg Q3HRS PRN    diphenhydrAMINE  25 mg Q6HRS PRN    Or    diphenhydrAMINE  25 mg Q6HRS PRN    ondansetron  4 mg Q4HRS PRN    ondansetron  4 mg Q4HRS PRN    promethazine  12.5-25 mg Q4HRS PRN    promethazine  12.5-25 mg Q4HRS PRN    diazePAM  5 mg Q6HRS PRN    benzocaine-menthol  1 Lozenge Q2HRS PRN    insulin regular  1-6 Units 4X/DAY ACHS    And    dextrose bolus  25 g Q15 MIN PRN    Nozin nasal  swab  1 Applicator BID       Assessment and Plan:  Hospital day #3  POD #2 L4-S1 lami, C5-7 ACDF   Prophylactic anticoagulation: no         Start date/time: TBD     Plan:   Ok to dc home today, medications already picked up by wife.  Replace silver dressing prior to discharge.  FWW at bedside.  Will follow-up with [facility] as previously scheduled. Call office for clarification if needed.    Keep incision clean and dry. No dressing required over incision.   OK to shower and pat dry.   Do not soak incision in bath, hot tub, etc.   Take over the counter stool softener daily with pain medication.   Do not lift anything over 10 pounds. No repetitive bending, lifting, twisting, movements.   No Aspirin or anticoagulants until cleared by Neurosurgery.   No driving if taking narcotic medication.   Follow up with [facility] in 2 weeks as scheduled,       Or call for an  appointment [phone]

## 2023-05-28 NOTE — DISCHARGE SUMMARY
Discharge Summary    CHIEF COMPLAINT ON ADMISSION  No chief complaint on file.      Reason for Admission  Lumbar stenosis without neurogenic*     Admission Date  5/26/2023    CODE STATUS  Full Code    HPI & HOSPITAL COURSE  This is a 62 y.o. male here with a previous medical history of lumbar stenosis without claudication and degeneration of cervical intervertebral disc. He was was admitted to the hospital on 5/26/2023 and underwent a C5-7 anterior cervical discectomy and interbody fusion and  L4-5, L5-S1 lumbar laminectomy with medial facetectomy and   bilateral foraminotomies with Dr. Cochran. He tolerated the procedure with no perioperative complications. He is adequately voiding, tolerating their diet, and their pain is controlled. They have been cleared by PT and OT for discharge home with front wheel walker.         Therefore, he is discharged in good and stable condition to home with close outpatient follow-up.    The patient met 2-midnight criteria for an inpatient stay at the time of discharge.    Discharge Date  5/28/2023    FOLLOW UP ITEMS POST DISCHARGE  See discharge instructions.    DISCHARGE DIAGNOSES  Principal Problem:    Lumbar stenosis without neurogenic claudication (POA: Yes)  Resolved Problems:    * No resolved hospital problems. *      FOLLOW UP  Future Appointments   Date Time Provider Department Center   9/26/2023  1:20 PM [facility]   10/3/2023 12:40 PM Thompson Tejada M.D. [facility] None   10/9/2023  1:00 PM Dragan Ayala M.D. [facility] None     Segun Cochran M.D.  [address]  [facility] [address]  [phone]    Schedule an appointment as soon as possible for a visit in 2 week(s)  For wound re-check, For suture removal      MEDICATIONS ON DISCHARGE     Medication List        CHANGE how you take these medications        Instructions   Advair Diskus 500-50 MCG/ACT Aepb  What changed: See the new instructions.  Generic drug: fluticasone-salmeterol   USE 1 INHALATION EVERY 12  HOURS     levothyroxine 50 MCG Tabs  What changed: additional instructions  Commonly known as: SYNTHROID   TAKE 1 TABLET 6 DAYS PER WEEK ON AN EMPTY STOMACH, 75 MCG ON 7TH DAY.     metoprolol SR 25 MG Tb24  What changed: See the new instructions.  Commonly known as: TOPROL XL   TAKE 1 TABLET BY MOUTH ONCE DAILY AT NIGHT FOR BLOOD PRESSURE            CONTINUE taking these medications        Instructions   ALPHA LIPOIC ACID PO   Take 600 mg by mouth 2 Times a Day.  Dose: 600 mg     atorvastatin 80 MG tablet  Commonly known as: LIPITOR   TAKE 1 TABLET EVERY EVENING     * Blood Glucose Test Strips   Test strips order:  Freestyle Freedom Lite test strips  testing one time per day     * FreeStyle Lite w/Device Kit   USE DAILY AS DIRECTED     cetirizine 10 MG Tabs  Commonly known as: ZYRTEC   Take 10 mg by mouth every day.  Dose: 10 mg     Coenzyme Q10 200 MG Caps   Take  by mouth every day.     Dulaglutide 1.5 MG/0.5ML Sopn   Inject 0.5 mL under the skin every 7 days.  Dose: 0.5 mL     EPINEPHrine 0.3 MG/0.3ML Soaj solution for injection  Commonly known as: EPIPEN   Inject 0.3 mL into the thigh one time for 1 dose.     Fish Oil 1200 MG Caps   Take 1 Capful by mouth every day.  Dose: 1 Capful     FreeStyle Lucy 14 Day Sensor Misc   1 Application. every 14 days.  Dose: 1 Application.     FreeStyle Precision Maged Test strip  Generic drug: glucose blood   Doctor's comments: Use with lucy cgm  1 Strip by Other route in the morning, at noon, and at bedtime.  Dose: 1 Each     gabapentin 300 MG Caps  Commonly known as: NEURONTIN   Take 1 Capsule by mouth 3 times a day as needed (pain).  Dose: 300 mg     Jardiance 25 MG Tabs  Generic drug: Empagliflozin   Take 1 Tablet by mouth every day.  Dose: 1 Tablet     Lancet Devices Misc   1 Applicator 3 times a day.  Dose: 1 Applicator     metFORMIN 500 MG Tabs  Commonly known as: GLUCOPHAGE   Take 1 Tablet by mouth 2 times a day with meals.  Dose: 500 mg     montelukast 10 MG  Tabs  Commonly known as: SINGULAIR   Take 1 Tablet by mouth every evening.  Dose: 10 mg     NON SPECIFIED   CPAP supplies through [facility]     pantoprazole 40 MG Tbec  Commonly known as: PROTONIX   Take 40 mg by mouth every day.  Dose: 40 mg     Ventolin  (90 Base) MCG/ACT Aers inhalation aerosol  Generic drug: albuterol   USE 2 INHALATIONS EVERY 4 HOURS AS NEEDED FOR SHORTNESS OF BREATH     vitamin D 2000 UNIT Tabs   Take  by mouth 2 times a day.     zolpidem 5 MG Tabs  Commonly known as: AMBIEN   Take 5 mg by mouth at bedtime as needed.  Dose: 5 mg           * This list has 2 medication(s) that are the same as other medications prescribed for you. Read the directions carefully, and ask your doctor or other care provider to review them with you.                STOP taking these medications      aspirin EC 81 MG Tbec  Commonly known as: ECOTRIN     meloxicam 15 MG tablet  Commonly known as: MOBIC            ASK your doctor about these medications        Instructions   * diazePAM 5 MG Tabs  Commonly known as: VALIUM  Ask about: Which instructions should I use?   Take 1 Tablet by mouth every 8 hours as needed for Sleep for up to 1 day.  Dose: 5 mg     * diazePAM 5 MG Tabs  Commonly known as: Valium  Ask about: Which instructions should I use?   Doctor's comments: meds to beds  Take 1 tablet every 8 hours by oral route as needed for 1 day.     * oxyCODONE immediate release 10 MG immediate release tablet  Commonly known as: ROXICODONE  Ask about: Which instructions should I use?   Take 1 Tablet by mouth every four hours as needed for Moderate Pain for up to 7 days.  Dose: 10 mg     * oxyCODONE immediate release 10 MG immediate release tablet  Commonly known as: ROXICODONE  Ask about: Which instructions should I use?   Doctor's comments: meds to beds  Take 1 tablet every 4 hours by oral route as needed for 7 days.     * tizanidine 4 MG Tabs  Commonly known as: ZANAFLEX  Ask about: Which instructions  should I use?   Take 1 Tablet by mouth every 6 hours as needed (muscle spasms).  Dose: 4 mg     * tizanidine 4 MG Tabs  Commonly known as: ZANAFLEX  Ask about: Which instructions should I use?   Take 1 Tablet by mouth every 6 hours as needed (muscle spasm) for up to 30 days.  Dose: 4 mg     * tizanidine 4 MG Tabs  Commonly known as: ZANAFLEX  Ask about: Which instructions should I use?   Doctor's comments: meds to beds  Take 1 tablet every 8 hours by oral route as directed for 30 days.           * This list has 7 medication(s) that are the same as other medications prescribed for you. Read the directions carefully, and ask your doctor or other care provider to review them with you.                  Allergies  Allergies   Allergen Reactions    Kiwi Extract Anaphylaxis    Shellfish Allergy Anaphylaxis    Strawberry Anaphylaxis    Ozempic (0.25 Or 0.5 Mg-Dose) [Semaglutide] Nausea     Pt does not recall any reaction    Sulfa Drugs Rash and Unspecified     rash    Testosterone Rash     rash       DIET  Orders Placed This Encounter   Procedures    Diet Order Diet: Level 6 - Soft and Bite Sized; Liquid level: Level 0 - Thin; Second Modifier: (optional): Consistent CHO (Diabetic)     Standing Status:   Standing     Number of Occurrences:   1     Order Specific Question:   Diet:     Answer:   Level 6 - Soft and Bite Sized [23]     Order Specific Question:   Liquid level     Answer:   Level 0 - Thin     Order Specific Question:   Second Modifier: (optional)     Answer:   Consistent CHO (Diabetic) [4]       ACTIVITY  As tolerated.  15 pound lifting limit.    LABORATORY  Lab Results   Component Value Date    SODIUM 140 05/11/2023    POTASSIUM 3.8 05/11/2023    CHLORIDE 106 05/11/2023    CO2 22 05/11/2023    GLUCOSE 139 (H) 05/11/2023    BUN 9 05/11/2023    CREATININE 0.68 05/11/2023    CREATININE 1.1 07/10/2008        Lab Results   Component Value Date    WBC 8.8 05/11/2023    HEMOGLOBIN 16.6 05/11/2023    HEMATOCRIT 50.0  05/11/2023    PLATELETCT 304 05/11/2023        Total time of the discharge process exceeds 35 minutes.

## 2023-05-28 NOTE — CARE PLAN
The patient is Stable - Low risk of patient condition declining or worsening    Shift Goals  Clinical Goals: pain management, discharge  Patient Goals: pain management, discharge  Family Goals: no family present    Progress made toward(s) clinical / shift goals:  updated pt on POC of discharge today, waiting neurosurgery to see the pt, medicated for pain per MAR.       Problem: Knowledge Deficit - Standard  Goal: Patient and family/care givers will demonstrate understanding of plan of care, disease process/condition, diagnostic tests and medications  Outcome: Progressing     Problem: Pain - Standard  Goal: Alleviation of pain or a reduction in pain to the patient’s comfort goal  Outcome: Progressing

## 2023-05-28 NOTE — CARE PLAN
The patient is Stable - Low risk of patient condition declining or worsening    Shift Goals  Clinical Goals: pain control monitor blood glucose level  Patient Goals: Pain control  Family Goals: YESY no family present    Progress made toward(s) clinical / shift goals:  on going    Patient is  progressing towards the following goals:  Problem: Pain - Standard  Goal: Alleviation of pain or a reduction in pain to the patient’s comfort goal  Outcome: Progressing     Problem: Fall Risk  Goal: Patient will remain free from falls  Outcome: Progressing     Problem: Knowledge Deficit - Standard  Goal: Patient and family/care givers will demonstrate understanding of plan of care, disease process/condition, diagnostic tests and medications  Outcome: Progressing

## 2023-05-29 NOTE — PROGRESS NOTES
Pt. discharged to home. Pt left unit via WC with escort.   IV pulled out.   Walker delivered and with the pt upon leaving  Reviewed discharge instructions, follow up appointments, and prescription with the patient. Patient states understanding of all the instructions. Discharge instructions, medications and personal belongings with patient upon leaving.

## 2023-05-30 ENCOUNTER — TELEPHONE (OUTPATIENT)
Dept: HEALTH INFORMATION MANAGEMENT | Facility: OTHER | Age: 63
End: 2023-05-30
Payer: COMMERCIAL

## 2023-06-23 NOTE — PROGRESS NOTES
"Reason for Consult:  Concern for Diabetic Neuropathy    History of present illness:    Barron Hewitt 62 y.o. right handed gentleman who is from [facility] and who has lived in [place] since around 1977. He is the  for the [employer].    He has had Diabetes for about 15 years.    Saw Segun Cochran ([facility]) in follow up this week- planning a discectomy in neck and an lumbar laminectomy.    He has described for the last 12 months to have a \"pressure\"  feeling without overt numbness or tingling and more noticeable in the left moreso than the right (the right foot \"comes and goes\" throughout a typical day but is present most days of the week).    He has not had any progressive gait decline in the last 6-12 months.    He has not had any evolving numbness,tingling or neuropathic pains of the hands-fingers in the last 6 months or so nor any symptoms (paroxysmal) to suggest Carpal Tunnel Syndrome (such as needing to shake hands out at night or when driving longer distances).    He has not had any consistent,daily or progressive cramping of the hands,legs,feet/toes in the last 6-12 months.    He denies erectile dysfunction,urinary urgency/frequency or ashley incontinence events in the last 3-6 months or so.    He has not had any falls or near falls in the last 6 months.    He has not had any RLS symptoms in the recent months.    He has not had any discrete radiculopathic pains or dermatomal sensory changes down either upper arms or his legs (to discrete or reproducible patterns of sensory changes in any of his extremities).    No significant tobacco use in adult.  No significant alcohol use in adult life.              Patient Active Problem List    Diagnosis Date Noted    Cervical spinal stenosis 12/15/2022    Long term (current) use of oral hypoglycemic drugs 07/29/2022    Benign prostatic hyperplasia with urinary frequency 05/27/2022    PONV (postoperative nausea and vomiting)     Asthma  " Upon review of the In Basket request we were able to locate, review, and update the patient chart as requested for Diabetic Eye Exam     Any additional questions or concerns should be emailed to the Practice Liaisons via the appropriate education email address, please do not reply via In Basket      Thank you  Liliana Gillespie "   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) 06/09/2021    Neuropathy - proximal, diabetic 05/03/2021    Acquired hypothyroidism 08/25/2020    Controlled type 2 diabetes mellitus with diabetic polyneuropathy, without long-term current use of insulin ([facility]) 11/07/2019    History of small bowel obstruction - 2018 11/07/2019    Syncope and collapse 11/07/2019    Seasonal allergies 02/28/2019    Other insomnia 08/17/2018    Essential hypertension, benign 10/09/2017    Dyslipidemia 10/09/2017    Mild intermittent asthma without complication 07/27/2017    Erythrocytosis 05/01/2017    ISELA on CPAP - [facility] 05/01/2017    Coronary artery disease, non-occlusive 04/15/2016    Angina pectoris (Former[facility]) 03/11/2016    Abnormal radionuclide heart study 01/31/2014    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 Nicolle TEMPLE (postoperative nausea and vomiting)     Sleep apnea     has CPAP    Snoring        Past surgical history:   Past Surgical History:   Procedure Laterality Date    RADIO FREQUENCY ABLATION ADDITIONAL LEVEL Bilateral 7/6/2022    Procedure: BILATERAL radiofrequency neurotomies medial branch targeting the L3-4, L4-5 and L5-S1 facet joints with fluoroscopic guidance and sedation. Plan for 80 degree C for 90 seconds for each neurotomy.;  Surgeon: Dragan Ayala M.D.;  [facility];  Service: Pain Management    MI INJ DX/THER AGNT PARAVERT FACET JOINT, " DEVON* Bilateral 6/21/2022    Procedure: Diagnostic medial branch blocks targeting the BILATERAL L3-4, L4-5 and L5-S1 facet joints with fluoroscopic guidance #2;  Surgeon: Dragan Ayala M.D.;  Location: [facility];  Service: Pain Management    LUMBAR MEDIAL BRANCH BLOCKS Bilateral 6/21/2022    Procedure: .;  Surgeon: Dragan Ayala M.D.;  Location: [facility];  Service: Pain Management    CONSCIOUS SEDATION Bilateral 6/21/2022    Procedure: .;  Surgeon: Dragan Ayala M.D.;  Location: [facility];  Service: Pain Management    LUMBAR MEDIAL BRANCH BLOCKS Bilateral 6/14/2022    Procedure: Diagnostic medial branch blocks targeting the BILATERAL L3-4, L4-5 and L5-S1 facet joints with fluoroscopic guidance #1;  Surgeon: Dragan Ayala M.D.;  Location: [facility];  Service: Pain Management    ND TRANSURETHRAL ELEC-SURG PROSTATECTOM N/A 5/27/2022    Procedure: TURP, USING BUTTON ELECTRODE;  Surgeon: Lee Mckee M.D.;  Location: [facility];  Service: Urology    BLOCK EPIDURAL STEROID INJECTION Left 3/22/2022    Procedure: LEFT L3-4 and L4-5 transforaminal epidural steroid injection with fluoroscopic guidance;  Surgeon: Dragan Ayala M.D.;  Location: [facility];  Service: Pain Management    ND NJX AA&/STRD TFRML EPI LUMBAR/SACRAL 1 LEVEL Left 2/15/2022    Procedure: LEFT L3-4 and L4-5 transforaminal epidural steroid injection with fluoroscopic guidance;  Surgeon: Dragan Ayala M.D.;  Location: [facility];  Service: Pain Management    SHOULDER DECOMPRESSION ARTHROSCOPIC Left 1/4/2018    Procedure: SHOULDER DECOMPRESSION ARTHROSCOPIC - SUBACROMIAL;  Surgeon: Linwood Grover M.D.;  Location: [facility];  Service: Orthopedics    SHOULDER ARTHROSCOPY W/ BICIPITAL TENODESIS REPAIR Left 1/4/2018    Procedure: SHOULDER ARTHROSCOPY W/ BICIPITAL Tenotomy REPAIR;  Surgeon:  Linwood Grover M.D.;  Location: SURGERY [facility];  Service: Orthopedics    CLAVICLE DISTAL EXCISION Left 1/4/2018    Procedure: CLAVICLE DISTAL EXCISION - POSSIBLE;  Surgeon: Linwood Grover M.D.;  Location: SURGERY [facility];  Service: Orthopedics    RECOVERY  4/8/2016    Procedure: CATH LAB C WITH POSSIBLE NAVIN;  Surgeon: [facility] Surgery;  Location: SURGERY PRE-POST PROC UNIT [facility];  Service:     VENTRAL HERNIA REPAIR LAPAROSCOPIC  11/1/2012    Performed by Marcos Ramírez M.D. at SURGERY [facility]    KNEE ARTHROSCOPY  1990's    right    SHOULDER ARTHROSCOPY  1990's    right    SINUSOTOMIES  1990's    times two surgeries    TUMOR EXCISION WITH BIOPSY  1990's    right hand - thumb         Social history:   Social History     Socioeconomic History    Marital status:      Spouse name: Not on file    Number of children: Not on file    Years of education: Not on file    Highest education level: Not on file   Occupational History    Not on file   Tobacco Use    Smoking status: Former     Packs/day: 0.00     Types: Cigarettes     Quit date: 2020     Years since quitting: 3.2    Smokeless tobacco: Never    Tobacco comments:     occasional cigars   Vaping Use    Vaping Use: Never used   Substance and Sexual Activity    Alcohol use: Yes     Comment: rare - 1-2 per month (social)    Drug use: Yes     Types: Marijuana, Inhaled, Oral     Comment: THC/ Occ    Sexual activity: Not on file     Comment: ; 2 biological kids (2 of his wife's); 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   Other Topics Concern    Not on file   Social History Narrative    Not on file     Social Determinants of Health     Financial Resource Strain: Not on file   Food Insecurity: Not on file   Transportation Needs: Not on file   Physical Activity: Not on file   Stress: Not on file   Social Connections: Not on file   Intimate Partner Violence: Not on file   Housing Stability: Not on file        Family history:   Family History   Problem Relation Age of Onset    Breast Cancer Mother     Hypertension Mother     Cancer Mother         breast    Heart Disease Mother         hx of bypass    Hypertension Father     Arthritis Father     Diabetes Father         prediabetes    No Known Problems Sister     Arthritis Brother     Heart Disease Other     Hypertension Other     Cancer Other     No Known Problems Brother          Current medications:   Current Outpatient Medications   Medication    zolpidem (AMBIEN) 5 MG Tab    Empagliflozin (JARDIANCE) 25 MG Tab    metFORMIN (GLUCOPHAGE) 500 MG Tab    metoprolol SR (TOPROL XL) 25 MG TABLET SR 24 HR    montelukast (SINGULAIR) 10 MG Tab    Dulaglutide 1.5 MG/0.5ML Solution Pen-injector    ADVAIR DISKUS 500-50 MCG/ACT AEROSOL POWDER, BREATH ACTIVATED    levothyroxine (SYNTHROID) 50 MCG Tab    benazepril (LOTENSIN) 20 MG Tab    atorvastatin (LIPITOR) 80 MG tablet    gabapentin (NEURONTIN) 300 MG Cap    Blood Glucose Monitoring Suppl (FREESTYLE LITE) w/Device Kit    tizanidine (ZANAFLEX) 4 MG Tab    Blood Glucose Test Strips    Continuous Blood Gluc Sensor (FREESTYLE LUNA 14 DAY SENSOR) Misc    glucose blood (FREESTYLE PRECISION CHOLO TEST) strip    Lancet Devices Misc    meloxicam (MOBIC) 15 MG tablet    albuterol 108 (90 Base) MCG/ACT Aero Soln inhalation aerosol    EPINEPHrine (EPIPEN) 0.3 MG/0.3ML Solution Auto-injector solution for injection    NON SPECIFIED    ALPHA LIPOIC ACID PO    Omega-3 Fatty Acids (FISH OIL) 1200 MG CAPS    Cinnamon 500 MG CAPS    aspirin EC (ECOTRIN) 81 MG TBEC    pantoprazole (PROTONIX) 40 MG TBEC    Coenzyme Q10 200 MG Cap    Cholecalciferol (VITAMIN D) 2000 UNIT TABS    cetirizine (ZYRTEC) 10 MG Tab     No current facility-administered medications for this visit.       Medication Allergy:  Allergies   Allergen Reactions    Kiwi Extract Anaphylaxis    Shellfish Allergy Anaphylaxis    Strawberry Anaphylaxis    Ozempic (0.25 Or 0.5 Mg-Dose)  "[Semaglutide] Nausea     Pt does not recall any reaction    Sulfa Drugs Rash and Unspecified     rash    Testosterone Rash     rash           Physical examination:   Vitals:    03/31/23 1322   BP: 102/62   BP Location: Right arm   Patient Position: Sitting   BP Cuff Size: Adult   Pulse: 87   Temp: 36.7 °C (98 °F)   TempSrc: Temporal   SpO2: 97%   Weight: 78 kg (171 lb 15.3 oz)   Height: 1.753 m (5' 9\")       Normal Cephalic atraumatic.  General: Full Range of Movement around the Neck in all directions without restrictions or discrete pain evoked triggers.  No lower extremity edema.  No high arches  No vasomotor or sudomotor changes of feet.  No ulcers or discrete toe fungus of feet.  Normal Posterior Tibial pulses.      Neurological  Exam:    Mental status: Awake, alert and fully oriented to person, place, time, and situation. Normal attention, concentration, and fund of knowledge for education level.  Did not appear/act combative,irritable,anxious,paranoid/delusional or aggressive to or with me.  Speech and language: Speech is fluent without errors, clear, intact to repetition, and intact to naming.     Follows 3 step motor commands in sequence without significant delay and correctly.    Cranial nerve exam:  II: Pupils are equally round and reactive to light. Visual fields are intact by confrontation.  III, IV, VI: EOMI, no diplopia, no ptosis.  V: Sensation to light touch is normal over V1-3 distributions bilaterally.  .  VII: Facial movements are symmetrical. There is no facial droop. .  VIII: Hearing intact to soft speech and finger rub bilaterally  IX: Palate elevates symmetrically, uvula is midline. Dysarthria is not present.  XI: Shoulder shrug are symmetrical and strong.   XII: Tongue protrudes midline.        Motor exam:  Muscle tone is normal in all 4 limbs. and No abnormal movements appreciated.    Muscle strength:    There is very slight atrophy of the left anterior thigh vs the right thigh but the " posterior thigh compartments show normal gross bulk (symmetrically) as do the forelegs,feet and  hands (intrinsicis).    Neck Flexors/Extensors: 5/5       Right  Left  Deltoid   5/5  5/5      Biceps   5/5  5/5  Triceps              5/5  5/5   Wrist extensors 5/5  5/5  Wrist flexors  5/5  5/5     5/5  5/5  Interossei  5/5  5/5  Thenar (APB)  5/5  5/5   Hip flexors  5/5  5/5  Quadriceps  5/5  5/5    Hamstrings  5/5  5/5  Dorsiflexors  5/5  5/5  Plantarflexors  5/5  5/5  Toe extension  5/5  5/5  Toe Flexors                5/5                   5/5    Brachioradialis,pronator teres, FPL, Wrist (ulnar deviation) both 5/5.    Note: Toe flexors and extensors are very strong bilaterally (great toe to 5th toes).    Sensory exam:    Wiggling all toes of both feet feel normal and symmetrical.      Vibratory: 8 seconds at great toes, 10 seconds at ankles and 12 seconds at the knees (symmetrical).    Proprioception: normal at great toes.    No stocking loss of the lower extremities to light touch or pin prick of (starting at the distal great toes).    He was easily able to detect very light touch of my finger on his great toes,2nd toes and 3rd toes.    Negative Tinel's and phalen's at median wrists bilaterally.        Reflexes:       Right  Left  Biceps   2/4  2/4  Triceps              2/4  2/4  Brachioradialis  2/4  2/4  Knee jerk  2/4  2/4  Ankle jerk  2/4  2/4     Frontal release signs are normal.    bilaterally toes are downgoing to plantar stimulation..    Coordination (finger-to-nose, heel/knee/shin, rapid alternating movements) was normal.     There was no truncal ataxia, no tremors, and no dysmetria.     Station and gait - easily stands up from exam chair without retropulsion,veering,leaning,swaying (to either side).   Arm swing symmetrical.    No Rombergism.      Labs and Tests:    Component Ref Range & Units 2 yr ago   Vitamin B12 -True Cobalamin 211 - 911 pg/mL 460        1 Result Note    1 Patient Communication     1 Follow-up Encounter    1  Topic             Component Ref Range & Units 2 yr ago  (20) 3 yr ago  (19) 4 yr ago  (3/12/19) 4 yr ago  (8/10/18) 4 yr ago  (4/10/18) 5 yr ago  (18) 5 yr ago  (17)   Glycohemoglobin 0.0 - 5.6 % 7.7 High   7.7 High  CM  7.4 High  CM  7.7 High  CM  7.5 High  CM  7.5 High  CM  8.4 High  CM    Comment: Increased risk for diabetes:  5.7 -6.4%   Diabetes:  >6.4%   Glycemic control for adults with diabetes:  <7.0%   The above interpretations are per ADA guidelines.  Diagnosis   of diabetes mellitus on the basis of elevated Hemoglobin A1c   should be confirmed by repeating the Hb A1c test.    Est Avg Glucose mg/dL 174  174 CM  166 CM  174 CM  169 CM  169 CM  194 CM    Comment: The eAG calculation is based on the A1c-Derived Daily Glucose   (ADAG) study.  See the ADA's website for additional information.    Resulting Agency  M M M              Result Notes    1  Topic             Component Ref Range & Units 10 d ago  (3/21/23) 3 mo ago  (22) 8 mo ago  (22) 1 yr ago  (21) 1 yr ago  (21) 2 yr ago  (20) 3 yr ago  (19)   Glycohemoglobin 5.8 % 7.2 Abnormal   7.2 Abnormal  R, CM  7.1 Abnormal  R  7.2 Abnormal  R  7.1 High  R, CM  7.7 High  R, CM  7.7 High  R, CM    Internal Control Positive  Positive      Valid       Internal Control Negative  Negative                  [id number] 7:56 AM     HISTORY/REASON FOR EXAM: Bilateral leg weakness.        TECHNIQUE/EXAM DESCRIPTION:  MRI of the lumbar spine without contrast.     The study was performed on a Ryan 1.5 Nanci MRI scanner. T1 sagittal, T2 sagittal, and T2 axial images were obtained of the lumbar spine.     COMPARISON: 2020     FINDINGS:  Vertebral body height and alignment is well maintained throughout.  There is hemangioma involving L1.  There is mild loss of the normal bright T2 disc signal and disc height at L4-5 and L5-S1.  No spinal canal masses seen.  The conus  is located normally at T12-L1.        Findings at specific levels:     T12-L1: No significant central canal or neuroforaminal narrowing.     L1-2: No significant central canal or neuroforaminal narrowing.     L2-3: No significant central canal or neuroforaminal narrowing.     L3-4: There is annular disc bulge with a left lateral disc protrusion. No central canal narrowing. There is also bilateral ligamentum flavum hypertrophy. There is moderate to severe left neural foraminal narrowing and mild right neuroforaminal narrowing.     L4-5: There is annular disc bulge with a central disc protrusion and bilateral facet degeneration. There is borderline central canal narrowing. There is moderate left and mild right neural foraminal narrowing.     L5-S1: There is annular disc bulge with a central disc protrusion and bilateral facet degeneration. No central canal narrowing. There is moderate bilateral neural foraminal narrowing.     IMPRESSION:     1.  L4-5 annular disc bulge with a central disc protrusion and bilateral facet degeneration. There is borderline central canal narrowing. There is moderate mild right neural foraminal narrowing.     2.  L3-4 annular disc bulge with a left lateral disc protrusion. There is moderate to severe left and mild right neural foraminal narrowing again seen.     3.  L5-S1 degenerative disc disease and facet degeneration results in moderate bilateral neuroforaminal narrowing.           Exam Ended: 07/27/21  9:40 AM Last Resulted: 07/27/21 10:29 AM             9/2022: Cervical MRI    IMPRESSION:     1.  Multilevel degenerative changes most notable at C5-C6 with disc/osteophyte change with mild central stenosis, moderate right lateral recess stenosis, and right-sided uncinate spondylosis with severe right foraminal stenosis. Moderate left foraminal   stenosis.  2.  Additional details for each level above in the body of the report.  3.  No myelopathic cord signal.           Exam Ended: 09/22/22  " 9:22 AM Last Resulted: 09/22/22 10:13 AM             IMPRESSION:  This is an abnormal electrodiagnostic study due to evidence of chronic reinnervation changes in the left vastus lateralis/medialis with mild active denervation changes noted.  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       There is no strong electrodiagnostic evidence of a an active right lumbosacral plexopathy though given some responses are unobtainable a mild lumbar plexopathy/femoral neuropathy may be present.  There is no EMG evidence of a right lumbosacral radiculopathy.       Recommend clinical correlation and repeat study if progressive symptoms.         Yenny Ogden MD  Neurology - Neurophysiology  [facility]         Impression/Plans/Recommendations:    There is no evidence for a wide spread and evolving polyneuropathy at this time despite a history suspected diabetic amotrophy with prior involvement of hte left femoral nerve (from 9/2022).    Had a proximal diabetic amatrophy with significant weight loss (when he lost 45 lbs).    6/2021: EMG-NCS note per Dr. Yenny Ogden:    Diabetic amyotrophy associated with diabetes mellitus due to underlying condition (HCC): I do suspect that patient had a diabetic amyotrophy on the left given EMG while actively experiencing his weakness.  There was involvement of both the obturator nerve and femoral nerve though it seemed predominantly femoral nerve.  A superimposed lumbar spine radiculopathy is also suspected which may account for her ongoing mild denervation changes though on clinical exam he is very strong.    Prior significant pain of left back and down front of left leg (predominantly left lateral hip to left lateral knee area) without ashley trauma known though he recalled getting out of bed and heard of \"pop\" (and " took an alleve)- this movement was to me not enough to suggest a lumbar radiculopathic condition.    Presently there is no clinical evidence for significant hip flexors and the knee extensors.    2.  There is no clinical evidence for a small fiber polyneuropathy, dysautonomia, myopathy or myelopathy at this time.    At this time I do not feel a repeat EMG-NCS is needed and we reviewed this issue today.    Goal A1C%  under 7.0 long term reviewed.    Heart healthy diet reviewed and I encouraged exercise for general health (WHO- 150 minutes per week).      I have performed  a history and physical exam and a directed /focused  ROS today.    Total time spent today or this patient's care was 35 minutes  and included reviewing diagnostic workup to date (labs and imaging that include interpreting such tests relevant to this patient's neurological condition),  reviewing/obtaining separately obtained history (from patient and/or accompanying family member)  for today's neurological problem(s) , ordering either/or medications and additional tests, giving advise and suggestions on the present neurological problem and  documenting the clinical information in the EMR.    Follow up: in 6 months or so.    Thompson Tejada MD  [facility].   [facility]

## 2023-07-05 ENCOUNTER — PATIENT MESSAGE (OUTPATIENT)
Dept: ENDOCRINOLOGY | Facility: MEDICAL CENTER | Age: 63
End: 2023-07-05
Payer: COMMERCIAL

## 2023-07-06 ENCOUNTER — HOSPITAL ENCOUNTER (OUTPATIENT)
Dept: RADIOLOGY | Facility: MEDICAL CENTER | Age: 63
End: 2023-07-06
Attending: NURSE PRACTITIONER
Payer: COMMERCIAL

## 2023-07-06 DIAGNOSIS — M47.812 SPONDYLOSIS OF CERVICAL REGION WITHOUT MYELOPATHY OR RADICULOPATHY: ICD-10-CM

## 2023-07-06 PROCEDURE — 72040 X-RAY EXAM NECK SPINE 2-3 VW: CPT

## 2023-07-17 ENCOUNTER — TELEPHONE (OUTPATIENT)
Dept: PHYSICAL MEDICINE AND REHAB | Facility: MEDICAL CENTER | Age: 63
End: 2023-07-17
Payer: COMMERCIAL

## 2023-07-17 DIAGNOSIS — M54.12 CERVICAL RADICULOPATHY: ICD-10-CM

## 2023-07-17 DIAGNOSIS — E11.44 DIABETIC AMYOTROPHY ASSOCIATED WITH TYPE 2 DIABETES MELLITUS (HCC): ICD-10-CM

## 2023-07-17 DIAGNOSIS — M54.16 LUMBAR RADICULOPATHY: ICD-10-CM

## 2023-07-17 DIAGNOSIS — M54.50 CHRONIC BILATERAL LOW BACK PAIN WITHOUT SCIATICA: ICD-10-CM

## 2023-07-17 DIAGNOSIS — S76.012A STRAIN OF LEFT HIP ADDUCTOR MUSCLE, INITIAL ENCOUNTER: ICD-10-CM

## 2023-07-17 DIAGNOSIS — S86.112A STRAIN OF GASTROCNEMIUS MUSCLE OF LEFT LOWER EXTREMITY, INITIAL ENCOUNTER: ICD-10-CM

## 2023-07-17 DIAGNOSIS — G89.29 CHRONIC BILATERAL LOW BACK PAIN WITHOUT SCIATICA: ICD-10-CM

## 2023-07-17 DIAGNOSIS — M47.816 LUMBAR SPONDYLOSIS: ICD-10-CM

## 2023-07-17 DIAGNOSIS — M54.2 CHRONIC NECK PAIN: ICD-10-CM

## 2023-07-17 DIAGNOSIS — M48.02 CERVICAL STENOSIS OF SPINAL CANAL: ICD-10-CM

## 2023-07-17 DIAGNOSIS — G89.29 CHRONIC NECK PAIN: ICD-10-CM

## 2023-07-17 NOTE — TELEPHONE ENCOUNTER
"TIZANIDINE - 90 Day RX    Received request via: Patient    Was the patient seen in the last year in this department? Yes    Does the patient have an active prescription (recently filled or refills available) for medication(s) requested?  Yes, Pharmacy said \"error\"    Does the patient have longterm Plus and need 100 day supply (blood pressure, diabetes and cholesterol meds only)? Patient does not have SCP    **[facility], requests to speak to Dr. Ayala personally or needs to receive new RX**  "

## 2023-07-18 RX ORDER — TIZANIDINE 4 MG/1
4 TABLET ORAL NIGHTLY PRN
Qty: 90 TABLET | Refills: 0 | Status: SHIPPED | OUTPATIENT
Start: 2023-07-18

## 2023-07-18 RX ORDER — TIZANIDINE 4 MG/1
4 TABLET ORAL NIGHTLY PRN
Qty: 90 TABLET | Refills: 0 | Status: SHIPPED | OUTPATIENT
Start: 2023-07-18 | End: 2023-07-18

## 2023-08-01 NOTE — TELEPHONE ENCOUNTER
Would either one of you happen to remember this patient and talked to him about diet and nutrition.

## 2023-08-16 ENCOUNTER — TELEPHONE (OUTPATIENT)
Dept: HEALTH INFORMATION MANAGEMENT | Facility: OTHER | Age: 63
End: 2023-08-16
Payer: COMMERCIAL

## 2023-09-03 DIAGNOSIS — J30.2 SEASONAL ALLERGIES: ICD-10-CM

## 2023-09-05 ENCOUNTER — HOSPITAL ENCOUNTER (OUTPATIENT)
Dept: RADIOLOGY | Facility: MEDICAL CENTER | Age: 63
End: 2023-09-05
Attending: NURSE PRACTITIONER
Payer: COMMERCIAL

## 2023-09-05 DIAGNOSIS — M47.812 CERVICAL SPONDYLOSIS WITHOUT MYELOPATHY: ICD-10-CM

## 2023-09-05 PROCEDURE — 72040 X-RAY EXAM NECK SPINE 2-3 VW: CPT

## 2023-09-05 RX ORDER — MONTELUKAST SODIUM 10 MG/1
10 TABLET ORAL EVERY EVENING
Qty: 90 TABLET | Refills: 3 | Status: SHIPPED | OUTPATIENT
Start: 2023-09-05

## 2023-09-05 NOTE — TELEPHONE ENCOUNTER
Received request via: Pharmacy    Was the patient seen in the last year in this department? Yes  1/3/23  Does the patient have an active prescription (recently filled or refills available) for medication(s) requested? No    Does the patient have long term Plus and need 100 day supply (blood pressure, diabetes and cholesterol meds only)? Medication is not for cholesterol, blood pressure or diabetes

## 2023-09-13 ENCOUNTER — PATIENT MESSAGE (OUTPATIENT)
Dept: NEUROLOGY | Facility: MEDICAL CENTER | Age: 63
End: 2023-09-13
Payer: COMMERCIAL

## 2023-09-15 ENCOUNTER — HOSPITAL ENCOUNTER (OUTPATIENT)
Dept: LAB | Facility: MEDICAL CENTER | Age: 63
End: 2023-09-15
Attending: INTERNAL MEDICINE
Payer: COMMERCIAL

## 2023-09-15 DIAGNOSIS — Z79.84 LONG TERM (CURRENT) USE OF ORAL HYPOGLYCEMIC DRUGS: ICD-10-CM

## 2023-09-15 DIAGNOSIS — E78.5 DYSLIPIDEMIA: ICD-10-CM

## 2023-09-15 DIAGNOSIS — E55.9 VITAMIN D DEFICIENCY: ICD-10-CM

## 2023-09-15 DIAGNOSIS — E11.42 CONTROLLED TYPE 2 DIABETES MELLITUS WITH DIABETIC POLYNEUROPATHY, WITHOUT LONG-TERM CURRENT USE OF INSULIN (HCC): ICD-10-CM

## 2023-09-15 DIAGNOSIS — E03.9 ACQUIRED HYPOTHYROIDISM: ICD-10-CM

## 2023-09-15 LAB
25(OH)D3 SERPL-MCNC: 40 NG/ML (ref 30–100)
ALBUMIN SERPL BCP-MCNC: 4.6 G/DL (ref 3.2–4.9)
ALBUMIN/GLOB SERPL: 2 G/DL
ALP SERPL-CCNC: 76 U/L (ref 30–99)
ALT SERPL-CCNC: 25 U/L (ref 2–50)
ANION GAP SERPL CALC-SCNC: 13 MMOL/L (ref 7–16)
AST SERPL-CCNC: 24 U/L (ref 12–45)
BILIRUB SERPL-MCNC: 1.1 MG/DL (ref 0.1–1.5)
BUN SERPL-MCNC: 10 MG/DL (ref 8–22)
CALCIUM ALBUM COR SERPL-MCNC: 9.2 MG/DL (ref 8.5–10.5)
CALCIUM SERPL-MCNC: 9.7 MG/DL (ref 8.5–10.5)
CHLORIDE SERPL-SCNC: 102 MMOL/L (ref 96–112)
CO2 SERPL-SCNC: 25 MMOL/L (ref 20–33)
CREAT SERPL-MCNC: 0.75 MG/DL (ref 0.5–1.4)
GFR SERPLBLD CREATININE-BSD FMLA CKD-EPI: 100 ML/MIN/1.73 M 2
GLOBULIN SER CALC-MCNC: 2.3 G/DL (ref 1.9–3.5)
GLUCOSE SERPL-MCNC: 153 MG/DL (ref 65–99)
POTASSIUM SERPL-SCNC: 4.5 MMOL/L (ref 3.6–5.5)
PROT SERPL-MCNC: 6.9 G/DL (ref 6–8.2)
SODIUM SERPL-SCNC: 140 MMOL/L (ref 135–145)
T4 FREE SERPL-MCNC: 1.35 NG/DL (ref 0.93–1.7)
TSH SERPL DL<=0.005 MIU/L-ACNC: 1.81 UIU/ML (ref 0.38–5.33)

## 2023-09-15 PROCEDURE — 84443 ASSAY THYROID STIM HORMONE: CPT

## 2023-09-15 PROCEDURE — 80053 COMPREHEN METABOLIC PANEL: CPT

## 2023-09-15 PROCEDURE — 36415 COLL VENOUS BLD VENIPUNCTURE: CPT

## 2023-09-15 PROCEDURE — 84439 ASSAY OF FREE THYROXINE: CPT

## 2023-09-15 PROCEDURE — 82306 VITAMIN D 25 HYDROXY: CPT

## 2023-09-26 ENCOUNTER — NON-PROVIDER VISIT (OUTPATIENT)
Dept: ENDOCRINOLOGY | Facility: MEDICAL CENTER | Age: 63
End: 2023-09-26
Attending: STUDENT IN AN ORGANIZED HEALTH CARE EDUCATION/TRAINING PROGRAM
Payer: COMMERCIAL

## 2023-09-26 VITALS
BODY MASS INDEX: 25.48 KG/M2 | HEART RATE: 76 BPM | WEIGHT: 172 LBS | HEIGHT: 69 IN | SYSTOLIC BLOOD PRESSURE: 122 MMHG | OXYGEN SATURATION: 97 % | DIASTOLIC BLOOD PRESSURE: 68 MMHG

## 2023-09-26 DIAGNOSIS — E11.9 TYPE 2 DIABETES MELLITUS WITHOUT COMPLICATION, WITH LONG-TERM CURRENT USE OF INSULIN (HCC): ICD-10-CM

## 2023-09-26 DIAGNOSIS — Z79.4 TYPE 2 DIABETES MELLITUS WITHOUT COMPLICATION, WITH LONG-TERM CURRENT USE OF INSULIN (HCC): ICD-10-CM

## 2023-09-26 LAB
HBA1C MFR BLD: 8.4 % (ref ?–5.8)
POCT INT CON NEG: NEGATIVE
POCT INT CON POS: POSITIVE

## 2023-09-26 PROCEDURE — 83036 HEMOGLOBIN GLYCOSYLATED A1C: CPT

## 2023-09-26 PROCEDURE — 99212 OFFICE O/P EST SF 10 MIN: CPT | Performed by: STUDENT IN AN ORGANIZED HEALTH CARE EDUCATION/TRAINING PROGRAM

## 2023-09-26 RX ORDER — METFORMIN HYDROCHLORIDE 500 MG/1
1000 TABLET, EXTENDED RELEASE ORAL 2 TIMES DAILY
Qty: 360 TABLET | Refills: 1 | Status: SHIPPED | OUTPATIENT
Start: 2023-09-26 | End: 2024-01-31 | Stop reason: SDUPTHER

## 2023-09-26 RX ORDER — BENAZEPRIL HYDROCHLORIDE 20 MG/1
TABLET ORAL
COMMUNITY
End: 2023-12-26

## 2023-09-26 RX ORDER — ATORVASTATIN CALCIUM 80 MG/1
80 TABLET, FILM COATED ORAL NIGHTLY
Qty: 90 TABLET | Refills: 3 | Status: SHIPPED | OUTPATIENT
Start: 2023-09-26

## 2023-09-26 ASSESSMENT — FIBROSIS 4 INDEX: FIB4 SCORE: 1.01

## 2023-09-26 NOTE — PROGRESS NOTES
RN-CDE Note    Subjective:   Endocrinology Clinic Progress Note  PCP: NIRANJAN Butts    HPI:  Barron Hewitt is a 62 y.o. old patient who is seen today by the Diabetes Nurse Specialist for review of Type 2 Diabetes and Hypothyroidism.  Recent changes in health: Has had severe stress with neck and back surgery and job stress.  He plans on retiring in December.  DM:   Last A1c:   Lab Results   Component Value Date/Time    HBA1C 8.4 (A) 09/26/2023 01:49 PM      Previous A1c was 7.1 on 5/11/23  A1C GOAL: < 7    Diabetes Medications:   Metformin 500 mg BID  Jardiance 25 mg daily  Trulicity 1.5 mg weekly      Exercise: Physical therapy and walking daily  Diet: Breakfast waffle and peanut butter.  Lunch is left overs of protein, vegetables, and carbohydrates.  Dinner is protein and vegetables, and small amount of carbohydrates.  Cereal at night.  Patient's body mass index is 25.4 kg/m². Exercise and nutrition counseling were performed at this visit.    Glucose monitoring frequency: See Lucy download.  He is having trouble with the Lucy 14 and would benefit going to the Lucy 2 or 3.    Hypoglycemic episodes: yes -   Last Retinal Exam: on file and up-to-date  Daily Foot Exam: Yes   Foot Exam:  Monofilament: done  Monofilament testing with a 10 gram force: sensation intact: intact bilaterally  Visual Inspection: Feet without maceration, ulcers, fissures.  Pedal pulses: intact bilaterally   Lab Results   Component Value Date/Time    MALBCRT see below 03/16/2023 06:48 AM    MICROALBUR <1.2 03/16/2023 06:48 AM        ACR Albumin/Creatinine Ratio goal <30     HTN:   Blood pressure goal <130/<80 .   Currently Rx ACE/ARB: Yes     Dyslipidemia:    Lab Results   Component Value Date/Time    CHOLSTRLTOT 114 03/16/2023 06:48 AM    LDL 62 03/16/2023 06:48 AM    HDL 39 (A) 03/16/2023 06:48 AM    TRIGLYCERIDE 66 03/16/2023 06:48 AM         Currently Rx Statin: Yes     He  reports that he quit smoking about 3 years  ago. His smoking use included cigarettes. He has never used smokeless tobacco.      Plan:     Discussed and educated on:   - All medications, side effects and compliance (discussed carefully)  - Annual eye examinations at Ophthalmology  - Home glucose monitoring emphasized  - Weight control and daily exercise    Recommended medication changes: He would benefit going from the Lucy 14 day to the Lucy 2 for the alarms.  We will increase his Metformin  mg to 2 BID.  He is hesitant to go up on his Trulicity 1.5 mg weekly at this time.  He may be a good candidate for the new research GLP-1 drug.  He will continue his Jardiance 25 mg daily.  He plans on getting more active as his back and neck heal.  He will follow up with me in 3 months and Dr. Vizcarra in 6 months.

## 2023-09-29 ENCOUNTER — TELEPHONE (OUTPATIENT)
Dept: ENDOCRINOLOGY | Facility: MEDICAL CENTER | Age: 63
End: 2023-09-29
Payer: COMMERCIAL

## 2023-10-09 ENCOUNTER — OFFICE VISIT (OUTPATIENT)
Dept: NEUROLOGY | Facility: MEDICAL CENTER | Age: 63
End: 2023-10-09
Attending: PSYCHIATRY & NEUROLOGY
Payer: COMMERCIAL

## 2023-10-09 ENCOUNTER — OFFICE VISIT (OUTPATIENT)
Dept: PHYSICAL MEDICINE AND REHAB | Facility: MEDICAL CENTER | Age: 63
End: 2023-10-09
Payer: COMMERCIAL

## 2023-10-09 VITALS
SYSTOLIC BLOOD PRESSURE: 120 MMHG | OXYGEN SATURATION: 95 % | WEIGHT: 179.23 LBS | HEART RATE: 86 BPM | RESPIRATION RATE: 14 BRPM | DIASTOLIC BLOOD PRESSURE: 76 MMHG | BODY MASS INDEX: 26.55 KG/M2 | HEIGHT: 69 IN

## 2023-10-09 VITALS
SYSTOLIC BLOOD PRESSURE: 122 MMHG | WEIGHT: 178.79 LBS | BODY MASS INDEX: 26.48 KG/M2 | HEIGHT: 69 IN | DIASTOLIC BLOOD PRESSURE: 70 MMHG | TEMPERATURE: 97.1 F | HEART RATE: 80 BPM | OXYGEN SATURATION: 96 %

## 2023-10-09 DIAGNOSIS — Z98.890 HISTORY OF LUMBAR DISCECTOMY: ICD-10-CM

## 2023-10-09 DIAGNOSIS — Z98.890 H/O CERVICAL DISCECTOMY: ICD-10-CM

## 2023-10-09 DIAGNOSIS — G89.29 CHRONIC BILATERAL LOW BACK PAIN WITHOUT SCIATICA: ICD-10-CM

## 2023-10-09 DIAGNOSIS — M48.02 CERVICAL STENOSIS OF SPINAL CANAL: ICD-10-CM

## 2023-10-09 DIAGNOSIS — E11.44 DIABETIC AMYOTROPHY ASSOCIATED WITH TYPE 2 DIABETES MELLITUS (HCC): ICD-10-CM

## 2023-10-09 DIAGNOSIS — R25.2 MUSCLE CRAMP: ICD-10-CM

## 2023-10-09 DIAGNOSIS — M47.816 LUMBAR SPONDYLOSIS: ICD-10-CM

## 2023-10-09 DIAGNOSIS — M54.50 CHRONIC BILATERAL LOW BACK PAIN WITHOUT SCIATICA: ICD-10-CM

## 2023-10-09 DIAGNOSIS — M62.552 ATROPHY OF MUSCLE OF LEFT THIGH: ICD-10-CM

## 2023-10-09 DIAGNOSIS — M54.12 CERVICAL RADICULOPATHY: ICD-10-CM

## 2023-10-09 DIAGNOSIS — M50.30 DEGENERATION OF CERVICAL INTERVERTEBRAL DISC: ICD-10-CM

## 2023-10-09 DIAGNOSIS — M54.16 LUMBAR RADICULOPATHY: ICD-10-CM

## 2023-10-09 DIAGNOSIS — M79.2 NEUROPATHIC PAIN OF LOWER EXTREMITY, LEFT: Primary | ICD-10-CM

## 2023-10-09 DIAGNOSIS — I10 ESSENTIAL HYPERTENSION, BENIGN: ICD-10-CM

## 2023-10-09 PROBLEM — G47.33 OBSTRUCTIVE SLEEP APNEA SYNDROME: Status: ACTIVE | Noted: 2017-04-30

## 2023-10-09 PROBLEM — J30.2 SEASONAL ALLERGIES: Status: ACTIVE | Noted: 2019-02-27

## 2023-10-09 PROBLEM — E11.9 DIABETES MELLITUS (HCC): Status: ACTIVE | Noted: 2017-11-01

## 2023-10-09 PROBLEM — N40.1 LOWER URINARY TRACT SYMPTOMS DUE TO BENIGN PROSTATIC HYPERPLASIA: Status: ACTIVE | Noted: 2022-03-23

## 2023-10-09 PROBLEM — J45.20 MILD INTERMITTENT ASTHMA: Status: ACTIVE | Noted: 2017-07-26

## 2023-10-09 PROBLEM — Z87.19 HISTORY OF INTESTINAL OBSTRUCTION: Status: ACTIVE | Noted: 2019-11-06

## 2023-10-09 PROBLEM — E78.5 DYSLIPIDEMIA: Status: ACTIVE | Noted: 2017-10-08

## 2023-10-09 PROBLEM — E11.9 TYPE 2 DIABETES MELLITUS WITHOUT COMPLICATION (HCC): Status: ACTIVE | Noted: 2019-11-06

## 2023-10-09 PROCEDURE — 99214 OFFICE O/P EST MOD 30 MIN: CPT | Performed by: PHYSICAL MEDICINE & REHABILITATION

## 2023-10-09 PROCEDURE — 3074F SYST BP LT 130 MM HG: CPT | Performed by: PSYCHIATRY & NEUROLOGY

## 2023-10-09 PROCEDURE — 3078F DIAST BP <80 MM HG: CPT | Performed by: PSYCHIATRY & NEUROLOGY

## 2023-10-09 PROCEDURE — 3074F SYST BP LT 130 MM HG: CPT | Performed by: PHYSICAL MEDICINE & REHABILITATION

## 2023-10-09 PROCEDURE — 99212 OFFICE O/P EST SF 10 MIN: CPT | Performed by: PSYCHIATRY & NEUROLOGY

## 2023-10-09 PROCEDURE — 99215 OFFICE O/P EST HI 40 MIN: CPT | Performed by: PSYCHIATRY & NEUROLOGY

## 2023-10-09 PROCEDURE — 1126F AMNT PAIN NOTED NONE PRSNT: CPT | Performed by: PHYSICAL MEDICINE & REHABILITATION

## 2023-10-09 PROCEDURE — 3078F DIAST BP <80 MM HG: CPT | Performed by: PHYSICAL MEDICINE & REHABILITATION

## 2023-10-09 RX ORDER — CEFDINIR 300 MG/1
CAPSULE ORAL
COMMUNITY
End: 2023-12-26

## 2023-10-09 RX ORDER — AMPICILLIN TRIHYDRATE 250 MG
CAPSULE ORAL
COMMUNITY
End: 2024-03-14

## 2023-10-09 RX ORDER — MELOXICAM 15 MG/1
TABLET ORAL
COMMUNITY
End: 2024-03-14

## 2023-10-09 RX ORDER — CANAGLIFLOZIN 300 MG/1
1 TABLET, FILM COATED ORAL DAILY
COMMUNITY
End: 2023-10-09

## 2023-10-09 RX ORDER — LEVOTHYROXINE SODIUM 0.07 MG/1
TABLET ORAL
COMMUNITY
End: 2023-10-09

## 2023-10-09 RX ORDER — DOXYCYCLINE HYCLATE 100 MG
1 TABLET ORAL 2 TIMES DAILY
COMMUNITY
End: 2023-12-26

## 2023-10-09 RX ORDER — DULAGLUTIDE 3 MG/.5ML
INJECTION, SOLUTION SUBCUTANEOUS
COMMUNITY
End: 2023-10-09

## 2023-10-09 RX ORDER — FINASTERIDE 5 MG/1
1 TABLET, FILM COATED ORAL
COMMUNITY
End: 2023-12-26

## 2023-10-09 RX ORDER — ASPIRIN 81 MG/1
TABLET ORAL
COMMUNITY

## 2023-10-09 RX ORDER — DULOXETIN HYDROCHLORIDE 30 MG/1
CAPSULE, DELAYED RELEASE ORAL
Qty: 60 CAPSULE | Refills: 5 | Status: SHIPPED | OUTPATIENT
Start: 2023-10-09 | End: 2024-03-14 | Stop reason: SDUPTHER

## 2023-10-09 ASSESSMENT — PATIENT HEALTH QUESTIONNAIRE - PHQ9: CLINICAL INTERPRETATION OF PHQ2 SCORE: 0

## 2023-10-09 ASSESSMENT — ENCOUNTER SYMPTOMS
PARESTHESIAS: 0
PARESIS: 0
PERIANAL NUMBNESS: 0
TINGLING: 0
WEAKNESS: 0
BACK PAIN: 1

## 2023-10-09 ASSESSMENT — FIBROSIS 4 INDEX
FIB4 SCORE: 1.01
FIB4 SCORE: 1.01

## 2023-10-09 ASSESSMENT — PAIN SCALES - GENERAL: PAINLEVEL: NO PAIN

## 2023-10-09 NOTE — PROGRESS NOTES
Neurology follow up visit:    Last saw me in March 2023.    History of Diabetic Amyotrophy and superimposed radiculopathic pain(s) from cervical and lumbar regions.    He is the  for the [employer] and expects to retire in the next 3-6 months.     He has had Diabetes for about 16  years and recent A1C% was 8.4 % on 9/26/2023     Aly more recently had an evaluation and then spine surgery with Segun Cochran M.D. ([facility]) in follow up this week- planning a discectomy in neck and an lumbar laminectomy.  May 26th 2023- Anterior approach Discectomy (cervical) and (lumbar)- but no fusion.    I reviewed Aly's Problem list.    Since the cervical and lumbar discectomies, Aly's  prior pains when bending or turning is much improved since the cervical and lower back surgery. Pain level is 3-4/10 where it used to be more consistent and pro vocable.    More over the constant dull pain has been consistently much improved (lower back and down the left leg) but the pain down the left leg is intermittent> some days without any pain and then days in a row with neuropathic pain(s).       Aly has not had any progressive gait decline in the last 6-12 months.    Aly  has not had any evolving numbness,tingling or neuropathic pains of the hands-fingers in the last 6 months or so nor any symptoms (paroxysmal) to suggest Carpal Tunnel Syndrome (such as needing to shake hands out at night or when driving longer distances).     He has not had any consistent,daily or progressive cramping of the hands,legs,feet/toes in the last 6-12 months.     He denies erectile dysfunction,urinary urgency/frequency or ashley incontinence events in the last 3-6 months or so.    He has not had any falls or near falls in the last 6 months.     He has not had any RLS symptoms in the recent months.     He has not had any discrete radiculopathic pains or dermatomal sensory changes down either upper arms or his legs (to discrete or reproducible  "patterns of sensory changes in any of his extremities).     No significant tobacco use in adult.  No significant alcohol use in adult life.    Reason for Consult:       Patient Active Problem List    Diagnosis Date Noted    Lumbar stenosis without neurogenic claudication 05/26/2023    Degeneration of cervical intervertebral disc 03/30/2023    Spinal stenosis of lumbar region 12/15/2022    Long term (current) use of oral hypoglycemic drugs 07/29/2022    Benign prostatic hyperplasia with urinary frequency 05/27/2022    PONV (postoperative nausea and vomiting)     Asthma     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) 06/09/2021    Neuropathy - proximal, diabetic 05/03/2021    Acquired hypothyroidism 08/25/2020    Controlled type 2 diabetes mellitus with diabetic polyneuropathy, without long-term current use of insulin ([facility]) 11/07/2019    History of small bowel obstruction - 2018 11/07/2019    Syncope and collapse 11/07/2019    Seasonal allergies 02/28/2019    Other insomnia 08/17/2018    Essential hypertension, benign 10/09/2017    Dyslipidemia 10/09/2017    Mild intermittent asthma without complication 07/27/2017    Erythrocytosis 05/01/2017    ISELA on CPAP - [facility] 05/01/2017    Coronary artery disease, non-occlusive 04/15/2016    Angina pectoris (HCC) 03/11/2016    Abnormal radionuclide heart study 01/31/2014    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 Nicolle TEMPLE " (postoperative nausea and vomiting)     Sleep apnea     has CPAP    Snoring        Past surgical history:   Past Surgical History:   Procedure Laterality Date    CERVICAL DISK AND FUSION ANTERIOR  5/26/2023    Procedure: ANTERIOR C5-7 DISCECTOMY AND FUSION;  Surgeon: Segun Cochran M.D.;  Location: SURGERY [facility];  Service: Neurosurgery    LUMBAR LAMINECTOMY DISKECTOMY  5/26/2023    Procedure: LAMINECTOMY, SPINE, LUMBAR, WITH DISCECTOMY POSTERIOR L4-S1 LAMINECTOMIES;  Surgeon: Segun Cocrhan M.D.;  Location: SURGERY [facility];  Service: Neurosurgery    RADIO FREQUENCY ABLATION ADDITIONAL LEVEL Bilateral 07/06/2022    Procedure: BILATERAL radiofrequency neurotomies medial branch targeting the L3-4, L4-5 and L5-S1 facet joints with fluoroscopic guidance and sedation. Plan for 80 degree C for 90 seconds for each neurotomy.;  Surgeon: Dragan Ayala M.D.;  [facility];  Service: Pain Management    VT INJ DX/THER AGNT PARAVERT FACET JOINT, DEVON* Bilateral 06/21/2022    Procedure: Diagnostic medial branch blocks targeting the BILATERAL L3-4, L4-5 and L5-S1 facet joints with fluoroscopic guidance #2;  Surgeon: Dragan Ayala M.D.;  Location: [facility];  Service: Pain Management    LUMBAR MEDIAL BRANCH BLOCKS Bilateral 06/21/2022    Procedure: .;  Surgeon: Dragan Ayala M.D.;  Location: [facility];  Service: Pain Management    CONSCIOUS SEDATION Bilateral 06/21/2022    Procedure: .;  Surgeon: Dragan Ayala M.D.;  Location: [facility];  Service: Pain Management    LUMBAR MEDIAL BRANCH BLOCKS Bilateral 06/14/2022    Procedure: Diagnostic medial branch blocks targeting the BILATERAL L3-4, L4-5 and L5-S1 facet joints with fluoroscopic guidance #1;  Surgeon: Dragan Ayala M.D.;  Location: [facility];  Service: Pain Management    VT TRANSURETHRAL ELEC-SURG PROSTATECTOM N/A 05/27/2022    Procedure: TURP, USING  BUTTON ELECTRODE;  Surgeon: Lee Mckee M.D.;  Location: [facility];  Service: Urology    BLOCK EPIDURAL STEROID INJECTION Left 03/22/2022    Procedure: LEFT L3-4 and L4-5 transforaminal epidural steroid injection with fluoroscopic guidance;  Surgeon: Dragan Ayala M.D.;  Location: [facility];  Service: Pain Management    GA NJX AA&/STRD TFRML EPI LUMBAR/SACRAL 1 LEVEL Left 02/15/2022    Procedure: LEFT L3-4 and L4-5 transforaminal epidural steroid injection with fluoroscopic guidance;  Surgeon: Dragan Ayala M.D.;  Location: [facility];  Service: Pain Management    SHOULDER DECOMPRESSION ARTHROSCOPIC Left 01/04/2018    Procedure: SHOULDER DECOMPRESSION ARTHROSCOPIC - SUBACROMIAL;  Surgeon: Linwood Grover M.D.;  Location: [facility];  Service: Orthopedics    SHOULDER ARTHROSCOPY W/ BICIPITAL TENODESIS REPAIR Left 01/04/2018    Procedure: SHOULDER ARTHROSCOPY W/ BICIPITAL Tenotomy REPAIR;  Surgeon: Linwood Grover M.D.;  Location: [facility];  Service: Orthopedics    CLAVICLE DISTAL EXCISION Left 01/04/2018    Procedure: CLAVICLE DISTAL EXCISION - POSSIBLE;  Surgeon: Linwood Grover M.D.;  Location: [facility];  Service: Orthopedics    RECOVERY  04/08/2016    Procedure: CATH LAB C WITH POSSIBLE NAVIN;  Surgeon: [facility];  [place] [facility] [place]  Service:     VENTRAL HERNIA REPAIR LAPAROSCOPIC  11/01/2012    Performed by Marcos Ramírez M.D. at [facility]    KNEE ARTHROSCOPY  1990's    right    OTHER ABDOMINAL SURGERY  10-    About 8 years ago    SHOULDER ARTHROSCOPY  1990's    right    SINUSOTOMIES  1990's    times two surgeries    TUMOR EXCISION WITH BIOPSY  1990's    right hand - thumb         Social history:   Social History     Socioeconomic History    Marital status:      Spouse name: Not on file    Number of children: Not on  file    Years of education: Not on file    Highest education level: Not on file   Occupational History    Not on file   Tobacco Use    Smoking status: Former     Current packs/day: 0.00     Types: Cigarettes     Quit date: 2020     Years since quitting: 3.7    Smokeless tobacco: Never    Tobacco comments:     occasional cigars   Vaping Use    Vaping Use: Never used   Substance and Sexual Activity    Alcohol use: Not Currently     Comment: If and when I drink it's only social    Drug use: Yes     Types: Marijuana, Inhaled, Oral     Comment: THC/ Occ    Sexual activity: Not on file     Comment: ; 2 biological kids (2 of his wife's); 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   Other Topics Concern    Not on file   Social History Narrative    Not on file     Social Determinants of Health     Financial Resource Strain: Not on file   Food Insecurity: Not on file   Transportation Needs: Not on file   Physical Activity: Not on file   Stress: Not on file   Social Connections: Not on file   Intimate Partner Violence: Not on file   Housing Stability: Not on file       Family history:   Family History   Problem Relation Age of Onset    Breast Cancer Mother     Hypertension Mother     Cancer Mother         breast    Heart Disease Mother         hx of bypass    Hypertension Father     Arthritis Father     Diabetes Father         prediabetes    No Known Problems Sister     Arthritis Brother     Heart Disease Other     Hypertension Other     Cancer Other     No Known Problems Brother          Current medications:   Current Outpatient Medications   Medication    Cinnamon 500 MG Cap    metFORMIN ER (GLUCOPHAGE XR) 500 MG TABLET SR 24 HR    atorvastatin (LIPITOR) 80 MG tablet    montelukast (SINGULAIR) 10 MG Tab    tizanidine (ZANAFLEX) 4 MG Tab    Continuous Blood Gluc Sensor (FREESTYLE LUNA 14 DAY SENSOR) Misc    VENTOLIN  (90 Base) MCG/ACT Aero Soln inhalation aerosol    gabapentin (NEURONTIN) 300 MG Cap     "zolpidem (AMBIEN) 5 MG Tab    Empagliflozin (JARDIANCE) 25 MG Tab    metoprolol SR (TOPROL XL) 25 MG TABLET SR 24 HR    Dulaglutide 1.5 MG/0.5ML Solution Pen-injector    levothyroxine (SYNTHROID) 50 MCG Tab    EPINEPHrine (EPIPEN) 0.3 MG/0.3ML Solution Auto-injector solution for injection    ALPHA LIPOIC ACID PO    Omega-3 Fatty Acids (FISH OIL) 1200 MG CAPS    pantoprazole (PROTONIX) 40 MG TBEC    Coenzyme Q10 200 MG Cap    Cholecalciferol (VITAMIN D) 2000 UNIT TABS    cetirizine (ZYRTEC) 10 MG Tab    aspirin 81 MG EC tablet    benazepril (LOTENSIN) 20 MG Tab    Continuous Blood Gluc Sensor (FREESTYLE LUNA 2 SENSOR) Misc    metFORMIN (GLUCOPHAGE) 500 MG Tab    Blood Glucose Monitoring Suppl (FREESTYLE LITE) w/Device Kit    Blood Glucose Test Strips    Lancet Devices Misc    NON SPECIFIED     No current facility-administered medications for this visit.       Medication Allergy:  Allergies   Allergen Reactions    Kiwi Extract Anaphylaxis    Shellfish Allergy Anaphylaxis    Strawberry Anaphylaxis    Ozempic (0.25 Or 0.5 Mg-Dose) [Semaglutide] Nausea     Pt does not recall any reaction    Sulfa Drugs Rash and Unspecified     rash    Testosterone Rash     rash           Physical examination:   Vitals:    10/09/23 1148   BP: 120/76   BP Location: Left arm   Patient Position: Sitting   BP Cuff Size: Adult   Pulse: 86   Resp: 14   SpO2: 95%   Weight: 81.3 kg (179 lb 3.7 oz)   Height: 1.753 m (5' 9\")       Normal Cephalic Atraumatic.  General: Full Range of Movement around the Neck in all directions without restrictions or discrete pain evoked triggers.  No lower extremity edema.      Neurological  Exam:    Mental status: Awake, alert and fully oriented to person, place, time, and situation. Normal attention and concentration.  Did not appear/act combative,irritable,anxious,paranoid/delusional or aggressive to or with me.  Speech and language: Speech is fluent without errors, clear, intact to repetition, and intact to " naming.     Follows 3 step motor commands in sequence without significant delay and correctly.    Cranial nerve exam:  II: Pupils are equally round and reactive to light. Visual fields are intact by confrontation.  III, IV, VI: EOMI, no diplopia, no ptosis.  V: Sensation to light touch is normal over V1-3 distributions bilaterally.  .  VII: Facial movements are symmetrical. There is no facial droop. .  VIII: Hearing intact to soft speech and finger rub bilaterally  IX: Palate elevates symmetrically, uvula is midline. Dysarthria is not present.  XI: Shoulder shrug are symmetrical and strong.   XII: Tongue protrudes midline. and No tongue fasciculations.        Motor exam:  Muscle tone is normal in all 4 limbs. and No abnormal movements appreciated.    Muscle strength:    Neck Flexors/Extensors: 5/5       Right  Left  Deltoid   5/5  5/5      Biceps   5/5  5/5  Triceps              5/5  5/5   Wrist extensors 5/5  5/5  Wrist flexors  5/5  5/5     5/5  5/5  Interossei  5/5  5/5  Thenar (APB)  5/5  5/5   Hip flexors  5/5  5/5  Quadriceps  5/5  5/5    Hamstrings  5/5  5/5  Dorsiflexors  5/5  5/5  Plantarflexors  5/5  5/5  Toe extension  5/5  5/5  Toe Flexors                5/5                   5/5    Sensory exam:    Vibratory: 6 seconds at great toes, 10 seconds at ankles and 12 seconds at knees (symmetrical).    Negative Tinel's and Phalen's at the median wrists.      Reflexes:       Right  Left  Biceps   2/4  2/4  Triceps               2/4  2/4  Brachioradialis  2/4  2/4  Knee jerk  1/4  1/4  Ankle jerk  0/4  0/4     Frontal release signs are normal.    bilaterally toes are downgoing to plantar stimulation..    Coordination (finger-to-nose, heel/knee/shin, rapid alternating movements) was normal.     There was no truncal ataxia, no tremors, and no dysmetria.     Station and gait - easily stands up from exam chair without retropulsion,veering,leaning,swaying (to either side).   Arm swing symmetrical.    No  Rombergism.      Labs and Tests:    0 Result Notes    1 HM Topic             Component Ref Range & Units 3 wk ago  (9/15/23) 5 mo ago  (5/11/23) 6 mo ago  (3/16/23) 6 mo ago  (3/16/23) 1 yr ago  (8/9/22) 1 yr ago  (7/22/22) 1 yr ago  (5/2/22)   Sodium 135 - 145 mmol/L 140  140   139  141  140  139    Potassium 3.6 - 5.5 mmol/L 4.5  3.8   4.1  4.2  4.2  3.8    Chloride 96 - 112 mmol/L 102  106   101  103  101  101    Co2 20 - 33 mmol/L 25  22   25  26  26  22    Anion Gap 7.0 - 16.0 13.0  12.0   13.0  12.0  13.0  16.0    Glucose 65 - 99 mg/dL 153 High   139 High    132 High   138 High   121 High   111 High     Bun 8 - 22 mg/dL 10  9   16  8  14  8    Creatinine 0.50 - 1.40 mg/dL 0.75  0.68   0.79  0.72  0.78  0.62    Calcium 8.5 - 10.5 mg/dL 9.7  9.2   9.8  9.5  9.4  9.7 R    Correct Calcium 8.5 - 10.5 mg/dL 9.2   9.3        AST(SGOT) 12 - 45 U/L 24    18  18  15     ALT(SGPT) 2 - 50 U/L 25    18  23  17     Alkaline Phosphatase 30 - 99 U/L 76    64  74  69     Total Bilirubin 0.1 - 1.5 mg/dL 1.1    0.7  0.6  0.8     Albumin 3.2 - 4.9 g/dL 4.6    4.6  4.6  4.5     Total Protein 6.0 - 8.2 g/dL 6.9    6.9  6.8  6.8     Globulin 1.9 - 3.5 g/dL 2.3    2.3  2.2  2.3     A-G Ratio g/dL 2.0                 0 Result Notes             Component Ref Range & Units 3 wk ago  (9/15/23) 6 mo ago  (3/16/23) 1 yr ago  (8/9/22) 2 yr ago  (6/9/21) 2 yr ago  (1/7/21) 3 yr ago  (6/24/20) 3 yr ago  (6/4/20)   TSH 0.380 - 5.330 uIU/mL 1.810  1.470 CM  1.420 CM  2.130 CM              IMPRESSION:  This is an abnormal electrodiagnostic study due to evidence of chronic reinnervation changes in the left vastus lateralis/medialis with mild active denervation changes noted.  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       There is no strong electrodiagnostic evidence of a an  active right lumbosacral plexopathy though given some responses are unobtainable a mild lumbar plexopathy/femoral neuropathy may be present.  There is no EMG evidence of a right lumbosacral radiculopathy.       Recommend clinical correlation and repeat study if progressive symptoms.         Yenny Ogden MD  Neurology - Neurophysiology  [facility]     NEUROIMAGING:     Narrative & Impression     9/22/2022 7:44 AM     HISTORY/REASON FOR EXAM:  Cervical spondylosis.     TECHNIQUE/EXAM DESCRIPTION:  MRI of the cervical spine without contrast.     The study was performed on a Ryan 1.5 Nanci MRI scanner.  T1 sagittal, T2 fast spin-echo sagittal, and gradient echo axial images were obtained of the cervical spine.  Optional additional T2 axial, T2 fat-suppressed sagittal and/or STIR sagittal images may also be obtained.     COMPARISON: None.     FINDINGS:  Alignment in the cervical spine shows slight degenerative retrolisthesis of C5 on C6. There is moderate degenerative disc space narrowing at C5-C6 and C6-C7. There is marginal endplate spurring at C4 to C5, C5-C6, C6-C7.     Marrow signal in the vertebral bodies is normal.     The prevertebral and paraspinous soft tissues are unremarkable.     There are no anomalies at the craniovertebral junction.  The cervical spinal cord is normal in caliber and signal throughout its course.     At C2-3, no central or foraminal stenosis.     At C3-4, there is a small central disc bulge or disc/osteophyte change with some thinning of ventral subarachnoid space. No cord impingement or ashley central stenosis. The neural foramina are intact.     At C4-5, mild diffuse disc bulging or disc/osteophyte complex. Thinning of ventral subarachnoid space without ashley central stenosis. Mild facet hypertrophy. Some uncinate spondylosis with mild right foraminal stenosis and moderate left foraminal   stenosis.     C5-6, moderate disc/osteophyte change combining with ligamentum flavum  hypertrophy or buckling with mild central stenosis. Moderate right lateral recess stenosis. Right-sided uncinate spondylosis with severe right foraminal stenosis. Moderate left   foraminal stenosis.     C6-C7 mild disc bulge favoring the right. No central stenosis. The right neural foramen is intact. Mild left foraminal stenosis with uncinate spondylotic change.     At C7-T1, no abnormality.     IMPRESSION:     1.  Multilevel degenerative changes most notable at C5-C6 with disc/osteophyte change with mild central stenosis, moderate right lateral recess stenosis, and right-sided uncinate spondylosis with severe right foraminal stenosis. Moderate left foraminal   stenosis.  2.  Additional details for each level above in the body of the report.  3.  No myelopathic cord signal.           Exam Ended: 09/22/22  9:22 AM Last Resulted: 09/22/22 10:13 AM              Ref Range & Units 13 d ago  (9/26/23) 5 mo ago  (5/11/23) 6 mo ago  (3/21/23) 10 mo ago  (12/2/22) 1 yr ago  (7/29/22) 1 yr ago  (11/16/21) 2 yr ago  (6/9/21)   Glycohemoglobin 5.8 % 8.4 Abnormal   7.1 High  R, CM  7.2 Abnormal   7.2 Abnormal  R, CM  7.1 Abnormal  R  7.2 Abnormal  R  7.1 High  R, CM    Comment: lot # [id number] expir. 5/25             Impression/Plans/Recommendations:    History of Diabetic amyotrophy associated with diabetes mellitus due to underlying condition (HCC): I do suspect that patient had a diabetic amyotrophy on the left given EMG while actively experiencing his weakness.  There was involvement of both the obturator nerve and femoral nerve though it seemed predominantly femoral nerve.  A superimposed lumbar spine radiculopathy is also suspected which may account for her ongoing mild denervation changes though on clinical exam he is very strong.    Prior significant pain of left back and down front of left leg (predominantly left lateral hip to left lateral knee area) without ashley trauma known though he recalled getting out of bed and  "heard of \"pop\" (and took an alleve)- this movement was to me not enough to suggest a lumbar radiculopathic condition.      2. S/P Multi level discectomy in cervical and lumbar regions in May 2023.    We discussed the options for pain management going forward.     He has been taking Gabapentin (300 mg capsules)> 1 in am and 1 in pm.     Zanaflex 4 mg > 1 PO QHS> this seems to help with leg cramps.    Uses Meloxicam PRN.    3. Long Standing Diabetes- on metformin ,jardiance an trulicity.    4. ISELA- on CPAP- I encouraged compliance with using this.    5.  HTN- on Toprol XL 25 mg a day; encouraged home BP monitoring and goal under 130/80 long term.    6. Left sided- muscle cramp (left chin or left inner upper thigh)- not clearly provoked. Muscle cramping seems more noticeable when laying down in bed.    Plans:    A. Consider tapering off Gabapentin which at  this time is a low relative daily and individual (300 mg capsule).    B. Continue Zanaflex 4 mg QPM (early evening)> seems to definitely helps cramping. He has stopped this for 2 to 3 days without any recurrence of cramping.  Side effects of Zanaflex reviewed with Aly today.     C. At this time I do not feel a repeat EMG-NCS is needed and we reviewed this type of analysis today.    D.  Will add Cymbalta to his regimen 30 mg in am x 2 weeks and then up 60 mg a day (which will be a replacement for his Gabapentin).    E.Goals of Rx (pain reducing) vs side effect profile reviewed with Aly today.       F. Goal A1C%  under 7.0 long term reviewed.     G. Heart healthy diet reviewed and I encouraged exercise for general health (WHO- 150 minutes per week).       I have performed  a history and physical exam and a directed /focused  ROS today.    Total time spent today or this patient's care was 40  minutes  and included reviewing diagnostic workup to date (labs and imaging that include interpreting such tests relevant to this patient's neurological condition),  " reviewing/obtaining separately obtained history from Aly or today's neurological problem(s) , ordering either/or medications and additional tests, giving advise and suggestions on the present neurological problem and  documenting the clinical information in the EMR.    Follow up at this time PRN or by email regaring pain medications (new introduction of Cymbalta)    Thompson Tejada MD  [place] [facility].   [facility] [place]

## 2023-10-09 NOTE — PROGRESS NOTES
Follow up patient note  Interventional spine and Pain  Physiatry (physical medicine and  Rehabilitation)       Chief complaint:   Chief Complaint   Patient presents with    Follow-Up     6m fv          HISTORY    Please see new patient note by Dr Ayala,  for more details.     HPI  Patient identification: Barron Hewitt ,  1960,   With Diagnoses of Lumbar radiculopathy, Cervical radiculopathy, Cervical stenosis of spinal canal, Lumbar spondylosis, Atrophy of muscle of left thigh, and Chronic bilateral low back pain without sciatica were pertinent to this visit.     procedures   2/15/2022 left Lumbar Transforaminal Epidural Steroid  at the L3-4 and L4-5 levels.   3/22/2022 left Lumbar Transforaminal Epidural Steroid  at the L3-4 and L4-5 levels 80% improved at the follow-up visit   medial branch block targeting the bilateral L3-4, L4-5, L5-S1 facet joints with 100% pain relief during the diagnostic phase   medial branch block targeting the bilateral L3-4, L4-5, L5-S1 facet joints with 100% pain relief during the diagnostic phase  2022 medial branch radiofrequency neurotomy targeting the bilateral L3-4, L4-5, L5-S1 facet joints with sedation 90% improved post procedure    Back Pain  This is a chronic problem. The current episode started more than 1 year ago. The problem occurs intermittently. The pain is present in the gluteal and lumbar spine. The pain is at a severity of 1/10. Pertinent negatives include no paresis, paresthesias, pelvic pain, perianal numbness, tingling or weakness. He has tried home exercises, heat, ice, muscle relaxant, NSAIDs, walking, chiropractic manipulation and analgesics for the symptoms. The treatment provided significant relief.     Previously had surgery by Dr Cochran.     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 Valverde    PONV (postoperative nausea and vomiting)     Sleep apnea     has CPAP    Snoring        PSHx:   Past Surgical History:   Procedure Laterality Date    CERVICAL DISK AND FUSION ANTERIOR  5/26/2023    Procedure: ANTERIOR C5-7 DISCECTOMY AND FUSION;  Surgeon: Segun Cochran M.D.;  Location: SURGERY [facility];  Service: Neurosurgery    LUMBAR LAMINECTOMY DISKECTOMY  5/26/2023    Procedure: LAMINECTOMY, SPINE, LUMBAR, WITH DISCECTOMY POSTERIOR L4-S1 LAMINECTOMIES;  Surgeon: Segun Cochran M.D.;  Location: SURGERY [facility];  Service: Neurosurgery    RADIO FREQUENCY ABLATION ADDITIONAL LEVEL Bilateral 07/06/2022    Procedure: BILATERAL radiofrequency neurotomies medial branch targeting the L3-4, L4-5 and L5-S1 facet joints with fluoroscopic guidance and sedation. Plan for 80 degree C for 90 seconds for each neurotomy.;  Surgeon: Dragan Ayala M.D.;  Location: [facility]ERY [facility];  Service: Pain Management    CA INJ DX/THER AGNT PARAVERT FACET JOINT, DEVON* Bilateral 06/21/2022    Procedure: Diagnostic medial branch blocks targeting the BILATERAL L3-4, L4-5 and L5-S1 facet joints with fluoroscopic guidance #2;  Surgeon: Dragan Ayala M.D.;  Location: [facility]ERY [facility];  Service: Pain Management    LUMBAR MEDIAL BRANCH BLOCKS Bilateral 06/21/2022    Procedure: .;  Surgeon: Dragan Ayala M.D.;  Location: [facility]ERY [facility];  Service: Pain Management "    CONSCIOUS SEDATION Bilateral 06/21/2022    Procedure: .;  Surgeon: Dragan Ayala M.D.;  Location: [facility]ERY [facility];  Service: Pain Management    LUMBAR MEDIAL BRANCH BLOCKS Bilateral 06/14/2022    Procedure: Diagnostic medial branch blocks targeting the BILATERAL L3-4, L4-5 and L5-S1 facet joints with fluoroscopic guidance #1;  Surgeon: Dragan Ayala M.D.;  Location: [facility]ERY [facility];  Service: Pain Management    WY TRANSURETHRAL ELEC-SURG PROSTATECTOM N/A 05/27/2022    Procedure: TURP, USING BUTTON ELECTRODE;  Surgeon: Lee Mckee M.D.;  Location: SURGERY [facility];  Service: Urology    BLOCK EPIDURAL STEROID INJECTION Left 03/22/2022    Procedure: LEFT L3-4 and L4-5 transforaminal epidural steroid injection with fluoroscopic guidance;  Surgeon: Dragan Ayala M.D.;  Location: SURGERY [facility];  Service: Pain Management    WY NJX AA&/STRD TFRML EPI LUMBAR/SACRAL 1 LEVEL Left 02/15/2022    Procedure: LEFT L3-4 and L4-5 transforaminal epidural steroid injection with fluoroscopic guidance;  Surgeon: Dragan Ayala M.D.;  Location: [facility]ERY [facility];  Service: Pain Management    SHOULDER DECOMPRESSION ARTHROSCOPIC Left 01/04/2018    Procedure: SHOULDER DECOMPRESSION ARTHROSCOPIC - SUBACROMIAL;  Surgeon: Linwood Grover M.D.;  Location: [facility];  Service: Orthopedics    SHOULDER ARTHROSCOPY W/ BICIPITAL TENODESIS REPAIR Left 01/04/2018    Procedure: SHOULDER ARTHROSCOPY W/ BICIPITAL Tenotomy REPAIR;  Surgeon: Linwood Grover M.D.;  Location: [facility];  Service: Orthopedics    CLAVICLE DISTAL EXCISION Left 01/04/2018    Procedure: CLAVICLE DISTAL EXCISION - POSSIBLE;  Surgeon: Linwood Grover M.D.;  Location: [facility];  Service: Orthopedics    RECOVERY  04/08/2016    Procedure: CATH LAB [facility] WITH POSSIBLE NAVIN;  Surgeon: Recoveryonly Surgery;  Location: SURGERY PRE-POST PROC UNIT  [facility] [place];  Service:     VENTRAL HERNIA REPAIR LAPAROSCOPIC  11/01/2012    Performed by Marcos Ramírez M.D. at SURGERY [facility]    KNEE ARTHROSCOPY  1990's    right    OTHER ABDOMINAL SURGERY  10-    About 8 years ago    SHOULDER ARTHROSCOPY  1990's    right    SINUSOTOMIES  1990's    times two surgeries    TUMOR EXCISION WITH BIOPSY  1990's    right hand - thumb       Family history   Family History   Problem Relation Age of Onset    Breast Cancer Mother     Hypertension Mother     Cancer Mother         breast    Heart Disease Mother         hx of bypass    Hypertension Father     Arthritis Father     Diabetes Father         prediabetes    No Known Problems Sister     Arthritis Brother     Heart Disease Other     Hypertension Other     Cancer Other     No Known Problems Brother          Medications:   Outpatient Medications Marked as Taking for the 10/9/23 encounter (Office Visit) with Dragan Ayala M.D.   Medication Sig Dispense Refill    aspirin 81 MG EC tablet 81 mg by oral route.      Cinnamon 500 MG Cap 1000 mg by oral route.      DULoxetine (CYMBALTA) 30 MG Cap DR Particles Take 1 in am x 2 weeks, if needed can increase to 2 a day 60 Capsule 5    cefdinir (OMNICEF) 300 MG Cap       doxycycline (VIBRAMYCIN) 100 MG Tab Take 1 Tablet by mouth 2 times a day.      finasteride (PROSCAR) 5 MG Tab Take 1 Tablet by mouth every day.      meloxicam (MOBIC) 15 MG tablet TAKE 1 TABLET BY MOUTH ONCE DAILY FOR PAIN AS NEEDED. DO NOT TAKE WITH NSAIDS      metFORMIN ER (GLUCOPHAGE XR) 500 MG TABLET SR 24 HR Take 2 Tablets by mouth 2 times a day. 360 Tablet 1    Continuous Blood Gluc Sensor (FREESTYLE LUNA 2 SENSOR) Misc 1 Each every 14 days. 6 Each 3    atorvastatin (LIPITOR) 80 MG tablet Take 1 Tablet by mouth every evening. 90 Tablet 3    montelukast (SINGULAIR) 10 MG Tab TAKE 1 TABLET EVERY EVENING 90 Tablet 3    tizanidine (ZANAFLEX) 4 MG Tab Take 1 Tablet by mouth at bedtime as needed (muscle spasms). 90  Tablet 0    Continuous Blood Gluc Sensor (FREESTYLE LUNA 14 DAY SENSOR) Misc 1 Application. every 14 days. 6 Each 3    VENTOLIN  (90 Base) MCG/ACT Aero Soln inhalation aerosol USE 2 INHALATIONS EVERY 4 HOURS AS NEEDED FOR SHORTNESS OF BREATH 54 g 3    gabapentin (NEURONTIN) 300 MG Cap Take 1 Capsule by mouth 3 times a day as needed (pain). (Patient taking differently: Take 300 mg by mouth 2 times a day.) 90 Capsule 5    zolpidem (AMBIEN) 5 MG Tab Take 5 mg by mouth at bedtime as needed.      Empagliflozin (JARDIANCE) 25 MG Tab Take 1 Tablet by mouth every day. 90 Tablet 2    metoprolol SR (TOPROL XL) 25 MG TABLET SR 24 HR TAKE 1 TABLET BY MOUTH ONCE DAILY AT NIGHT FOR BLOOD PRESSURE (Patient taking differently: Take 25 mg by mouth every day.) 90 Tablet 3    Dulaglutide 1.5 MG/0.5ML Solution Pen-injector Inject 0.5 mL under the skin every 7 days. 6 mL 5    levothyroxine (SYNTHROID) 50 MCG Tab TAKE 1 TABLET 6 DAYS PER WEEK ON AN EMPTY STOMACH, 75 MCG ON 7TH DAY. (Patient taking differently: 50 mcg every morning on an empty stomach. 50mg TABLET DAILY) 90 Tablet 3    Blood Glucose Monitoring Suppl (FREESTYLE LITE) w/Device Kit USE DAILY AS DIRECTED      Blood Glucose Test Strips Test strips order:  Freestyle Freedom Lite test strips  testing one time per day 100 Strip 2    Lancet Devices Misc 1 Applicator 3 times a day. 100 Each 11    EPINEPHrine (EPIPEN) 0.3 MG/0.3ML Solution Auto-injector solution for injection Inject 0.3 mL into the thigh one time for 1 dose. 1 Each 0    NON SPECIFIED CPAP supplies through [facility] 1 Each 1    ALPHA LIPOIC ACID PO Take 600 mg by mouth 2 Times a Day.      Omega-3 Fatty Acids (FISH OIL) 1200 MG CAPS Take 1 Capful by mouth every day.      pantoprazole (PROTONIX) 40 MG TBEC Take 40 mg by mouth every day.        Coenzyme Q10 200 MG Cap Take  by mouth every day.        Cholecalciferol (VITAMIN D) 2000 UNIT TABS Take  by mouth 2 times a day.      cetirizine (ZYRTEC) 10  MG Tab Take 10 mg by mouth every day.          Current Outpatient Medications on File Prior to Visit   Medication Sig Dispense Refill    metFORMIN ER (GLUCOPHAGE XR) 500 MG TABLET SR 24 HR Take 2 Tablets by mouth 2 times a day. 360 Tablet 1    Continuous Blood Gluc Sensor (FREESTYLE LUNA 2 SENSOR) Misc 1 Each every 14 days. 6 Each 3    atorvastatin (LIPITOR) 80 MG tablet Take 1 Tablet by mouth every evening. 90 Tablet 3    montelukast (SINGULAIR) 10 MG Tab TAKE 1 TABLET EVERY EVENING 90 Tablet 3    tizanidine (ZANAFLEX) 4 MG Tab Take 1 Tablet by mouth at bedtime as needed (muscle spasms). 90 Tablet 0    Continuous Blood Gluc Sensor (FREESTYLE LUNA 14 DAY SENSOR) Misc 1 Application. every 14 days. 6 Each 3    VENTOLIN  (90 Base) MCG/ACT Aero Soln inhalation aerosol USE 2 INHALATIONS EVERY 4 HOURS AS NEEDED FOR SHORTNESS OF BREATH 54 g 3    gabapentin (NEURONTIN) 300 MG Cap Take 1 Capsule by mouth 3 times a day as needed (pain). (Patient taking differently: Take 300 mg by mouth 2 times a day.) 90 Capsule 5    zolpidem (AMBIEN) 5 MG Tab Take 5 mg by mouth at bedtime as needed.      Empagliflozin (JARDIANCE) 25 MG Tab Take 1 Tablet by mouth every day. 90 Tablet 2    metoprolol SR (TOPROL XL) 25 MG TABLET SR 24 HR TAKE 1 TABLET BY MOUTH ONCE DAILY AT NIGHT FOR BLOOD PRESSURE (Patient taking differently: Take 25 mg by mouth every day.) 90 Tablet 3    Dulaglutide 1.5 MG/0.5ML Solution Pen-injector Inject 0.5 mL under the skin every 7 days. 6 mL 5    levothyroxine (SYNTHROID) 50 MCG Tab TAKE 1 TABLET 6 DAYS PER WEEK ON AN EMPTY STOMACH, 75 MCG ON 7TH DAY. (Patient taking differently: 50 mcg every morning on an empty stomach. 50mg TABLET DAILY) 90 Tablet 3    Blood Glucose Monitoring Suppl (FREESTYLE LITE) w/Device Kit USE DAILY AS DIRECTED      Blood Glucose Test Strips Test strips order:  Freestyle Freedom Lite test strips  testing one time per day 100 Strip 2    Lancet Devices Misc 1 Applicator 3 times a day. 100  Each 11    EPINEPHrine (EPIPEN) 0.3 MG/0.3ML Solution Auto-injector solution for injection Inject 0.3 mL into the thigh one time for 1 dose. 1 Each 0    NON SPECIFIED CPAP supplies through [facility] 1 Each 1    ALPHA LIPOIC ACID PO Take 600 mg by mouth 2 Times a Day.      Omega-3 Fatty Acids (FISH OIL) 1200 MG CAPS Take 1 Capful by mouth every day.      pantoprazole (PROTONIX) 40 MG TBEC Take 40 mg by mouth every day.        Coenzyme Q10 200 MG Cap Take  by mouth every day.        Cholecalciferol (VITAMIN D) 2000 UNIT TABS Take  by mouth 2 times a day.      cetirizine (ZYRTEC) 10 MG Tab Take 10 mg by mouth every day.      benazepril (LOTENSIN) 20 MG Tab 20 mg by oral route. (Patient not taking: Reported on 10/9/2023)      metFORMIN (GLUCOPHAGE) 500 MG Tab Take 1 Tablet by mouth 2 times a day with meals. (Patient not taking: Reported on 10/9/2023) 180 Tablet 2     No current facility-administered medications on file prior to visit.         Allergies:   Allergies   Allergen Reactions    Kiwi Extract Anaphylaxis    Shellfish Allergy Anaphylaxis and Unspecified    Strawberry Anaphylaxis and Unspecified    Ozempic (0.25 Or 0.5 Mg-Dose) [Semaglutide] Nausea     Pt does not recall any reaction    Sulfa Drugs Rash and Unspecified     rash    Testosterone Rash     rash       Social Hx:   Social History     Socioeconomic History    Marital status:      Spouse name: Not on file    Number of children: Not on file    Years of education: Not on file    Highest education level: Not on file   Occupational History    Not on file   Tobacco Use    Smoking status: Former     Current packs/day: 0.00     Types: Cigarettes     Quit date: 2020     Years since quitting: 3.7    Smokeless tobacco: Never    Tobacco comments:     occasional cigars   Vaping Use    Vaping Use: Never used   Substance and Sexual Activity    Alcohol use: Not Currently     Comment: If and when I drink it's only social    Drug use: Yes     Types:  "Marijuana, Inhaled, Oral     Comment: THC/ Occ    Sexual activity: Not on file     Comment: ; 2 biological kids (2 of his wife's); 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   Other Topics Concern    Not on file   Social History Narrative    Not on file     Social Determinants of Health     Financial Resource Strain: Not on file   Food Insecurity: Not on file   Transportation Needs: Not on file   Physical Activity: Not on file   Stress: Not on file   Social Connections: Not on file   Intimate Partner Violence: Not on file   Housing Stability: Not on file         EXAMINATION     Physical Exam:   Vitals: /70 (BP Location: Left arm, Patient Position: Sitting, BP Cuff Size: Large adult)   Pulse 80   Temp 36.2 °C (97.1 °F) (Temporal)   Ht 1.753 m (5' 9\")   Wt 81.1 kg (178 lb 12.7 oz)   SpO2 96%     Constitutional:   Body Habitus: Body mass index is 26.4 kg/m².  Cooperation: Fully cooperates with exam  Appearance: Well-groomed no disheveled    Respiratory-  breathing comfortable on room air, no audible wheezing  Cardiovascular- capillary refills less than 2 seconds. No lower extremity edema is noted.   Psychiatric- alert and oriented ×3. Normal affect.    MSK and Neuro: -      Thoracic/Lumbar Spine/Sacral Spine/Hips   There are no signs of infection around the injection sites.   full  active range of motion with flexion, lateral flexion, and rotation bilaterally.   There is full  active range of motion with lumbar extension.    There is no  pain with lumbar extension.   There is no pain with facet loading maneuver (extension rotation) with axial low back pain on the BILATERAL side(s)       Palpation:   No tenderness to palpation in midline at T1-T12 levels. No tenderness to palpation in the left and right of the midline T1-L5, NEGATIVE for tenderness to palpation to the para-midline region in the lower lumbar levels.  palpation over SI joint: negative bilaterally    palpation in hip or over the " gluteus medius tendon insertion: negative bilaterally      Lumbar spine Special tests  Neuro tension  Straight leg test negative bilaterally    Slump test negative bilaterally      Key points for the international standards for neurological classification of spinal cord injury (ISNCSCI) to light touch.     Dermatome R L                                      L2 2 2   L3 2 2   L4 2 2   L5 2 2   S1 2 2   S2 2 2         Motor Exam Lower Extremities    ? Myotome R L   Hip flexion L2 5 5   Knee extension L3 5 5   Ankle dorsiflexion L4 5 5   Toe extension L5 5 5   Ankle plantarflexion S1 5 5               MEDICAL DECISION MAKING    DATA    Labs:   Lab Results   Component Value Date/Time    SODIUM 140 09/15/2023 06:42 AM    POTASSIUM 4.5 09/15/2023 06:42 AM    CHLORIDE 102 09/15/2023 06:42 AM    CO2 25 09/15/2023 06:42 AM    GLUCOSE 153 (H) 09/15/2023 06:42 AM    BUN 10 09/15/2023 06:42 AM    CREATININE 0.75 09/15/2023 06:42 AM    CREATININE 1.1 07/10/2008 09:25 AM        Lab Results   Component Value Date/Time    PROTHROMBTM 14.6 05/11/2023 01:41 PM    INR 1.15 (H) 05/11/2023 01:41 PM        Lab Results   Component Value Date/Time    WBC 8.8 05/11/2023 01:41 PM    RBC 5.66 05/11/2023 01:41 PM    HEMOGLOBIN 16.6 05/11/2023 01:41 PM    HEMATOCRIT 50.0 05/11/2023 01:41 PM    MCV 88.3 05/11/2023 01:41 PM    MCH 29.3 05/11/2023 01:41 PM    MCHC 33.2 (L) 05/11/2023 01:41 PM    MPV 10.5 05/11/2023 01:41 PM    NEUTSPOLYS 64.50 05/11/2023 01:41 PM    LYMPHOCYTES 18.70 (L) 05/11/2023 01:41 PM    MONOCYTES 8.70 05/11/2023 01:41 PM    EOSINOPHILS 6.40 05/11/2023 01:41 PM    BASOPHILS 1.40 05/11/2023 01:41 PM    HYPOCHROMIA 1+ 08/27/2013 07:13 AM        Lab Results   Component Value Date/Time    HBA1C 8.4 (A) 09/26/2023 01:49 PM          Imaging:   I personally reviewed following images    MRI lumbar spine 7/27/2021 with moderate left neuroforaminal stenosis at L3-4 and L4-5 with mild right neuroforaminal stenosis at L3-4 and L4-5.   Mild to moderate bilateral neuroforaminal stenosis at L5-S1.  There is facet arthropathy bilaterally at L3-4, L4-5, L5-S1        I reviewed the following radiology reports      Cervical spine 9/22/2022  IMPRESSION:     1.  Multilevel degenerative changes most notable at C5-C6 with disc/osteophyte change with mild central stenosis, moderate right lateral recess stenosis, and right-sided uncinate spondylosis with severe right foraminal stenosis. Moderate left foraminal   stenosis.  2.  Additional details for each level above in the body of the report.  3.  No myelopathic cord signal.                         Results for orders placed during the hospital encounter of 07/27/21    MR-LUMBAR SPINE-W/O    Impression  1.  L4-5 annular disc bulge with a central disc protrusion and bilateral facet degeneration. There is borderline central canal narrowing. There is moderate mild right neural foraminal narrowing.    2.  L3-4 annular disc bulge with a left lateral disc protrusion. There is moderate to severe left and mild right neural foraminal narrowing again seen.    3.  L5-S1 degenerative disc disease and facet degeneration results in moderate bilateral neuroforaminal narrowing.        Results for orders placed during the hospital encounter of 07/27/21    MR-LUMBAR SPINE-W/O    Impression  1.  L4-5 annular disc bulge with a central disc protrusion and bilateral facet degeneration. There is borderline central canal narrowing. There is moderate mild right neural foraminal narrowing.    2.  L3-4 annular disc bulge with a left lateral disc protrusion. There is moderate to severe left and mild right neural foraminal narrowing again seen.    3.  L5-S1 degenerative disc disease and facet degeneration results in moderate bilateral neuroforaminal narrowing.      Results for orders placed during the hospital encounter of 07/27/21    MR-MRA NECK-W/O    Impression  There is no significant stenosis in the visualized extracranial neck  arteries.                                                                   Results for orders placed during the hospital encounter of 17    CT-ABDOMEN-PELVIS WITH    Impression  1.  Findings suggestive of early or low grade obstruction in the mid to distal small bowel. Enteritis with associated ileus could also give a similar appearance.  2.  LEFT calyceal stone                       Results for orders placed during the hospital encounter of 17    DX-CHEST-2 VIEWS    Impression  Negative two views of the chest.                          Electrodiagnostics   2022 EMG  IMPRESSION:  This is an abnormal electrodiagnostic study due to evidence of chronic reinnervation changes in the left vastus lateralis/medialis with mild active denervation changes noted.  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       There is no strong electrodiagnostic evidence of a an active right lumbosacral plexopathy though given some responses are unobtainable a mild lumbar plexopathy/femoral neuropathy may be present.  There is no EMG evidence of a right lumbosacral radiculopathy.                                         DIAGNOSIS   Visit Diagnoses     ICD-10-CM   1. Lumbar radiculopathy  M54.16   2. Cervical radiculopathy  M54.12   3. Cervical stenosis of spinal canal  M48.02   4. Lumbar spondylosis  M47.816   5. Atrophy of muscle of left thigh  M62.552   6. Chronic bilateral low back pain without sciatica  M54.50    G89.29           ASSESSMENT and PLAN:     Barron Sepulveda Kash  1960 male      Barron was seen today for follow-up.    Diagnoses and all orders for this visit:    Lumbar radiculopathy    Cervical radiculopathy    Cervical stenosis of spinal canal    Lumbar spondylosis    Atrophy of muscle of left thigh    Chronic bilateral low back  pain without sciatica      Medications: I reviewed the previous notes in the patient's neurologist Dr. Tejada.  We will plan to taper off of gabapentin.  The patient is currently taking gabapentin 300 mg twice daily.  We will decrease this to 300 mg once daily for the next 2 weeks and then stop. I agree with cymbalta as prescribed by Dr Tejada.        Follow up: as needed with me    Thank you for allowing me to participate in the care of this patient. If you have any questions please not hesitate to contact me.             Please note that this dictation was created using voice recognition software. I have made every reasonable attempt to correct obvious errors but there may be errors of grammar and content that I may have overlooked prior to finalization of this note.      Dragan Ayala MD  Interventional Spine and Sports Physiatry  Physical Medicine and Rehabilitation  [facility]       CC NIRANJAN Butts   CC Osvaldo Tejada MD

## 2023-10-10 DIAGNOSIS — Z79.4 TYPE 2 DIABETES MELLITUS WITHOUT COMPLICATION, WITH LONG-TERM CURRENT USE OF INSULIN (HCC): ICD-10-CM

## 2023-10-10 DIAGNOSIS — E11.9 TYPE 2 DIABETES MELLITUS WITHOUT COMPLICATION, WITH LONG-TERM CURRENT USE OF INSULIN (HCC): ICD-10-CM

## 2023-10-24 RX ORDER — METOPROLOL SUCCINATE 25 MG/1
25 TABLET, EXTENDED RELEASE ORAL DAILY
Qty: 90 TABLET | Refills: 3 | Status: SHIPPED | OUTPATIENT
Start: 2023-10-24

## 2023-11-16 DIAGNOSIS — Z79.4 TYPE 2 DIABETES MELLITUS WITHOUT COMPLICATION, WITH LONG-TERM CURRENT USE OF INSULIN (HCC): ICD-10-CM

## 2023-11-16 DIAGNOSIS — E11.9 TYPE 2 DIABETES MELLITUS WITHOUT COMPLICATION, WITH LONG-TERM CURRENT USE OF INSULIN (HCC): ICD-10-CM

## 2023-11-16 RX ORDER — FLASH GLUCOSE SENSOR
1 KIT MISCELLANEOUS
Qty: 6 EACH | Refills: 3 | Status: SHIPPED | OUTPATIENT
Start: 2023-11-16

## 2023-12-10 DIAGNOSIS — E03.9 ACQUIRED HYPOTHYROIDISM: ICD-10-CM

## 2023-12-11 RX ORDER — LEVOTHYROXINE SODIUM 0.05 MG/1
TABLET ORAL
Qty: 90 TABLET | Refills: 2 | Status: SHIPPED | OUTPATIENT
Start: 2023-12-11 | End: 2024-02-20 | Stop reason: SDUPTHER

## 2023-12-26 ENCOUNTER — NON-PROVIDER VISIT (OUTPATIENT)
Dept: ENDOCRINOLOGY | Facility: MEDICAL CENTER | Age: 63
End: 2023-12-26
Attending: INTERNAL MEDICINE
Payer: MEDICARE

## 2023-12-26 VITALS
BODY MASS INDEX: 25.92 KG/M2 | HEART RATE: 72 BPM | SYSTOLIC BLOOD PRESSURE: 118 MMHG | HEIGHT: 69 IN | DIASTOLIC BLOOD PRESSURE: 70 MMHG | OXYGEN SATURATION: 96 % | WEIGHT: 175 LBS

## 2023-12-26 DIAGNOSIS — E55.9 VITAMIN D DEFICIENCY: ICD-10-CM

## 2023-12-26 DIAGNOSIS — E78.5 DYSLIPIDEMIA: ICD-10-CM

## 2023-12-26 DIAGNOSIS — Z79.4 TYPE 2 DIABETES MELLITUS WITHOUT COMPLICATION, WITH LONG-TERM CURRENT USE OF INSULIN (HCC): ICD-10-CM

## 2023-12-26 DIAGNOSIS — E11.9 TYPE 2 DIABETES MELLITUS WITHOUT COMPLICATION, WITH LONG-TERM CURRENT USE OF INSULIN (HCC): ICD-10-CM

## 2023-12-26 DIAGNOSIS — E03.9 ACQUIRED HYPOTHYROIDISM: ICD-10-CM

## 2023-12-26 LAB
HBA1C MFR BLD: 7.8 % (ref ?–5.8)
POCT INT CON NEG: NEGATIVE
POCT INT CON POS: POSITIVE

## 2023-12-26 PROCEDURE — 99212 OFFICE O/P EST SF 10 MIN: CPT | Performed by: INTERNAL MEDICINE

## 2023-12-26 PROCEDURE — 83036 HEMOGLOBIN GLYCOSYLATED A1C: CPT

## 2023-12-26 RX ORDER — GLIMEPIRIDE 2 MG/1
2 TABLET ORAL EVERY MORNING
Qty: 180 TABLET | Refills: 1 | Status: SHIPPED | OUTPATIENT
Start: 2023-12-26

## 2023-12-26 ASSESSMENT — FIBROSIS 4 INDEX: FIB4 SCORE: 1.026315789473684211

## 2023-12-26 NOTE — PROGRESS NOTES
RN-CDE Note    Subjective:   Endocrinology Clinic Progress Note  PCP: NIRANJAN Butts    HPI:  Barron Hewitt is a 63 y.o. old patient who is seen today by the Diabetes Nurse Specialist for review of Type 2 Diabetes and Hypothyroidism.  Recent changes in health: Health good.  Recovering from back surgery.  DM:   Last A1c:   Lab Results   Component Value Date/Time    HBA1C 7.8 (A) 12/26/2023 01:50 PM      Previous A1c was 8.4 on 9/26/23  A1C GOAL: < 7    Diabetes Medications:   Jardiance 25 mg daily  Metformin  mg 2 q am and 1 q pm  Trulicity 1.5 mg weekly    Exercise: Physical therapy twice weekly and walking  Diet: Waffle, peanut butter and fruit.  Lunch is leftovers.  Dinner is protein, vegetables, and carbohydrates.  Healthy, still eating cereal at night.  Patient's body mass index is 25.84 kg/m². Exercise and nutrition counseling were performed at this visit.    Glucose monitoring frequency: See Lucy download    Hypoglycemic episodes: no  Last Retinal Exam:  Cataract surgery by Dr. Simms  Daily Foot Exam: Yes   Foot Exam:  Monofilament: done  Monofilament testing with a 10 gram force: sensation intact: intact bilaterally  Visual Inspection: Feet without maceration, ulcers, fissures.  Pedal pulses: intact bilaterally   Lab Results   Component Value Date/Time    MALBCRT see below 03/16/2023 06:48 AM    MICROALBUR <1.2 03/16/2023 06:48 AM        ACR Albumin/Creatinine Ratio goal <30     HTN:   Blood pressure goal <130/<80 .   Currently Rx ACE/ARB: No     Dyslipidemia:    Lab Results   Component Value Date/Time    CHOLSTRLTOT 114 03/16/2023 06:48 AM    LDL 62 03/16/2023 06:48 AM    HDL 39 (A) 03/16/2023 06:48 AM    TRIGLYCERIDE 66 03/16/2023 06:48 AM         Currently Rx Statin: Yes     He  reports that he quit smoking about 3 years ago. His smoking use included cigarettes. He has never used smokeless tobacco.      Plan:     Discussed and educated on:   - All medications, side effects and  compliance (discussed carefully)  - Annual eye examinations at Ophthalmology  - Home glucose monitoring emphasized  - Weight control and daily exercise    Recommended medication changes: He will start Glimepiride 2 mg daily and increase to BID if needed.  Continue Jardiance 25 mg Metformin  mg BID, and Trulicity 1.5 mg weekly.  He is interested in getting on the trial for the new oral GLP-1.    He has a follow up with Dr. Vizcarra in March.

## 2024-01-31 ENCOUNTER — PATIENT MESSAGE (OUTPATIENT)
Dept: ENDOCRINOLOGY | Facility: MEDICAL CENTER | Age: 64
End: 2024-01-31
Payer: COMMERCIAL

## 2024-01-31 DIAGNOSIS — Z79.4 TYPE 2 DIABETES MELLITUS WITHOUT COMPLICATION, WITH LONG-TERM CURRENT USE OF INSULIN (HCC): ICD-10-CM

## 2024-01-31 DIAGNOSIS — E11.42 CONTROLLED TYPE 2 DIABETES MELLITUS WITH DIABETIC POLYNEUROPATHY, WITHOUT LONG-TERM CURRENT USE OF INSULIN (HCC): ICD-10-CM

## 2024-01-31 DIAGNOSIS — E11.9 TYPE 2 DIABETES MELLITUS WITHOUT COMPLICATION, WITH LONG-TERM CURRENT USE OF INSULIN (HCC): ICD-10-CM

## 2024-01-31 DIAGNOSIS — E03.9 ACQUIRED HYPOTHYROIDISM: ICD-10-CM

## 2024-01-31 RX ORDER — EMPAGLIFLOZIN 25 MG/1
1 TABLET, FILM COATED ORAL DAILY
Qty: 90 TABLET | Refills: 0 | Status: SHIPPED | OUTPATIENT
Start: 2024-01-31 | End: 2024-03-28 | Stop reason: SDUPTHER

## 2024-01-31 RX ORDER — LEVOTHYROXINE SODIUM 0.05 MG/1
TABLET ORAL
Qty: 90 TABLET | Refills: 2 | Status: CANCELLED
Start: 2024-01-31

## 2024-01-31 RX ORDER — METFORMIN HYDROCHLORIDE 500 MG/1
1000 TABLET, EXTENDED RELEASE ORAL 2 TIMES DAILY
Qty: 360 TABLET | Refills: 1 | Status: SHIPPED | OUTPATIENT
Start: 2024-01-31 | End: 2024-03-28 | Stop reason: SDUPTHER

## 2024-01-31 NOTE — PATIENT COMMUNICATION
Received request via: Pharmacy    Was the patient seen in the last year in this department? Yes    Does the patient have an active prescription (recently filled or refills available) for medication(s) requested? No    [facility]    Does the patient have long term Plus and need 100 day supply (blood pressure, diabetes and cholesterol meds only)? Patient does not have SCP

## 2024-02-01 NOTE — TELEPHONE ENCOUNTER
Received request via: Pharmacy    Was the patient seen in the last year in this department? No  1/3/23  Does the patient have an active prescription (recently filled or refills available) for medication(s) requested? No    [facility]    Does the patient have retirement Plus and need 100 day supply (blood pressure, diabetes and cholesterol meds only)? Medication is not for cholesterol, blood pressure or diabetes

## 2024-02-01 NOTE — TELEPHONE ENCOUNTER
I have not seen him in a year.  He also sees endocrinology.  Will u check with him to make sure this is his current dose.  Thanks

## 2024-02-08 DIAGNOSIS — E11.9 TYPE 2 DIABETES MELLITUS WITHOUT COMPLICATION, WITHOUT LONG-TERM CURRENT USE OF INSULIN (HCC): ICD-10-CM

## 2024-02-09 RX ORDER — DULAGLUTIDE 1.5 MG/.5ML
INJECTION, SOLUTION SUBCUTANEOUS
Qty: 6 ML | Refills: 3 | Status: SHIPPED | OUTPATIENT
Start: 2024-02-09

## 2024-02-09 NOTE — TELEPHONE ENCOUNTER
Received request via: Pharmacy    Was the patient seen in the last year in this department? No  1/3/23  Does the patient have an active prescription (recently filled or refills available) for medication(s) requested? No    Pharmacy Name: [facility]    Does the patient have residential Plus and need 100 day supply (blood pressure, diabetes and cholesterol meds only)? Medication is not for cholesterol, blood pressure or diabetes

## 2024-02-20 DIAGNOSIS — E03.9 ACQUIRED HYPOTHYROIDISM: ICD-10-CM

## 2024-02-20 NOTE — TELEPHONE ENCOUNTER
Received request via: Pharmacy    Was the patient seen in the last year in this department? No  1/3/23  Does the patient have an active prescription (recently filled or refills available) for medication(s) requested? No    Pharmacy Name: [facility]    Does the patient have assisted Plus and need 100 day supply (blood pressure, diabetes and cholesterol meds only)? Medication is not for cholesterol, blood pressure or diabetes

## 2024-02-21 RX ORDER — LEVOTHYROXINE SODIUM 0.05 MG/1
TABLET ORAL
Qty: 90 TABLET | Refills: 2 | Status: SHIPPED | OUTPATIENT
Start: 2024-02-21 | End: 2024-03-28 | Stop reason: SDUPTHER

## 2024-03-14 ENCOUNTER — HOSPITAL ENCOUNTER (OUTPATIENT)
Dept: LAB | Facility: MEDICAL CENTER | Age: 64
End: 2024-03-14
Attending: INTERNAL MEDICINE
Payer: COMMERCIAL

## 2024-03-14 ENCOUNTER — OFFICE VISIT (OUTPATIENT)
Dept: MEDICAL GROUP | Facility: LAB | Age: 64
End: 2024-03-14
Payer: MEDICARE

## 2024-03-14 ENCOUNTER — HOSPITAL ENCOUNTER (OUTPATIENT)
Dept: LAB | Facility: MEDICAL CENTER | Age: 64
End: 2024-03-14
Attending: NURSE PRACTITIONER
Payer: COMMERCIAL

## 2024-03-14 VITALS
WEIGHT: 177 LBS | RESPIRATION RATE: 16 BRPM | TEMPERATURE: 96.7 F | HEIGHT: 69 IN | BODY MASS INDEX: 26.22 KG/M2 | SYSTOLIC BLOOD PRESSURE: 124 MMHG | HEART RATE: 70 BPM | OXYGEN SATURATION: 97 % | DIASTOLIC BLOOD PRESSURE: 70 MMHG

## 2024-03-14 DIAGNOSIS — E78.5 DYSLIPIDEMIA: ICD-10-CM

## 2024-03-14 DIAGNOSIS — Z86.69 HISTORY OF SLEEP APNEA: ICD-10-CM

## 2024-03-14 DIAGNOSIS — Z12.5 SCREENING FOR PROSTATE CANCER: ICD-10-CM

## 2024-03-14 DIAGNOSIS — Z13.6 SCREENING FOR ABDOMINAL AORTIC ANEURYSM: ICD-10-CM

## 2024-03-14 DIAGNOSIS — E55.9 VITAMIN D DEFICIENCY: ICD-10-CM

## 2024-03-14 DIAGNOSIS — M79.2 NEUROPATHIC PAIN OF LOWER EXTREMITY, LEFT: ICD-10-CM

## 2024-03-14 DIAGNOSIS — R35.0 BENIGN PROSTATIC HYPERPLASIA WITH URINARY FREQUENCY: ICD-10-CM

## 2024-03-14 DIAGNOSIS — I25.10 CORONARY ARTERIOSCLEROSIS: ICD-10-CM

## 2024-03-14 DIAGNOSIS — J45.909 UNCOMPLICATED ASTHMA, UNSPECIFIED ASTHMA SEVERITY, UNSPECIFIED WHETHER PERSISTENT: ICD-10-CM

## 2024-03-14 DIAGNOSIS — G47.33 OSA ON CPAP: ICD-10-CM

## 2024-03-14 DIAGNOSIS — Z00.00 WELCOME TO MEDICARE PREVENTIVE VISIT: ICD-10-CM

## 2024-03-14 DIAGNOSIS — E03.9 ACQUIRED HYPOTHYROIDISM: ICD-10-CM

## 2024-03-14 DIAGNOSIS — E11.42 CONTROLLED TYPE 2 DIABETES MELLITUS WITH DIABETIC POLYNEUROPATHY, WITHOUT LONG-TERM CURRENT USE OF INSULIN (HCC): ICD-10-CM

## 2024-03-14 DIAGNOSIS — I10 BENIGN ESSENTIAL HYPERTENSION: ICD-10-CM

## 2024-03-14 DIAGNOSIS — J30.2 SEASONAL ALLERGIES: ICD-10-CM

## 2024-03-14 DIAGNOSIS — Z79.4 TYPE 2 DIABETES MELLITUS WITHOUT COMPLICATION, WITH LONG-TERM CURRENT USE OF INSULIN (HCC): ICD-10-CM

## 2024-03-14 DIAGNOSIS — E11.44 DIABETIC AMYOTROPHY ASSOCIATED WITH TYPE 2 DIABETES MELLITUS (HCC): ICD-10-CM

## 2024-03-14 DIAGNOSIS — M50.30 DEGENERATION OF CERVICAL INTERVERTEBRAL DISC: ICD-10-CM

## 2024-03-14 DIAGNOSIS — G47.33 OBSTRUCTIVE SLEEP APNEA SYNDROME: ICD-10-CM

## 2024-03-14 DIAGNOSIS — G47.09 OTHER INSOMNIA: ICD-10-CM

## 2024-03-14 DIAGNOSIS — N40.1 BENIGN PROSTATIC HYPERPLASIA WITH URINARY FREQUENCY: ICD-10-CM

## 2024-03-14 DIAGNOSIS — E11.9 TYPE 2 DIABETES MELLITUS WITHOUT COMPLICATION, WITH LONG-TERM CURRENT USE OF INSULIN (HCC): ICD-10-CM

## 2024-03-14 PROBLEM — G62.9 NEUROPATHY: Status: RESOLVED | Noted: 2021-05-03 | Resolved: 2024-03-14

## 2024-03-14 PROBLEM — M48.061 LUMBAR STENOSIS WITHOUT NEUROGENIC CLAUDICATION: Status: RESOLVED | Noted: 2023-05-26 | Resolved: 2024-03-14

## 2024-03-14 PROBLEM — Z98.890 HISTORY OF LUMBAR DISCECTOMY: Status: RESOLVED | Noted: 2023-10-09 | Resolved: 2024-03-14

## 2024-03-14 PROBLEM — J45.20 MILD INTERMITTENT ASTHMA: Status: RESOLVED | Noted: 2017-07-26 | Resolved: 2024-03-14

## 2024-03-14 PROBLEM — D75.1 ERYTHROCYTOSIS: Status: RESOLVED | Noted: 2017-05-01 | Resolved: 2024-03-14

## 2024-03-14 PROBLEM — Z87.19 HISTORY OF INTESTINAL OBSTRUCTION: Status: RESOLVED | Noted: 2019-11-06 | Resolved: 2024-03-14

## 2024-03-14 PROBLEM — R55 SYNCOPE AND COLLAPSE: Status: RESOLVED | Noted: 2019-11-07 | Resolved: 2024-03-14

## 2024-03-14 PROBLEM — M48.061 SPINAL STENOSIS OF LUMBAR REGION: Status: RESOLVED | Noted: 2022-12-15 | Resolved: 2024-03-14

## 2024-03-14 PROBLEM — J45.20 MILD INTERMITTENT ASTHMA WITHOUT COMPLICATION: Status: RESOLVED | Noted: 2017-07-27 | Resolved: 2024-03-14

## 2024-03-14 PROBLEM — Z79.84 LONG TERM (CURRENT) USE OF ORAL HYPOGLYCEMIC DRUGS: Status: RESOLVED | Noted: 2022-07-29 | Resolved: 2024-03-14

## 2024-03-14 LAB
25(OH)D3 SERPL-MCNC: 40 NG/ML (ref 30–100)
ALBUMIN SERPL BCP-MCNC: 4.4 G/DL (ref 3.2–4.9)
ALBUMIN/GLOB SERPL: 1.8 G/DL
ALP SERPL-CCNC: 74 U/L (ref 30–99)
ALT SERPL-CCNC: 23 U/L (ref 2–50)
ANION GAP SERPL CALC-SCNC: 11 MMOL/L (ref 7–16)
AST SERPL-CCNC: 22 U/L (ref 12–45)
BILIRUB SERPL-MCNC: 0.5 MG/DL (ref 0.1–1.5)
BUN SERPL-MCNC: 10 MG/DL (ref 8–22)
CALCIUM ALBUM COR SERPL-MCNC: 9 MG/DL (ref 8.5–10.5)
CALCIUM SERPL-MCNC: 9.3 MG/DL (ref 8.5–10.5)
CHLORIDE SERPL-SCNC: 103 MMOL/L (ref 96–112)
CHOLEST SERPL-MCNC: 113 MG/DL (ref 100–199)
CO2 SERPL-SCNC: 25 MMOL/L (ref 20–33)
CREAT SERPL-MCNC: 0.6 MG/DL (ref 0.5–1.4)
FASTING STATUS PATIENT QL REPORTED: NORMAL
GFR SERPLBLD CREATININE-BSD FMLA CKD-EPI: 107 ML/MIN/1.73 M 2
GLOBULIN SER CALC-MCNC: 2.5 G/DL (ref 1.9–3.5)
GLUCOSE SERPL-MCNC: 110 MG/DL (ref 65–99)
HDLC SERPL-MCNC: 34 MG/DL
LDLC SERPL CALC-MCNC: 59 MG/DL
POTASSIUM SERPL-SCNC: 4.2 MMOL/L (ref 3.6–5.5)
PROT SERPL-MCNC: 6.9 G/DL (ref 6–8.2)
PSA SERPL-MCNC: 0.19 NG/ML (ref 0–4)
SODIUM SERPL-SCNC: 139 MMOL/L (ref 135–145)
T3FREE SERPL-MCNC: 2.66 PG/ML (ref 2–4.4)
T4 FREE SERPL-MCNC: 1.28 NG/DL (ref 0.93–1.7)
TRIGL SERPL-MCNC: 101 MG/DL (ref 0–149)
TSH SERPL DL<=0.005 MIU/L-ACNC: 1.97 UIU/ML (ref 0.38–5.33)

## 2024-03-14 PROCEDURE — 84153 ASSAY OF PSA TOTAL: CPT

## 2024-03-14 PROCEDURE — 36415 COLL VENOUS BLD VENIPUNCTURE: CPT

## 2024-03-14 PROCEDURE — 3074F SYST BP LT 130 MM HG: CPT | Performed by: NURSE PRACTITIONER

## 2024-03-14 PROCEDURE — 84443 ASSAY THYROID STIM HORMONE: CPT

## 2024-03-14 PROCEDURE — 84439 ASSAY OF FREE THYROXINE: CPT

## 2024-03-14 PROCEDURE — 82306 VITAMIN D 25 HYDROXY: CPT

## 2024-03-14 PROCEDURE — 3078F DIAST BP <80 MM HG: CPT | Performed by: NURSE PRACTITIONER

## 2024-03-14 PROCEDURE — G0438 PPPS, INITIAL VISIT: HCPCS | Performed by: NURSE PRACTITIONER

## 2024-03-14 PROCEDURE — 80053 COMPREHEN METABOLIC PANEL: CPT

## 2024-03-14 PROCEDURE — 80061 LIPID PANEL: CPT

## 2024-03-14 PROCEDURE — 84481 FREE ASSAY (FT-3): CPT

## 2024-03-14 RX ORDER — ZOLPIDEM TARTRATE 5 MG/1
5 TABLET ORAL NIGHTLY PRN
Qty: 30 TABLET | Refills: 2 | Status: SHIPPED | OUTPATIENT
Start: 2024-03-14 | End: 2024-04-13

## 2024-03-14 RX ORDER — EPINEPHRINE 0.3 MG/.3ML
INJECTION SUBCUTANEOUS
Qty: 2 EACH | Refills: 2 | Status: SHIPPED | OUTPATIENT
Start: 2024-03-14

## 2024-03-14 RX ORDER — FLUTICASONE PROPIONATE AND SALMETEROL 250; 50 UG/1; UG/1
1 POWDER RESPIRATORY (INHALATION) EVERY 12 HOURS
Qty: 60 EACH | Refills: 5
Start: 2024-03-14

## 2024-03-14 RX ORDER — PANTOPRAZOLE SODIUM 20 MG/1
20 TABLET, DELAYED RELEASE ORAL DAILY
Qty: 90 TABLET | Refills: 3
Start: 2024-03-14

## 2024-03-14 RX ORDER — DULOXETIN HYDROCHLORIDE 30 MG/1
30 CAPSULE, DELAYED RELEASE ORAL DAILY
Qty: 90 CAPSULE | Refills: 3
Start: 2024-03-14

## 2024-03-14 RX ORDER — PANTOPRAZOLE SODIUM 20 MG/1
TABLET, DELAYED RELEASE ORAL
COMMUNITY
Start: 2024-02-07 | End: 2024-03-14

## 2024-03-14 ASSESSMENT — PATIENT HEALTH QUESTIONNAIRE - PHQ9: CLINICAL INTERPRETATION OF PHQ2 SCORE: 0

## 2024-03-14 ASSESSMENT — ENCOUNTER SYMPTOMS: GENERAL WELL-BEING: GOOD

## 2024-03-14 ASSESSMENT — FIBROSIS 4 INDEX: FIB4 SCORE: 1.026315789473684211

## 2024-03-14 ASSESSMENT — ACTIVITIES OF DAILY LIVING (ADL): BATHING_REQUIRES_ASSISTANCE: 0

## 2024-03-14 NOTE — PROGRESS NOTES
Chief Complaint   Patient presents with    Medicare Annual Wellness       HPI:  Barron Hewitt is a 63 y.o. here for Medicare Annual Wellness Visit.  Retired 11/2023.      Had cervical / lumbar discectomy / laminectomy / fusion 5/2023 with Dr. Cochran and that went well.      Also had cataracts removed in the past few months - went well.  F/u with Dr. Frost last week.     TURP 2022 with Dr. cMkee, last psa 2023 and cleared to stick with PCP now.  Nocturia x one.      DM:  sees Dr. Lyon for this - f/u 3/28/2024.    Walking 2-3 miles a few d per week with friends and dog daily.  In PT twice weekly.      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) 06/09/2021    Acquired hypothyroidism 08/25/2020    Controlled type 2 diabetes mellitus with diabetic polyneuropathy, without long-term current use of insulin (HCC) 11/07/2019    History of small bowel obstruction - 2018 11/07/2019    Seasonal allergies 02/27/2019    Other insomnia 08/17/2018    Dyslipidemia 10/08/2017    Benign essential hypertension 10/08/2017    ISELA on CPAP - [facility] 05/01/2017    Coronary arteriosclerosis 04/14/2016    Angina pectoris (HCC) 03/11/2016    Abnormal radionuclide heart study 01/31/2014    Coronary-myocardial bridge 11/27/2012       Current Outpatient Medications   Medication Sig Dispense Refill    pantoprazole (PROTONIX) 20 MG tablet       levothyroxine (SYNTHROID) 50 MCG Tab TAKE 1 TABLET 6 DAYS PER WEEK ON AN EMPTY STOMACH, 75 MCG ON 7TH DAY. 90 Tablet 2    TRULICITY 1.5 MG/0.5ML Solution Pen-injector INJECT 0.5 MLS UNDER THE SKIN EVERY 7 DAYS 6 mL 3    JARDIANCE 25 MG Tab TAKE 1 TABLET DAILY 90 Tablet 0    metFORMIN ER  (GLUCOPHAGE XR) 500 MG TABLET SR 24 HR Take 2 Tablets by mouth 2 times a day. 360 Tablet 1    glimepiride (AMARYL) 2 MG Tab Take 1 Tablet by mouth every morning. 180 Tablet 1    Continuous Blood Gluc Sensor (FREESTYLE LUNA 14 DAY SENSOR) Misc 1 Application every 14 days. 6 Each 3    metoprolol SR (TOPROL XL) 25 MG TABLET SR 24 HR Take 1 Tablet by mouth every day. 90 Tablet 3    aspirin 81 MG EC tablet 81 mg by oral route.      Cinnamon 500 MG Cap 1000 mg by oral route. (Patient not taking: Reported on 12/26/2023)      DULoxetine (CYMBALTA) 30 MG Cap DR Particles Take 1 in am x 2 weeks, if needed can increase to 2 a day 60 Capsule 5    meloxicam (MOBIC) 15 MG tablet TAKE 1 TABLET BY MOUTH ONCE DAILY FOR PAIN AS NEEDED. DO NOT TAKE WITH NSAIDS      atorvastatin (LIPITOR) 80 MG tablet Take 1 Tablet by mouth every evening. 90 Tablet 3    montelukast (SINGULAIR) 10 MG Tab TAKE 1 TABLET EVERY EVENING 90 Tablet 3    tizanidine (ZANAFLEX) 4 MG Tab Take 1 Tablet by mouth at bedtime as needed (muscle spasms). 90 Tablet 0    VENTOLIN  (90 Base) MCG/ACT Aero Soln inhalation aerosol USE 2 INHALATIONS EVERY 4 HOURS AS NEEDED FOR SHORTNESS OF BREATH 54 g 3    zolpidem (AMBIEN) 5 MG Tab Take 5 mg by mouth at bedtime as needed.      Blood Glucose Monitoring Suppl (FREESTYLE LITE) w/Device Kit USE DAILY AS DIRECTED      Blood Glucose Test Strips Test strips order:  Freestyle Freedom Lite test strips  testing one time per day 100 Strip 2    Lancet Devices Misc 1 Applicator 3 times a day. 100 Each 11    EPINEPHrine (EPIPEN) 0.3 MG/0.3ML Solution Auto-injector solution for injection Inject 0.3 mL into the thigh one time for 1 dose. 1 Each 0    ALPHA LIPOIC ACID PO Take 600 mg by mouth 2 Times a Day.      Omega-3 Fatty Acids (FISH OIL) 1200 MG CAPS Take 1 Capful by mouth every day.      pantoprazole (PROTONIX) 40 MG TBEC Take 40 mg by mouth every day.        Coenzyme Q10 200 MG Cap Take  by mouth every day.        Cholecalciferol  (VITAMIN D) 2000 UNIT TABS Take  by mouth 2 times a day.      cetirizine (ZYRTEC) 10 MG Tab Take 10 mg by mouth every day.       No current facility-administered medications for this visit.          Current supplements as per medication list.     Allergies: Kiwi extract, Shellfish allergy, Strawberry, Ozempic (0.25 or 0.5 mg-dose) [semaglutide], Sulfa drugs, and Testosterone    Current social contact/activities: friends/ hike/ walk/ music     He  reports that he quit smoking about 4 years ago. His smoking use included cigarettes. He has never used smokeless tobacco. He reports that he does not currently use alcohol. He reports current drug use. Drugs: Marijuana, Inhaled, and Oral.  Counseling given: Not Answered  Tobacco comments: occasional cigars      ROS:    Gait: Uses no assistive device  Ostomy: No  Other tubes: No  Amputations: No  Chronic oxygen use: No  Last eye exam: 2023  Wears hearing aids: No   : Denies any urinary leakage during the last 6 months    Screening:    Depression Screening  Little interest or pleasure in doing things?  0 - not at all  Feeling down, depressed , or hopeless? 0 - not at all  Patient Health Questionnaire Score: 0     If depressive symptoms identified deferred to follow up visit unless specifically addressed in assessment and plan.    Interpretation of PHQ-9 Total Score   Score Severity   1-4 No Depression   5-9 Mild Depression   10-14 Moderate Depression   15-19 Moderately Severe Depression   20-27 Severe Depression    Screening for Cognitive Impairment  Do you or any of your friends or family members have any concern about your memory? No  Three Minute Recall (Leader, Season, Table) 3/3    [place] clock face with all 12 numbers and set the hands to show 10 minutes after 11.  Yes    Cognitive concerns identified deferred for follow up unless specifically addressed in assessment and plan.    Fall Risk Assessment  Has the patient had two or more falls in the last year or any fall  with injury in the last year?  No    Safety Assessment  Do you always wear your seatbelt?  Yes  Any changes to home needed to function safely? No  Difficulty hearing.  No  Patient counseled about all safety risks that were identified.    Functional Assessment ADLs  Are there any barriers preventing you from cooking for yourself or meeting nutritional needs?  No.    Are there any barriers preventing you from driving safely or obtaining transportation?  No.    Are there any barriers preventing you from using a telephone or calling for help?  No    Are there any barriers preventing you from shopping?  No.    Are there any barriers preventing you from taking care of your own finances?  No    Are there any barriers preventing you from managing your medications?  No    Are there any barriers preventing you from showering, bathing or dressing yourself? No    Are there any barriers preventing you from doing housework or laundry? No  Are there any barriers preventing you from using the toilet?No  Are you currently engaging in any exercise or physical activity?  Yes.      Self-Assessment of Health  What is your perception of your health? Good  Do you sleep more than six hours a night? Yes  In the past 7 days, how much did pain keep you from doing your normal work? None  Do you spend quality time with family or friends (virtually or in person)? Yes  Do you usually eat a heart healthy diet that constists of a variety of fruits, vegetables, whole grains and fiber? Yes  Do you eat foods high in fat and/or Fast Food more than three times per week? No    Advance Care Planning  Do you have an Advance Directive, Living Will, Durable Power of , or POLST? Yes  Advance Directive       is on file      Health Maintenance Summary            Overdue - Abdominal Aortic Aneurysm (AAA) Screening (Once) Order placed this encounter      05/01/2017  CT-ABDOMEN-PELVIS WITH    03/14/2017  Done              Overdue - Diabetes: Retinopathy  Screening (Yearly) Overdue since 12/6/2023 12/06/2022  RETINAL SCREENING RESULTS    12/02/2022  POCT Retinal Eye Exam    11/19/2021  Referral for Retinal Screening Exam              Ordered - Fasting Lipid Profile (Yearly) Ordered on 12/26/2023 03/16/2023  Lipid Profile    07/22/2022  Lipid Profile    01/07/2021  Lipid Profile    12/31/2019  Lipid Profile    03/12/2019  Lipid Profile    Only the first 5 history entries have been loaded, but more history exists.              Ordered - Diabetes: Urine Protein Screening (Yearly) Ordered on 12/26/2023 03/16/2023  MICROALBUMIN CREAT RATIO URINE    07/29/2022  MICROALBUMIN CREAT RATIO URINE    01/07/2021  MICROALBUMIN CREAT RATIO URINE    12/31/2019  MICROALBUMIN CREAT RATIO URINE    03/12/2019  MICROALBUMIN CREAT RATIO URINE    Only the first 5 history entries have been loaded, but more history exists.              Postponed - Hepatitis B Vaccine (Hep B) (3 of 3 - 19+ 3-dose series) Postponed until 4/18/2024 06/04/2020  Imm Admin: Hepatitis B Vaccine (Adol/Adult)    11/07/2019  Imm Admin: Hepatitis B Vaccine (Adol/Adult)              Postponed - COVID-19 Vaccine (4 - 2023-24 season) Postponed until 3/14/2025      09/27/2022  Imm Admin: MODERNA BIVALENT BOOSTER SARS-COV-2 VACCINE (6+)    11/09/2021  Imm Admin: MODERNA SARS-COV-2 VACCINE (12+)    04/13/2021  Imm Admin: MODERNA SARS-COV-2 VACCINE (12+)    03/16/2021  Imm Admin: MODERNA SARS-COV-2 VACCINE (12+)              A1c Screening (Every 6 Months) Next due on 6/26/2024 12/26/2023  POCT  A1C    09/26/2023  POCT  A1C    05/11/2023  HEMOGLOBIN A1C    03/21/2023  POCT Hemoglobin A1C    12/02/2022  POCT Hemoglobin A1C    Only the first 5 history entries have been loaded, but more history exists.              Ordered - SERUM CREATININE (Yearly) Ordered on 12/26/2023      09/15/2023  Comp Metabolic Panel    05/11/2023  Basic Metabolic Panel    03/16/2023  Comp Metabolic Panel    08/09/2022  Comp  Metabolic Panel    07/22/2022  Comp Metabolic Panel    Only the first 5 history entries have been loaded, but more history exists.              Diabetes: Monofilament / LE Exam (Yearly) Next due on 12/26/2024 12/26/2023  Diabetic Monofilament LE Exam    12/26/2023  SmartData: WORKFLOW - DIABETES - DIABETIC FOOT EXAM PERFORMED    09/26/2023  SmartData: WORKFLOW - DIABETES - DIABETIC FOOT EXAM PERFORMED    09/26/2023  Diabetic Monofilament LE Exam    12/02/2022  Diabetic Monofilament LE Exam    Only the first 5 history entries have been loaded, but more history exists.              Annual Wellness Visit (Yearly) Next due on 3/14/2025      03/14/2024  Visit Dx: Welcome to Medicare preventive visit              Colorectal Cancer Screening (Colonoscopy - Every 5 Years) Tentatively due on 8/31/2026 08/31/2021  REFERRAL TO GI FOR COLONOSCOPY    07/15/2016  REFERRAL TO GI FOR COLONOSCOPY              IMM DTaP/Tdap/Td Vaccine (2 - Td or Tdap) Next due on 11/7/2029 11/07/2019  Imm Admin: Tdap Vaccine              Hepatitis A Vaccine (Hep A) (Series Information) Aged Out      09/28/2017  Imm Admin: Hepatitis A Vaccine, Adult              Hepatitis C Screening  Tentatively Complete      06/04/2020  Hepatitis C Antibody component of HEP C VIRUS ANTIBODY              Zoster (Shingles) Vaccines (Series Information) Completed      10/04/2021  Imm Admin: Zoster Vaccine Recombinant (RZV) (SHINGRIX)    02/06/2019  Imm Admin: Zoster Vaccine Recombinant (RZV) (SHINGRIX)              Pneumococcal Vaccine: 65+ Years (Series Information) Completed      12/15/2022  Imm Admin: Pneumococcal Conjugate Vaccine (PCV20)    11/01/2015  Imm Admin: Pneumococcal Vaccine (PCV7) - HISTORICAL DATA    01/28/2014  Imm Admin: Pneumococcal Conjugate Vaccine (Prevnar/PCV-13)              Influenza Vaccine (Series Information) Completed      09/26/2023  Imm Admin: Influenza Vaccine Quad Inj (Pf)    09/27/2022  Imm Admin: Influenza Vaccine Quad  Inj (Pf)    2021  Imm Admin: Influenza Vaccine Quad Inj (Pf)    2019  Imm Admin: Influenza Vaccine Quad Inj (Pf)              HPV Vaccines (Series Information) Aged Out      No completion history exists for this topic.              Polio Vaccine (Inactivated Polio) (Series Information) Aged Out      No completion history exists for this topic.              Meningococcal Immunization (Series Information) Aged Out      No completion history exists for this topic.              Discontinued - HIV Screening  Discontinued      No completion history exists for this topic.                    Patient Care Team:  NIRANJAN Butts as PCP - General (Family Medicine)  Anastasia Sandoval M.D. (Endocrinology)  Ishan as Respiratory Therapist  [facility]  as Respiratory Therapist  Elijah Stern M.D. (Otolaryngology)  Aurelio Dennis M.D. (Orthopedic Surgery)  Marcos Carlin M.D. (Cardiovascular Disease (Cardiology))  Dragan Ayala M.D. (Phys Med and Rehab)        Social History     Tobacco Use    Smoking status: Former     Current packs/day: 0.00     Types: Cigarettes     Quit date:      Years since quittin.2    Smokeless tobacco: Never    Tobacco comments:     occasional cigars   Vaping Use    Vaping Use: Never used   Substance Use Topics    Alcohol use: Not Currently     Comment: If and when I drink it's only social    Drug use: Yes     Types: Marijuana, Inhaled, Oral     Comment: THC/ Occ     Family History   Problem Relation Age of Onset    Breast Cancer Mother     Hypertension Mother     Cancer Mother         breast    Heart Disease Mother         hx of bypass    Hypertension Father     Arthritis Father     Diabetes Father         prediabetes    No Known Problems Sister     Arthritis Brother     Heart Disease Other     Hypertension Other     Cancer Other     No Known Problems Brother      He  has a past medical history of Abnormal nuclear cardiac imaging test (2014), Allergy,  Anesthesia, Anginal syndrome (HCC), ASTHMA, CHEST PAIN (6/15/2011), Chest pain at rest (1/31/2014), Coronary-myocardial bridge (11/28/2012), Diabetes (2009), High cholesterol, Hyperlipidemia, Hypertension, Mild intermittent asthma without complication (7/27/2017), Myocardial bridge, PONV (postoperative nausea and vomiting), Sleep apnea, and Snoring.   Past Surgical History:   Procedure Laterality Date    CERVICAL DISK AND FUSION ANTERIOR  5/26/2023    Procedure: ANTERIOR C5-7 DISCECTOMY AND FUSION;  Surgeon: Segun Cochran M.D.;  Location: [facility];  Service: Neurosurgery    LUMBAR LAMINECTOMY DISKECTOMY  5/26/2023    Procedure: LAMINECTOMY, SPINE, LUMBAR, WITH DISCECTOMY POSTERIOR L4-S1 LAMINECTOMIES;  Surgeon: Segun Cochran M.D.;  Location: [facility];  Service: Neurosurgery    RADIO FREQUENCY ABLATION ADDITIONAL LEVEL Bilateral 07/06/2022    Procedure: BILATERAL radiofrequency neurotomies medial branch targeting the L3-4, L4-5 and L5-S1 facet joints with fluoroscopic guidance and sedation. Plan for 80 degree C for 90 seconds for each neurotomy.;  Surgeon: Dragan Ayala M.D.;  [facility];  Service: Pain Management    TN INJ DX/THER AGNT PARAVERT FACET JOINT, DEVON* Bilateral 06/21/2022    Procedure: Diagnostic medial branch blocks targeting the BILATERAL L3-4, L4-5 and L5-S1 facet joints with fluoroscopic guidance #2;  Surgeon: Dragan Ayala M.D.;  [facility];  Service: Pain Management    LUMBAR MEDIAL BRANCH BLOCKS Bilateral 06/21/2022    Procedure: .;  Surgeon: Dragan Ayala M.D.;  [facility];  Service: Pain Management    CONSCIOUS SEDATION Bilateral 06/21/2022    Procedure: .;  Surgeon: Dragan Ayala M.D.;  [facility];  Service: Pain Management    LUMBAR MEDIAL BRANCH BLOCKS Bilateral 06/14/2022    Procedure: Diagnostic medial branch blocks targeting the BILATERAL L3-4,  L4-5 and L5-S1 facet joints with fluoroscopic guidance #1;  Surgeon: Dragan Ayala M.D.;  [facility];  Service: Pain Management    ID TRANSURETHRAL ELEC-SURG PROSTATECTOM N/A 05/27/2022    Procedure: TURP, USING BUTTON ELECTRODE;  Surgeon: Lee Mckee M.D.;  Location: [facility];  Service: Urology    BLOCK EPIDURAL STEROID INJECTION Left 03/22/2022    Procedure: LEFT L3-4 and L4-5 transforaminal epidural steroid injection with fluoroscopic guidance;  Surgeon: Dragan Ayala M.D.;  [facility]: [facility];  Service: Pain Management    ID NJX AA&/STRD TFRML EPI LUMBAR/SACRAL 1 LEVEL Left 02/15/2022    Procedure: LEFT L3-4 and L4-5 transforaminal epidural steroid injection with fluoroscopic guidance;  Surgeon: Dragan Ayala M.D.;  [facility]: [facility];  Service: Pain Management    SHOULDER DECOMPRESSION ARTHROSCOPIC Left 01/04/2018    Procedure: SHOULDER DECOMPRESSION ARTHROSCOPIC - SUBACROMIAL;  Surgeon: Linwood Grover M.D.;  Location: [facility];  Service: Orthopedics    SHOULDER ARTHROSCOPY W/ BICIPITAL TENODESIS REPAIR Left 01/04/2018    Procedure: SHOULDER ARTHROSCOPY W/ BICIPITAL [place] REPAIR;  Surgeon: Linwood Grover M.D.;  Location: [facility];  Service: Orthopedics    CLAVICLE DISTAL EXCISION Left 01/04/2018    Procedure: CLAVICLE DISTAL EXCISION - POSSIBLE;  Surgeon: Linwood Grover M.D.;  Location: [facility];  Service: Orthopedics    RECOVERY  04/08/2016    Procedure: CATH LAB [facility] WITH POSSIBLE NAVIN;  Surgeon: [facility];  [place] [facility] [place]  Service:     VENTRAL HERNIA REPAIR LAPAROSCOPIC  11/01/2012    Performed by Marcos Ramírez M.D. at [facility]    KNEE ARTHROSCOPY  1990's    right    OTHER ABDOMINAL SURGERY  10-    About 8 years ago    SHOULDER ARTHROSCOPY  1990's    right    SINUSOTOMIES   "1990's    times two surgeries    TUMOR EXCISION WITH BIOPSY  1990's    right hand - thumb       Exam:   /70 (BP Location: Left arm, Patient Position: Sitting, BP Cuff Size: Large adult)   Pulse 70   Temp 35.9 °C (96.7 °F)   Resp 16   Ht 1.753 m (5' 9\")   Wt 80.3 kg (177 lb)   SpO2 97%  Body mass index is 26.14 kg/m².    Hearing excellent.    Dentition good  Alert, oriented in no acute distress.  Eye contact is good, speech goal directed, affect calm    Assessment and Plan. The following treatment and monitoring plan is recommended:    1. Welcome to Medicare preventive visit    2. Screening for prostate cancer  - PROSTATE SPECIFIC AG SCREENING; Future    3. Screening for abdominal aortic aneurysm  -ordered.     4. Acquired hypothyroidism  -doing labs today.  Followed by endo.     5. Uncomplicated asthma, unspecified asthma severity, unspecified whether persistent  -stable.   On advair bid, singular and zyrtec.     6. Benign essential hypertension  -stable.  Continue metoprolol 25 mg daily     7. Benign prostatic hyperplasia with urinary frequency  -improved symptoms after turp.     8. Controlled type 2 diabetes mellitus with diabetic polyneuropathy, without long-term current use of insulin (HCC)  -stable and followed by endocrinology closely.  Avg in past 7 d is 125, 30 d 127, 90 d 137.    Using CGM.  Taking metformin, glimepiride, jardiance, trulicity,   -doing labs today and following up with endocrinology in may.      9. Coronary arteriosclerosis  -taking statin daily. Having labs today.  Denies chest pain.  Taking 81 mg aspirin.      10. Degeneration of cervical intervertebral disc  -improved s/p surgery.    11. 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 On 30 mg cymbalta daily.  Will f/u with neurology every 1-2 years.   12. Dyslipidemia  -compliant with statin therapy.  Denies chest pain.     13. Obstructive sleep apnea syndrome    14. ISELA not on CPAP - [facility] " home care  -hasn't used in  years since losing 70 lbs.  Denies updated sleep study.     15. Other insomnia  -takes ambien as needed. Informed Aly re: possibility of memory loss with ambien.     16. Seasonal allergies  -stable. On zyrtec.         Services suggested: No services needed at this time  Health Care Screening: Age-appropriate preventive services recommended by USPTF and ACIP covered by Medicare were discussed today. Services ordered if indicated and agreed upon by the patient.  Referrals offered: Community-based lifestyle interventions to reduce health risks and promote self-management and wellness, fall prevention, nutrition, physical activity, tobacco-use cessation, weight loss, and mental health services as per orders if indicated.    Discussion today about general wellness and lifestyle habits:    Prevent falls and reduce trip hazards; Cautioned about securing or removing rugs.  Have a working fire alarm and carbon monoxide detector;   Engage in regular physical activity and social activities     Follow-up: every 3-12 months.

## 2024-03-27 NOTE — PROGRESS NOTES
RN-CDE Note    Subjective:   Endocrinology Clinic Progress Note  PCP: NIRANJAN Butts    HPI:  Barron Hewitt is a 63 y.o. old patient who is seen today by the Diabetes Nurse Specialist for review of his type 2 diabetes.    Recent changes in health: recently retired.   DM:   Last A1c:   Lab Results   Component Value Date/Time    HBA1C 7.3 (A) 03/28/2024 09:26 AM      Previous A1c was 7.8 on 12/26/23  A1C GOAL: < 7    Diabetes Medications:   Trulicity 1.5 mg weekly  Glimepiride 2 mg daily  Jardiance 25 mg daily      Exercise: walking daily, physical therapy 2 times a week.   Diet: feels he eats fairly healthy most of the time.  Does like sweets.   Patient's body mass index is 26.3 kg/m². Exercise and nutrition counseling were performed at this visit.    Glucose monitoring frequency: using Dexcom  cgm     Hypoglycemic episodes: no  Last Retinal Exam: on file and up-to-date  Foot Exam:  Monofilament: current.   Lab Results   Component Value Date/Time    MALBCRT see below 03/16/2023 06:48 AM    MICROALBUR <1.2 03/16/2023 06:48 AM        ACR Albumin/Creatinine Ratio goal <30     HTN:   Blood pressure goal <130/<80 .   Currently Rx ACE/ARB: Not Indicated     Dyslipidemia:    Lab Results   Component Value Date/Time    CHOLSTRLTOT 113 03/14/2024 09:42 AM    LDL 59 03/14/2024 09:42 AM    HDL 34 (A) 03/14/2024 09:42 AM    TRIGLYCERIDE 101 03/14/2024 09:42 AM         Currently Rx Statin: Yes     He  reports that he quit smoking about 4 years ago. His smoking use included cigarettes. He has never used smokeless tobacco.      Plan:     Discussed and educated on:   - All medications, side effects and compliance (discussed carefully)  - Annual eye examinations at Ophthalmology  - HbA1C: target  - Home glucose monitoring emphasized  - Weight control and daily exercise    Recommended medication changes: none at this time.

## 2024-03-28 ENCOUNTER — OFFICE VISIT (OUTPATIENT)
Dept: ENDOCRINOLOGY | Facility: MEDICAL CENTER | Age: 64
End: 2024-03-28
Attending: INTERNAL MEDICINE
Payer: MEDICARE

## 2024-03-28 VITALS
HEART RATE: 82 BPM | DIASTOLIC BLOOD PRESSURE: 88 MMHG | BODY MASS INDEX: 26.38 KG/M2 | WEIGHT: 178.1 LBS | OXYGEN SATURATION: 96 % | HEIGHT: 69 IN | RESPIRATION RATE: 16 BRPM | SYSTOLIC BLOOD PRESSURE: 106 MMHG

## 2024-03-28 DIAGNOSIS — E55.9 VITAMIN D DEFICIENCY: ICD-10-CM

## 2024-03-28 DIAGNOSIS — Z79.4 TYPE 2 DIABETES MELLITUS WITHOUT COMPLICATION, WITH LONG-TERM CURRENT USE OF INSULIN (HCC): ICD-10-CM

## 2024-03-28 DIAGNOSIS — E03.9 ACQUIRED HYPOTHYROIDISM: ICD-10-CM

## 2024-03-28 DIAGNOSIS — E11.9 TYPE 2 DIABETES MELLITUS WITHOUT COMPLICATION, WITH LONG-TERM CURRENT USE OF INSULIN (HCC): ICD-10-CM

## 2024-03-28 DIAGNOSIS — E11.42 CONTROLLED TYPE 2 DIABETES MELLITUS WITH DIABETIC POLYNEUROPATHY, WITHOUT LONG-TERM CURRENT USE OF INSULIN (HCC): ICD-10-CM

## 2024-03-28 DIAGNOSIS — Z79.84 LONG TERM (CURRENT) USE OF ORAL HYPOGLYCEMIC DRUGS: ICD-10-CM

## 2024-03-28 DIAGNOSIS — E78.5 DYSLIPIDEMIA: ICD-10-CM

## 2024-03-28 LAB
HBA1C MFR BLD: 7.3 % (ref ?–5.8)
POCT INT CON NEG: NEGATIVE
POCT INT CON POS: POSITIVE

## 2024-03-28 PROCEDURE — 99214 OFFICE O/P EST MOD 30 MIN: CPT | Performed by: INTERNAL MEDICINE

## 2024-03-28 PROCEDURE — 83036 HEMOGLOBIN GLYCOSYLATED A1C: CPT | Performed by: INTERNAL MEDICINE

## 2024-03-28 RX ORDER — LEVOTHYROXINE SODIUM 0.05 MG/1
50 TABLET ORAL
Qty: 90 TABLET | Refills: 2 | Status: SHIPPED | OUTPATIENT
Start: 2024-03-28

## 2024-03-28 RX ORDER — EMPAGLIFLOZIN 25 MG/1
1 TABLET, FILM COATED ORAL DAILY
Qty: 90 TABLET | Refills: 1 | Status: SHIPPED | OUTPATIENT
Start: 2024-03-28

## 2024-03-28 RX ORDER — METFORMIN HYDROCHLORIDE 500 MG/1
500 TABLET, EXTENDED RELEASE ORAL 2 TIMES DAILY
Qty: 180 TABLET | Refills: 3 | Status: SHIPPED | OUTPATIENT
Start: 2024-03-28

## 2024-03-28 ASSESSMENT — FIBROSIS 4 INDEX: FIB4 SCORE: 0.98

## 2024-03-28 NOTE — PROGRESS NOTES
CHIEF COMPLAINT: Patient is here for follow up of Type 2 Diabetes Mellitus    HPI:     Barron Hewitt is a 63 y.o. male with Type 2 Diabetes Mellitus here for follow up.    Labs from 3/28/2024 show A1c is 7.3%  Labs from 12/26/2023 show A1c is 7.8%  Labs from 3/21/2023 show a1c is 7.2%  Labs from 12/2/2022 show A1c was 7.2%  Labs from 7//9/2022 show A1c was 7.1%    He was previously seen by Dr. Sandoval and then Yoseph DOMÍNGUEZ.    He then saw Lizett Mi NP   He has been wearing a [employer] GEN 1 CGM for the past 6 months  He did not tolerate the higher doses of Trulicity         He is currently on   Metformin 500 mg twice a day,  Jardiance 25mg daily  Trulicity 1.5 mg weekly  Glimepiride 2mg daily        He denies SE with his meds  We discussed the ACHIEVE study but he doesn't qualify as he does not have CAD        CGM was downloaded and reviewed today                He has hyperlipidemia and is on atorvastatin  He denies muscle aches and muscle weakness  LDL cholesterol 59 on 3/2024         He sees Dr. Huerta as well  He is now on Toprol XL and Benazepril   He doesn't have diabetic kidney disease  UACR was < 30 on 3/2023        Eye exam was done on 3/4/2024        Finally he has primary hypothyroidism and is on Levothyroxine 50 every day   He denies constipation and cold intolerance  TSH is 1.9 on 3/2024  TSH is 1.4 on 3/2023  TSH is 1.4 on 8/2022          BG Diary:  CGM was downloaded and reviewed    Weight has been stable    Diabetes Complications   Retinopathy: No known retinopathy.  Last eye exam: 12/2/2022  Neuropathy: Denies paresthesias or numbness in hands or feet. Denies any foot wounds.  Exercise: Minimal.  Diet: Fair.  Patient's medications, allergies, and social histories were reviewed and updated as appropriate.    ROS:     CONS:     No fever, no chills   EYES:     No diplopia, no blurry vision   CV:           No chest pain, no palpitations   PULM:     No SOB, no cough, no hemoptysis.    GI:            No nausea, no vomiting, no diarrhea, no constipation   ENDO:     No polyuria, no polydipsia, no heat intolerance, no cold intolerance       Past Medical History:  Problem List:  2024-03: History of sleep apnea - improved with 70 lb weight loss  2023-10: H/O cervical discectomy  2023-10: History of lumbar discectomy  2023-10: Neuropathic pain of lower extremity, left  2023-05: Lumbar stenosis without neurogenic claudication  2023-03: Degeneration of cervical intervertebral disc  2022-12: Spinal stenosis of lumbar region  2022-07: Long term (current) use of oral hypoglycemic drugs  2022-05: Benign prostatic hyperplasia with urinary frequency  2022-03: Lower urinary tract symptoms due to benign prostatic   hyperplasia  2021-06: Diabetic amyotrophy associated with type 2 diabetes mellitus   ([facility])  2021-05: Neuropathy - proximal, diabetic  2020-08: Acquired hypothyroidism  2019-11: Controlled type 2 diabetes mellitus with diabetic   polyneuropathy, without long-term current use of insulin ([facility])  2019-11: History of small bowel obstruction - 2018  2019-11: Syncope and collapse  2019-11: Type 2 diabetes mellitus without complication ([facility])  2019-11: History of intestinal obstruction  2019-02: Seasonal allergies  2018-08: Other insomnia  2017-11: Diabetes mellitus ([facility])  2017-11: Hypertensive disorder  2017-10: Essential hypertension, benign  2017-10: Dyslipidemia  2017-10: Benign essential hypertension  2017-07: Mild intermittent asthma without complication  2017-07: Mild intermittent asthma  2017-05: Ileus ([facility])  2017-05: Erythrocytosis  2017-05: ISELA on CPAP - [facility]  2017-05: HTN (hypertension)  2017-05: Enteritis  2017-04: Obstructive sleep apnea syndrome  2016-04: Coronary arteriosclerosis  2016-03: Angina pectoris ([facility])  2014-01: Chest pain at rest  2014-01: Abnormal radionuclide heart study  2014-01: DM (diabetes mellitus) ([facility])  2014-01: Hyperlipidemia  2014-01: Hypertension  2012-11:  Coronary-myocardial bridge  2011-06: CHEST PAIN  PONV (postoperative nausea and vomiting)  Asthma      Past Surgical History:  Past Surgical History:   Procedure Laterality Date    CERVICAL DISK AND FUSION ANTERIOR  5/26/2023    Procedure: ANTERIOR C5-7 DISCECTOMY AND FUSION;  Surgeon: Segun Cochran M.D.;  Location: [facility];  Service: Neurosurgery    LUMBAR LAMINECTOMY DISKECTOMY  5/26/2023    Procedure: LAMINECTOMY, SPINE, LUMBAR, WITH DISCECTOMY POSTERIOR L4-S1 LAMINECTOMIES;  Surgeon: Segun Cochran M.D.;  Location: [facility];  Service: Neurosurgery    RADIO FREQUENCY ABLATION ADDITIONAL LEVEL Bilateral 07/06/2022    Procedure: BILATERAL radiofrequency neurotomies medial branch targeting the L3-4, L4-5 and L5-S1 facet joints with fluoroscopic guidance and sedation. Plan for 80 degree C for 90 seconds for each neurotomy.;  Surgeon: Dragan Ayala M.D.;  [facility];  Service: Pain Management    FL INJ DX/THER AGNT PARAVERT FACET JOINT, DEVON* Bilateral 06/21/2022    Procedure: Diagnostic medial branch blocks targeting the BILATERAL L3-4, L4-5 and L5-S1 facet joints with fluoroscopic guidance #2;  Surgeon: Dragan Ayala M.D.;  [facility];  Service: Pain Management    LUMBAR MEDIAL BRANCH BLOCKS Bilateral 06/21/2022    Procedure: .;  Surgeon: Dragan Ayala M.D.;  [facility];  Service: Pain Management    CONSCIOUS SEDATION Bilateral 06/21/2022    Procedure: .;  Surgeon: Dragan Ayala M.D.;  [facility];  Service: Pain Management    LUMBAR MEDIAL BRANCH BLOCKS Bilateral 06/14/2022    Procedure: Diagnostic medial branch blocks targeting the BILATERAL L3-4, L4-5 and L5-S1 facet joints with fluoroscopic guidance #1;  Surgeon: Dragan Ayala M.D.;  [facility];  Service: Pain Management    FL TRANSURETHRAL ELEC-SURG PROSTATECTOM N/A 05/27/2022     Procedure: TURP, USING BUTTON ELECTRODE;  Surgeon: Lee Mckee M.D.;  Location: [facility];  Service: Urology    BLOCK EPIDURAL STEROID INJECTION Left 03/22/2022    Procedure: LEFT L3-4 and L4-5 transforaminal epidural steroid injection with fluoroscopic guidance;  Surgeon: Dragan Ayala M.D.;  [facility]: [facility];  Service: Pain Management    AL NJX AA&/STRD TFRML EPI LUMBAR/SACRAL 1 LEVEL Left 02/15/2022    Procedure: LEFT L3-4 and L4-5 transforaminal epidural steroid injection with fluoroscopic guidance;  Surgeon: Dragan Ayala M.D.;  [facility];  Service: Pain Management    SHOULDER DECOMPRESSION ARTHROSCOPIC Left 01/04/2018    Procedure: SHOULDER DECOMPRESSION ARTHROSCOPIC - SUBACROMIAL;  Surgeon: Linwood Grover M.D.;  Location: [facility];  Service: Orthopedics    SHOULDER ARTHROSCOPY W/ BICIPITAL TENODESIS REPAIR Left 01/04/2018    Procedure: SHOULDER ARTHROSCOPY W/ BICIPITAL Tenotomy REPAIR;  Surgeon: Linwood Grover M.D.;  Location: [facility];  Service: Orthopedics    CLAVICLE DISTAL EXCISION Left 01/04/2018    Procedure: CLAVICLE DISTAL EXCISION - POSSIBLE;  Surgeon: Linwood Grover M.D.;  Location: [facility];  Service: Orthopedics    RECOVERY  04/08/2016    Procedure: CATH LAB [facility] WITH POSSIBLE NAVIN;  Surgeon: Recoveryon Surgery;  Location: SURGERY [facility]POST PROC UNIT [facility];  Service:     VENTRAL HERNIA REPAIR LAPAROSCOPIC  11/01/2012    Performed by Marcos Ramírez M.D. at [facility]    KNEE ARTHROSCOPY  1990's    right    OTHER ABDOMINAL SURGERY  10-    About 8 years ago    SHOULDER ARTHROSCOPY  1990's    right    SINUSOTOMIES  1990's    times two surgeries    TUMOR EXCISION WITH BIOPSY  1990's    right hand - thumb        Allergies:  Kiwi extract, Shellfish allergy, Strawberry, Ozempic (0.25 or 0.5 mg-dose) [semaglutide], Sulfa drugs, and  "Testosterone     Social History:  Social History     Tobacco Use    Smoking status: Former     Current packs/day: 0.00     Types: Cigarettes     Quit date:      Years since quittin.2    Smokeless tobacco: Never    Tobacco comments:     occasional cigars   Vaping Use    Vaping Use: Never used   Substance Use Topics    Alcohol use: Not Currently     Comment: If and when I drink it's only social    Drug use: Yes     Types: Marijuana, Inhaled, Oral     Comment: THC/ Occ        Family History:   family history includes Arthritis in his brother and father; Breast Cancer in his mother; Cancer in his mother and another family member; Diabetes in his father; Heart Disease in his mother and another family member; Hypertension in his father, mother, and another family member; No Known Problems in his brother and sister.      PHYSICAL EXAM:   OBJECTIVE:  Vital signs: /88 (BP Location: Right arm, Patient Position: Sitting)   Pulse 82   Resp 16   Ht 1.753 m (5' 9\")   Wt 80.8 kg (178 lb 1.6 oz)   SpO2 96%   BMI 26.30 kg/m²   GENERAL: Well-developed, well-nourished in no apparent distress.   EYE:  No ocular asymmetry, PERRLA  HENT: Pink, moist mucous membranes.    NECK: No thyromegaly.   CARDIOVASCULAR:  No murmurs  LUNGS: Clear breath sounds  ABDOMEN: Soft, nontender   BACK: negative SLR bilaterally  EXTREMITIES: No clubbing, cyanosis, or edema.   NEUROLOGICAL: No gross focal motor abnormalities   LYMPH: No cervical adenopathy palpated.   SKIN: No rashes, lesions.     Labs:  Lab Results   Component Value Date/Time    HBA1C 7.3 (A) 2024 09:26 AM        Lab Results   Component Value Date/Time    WBC 8.8 2023 01:41 PM    RBC 5.66 2023 01:41 PM    HEMOGLOBIN 16.6 2023 01:41 PM    MCV 88.3 2023 01:41 PM    MCH 29.3 2023 01:41 PM    MCHC 33.2 (L) 2023 01:41 PM    RDW 44.6 2023 01:41 PM    MPV 10.5 2023 01:41 PM       Lab Results   Component Value Date/Time    " SODIUM 139 03/14/2024 09:42 AM    POTASSIUM 4.2 03/14/2024 09:42 AM    CHLORIDE 103 03/14/2024 09:42 AM    CO2 25 03/14/2024 09:42 AM    ANION 11.0 03/14/2024 09:42 AM    GLUCOSE 110 (H) 03/14/2024 09:42 AM    BUN 10 03/14/2024 09:42 AM    CREATININE 0.60 03/14/2024 09:42 AM    CREATININE 1.1 07/10/2008 09:25 AM    CALCIUM 9.3 03/14/2024 09:42 AM    ASTSGOT 22 03/14/2024 09:42 AM    ALTSGPT 23 03/14/2024 09:42 AM    TBILIRUBIN 0.5 03/14/2024 09:42 AM    ALBUMIN 4.4 03/14/2024 09:42 AM    TOTPROTEIN 6.9 03/14/2024 09:42 AM    GLOBULIN 2.5 03/14/2024 09:42 AM    AGRATIO 1.8 03/14/2024 09:42 AM       Lab Results   Component Value Date/Time    CHOLSTRLTOT 126 07/22/2022 0700    TRIGLYCERIDE 97 07/22/2022 0700    HDL 34 (A) 07/22/2022 0700    LDL 73 07/22/2022 0700       Lab Results   Component Value Date/Time    MALBCRT see below 03/16/2023 06:48 AM    MICROALBUR <1.2 03/16/2023 06:48 AM        Lab Results   Component Value Date/Time    TSHULTRASEN 2.130 06/09/2021 1313     No results found for: FREEDIR  Lab Results   Component Value Date/Time    FREET3 3.59 12/22/2017 0926     No results found for: THYSTIMIG        ASSESSMENT/PLAN:     1. Controlled type 2 diabetes mellitus without complication, without long-term current use of insulin (HCC)  Fair control  his A1c is fair at 7.3%  We downloaded and reviewed his CGM today   Continue Metformin 500 mg twice a day  Continue Jardiance 25mg daily  Continue Trulicity 1.5 mg weekly  Continue glimepiride 2mg daily  He is updated with his labs  Follow-up with Viola in 6 months with thyroid labs      2. Acquired hypothyroidism  Controlled  TSH is stable  Continue  Levothyroxine 50 mcg  every day   Reviewed proper administration of thyroid hormone  Patient should take thyroid hormone 30-60 minutes before breakfast on an empty stomach plain water and not take it together with food, iron, calcium, and antacids.  Iron, calcium, and antacids should be taken at least 4 hours apart from  thyroid hormone.  Repeat TSH in 6 months    3. Dyslipidemia  Controlled  Continue atorvastatin  Repeat fasting lipids in 12  months    4. Vitamin D deficiency  Stable  Continue monitoring     5. Long term (current) use of oral hypoglycemic drugs  Patient is on oral agents for type 2 diabetes management      Return in about 6 months (around 9/28/2024).      Thank you kindly for allowing me to participate in the diabetes care plan for this patient.    Seth Vizcarra MD, [facility], [facility]      CC:   MARTHA Butts.

## 2024-04-08 ENCOUNTER — TELEPHONE (OUTPATIENT)
Dept: ENDOCRINOLOGY | Facility: MEDICAL CENTER | Age: 64
End: 2024-04-08
Payer: COMMERCIAL

## 2024-04-17 ENCOUNTER — HOSPITAL ENCOUNTER (OUTPATIENT)
Dept: RADIOLOGY | Facility: MEDICAL CENTER | Age: 64
End: 2024-04-17
Attending: NURSE PRACTITIONER
Payer: MEDICARE

## 2024-04-17 DIAGNOSIS — Z13.6 SCREENING FOR ABDOMINAL AORTIC ANEURYSM: ICD-10-CM

## 2024-04-17 PROCEDURE — 76706 US ABDL AORTA SCREEN AAA: CPT

## 2024-04-22 DIAGNOSIS — E11.9 TYPE 2 DIABETES MELLITUS WITHOUT COMPLICATION, WITHOUT LONG-TERM CURRENT USE OF INSULIN (HCC): ICD-10-CM

## 2024-04-22 RX ORDER — DULAGLUTIDE 1.5 MG/.5ML
INJECTION, SOLUTION SUBCUTANEOUS
Qty: 6 ML | Refills: 3 | Status: SHIPPED | OUTPATIENT
Start: 2024-04-22

## 2024-04-22 NOTE — TELEPHONE ENCOUNTER
Have we ever prescribed this med? Yes.  If yes, what date? 8/17/18    Last OV: 8/26/19 Bic    Next OV: 3/2/20 Bic    DX: Mild intermittent asthma without complication (J45.20)    Medications: albuterol (VENTOLIN HFA) 108 (90 Base) MCG/ACT Aero Soln inhalation aerosol   Pt ambulated to the room with steady gait and without assistance. Partner at bedside. Pt placed on SpO2 and BP monitors. Call light within reach. States she has had flu like symptoms since Thursday. Chart up for ERP.

## 2024-06-24 ENCOUNTER — PATIENT MESSAGE (OUTPATIENT)
Dept: MEDICAL GROUP | Facility: LAB | Age: 64
End: 2024-06-24
Payer: MEDICARE

## 2024-06-24 DIAGNOSIS — S76.012A STRAIN OF LEFT HIP ADDUCTOR MUSCLE, INITIAL ENCOUNTER: ICD-10-CM

## 2024-06-24 DIAGNOSIS — M54.50 CHRONIC BILATERAL LOW BACK PAIN WITHOUT SCIATICA: ICD-10-CM

## 2024-06-24 DIAGNOSIS — J45.20 MILD INTERMITTENT ASTHMA WITHOUT COMPLICATION: ICD-10-CM

## 2024-06-24 DIAGNOSIS — M54.12 CERVICAL RADICULOPATHY: ICD-10-CM

## 2024-06-24 DIAGNOSIS — G89.29 CHRONIC BILATERAL LOW BACK PAIN WITHOUT SCIATICA: ICD-10-CM

## 2024-06-24 DIAGNOSIS — M47.816 LUMBAR SPONDYLOSIS: ICD-10-CM

## 2024-06-24 DIAGNOSIS — E11.44 DIABETIC AMYOTROPHY ASSOCIATED WITH TYPE 2 DIABETES MELLITUS (HCC): ICD-10-CM

## 2024-06-24 DIAGNOSIS — M54.2 CHRONIC NECK PAIN: ICD-10-CM

## 2024-06-24 DIAGNOSIS — M54.16 LUMBAR RADICULOPATHY: ICD-10-CM

## 2024-06-24 DIAGNOSIS — G89.29 CHRONIC NECK PAIN: ICD-10-CM

## 2024-06-24 DIAGNOSIS — M48.02 CERVICAL STENOSIS OF SPINAL CANAL: ICD-10-CM

## 2024-06-24 DIAGNOSIS — S86.112A STRAIN OF GASTROCNEMIUS MUSCLE OF LEFT LOWER EXTREMITY, INITIAL ENCOUNTER: ICD-10-CM

## 2024-06-24 RX ORDER — ALBUTEROL SULFATE 90 UG/1
AEROSOL, METERED RESPIRATORY (INHALATION)
Qty: 54 G | Refills: 3 | Status: SHIPPED | OUTPATIENT
Start: 2024-06-24

## 2024-06-24 RX ORDER — TIZANIDINE 4 MG/1
4 TABLET ORAL NIGHTLY PRN
Qty: 90 TABLET | Refills: 0 | Status: SHIPPED | OUTPATIENT
Start: 2024-06-24

## 2024-07-12 NOTE — PROCEDURES
LM on VM for patient to keep appointment as scheduled and to call back with any questions.   PULMONARY FUNCTION TEST INTERPRETATION    REQUESTING PROVIDER:  JAMES Moon    REASON FOR REQUEST:  Mild intermittent asthma.    FINDINGS:  1.  Acceptable and reproducible.  2.  FEV1 2.85 liters (82%), FVC 4.06 liters (90%), ratio 70%.  3.  Flow volume loop is normal appearing.  4.  TLC 6.81 liters (100%).  5.  DLCO 41.33 mL/min/mmHg (167%).    IMPRESSION:  Normal spirometry.  No response to bronchodilator, except forced   expiratory flow at 25-75%.  Patient had a marked response to bronchodilator.    Evidence of air trapping as well as elevated DLCO consistent with the   patient's diagnosis of asthma.       ____________________________________     MD WHITNEY Gallardo / MARGE    DD:  11/26/2018 14:53:25  DT:  11/26/2018 15:08:30    D#:  [id number]  Job#:  [id number]    cc: Yenny RAMIREZ

## 2024-07-15 DIAGNOSIS — G47.09 OTHER INSOMNIA: ICD-10-CM

## 2024-07-15 RX ORDER — ZOLPIDEM TARTRATE 5 MG/1
5 TABLET ORAL NIGHTLY PRN
Qty: 30 TABLET | Refills: 2 | Status: SHIPPED | OUTPATIENT
Start: 2024-07-15 | End: 2024-08-14

## 2024-07-17 ENCOUNTER — TELEPHONE (OUTPATIENT)
Dept: CARDIOLOGY | Facility: MEDICAL CENTER | Age: 64
End: 2024-07-17
Payer: MEDICARE

## 2024-07-19 ENCOUNTER — APPOINTMENT (OUTPATIENT)
Dept: CARDIOLOGY | Facility: MEDICAL CENTER | Age: 64
End: 2024-07-19
Attending: INTERNAL MEDICINE
Payer: MEDICARE

## 2024-09-03 ENCOUNTER — APPOINTMENT (OUTPATIENT)
Dept: MEDICAL GROUP | Facility: LAB | Age: 64
End: 2024-09-03
Payer: MEDICARE

## 2024-09-03 VITALS
DIASTOLIC BLOOD PRESSURE: 70 MMHG | BODY MASS INDEX: 25.33 KG/M2 | SYSTOLIC BLOOD PRESSURE: 128 MMHG | WEIGHT: 171 LBS | RESPIRATION RATE: 16 BRPM | HEART RATE: 84 BPM | HEIGHT: 69 IN | OXYGEN SATURATION: 95 % | TEMPERATURE: 97.9 F

## 2024-09-03 DIAGNOSIS — Z23 NEED FOR HEPATITIS B VACCINATION: ICD-10-CM

## 2024-09-03 DIAGNOSIS — J30.2 SEASONAL ALLERGIES: ICD-10-CM

## 2024-09-03 DIAGNOSIS — E11.42 CONTROLLED TYPE 2 DIABETES MELLITUS WITH DIABETIC POLYNEUROPATHY, WITHOUT LONG-TERM CURRENT USE OF INSULIN (HCC): ICD-10-CM

## 2024-09-03 DIAGNOSIS — L30.9 DERMATITIS: ICD-10-CM

## 2024-09-03 PROCEDURE — 3074F SYST BP LT 130 MM HG: CPT | Performed by: NURSE PRACTITIONER

## 2024-09-03 PROCEDURE — 3078F DIAST BP <80 MM HG: CPT | Performed by: NURSE PRACTITIONER

## 2024-09-03 PROCEDURE — G0010 ADMIN HEPATITIS B VACCINE: HCPCS | Performed by: NURSE PRACTITIONER

## 2024-09-03 PROCEDURE — 99214 OFFICE O/P EST MOD 30 MIN: CPT | Mod: 25 | Performed by: NURSE PRACTITIONER

## 2024-09-03 PROCEDURE — 90746 HEPB VACCINE 3 DOSE ADULT IM: CPT | Mod: JZ | Performed by: NURSE PRACTITIONER

## 2024-09-03 RX ORDER — FLASH GLUCOSE SENSOR
1 KIT MISCELLANEOUS
Qty: 6 EACH | Refills: 3 | Status: SHIPPED | OUTPATIENT
Start: 2024-09-03

## 2024-09-03 RX ORDER — MONTELUKAST SODIUM 10 MG/1
10 TABLET ORAL EVERY EVENING
Qty: 90 TABLET | Refills: 3 | Status: SHIPPED | OUTPATIENT
Start: 2024-09-03

## 2024-09-03 RX ORDER — HYDROXYZINE HYDROCHLORIDE 25 MG/1
25 TABLET, FILM COATED ORAL NIGHTLY PRN
Qty: 30 TABLET | Refills: 0 | Status: ON HOLD | OUTPATIENT
Start: 2024-09-03 | End: 2024-09-20

## 2024-09-03 RX ORDER — EMPAGLIFLOZIN 25 MG/1
1 TABLET, FILM COATED ORAL DAILY
Qty: 90 TABLET | Refills: 3 | Status: SHIPPED | OUTPATIENT
Start: 2024-09-03

## 2024-09-03 RX ORDER — TRIAMCINOLONE ACETONIDE 1 MG/G
CREAM TOPICAL
Qty: 80 G | Refills: 1 | Status: SHIPPED | OUTPATIENT
Start: 2024-09-03

## 2024-09-03 ASSESSMENT — FIBROSIS 4 INDEX: FIB4 SCORE: 0.98

## 2024-09-03 NOTE — PROGRESS NOTES
Verbal consent was acquired by the patient to use [employer] ambient listening note generation during this visit Yes      Subjective   Aly Hewitt is a 63 y.o. male who presents for f/u  History of Present Illness  The patient is a 63-year-old established male who presents with a rash on his left side that has been present for about a month.  The rash first appeared after he leaned against a hot metal lounge chair at a pool on vacation a month ago. Initially, it felt like a burn but later developed into a rash. The rash has been intermittent, appearing and disappearing periodically. He has been managing it with leftover cream and additional Benadryl, which seems to help, but the rash persists. He experiences itching, particularly when he scratches the area.  He reports no presence of the rash in his armpit. He suspects it might be a viral infection or an allergic reaction.   He has a history of allergies and has received allergy shots throughout his life. He is known to be allergic to shellfish, which once caused anaphylaxis.     He has been experiencing issues with his fingers locking up, particularly when playing the guitar, an activity he enjoys daily. This issue has been ongoing intermittently for over a year but has become more frequent in the past three months. He is concerned that this may prevent him from playing the guitar.    He has discontinued duloxetine, which he was taking for neuropathy pain, as he was unable to obtain a refill. He has not taken it for almost two months and is unsure of its effectiveness. He continues to experience pain following back and neck surgeries. He plans to discuss this with his physical therapist - randal at [facility]. He used to experience leg cramps when stretching, but this resolved after surgery. He has also stopped taking gabapentin. He has been receiving acupuncture treatments from his physical therapist, which he finds helpful.    He has comorbid medical  "conditions of type 2 diabetes, hypertension, and hypothyroidism. He is also followed by endocrinology. His last appointment with endocrinology was back in 03/2024 and his A1c has been very well controlled. No changes were made at his last endocrinology visit in terms of medications. He does need some refills.     ALLERGIES  He is allergic to SHELLFISH.    Review of Systems  Neg except hpi  Objective   /70 (BP Location: Left arm, Patient Position: Sitting, BP Cuff Size: Large adult)   Pulse 84   Temp 36.6 °C (97.9 °F)   Resp 16   Ht 1.753 m (5' 9\")   Wt 77.6 kg (171 lb)   SpO2 95%   Physical Exam  Gen: NAD  Resp: nonlabored.  Able to speak in full sentences  Skin: He has a fine papular rash to his left lateral trunk and upper back without vesicles, open lesions or any other abnormalities.  Psy: pleasant / cooperative.   Neuro:  Alert and oriented x 3      Assessment & Plan  1.  Dermatitis: Trial of triamcinolone topically twice daily along with hydroxyzine at night for the next 7 to 10 days, notify me if rash does not resolve.    2. Trigger fingers.  The condition is likely due to overuse from guitar playing.  He was given the names of orthopedic hand surgeons in [facility] to make an appointment for injection.    3. Type 2 Diabetes Mellitus.  Refilled his Jardiance.  Scheduled to see endocrinology in the near future.  Well-controlled.    5. Hypertension.  Stable.  He is advised to continue his current medication regimen. Metoprolol will need refills towards October.    6.  Asthma:  Stable with Advair and Singulair as maintenance medications as well as albuterol as needed.  Encouraged influenza and COVID vaccines in October                   Please note that this dictation was created using voice recognition software. I have made every reasonable attempt to correct obvious errors, but I expect that there are errors of grammar and possibly content that I did not discover before finalizing the note.  "

## 2024-09-03 NOTE — PATIENT INSTRUCTIONS
Hands - ortho - Dr. Maradiaga at [facility] or Dr. Davey at [facility].

## 2024-09-14 ENCOUNTER — HOSPITAL ENCOUNTER (INPATIENT)
Facility: MEDICAL CENTER | Age: 64
LOS: 7 days | DRG: 372 | End: 2024-09-21
Attending: EMERGENCY MEDICINE | Admitting: HOSPITALIST
Payer: MEDICARE

## 2024-09-14 ENCOUNTER — OFFICE VISIT (OUTPATIENT)
Dept: URGENT CARE | Facility: PHYSICIAN GROUP | Age: 64
End: 2024-09-14
Payer: MEDICARE

## 2024-09-14 VITALS
OXYGEN SATURATION: 99 % | HEART RATE: 111 BPM | RESPIRATION RATE: 18 BRPM | TEMPERATURE: 96.7 F | HEIGHT: 69 IN | DIASTOLIC BLOOD PRESSURE: 74 MMHG | WEIGHT: 160 LBS | BODY MASS INDEX: 23.7 KG/M2 | SYSTOLIC BLOOD PRESSURE: 110 MMHG

## 2024-09-14 DIAGNOSIS — E86.0 DEHYDRATION: ICD-10-CM

## 2024-09-14 DIAGNOSIS — R11.10 INTRACTABLE VOMITING: ICD-10-CM

## 2024-09-14 DIAGNOSIS — R79.89 AZOTEMIA: ICD-10-CM

## 2024-09-14 DIAGNOSIS — K52.9 AGE (ACUTE GASTROENTERITIS): ICD-10-CM

## 2024-09-14 DIAGNOSIS — A02.0 SALMONELLA GASTROENTERITIS: ICD-10-CM

## 2024-09-14 DIAGNOSIS — E87.1 HYPONATREMIA: ICD-10-CM

## 2024-09-14 PROBLEM — R19.7 DIARRHEA: Status: ACTIVE | Noted: 2024-09-14

## 2024-09-14 PROBLEM — E87.20 METABOLIC ACIDOSIS: Status: ACTIVE | Noted: 2024-09-14

## 2024-09-14 PROBLEM — I16.0 HYPERTENSIVE URGENCY: Status: ACTIVE | Noted: 2024-09-14

## 2024-09-14 PROBLEM — D75.1 POLYCYTHEMIA: Status: ACTIVE | Noted: 2024-09-14

## 2024-09-14 PROBLEM — I10 PRIMARY HYPERTENSION: Status: ACTIVE | Noted: 2024-09-14

## 2024-09-14 LAB
ALBUMIN SERPL BCP-MCNC: 3.1 G/DL (ref 3.2–4.9)
ALBUMIN/GLOB SERPL: 0.9 G/DL
ALP SERPL-CCNC: 79 U/L (ref 30–99)
ALT SERPL-CCNC: 14 U/L (ref 2–50)
ANION GAP SERPL CALC-SCNC: 16 MMOL/L (ref 7–16)
ANION GAP SERPL CALC-SCNC: 23 MMOL/L (ref 7–16)
AST SERPL-CCNC: 20 U/L (ref 12–45)
BASOPHILS # BLD AUTO: 0.8 % (ref 0–1.8)
BASOPHILS # BLD: 0.06 K/UL (ref 0–0.12)
BILIRUB SERPL-MCNC: 0.9 MG/DL (ref 0.1–1.5)
BUN SERPL-MCNC: 22 MG/DL (ref 8–22)
BUN SERPL-MCNC: 27 MG/DL (ref 8–22)
C DIFF DNA SPEC QL NAA+PROBE: NEGATIVE
C DIFF TOX GENS STL QL NAA+PROBE: NEGATIVE
CALCIUM ALBUM COR SERPL-MCNC: 9.5 MG/DL (ref 8.5–10.5)
CALCIUM SERPL-MCNC: 8.5 MG/DL (ref 8.4–10.2)
CALCIUM SERPL-MCNC: 8.8 MG/DL (ref 8.4–10.2)
CHLORIDE SERPL-SCNC: 86 MMOL/L (ref 96–112)
CHLORIDE SERPL-SCNC: 91 MMOL/L (ref 96–112)
CO2 SERPL-SCNC: 17 MMOL/L (ref 20–33)
CO2 SERPL-SCNC: 24 MMOL/L (ref 20–33)
CREAT SERPL-MCNC: 0.74 MG/DL (ref 0.5–1.4)
CREAT SERPL-MCNC: 0.79 MG/DL (ref 0.5–1.4)
EOSINOPHIL # BLD AUTO: 0.03 K/UL (ref 0–0.51)
EOSINOPHIL NFR BLD: 0.4 % (ref 0–6.9)
ERYTHROCYTE [DISTWIDTH] IN BLOOD BY AUTOMATED COUNT: 42.9 FL (ref 35.9–50)
GFR SERPLBLD CREATININE-BSD FMLA CKD-EPI: 100 ML/MIN/1.73 M 2
GFR SERPLBLD CREATININE-BSD FMLA CKD-EPI: 98 ML/MIN/1.73 M 2
GLOBULIN SER CALC-MCNC: 3.6 G/DL (ref 1.9–3.5)
GLUCOSE BLD STRIP.AUTO-MCNC: 185 MG/DL (ref 65–99)
GLUCOSE BLD STRIP.AUTO-MCNC: 219 MG/DL (ref 65–99)
GLUCOSE SERPL-MCNC: 203 MG/DL (ref 65–99)
GLUCOSE SERPL-MCNC: 241 MG/DL (ref 65–99)
HCT VFR BLD AUTO: 52.6 % (ref 42–52)
HGB BLD-MCNC: 18.3 G/DL (ref 14–18)
IMM GRANULOCYTES # BLD AUTO: 0.05 K/UL (ref 0–0.11)
IMM GRANULOCYTES NFR BLD AUTO: 0.7 % (ref 0–0.9)
LIPASE SERPL-CCNC: 22 U/L (ref 11–82)
LYMPHOCYTES # BLD AUTO: 0.35 K/UL (ref 1–4.8)
LYMPHOCYTES NFR BLD: 4.6 % (ref 22–41)
MCH RBC QN AUTO: 27.3 PG (ref 27–33)
MCHC RBC AUTO-ENTMCNC: 34.8 G/DL (ref 32.3–36.5)
MCV RBC AUTO: 78.4 FL (ref 81.4–97.8)
MONOCYTES # BLD AUTO: 1.58 K/UL (ref 0–0.85)
MONOCYTES NFR BLD AUTO: 20.9 % (ref 0–13.4)
NEUTROPHILS # BLD AUTO: 5.5 K/UL (ref 1.82–7.42)
NEUTROPHILS NFR BLD: 72.6 % (ref 44–72)
NRBC # BLD AUTO: 0 K/UL
NRBC BLD-RTO: 0 /100 WBC (ref 0–0.2)
PLATELET # BLD AUTO: 316 K/UL (ref 164–446)
PMV BLD AUTO: 10.3 FL (ref 9–12.9)
POTASSIUM SERPL-SCNC: 3.7 MMOL/L (ref 3.6–5.5)
POTASSIUM SERPL-SCNC: 4 MMOL/L (ref 3.6–5.5)
PROT SERPL-MCNC: 6.7 G/DL (ref 6–8.2)
RBC # BLD AUTO: 6.71 M/UL (ref 4.7–6.1)
SODIUM SERPL-SCNC: 126 MMOL/L (ref 135–145)
SODIUM SERPL-SCNC: 131 MMOL/L (ref 135–145)
WBC # BLD AUTO: 7.6 K/UL (ref 4.8–10.8)

## 2024-09-14 PROCEDURE — 700105 HCHG RX REV CODE 258: Performed by: HOSPITALIST

## 2024-09-14 PROCEDURE — 99214 OFFICE O/P EST MOD 30 MIN: CPT | Mod: 25 | Performed by: NURSE PRACTITIONER

## 2024-09-14 PROCEDURE — 3078F DIAST BP <80 MM HG: CPT | Performed by: NURSE PRACTITIONER

## 2024-09-14 PROCEDURE — 82962 GLUCOSE BLOOD TEST: CPT | Mod: 91

## 2024-09-14 PROCEDURE — 700105 HCHG RX REV CODE 258: Performed by: EMERGENCY MEDICINE

## 2024-09-14 PROCEDURE — 99285 EMERGENCY DEPT VISIT HI MDM: CPT

## 2024-09-14 PROCEDURE — A9270 NON-COVERED ITEM OR SERVICE: HCPCS | Performed by: HOSPITALIST

## 2024-09-14 PROCEDURE — 36415 COLL VENOUS BLD VENIPUNCTURE: CPT

## 2024-09-14 PROCEDURE — 96374 THER/PROPH/DIAG INJ IV PUSH: CPT

## 2024-09-14 PROCEDURE — 80048 BASIC METABOLIC PNL TOTAL CA: CPT

## 2024-09-14 PROCEDURE — 87493 C DIFF AMPLIFIED PROBE: CPT

## 2024-09-14 PROCEDURE — 83690 ASSAY OF LIPASE: CPT

## 2024-09-14 PROCEDURE — 700111 HCHG RX REV CODE 636 W/ 250 OVERRIDE (IP): Mod: JZ | Performed by: EMERGENCY MEDICINE

## 2024-09-14 PROCEDURE — 3074F SYST BP LT 130 MM HG: CPT | Performed by: NURSE PRACTITIONER

## 2024-09-14 PROCEDURE — 80053 COMPREHEN METABOLIC PANEL: CPT

## 2024-09-14 PROCEDURE — 99223 1ST HOSP IP/OBS HIGH 75: CPT | Mod: AI | Performed by: HOSPITALIST

## 2024-09-14 PROCEDURE — 770001 HCHG ROOM/CARE - MED/SURG/GYN PRIV*

## 2024-09-14 PROCEDURE — 700111 HCHG RX REV CODE 636 W/ 250 OVERRIDE (IP): Mod: JZ | Performed by: HOSPITALIST

## 2024-09-14 PROCEDURE — 94760 N-INVAS EAR/PLS OXIMETRY 1: CPT

## 2024-09-14 PROCEDURE — 85025 COMPLETE CBC W/AUTO DIFF WBC: CPT

## 2024-09-14 PROCEDURE — 700102 HCHG RX REV CODE 250 W/ 637 OVERRIDE(OP): Performed by: HOSPITALIST

## 2024-09-14 PROCEDURE — 87507 IADNA-DNA/RNA PROBE TQ 12-25: CPT

## 2024-09-14 RX ORDER — ASPIRIN 81 MG/1
81 TABLET ORAL DAILY
Status: DISCONTINUED | OUTPATIENT
Start: 2024-09-14 | End: 2024-09-21 | Stop reason: HOSPADM

## 2024-09-14 RX ORDER — ONDANSETRON 4 MG/1
4 TABLET, ORALLY DISINTEGRATING ORAL EVERY 4 HOURS PRN
Status: DISCONTINUED | OUTPATIENT
Start: 2024-09-14 | End: 2024-09-21 | Stop reason: HOSPADM

## 2024-09-14 RX ORDER — LEVOTHYROXINE SODIUM 50 UG/1
50 TABLET ORAL
Status: DISCONTINUED | OUTPATIENT
Start: 2024-09-15 | End: 2024-09-21 | Stop reason: HOSPADM

## 2024-09-14 RX ORDER — OXYCODONE HYDROCHLORIDE 5 MG/1
5 TABLET ORAL
Status: DISCONTINUED | OUTPATIENT
Start: 2024-09-14 | End: 2024-09-21 | Stop reason: HOSPADM

## 2024-09-14 RX ORDER — FLUTICASONE PROPIONATE AND SALMETEROL 500; 50 UG/1; UG/1
1 POWDER RESPIRATORY (INHALATION) DAILY
COMMUNITY

## 2024-09-14 RX ORDER — POLYETHYLENE GLYCOL 3350 17 G/17G
1 POWDER, FOR SOLUTION ORAL
Status: DISCONTINUED | OUTPATIENT
Start: 2024-09-14 | End: 2024-09-14

## 2024-09-14 RX ORDER — HYDROMORPHONE HYDROCHLORIDE 1 MG/ML
0.25 INJECTION, SOLUTION INTRAMUSCULAR; INTRAVENOUS; SUBCUTANEOUS
Status: DISCONTINUED | OUTPATIENT
Start: 2024-09-14 | End: 2024-09-21 | Stop reason: HOSPADM

## 2024-09-14 RX ORDER — PROCHLORPERAZINE EDISYLATE 5 MG/ML
10 INJECTION INTRAMUSCULAR; INTRAVENOUS ONCE
Status: COMPLETED | OUTPATIENT
Start: 2024-09-14 | End: 2024-09-14

## 2024-09-14 RX ORDER — ONDANSETRON 2 MG/ML
4 INJECTION INTRAMUSCULAR; INTRAVENOUS ONCE
Status: COMPLETED | OUTPATIENT
Start: 2024-09-14 | End: 2024-09-14

## 2024-09-14 RX ORDER — SODIUM CHLORIDE, SODIUM LACTATE, POTASSIUM CHLORIDE, CALCIUM CHLORIDE 600; 310; 30; 20 MG/100ML; MG/100ML; MG/100ML; MG/100ML
INJECTION, SOLUTION INTRAVENOUS CONTINUOUS
Status: ACTIVE | OUTPATIENT
Start: 2024-09-14 | End: 2024-09-15

## 2024-09-14 RX ORDER — SODIUM CHLORIDE 9 MG/ML
1000 INJECTION, SOLUTION INTRAVENOUS ONCE
Status: COMPLETED | OUTPATIENT
Start: 2024-09-14 | End: 2024-09-15

## 2024-09-14 RX ORDER — SODIUM CHLORIDE, SODIUM LACTATE, POTASSIUM CHLORIDE, CALCIUM CHLORIDE 600; 310; 30; 20 MG/100ML; MG/100ML; MG/100ML; MG/100ML
INJECTION, SOLUTION INTRAVENOUS CONTINUOUS
Status: ACTIVE | OUTPATIENT
Start: 2024-09-15 | End: 2024-09-15

## 2024-09-14 RX ORDER — DEXTROSE MONOHYDRATE 25 G/50ML
25 INJECTION, SOLUTION INTRAVENOUS
Status: DISCONTINUED | OUTPATIENT
Start: 2024-09-14 | End: 2024-09-21 | Stop reason: HOSPADM

## 2024-09-14 RX ORDER — ENOXAPARIN SODIUM 100 MG/ML
40 INJECTION SUBCUTANEOUS DAILY
Status: DISCONTINUED | OUTPATIENT
Start: 2024-09-15 | End: 2024-09-21 | Stop reason: HOSPADM

## 2024-09-14 RX ORDER — ALBUTEROL SULFATE 90 UG/1
2 INHALANT RESPIRATORY (INHALATION) EVERY 4 HOURS PRN
Status: DISCONTINUED | OUTPATIENT
Start: 2024-09-14 | End: 2024-09-21 | Stop reason: HOSPADM

## 2024-09-14 RX ORDER — SODIUM BICARBONATE 650 MG/1
650 TABLET ORAL 3 TIMES DAILY
Status: DISCONTINUED | OUTPATIENT
Start: 2024-09-14 | End: 2024-09-15

## 2024-09-14 RX ORDER — LABETALOL HYDROCHLORIDE 5 MG/ML
10 INJECTION, SOLUTION INTRAVENOUS EVERY 4 HOURS PRN
Status: DISCONTINUED | OUTPATIENT
Start: 2024-09-14 | End: 2024-09-21 | Stop reason: HOSPADM

## 2024-09-14 RX ORDER — ACETAMINOPHEN 325 MG/1
650 TABLET ORAL EVERY 6 HOURS PRN
Status: DISCONTINUED | OUTPATIENT
Start: 2024-09-14 | End: 2024-09-21 | Stop reason: HOSPADM

## 2024-09-14 RX ORDER — ONDANSETRON 2 MG/ML
4 INJECTION INTRAMUSCULAR; INTRAVENOUS EVERY 4 HOURS PRN
Status: DISCONTINUED | OUTPATIENT
Start: 2024-09-14 | End: 2024-09-21 | Stop reason: HOSPADM

## 2024-09-14 RX ORDER — ONDANSETRON 4 MG/1
4 TABLET, ORALLY DISINTEGRATING ORAL EVERY 6 HOURS PRN
Qty: 10 TABLET | Refills: 0 | Status: SHIPPED | OUTPATIENT
Start: 2024-09-14

## 2024-09-14 RX ORDER — SODIUM CHLORIDE, SODIUM LACTATE, POTASSIUM CHLORIDE, CALCIUM CHLORIDE 600; 310; 30; 20 MG/100ML; MG/100ML; MG/100ML; MG/100ML
INJECTION, SOLUTION INTRAVENOUS CONTINUOUS
Status: ACTIVE | OUTPATIENT
Start: 2024-09-15 | End: 2024-09-16

## 2024-09-14 RX ORDER — OXYCODONE HYDROCHLORIDE 5 MG/1
2.5 TABLET ORAL
Status: DISCONTINUED | OUTPATIENT
Start: 2024-09-14 | End: 2024-09-21 | Stop reason: HOSPADM

## 2024-09-14 RX ORDER — AMOXICILLIN 250 MG
2 CAPSULE ORAL 2 TIMES DAILY
Status: DISCONTINUED | OUTPATIENT
Start: 2024-09-14 | End: 2024-09-14

## 2024-09-14 RX ORDER — ACETAMINOPHEN 500 MG
1000 TABLET ORAL EVERY 6 HOURS PRN
COMMUNITY

## 2024-09-14 RX ORDER — AMLODIPINE BESYLATE 5 MG/1
5 TABLET ORAL
Status: DISCONTINUED | OUTPATIENT
Start: 2024-09-14 | End: 2024-09-21 | Stop reason: HOSPADM

## 2024-09-14 RX ORDER — DIPHENOXYLATE HCL/ATROPINE 2.5-.025MG
1 TABLET ORAL 4 TIMES DAILY PRN
Qty: 12 TABLET | Refills: 0 | Status: ON HOLD | OUTPATIENT
Start: 2024-09-14 | End: 2024-09-20

## 2024-09-14 RX ADMIN — SODIUM BICARBONATE 650 MG: 650 TABLET ORAL at 17:29

## 2024-09-14 RX ADMIN — SODIUM CHLORIDE 1000 ML: 9 INJECTION, SOLUTION INTRAVENOUS at 12:11

## 2024-09-14 RX ADMIN — PROCHLORPERAZINE EDISYLATE 10 MG: 5 INJECTION INTRAMUSCULAR; INTRAVENOUS at 11:23

## 2024-09-14 RX ADMIN — SODIUM CHLORIDE 1000 ML: 9 INJECTION, SOLUTION INTRAVENOUS at 12:10

## 2024-09-14 RX ADMIN — ONDANSETRON 4 MG: 2 INJECTION INTRAMUSCULAR; INTRAVENOUS at 10:12

## 2024-09-14 RX ADMIN — OMEPRAZOLE 20 MG: 20 CAPSULE, DELAYED RELEASE ORAL at 17:29

## 2024-09-14 RX ADMIN — SODIUM BICARBONATE 650 MG: 650 TABLET ORAL at 20:36

## 2024-09-14 RX ADMIN — INSULIN HUMAN 2 UNITS: 100 INJECTION, SOLUTION PARENTERAL at 18:06

## 2024-09-14 RX ADMIN — ONDANSETRON 4 MG: 2 INJECTION INTRAMUSCULAR; INTRAVENOUS at 15:32

## 2024-09-14 RX ADMIN — ASPIRIN 81 MG: 81 TABLET, COATED ORAL at 17:28

## 2024-09-14 RX ADMIN — INSULIN HUMAN 1 UNITS: 100 INJECTION, SOLUTION PARENTERAL at 20:39

## 2024-09-14 RX ADMIN — SODIUM CHLORIDE, POTASSIUM CHLORIDE, SODIUM LACTATE AND CALCIUM CHLORIDE: 600; 310; 30; 20 INJECTION, SOLUTION INTRAVENOUS at 14:45

## 2024-09-14 RX ADMIN — AMLODIPINE BESYLATE 5 MG: 5 TABLET ORAL at 17:29

## 2024-09-14 RX ADMIN — SODIUM CHLORIDE, POTASSIUM CHLORIDE, SODIUM LACTATE AND CALCIUM CHLORIDE: 600; 310; 30; 20 INJECTION, SOLUTION INTRAVENOUS at 19:52

## 2024-09-14 SDOH — ECONOMIC STABILITY: TRANSPORTATION INSECURITY
IN THE PAST 12 MONTHS, HAS THE LACK OF TRANSPORTATION KEPT YOU FROM MEDICAL APPOINTMENTS OR FROM GETTING MEDICATIONS?: NO

## 2024-09-14 SDOH — ECONOMIC STABILITY: TRANSPORTATION INSECURITY
IN THE PAST 12 MONTHS, HAS LACK OF RELIABLE TRANSPORTATION KEPT YOU FROM MEDICAL APPOINTMENTS, MEETINGS, WORK OR FROM GETTING THINGS NEEDED FOR DAILY LIVING?: NO

## 2024-09-14 ASSESSMENT — COGNITIVE AND FUNCTIONAL STATUS - GENERAL
SUGGESTED CMS G CODE MODIFIER MOBILITY: CJ
TOILETING: A LITTLE
SUGGESTED CMS G CODE MODIFIER DAILY ACTIVITY: CI
CLIMB 3 TO 5 STEPS WITH RAILING: A LITTLE
MOBILITY SCORE: 22
DAILY ACTIVITIY SCORE: 23
WALKING IN HOSPITAL ROOM: A LITTLE

## 2024-09-14 ASSESSMENT — LIFESTYLE VARIABLES
DOES PATIENT WANT TO STOP DRINKING: NO
CONSUMPTION TOTAL: NEGATIVE
TOTAL SCORE: 0
ALCOHOL_USE: YES
EVER FELT BAD OR GUILTY ABOUT YOUR DRINKING: NO
ON A TYPICAL DAY WHEN YOU DRINK ALCOHOL HOW MANY DRINKS DO YOU HAVE: 3
EVER HAD A DRINK FIRST THING IN THE MORNING TO STEADY YOUR NERVES TO GET RID OF A HANGOVER: NO
TOTAL SCORE: 0
HAVE PEOPLE ANNOYED YOU BY CRITICIZING YOUR DRINKING: NO
TOTAL SCORE: 0
HOW MANY TIMES IN THE PAST YEAR HAVE YOU HAD 5 OR MORE DRINKS IN A DAY: 0
HAVE YOU EVER FELT YOU SHOULD CUT DOWN ON YOUR DRINKING: NO
AVERAGE NUMBER OF DAYS PER WEEK YOU HAVE A DRINK CONTAINING ALCOHOL: 0

## 2024-09-14 ASSESSMENT — ENCOUNTER SYMPTOMS
FOCAL WEAKNESS: 0
CHILLS: 0
HEARTBURN: 1
SHORTNESS OF BREATH: 0
EYE REDNESS: 0
EYE DISCHARGE: 0
ABDOMINAL PAIN: 1
HEADACHES: 0
FEVER: 0
MYALGIAS: 0
VOMITING: 1
DIARRHEA: 1
FLANK PAIN: 0
COUGH: 0
MYALGIAS: 1
CHILLS: 0
ABDOMINAL PAIN: 1
BLOOD IN STOOL: 0
NERVOUS/ANXIOUS: 0
DIZZINESS: 1
NAUSEA: 1
WEIGHT LOSS: 1
STRIDOR: 0
BRUISES/BLEEDS EASILY: 0
DIARRHEA: 1
FEVER: 0
COUGH: 0
NAUSEA: 1
VOMITING: 1

## 2024-09-14 ASSESSMENT — FIBROSIS 4 INDEX
FIB4 SCORE: 1.1
FIB4 SCORE: 0.98
FIB4 SCORE: 0.98

## 2024-09-14 ASSESSMENT — SOCIAL DETERMINANTS OF HEALTH (SDOH)
WITHIN THE PAST 12 MONTHS, THE FOOD YOU BOUGHT JUST DIDN'T LAST AND YOU DIDN'T HAVE MONEY TO GET MORE: NEVER TRUE
WITHIN THE PAST 12 MONTHS, YOU WORRIED THAT YOUR FOOD WOULD RUN OUT BEFORE YOU GOT THE MONEY TO BUY MORE: NEVER TRUE
WITHIN THE LAST YEAR, HAVE YOU BEEN KICKED, HIT, SLAPPED, OR OTHERWISE PHYSICALLY HURT BY YOUR PARTNER OR EX-PARTNER?: NO
WITHIN THE LAST YEAR, HAVE YOU BEEN AFRAID OF YOUR PARTNER OR EX-PARTNER?: NO
WITHIN THE LAST YEAR, HAVE TO BEEN RAPED OR FORCED TO HAVE ANY KIND OF SEXUAL ACTIVITY BY YOUR PARTNER OR EX-PARTNER?: NO
IN THE PAST 12 MONTHS, HAS THE ELECTRIC, GAS, OIL, OR WATER COMPANY THREATENED TO SHUT OFF SERVICE IN YOUR HOME?: NO
WITHIN THE LAST YEAR, HAVE YOU BEEN HUMILIATED OR EMOTIONALLY ABUSED IN OTHER WAYS BY YOUR PARTNER OR EX-PARTNER?: NO

## 2024-09-14 ASSESSMENT — PATIENT HEALTH QUESTIONNAIRE - PHQ9
2. FEELING DOWN, DEPRESSED, IRRITABLE, OR HOPELESS: NOT AT ALL
1. LITTLE INTEREST OR PLEASURE IN DOING THINGS: NOT AT ALL
SUM OF ALL RESPONSES TO PHQ9 QUESTIONS 1 AND 2: 0

## 2024-09-14 ASSESSMENT — PAIN DESCRIPTION - PAIN TYPE
TYPE: ACUTE PAIN
TYPE: ACUTE PAIN

## 2024-09-14 NOTE — PROGRESS NOTES
Subjective     Aly Derick Hewitt is a 63 y.o. male who presents with Fatigue (Vomiting, diarrhea, body aches x 5 days /)            HPI  New problem.  Patient is a very pleasant 63-year-old male who presents with a 5-day history of fatigue, vomiting, diarrhea, body aches, and abdominal cramping.  He denies fever, chills, congestion, or cough.  He initially thought this was due to the hepatitis B vaccine that he got on the third and then thought he may have gotten food poisoning on Tuesday.  His last episode of vomiting and diarrhea both were today.  He is at the point where he is mostly dry heaving.  He has been unable to tolerate p.o. intake.  He has not taken any medications other than Pepto and Pepcid for his symptoms.    Kiwi extract, Shellfish allergy, Strawberry, Ozempic (0.25 or 0.5 mg-dose) [semaglutide], Sulfa drugs, and Testosterone  Current Outpatient Medications on File Prior to Visit   Medication Sig Dispense Refill    triamcinolone acetonide (KENALOG) 0.1 % Cream Apply to affected area twice daily until resolved 80 g 1    hydrOXYzine HCl (ATARAX) 25 MG Tab Take 1 Tablet by mouth at bedtime as needed for Itching. 30 Tablet 0    Empagliflozin (JARDIANCE) 25 MG Tab Take 1 Tablet by mouth every day. 90 Tablet 3    Continuous Glucose Sensor (FREESTYLE LUNA 14 DAY SENSOR) Misc 1 Application every 14 days. 6 Each 3    montelukast (SINGULAIR) 10 MG Tab Take 1 Tablet by mouth every evening. 90 Tablet 3    tizanidine (ZANAFLEX) 4 MG Tab Take 1 Tablet by mouth at bedtime as needed (muscle spasms). 90 Tablet 0    VENTOLIN  (90 Base) MCG/ACT Aero Soln inhalation aerosol USE 2 INHALATIONS EVERY 4 HOURS AS NEEDED FOR SHORTNESS OF BREATH 54 g 3    Dulaglutide (TRULICITY) 1.5 MG/0.5ML Solution Pen-injector INJECT 0.5 MLS UNDER THE SKIN EVERY 7 DAYS 6 mL 3    levothyroxine (SYNTHROID) 50 MCG Tab Take 1 Tablet by mouth every morning on an empty stomach. 90 Tablet 2    metFORMIN ER (GLUCOPHAGE XR) 500 MG TABLET  SR 24 HR Take 1 Tablet by mouth 2 times a day. 180 Tablet 3    EPINEPHrine (EPIPEN) 0.3 MG/0.3ML Solution Auto-injector solution for injection Inject 0.3 mL into the thigh one time for 1 dose. 2 Each 2    pantoprazole (PROTONIX) 20 MG tablet Take 1 Tablet by mouth every day. 90 Tablet 3    metoprolol SR (TOPROL XL) 25 MG TABLET SR 24 HR Take 1 Tablet by mouth every day. 90 Tablet 3    aspirin 81 MG EC tablet 81 mg by oral route.      atorvastatin (LIPITOR) 80 MG tablet Take 1 Tablet by mouth every evening. 90 Tablet 3    Blood Glucose Monitoring Suppl (FREESTYLE LITE) w/Device Kit USE DAILY AS DIRECTED      Blood Glucose Test Strips Test strips order:  Freestyle Freedom Lite test strips  testing one time per day 100 Strip 2    ALPHA LIPOIC ACID PO Take 600 mg by mouth every day.      Omega-3 Fatty Acids (FISH OIL) 1200 MG CAPS Take 1 Capful by mouth every day.      Coenzyme Q10 200 MG Cap Take  by mouth every day.        Cholecalciferol (VITAMIN D) 2000 UNIT TABS Take  by mouth 2 times a day.      cetirizine (ZYRTEC) 10 MG Tab Take 10 mg by mouth every day.       No current facility-administered medications on file prior to visit.     Social History     Socioeconomic History    Marital status:      Spouse name: Not on file    Number of children: Not on file    Years of education: Not on file    Highest education level: Not on file   Occupational History    Not on file   Tobacco Use    Smoking status: Former     Current packs/day: 0.00     Types: Cigarettes     Quit date:      Years since quittin.7    Smokeless tobacco: Never    Tobacco comments:     occasional cigars   Vaping Use    Vaping status: Never Used   Substance and Sexual Activity    Alcohol use: Not Currently     Comment: If and when I drink it's only social    Drug use: Yes     Types: Marijuana, Inhaled, Oral     Comment: THC/ Occ    Sexual activity: Not on file     Comment: ; 2 biological kids (2 of his wife's); 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      Review of Systems   Constitutional:  Positive for malaise/fatigue and weight loss. Negative for chills and fever.   HENT:  Negative for congestion.    Respiratory:  Negative for cough.    Gastrointestinal:  Positive for abdominal pain, diarrhea, heartburn, nausea and vomiting. Negative for blood in stool and melena.   Musculoskeletal:  Positive for myalgias.   Neurological:  Positive for dizziness. Negative for headaches.              Objective     /74   Pulse (!) 111   Temp 35.9 °C (96.7 °F) (Temporal)   Resp 18   Ht 1.753 m (5' 9\")   Wt 72.6 kg (160 lb)   SpO2 99%   BMI 23.63 kg/m²      Physical Exam  Vitals and nursing note reviewed.   Constitutional:       General: He is not in acute distress.     Appearance: He is well-developed. He is ill-appearing.   Cardiovascular:      Rate and Rhythm: Regular rhythm. Tachycardia present.      Heart sounds: Normal heart sounds. No murmur heard.  Pulmonary:      Effort: Pulmonary effort is normal. No respiratory distress.      Breath sounds: Normal breath sounds.   Abdominal:      General: Bowel sounds are normal. There is no distension.      Palpations: Abdomen is soft.      Tenderness: There is no abdominal tenderness.   Musculoskeletal:         General: Normal range of motion.      Comments: Moves all 4 extremities normally   Skin:     General: Skin is warm and dry.   Neurological:      Mental Status: He is alert and oriented to person, place, and time.   Psychiatric:         Behavior: " Behavior normal.         Thought Content: Thought content normal.                             Assessment & Plan        Assessment & Plan  AGE (acute gastroenteritis)    Orders:    ondansetron (Zofran) syringe/vial injection 4 mg    ondansetron (ZOFRAN ODT) 4 MG TABLET DISPERSIBLE; Take 1 Tablet by mouth every 6 hours as needed for Nausea/Vomiting.    diphenoxylate-atropine (LOMOTIL) 2.5-0.025 MG Tab; Take 1 Tablet by mouth 4 times a day as needed for Diarrhea for up to 3 days.          Patient going to ED for further evaluation due to tachycardia and weight loss. Unfortunately I cannot hydrate him here in clinic. Zofran given IM prior to discharge. He has friend driving him. Report to [facility].

## 2024-09-14 NOTE — PROGRESS NOTES
Received report from ER Nurse at 1430. Patient to floor via wheelchair. Admission care rendered. Placed patient comfortably on bed. Patient is awake and alert x 4.Reports nausea, received antinausea medication earlier. Basic assessment rendered.  Fall precautions observe, hourly rounding ongoing.    1718- stool sample sent to lab    ISOLATION PRECAUTIONS EDUCATION    Educated PATIENT, FAMILY, S.O: patient, family member, and other on isolation for C DIFFICILE.    Educated on reason for isolation, how the infection may be transmitted, and how to help prevent transmission to others. Educated precautions involves staff and visitors wearing PPE, following Standard Precautions and performing meticulous hand hygiene in order to prevent transmission of infection.     Special Contact Precautions: Educated that Special Contact Precautions involves staff and visitors wearing gowns and gloves when in the patient room, and using soap and water for hand hygiene when exiting patient’s room.     Educated that patient may leave the room if they or continent of stool, but prior to exiting the patient room each time, the patient needs to have on a fresh patient gown, ensure the potentially infectious area is covered, and perform hand hygiene with soap and water immediately prior to exiting the room.    In addition, educated that alcohol based hand rub is NOT sufficient for hand hygiene for Special Contact Precautions. A bonnet is placed over the hand  dispenser inside the patients’ room as a reminder to wash hands with soap and water.     Patient transport and mobilization on unit  Educated that they may leave their room, but prior to exiting, the patient needs to have on a fresh patient gown, ensure the potentially infectious area is covered, performing appropriate hand hygiene immediately prior to exiting the room.

## 2024-09-14 NOTE — PROGRESS NOTES
4 Eyes Skin Assessment Completed by TANYA Bae and TANYA Price.    Head WDL  Ears WDL  Nose WDL  Mouth WDL  Neck WDL  Breast/Chest WDL  Shoulder Blades WDL  Spine WDL  (R) Arm/Elbow/Hand WDL  (L) Arm/Elbow/Hand WDL  Abdomen WDL  Groin WDL  Scrotum/Coccyx/Buttocks WDL  (R) Leg WDL  (L) Leg WDL  (R) Heel/Foot/Toe WDL  (L) Heel/Foot/Toe WDL          Devices In Places Pulse Ox      Interventions In Place N/A    Possible Skin Injury No    Pictures Uploaded Into Epic N/A  Wound Consult Placed N/A  RN Wound Prevention Protocol Ordered No

## 2024-09-14 NOTE — ED TRIAGE NOTES
Chief Complaint   Patient presents with    Nausea/Vomiting/Diarrhea    Abdominal Pain     Bilat lower quads. Reports N/V/D since Tuesday. Denies noted blood in emesis or stools. Denies known fevers or dysuria.      Physical Exam  Pulmonary:      Effort: Pulmonary effort is normal.   Skin:     General: Skin is warm and dry.   Neurological:      Mental Status: He is alert.

## 2024-09-14 NOTE — H&P
[facility] Medicine History & Physical Note    Date of Service  9/14/2024    Primary Care Physician  MARTHA Butts.    Consultants  None     Code Status  Full Code    Chief Complaint  Chief Complaint   Patient presents with    Nausea/Vomiting/Diarrhea    Abdominal Pain     Bilat lower quads. Reports N/V/D since Tuesday. Denies noted blood in emesis or stools. Denies known fevers or dysuria.      History of Presenting Illness  Barron Hewitt is a 63 y.o. male with a past medical history of diabetes, primary hypertension and hypothyroidism who presented 9/14/2024 with diarrhea, generalized weakness, nausea and vomiting.  Patient has not been feeling well over the past 2-3 days.  He has been having progressively worsening diarrhea, crampy abdominal pain with nausea and vomiting.  In the emergency room he was found to have severe dehydration and hyponatremia.      I discussed the plan of care with emergency physician, and the patient    Review of Systems  Review of Systems   Constitutional:  Positive for malaise/fatigue. Negative for chills and fever.   Eyes:  Negative for discharge and redness.   Respiratory:  Negative for cough, shortness of breath and stridor.    Cardiovascular:  Negative for chest pain and leg swelling.   Gastrointestinal:  Positive for abdominal pain, diarrhea, nausea and vomiting.   Genitourinary:  Negative for flank pain.   Musculoskeletal:  Negative for myalgias.   Skin: Negative.    Neurological:  Negative for focal weakness.   Endo/Heme/Allergies:  Does not bruise/bleed easily.   Psychiatric/Behavioral:  The patient is not nervous/anxious.      Past Medical History   has a past medical history of Abnormal nuclear cardiac imaging test (1/31/2014), Allergy, Anesthesia, Anginal syndrome (HCC), ASTHMA, CHEST PAIN (6/15/2011), Chest pain at rest (1/31/2014), Coronary-myocardial bridge (11/28/2012), Diabetes (2009), High cholesterol, Hyperlipidemia, Hypertension, Mild intermittent  asthma without complication (7/27/2017), Myocardial bridge, PONV (postoperative nausea and vomiting), Sleep apnea, and Snoring.    Surgical History   has a past surgical history that includes sinusotomies (1990's); knee arthroscopy (1990's); tumor excision with biopsy (1990's); shoulder arthroscopy (1990's); ventral hernia repair laparoscopic (11/01/2012); recovery (04/08/2016); shoulder decompression arthroscopic (Left, 01/04/2018); shoulder arthroscopy w/ bicipital tenodesis repair (Left, 01/04/2018); clavicle distal excision (Left, 01/04/2018); pr njx aa&/strd tfrml epi lumbar/sacral 1 level (Left, 02/15/2022); block epidural steroid injection (Left, 03/22/2022); pr transurethral elec-surg prostatectom (N/A, 05/27/2022); lumbar medial branch blocks (Bilateral, 06/14/2022); pr inj dx/ther agnt paravert facet joint, yael* (Bilateral, 06/21/2022); lumbar medial branch blocks (Bilateral, 06/21/2022); conscious sedation (Bilateral, 06/21/2022); radio frequency ablation additional level (Bilateral, 07/06/2022); other abdominal surgery (10-); cervical disk and fusion anterior (5/26/2023); and lumbar laminectomy diskectomy (5/26/2023).     Family History  family history includes Arthritis in his brother and father; Breast Cancer in his mother; Cancer in his mother and another family member; Diabetes in his father; Heart Disease in his mother and another family member; Hypertension in his father, mother, and another family member; No Known Problems in his brother and sister.      Social History   reports that he quit smoking about 4 years ago. His smoking use included cigarettes. He has never used smokeless tobacco. He reports that he does not currently use alcohol. He reports current drug use. Drugs: Marijuana, Inhaled, and Oral.    Allergies  Allergies   Allergen Reactions    Kiwi Extract Anaphylaxis    Shellfish Allergy Anaphylaxis and Unspecified    Strawberry Anaphylaxis and Unspecified    Ozempic (0.25 Or 0.5  Mg-Dose) [Semaglutide] Nausea    Sulfa Drugs Rash and Unspecified     rash    Testosterone Rash     rash     Medications  Prior to Admission Medications   Prescriptions Last Dose Informant Patient Reported? Taking?   ALPHA LIPOIC ACID PO  Patient Yes No   Sig: Take 600 mg by mouth every day.   Blood Glucose Monitoring Suppl (FREESTYLE LITE) w/Device Kit  Patient Yes No   Sig: USE DAILY AS DIRECTED   Blood Glucose Test Strips  Patient No No   Sig: Test strips order:  Freestyle Freedom Lite test strips  testing one time per day   Cholecalciferol (VITAMIN D) 2000 UNIT TABS  Patient Yes No   Sig: Take  by mouth 2 times a day.   Coenzyme Q10 200 MG Cap  Patient Yes No   Sig: Take  by mouth every day.     Continuous Glucose Sensor (FREESTYLE LUNA 14 DAY SENSOR) Misc   No No   Si Application every 14 days.   Dulaglutide (TRULICITY) 1.5 MG/0.5ML Solution Pen-injector   No No   Sig: INJECT 0.5 MLS UNDER THE SKIN EVERY 7 DAYS   EPINEPHrine (EPIPEN) 0.3 MG/0.3ML Solution Auto-injector solution for injection   No No   Sig: Inject 0.3 mL into the thigh one time for 1 dose.   Empagliflozin (JARDIANCE) 25 MG Tab   No No   Sig: Take 1 Tablet by mouth every day.   Omega-3 Fatty Acids (FISH OIL) 1200 MG CAPS  Patient Yes No   Sig: Take 1 Capful by mouth every day.   VENTOLIN  (90 Base) MCG/ACT Aero Soln inhalation aerosol   No No   Sig: USE 2 INHALATIONS EVERY 4 HOURS AS NEEDED FOR SHORTNESS OF BREATH   aspirin 81 MG EC tablet   Yes No   Si mg by oral route.   atorvastatin (LIPITOR) 80 MG tablet   No No   Sig: Take 1 Tablet by mouth every evening.   cetirizine (ZYRTEC) 10 MG Tab  Patient Yes No   Sig: Take 10 mg by mouth every day.   diphenoxylate-atropine (LOMOTIL) 2.5-0.025 MG Tab   No No   Sig: Take 1 Tablet by mouth 4 times a day as needed for Diarrhea for up to 3 days.   hydrOXYzine HCl (ATARAX) 25 MG Tab   No No   Sig: Take 1 Tablet by mouth at bedtime as needed for Itching.   levothyroxine (SYNTHROID) 50 MCG  Tab   No No   Sig: Take 1 Tablet by mouth every morning on an empty stomach.   metFORMIN ER (GLUCOPHAGE XR) 500 MG TABLET SR 24 HR   No No   Sig: Take 1 Tablet by mouth 2 times a day.   metoprolol SR (TOPROL XL) 25 MG TABLET SR 24 HR   No No   Sig: Take 1 Tablet by mouth every day.   montelukast (SINGULAIR) 10 MG Tab   No No   Sig: Take 1 Tablet by mouth every evening.   ondansetron (ZOFRAN ODT) 4 MG TABLET DISPERSIBLE   No No   Sig: Take 1 Tablet by mouth every 6 hours as needed for Nausea/Vomiting.   pantoprazole (PROTONIX) 20 MG tablet   No No   Sig: Take 1 Tablet by mouth every day.   tizanidine (ZANAFLEX) 4 MG Tab   No No   Sig: Take 1 Tablet by mouth at bedtime as needed (muscle spasms).   triamcinolone acetonide (KENALOG) 0.1 % Cream   No No   Sig: Apply to affected area twice daily until resolved      Facility-Administered Medications: None     Physical Exam  Temp:  [35.9 °C (96.7 °F)-36.9 °C (98.5 °F)] 36.9 °C (98.5 °F)  Pulse:  [] 82  Resp:  [18] 18  BP: (110-191)/(64-99) 161/93  SpO2:  [95 %-99 %] 96 %  Blood Pressure : (!) 155/75   Temperature: 36.6 °C (97.9 °F)   Pulse: 100   Respiration: 18   Pulse Oximetry: 99 %     Physical Exam  Constitutional:       General: He is not in acute distress.     Appearance: He is not ill-appearing or diaphoretic.   HENT:      Head: Atraumatic.      Right Ear: External ear normal.      Left Ear: External ear normal.      Nose: No congestion or rhinorrhea.      Mouth/Throat:      Mouth: Mucous membranes are dry.   Eyes:      General: No scleral icterus.        Right eye: No discharge.         Left eye: No discharge.      Pupils: Pupils are equal, round, and reactive to light.   Cardiovascular:      Rate and Rhythm: Regular rhythm. Tachycardia present.   Pulmonary:      Effort: Pulmonary effort is normal.   Abdominal:      General: There is no distension.      Tenderness: There is abdominal tenderness. There is no guarding or rebound.   Musculoskeletal:      Cervical  "back: Neck supple. No rigidity. No muscular tenderness.      Right lower leg: No edema.      Left lower leg: No edema.   Skin:     Capillary Refill: Capillary refill takes 2 to 3 seconds.      Coloration: Skin is not jaundiced or pale.   Neurological:      Mental Status: He is alert and oriented to person, place, and time.      Coordination: Coordination normal.   Psychiatric:         Mood and Affect: Mood normal.         Behavior: Behavior normal.       Laboratory:  Recent Labs     09/14/24  1117   WBC 7.6   RBC 6.71*   HEMOGLOBIN 18.3*   HEMATOCRIT 52.6*   MCV 78.4*   MCH 27.3   MCHC 34.8   RDW 42.9   PLATELETCT 316   MPV 10.3     Recent Labs     09/14/24  1117   SODIUM 126*   POTASSIUM 4.0   CHLORIDE 86*   CO2 17*   GLUCOSE 241*   BUN 27*   CREATININE 0.79   CALCIUM 8.8     Recent Labs     09/14/24  1117   ALTSGPT 14   ASTSGOT 20   ALKPHOSPHAT 79   TBILIRUBIN 0.9   LIPASE 22   GLUCOSE 241*         No results for input(s): \"NTPROBNP\" in the last 72 hours.      No results for input(s): \"TROPONINT\" in the last 72 hours.    Imaging:  No orders to display     Assessment/Plan:  Justification for Admission Status  I anticipate this patient will require at least two midnights for appropriate medical management, necessitating inpatient admission because the patient has multiple acute medical problems including dehydration, hyperglycemia, hyponatremia secondary to diarrhea.  The patient will require intravenous fluids, close monitoring of glucose and sodium levels, and further workup for diarrhea    Patient will need a Med/Surg bed on MEDICAL service      * Hyponatremia- (present on admission)  Assessment & Plan  Hypovolemic hyponatremia  I expect Na to improve with IVF     Hypertensive urgency- (present on admission)  Assessment & Plan  Likely secondary to severe pain  Multimodal pain control  I will start as needed labetalol for extreme hypertension     Diarrhea- (present on admission)  Assessment & Plan  Differentials " include viral infection, C. difficile colitis    I will check stool for C. difficile toxins.  I will check a GI panel   Supportive care with intravenous fluids monitor electrolytes and replace accordingly     Dehydration- (present on admission)  Assessment & Plan  Likely secondary to reduced oral intake, and increase in losses secondary to diarrhea.  Encourage oral intake as tolerated, antiemetics as needed.  Intravenous hydration until adequate oral intake is achieved.    Polycythemia- (present on admission)  Assessment & Plan  Likely secondary to severe dehydration  I will start intravenous fluids  I anticipate improvement with intravenous fluids  I will order a follow-up CBC      Metabolic acidosis- (present on admission)  Assessment & Plan  Likely due to reduced intravascular volume and increase bicarb losses through diarrhea    I will start intravenous fluids and bicarb  Anticipate improvement with intravenous fluids  Continue to monitor, I will order follow-up bicarb level        Controlled type 2 diabetes mellitus with diabetic polyneuropathy, without long-term current use of insulin (HCC)- (present on admission)  Assessment & Plan  With hyperglycemia  Last glycated hemoglobin was 7.3%  I will start short acting insulin for now  I will order Accu-Checks, hypoglycemia protocol  Adjust according to blood sugars trend     Asthma- (present on admission)  Assessment & Plan  Not currently in exacerbation.  Oxygen as needed.  Resume home inhalers    Acquired hypothyroidism- (present on admission)  Assessment & Plan  I will start levothyroxine    Dyslipidemia- (present on admission)  Assessment & Plan  I will hold home atorvastatin for now given his GI symptoms.  Cardiac diet when able to take orally.      VTE prophylaxis: SCDs/TEDs and enoxaparin ppx

## 2024-09-14 NOTE — CARE PLAN
The patient is Watcher - Medium risk of patient condition declining or worsening    Shift Goals  Clinical Goals: Patient will less n/v, able to tolertae current diet  Patient Goals: no more nausea    Progress made toward(s) clinical / shift goals:  Patient is still having n/v, will give PRN anti nausea medication.  Unable to take food, tried small amount of jello.  Had 2 x BM today, stool sample sent to lab  for Cdiff by PCR and GI panel, result to ff up    Patient is not progressing towards the following goals:    Problem: Knowledge Deficit - Standard  Goal: Patient and family/care givers will demonstrate understanding of plan of care, disease process/condition, diagnostic tests and medications  Outcome: Progressing     Problem: Fall Risk  Goal: Patient will remain free from falls  Outcome: Progressing     Problem: Psychosocial  Goal: Patient's level of anxiety will decrease  Outcome: Progressing  Goal: Patient's ability to verbalize feelings about condition will improve  Outcome: Progressing     Problem: Fluid Volume  Goal: Fluid volume balance will be maintained  Outcome: Progressing     Problem: Nutrition  Goal: Patient's nutritional and fluid intake will be adequate or improve  Outcome: Progressing     Problem: Bowel Elimination  Goal: Establish and maintain regular bowel function  Outcome: Progressing     Problem: Gastrointestinal Irritability  Goal: Nausea and vomiting will be absent or improve  Outcome: Progressing  Goal: Diarrhea will be absent or improved  Outcome: Progressing     Problem: Diabetes Management  Goal: Patient will achieve and maintain glucose in satisfactory range  Outcome: Progressing     Problem: Discharge Planning - Diabetes  Goal: Patient's continuum of care needs will be met  Outcome: Progressing     Problem: Skin Integrity - Diabetes  Goal: Patient's skin on legs and feet will remain intact while hospitalized  Outcome: Progressing

## 2024-09-14 NOTE — ED NOTES
Medication history reviewed with pt. Med rec is complete.  Allergies reviewed, per pt    Pt report that he does not take METOPROLOL SR 25MG, TIZANIDINE 4MG or HYDROXYZINE 25MG in the last 30 days or longer.    Patient has not had any outpatient antibiotics in the last 30 days.    Pt is not on any anticoagulants

## 2024-09-14 NOTE — ED PROVIDER NOTES
"ER Provider Note    Scribed for Trent Sow D.O. by Eva Diane. 9/14/2024  11:14 AM    Primary Care Provider: NIRANJAN Butts    CHIEF COMPLAINT  Chief Complaint   Patient presents with    Nausea/Vomiting/Diarrhea    Abdominal Pain     Bilat lower quads. Reports N/V/D since Tuesday. Denies noted blood in emesis or stools. Denies known fevers or dysuria.        HPI/ROS    Barron Hewitt is a 63 y.o. male who presents to the Emergency Department via EMS for nausea and vomiting onset four days ago. The patient states he received a Hepatitis B vaccine four days ago and began having nausea and vomiting soon after. He endorses additional dry heaving, stomach cramping, and diarrhea but denies fever. The patient reports he was medicated with Zofran by EMS en route to the ED.    ROS as per HPI.    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 Nicolle TEMPLE (postoperative nausea and vomiting)     Sleep apnea     has CPAP    Snoring        SURGICAL HISTORY  Past Surgical History:   Procedure Laterality Date    CERVICAL DISK AND FUSION ANTERIOR  5/26/2023    Procedure: ANTERIOR C5-7 DISCECTOMY AND FUSION;  Surgeon: Segun Cochran M.D.;  Location: SURGERY [facility];  Service: Neurosurgery    LUMBAR LAMINECTOMY DISKECTOMY  5/26/2023    Procedure: LAMINECTOMY, SPINE, LUMBAR, WITH DISCECTOMY POSTERIOR L4-S1 LAMINECTOMIES;  Surgeon: Segun Cochran M.D.;  Location: SURGERY [facility];  Service: Neurosurgery    RADIO FREQUENCY ABLATION ADDITIONAL " LEVEL Bilateral 07/06/2022    Procedure: BILATERAL radiofrequency neurotomies medial branch targeting the L3-4, L4-5 and L5-S1 facet joints with fluoroscopic guidance and sedation. Plan for 80 degree C for 90 seconds for each neurotomy.;  Surgeon: Dragan Ayala M.D.;  Location: [facility];  Service: Pain Management    RI INJ DX/THER AGNT PARAVERT FACET JOINT, DEVON* Bilateral 06/21/2022    Procedure: Diagnostic medial branch blocks targeting the BILATERAL L3-4, L4-5 and L5-S1 facet joints with fluoroscopic guidance #2;  Surgeon: Dragan Ayala M.D.;  Location: [facility];  Service: Pain Management    LUMBAR MEDIAL BRANCH BLOCKS Bilateral 06/21/2022    Procedure: .;  Surgeon: Dragan Ayala M.D.;  Location: [facility];  Service: Pain Management    CONSCIOUS SEDATION Bilateral 06/21/2022    Procedure: .;  Surgeon: Dragan Ayala M.D.;  Location: [facility];  Service: Pain Management    LUMBAR MEDIAL BRANCH BLOCKS Bilateral 06/14/2022    Procedure: Diagnostic medial branch blocks targeting the BILATERAL L3-4, L4-5 and L5-S1 facet joints with fluoroscopic guidance #1;  Surgeon: Dragan Ayala M.D.;  Location: [facility];  Service: Pain Management    RI TRANSURETHRAL ELEC-SURG PROSTATECTOM N/A 05/27/2022    Procedure: TURP, USING BUTTON ELECTRODE;  Surgeon: Lee Mckee M.D.;  Location: SURGERY [facility];  Service: Urology    BLOCK EPIDURAL STEROID INJECTION Left 03/22/2022    Procedure: LEFT L3-4 and L4-5 transforaminal epidural steroid injection with fluoroscopic guidance;  Surgeon: Dragan Ayala M.D.;  Location: [facility];  Service: Pain Management    RI NJX AA&/STRD TFRML EPI LUMBAR/SACRAL 1 LEVEL Left 02/15/2022    Procedure: LEFT L3-4 and L4-5 transforaminal epidural steroid injection with fluoroscopic guidance;  Surgeon: Dragan Ayala M.D.;  Location: [facility];  Service: Pain Management    SHOULDER DECOMPRESSION ARTHROSCOPIC Left 01/04/2018    Procedure: SHOULDER DECOMPRESSION ARTHROSCOPIC - SUBACROMIAL;  Surgeon: Linwood Grover M.D.;  Location: SURGERY [facility];  Service: Orthopedics    SHOULDER ARTHROSCOPY W/ BICIPITAL TENODESIS REPAIR Left 01/04/2018    Procedure: SHOULDER ARTHROSCOPY W/ BICIPITAL Tenotomy REPAIR;  Surgeon: Linwood Grover M.D.;  Location: SURGERY [facility];  Service: Orthopedics    CLAVICLE DISTAL EXCISION Left 01/04/2018    Procedure: CLAVICLE DISTAL EXCISION - POSSIBLE;  Surgeon: Linwood Grover M.D.;  Location: SURGERY [facility];  Service: Orthopedics    RECOVERY  04/08/2016    Procedure: CATH LAB C WITH POSSIBLE NAVIN;  Surgeon: Recoveryons[facility];  [place] [facility] [place]  Service:     VENTRAL HERNIA REPAIR LAPAROSCOPIC  11/01/2012    Performed by Marcos Ramírez M.D. at [facility]    KNEE ARTHROSCOPY  1990's    right    OTHER ABDOMINAL SURGERY  10-    About 8 years ago    SHOULDER ARTHROSCOPY  1990's    right    SINUSOTOMIES  1990's    times two surgeries    TUMOR EXCISION WITH BIOPSY  1990's    right hand - thumb       FAMILY HISTORY  Family History   Problem Relation Age of Onset    Breast Cancer Mother     Hypertension Mother     Cancer Mother         breast    Heart Disease Mother         hx of bypass    Hypertension Father     Arthritis Father     Diabetes Father         prediabetes    No Known Problems Sister     Arthritis Brother     Heart Disease Other     Hypertension Other     Cancer Other     No Known Problems Brother        SOCIAL HISTORY   reports that he quit smoking about 4 years ago. His smoking use included cigarettes. He has never used smokeless tobacco. He reports that he does not currently use alcohol. He reports current drug use. Drugs: Marijuana, Inhaled, and Oral.    CURRENT MEDICATIONS  Previous Medications    ALPHA LIPOIC  ACID PO    Take 600 mg by mouth every day.    ASPIRIN 81 MG EC TABLET    81 mg by oral route.    ATORVASTATIN (LIPITOR) 80 MG TABLET    Take 1 Tablet by mouth every evening.    BLOOD GLUCOSE MONITORING SUPPL (FREESTYLE LITE) W/DEVICE KIT    USE DAILY AS DIRECTED    BLOOD GLUCOSE TEST STRIPS    Test strips order:  Freestyle Freedom Lite test strips  testing one time per day    CETIRIZINE (ZYRTEC) 10 MG TAB    Take 10 mg by mouth every day.    CHOLECALCIFEROL (VITAMIN D) 2000 UNIT TABS    Take  by mouth 2 times a day.    COENZYME Q10 200 MG CAP    Take  by mouth every day.      CONTINUOUS GLUCOSE SENSOR (FREESTYLE LUNA 14 DAY SENSOR) MISC    1 Application every 14 days.    DIPHENOXYLATE-ATROPINE (LOMOTIL) 2.5-0.025 MG TAB    Take 1 Tablet by mouth 4 times a day as needed for Diarrhea for up to 3 days.    DULAGLUTIDE (TRULICITY) 1.5 MG/0.5ML SOLUTION PEN-INJECTOR    INJECT 0.5 MLS UNDER THE SKIN EVERY 7 DAYS    EMPAGLIFLOZIN (JARDIANCE) 25 MG TAB    Take 1 Tablet by mouth every day.    EPINEPHRINE (EPIPEN) 0.3 MG/0.3ML SOLUTION AUTO-INJECTOR SOLUTION FOR INJECTION    Inject 0.3 mL into the thigh one time for 1 dose.    HYDROXYZINE HCL (ATARAX) 25 MG TAB    Take 1 Tablet by mouth at bedtime as needed for Itching.    LEVOTHYROXINE (SYNTHROID) 50 MCG TAB    Take 1 Tablet by mouth every morning on an empty stomach.    METFORMIN ER (GLUCOPHAGE XR) 500 MG TABLET SR 24 HR    Take 1 Tablet by mouth 2 times a day.    METOPROLOL SR (TOPROL XL) 25 MG TABLET SR 24 HR    Take 1 Tablet by mouth every day.    MONTELUKAST (SINGULAIR) 10 MG TAB    Take 1 Tablet by mouth every evening.    OMEGA-3 FATTY ACIDS (FISH OIL) 1200 MG CAPS    Take 1 Capful by mouth every day.    ONDANSETRON (ZOFRAN ODT) 4 MG TABLET DISPERSIBLE    Take 1 Tablet by mouth every 6 hours as needed for Nausea/Vomiting.    PANTOPRAZOLE (PROTONIX) 20 MG TABLET    Take 1 Tablet by mouth every day.    TIZANIDINE (ZANAFLEX) 4 MG TAB    Take 1 Tablet by mouth at bedtime  as needed (muscle spasms).    TRIAMCINOLONE ACETONIDE (KENALOG) 0.1 % CREAM    Apply to affected area twice daily until resolved    VENTOLIN  (90 BASE) MCG/ACT AERO SOLN INHALATION AEROSOL    USE 2 INHALATIONS EVERY 4 HOURS AS NEEDED FOR SHORTNESS OF BREATH       ALLERGIES  Kiwi extract, Shellfish allergy, Strawberry, Ozempic (0.25 or 0.5 mg-dose) [semaglutide], Sulfa drugs, and Testosterone    PHYSICAL EXAM  BP (!) 191/99   Pulse 96   Temp 36.6 °C (97.9 °F) (Temporal)   Resp 18   SpO2 96%     General: Moderate distress, Holding vomit bag to face.  HENT: Normocephalic, Mucus membranes are moist.   Chest: Lungs have even and unlabored respirations, Clear to auscultation.   Cardiovascular: Regular rate and regular rhythm, No peripheral cyanosis.  Abdomen: Non distended.  Neuro: Awake, Conversive, Able to relay recent events.  Psychiatric: Calm and cooperative.       INITIAL ASSESSMENT  Patient presents with nausea and vomiting for a couple of days which started after a Hepatitis shot. Concerns for immunization reaction, food poisoning, dehydration, or electrolyte imbalance. Patient has no abdominal pain, so imaging is not indicated at this time. Will treat symptoms.    ED Observation Status? No; Patient does not meet criteria for ED Observation.     DIAGNOSTIC STUDIES    Labs:   Results for orders placed or performed during the hospital encounter of 09/14/24   CBC WITH DIFFERENTIAL   Result Value Ref Range    WBC 7.6 4.8 - 10.8 K/uL    RBC 6.71 (H) 4.70 - 6.10 M/uL    Hemoglobin 18.3 (H) 14.0 - 18.0 g/dL    Hematocrit 52.6 (H) 42.0 - 52.0 %    MCV 78.4 (L) 81.4 - 97.8 fL    MCH 27.3 27.0 - 33.0 pg    MCHC 34.8 32.3 - 36.5 g/dL    RDW 42.9 35.9 - 50.0 fL    Platelet Count 316 164 - 446 K/uL    MPV 10.3 9.0 - 12.9 fL    Neutrophils-Polys 72.60 (H) 44.00 - 72.00 %    Lymphocytes 4.60 (L) 22.00 - 41.00 %    Monocytes 20.90 (H) 0.00 - 13.40 %    Eosinophils 0.40 0.00 - 6.90 %    Basophils 0.80 0.00 - 1.80 %     Immature Granulocytes 0.70 0.00 - 0.90 %    Nucleated RBC 0.00 0.00 - 0.20 /100 WBC    Neutrophils (Absolute) 5.50 1.82 - 7.42 K/uL    Lymphs (Absolute) 0.35 (L) 1.00 - 4.80 K/uL    Monos (Absolute) 1.58 (H) 0.00 - 0.85 K/uL    Eos (Absolute) 0.03 0.00 - 0.51 K/uL    Baso (Absolute) 0.06 0.00 - 0.12 K/uL    Immature Granulocytes (abs) 0.05 0.00 - 0.11 K/uL    NRBC (Absolute) 0.00 K/uL   COMP METABOLIC PANEL   Result Value Ref Range    Sodium 126 (L) 135 - 145 mmol/L    Potassium 4.0 3.6 - 5.5 mmol/L    Chloride 86 (L) 96 - 112 mmol/L    Co2 17 (L) 20 - 33 mmol/L    Anion Gap 23.0 (H) 7.0 - 16.0    Glucose 241 (H) 65 - 99 mg/dL    Bun 27 (H) 8 - 22 mg/dL    Creatinine 0.79 0.50 - 1.40 mg/dL    Calcium 8.8 8.4 - 10.2 mg/dL    Correct Calcium 9.5 8.5 - 10.5 mg/dL    AST(SGOT) 20 12 - 45 U/L    ALT(SGPT) 14 2 - 50 U/L    Alkaline Phosphatase 79 30 - 99 U/L    Total Bilirubin 0.9 0.1 - 1.5 mg/dL    Albumin 3.1 (L) 3.2 - 4.9 g/dL    Total Protein 6.7 6.0 - 8.2 g/dL    Globulin 3.6 (H) 1.9 - 3.5 g/dL    A-G Ratio 0.9 g/dL   LIPASE   Result Value Ref Range    Lipase 22 11 - 82 U/L   ESTIMATED GFR   Result Value Ref Range    GFR (CKD-EPI) 98 >60 mL/min/1.73 m 2        COURSE & MEDICAL DECISION MAKING     COURSE AND PLAN  11:14 AM - Patient seen and examined at bedside. Discussed plan of care, including labs to evaluate and medication to treat nausea. Patient agrees to the plan of care. The patient will be medicated with Compazine 10 mg injection for nausea. Ordered for Lipase, CMP, and CBC w/ diff to evaluate his symptoms.     1:04 PM - Patient was reevaluated at bedside. His nausea has improved after medications. Patient's labs show concerns for dehydration and electrolyte abnormality. I discussed the patient's case and the above findings with Dr. Horan (Hospitalist) who agrees to evaluate the patient for hospitalization.     ED Summary: Patient presented with nausea vomiting and diarrhea pain is going on for several  days.  He is dry heaving on initial evaluation has had 2 separate doses of Zofran.  I gave him Compazine here which did improve his vomiting.  His laboratory shows hyponatremia, electrolyte imbalance and volume contraction.  IV fluids were initiated here.  And the patient will be admitted for further evaluation and treatment.      HYDRATION: Based on the patient's presentation of Acute Vomiting and Dehydration the patient was given IV fluids. IV Hydration was used because oral hydration was not adequate alone. Upon recheck following hydration, the patient was improved.      DISPOSITION AND DISCUSSIONS  I have discussed management of the patient with the following physicians and DANNY's: Dr. Horan (Hospitalist)    DISPOSITION:  Patient will be hospitalized by Dr. Horan in guarded condition.     FINAL DIAGNOSIS  1. Intractable vomiting    2. Hyponatremia    3. Azotemia    4. Dehydration      Eva APODACA (Floraibe), am scribing for, and in the presence of, Trent Sow D.O..    Electronically signed by: Eva Diane (Scribe), 9/14/2024    Trent APODACA D.O. personally performed the services described in this documentation, as scribed by Eva Diane in my presence, and it is both accurate and complete.     The note accurately reflects work and decisions made by me.  Trent Sow D.O.  9/14/2024  4:57 PM

## 2024-09-14 NOTE — ED NOTES
PO challenge in progress.  Water is provided. Pt is instructed to take small sips, and report any adverse symptoms.  Verbalizes understanding.

## 2024-09-15 PROBLEM — A02.0 SALMONELLA GASTROENTERITIS: Status: ACTIVE | Noted: 2024-09-14

## 2024-09-15 LAB
ADV 40+41 DNA STL QL NAA+NON-PROBE: NOT DETECTED
ALBUMIN SERPL BCP-MCNC: 3.3 G/DL (ref 3.2–4.9)
ALBUMIN/GLOB SERPL: 1.2 G/DL
ALP SERPL-CCNC: 70 U/L (ref 30–99)
ALT SERPL-CCNC: 12 U/L (ref 2–50)
ANION GAP SERPL CALC-SCNC: 14 MMOL/L (ref 7–16)
ANION GAP SERPL CALC-SCNC: 16 MMOL/L (ref 7–16)
AST SERPL-CCNC: 14 U/L (ref 12–45)
ASTRO TYP 1-8 RNA STL QL NAA+NON-PROBE: NOT DETECTED
B-OH-BUTYR SERPL-MCNC: 1.7 MMOL/L (ref 0.02–0.27)
BILIRUB SERPL-MCNC: 0.7 MG/DL (ref 0.1–1.5)
BUN SERPL-MCNC: 11 MG/DL (ref 8–22)
BUN SERPL-MCNC: 14 MG/DL (ref 8–22)
C CAYETANENSIS DNA STL QL NAA+NON-PROBE: NOT DETECTED
C COLI+JEJ+UPSA DNA STL QL NAA+NON-PROBE: NOT DETECTED
CALCIUM ALBUM COR SERPL-MCNC: 8.9 MG/DL (ref 8.5–10.5)
CALCIUM SERPL-MCNC: 8.2 MG/DL (ref 8.4–10.2)
CALCIUM SERPL-MCNC: 8.3 MG/DL (ref 8.4–10.2)
CHLORIDE SERPL-SCNC: 86 MMOL/L (ref 96–112)
CHLORIDE SERPL-SCNC: 91 MMOL/L (ref 96–112)
CO2 SERPL-SCNC: 23 MMOL/L (ref 20–33)
CO2 SERPL-SCNC: 25 MMOL/L (ref 20–33)
CREAT SERPL-MCNC: 0.56 MG/DL (ref 0.5–1.4)
CREAT SERPL-MCNC: 0.58 MG/DL (ref 0.5–1.4)
CRYPTOSP DNA STL QL NAA+NON-PROBE: NOT DETECTED
E HISTOLYT DNA STL QL NAA+NON-PROBE: NOT DETECTED
EAEC PAA PLAS AGGR+AATA ST NAA+NON-PRB: NOT DETECTED
EC STX1+STX2 GENES STL QL NAA+NON-PROBE: NOT DETECTED
EPEC EAE GENE STL QL NAA+NON-PROBE: NOT DETECTED
ERYTHROCYTE [DISTWIDTH] IN BLOOD BY AUTOMATED COUNT: 42.2 FL (ref 35.9–50)
EST. AVERAGE GLUCOSE BLD GHB EST-MCNC: 214 MG/DL
ETEC LTA+ST1A+ST1B TOX ST NAA+NON-PROBE: NOT DETECTED
G LAMBLIA DNA STL QL NAA+NON-PROBE: NOT DETECTED
GFR SERPLBLD CREATININE-BSD FMLA CKD-EPI: 108 ML/MIN/1.73 M 2
GFR SERPLBLD CREATININE-BSD FMLA CKD-EPI: 109 ML/MIN/1.73 M 2
GLOBULIN SER CALC-MCNC: 2.8 G/DL (ref 1.9–3.5)
GLUCOSE BLD STRIP.AUTO-MCNC: 154 MG/DL (ref 65–99)
GLUCOSE BLD STRIP.AUTO-MCNC: 169 MG/DL (ref 65–99)
GLUCOSE BLD STRIP.AUTO-MCNC: 170 MG/DL (ref 65–99)
GLUCOSE BLD STRIP.AUTO-MCNC: 191 MG/DL (ref 65–99)
GLUCOSE SERPL-MCNC: 181 MG/DL (ref 65–99)
GLUCOSE SERPL-MCNC: 191 MG/DL (ref 65–99)
HBA1C MFR BLD: 9.1 % (ref 4–5.6)
HCT VFR BLD AUTO: 48.3 % (ref 42–52)
HGB BLD-MCNC: 16.6 G/DL (ref 14–18)
MAGNESIUM SERPL-MCNC: 1.9 MG/DL (ref 1.5–2.5)
MCH RBC QN AUTO: 26.9 PG (ref 27–33)
MCHC RBC AUTO-ENTMCNC: 34.4 G/DL (ref 32.3–36.5)
MCV RBC AUTO: 78.4 FL (ref 81.4–97.8)
NOROVIRUS GI+II RNA STL QL NAA+NON-PROBE: NOT DETECTED
P SHIGELLOIDES DNA STL QL NAA+NON-PROBE: NOT DETECTED
PLATELET # BLD AUTO: 267 K/UL (ref 164–446)
PMV BLD AUTO: 10.7 FL (ref 9–12.9)
POTASSIUM SERPL-SCNC: 2.8 MMOL/L (ref 3.6–5.5)
POTASSIUM SERPL-SCNC: 3 MMOL/L (ref 3.6–5.5)
PROT SERPL-MCNC: 6.1 G/DL (ref 6–8.2)
RBC # BLD AUTO: 6.16 M/UL (ref 4.7–6.1)
RVA RNA STL QL NAA+NON-PROBE: NOT DETECTED
S ENT+BONG DNA STL QL NAA+NON-PROBE: DETECTED
SAPO I+II+IV+V RNA STL QL NAA+NON-PROBE: NOT DETECTED
SHIGELLA SP+EIEC IPAH ST NAA+NON-PROBE: NOT DETECTED
SODIUM SERPL-SCNC: 127 MMOL/L (ref 135–145)
SODIUM SERPL-SCNC: 128 MMOL/L (ref 135–145)
V CHOL+PARA+VUL DNA STL QL NAA+NON-PROBE: NOT DETECTED
V CHOLERAE DNA STL QL NAA+NON-PROBE: NOT DETECTED
WBC # BLD AUTO: 5.4 K/UL (ref 4.8–10.8)
Y ENTEROCOL DNA STL QL NAA+NON-PROBE: NOT DETECTED

## 2024-09-15 PROCEDURE — 80048 BASIC METABOLIC PNL TOTAL CA: CPT

## 2024-09-15 PROCEDURE — A9270 NON-COVERED ITEM OR SERVICE: HCPCS | Performed by: HOSPITALIST

## 2024-09-15 PROCEDURE — 36415 COLL VENOUS BLD VENIPUNCTURE: CPT

## 2024-09-15 PROCEDURE — 700102 HCHG RX REV CODE 250 W/ 637 OVERRIDE(OP): Performed by: HOSPITALIST

## 2024-09-15 PROCEDURE — 82962 GLUCOSE BLOOD TEST: CPT

## 2024-09-15 PROCEDURE — 83735 ASSAY OF MAGNESIUM: CPT

## 2024-09-15 PROCEDURE — 700105 HCHG RX REV CODE 258: Performed by: HOSPITALIST

## 2024-09-15 PROCEDURE — 700102 HCHG RX REV CODE 250 W/ 637 OVERRIDE(OP): Performed by: INTERNAL MEDICINE

## 2024-09-15 PROCEDURE — 94760 N-INVAS EAR/PLS OXIMETRY 1: CPT

## 2024-09-15 PROCEDURE — 83036 HEMOGLOBIN GLYCOSYLATED A1C: CPT

## 2024-09-15 PROCEDURE — A9270 NON-COVERED ITEM OR SERVICE: HCPCS | Performed by: INTERNAL MEDICINE

## 2024-09-15 PROCEDURE — 770001 HCHG ROOM/CARE - MED/SURG/GYN PRIV*

## 2024-09-15 PROCEDURE — 82010 KETONE BODYS QUAN: CPT

## 2024-09-15 PROCEDURE — 99233 SBSQ HOSP IP/OBS HIGH 50: CPT | Performed by: INTERNAL MEDICINE

## 2024-09-15 PROCEDURE — 80053 COMPREHEN METABOLIC PANEL: CPT

## 2024-09-15 PROCEDURE — 85027 COMPLETE CBC AUTOMATED: CPT

## 2024-09-15 PROCEDURE — 700111 HCHG RX REV CODE 636 W/ 250 OVERRIDE (IP): Mod: JZ | Performed by: HOSPITALIST

## 2024-09-15 RX ORDER — POTASSIUM CHLORIDE 1500 MG/1
40 TABLET, EXTENDED RELEASE ORAL 2 TIMES DAILY
Status: DISCONTINUED | OUTPATIENT
Start: 2024-09-15 | End: 2024-09-16

## 2024-09-15 RX ORDER — TRAZODONE HYDROCHLORIDE 50 MG/1
50 TABLET, FILM COATED ORAL
Status: DISCONTINUED | OUTPATIENT
Start: 2024-09-15 | End: 2024-09-21 | Stop reason: HOSPADM

## 2024-09-15 RX ADMIN — OMEPRAZOLE 20 MG: 20 CAPSULE, DELAYED RELEASE ORAL at 06:11

## 2024-09-15 RX ADMIN — POTASSIUM CHLORIDE 40 MEQ: 1500 TABLET, EXTENDED RELEASE ORAL at 18:15

## 2024-09-15 RX ADMIN — LEVOTHYROXINE SODIUM 50 MCG: 0.05 TABLET ORAL at 06:11

## 2024-09-15 RX ADMIN — INSULIN GLARGINE-YFGN 3 UNITS: 100 INJECTION, SOLUTION SUBCUTANEOUS at 13:28

## 2024-09-15 RX ADMIN — SODIUM CHLORIDE, POTASSIUM CHLORIDE, SODIUM LACTATE AND CALCIUM CHLORIDE: 600; 310; 30; 20 INJECTION, SOLUTION INTRAVENOUS at 08:03

## 2024-09-15 RX ADMIN — ONDANSETRON 4 MG: 2 INJECTION INTRAMUSCULAR; INTRAVENOUS at 17:01

## 2024-09-15 RX ADMIN — Medication 5 MG: at 20:38

## 2024-09-15 RX ADMIN — INSULIN HUMAN 1 UNITS: 100 INJECTION, SOLUTION PARENTERAL at 06:17

## 2024-09-15 RX ADMIN — INSULIN HUMAN 1 UNITS: 100 INJECTION, SOLUTION PARENTERAL at 20:37

## 2024-09-15 RX ADMIN — ASPIRIN 81 MG: 81 TABLET, COATED ORAL at 06:11

## 2024-09-15 RX ADMIN — INSULIN HUMAN 1 UNITS: 100 INJECTION, SOLUTION PARENTERAL at 11:42

## 2024-09-15 RX ADMIN — ENOXAPARIN SODIUM 40 MG: 100 INJECTION SUBCUTANEOUS at 18:16

## 2024-09-15 RX ADMIN — POTASSIUM CHLORIDE 40 MEQ: 1500 TABLET, EXTENDED RELEASE ORAL at 07:55

## 2024-09-15 RX ADMIN — SODIUM CHLORIDE, POTASSIUM CHLORIDE, SODIUM LACTATE AND CALCIUM CHLORIDE: 600; 310; 30; 20 INJECTION, SOLUTION INTRAVENOUS at 00:19

## 2024-09-15 RX ADMIN — INSULIN HUMAN 1 UNITS: 100 INJECTION, SOLUTION PARENTERAL at 16:21

## 2024-09-15 RX ADMIN — AMLODIPINE BESYLATE 5 MG: 5 TABLET ORAL at 06:11

## 2024-09-15 RX ADMIN — SODIUM CHLORIDE, POTASSIUM CHLORIDE, SODIUM LACTATE AND CALCIUM CHLORIDE: 600; 310; 30; 20 INJECTION, SOLUTION INTRAVENOUS at 16:45

## 2024-09-15 ASSESSMENT — ENCOUNTER SYMPTOMS
NAUSEA: 0
DIARRHEA: 1
VOMITING: 0

## 2024-09-15 ASSESSMENT — PAIN DESCRIPTION - PAIN TYPE
TYPE: ACUTE PAIN
TYPE: ACUTE PAIN

## 2024-09-15 NOTE — ASSESSMENT & PLAN NOTE
Stool less often and more formed   negative C. difficile PCR  GI panel PCR shows Salmonella gastroenteritis  Continue isolation  Continue IV fluids  Given the lack of improvement start Levaquin 500 mg p.o. daily for the next 5 days  Start low-dose Imodium, 2 mg every 4h as needed

## 2024-09-15 NOTE — HOSPITAL COURSE
63-year-old male with a past medical history of T2DM, hypothyroidism, hypertension, sleep apnea, hyperlipidemia, myocardial bridge admitted on 9/14/2024 after having nausea vomiting diarrhea, worsening abdominal pain.  In the ED patient was found to have hypertension at 181/99, 163/74.  Labs showed hemoconcentration as hemoglobin was 18.3, hematocrit 52.6, platelets 316, sodium 126, anion gap 23, bicarb 17, chloride 86.  Patient was started on IV fluids.     Patient has shown marked improvement in his electrolytes but still remains with significant watery diarrhea.  He has had this over 5 days which should be self-limiting point.  Started on Levaquin for treatment given the fact that he is hospitalized and duration is longer than anticipated.

## 2024-09-15 NOTE — ASSESSMENT & PLAN NOTE
With hyperglycemia  Last glycated hemoglobin was 7.3%  Continue insulin sliding scale, Accu-Cheks and hypoglycemic protocol  A1c 9.1, uncontrolled.  There was a elevated beta hydroxybutyrate 1.7.  It is more likely from GI loss.

## 2024-09-15 NOTE — PROGRESS NOTES
0700 Received report from Night shift nurse  0757 Performed assessment  Pt is A&Ox4, no complaints of pain, Updated on POC: monitor BM, waiting for G.I. PCR, replace electrolytes  Call light within reach, hourly rounding in place, bed in lowest position.

## 2024-09-15 NOTE — PROGRESS NOTES
[facility] Medicine Daily Progress Note    Date of Service  9/15/2024    Chief Complaint  Barron Hewitt is a 63 y.o. male admitted 9/14/2024 with nausea vomiting and diarrhea    Hospital Course  63-year-old male with a past medical history of T2DM, hypothyroidism, hypertension, sleep apnea, hyperlipidemia, myocardial bridge admitted on 9/14/2024 after having nausea vomiting diarrhea, worsening abdominal pain.  In the ED patient was found to have hypertension at 181/99, 163/74.  Labs showed hemoconcentration as hemoglobin was 18.3, hematocrit 52.6, platelets 316, sodium 126, anion gap 23, bicarb 17, chloride 86.  Patient was started on IV fluids.     Interval Problem Update  Patient had several loose BM overnight.  Potassium 2.8 replacing via p.o. if possible.  Continue IV fluids  Sodium 128, rechecking labs today  C. difficile PCR negative, GI panel PCR shows Salmonella gastroenteritis.  Pt tried some breakfast, but not back to taking PO fluids or solids.    I have discussed this patient's plan of care and discharge plan at IDT rounds today with Case Management, Nursing, Nursing leadership, and other members of the IDT team.    Consultants/Specialty  None    Code Status  Full Code    Disposition  The patient is not medically cleared for discharge to home or a post-acute facility.  Anticipate discharge to: home with close outpatient follow-up    I have placed the appropriate orders for post-discharge needs.    Review of Systems  Review of Systems   Gastrointestinal:  Positive for diarrhea. Negative for nausea and vomiting.   All other systems reviewed and are negative.       Physical Exam  Temp:  [36.4 °C (97.5 °F)-36.9 °C (98.5 °F)] 36.8 °C (98.3 °F)  Pulse:  [] 86  Resp:  [16-18] 16  BP: (131-161)/(64-93) 136/74  SpO2:  [94 %-99 %] 95 %    Physical Exam  Vitals and nursing note reviewed.   Constitutional:       General: He is not in acute distress.     Appearance: He is not diaphoretic.   HENT:       Mouth/Throat:      Mouth: Mucous membranes are dry.      Pharynx: No oropharyngeal exudate.   Cardiovascular:      Rate and Rhythm: Normal rate and regular rhythm.      Pulses: Normal pulses.      Heart sounds: Normal heart sounds. No murmur heard.  Pulmonary:      Effort: Pulmonary effort is normal. No respiratory distress.      Breath sounds: Normal breath sounds. No wheezing or rales.   Abdominal:      General: There is no distension.      Tenderness: There is no abdominal tenderness.      Comments: Hyperactive BS   Musculoskeletal:         General: No swelling or tenderness. Normal range of motion.   Skin:     General: Skin is warm.      Capillary Refill: Capillary refill takes less than 2 seconds.      Coloration: Skin is not jaundiced or pale.   Neurological:      General: No focal deficit present.      Mental Status: He is alert and oriented to person, place, and time. Mental status is at baseline.      Motor: No weakness.   Psychiatric:         Mood and Affect: Mood normal.         Behavior: Behavior normal.         Thought Content: Thought content normal.         Judgment: Judgment normal.         Fluids    Intake/Output Summary (Last 24 hours) at 9/15/2024 1234  Last data filed at 9/15/2024 0834  Gross per 24 hour   Intake 540 ml   Output 325 ml   Net 215 ml        Laboratory  Recent Labs     09/14/24  1117 09/15/24  0226   WBC 7.6 5.4   RBC 6.71* 6.16*   HEMOGLOBIN 18.3* 16.6   HEMATOCRIT 52.6* 48.3   MCV 78.4* 78.4*   MCH 27.3 26.9*   MCHC 34.8 34.4   RDW 42.9 42.2   PLATELETCT 316 267   MPV 10.3 10.7     Recent Labs     09/14/24  1703 09/15/24  0226 09/15/24  1050   SODIUM 131* 128* 127*   POTASSIUM 3.7 2.8* 3.0*   CHLORIDE 91* 91* 86*   CO2 24 23 25   GLUCOSE 203* 181* 191*   BUN 22 14 11   CREATININE 0.74 0.58 0.56   CALCIUM 8.5 8.3* 8.2*                   Imaging  No orders to display        Assessment/Plan  * Hyponatremia- (present on admission)  Assessment & Plan  Hypovolemic hyponatremia  Na 126  on admission.  Continue IV fluids  Sodium 128, GI loss    Salmonella gastroenteritis- (present on admission)  Assessment & Plan  Negative C. difficile PCR  GI panel PCR shows Salmonella gastroenteritis  Continue isolation in case patient has an infectious diarrhea    Dehydration- (present on admission)  Assessment & Plan  Reduced oral intake due to N/V/D from Salmonella gastroenteritis  Encourage oral intake as tolerated, antiemetics as needed.  Home empagliflozin and trulicity worsening situation  Continue IV fluids    Controlled type 2 diabetes mellitus with diabetic polyneuropathy, without long-term current use of insulin (HCC)- (present on admission)  Assessment & Plan  With hyperglycemia  Last glycated hemoglobin was 7.3%  Continue insulin sliding scale, Accu-Cheks and hypoglycemic protocol  Checking A1c  Checking beta hydroxybutyrate, anion gap is elevated with metabolic acidosis.  It is more likely from GI loss.    Primary hypertension- (present on admission)  Assessment & Plan  Used to be on metoprolol SR but recently stopped.  Continue amlodipine    Hypertensive urgency- (present on admission)  Assessment & Plan  Due to abdominal pain  Monitor    Polycythemia- (present on admission)  Assessment & Plan  Due to severe dehydration  Continue fluids hemoglobin 16.6, from 18.3    Metabolic acidosis- (present on admission)  Assessment & Plan  Likely due to reduced intravascular volume and increase bicarb losses through diarrhea    Anion gap 14, bicarb 24.  Stop sodium bicarb tablets.  Continue IV fluids    Asthma- (present on admission)  Assessment & Plan  Not currently in exacerbation.  Oxygen as needed.  Resume home inhalers    Acquired hypothyroidism- (present on admission)  Assessment & Plan  Continue Synthroid    Dyslipidemia- (present on admission)  Assessment & Plan  Restart atorvastatin when appropriate         VTE prophylaxis:    enoxaparin ppx      I have performed a physical exam and reviewed and  updated ROS and Plan today (9/15/2024). In review of yesterday's note (9/14/2024), there are no changes except as documented above.      Total time spent 51 minutes. I spent greater than 50% of the time for patient care, including unit/floor time, multiple face-to-face encounters with the patient, counseling on treatment plan and discussion with bedside RN.  Further, I spent time on my own review of patient's overnight events, RN notes, imaging and lab analysis, and developing my assessment and plan above.  In addition, working with , social workers, and Charge RN on case coordination on this date.    This note was generated using voice recognition software which has a chance of producing errors of grammar and content.  I have made every reasonable attempt to find and correct any errors, but it should be expected that some may not be found prior to finalization of this note.

## 2024-09-15 NOTE — PROGRESS NOTES
BSSR received from day shift RN. Patient laying in bed in no apparent distress with c/o nausea. A&O X4. Plan of care discussed with patient. Bed in low position with bed brakes applied and call light in reach. Patient states no needs at this time.

## 2024-09-15 NOTE — CARE PLAN
The patient is Stable - Low risk of patient condition declining or worsening    Shift Goals  Clinical Goals: N/V/D to subside  Patient Goals: Sleep/rest overnight    Progress made toward(s) clinical / shift goals:  Nausea and emesis improved but diarrhea has continued.    Patient is not progressing towards the following goals:      Problem: Gastrointestinal Irritability  Goal: Nausea and vomiting will be absent or improve  Outcome: Not Progressing  Goal: Diarrhea will be absent or improved  Outcome: Not Progressing

## 2024-09-16 LAB
ALBUMIN SERPL BCP-MCNC: 3.2 G/DL (ref 3.2–4.9)
BUN SERPL-MCNC: 11 MG/DL (ref 8–22)
CALCIUM ALBUM COR SERPL-MCNC: 9.1 MG/DL (ref 8.5–10.5)
CALCIUM SERPL-MCNC: 8.5 MG/DL (ref 8.4–10.2)
CHLORIDE SERPL-SCNC: 90 MMOL/L (ref 96–112)
CO2 SERPL-SCNC: 23 MMOL/L (ref 20–33)
CREAT SERPL-MCNC: 0.55 MG/DL (ref 0.5–1.4)
ERYTHROCYTE [DISTWIDTH] IN BLOOD BY AUTOMATED COUNT: 41.9 FL (ref 35.9–50)
GFR SERPLBLD CREATININE-BSD FMLA CKD-EPI: 109 ML/MIN/1.73 M 2
GLUCOSE BLD STRIP.AUTO-MCNC: 144 MG/DL (ref 65–99)
GLUCOSE BLD STRIP.AUTO-MCNC: 148 MG/DL (ref 65–99)
GLUCOSE BLD STRIP.AUTO-MCNC: 159 MG/DL (ref 65–99)
GLUCOSE BLD STRIP.AUTO-MCNC: 166 MG/DL (ref 65–99)
GLUCOSE SERPL-MCNC: 148 MG/DL (ref 65–99)
HCT VFR BLD AUTO: 48.3 % (ref 42–52)
HGB BLD-MCNC: 16.4 G/DL (ref 14–18)
MAGNESIUM SERPL-MCNC: 2 MG/DL (ref 1.5–2.5)
MCH RBC QN AUTO: 27 PG (ref 27–33)
MCHC RBC AUTO-ENTMCNC: 34 G/DL (ref 32.3–36.5)
MCV RBC AUTO: 79.4 FL (ref 81.4–97.8)
PHOSPHATE SERPL-MCNC: 2 MG/DL (ref 2.5–4.5)
PLATELET # BLD AUTO: 277 K/UL (ref 164–446)
PMV BLD AUTO: 10.9 FL (ref 9–12.9)
POTASSIUM SERPL-SCNC: 2.8 MMOL/L (ref 3.6–5.5)
RBC # BLD AUTO: 6.08 M/UL (ref 4.7–6.1)
SODIUM SERPL-SCNC: 131 MMOL/L (ref 135–145)
WBC # BLD AUTO: 7.9 K/UL (ref 4.8–10.8)

## 2024-09-16 PROCEDURE — 770001 HCHG ROOM/CARE - MED/SURG/GYN PRIV*

## 2024-09-16 PROCEDURE — 83735 ASSAY OF MAGNESIUM: CPT

## 2024-09-16 PROCEDURE — 36415 COLL VENOUS BLD VENIPUNCTURE: CPT

## 2024-09-16 PROCEDURE — A9270 NON-COVERED ITEM OR SERVICE: HCPCS | Performed by: HOSPITALIST

## 2024-09-16 PROCEDURE — 82962 GLUCOSE BLOOD TEST: CPT

## 2024-09-16 PROCEDURE — 85027 COMPLETE CBC AUTOMATED: CPT

## 2024-09-16 PROCEDURE — 94760 N-INVAS EAR/PLS OXIMETRY 1: CPT

## 2024-09-16 PROCEDURE — 700101 HCHG RX REV CODE 250: Performed by: INTERNAL MEDICINE

## 2024-09-16 PROCEDURE — 99233 SBSQ HOSP IP/OBS HIGH 50: CPT | Performed by: INTERNAL MEDICINE

## 2024-09-16 PROCEDURE — 700102 HCHG RX REV CODE 250 W/ 637 OVERRIDE(OP): Performed by: INTERNAL MEDICINE

## 2024-09-16 PROCEDURE — 700102 HCHG RX REV CODE 250 W/ 637 OVERRIDE(OP): Performed by: HOSPITALIST

## 2024-09-16 PROCEDURE — 700111 HCHG RX REV CODE 636 W/ 250 OVERRIDE (IP): Mod: JZ | Performed by: HOSPITALIST

## 2024-09-16 PROCEDURE — A9270 NON-COVERED ITEM OR SERVICE: HCPCS | Performed by: INTERNAL MEDICINE

## 2024-09-16 PROCEDURE — 80069 RENAL FUNCTION PANEL: CPT

## 2024-09-16 RX ORDER — SODIUM CHLORIDE AND POTASSIUM CHLORIDE 300; 900 MG/100ML; MG/100ML
INJECTION, SOLUTION INTRAVENOUS CONTINUOUS
Status: DISPENSED | OUTPATIENT
Start: 2024-09-16 | End: 2024-09-17

## 2024-09-16 RX ADMIN — ASPIRIN 81 MG: 81 TABLET, COATED ORAL at 06:20

## 2024-09-16 RX ADMIN — LEVOTHYROXINE SODIUM 50 MCG: 0.05 TABLET ORAL at 06:21

## 2024-09-16 RX ADMIN — POTASSIUM CHLORIDE AND SODIUM CHLORIDE: 900; 300 INJECTION, SOLUTION INTRAVENOUS at 08:15

## 2024-09-16 RX ADMIN — POTASSIUM CHLORIDE 40 MEQ: 1500 TABLET, EXTENDED RELEASE ORAL at 06:19

## 2024-09-16 RX ADMIN — ENOXAPARIN SODIUM 40 MG: 100 INJECTION SUBCUTANEOUS at 17:34

## 2024-09-16 RX ADMIN — OMEPRAZOLE 20 MG: 20 CAPSULE, DELAYED RELEASE ORAL at 06:19

## 2024-09-16 RX ADMIN — AMLODIPINE BESYLATE 5 MG: 5 TABLET ORAL at 06:19

## 2024-09-16 RX ADMIN — INSULIN HUMAN 1 UNITS: 100 INJECTION, SOLUTION PARENTERAL at 06:27

## 2024-09-16 RX ADMIN — POTASSIUM CHLORIDE AND SODIUM CHLORIDE: 900; 300 INJECTION, SOLUTION INTRAVENOUS at 17:08

## 2024-09-16 RX ADMIN — Medication 5 MG: at 20:26

## 2024-09-16 RX ADMIN — INSULIN HUMAN 1 UNITS: 100 INJECTION, SOLUTION PARENTERAL at 17:34

## 2024-09-16 RX ADMIN — INSULIN GLARGINE-YFGN 3 UNITS: 100 INJECTION, SOLUTION SUBCUTANEOUS at 06:27

## 2024-09-16 ASSESSMENT — FIBROSIS 4 INDEX: FIB4 SCORE: 0.95

## 2024-09-16 ASSESSMENT — ENCOUNTER SYMPTOMS
VOMITING: 0
NAUSEA: 1
DIARRHEA: 1

## 2024-09-16 ASSESSMENT — PAIN DESCRIPTION - PAIN TYPE
TYPE: ACUTE PAIN
TYPE: ACUTE PAIN

## 2024-09-16 NOTE — PROGRESS NOTES
BSSR received from day shift RN. Patient laying in bed in no apparent distress with no complaints. A&O X4. Patient making frequent trips to bathroom for diarrhea. Plan of care discussed with patient. Bed in low position with bed brakes applied and call light in reach. Patient states no needs at this time.

## 2024-09-16 NOTE — CARE PLAN
The patient is Watcher - Medium risk of patient condition declining or worsening    Shift Goals  Clinical Goals: Patient to remain  free from falls or other injury, N/V/D to mprove  Patient Goals: Rest/ sleep overnight  Family Goals: N/A    Progress made toward(s) clinical / shift goals:  Has had no falls overnight but is becoming weaker    Patient is not progressing towards the following goals:      Problem: Bowel Elimination  Goal: Establish and maintain regular bowel function  Outcome: Not Progressing     Problem: Gastrointestinal Irritability  Goal: Nausea and vomiting will be absent or improve  Outcome: Not Progressing

## 2024-09-16 NOTE — CARE PLAN
The patient is Stable - Low risk of patient condition declining or worsening    Shift Goals  Clinical Goals: remain free from falls, manage pain at or above 3/10 with medcation per MAR, monitor BM, replace electrolytes  Patient Goals: rest, manage pain  Family Goals: N/A    Progress made toward(s) clinical / shift goals:  pt remained free from falls, no complains of pain, still having watery BM's, pt positive for salmonella, Contact precautions remain in place, oral P.O. replacement of K    Problem: Knowledge Deficit - Standard  Goal: Patient and family/care givers will demonstrate understanding of plan of care, disease process/condition, diagnostic tests and medications  Outcome: Progressing     Problem: Fall Risk  Goal: Patient will remain free from falls  Outcome: Progressing     Problem: Psychosocial  Goal: Patient's level of anxiety will decrease  Outcome: Progressing  Goal: Patient's ability to verbalize feelings about condition will improve  Outcome: Progressing     Problem: Fluid Volume  Goal: Fluid volume balance will be maintained  Outcome: Progressing     Problem: Nutrition  Goal: Patient's nutritional and fluid intake will be adequate or improve  Outcome: Progressing     Problem: Bowel Elimination  Goal: Establish and maintain regular bowel function  Outcome: Progressing     Problem: Diabetes Management  Goal: Patient will achieve and maintain glucose in satisfactory range  Outcome: Progressing     Problem: Discharge Planning - Diabetes  Goal: Patient's continuum of care needs will be met  Outcome: Progressing     Problem: Skin Integrity - Diabetes  Goal: Patient's skin on legs and feet will remain intact while hospitalized  Outcome: Progressing       Patient is not progressing towards the following goals:

## 2024-09-16 NOTE — PROGRESS NOTES
0700 Received report from Night shift nurse  0815 Performed assessment  Pt is A&Ox4, no complaints of pain, Updated on POC: monitor BM's, replace electrolytes, maintain ISO precautions  Call light within reach, hourly rounding in place, bed in lowest position.

## 2024-09-17 PROBLEM — E87.6 HYPOKALEMIA: Status: ACTIVE | Noted: 2024-09-17

## 2024-09-17 LAB
ALBUMIN SERPL BCP-MCNC: 2.9 G/DL (ref 3.2–4.9)
BUN SERPL-MCNC: 7 MG/DL (ref 8–22)
CALCIUM ALBUM COR SERPL-MCNC: 8.5 MG/DL (ref 8.5–10.5)
CALCIUM SERPL-MCNC: 7.6 MG/DL (ref 8.4–10.2)
CHLORIDE SERPL-SCNC: 100 MMOL/L (ref 96–112)
CO2 SERPL-SCNC: 21 MMOL/L (ref 20–33)
CREAT SERPL-MCNC: 0.47 MG/DL (ref 0.5–1.4)
GFR SERPLBLD CREATININE-BSD FMLA CKD-EPI: 115 ML/MIN/1.73 M 2
GLUCOSE BLD STRIP.AUTO-MCNC: 115 MG/DL (ref 65–99)
GLUCOSE BLD STRIP.AUTO-MCNC: 177 MG/DL (ref 65–99)
GLUCOSE BLD STRIP.AUTO-MCNC: 180 MG/DL (ref 65–99)
GLUCOSE BLD STRIP.AUTO-MCNC: 211 MG/DL (ref 65–99)
GLUCOSE SERPL-MCNC: 165 MG/DL (ref 65–99)
MAGNESIUM SERPL-MCNC: 1.9 MG/DL (ref 1.5–2.5)
PHOSPHATE SERPL-MCNC: 1.3 MG/DL (ref 2.5–4.5)
POTASSIUM SERPL-SCNC: 3.2 MMOL/L (ref 3.6–5.5)
SODIUM SERPL-SCNC: 132 MMOL/L (ref 135–145)

## 2024-09-17 PROCEDURE — 700102 HCHG RX REV CODE 250 W/ 637 OVERRIDE(OP): Performed by: INTERNAL MEDICINE

## 2024-09-17 PROCEDURE — 36415 COLL VENOUS BLD VENIPUNCTURE: CPT

## 2024-09-17 PROCEDURE — 700102 HCHG RX REV CODE 250 W/ 637 OVERRIDE(OP): Performed by: HOSPITALIST

## 2024-09-17 PROCEDURE — 700111 HCHG RX REV CODE 636 W/ 250 OVERRIDE (IP): Mod: JZ | Performed by: INTERNAL MEDICINE

## 2024-09-17 PROCEDURE — 700111 HCHG RX REV CODE 636 W/ 250 OVERRIDE (IP): Mod: JZ | Performed by: HOSPITALIST

## 2024-09-17 PROCEDURE — 94760 N-INVAS EAR/PLS OXIMETRY 1: CPT

## 2024-09-17 PROCEDURE — A9270 NON-COVERED ITEM OR SERVICE: HCPCS | Performed by: HOSPITALIST

## 2024-09-17 PROCEDURE — A9270 NON-COVERED ITEM OR SERVICE: HCPCS | Performed by: INTERNAL MEDICINE

## 2024-09-17 PROCEDURE — 82962 GLUCOSE BLOOD TEST: CPT | Mod: 91

## 2024-09-17 PROCEDURE — 83735 ASSAY OF MAGNESIUM: CPT

## 2024-09-17 PROCEDURE — 770001 HCHG ROOM/CARE - MED/SURG/GYN PRIV*

## 2024-09-17 PROCEDURE — 80069 RENAL FUNCTION PANEL: CPT

## 2024-09-17 PROCEDURE — 99232 SBSQ HOSP IP/OBS MODERATE 35: CPT | Performed by: INTERNAL MEDICINE

## 2024-09-17 PROCEDURE — 700101 HCHG RX REV CODE 250: Performed by: INTERNAL MEDICINE

## 2024-09-17 RX ORDER — BACLOFEN 10 MG/1
10 TABLET ORAL EVERY 6 HOURS PRN
Status: DISCONTINUED | OUTPATIENT
Start: 2024-09-17 | End: 2024-09-18

## 2024-09-17 RX ORDER — PROCHLORPERAZINE EDISYLATE 5 MG/ML
10 INJECTION INTRAMUSCULAR; INTRAVENOUS EVERY 6 HOURS PRN
Status: DISCONTINUED | OUTPATIENT
Start: 2024-09-17 | End: 2024-09-21 | Stop reason: HOSPADM

## 2024-09-17 RX ORDER — LEVOFLOXACIN 500 MG/1
500 TABLET, FILM COATED ORAL EVERY 24 HOURS
Status: DISCONTINUED | OUTPATIENT
Start: 2024-09-17 | End: 2024-09-21 | Stop reason: HOSPADM

## 2024-09-17 RX ORDER — CLOTRIMAZOLE 10 MG/1
10 LOZENGE ORAL
Status: DISCONTINUED | OUTPATIENT
Start: 2024-09-17 | End: 2024-09-21 | Stop reason: HOSPADM

## 2024-09-17 RX ADMIN — ENOXAPARIN SODIUM 40 MG: 100 INJECTION SUBCUTANEOUS at 17:31

## 2024-09-17 RX ADMIN — LEVOTHYROXINE SODIUM 50 MCG: 0.05 TABLET ORAL at 05:16

## 2024-09-17 RX ADMIN — AMLODIPINE BESYLATE 5 MG: 5 TABLET ORAL at 05:16

## 2024-09-17 RX ADMIN — Medication 5 MG: at 20:15

## 2024-09-17 RX ADMIN — BACLOFEN 10 MG: 10 TABLET ORAL at 13:28

## 2024-09-17 RX ADMIN — INSULIN GLARGINE-YFGN 3 UNITS: 100 INJECTION, SOLUTION SUBCUTANEOUS at 06:35

## 2024-09-17 RX ADMIN — INSULIN HUMAN 1 UNITS: 100 INJECTION, SOLUTION PARENTERAL at 16:54

## 2024-09-17 RX ADMIN — OMEPRAZOLE 20 MG: 20 CAPSULE, DELAYED RELEASE ORAL at 05:16

## 2024-09-17 RX ADMIN — PROCHLORPERAZINE EDISYLATE 10 MG: 5 INJECTION INTRAMUSCULAR; INTRAVENOUS at 23:10

## 2024-09-17 RX ADMIN — LEVOFLOXACIN 500 MG: 500 TABLET, FILM COATED ORAL at 12:29

## 2024-09-17 RX ADMIN — CLOTRIMAZOLE 10 MG: 10 LOZENGE ORAL at 19:45

## 2024-09-17 RX ADMIN — CLOTRIMAZOLE 10 MG: 10 LOZENGE ORAL at 23:10

## 2024-09-17 RX ADMIN — INSULIN HUMAN 1 UNITS: 100 INJECTION, SOLUTION PARENTERAL at 11:20

## 2024-09-17 RX ADMIN — POTASSIUM CHLORIDE AND SODIUM CHLORIDE: 900; 300 INJECTION, SOLUTION INTRAVENOUS at 01:14

## 2024-09-17 RX ADMIN — INSULIN HUMAN 2 UNITS: 100 INJECTION, SOLUTION PARENTERAL at 06:34

## 2024-09-17 RX ADMIN — ONDANSETRON 4 MG: 2 INJECTION INTRAMUSCULAR; INTRAVENOUS at 20:15

## 2024-09-17 RX ADMIN — CLOTRIMAZOLE 10 MG: 10 LOZENGE ORAL at 14:31

## 2024-09-17 RX ADMIN — CLOTRIMAZOLE 10 MG: 10 LOZENGE ORAL at 11:17

## 2024-09-17 RX ADMIN — ONDANSETRON 4 MG: 2 INJECTION INTRAMUSCULAR; INTRAVENOUS at 14:32

## 2024-09-17 RX ADMIN — ASPIRIN 81 MG: 81 TABLET, COATED ORAL at 05:16

## 2024-09-17 RX ADMIN — ONDANSETRON 4 MG: 4 TABLET, ORALLY DISINTEGRATING ORAL at 05:12

## 2024-09-17 RX ADMIN — PROCHLORPERAZINE EDISYLATE 10 MG: 5 INJECTION INTRAMUSCULAR; INTRAVENOUS at 17:31

## 2024-09-17 ASSESSMENT — ENCOUNTER SYMPTOMS
HEADACHES: 0
SORE THROAT: 1
DEPRESSION: 0
BLURRED VISION: 0
WEAKNESS: 0
CHILLS: 0
DIARRHEA: 1
SPEECH CHANGE: 0
MYALGIAS: 0
DIZZINESS: 0
ABDOMINAL PAIN: 0
PHOTOPHOBIA: 0
FEVER: 0
SENSORY CHANGE: 0
CLAUDICATION: 0
NAUSEA: 0
CONSTIPATION: 0
INSOMNIA: 0
NERVOUS/ANXIOUS: 0
HEARTBURN: 0
VOMITING: 0
SHORTNESS OF BREATH: 0
COUGH: 0

## 2024-09-17 ASSESSMENT — PAIN DESCRIPTION - PAIN TYPE
TYPE: ACUTE PAIN

## 2024-09-17 NOTE — PROGRESS NOTES
Hospital Medicine Daily Progress Note    Date of Service  9/17/2024    Chief Complaint  Barron Hewitt is a 63 y.o. male admitted 9/14/2024 with diarrhea and dehydration.    Hospital Course  63-year-old male with a past medical history of T2DM, hypothyroidism, hypertension, sleep apnea, hyperlipidemia, myocardial bridge admitted on 9/14/2024 after having nausea vomiting diarrhea, worsening abdominal pain.  In the ED patient was found to have hypertension at 181/99, 163/74.  Labs showed hemoconcentration as hemoglobin was 18.3, hematocrit 52.6, platelets 316, sodium 126, anion gap 23, bicarb 17, chloride 86.  Patient was started on IV fluids.     Patient has shown marked improvement in his electrolytes but still remains with significant watery diarrhea.  He has had this over 5 days which should be self-limiting point.  Started on Levaquin for treatment given the fact that he is hospitalized and duration is longer than anticipated.    Interval Problem Update  9/17 patient still with frequent watery bowel movements.  Started on Levaquin 500 mg p.o. daily for the next 5 days given the fact that he is hospitalized and continues to have significant diarrhea.  He also developed thrush with some difficulty swallowing today started on clotrimazole.  He also continues to have significant hiccups therefore I will give him baclofen as needed.  Will continue to hold off Imodium at this point given the presence of Salmonella.    I have discussed this patient's plan of care and discharge plan at IDT rounds today with Case Management, Nursing, Nursing leadership, and other members of the IDT team.    Consultants/Specialty  none    Code Status  Full Code    Disposition  The patient is not medically cleared for discharge to home or a post-acute facility.  Anticipate discharge to: home with close outpatient follow-up    I have placed the appropriate orders for post-discharge needs.    Review of Systems  Review of Systems    Constitutional:  Negative for chills and fever.   HENT:  Positive for sore throat. Negative for congestion.    Eyes:  Negative for blurred vision and photophobia.   Respiratory:  Negative for cough and shortness of breath.    Cardiovascular:  Negative for chest pain, claudication and leg swelling.   Gastrointestinal:  Positive for diarrhea. Negative for abdominal pain, constipation, heartburn, nausea and vomiting.   Genitourinary:  Negative for dysuria and hematuria.   Musculoskeletal:  Negative for joint pain and myalgias.   Skin:  Negative for itching and rash.   Neurological:  Negative for dizziness, sensory change, speech change, weakness and headaches.   Psychiatric/Behavioral:  Negative for depression. The patient is not nervous/anxious and does not have insomnia.         Physical Exam  Temp:  [36.4 °C (97.6 °F)-36.6 °C (97.9 °F)] 36.4 °C (97.6 °F)  Pulse:  [75-79] 75  Resp:  [17-18] 17  BP: (121-143)/(72-79) 121/72  SpO2:  [94 %-96 %] 96 %    Physical Exam  Vitals and nursing note reviewed.   Constitutional:       General: He is not in acute distress.     Appearance: Normal appearance.   HENT:      Head: Normocephalic and atraumatic.      Mouth/Throat:      Comments: White patches present, consistent with thrush  Eyes:      General: No scleral icterus.     Extraocular Movements: Extraocular movements intact.   Cardiovascular:      Rate and Rhythm: Normal rate and regular rhythm.      Pulses: Normal pulses.      Heart sounds: Normal heart sounds. No murmur heard.  Pulmonary:      Effort: Pulmonary effort is normal. No respiratory distress.      Breath sounds: Normal breath sounds. No wheezing, rhonchi or rales.   Abdominal:      General: Abdomen is flat. Bowel sounds are normal. There is no distension.      Palpations: Abdomen is soft.      Tenderness: There is no rebound.   Musculoskeletal:         General: No swelling or tenderness.      Cervical back: Normal range of motion and neck supple.    Lymphadenopathy:      Cervical: No cervical adenopathy.   Skin:     Coloration: Skin is not jaundiced.      Findings: No erythema.   Neurological:      General: No focal deficit present.      Mental Status: He is alert and oriented to person, place, and time. Mental status is at baseline.      Cranial Nerves: No cranial nerve deficit.   Psychiatric:         Mood and Affect: Mood normal.         Behavior: Behavior normal.         Fluids    Intake/Output Summary (Last 24 hours) at 9/17/2024 1309  Last data filed at 9/17/2024 1130  Gross per 24 hour   Intake 240 ml   Output 400 ml   Net -160 ml        Laboratory  Recent Labs     09/15/24  0226 09/16/24  0143   WBC 5.4 7.9   RBC 6.16* 6.08   HEMOGLOBIN 16.6 16.4   HEMATOCRIT 48.3 48.3   MCV 78.4* 79.4*   MCH 26.9* 27.0   MCHC 34.4 34.0   RDW 42.2 41.9   PLATELETCT 267 277   MPV 10.7 10.9     Recent Labs     09/15/24  1050 09/16/24  0143 09/17/24  0149   SODIUM 127* 131* 132*   POTASSIUM 3.0* 2.8* 3.2*   CHLORIDE 86* 90* 100   CO2 25 23 21   GLUCOSE 191* 148* 165*   BUN 11 11 7*   CREATININE 0.56 0.55 0.47*   CALCIUM 8.2* 8.5 7.6*                   Imaging  No orders to display        Assessment/Plan  * Hyponatremia- (present on admission)  Assessment & Plan  Hypovolemic hyponatremia from GI loss  Sodium 132    Hypokalemia  Assessment & Plan  Continue repletion through IV fluids, losing through diarrhea    Primary hypertension- (present on admission)  Assessment & Plan  Used to be on metoprolol SR but recently stopped.  Continue amlodipine    Hypertensive urgency- (present on admission)  Assessment & Plan  Due to abdominal pain  Monitor    Polycythemia- (present on admission)  Assessment & Plan  Resolved    Metabolic acidosis- (present on admission)  Assessment & Plan  Resolved    Salmonella gastroenteritis- (present on admission)  Assessment & Plan  Negative C. difficile PCR  GI panel PCR shows Salmonella gastroenteritis  Continue isolation  Continue IV fluids  Given  the lack of improvement start Levaquin 500 mg p.o. daily for the next 5 days    Dehydration- (present on admission)  Assessment & Plan  Continue with IV hydration    Asthma- (present on admission)  Assessment & Plan  Not currently in exacerbation.  Oxygen as needed.  Resume home inhalers    Acquired hypothyroidism- (present on admission)  Assessment & Plan  Continue Synthroid    Controlled type 2 diabetes mellitus with diabetic polyneuropathy, without long-term current use of insulin (HCC)- (present on admission)  Assessment & Plan  With hyperglycemia  Last glycated hemoglobin was 7.3%  Continue insulin sliding scale, Accu-Cheks and hypoglycemic protocol  A1c 9.1, uncontrolled.  There was a elevated beta hydroxybutyrate 1.7.  It is more likely from GI loss.    Dyslipidemia- (present on admission)  Assessment & Plan  Restart atorvastatin when appropriate         VTE prophylaxis:    enoxaparin ppx      I have performed a physical exam and reviewed and updated ROS and Plan today (9/17/2024). In review of yesterday's note (9/16/2024), there are no changes except as documented above.

## 2024-09-17 NOTE — PROGRESS NOTES
0700 Received report from Night shift nurse  0915 Performed assessment  Pt is A&Ox4, no complaints of pain, Updated on POC: monitor BM, tolerate abx, monitor N&V, tolerate clear liquid diet.  Call light within reach, hourly rounding in place, bed in lowest position, pt refuses bed/strip alarm, pt educated.

## 2024-09-17 NOTE — PROGRESS NOTES
1918 Received report from day shift RN. Patient is awake and alert, resting in bed. A&O X4, room air. Unlabored respiration observed. IVF is on. Care plan discussed . Call light within reach. Personal belongings within reach. No needs required at this time. Safety precautions in place.     0030 pt had 9 times of BM, small size, watery, the color changed from brown to green. Pt tolerating clear liquid diet.     0620 Pt continuously has small green watery BM. Pt stated the BM less watery. No pain reported. Zofran given for nausea.     0624 White patches observed in the membrane of the mouth, pt complains pain and difficult to swallow.

## 2024-09-17 NOTE — CARE PLAN
The patient is Stable - Low risk of patient condition declining or worsening    Shift Goals  Clinical Goals: remain free from falls, monitor BM, replace electrolytes, manage pain at or above 3/10 with medication per MAR, tolerate meals  Patient Goals: rest, manage pain, tolerate meals  Family Goals: N/A    Progress made toward(s) clinical / shift goals:  pt remained free from falls, multiple watery Bm's today, replaced electrolytes both IV and PO, pt had his diet changed to clear liquids as he was not tolerating diet, no complaints of pain    Problem: Knowledge Deficit - Standard  Goal: Patient and family/care givers will demonstrate understanding of plan of care, disease process/condition, diagnostic tests and medications  Outcome: Progressing     Problem: Fall Risk  Goal: Patient will remain free from falls  Outcome: Progressing     Problem: Psychosocial  Goal: Patient's level of anxiety will decrease  Outcome: Progressing  Goal: Patient's ability to verbalize feelings about condition will improve  Outcome: Progressing     Problem: Fluid Volume  Goal: Fluid volume balance will be maintained  Outcome: Progressing     Problem: Nutrition  Goal: Patient's nutritional and fluid intake will be adequate or improve  Outcome: Progressing     Problem: Bowel Elimination  Goal: Establish and maintain regular bowel function  Outcome: Progressing     Problem: Gastrointestinal Irritability  Goal: Nausea and vomiting will be absent or improve  Outcome: Progressing  Goal: Diarrhea will be absent or improved  Outcome: Progressing     Problem: Diabetes Management  Goal: Patient will achieve and maintain glucose in satisfactory range  Outcome: Progressing     Problem: Discharge Planning - Diabetes  Goal: Patient's continuum of care needs will be met  Outcome: Progressing     Problem: Skin Integrity - Diabetes  Goal: Patient's skin on legs and feet will remain intact while hospitalized  Outcome: Progressing       Patient is not  progressing towards the following goals:

## 2024-09-17 NOTE — PROGRESS NOTES
[facility] Medicine Daily Progress Note    Date of Service  9/16/2024    Chief Complaint  Barron Hewitt is a 63 y.o. male admitted 9/14/2024 with nausea vomiting and diarrhea    Hospital Course  63-year-old male with a past medical history of T2DM, hypothyroidism, hypertension, sleep apnea, hyperlipidemia, myocardial bridge admitted on 9/14/2024 after having nausea vomiting diarrhea, worsening abdominal pain.  In the ED patient was found to have hypertension at 181/99, 163/74.  Labs showed hemoconcentration as hemoglobin was 18.3, hematocrit 52.6, platelets 316, sodium 126, anion gap 23, bicarb 17, chloride 86.  Patient was started on IV fluids.     Interval Problem Update  Patient had more loose stools overnight and this morning.  In the evening patient still had watery stool  Patient opted for clear liquid diet which I changed  Potassium 2.8, patient nauseated.  I discontinued oral potassium and started IV NS with potassium.  Reviewed labs, hemoglobin 16.4, WBC 7.9, sodium 121, Chloride 90 low, creatinine 0.55, Phos 2.0, magnesium 2.0.    I have discussed this patient's plan of care and discharge plan at IDT rounds today with Case Management, Nursing, Nursing leadership, and other members of the IDT team.    Consultants/Specialty  None    Code Status  Full Code    Disposition  The patient is not medically cleared for discharge to home or a post-acute facility.      I have placed the appropriate orders for post-discharge needs.    Review of Systems  Review of Systems   Gastrointestinal:  Positive for diarrhea and nausea. Negative for vomiting.   All other systems reviewed and are negative.       Physical Exam  Temp:  [36.4 °C (97.5 °F)-36.4 °C (97.6 °F)] 36.4 °C (97.5 °F)  Pulse:  [83-84] 83  Resp:  [16-17] 17  BP: (130-143)/(64-82) 130/68  SpO2:  [92 %-98 %] 94 %    Physical Exam  Vitals and nursing note reviewed.   Constitutional:       General: He is not in acute distress.     Appearance: He is not  diaphoretic.   HENT:      Mouth/Throat:      Mouth: Mucous membranes are dry.      Pharynx: No oropharyngeal exudate.   Cardiovascular:      Rate and Rhythm: Normal rate and regular rhythm.      Pulses: Normal pulses.      Heart sounds: Normal heart sounds. No murmur heard.  Pulmonary:      Effort: Pulmonary effort is normal. No respiratory distress.      Breath sounds: Normal breath sounds. No wheezing or rales.   Abdominal:      General: There is no distension.      Tenderness: There is abdominal tenderness.      Comments: Hyperactive BS   Musculoskeletal:         General: No swelling or tenderness. Normal range of motion.   Skin:     General: Skin is warm.      Capillary Refill: Capillary refill takes less than 2 seconds.      Coloration: Skin is not jaundiced or pale.   Neurological:      General: No focal deficit present.      Mental Status: He is alert and oriented to person, place, and time. Mental status is at baseline.      Motor: No weakness.   Psychiatric:         Mood and Affect: Mood normal.         Behavior: Behavior normal.         Thought Content: Thought content normal.         Judgment: Judgment normal.         Fluids    Intake/Output Summary (Last 24 hours) at 9/16/2024 1730  Last data filed at 9/16/2024 0905  Gross per 24 hour   Intake 480 ml   Output 50 ml   Net 430 ml        Laboratory  Recent Labs     09/14/24  1117 09/15/24  0226 09/16/24  0143   WBC 7.6 5.4 7.9   RBC 6.71* 6.16* 6.08   HEMOGLOBIN 18.3* 16.6 16.4   HEMATOCRIT 52.6* 48.3 48.3   MCV 78.4* 78.4* 79.4*   MCH 27.3 26.9* 27.0   MCHC 34.8 34.4 34.0   RDW 42.9 42.2 41.9   PLATELETCT 316 267 277   MPV 10.3 10.7 10.9     Recent Labs     09/15/24  0226 09/15/24  1050 09/16/24  0143   SODIUM 128* 127* 131*   POTASSIUM 2.8* 3.0* 2.8*   CHLORIDE 91* 86* 90*   CO2 23 25 23   GLUCOSE 181* 191* 148*   BUN 14 11 11   CREATININE 0.58 0.56 0.55   CALCIUM 8.3* 8.2* 8.5                   Imaging  No orders to display        Assessment/Plan  *  Hyponatremia- (present on admission)  Assessment & Plan  Hypovolemic hyponatremia from GI loss  Na 126 on admission.  Sodium 131  Changed IV fluids    Salmonella gastroenteritis- (present on admission)  Assessment & Plan  Negative C. difficile PCR  GI panel PCR shows Salmonella gastroenteritis  Continue isolation  Continue IV fluids    Dehydration- (present on admission)  Assessment & Plan  Reduced oral intake due to N/V/D from Salmonella gastroenteritis  Encourage oral intake as tolerated, antiemetics as needed.  Home empagliflozin and trulicity worsening situation  Continue IV fluids, ongoing watery diarrhea    Controlled type 2 diabetes mellitus with diabetic polyneuropathy, without long-term current use of insulin (HCC)- (present on admission)  Assessment & Plan  With hyperglycemia  Last glycated hemoglobin was 7.3%  Continue insulin sliding scale, Accu-Cheks and hypoglycemic protocol  A1c 9.1, uncontrolled.  There was a elevated beta hydroxybutyrate 1.7.  It is more likely from GI loss.    Primary hypertension- (present on admission)  Assessment & Plan  Used to be on metoprolol SR but recently stopped.  Continue amlodipine    Hypertensive urgency- (present on admission)  Assessment & Plan  Due to abdominal pain  Monitor    Polycythemia- (present on admission)  Assessment & Plan  Due to severe dehydration  Continue fluids hemoglobin 16.6, from 18.3    Metabolic acidosis- (present on admission)  Assessment & Plan  Likely due to reduced intravascular volume and increase bicarb losses through diarrhea    Anion gap 14, bicarb 24.  Stop sodium bicarb tablets.  Continue IV fluids    Asthma- (present on admission)  Assessment & Plan  Not currently in exacerbation.  Oxygen as needed.  Resume home inhalers    Acquired hypothyroidism- (present on admission)  Assessment & Plan  Continue Synthroid    Dyslipidemia- (present on admission)  Assessment & Plan  Restart atorvastatin when appropriate         VTE prophylaxis:     enoxaparin ppx      I have performed a physical exam and reviewed and updated ROS and Plan today (9/16/2024). In review of yesterday's note (9/15/2024), there are no changes except as documented above.      Total time spent 52 minutes.    This included my review of patient's overnight RN notes, face to face interview, physical examination, lab analysis.  Also includes repeat visits with the patient, and my documented assessments and interventions above.  In addition, I spoke with entire care team on patient's treatment plan, and DC planning on morning rounds and IDT rounds.    This note was generated using voice recognition software which has a chance of producing errors of grammar and content.  I have made every reasonable attempt to find and correct any errors, but it should be expected that some may not be found prior to finalization of this note.

## 2024-09-17 NOTE — CARE PLAN
The patient is Stable - Low risk of patient condition declining or worsening    Shift Goals  Clinical Goals: remain free from falls, manage pain at or above 3/10 with medication per MAR, tolerate diet, monitor BM, replace electrolytes  Patient Goals: rest, manage pain  Family Goals: Stay updated on POC    Progress made toward(s) clinical / shift goals:  pt remained free from falls, no complaints of pain, pt tolerating the clear liquid diet, pt had multiple watery BM's, electrolytes were replaced.    Problem: Knowledge Deficit - Standard  Goal: Patient and family/care givers will demonstrate understanding of plan of care, disease process/condition, diagnostic tests and medications  Outcome: Progressing     Problem: Fall Risk  Goal: Patient will remain free from falls  Outcome: Progressing     Problem: Psychosocial  Goal: Patient's level of anxiety will decrease  Outcome: Progressing  Goal: Patient's ability to verbalize feelings about condition will improve  Outcome: Progressing     Problem: Nutrition  Goal: Patient's nutritional and fluid intake will be adequate or improve  Outcome: Progressing     Problem: Bowel Elimination  Goal: Establish and maintain regular bowel function  Outcome: Progressing     Problem: Gastrointestinal Irritability  Goal: Nausea and vomiting will be absent or improve  Outcome: Progressing  Goal: Diarrhea will be absent or improved  Outcome: Progressing     Problem: Diabetes Management  Goal: Patient will achieve and maintain glucose in satisfactory range  Outcome: Progressing     Problem: Skin Integrity - Diabetes  Goal: Patient's skin on legs and feet will remain intact while hospitalized  Outcome: Progressing       Patient is not progressing towards the following goals:

## 2024-09-18 PROBLEM — R06.6 HICCUPS: Status: ACTIVE | Noted: 2024-09-18

## 2024-09-18 LAB
ANION GAP SERPL CALC-SCNC: 11 MMOL/L (ref 7–16)
BUN SERPL-MCNC: 5 MG/DL (ref 8–22)
CALCIUM SERPL-MCNC: 8.2 MG/DL (ref 8.4–10.2)
CHLORIDE SERPL-SCNC: 96 MMOL/L (ref 96–112)
CO2 SERPL-SCNC: 26 MMOL/L (ref 20–33)
CREAT SERPL-MCNC: 0.66 MG/DL (ref 0.5–1.4)
ERYTHROCYTE [DISTWIDTH] IN BLOOD BY AUTOMATED COUNT: 43.8 FL (ref 35.9–50)
GFR SERPLBLD CREATININE-BSD FMLA CKD-EPI: 104 ML/MIN/1.73 M 2
GLUCOSE BLD STRIP.AUTO-MCNC: 142 MG/DL (ref 65–99)
GLUCOSE BLD STRIP.AUTO-MCNC: 183 MG/DL (ref 65–99)
GLUCOSE BLD STRIP.AUTO-MCNC: 214 MG/DL (ref 65–99)
GLUCOSE BLD STRIP.AUTO-MCNC: 232 MG/DL (ref 65–99)
GLUCOSE SERPL-MCNC: 154 MG/DL (ref 65–99)
HCT VFR BLD AUTO: 47.6 % (ref 42–52)
HGB BLD-MCNC: 16.2 G/DL (ref 14–18)
MCH RBC QN AUTO: 27.1 PG (ref 27–33)
MCHC RBC AUTO-ENTMCNC: 34 G/DL (ref 32.3–36.5)
MCV RBC AUTO: 79.7 FL (ref 81.4–97.8)
PLATELET # BLD AUTO: 291 K/UL (ref 164–446)
PMV BLD AUTO: 11.2 FL (ref 9–12.9)
POTASSIUM SERPL-SCNC: 2.9 MMOL/L (ref 3.6–5.5)
RBC # BLD AUTO: 5.97 M/UL (ref 4.7–6.1)
SODIUM SERPL-SCNC: 133 MMOL/L (ref 135–145)
WBC # BLD AUTO: 10.6 K/UL (ref 4.8–10.8)

## 2024-09-18 PROCEDURE — A9270 NON-COVERED ITEM OR SERVICE: HCPCS | Performed by: INTERNAL MEDICINE

## 2024-09-18 PROCEDURE — 85027 COMPLETE CBC AUTOMATED: CPT

## 2024-09-18 PROCEDURE — 700111 HCHG RX REV CODE 636 W/ 250 OVERRIDE (IP): Mod: JZ | Performed by: HOSPITALIST

## 2024-09-18 PROCEDURE — 770001 HCHG ROOM/CARE - MED/SURG/GYN PRIV*

## 2024-09-18 PROCEDURE — 99232 SBSQ HOSP IP/OBS MODERATE 35: CPT | Performed by: INTERNAL MEDICINE

## 2024-09-18 PROCEDURE — A9270 NON-COVERED ITEM OR SERVICE: HCPCS | Mod: JZ | Performed by: HOSPITALIST

## 2024-09-18 PROCEDURE — 700102 HCHG RX REV CODE 250 W/ 637 OVERRIDE(OP): Performed by: INTERNAL MEDICINE

## 2024-09-18 PROCEDURE — 700102 HCHG RX REV CODE 250 W/ 637 OVERRIDE(OP): Mod: JZ | Performed by: HOSPITALIST

## 2024-09-18 PROCEDURE — 80048 BASIC METABOLIC PNL TOTAL CA: CPT

## 2024-09-18 PROCEDURE — A9270 NON-COVERED ITEM OR SERVICE: HCPCS | Performed by: HOSPITALIST

## 2024-09-18 PROCEDURE — 82962 GLUCOSE BLOOD TEST: CPT | Mod: 91

## 2024-09-18 PROCEDURE — 700102 HCHG RX REV CODE 250 W/ 637 OVERRIDE(OP): Performed by: HOSPITALIST

## 2024-09-18 PROCEDURE — 36415 COLL VENOUS BLD VENIPUNCTURE: CPT

## 2024-09-18 PROCEDURE — 700101 HCHG RX REV CODE 250: Performed by: INTERNAL MEDICINE

## 2024-09-18 PROCEDURE — 94760 N-INVAS EAR/PLS OXIMETRY 1: CPT

## 2024-09-18 PROCEDURE — 700105 HCHG RX REV CODE 258: Performed by: INTERNAL MEDICINE

## 2024-09-18 RX ORDER — POTASSIUM CHLORIDE 7.45 MG/ML
10 INJECTION INTRAVENOUS
Status: DISCONTINUED | OUTPATIENT
Start: 2024-09-18 | End: 2024-09-18

## 2024-09-18 RX ORDER — POTASSIUM CHLORIDE 1500 MG/1
40 TABLET, EXTENDED RELEASE ORAL ONCE
Status: COMPLETED | OUTPATIENT
Start: 2024-09-18 | End: 2024-09-18

## 2024-09-18 RX ORDER — LOPERAMIDE HCL 2 MG
2 CAPSULE ORAL EVERY 4 HOURS PRN
Status: DISCONTINUED | OUTPATIENT
Start: 2024-09-18 | End: 2024-09-21 | Stop reason: HOSPADM

## 2024-09-18 RX ORDER — POTASSIUM CHLORIDE 7.45 MG/ML
10 INJECTION INTRAVENOUS ONCE
Status: COMPLETED | OUTPATIENT
Start: 2024-09-18 | End: 2024-09-18

## 2024-09-18 RX ORDER — CHLORPROMAZINE HYDROCHLORIDE 25 MG/1
25 TABLET, FILM COATED ORAL 3 TIMES DAILY PRN
Status: DISCONTINUED | OUTPATIENT
Start: 2024-09-18 | End: 2024-09-21 | Stop reason: HOSPADM

## 2024-09-18 RX ADMIN — OMEPRAZOLE 20 MG: 20 CAPSULE, DELAYED RELEASE ORAL at 06:19

## 2024-09-18 RX ADMIN — ONDANSETRON 4 MG: 2 INJECTION INTRAMUSCULAR; INTRAVENOUS at 23:15

## 2024-09-18 RX ADMIN — INSULIN HUMAN 1 UNITS: 100 INJECTION, SOLUTION PARENTERAL at 06:16

## 2024-09-18 RX ADMIN — CHLORPROMAZINE HYDROCHLORIDE 25 MG: 25 TABLET, FILM COATED ORAL at 12:12

## 2024-09-18 RX ADMIN — CLOTRIMAZOLE 10 MG: 10 LOZENGE ORAL at 15:31

## 2024-09-18 RX ADMIN — ONDANSETRON 4 MG: 2 INJECTION INTRAMUSCULAR; INTRAVENOUS at 04:34

## 2024-09-18 RX ADMIN — POTASSIUM CHLORIDE 10 MEQ: 10 INJECTION, SOLUTION INTRAVENOUS at 04:46

## 2024-09-18 RX ADMIN — INSULIN HUMAN 2 UNITS: 100 INJECTION, SOLUTION PARENTERAL at 12:07

## 2024-09-18 RX ADMIN — BACLOFEN 10 MG: 10 TABLET ORAL at 00:46

## 2024-09-18 RX ADMIN — CLOTRIMAZOLE 10 MG: 10 LOZENGE ORAL at 19:18

## 2024-09-18 RX ADMIN — INSULIN GLARGINE-YFGN 3 UNITS: 100 INJECTION, SOLUTION SUBCUTANEOUS at 06:16

## 2024-09-18 RX ADMIN — ENOXAPARIN SODIUM 40 MG: 100 INJECTION SUBCUTANEOUS at 17:04

## 2024-09-18 RX ADMIN — LOPERAMIDE HYDROCHLORIDE 2 MG: 2 CAPSULE ORAL at 12:07

## 2024-09-18 RX ADMIN — Medication 5 MG: at 20:06

## 2024-09-18 RX ADMIN — AMLODIPINE BESYLATE 5 MG: 5 TABLET ORAL at 06:19

## 2024-09-18 RX ADMIN — CLOTRIMAZOLE 10 MG: 10 LOZENGE ORAL at 12:07

## 2024-09-18 RX ADMIN — LOPERAMIDE HYDROCHLORIDE 2 MG: 2 CAPSULE ORAL at 20:06

## 2024-09-18 RX ADMIN — POTASSIUM CHLORIDE 40 MEQ: 1500 TABLET, EXTENDED RELEASE ORAL at 04:34

## 2024-09-18 RX ADMIN — CLOTRIMAZOLE 10 MG: 10 LOZENGE ORAL at 06:23

## 2024-09-18 RX ADMIN — CHLORPROMAZINE HYDROCHLORIDE 25 MG: 25 TABLET, FILM COATED ORAL at 20:06

## 2024-09-18 RX ADMIN — ACETAMINOPHEN 650 MG: 325 TABLET ORAL at 15:32

## 2024-09-18 RX ADMIN — ONDANSETRON 4 MG: 2 INJECTION INTRAMUSCULAR; INTRAVENOUS at 00:46

## 2024-09-18 RX ADMIN — LEVOTHYROXINE SODIUM 50 MCG: 0.05 TABLET ORAL at 06:19

## 2024-09-18 RX ADMIN — LEVOFLOXACIN 500 MG: 500 TABLET, FILM COATED ORAL at 06:20

## 2024-09-18 RX ADMIN — INSULIN HUMAN 2 UNITS: 100 INJECTION, SOLUTION PARENTERAL at 17:04

## 2024-09-18 RX ADMIN — ASPIRIN 81 MG: 81 TABLET, COATED ORAL at 06:19

## 2024-09-18 RX ADMIN — CLOTRIMAZOLE 10 MG: 10 LOZENGE ORAL at 23:15

## 2024-09-18 RX ADMIN — POTASSIUM PHOSPHATE, MONOBASIC AND POTASSIUM PHOSPHATE, DIBASIC 30 MMOL: 224; 236 INJECTION, SOLUTION, CONCENTRATE INTRAVENOUS at 07:57

## 2024-09-18 ASSESSMENT — ENCOUNTER SYMPTOMS
SENSORY CHANGE: 0
BLURRED VISION: 0
DEPRESSION: 0
SHORTNESS OF BREATH: 0
HEADACHES: 0
DIARRHEA: 1
FEVER: 0
NAUSEA: 1
CONSTIPATION: 0
COUGH: 0
CLAUDICATION: 0
SPEECH CHANGE: 0
SORE THROAT: 1
MYALGIAS: 0
WEAKNESS: 0
INSOMNIA: 0
DIZZINESS: 0
CHILLS: 0
PHOTOPHOBIA: 0
HEARTBURN: 0
VOMITING: 0
NERVOUS/ANXIOUS: 0
ABDOMINAL PAIN: 0

## 2024-09-18 ASSESSMENT — PAIN DESCRIPTION - PAIN TYPE
TYPE: ACUTE PAIN
TYPE: ACUTE PAIN

## 2024-09-18 NOTE — ASSESSMENT & PLAN NOTE
Improved with Thorazine  May benefit from Carafate given significant reflux  Tolerating soft solid diet

## 2024-09-18 NOTE — CARE PLAN
The patient is Stable - Low risk of patient condition declining or worsening    Shift Goals  Clinical Goals: Pt will have less episode of nausea and vomting. Pt will remain free from falls or injury throughout the shift.  Patient Goals: les vomiting and BM, rest  Family Goals: n/a    Progress made toward(s) clinical / shift goals:  Pt has still vomits after drinking water; prn medications given as per MAR. Multiple watery BM noted throughout the shift. Pt able to sleep at least 4 hours within the shift. Pt refused bed alarm and strip alarm. Educated on fall precautions. Pt is free from falls or injury throughout the shift. Hourly rounding in progress.     Patient is not progressing towards the following goals: n/a

## 2024-09-18 NOTE — PROGRESS NOTES
Received bedside report from day shift nurse, Chinyere MARTÍNEZ. Assumed pt care at 1915.  Pt is awake, resting comfortably in bed. Pt on room air; no signs of SOB/respiratory distress. Labs noted, VSS. Pt denies pain at this moment. Needs attended well. Fall precautions in place. Bed at lowest position. Call light and personal belongings within reach. Encouraged to call for assistance. Plan of care on going, no further concerns as of present.   Hourly rounding in progress.

## 2024-09-18 NOTE — CARE PLAN
The patient is Stable - Low risk of patient condition declining or worsening    Shift Goals  Clinical Goals: Tolerate diet, pain controlled to less than 4  Patient Goals: have more formed BM  Family Goals: n/a    Progress made toward(s) clinical / shift goals:  Patient has reported nausea during shift bu no vomiting. Reports tolerating food with no pain.     Patient is not progressing towards the following goals:

## 2024-09-18 NOTE — PROGRESS NOTES
Educated patient regarding fall risk and fall prevention interventions. Patient refused having bed alarms at this time.

## 2024-09-18 NOTE — PROGRESS NOTES
Hospital Medicine Daily Progress Note    Date of Service  9/18/2024    Chief Complaint  Barron Hewitt is a 63 y.o. male admitted 9/14/2024 with diarrhea and dehydration.    Hospital Course  63-year-old male with a past medical history of T2DM, hypothyroidism, hypertension, sleep apnea, hyperlipidemia, myocardial bridge admitted on 9/14/2024 after having nausea vomiting diarrhea, worsening abdominal pain.  In the ED patient was found to have hypertension at 181/99, 163/74.  Labs showed hemoconcentration as hemoglobin was 18.3, hematocrit 52.6, platelets 316, sodium 126, anion gap 23, bicarb 17, chloride 86.  Patient was started on IV fluids.     Patient has shown marked improvement in his electrolytes but still remains with significant watery diarrhea.  He has had this over 5 days which should be self-limiting point.  Started on Levaquin for treatment given the fact that he is hospitalized and duration is longer than anticipated.    Interval Problem Update  9/17 patient still with frequent watery bowel movements.  Started on Levaquin 500 mg p.o. daily for the next 5 days given the fact that he is hospitalized and continues to have significant diarrhea.  He also developed thrush with some difficulty swallowing today started on clotrimazole.  He also continues to have significant hiccups therefore I will give him baclofen as needed.  Will continue to hold off Imodium at this point given the presence of Salmonella.  9/18 patient continues to have watery loose bowel movements but there is some solid material now passing.  Will trial low-dose Imodium 2 mg every 4 as needed and continue Levaquin.  Baclofen is not helping with the hiccups therefore I will discontinue it and trial Thorazine.  Potassium down to 2.9 and he received both IV and p.o. this morning as he continues to have GI losses.  Will continue with potassium phosphate supplementation.    I have discussed this patient's plan of care and discharge plan  at IDT rounds today with Case Management, Nursing, Nursing leadership, and other members of the IDT team.    Consultants/Specialty  none    Code Status  Full Code    Disposition  The patient is not medically cleared for discharge to home or a post-acute facility.  Anticipate discharge to: home with close outpatient follow-up    I have placed the appropriate orders for post-discharge needs.    Review of Systems  Review of Systems   Constitutional:  Negative for chills and fever.   HENT:  Positive for sore throat. Negative for congestion.    Eyes:  Negative for blurred vision and photophobia.   Respiratory:  Negative for cough and shortness of breath.    Cardiovascular:  Negative for chest pain, claudication and leg swelling.   Gastrointestinal:  Positive for diarrhea and nausea. Negative for abdominal pain, constipation, heartburn and vomiting.   Genitourinary:  Negative for dysuria and hematuria.   Musculoskeletal:  Negative for joint pain and myalgias.   Skin:  Negative for itching and rash.   Neurological:  Negative for dizziness, sensory change, speech change, weakness and headaches.   Psychiatric/Behavioral:  Negative for depression. The patient is not nervous/anxious and does not have insomnia.         Physical Exam  Temp:  [36.7 °C (98 °F)-37.1 °C (98.8 °F)] 37.1 °C (98.8 °F)  Pulse:  [75-84] 75  Resp:  [16-17] 17  BP: (117-159)/(54-88) 152/88  SpO2:  [91 %-96 %] 94 %    Physical Exam  Vitals and nursing note reviewed.   Constitutional:       General: He is not in acute distress.     Appearance: Normal appearance.   HENT:      Head: Normocephalic and atraumatic.      Mouth/Throat:      Comments: White patches present, consistent with thrush  Eyes:      General: No scleral icterus.     Extraocular Movements: Extraocular movements intact.   Cardiovascular:      Rate and Rhythm: Normal rate and regular rhythm.      Pulses: Normal pulses.      Heart sounds: Normal heart sounds. No murmur heard.  Pulmonary:       Effort: Pulmonary effort is normal. No respiratory distress.      Breath sounds: Normal breath sounds. No wheezing, rhonchi or rales.   Abdominal:      General: Abdomen is flat. Bowel sounds are normal. There is no distension.      Palpations: Abdomen is soft.      Tenderness: There is no rebound.   Musculoskeletal:         General: No swelling or tenderness.      Cervical back: Normal range of motion and neck supple.   Lymphadenopathy:      Cervical: No cervical adenopathy.   Skin:     Coloration: Skin is not jaundiced.      Findings: No erythema.   Neurological:      General: No focal deficit present.      Mental Status: He is alert and oriented to person, place, and time. Mental status is at baseline.      Cranial Nerves: No cranial nerve deficit.   Psychiatric:         Mood and Affect: Mood normal.         Behavior: Behavior normal.         Fluids    Intake/Output Summary (Last 24 hours) at 9/18/2024 1230  Last data filed at 9/18/2024 0700  Gross per 24 hour   Intake 2096.77 ml   Output 600 ml   Net 1496.77 ml        Laboratory  Recent Labs     09/16/24  0143 09/18/24  0117   WBC 7.9 10.6   RBC 6.08 5.97   HEMOGLOBIN 16.4 16.2   HEMATOCRIT 48.3 47.6   MCV 79.4* 79.7*   MCH 27.0 27.1   MCHC 34.0 34.0   RDW 41.9 43.8   PLATELETCT 277 291   MPV 10.9 11.2     Recent Labs     09/16/24  0143 09/17/24  0149 09/18/24  0117   SODIUM 131* 132* 133*   POTASSIUM 2.8* 3.2* 2.9*   CHLORIDE 90* 100 96   CO2 23 21 26   GLUCOSE 148* 165* 154*   BUN 11 7* 5*   CREATININE 0.55 0.47* 0.66   CALCIUM 8.5 7.6* 8.2*                   Imaging  No orders to display        Assessment/Plan  * Hyponatremia- (present on admission)  Assessment & Plan  Hypovolemic hyponatremia from GI loss  Sodium 133    Hiccups  Assessment & Plan  No improvement with baclofen, trial Thorazine  May benefit from Carafate given significant reflux    Hypokalemia  Assessment & Plan  Continue repletion through IV fluids, losing through diarrhea  IV potassium with  potassium phosphate    Primary hypertension- (present on admission)  Assessment & Plan  Used to be on metoprolol SR but recently stopped.  Continue amlodipine    Hypertensive urgency- (present on admission)  Assessment & Plan  Due to abdominal pain  Monitor    Polycythemia- (present on admission)  Assessment & Plan  Resolved    Metabolic acidosis- (present on admission)  Assessment & Plan  Resolved    Salmonella gastroenteritis- (present on admission)  Assessment & Plan  Negative C. difficile PCR  GI panel PCR shows Salmonella gastroenteritis  Continue isolation  Continue IV fluids  Given the lack of improvement start Levaquin 500 mg p.o. daily for the next 5 days  Start low-dose Imodium, 2 mg every 4h as needed    Dehydration- (present on admission)  Assessment & Plan  Continue with IV hydration    Asthma- (present on admission)  Assessment & Plan  Not currently in exacerbation.  Oxygen as needed.  Resume home inhalers    Acquired hypothyroidism- (present on admission)  Assessment & Plan  Continue Synthroid    Controlled type 2 diabetes mellitus with diabetic polyneuropathy, without long-term current use of insulin (HCC)- (present on admission)  Assessment & Plan  With hyperglycemia  Last glycated hemoglobin was 7.3%  Continue insulin sliding scale, Accu-Cheks and hypoglycemic protocol  A1c 9.1, uncontrolled.  There was a elevated beta hydroxybutyrate 1.7.  It is more likely from GI loss.    Dyslipidemia- (present on admission)  Assessment & Plan  Restart atorvastatin when appropriate         VTE prophylaxis:    enoxaparin ppx      I have performed a physical exam and reviewed and updated ROS and Plan today (9/18/2024). In review of yesterday's note (9/17/2024), there are no changes except as documented above.

## 2024-09-18 NOTE — PROGRESS NOTES
ISOLATION PRECAUTIONS EDUCATION    Educated PATIENT, FAMILY, S.O: patient on isolation for OTHER, Salmonella.    Educated on reason for isolation, how the infection may be transmitted, and how to help prevent transmission to others. Educated precautions involves staff and visitors wearing PPE, following Standard Precautions and performing meticulous hand hygiene in order to prevent transmission of infection.     Special Contact Precautions: Educated that Special Contact Precautions involves staff and visitors wearing gowns and gloves when in the patient room, and using soap and water for hand hygiene when exiting patient’s room.     Educated that patient may leave the room if they or continent of stool, but prior to exiting the patient room each time, the patient needs to have on a fresh patient gown, ensure the potentially infectious area is covered, and perform hand hygiene with soap and water immediately prior to exiting the room.    In addition, educated that alcohol based hand rub is NOT sufficient for hand hygiene for Special Contact Precautions. A bonnet is placed over the hand  dispenser inside the patients’ room as a reminder to wash hands with soap and water.     Patient transport and mobilization on unit  Educated that they may leave their room, but prior to exiting, the patient needs to have on a fresh patient gown, ensure the potentially infectious area is covered, performing appropriate hand hygiene immediately prior to exiting the room.

## 2024-09-18 NOTE — PROGRESS NOTES
Bedside report received from TANYA ZUNIGA. Assumed care of patient. Daily plan of care discussed. Pt resting comfortably in bed with no signs of distress noted. Breathing even and unlabored.Denies any nausea or vomiting. Patient reports no further needs at this time. Call light within reach. Bed locked in lowest position. Plan of care on going.

## 2024-09-19 LAB
ANION GAP SERPL CALC-SCNC: 12 MMOL/L (ref 7–16)
BUN SERPL-MCNC: 7 MG/DL (ref 8–22)
CALCIUM SERPL-MCNC: 8.1 MG/DL (ref 8.4–10.2)
CHLORIDE SERPL-SCNC: 92 MMOL/L (ref 96–112)
CO2 SERPL-SCNC: 24 MMOL/L (ref 20–33)
CREAT SERPL-MCNC: 0.63 MG/DL (ref 0.5–1.4)
ERYTHROCYTE [DISTWIDTH] IN BLOOD BY AUTOMATED COUNT: 43.5 FL (ref 35.9–50)
GFR SERPLBLD CREATININE-BSD FMLA CKD-EPI: 105 ML/MIN/1.73 M 2
GLUCOSE BLD STRIP.AUTO-MCNC: 152 MG/DL (ref 65–99)
GLUCOSE BLD STRIP.AUTO-MCNC: 160 MG/DL (ref 65–99)
GLUCOSE BLD STRIP.AUTO-MCNC: 193 MG/DL (ref 65–99)
GLUCOSE BLD STRIP.AUTO-MCNC: 231 MG/DL (ref 65–99)
GLUCOSE SERPL-MCNC: 154 MG/DL (ref 65–99)
HCT VFR BLD AUTO: 47.2 % (ref 42–52)
HGB BLD-MCNC: 15.9 G/DL (ref 14–18)
MAGNESIUM SERPL-MCNC: 1.8 MG/DL (ref 1.5–2.5)
MCH RBC QN AUTO: 26.8 PG (ref 27–33)
MCHC RBC AUTO-ENTMCNC: 33.7 G/DL (ref 32.3–36.5)
MCV RBC AUTO: 79.5 FL (ref 81.4–97.8)
PLATELET # BLD AUTO: 268 K/UL (ref 164–446)
PMV BLD AUTO: 10.4 FL (ref 9–12.9)
POTASSIUM SERPL-SCNC: 2.7 MMOL/L (ref 3.6–5.5)
POTASSIUM SERPL-SCNC: 3.2 MMOL/L (ref 3.6–5.5)
RBC # BLD AUTO: 5.94 M/UL (ref 4.7–6.1)
SODIUM SERPL-SCNC: 128 MMOL/L (ref 135–145)
WBC # BLD AUTO: 7.2 K/UL (ref 4.8–10.8)

## 2024-09-19 PROCEDURE — 85027 COMPLETE CBC AUTOMATED: CPT

## 2024-09-19 PROCEDURE — 700102 HCHG RX REV CODE 250 W/ 637 OVERRIDE(OP): Performed by: HOSPITALIST

## 2024-09-19 PROCEDURE — 700102 HCHG RX REV CODE 250 W/ 637 OVERRIDE(OP): Mod: JZ | Performed by: HOSPITALIST

## 2024-09-19 PROCEDURE — 770001 HCHG ROOM/CARE - MED/SURG/GYN PRIV*

## 2024-09-19 PROCEDURE — A9270 NON-COVERED ITEM OR SERVICE: HCPCS | Mod: JZ | Performed by: HOSPITALIST

## 2024-09-19 PROCEDURE — 700102 HCHG RX REV CODE 250 W/ 637 OVERRIDE(OP): Performed by: INTERNAL MEDICINE

## 2024-09-19 PROCEDURE — 83735 ASSAY OF MAGNESIUM: CPT

## 2024-09-19 PROCEDURE — 82962 GLUCOSE BLOOD TEST: CPT

## 2024-09-19 PROCEDURE — 700101 HCHG RX REV CODE 250: Performed by: INTERNAL MEDICINE

## 2024-09-19 PROCEDURE — 36415 COLL VENOUS BLD VENIPUNCTURE: CPT

## 2024-09-19 PROCEDURE — A9270 NON-COVERED ITEM OR SERVICE: HCPCS | Performed by: HOSPITALIST

## 2024-09-19 PROCEDURE — 80048 BASIC METABOLIC PNL TOTAL CA: CPT

## 2024-09-19 PROCEDURE — 99232 SBSQ HOSP IP/OBS MODERATE 35: CPT | Performed by: INTERNAL MEDICINE

## 2024-09-19 PROCEDURE — A9270 NON-COVERED ITEM OR SERVICE: HCPCS | Performed by: INTERNAL MEDICINE

## 2024-09-19 PROCEDURE — 84132 ASSAY OF SERUM POTASSIUM: CPT

## 2024-09-19 PROCEDURE — 700111 HCHG RX REV CODE 636 W/ 250 OVERRIDE (IP): Mod: JZ | Performed by: HOSPITALIST

## 2024-09-19 RX ORDER — SODIUM CHLORIDE AND POTASSIUM CHLORIDE 300; 900 MG/100ML; MG/100ML
INJECTION, SOLUTION INTRAVENOUS CONTINUOUS
Status: DISCONTINUED | OUTPATIENT
Start: 2024-09-19 | End: 2024-09-20

## 2024-09-19 RX ORDER — POTASSIUM CHLORIDE 7.45 MG/ML
10 INJECTION INTRAVENOUS
Status: COMPLETED | OUTPATIENT
Start: 2024-09-19 | End: 2024-09-19

## 2024-09-19 RX ORDER — POTASSIUM CHLORIDE 1500 MG/1
40 TABLET, EXTENDED RELEASE ORAL ONCE
Status: COMPLETED | OUTPATIENT
Start: 2024-09-19 | End: 2024-09-19

## 2024-09-19 RX ADMIN — CLOTRIMAZOLE 10 MG: 10 LOZENGE ORAL at 23:48

## 2024-09-19 RX ADMIN — INSULIN HUMAN 1 UNITS: 100 INJECTION, SOLUTION PARENTERAL at 17:27

## 2024-09-19 RX ADMIN — CLOTRIMAZOLE 10 MG: 10 LOZENGE ORAL at 20:05

## 2024-09-19 RX ADMIN — INSULIN GLARGINE-YFGN 3 UNITS: 100 INJECTION, SOLUTION SUBCUTANEOUS at 06:03

## 2024-09-19 RX ADMIN — INSULIN HUMAN 2 UNITS: 100 INJECTION, SOLUTION PARENTERAL at 06:03

## 2024-09-19 RX ADMIN — CLOTRIMAZOLE 10 MG: 10 LOZENGE ORAL at 15:41

## 2024-09-19 RX ADMIN — POTASSIUM CHLORIDE 40 MEQ: 1500 TABLET, EXTENDED RELEASE ORAL at 04:10

## 2024-09-19 RX ADMIN — Medication 5 MG: at 20:05

## 2024-09-19 RX ADMIN — POTASSIUM CHLORIDE AND SODIUM CHLORIDE: 900; 300 INJECTION, SOLUTION INTRAVENOUS at 07:51

## 2024-09-19 RX ADMIN — LEVOTHYROXINE SODIUM 50 MCG: 0.05 TABLET ORAL at 05:56

## 2024-09-19 RX ADMIN — CLOTRIMAZOLE 10 MG: 10 LOZENGE ORAL at 11:30

## 2024-09-19 RX ADMIN — ASPIRIN 81 MG: 81 TABLET, COATED ORAL at 05:56

## 2024-09-19 RX ADMIN — POTASSIUM CHLORIDE 10 MEQ: 10 INJECTION, SOLUTION INTRAVENOUS at 05:55

## 2024-09-19 RX ADMIN — LEVOFLOXACIN 500 MG: 500 TABLET, FILM COATED ORAL at 05:56

## 2024-09-19 RX ADMIN — INSULIN HUMAN 1 UNITS: 100 INJECTION, SOLUTION PARENTERAL at 20:05

## 2024-09-19 RX ADMIN — AMLODIPINE BESYLATE 5 MG: 5 TABLET ORAL at 05:56

## 2024-09-19 RX ADMIN — POTASSIUM CHLORIDE 10 MEQ: 10 INJECTION, SOLUTION INTRAVENOUS at 04:12

## 2024-09-19 RX ADMIN — CLOTRIMAZOLE 10 MG: 10 LOZENGE ORAL at 06:06

## 2024-09-19 RX ADMIN — INSULIN HUMAN 1 UNITS: 100 INJECTION, SOLUTION PARENTERAL at 11:29

## 2024-09-19 RX ADMIN — POTASSIUM CHLORIDE AND SODIUM CHLORIDE: 900; 300 INJECTION, SOLUTION INTRAVENOUS at 17:28

## 2024-09-19 RX ADMIN — OMEPRAZOLE 20 MG: 20 CAPSULE, DELAYED RELEASE ORAL at 05:56

## 2024-09-19 ASSESSMENT — ENCOUNTER SYMPTOMS
COUGH: 0
CONSTIPATION: 0
PHOTOPHOBIA: 0
NAUSEA: 0
DEPRESSION: 0
CLAUDICATION: 0
FEVER: 0
INSOMNIA: 0
WEAKNESS: 0
BLURRED VISION: 0
SORE THROAT: 0
SPEECH CHANGE: 0
NERVOUS/ANXIOUS: 0
SHORTNESS OF BREATH: 0
HEARTBURN: 0
ABDOMINAL PAIN: 0
DIARRHEA: 1
CHILLS: 0
SENSORY CHANGE: 0
DIZZINESS: 0
HEADACHES: 0
MYALGIAS: 0
VOMITING: 0

## 2024-09-19 ASSESSMENT — PAIN DESCRIPTION - PAIN TYPE
TYPE: ACUTE PAIN
TYPE: ACUTE PAIN

## 2024-09-19 ASSESSMENT — PATIENT HEALTH QUESTIONNAIRE - PHQ9
1. LITTLE INTEREST OR PLEASURE IN DOING THINGS: NOT AT ALL
2. FEELING DOWN, DEPRESSED, IRRITABLE, OR HOPELESS: NOT AT ALL
SUM OF ALL RESPONSES TO PHQ9 QUESTIONS 1 AND 2: 0

## 2024-09-19 NOTE — PROGRESS NOTES
0700 - Bedside report received from TANYA ZUNIGA. Alert and oriented X4, on RA in no acute respiratory distress. Daily plan of care discussed. Patient complains of no pain at this time. No other needs at this time. Call light and personal belongings within reach. Hourly rounding in place. Chart reviewed for recent labs, notes, and orders.

## 2024-09-19 NOTE — PROGRESS NOTES
Received bedside report from day shift nurse, Sandhya MARTÍNEZ. Assumed pt care at 1915.  Pt is awake, resting comfortably in bed. Pt on room air; no signs of SOB/respiratory distress. Labs noted, VSS. Pt denies pain at this moment. Needs attended well. Fall precautions in place. Bed at lowest position. Call light and personal belongings within reach. Encouraged to call for assistance. Pt refused to turn bed alarm on and refused to place strip alarm. Educated on importance of prevention of fall; pt states understanding and will call if needed. Plan of care on going, no further concerns as of present.   Hourly rounding in progress.       0202 Young from Lab called with critical result of potassium 2.7. Critical lab result read back to Young.  Dr. Garcia notified of critical lab result at 0255; with orders placed.

## 2024-09-19 NOTE — CARE PLAN
The patient is Stable - Low risk of patient condition declining or worsening    Shift Goals  Clinical Goals: monitor BM, monitor glucose  Patient Goals: rest and update on POC  Family Goals: n/a    Progress made toward(s) clinical / shift goals:  BM still loose, glucose monitored patient given diet cranberry juice    Patient is not progressing towards the following goals: n/a

## 2024-09-19 NOTE — CARE PLAN
The patient is Stable - Low risk of patient condition declining or worsening    Shift Goals  Clinical Goals: Pt will be able to tolerate diet. Monitor blood glucose within the shift. Monitor BM.  Patient Goals: less BM, rest and sleep  Family Goals: n/a    Progress made toward(s) clinical / shift goals:  Pt states he has less BM tonight. BM x3 noted and still watery. Pt states able to sleep a little bit; seen resting in between rounds; even and unlabored breathing noted. Hourly rounding in progress.      Patient is not progressing towards the following goals: n/a

## 2024-09-19 NOTE — PROGRESS NOTES
[facility] Medicine Daily Progress Note    Date of Service  9/19/2024    Chief Complaint  Barron Hewitt is a 63 y.o. male admitted 9/14/2024 with diarrhea and dehydration.    Hospital Course  63-year-old male with a past medical history of T2DM, hypothyroidism, hypertension, sleep apnea, hyperlipidemia, myocardial bridge admitted on 9/14/2024 after having nausea vomiting diarrhea, worsening abdominal pain.  In the ED patient was found to have hypertension at 181/99, 163/74.  Labs showed hemoconcentration as hemoglobin was 18.3, hematocrit 52.6, platelets 316, sodium 126, anion gap 23, bicarb 17, chloride 86.  Patient was started on IV fluids.     Patient has shown marked improvement in his electrolytes but still remains with significant watery diarrhea.  He has had this over 5 days which should be self-limiting point.  Started on Levaquin for treatment given the fact that he is hospitalized and duration is longer than anticipated.    Interval Problem Update  9/17 patient still with frequent watery bowel movements.  Started on Levaquin 500 mg p.o. daily for the next 5 days given the fact that he is hospitalized and continues to have significant diarrhea.  He also developed thrush with some difficulty swallowing today started on clotrimazole.  He also continues to have significant hiccups therefore I will give him baclofen as needed.  Will continue to hold off Imodium at this point given the presence of Salmonella.  9/18 patient continues to have watery loose bowel movements but there is some solid material now passing.  Will trial low-dose Imodium 2 mg every 4 as needed and continue Levaquin.  Baclofen is not helping with the hiccups therefore I will discontinue it and trial Thorazine.  Potassium down to 2.9 and he received both IV and p.o. this morning as he continues to have GI losses.  Will continue with potassium phosphate supplementation.  9/19 patient continues to make improvements and does still have  loose stool but it has more fecal material rather than just water.  Thorazine seems to be helping with hiccups.  Potassium is down to 2.7 and IV repletion is in process.  Magnesium checked this morning and is normal at 1.8 therefore magnesium supplementation would not help with hypokalemia.  As stool begins to firm patient should be able to discharge in the next 24 to 48 hours.    I have discussed this patient's plan of care and discharge plan at IDT rounds today with Case Management, Nursing, Nursing leadership, and other members of the IDT team.    Consultants/Specialty  none    Code Status  Full Code    Disposition  The patient is not medically cleared for discharge to home or a post-acute facility.  Anticipate discharge to: home with close outpatient follow-up    I have placed the appropriate orders for post-discharge needs.    Review of Systems  Review of Systems   Constitutional:  Negative for chills and fever.   HENT:  Negative for congestion and sore throat.    Eyes:  Negative for blurred vision and photophobia.   Respiratory:  Negative for cough and shortness of breath.    Cardiovascular:  Negative for chest pain, claudication and leg swelling.   Gastrointestinal:  Positive for diarrhea. Negative for abdominal pain, constipation, heartburn, nausea and vomiting.   Genitourinary:  Negative for dysuria and hematuria.   Musculoskeletal:  Negative for joint pain and myalgias.   Skin:  Negative for itching and rash.   Neurological:  Negative for dizziness, sensory change, speech change, weakness and headaches.   Psychiatric/Behavioral:  Negative for depression. The patient is not nervous/anxious and does not have insomnia.         Physical Exam  Temp:  [36.5 °C (97.7 °F)-37.7 °C (99.8 °F)] 36.5 °C (97.7 °F)  Pulse:  [] 84  Resp:  [17] 17  BP: (118-136)/(76-89) 125/76  SpO2:  [92 %-95 %] 94 %    Physical Exam  Vitals and nursing note reviewed.   Constitutional:       General: He is not in acute distress.      Appearance: Normal appearance.   HENT:      Head: Normocephalic and atraumatic.      Mouth/Throat:      Comments: White patches present, consistent with thrush  Eyes:      General: No scleral icterus.     Extraocular Movements: Extraocular movements intact.   Cardiovascular:      Rate and Rhythm: Normal rate and regular rhythm.      Pulses: Normal pulses.      Heart sounds: Normal heart sounds. No murmur heard.  Pulmonary:      Effort: Pulmonary effort is normal. No respiratory distress.      Breath sounds: Normal breath sounds. No wheezing, rhonchi or rales.   Abdominal:      General: Abdomen is flat. Bowel sounds are normal. There is no distension.      Palpations: Abdomen is soft.      Tenderness: There is no rebound.   Musculoskeletal:         General: No swelling or tenderness.      Cervical back: Normal range of motion and neck supple.   Lymphadenopathy:      Cervical: No cervical adenopathy.   Skin:     Coloration: Skin is not jaundiced.      Findings: No erythema.   Neurological:      General: No focal deficit present.      Mental Status: He is alert and oriented to person, place, and time. Mental status is at baseline.      Cranial Nerves: No cranial nerve deficit.   Psychiatric:         Mood and Affect: Mood normal.         Behavior: Behavior normal.         Fluids    Intake/Output Summary (Last 24 hours) at 9/19/2024 1150  Last data filed at 9/19/2024 1030  Gross per 24 hour   Intake 729.59 ml   Output 0.47 ml   Net 729.12 ml        Laboratory  Recent Labs     09/18/24  0117 09/19/24 0145   WBC 10.6 7.2   RBC 5.97 5.94   HEMOGLOBIN 16.2 15.9   HEMATOCRIT 47.6 47.2   MCV 79.7* 79.5*   MCH 27.1 26.8*   MCHC 34.0 33.7   RDW 43.8 43.5   PLATELETCT 291 268   MPV 11.2 10.4     Recent Labs     09/17/24  0149 09/18/24  0117 09/19/24  0145   SODIUM 132* 133* 128*   POTASSIUM 3.2* 2.9* 2.7*   CHLORIDE 100 96 92*   CO2 21 26 24   GLUCOSE 165* 154* 154*   BUN 7* 5* 7*   CREATININE 0.47* 0.66 0.63   CALCIUM 7.6* 8.2*  8.1*                   Imaging  No orders to display        Assessment/Plan  * Hyponatremia- (present on admission)  Assessment & Plan  Hypovolemic hyponatremia from GI loss  Sodium 133    Hiccups  Assessment & Plan  Improved with Thorazine  May benefit from Carafate given significant reflux  Tolerating soft solid diet    Hypokalemia  Assessment & Plan  losing through diarrhea  IV potassium with potassium phosphate  Calcium and IV fluids    Primary hypertension- (present on admission)  Assessment & Plan  Used to be on metoprolol SR but recently stopped.  Continue amlodipine    Hypertensive urgency- (present on admission)  Assessment & Plan  Due to abdominal pain  Monitor    Polycythemia- (present on admission)  Assessment & Plan  Resolved    Metabolic acidosis- (present on admission)  Assessment & Plan  Resolved    Salmonella gastroenteritis- (present on admission)  Assessment & Plan  Stool less often and more formed   negative C. difficile PCR  GI panel PCR shows Salmonella gastroenteritis  Continue isolation  Continue IV fluids  Given the lack of improvement start Levaquin 500 mg p.o. daily for the next 5 days  Start low-dose Imodium, 2 mg every 4h as needed    Dehydration- (present on admission)  Assessment & Plan  Continue with IV hydration    Asthma- (present on admission)  Assessment & Plan  Not currently in exacerbation.  Oxygen as needed.  Resume home inhalers    Acquired hypothyroidism- (present on admission)  Assessment & Plan  Continue Synthroid    Controlled type 2 diabetes mellitus with diabetic polyneuropathy, without long-term current use of insulin (HCC)- (present on admission)  Assessment & Plan  With hyperglycemia  Last glycated hemoglobin was 7.3%  Continue insulin sliding scale, Accu-Cheks and hypoglycemic protocol  A1c 9.1, uncontrolled.  There was a elevated beta hydroxybutyrate 1.7.  It is more likely from GI loss.    Dyslipidemia- (present on admission)  Assessment & Plan  Restart  atorvastatin when appropriate         VTE prophylaxis: VTE Selection    I have performed a physical exam and reviewed and updated ROS and Plan today (9/19/2024). In review of yesterday's note (9/18/2024), there are no changes except as documented above.

## 2024-09-20 ENCOUNTER — PHARMACY VISIT (OUTPATIENT)
Dept: PHARMACY | Facility: MEDICAL CENTER | Age: 64
End: 2024-09-20
Payer: COMMERCIAL

## 2024-09-20 LAB
ANION GAP SERPL CALC-SCNC: 11 MMOL/L (ref 7–16)
BUN SERPL-MCNC: 6 MG/DL (ref 8–22)
CALCIUM SERPL-MCNC: 7.8 MG/DL (ref 8.4–10.2)
CHLORIDE SERPL-SCNC: 101 MMOL/L (ref 96–112)
CO2 SERPL-SCNC: 21 MMOL/L (ref 20–33)
CREAT SERPL-MCNC: 0.42 MG/DL (ref 0.5–1.4)
ERYTHROCYTE [DISTWIDTH] IN BLOOD BY AUTOMATED COUNT: 48.1 FL (ref 35.9–50)
GFR SERPLBLD CREATININE-BSD FMLA CKD-EPI: 119 ML/MIN/1.73 M 2
GLUCOSE BLD STRIP.AUTO-MCNC: 118 MG/DL (ref 65–99)
GLUCOSE BLD STRIP.AUTO-MCNC: 147 MG/DL (ref 65–99)
GLUCOSE BLD STRIP.AUTO-MCNC: 164 MG/DL (ref 65–99)
GLUCOSE BLD STRIP.AUTO-MCNC: 174 MG/DL (ref 65–99)
GLUCOSE SERPL-MCNC: 109 MG/DL (ref 65–99)
HCT VFR BLD AUTO: 46.8 % (ref 42–52)
HGB BLD-MCNC: 15.1 G/DL (ref 14–18)
MCH RBC QN AUTO: 27.5 PG (ref 27–33)
MCHC RBC AUTO-ENTMCNC: 32.3 G/DL (ref 32.3–36.5)
MCV RBC AUTO: 85.1 FL (ref 81.4–97.8)
PLATELET # BLD AUTO: 187 K/UL (ref 164–446)
PMV BLD AUTO: 11.6 FL (ref 9–12.9)
POTASSIUM SERPL-SCNC: 3.5 MMOL/L (ref 3.6–5.5)
RBC # BLD AUTO: 5.5 M/UL (ref 4.7–6.1)
SODIUM SERPL-SCNC: 133 MMOL/L (ref 135–145)
WBC # BLD AUTO: 10 K/UL (ref 4.8–10.8)

## 2024-09-20 PROCEDURE — 94760 N-INVAS EAR/PLS OXIMETRY 1: CPT

## 2024-09-20 PROCEDURE — 99232 SBSQ HOSP IP/OBS MODERATE 35: CPT | Performed by: INTERNAL MEDICINE

## 2024-09-20 PROCEDURE — 770001 HCHG ROOM/CARE - MED/SURG/GYN PRIV*

## 2024-09-20 PROCEDURE — 85027 COMPLETE CBC AUTOMATED: CPT

## 2024-09-20 PROCEDURE — 80048 BASIC METABOLIC PNL TOTAL CA: CPT

## 2024-09-20 PROCEDURE — RXMED WILLOW AMBULATORY MEDICATION CHARGE: Performed by: INTERNAL MEDICINE

## 2024-09-20 PROCEDURE — 700101 HCHG RX REV CODE 250: Performed by: INTERNAL MEDICINE

## 2024-09-20 PROCEDURE — 99239 HOSP IP/OBS DSCHRG MGMT >30: CPT | Performed by: INTERNAL MEDICINE

## 2024-09-20 PROCEDURE — 36415 COLL VENOUS BLD VENIPUNCTURE: CPT

## 2024-09-20 PROCEDURE — 700111 HCHG RX REV CODE 636 W/ 250 OVERRIDE (IP): Mod: JZ | Performed by: HOSPITALIST

## 2024-09-20 PROCEDURE — A9270 NON-COVERED ITEM OR SERVICE: HCPCS | Performed by: INTERNAL MEDICINE

## 2024-09-20 PROCEDURE — A9270 NON-COVERED ITEM OR SERVICE: HCPCS | Performed by: HOSPITALIST

## 2024-09-20 PROCEDURE — 700102 HCHG RX REV CODE 250 W/ 637 OVERRIDE(OP): Performed by: HOSPITALIST

## 2024-09-20 PROCEDURE — 82962 GLUCOSE BLOOD TEST: CPT | Mod: 91

## 2024-09-20 PROCEDURE — 700102 HCHG RX REV CODE 250 W/ 637 OVERRIDE(OP): Performed by: INTERNAL MEDICINE

## 2024-09-20 RX ORDER — CALCIUM CARBONATE 500 MG/1
1000 TABLET, CHEWABLE ORAL ONCE
Status: COMPLETED | OUTPATIENT
Start: 2024-09-20 | End: 2024-09-21

## 2024-09-20 RX ORDER — LEVOFLOXACIN 500 MG/1
500 TABLET, FILM COATED ORAL EVERY 24 HOURS
Qty: 4 TABLET | Refills: 0 | Status: ACTIVE | OUTPATIENT
Start: 2024-09-21 | End: 2024-09-25

## 2024-09-20 RX ORDER — LOPERAMIDE HCL 2 MG
2 CAPSULE ORAL EVERY 4 HOURS PRN
COMMUNITY
Start: 2024-09-20

## 2024-09-20 RX ADMIN — LEVOTHYROXINE SODIUM 50 MCG: 0.05 TABLET ORAL at 06:15

## 2024-09-20 RX ADMIN — AMLODIPINE BESYLATE 5 MG: 5 TABLET ORAL at 06:16

## 2024-09-20 RX ADMIN — POTASSIUM CHLORIDE AND SODIUM CHLORIDE: 900; 300 INJECTION, SOLUTION INTRAVENOUS at 03:31

## 2024-09-20 RX ADMIN — LOPERAMIDE HYDROCHLORIDE 2 MG: 2 CAPSULE ORAL at 16:18

## 2024-09-20 RX ADMIN — INSULIN HUMAN 1 UNITS: 100 INJECTION, SOLUTION PARENTERAL at 17:35

## 2024-09-20 RX ADMIN — CHLORPROMAZINE HYDROCHLORIDE 25 MG: 25 TABLET, FILM COATED ORAL at 22:08

## 2024-09-20 RX ADMIN — ASPIRIN 81 MG: 81 TABLET, COATED ORAL at 06:15

## 2024-09-20 RX ADMIN — CLOTRIMAZOLE 10 MG: 10 LOZENGE ORAL at 06:15

## 2024-09-20 RX ADMIN — CHLORPROMAZINE HYDROCHLORIDE 25 MG: 25 TABLET, FILM COATED ORAL at 13:40

## 2024-09-20 RX ADMIN — INSULIN HUMAN 1 UNITS: 100 INJECTION, SOLUTION PARENTERAL at 11:54

## 2024-09-20 RX ADMIN — OMEPRAZOLE 20 MG: 20 CAPSULE, DELAYED RELEASE ORAL at 06:14

## 2024-09-20 RX ADMIN — LEVOFLOXACIN 500 MG: 500 TABLET, FILM COATED ORAL at 06:15

## 2024-09-20 RX ADMIN — ONDANSETRON 4 MG: 4 TABLET, ORALLY DISINTEGRATING ORAL at 15:42

## 2024-09-20 RX ADMIN — ONDANSETRON 4 MG: 2 INJECTION INTRAMUSCULAR; INTRAVENOUS at 22:08

## 2024-09-20 RX ADMIN — CLOTRIMAZOLE 10 MG: 10 LOZENGE ORAL at 15:42

## 2024-09-20 RX ADMIN — CLOTRIMAZOLE 10 MG: 10 LOZENGE ORAL at 22:07

## 2024-09-20 RX ADMIN — CLOTRIMAZOLE 10 MG: 10 LOZENGE ORAL at 11:00

## 2024-09-20 ASSESSMENT — PAIN DESCRIPTION - PAIN TYPE: TYPE: ACUTE PAIN

## 2024-09-20 NOTE — DISCHARGE SUMMARY
Discharge Summary    CHIEF COMPLAINT ON ADMISSION  Chief Complaint   Patient presents with    Nausea/Vomiting/Diarrhea    Abdominal Pain     Bilat lower quads. Reports N/V/D since Tuesday. Denies noted blood in emesis or stools. Denies known fevers or dysuria.        Reason for Admission  EMS     Admission Date  9/14/2024    CODE STATUS  Full Code    HPI & HOSPITAL COURSE  63-year-old male with a past medical history of T2DM, hypothyroidism, hypertension, sleep apnea, hyperlipidemia, myocardial bridge admitted on 9/14/2024 after having nausea vomiting diarrhea, worsening abdominal pain.  In the ED patient was found to have hypertension at 181/99, 163/74.  Labs showed hemoconcentration as hemoglobin was 18.3, hematocrit 52.6, platelets 316, sodium 126, anion gap 23, bicarb 17, chloride 86.  Patient was started on IV fluids.     Patient has shown marked improvement in his electrolytes but still remains with significant watery diarrhea.  He has had this over 5 days which should be self-limiting point.  Started on Levaquin and low-dose Imodium with marked improvement with less frequent bowel movements as well as semiformed stools.  His electrolytes have markedly improved and on day of discharge sodium 133 potassium 3.5.  He will complete his normal saline potassium infusion this afternoon and discharged home and should be able to continue with oral repletion as he will not be losing as much from diarrhea.  Patient had another episode of emesis and was fearful of return of symptoms, watched overnight and is better this am.  Patient agreeable with discharge at this time.    Therefore, he is discharged in good and stable condition to home with close outpatient follow-up.    The patient met 2-midnight criteria for an inpatient stay at the time of discharge.    Discharge Date  9/21/24    FOLLOW UP ITEMS POST DISCHARGE  PCP-2 weeks    DISCHARGE DIAGNOSES  Principal Problem:    Hyponatremia (POA: Yes)  Active Problems:     Dyslipidemia (POA: Yes)    Controlled type 2 diabetes mellitus with diabetic polyneuropathy, without long-term current use of insulin (HCC) (POA: Yes)    Acquired hypothyroidism (POA: Yes)    Asthma (POA: Yes)    Dehydration (POA: Yes)    Salmonella gastroenteritis (POA: Yes)    Metabolic acidosis (POA: Yes)    Polycythemia (POA: Yes)    Hypertensive urgency (POA: Yes)    Primary hypertension (POA: Yes)    Hypokalemia (POA: Unknown)    Hiccups (POA: Unknown)  Resolved Problems:    * No resolved hospital problems. *      FOLLOW UP  Future Appointments   Date Time Provider Department Center   9/30/2024  8:40 AM UNA Mcnamara   11/7/2024  8:00 AM RENETTA Ambriz None     Ruthie Unger, JOSE ENRIQUE.[email or web address]  [address] [address]  [phone]    Follow up in 2 week(s)        MEDICATIONS ON DISCHARGE     Medication List        START taking these medications        Instructions   levoFLOXacin 500 MG tablet  Start taking on: September 21, 2024  Commonly known as: Levaquin   Take 1 Tablet by mouth every 24 hours for 4 days.  Dose: 500 mg     loperamide 2 MG Caps  Commonly known as: Imodium   Take 1 Capsule by mouth every four hours as needed for Diarrhea.  Dose: 2 mg            CHANGE how you take these medications        Instructions   EPINEPHrine 0.3 MG/0.3ML Soaj solution for injection  What changed:   how much to take  how to take this  when to take this  Commonly known as: Epipen   Inject 0.3 mL into the thigh one time for 1 dose.     triamcinolone acetonide 0.1 % Crea  What changed:   how much to take  when to take this  reasons to take this  Commonly known as: Kenalog   Apply to affected area twice daily until resolved     Trulicity 1.5 MG/0.5ML Sopn  What changed:   how much to take  how to take this  when to take this  additional instructions  Generic drug: Dulaglutide   INJECT 0.5 MLS UNDER THE SKIN EVERY 7 DAYS     Ventolin  (90 Base) MCG/ACT Aers  inhalation aerosol  What changed: See the new instructions.  Generic drug: albuterol   USE 2 INHALATIONS EVERY 4 HOURS AS NEEDED FOR SHORTNESS OF BREATH            CONTINUE taking these medications        Instructions   acetaminophen 500 MG Tabs  Commonly known as: Tylenol   Take 1,000 mg by mouth every 6 hours as needed. Indications: Pain  Dose: 1,000 mg     Advair Diskus 500-50 MCG/ACT Aepb  Generic drug: fluticasone-salmeterol   Inhale 1 Puff every day. Pt uses QDAY, not BID  Dose: 1 Puff     aspirin 81 MG EC tablet   Take 81 mg by mouth every day.  Dose: 81 mg     atorvastatin 80 MG tablet  Commonly known as: Lipitor   Take 1 Tablet by mouth every evening.  Dose: 80 mg     Blood Glucose Test Strips   Test strips order:  Freestyle Freedom Lite test strips  testing one time per day     cetirizine 10 MG Tabs  Commonly known as: ZyrTEC   Take 10 mg by mouth every day.  Dose: 10 mg     COENZYME Q10 PO   Take 1 Capsule by mouth every evening.  Dose: 1 Capsule     D3 PO   Take 1 Capsule by mouth every day.  Dose: 1 Capsule     Fish Oil 1200 MG Caps   Take 1 Capsule by mouth every day.  Dose: 1 Capsule     FreeStyle Lucy 14 Day Sensor Misc   1 Application every 14 days.  Dose: 1 Application      Jardiance 25 MG Tabs  Generic drug: Empagliflozin   Take 1 Tablet by mouth every day.  Dose: 1 Tablet     levothyroxine 50 MCG Tabs  Commonly known as: Synthroid   Take 1 Tablet by mouth every morning on an empty stomach.  Dose: 50 mcg     metFORMIN  MG Tb24  Commonly known as: Glucophage XR   Take 1 Tablet by mouth 2 times a day.  Dose: 500 mg     montelukast 10 MG Tabs  Commonly known as: Singulair   Take 1 Tablet by mouth every evening.  Dose: 10 mg     ondansetron 4 MG Tbdp  Commonly known as: Zofran ODT   Take 1 Tablet by mouth every 6 hours as needed for Nausea/Vomiting.  Dose: 4 mg     pantoprazole 20 MG tablet  Commonly known as: Protonix   Take 1 Tablet by mouth every day.  Dose: 20 mg            STOP taking  these medications      diphenoxylate-atropine 2.5-0.025 MG Tabs  Commonly known as: Lomotil     hydrOXYzine HCl 25 MG Tabs  Commonly known as: Atarax     metoprolol SR 25 MG Tb24  Commonly known as: Toprol XL     tizanidine 4 MG Tabs  Commonly known as: Zanaflex              Allergies  Allergies   Allergen Reactions    Kiwi Extract Anaphylaxis    Shellfish Allergy Anaphylaxis and Unspecified    Strawberry Anaphylaxis and Unspecified    Ozempic (0.25 Or 0.5 Mg-Dose) [Semaglutide] Nausea    Sulfa Drugs Rash and Unspecified     rash    Testosterone Rash     rash       DIET  Orders Placed This Encounter   Procedures    Diet Order Diet: Consistent CHO (Diabetic)     Standing Status:   Standing     Number of Occurrences:   1     Order Specific Question:   Diet:     Answer:   Consistent CHO (Diabetic) [4]       ACTIVITY  As tolerated.  Weight bearing as tolerated    CONSULTATIONS  None    PROCEDURES  None    LABORATORY  Lab Results   Component Value Date    SODIUM 133 (L) 09/20/2024    POTASSIUM 3.5 (L) 09/20/2024    CHLORIDE 101 09/20/2024    CO2 21 09/20/2024    GLUCOSE 109 (H) 09/20/2024    BUN 6 (L) 09/20/2024    CREATININE 0.42 (L) 09/20/2024    CREATININE 1.1 07/10/2008        Lab Results   Component Value Date    WBC 10.0 09/20/2024    HEMOGLOBIN 15.1 09/20/2024    HEMATOCRIT 46.8 09/20/2024    PLATELETCT 187 09/20/2024        Total time of the discharge process exceeds 38 minutes.

## 2024-09-20 NOTE — CARE PLAN
The patient is Stable - Low risk of patient condition declining or worsening    Shift Goals  Clinical Goals: monitor BMs, monitor blood sugars  Patient Goals: rest and update on POC  Family Goals: n/a    Progress made toward(s) clinical / shift goals:  Bms will be controlled with home medication. BS checked before meals.    Patient is not progressing towards the following goals: n/a

## 2024-09-20 NOTE — CARE PLAN
Problem: Fall Risk  Goal: Patient will remain free from falls  Outcome: Progressing     Problem: Communication  Goal: The ability to communicate needs accurately and effectively will improve  Outcome: Progressing   The patient is Stable - Low risk of patient condition declining or worsening    Shift Goals  Clinical Goals: monitor blood sugar,pain control,free from falls,IV fluids,electrolyte replacement  Patient Goals: rest  Family Goals: n/a    Progress made toward(s) clinical / shift goals:  No falls sustained.Still refusing bed and chair alarms.Education provided.On going electrolyte replacement    Patient is not progressing towards the following goals:

## 2024-09-20 NOTE — PROGRESS NOTES
0700 - Bedside report received from TANYA Yadav. Alert and oriented X4, on RA in no acute respiratory distress. Daily plan of care discussed. Patient complains of no pain at this time. No other needs at this time. Call light and personal belongings within reach. Hourly rounding in place. Chart reviewed for recent labs, notes, and orders.

## 2024-09-21 VITALS
OXYGEN SATURATION: 92 % | HEIGHT: 69 IN | BODY MASS INDEX: 23.93 KG/M2 | WEIGHT: 161.6 LBS | DIASTOLIC BLOOD PRESSURE: 72 MMHG | SYSTOLIC BLOOD PRESSURE: 129 MMHG | RESPIRATION RATE: 16 BRPM | HEART RATE: 79 BPM | TEMPERATURE: 96.8 F

## 2024-09-21 LAB
ANION GAP SERPL CALC-SCNC: 12 MMOL/L (ref 7–16)
BASOPHILS # BLD AUTO: 0.5 % (ref 0–1.8)
BASOPHILS # BLD: 0.03 K/UL (ref 0–0.12)
BUN SERPL-MCNC: 7 MG/DL (ref 8–22)
CALCIUM SERPL-MCNC: 8.7 MG/DL (ref 8.4–10.2)
CHLORIDE SERPL-SCNC: 96 MMOL/L (ref 96–112)
CO2 SERPL-SCNC: 27 MMOL/L (ref 20–33)
CREAT SERPL-MCNC: 0.54 MG/DL (ref 0.5–1.4)
EOSINOPHIL # BLD AUTO: 0.11 K/UL (ref 0–0.51)
EOSINOPHIL NFR BLD: 1.7 % (ref 0–6.9)
ERYTHROCYTE [DISTWIDTH] IN BLOOD BY AUTOMATED COUNT: 45.3 FL (ref 35.9–50)
GFR SERPLBLD CREATININE-BSD FMLA CKD-EPI: 110 ML/MIN/1.73 M 2
GLUCOSE BLD STRIP.AUTO-MCNC: 137 MG/DL (ref 65–99)
GLUCOSE BLD STRIP.AUTO-MCNC: 174 MG/DL (ref 65–99)
GLUCOSE SERPL-MCNC: 162 MG/DL (ref 65–99)
HCT VFR BLD AUTO: 43.9 % (ref 42–52)
HGB BLD-MCNC: 14.6 G/DL (ref 14–18)
IMM GRANULOCYTES # BLD AUTO: 0.1 K/UL (ref 0–0.11)
IMM GRANULOCYTES NFR BLD AUTO: 1.5 % (ref 0–0.9)
LYMPHOCYTES # BLD AUTO: 0.68 K/UL (ref 1–4.8)
LYMPHOCYTES NFR BLD: 10.4 % (ref 22–41)
MCH RBC QN AUTO: 26.7 PG (ref 27–33)
MCHC RBC AUTO-ENTMCNC: 33.3 G/DL (ref 32.3–36.5)
MCV RBC AUTO: 80.4 FL (ref 81.4–97.8)
MONOCYTES # BLD AUTO: 0.86 K/UL (ref 0–0.85)
MONOCYTES NFR BLD AUTO: 13.1 % (ref 0–13.4)
NEUTROPHILS # BLD AUTO: 4.77 K/UL (ref 1.82–7.42)
NEUTROPHILS NFR BLD: 72.8 % (ref 44–72)
NRBC # BLD AUTO: 0 K/UL
NRBC BLD-RTO: 0 /100 WBC (ref 0–0.2)
PLATELET # BLD AUTO: 239 K/UL (ref 164–446)
PMV BLD AUTO: 10 FL (ref 9–12.9)
POTASSIUM SERPL-SCNC: 3.3 MMOL/L (ref 3.6–5.5)
RBC # BLD AUTO: 5.46 M/UL (ref 4.7–6.1)
SODIUM SERPL-SCNC: 135 MMOL/L (ref 135–145)
WBC # BLD AUTO: 6.6 K/UL (ref 4.8–10.8)

## 2024-09-21 PROCEDURE — 700102 HCHG RX REV CODE 250 W/ 637 OVERRIDE(OP): Performed by: HOSPITALIST

## 2024-09-21 PROCEDURE — 85025 COMPLETE CBC W/AUTO DIFF WBC: CPT

## 2024-09-21 PROCEDURE — A9270 NON-COVERED ITEM OR SERVICE: HCPCS | Performed by: INTERNAL MEDICINE

## 2024-09-21 PROCEDURE — A9270 NON-COVERED ITEM OR SERVICE: HCPCS | Performed by: HOSPITALIST

## 2024-09-21 PROCEDURE — 99239 HOSP IP/OBS DSCHRG MGMT >30: CPT | Performed by: INTERNAL MEDICINE

## 2024-09-21 PROCEDURE — 82962 GLUCOSE BLOOD TEST: CPT | Mod: 91

## 2024-09-21 PROCEDURE — 80048 BASIC METABOLIC PNL TOTAL CA: CPT

## 2024-09-21 PROCEDURE — 700102 HCHG RX REV CODE 250 W/ 637 OVERRIDE(OP): Performed by: INTERNAL MEDICINE

## 2024-09-21 PROCEDURE — 94760 N-INVAS EAR/PLS OXIMETRY 1: CPT

## 2024-09-21 PROCEDURE — 36415 COLL VENOUS BLD VENIPUNCTURE: CPT

## 2024-09-21 RX ORDER — CALCIUM CARBONATE 500 MG/1
1000 TABLET, CHEWABLE ORAL ONCE
Status: COMPLETED | OUTPATIENT
Start: 2024-09-21 | End: 2024-09-21

## 2024-09-21 RX ADMIN — OMEPRAZOLE 20 MG: 20 CAPSULE, DELAYED RELEASE ORAL at 06:19

## 2024-09-21 RX ADMIN — INSULIN HUMAN 1 UNITS: 100 INJECTION, SOLUTION PARENTERAL at 06:27

## 2024-09-21 RX ADMIN — CLOTRIMAZOLE 10 MG: 10 LOZENGE ORAL at 00:10

## 2024-09-21 RX ADMIN — LOPERAMIDE HYDROCHLORIDE 2 MG: 2 CAPSULE ORAL at 04:00

## 2024-09-21 RX ADMIN — LEVOTHYROXINE SODIUM 50 MCG: 0.05 TABLET ORAL at 06:20

## 2024-09-21 RX ADMIN — CLOTRIMAZOLE 10 MG: 10 LOZENGE ORAL at 11:27

## 2024-09-21 RX ADMIN — CALCIUM CARBONATE (ANTACID) CHEW TAB 500 MG 1000 MG: 500 CHEW TAB at 03:43

## 2024-09-21 RX ADMIN — CALCIUM CARBONATE (ANTACID) CHEW TAB 500 MG 1000 MG: 500 CHEW TAB at 00:10

## 2024-09-21 RX ADMIN — LEVOFLOXACIN 500 MG: 500 TABLET, FILM COATED ORAL at 06:20

## 2024-09-21 RX ADMIN — ASPIRIN 81 MG: 81 TABLET, COATED ORAL at 06:21

## 2024-09-21 RX ADMIN — INSULIN GLARGINE-YFGN 3 UNITS: 100 INJECTION, SOLUTION SUBCUTANEOUS at 06:26

## 2024-09-21 RX ADMIN — CLOTRIMAZOLE 10 MG: 10 LOZENGE ORAL at 09:28

## 2024-09-21 ASSESSMENT — ENCOUNTER SYMPTOMS
BLURRED VISION: 0
DIZZINESS: 0
NAUSEA: 1
CHILLS: 0
SHORTNESS OF BREATH: 0
CONSTIPATION: 0
DEPRESSION: 0
SENSORY CHANGE: 0
COUGH: 0
ABDOMINAL PAIN: 0
NERVOUS/ANXIOUS: 0
HEADACHES: 0
PHOTOPHOBIA: 0
HEARTBURN: 0
DIARRHEA: 1
MYALGIAS: 0
CLAUDICATION: 0
WEAKNESS: 0
INSOMNIA: 0
SORE THROAT: 0
FEVER: 0
VOMITING: 1
SPEECH CHANGE: 0

## 2024-09-21 ASSESSMENT — PAIN DESCRIPTION - PAIN TYPE: TYPE: ACUTE PAIN;CHRONIC PAIN

## 2024-09-21 NOTE — CARE PLAN
The patient is Stable - Low risk of patient condition declining or worsening    Shift Goals  Clinical Goals: Monitor N/V/D, monitor blood glucose & medicated as appropriate, remain free from falls/injury.  Patient Goals: Improved N/V/D & hiccups, rest, sleep.  Family Goals: YESY    Progress made toward(s) clinical / shift goals:  Patient's N/V/D was monitored this shift and medicated multiple times. Patient's blood glucose was monitored and medicated as appropriate. Patient had no falls or injury.    Patient is not progressing towards the following goals:      Problem: Gastrointestinal Irritability  Goal: Diarrhea will be absent or improved  Outcome: Not Progressing     Patient had multiple bouts of diarrhea this shift despite medication. Patient was medicated for N/V/D and acid reflux multiple times.

## 2024-09-21 NOTE — PROGRESS NOTES
Assumed care of patient at 1915, bedside report received from TANYA Gonzalez.  Patient sitting up in cardiac chair watching TV, alert & oriented, calm & cooperative, NAD, breaths even and unlabored, VS stable on room air. Bed in low & locked position, call light and personal belongings within reach, fall precautions in place. Patient updated on POC, denies pain, denies further needs at this time. Hourly rounding continues.

## 2024-09-21 NOTE — PROGRESS NOTES
Hospital Medicine Daily Progress Note    Date of Service  Late entry for DOS 9/21/24    Chief Complaint  Barron Hewitt is a 63 y.o. male admitted 9/14/2024 with diarrhea and dehydration.    Hospital Course  63-year-old male with a past medical history of T2DM, hypothyroidism, hypertension, sleep apnea, hyperlipidemia, myocardial bridge admitted on 9/14/2024 after having nausea vomiting diarrhea, worsening abdominal pain.  In the ED patient was found to have hypertension at 181/99, 163/74.  Labs showed hemoconcentration as hemoglobin was 18.3, hematocrit 52.6, platelets 316, sodium 126, anion gap 23, bicarb 17, chloride 86.  Patient was started on IV fluids.     Patient has shown marked improvement in his electrolytes but still remains with significant watery diarrhea.  He has had this over 5 days which should be self-limiting point.  Started on Levaquin for treatment given the fact that he is hospitalized and duration is longer than anticipated.    Interval Problem Update  9/17 patient still with frequent watery bowel movements.  Started on Levaquin 500 mg p.o. daily for the next 5 days given the fact that he is hospitalized and continues to have significant diarrhea.  He also developed thrush with some difficulty swallowing today started on clotrimazole.  He also continues to have significant hiccups therefore I will give him baclofen as needed.  Will continue to hold off Imodium at this point given the presence of Salmonella.  9/18 patient continues to have watery loose bowel movements but there is some solid material now passing.  Will trial low-dose Imodium 2 mg every 4 as needed and continue Levaquin.  Baclofen is not helping with the hiccups therefore I will discontinue it and trial Thorazine.  Potassium down to 2.9 and he received both IV and p.o. this morning as he continues to have GI losses.  Will continue with potassium phosphate supplementation.  9/19 patient continues to make improvements and  does still have loose stool but it has more fecal material rather than just water.  Thorazine seems to be helping with hiccups.  Potassium is down to 2.7 and IV repletion is in process.  Magnesium checked this morning and is normal at 1.8 therefore magnesium supplementation would not help with hypokalemia.  As stool begins to firm patient should be able to discharge in the next 24 to 48 hours.  9/20 Initially patient was slated for discharge this afternoon but he had episode of emesis and started to feel poorly again.  He will be watched overnight and planned to dc tomorrow am.    I have discussed this patient's plan of care and discharge plan at IDT rounds today with Case Management, Nursing, Nursing leadership, and other members of the IDT team.    Consultants/Specialty  none    Code Status  Full Code    Disposition  The patient is not medically cleared for discharge to home or a post-acute facility.  Anticipate discharge to: home with close outpatient follow-up    I have placed the appropriate orders for post-discharge needs.    Review of Systems  Review of Systems   Constitutional:  Negative for chills and fever.   HENT:  Negative for congestion and sore throat.    Eyes:  Negative for blurred vision and photophobia.   Respiratory:  Negative for cough and shortness of breath.    Cardiovascular:  Negative for chest pain, claudication and leg swelling.   Gastrointestinal:  Positive for diarrhea, nausea and vomiting. Negative for abdominal pain, constipation and heartburn.   Genitourinary:  Negative for dysuria and hematuria.   Musculoskeletal:  Negative for joint pain and myalgias.   Skin:  Negative for itching and rash.   Neurological:  Negative for dizziness, sensory change, speech change, weakness and headaches.   Psychiatric/Behavioral:  Negative for depression. The patient is not nervous/anxious and does not have insomnia.         Physical Exam  Temp:  [36.4 °C (97.5 °F)-36.7 °C (98.1 °F)] 36.4 °C (97.5  °F)  Pulse:  [77-85] 81  Resp:  [16-17] 16  BP: (116-129)/(61-74) 124/74  SpO2:  [90 %-95 %] 90 %    Physical Exam  Vitals and nursing note reviewed.   Constitutional:       General: He is not in acute distress.     Appearance: Normal appearance.   HENT:      Head: Normocephalic and atraumatic.      Mouth/Throat:      Comments: Thrush resolved.    Eyes:      General: No scleral icterus.     Extraocular Movements: Extraocular movements intact.   Cardiovascular:      Rate and Rhythm: Normal rate and regular rhythm.      Pulses: Normal pulses.      Heart sounds: Normal heart sounds. No murmur heard.  Pulmonary:      Effort: Pulmonary effort is normal. No respiratory distress.      Breath sounds: Normal breath sounds. No wheezing, rhonchi or rales.   Abdominal:      General: Abdomen is flat. Bowel sounds are normal. There is no distension.      Palpations: Abdomen is soft.      Tenderness: There is no rebound.   Musculoskeletal:         General: No swelling or tenderness.      Cervical back: Normal range of motion and neck supple.   Lymphadenopathy:      Cervical: No cervical adenopathy.   Skin:     Coloration: Skin is not jaundiced.      Findings: No erythema.   Neurological:      General: No focal deficit present.      Mental Status: He is alert and oriented to person, place, and time. Mental status is at baseline.      Cranial Nerves: No cranial nerve deficit.   Psychiatric:         Mood and Affect: Mood normal.         Behavior: Behavior normal.         Fluids    Intake/Output Summary (Last 24 hours) at 9/21/2024 0650  Last data filed at 9/20/2024 2200  Gross per 24 hour   Intake 120 ml   Output --   Net 120 ml        Laboratory  Recent Labs     09/19/24  0145 09/20/24  0201 09/21/24  0456   WBC 7.2 10.0 6.6   RBC 5.94 5.50 5.46   HEMOGLOBIN 15.9 15.1 14.6   HEMATOCRIT 47.2 46.8 43.9   MCV 79.5* 85.1 80.4*   MCH 26.8* 27.5 26.7*   MCHC 33.7 32.3 33.3   RDW 43.5 48.1 45.3   PLATELETCT 268 187 239   MPV 10.4 11.6 10.0      Recent Labs     09/19/24  0145 09/19/24  1516 09/20/24  0201   SODIUM 128*  --  133*   POTASSIUM 2.7* 3.2* 3.5*   CHLORIDE 92*  --  101   CO2 24  --  21   GLUCOSE 154*  --  109*   BUN 7*  --  6*   CREATININE 0.63  --  0.42*   CALCIUM 8.1*  --  7.8*                   Imaging  No orders to display        Assessment/Plan  * Hyponatremia- (present on admission)  Assessment & Plan  Hypovolemic hyponatremia from GI loss  Sodium 133    Hiccups  Assessment & Plan  Improved with Thorazine  May benefit from Carafate given significant reflux  Tolerating soft solid diet    Hypokalemia  Assessment & Plan  losing through diarrhea  IV potassium with potassium phosphate  Calcium and IV fluids    Primary hypertension- (present on admission)  Assessment & Plan  Used to be on metoprolol SR but recently stopped.  Continue amlodipine    Hypertensive urgency- (present on admission)  Assessment & Plan  Due to abdominal pain  Monitor    Polycythemia- (present on admission)  Assessment & Plan  Resolved    Metabolic acidosis- (present on admission)  Assessment & Plan  Resolved    Salmonella gastroenteritis- (present on admission)  Assessment & Plan  Stool less often and more formed   negative C. difficile PCR  GI panel PCR shows Salmonella gastroenteritis  Continue isolation  Continue IV fluids  Given the lack of improvement start Levaquin 500 mg p.o. daily for the next 5 days  Start low-dose Imodium, 2 mg every 4h as needed    Dehydration- (present on admission)  Assessment & Plan  Continue with IV hydration    Asthma- (present on admission)  Assessment & Plan  Not currently in exacerbation.  Oxygen as needed.  Resume home inhalers    Acquired hypothyroidism- (present on admission)  Assessment & Plan  Continue Synthroid    Controlled type 2 diabetes mellitus with diabetic polyneuropathy, without long-term current use of insulin (HCC)- (present on admission)  Assessment & Plan  With hyperglycemia  Last glycated hemoglobin was  7.3%  Continue insulin sliding scale, Accu-Cheks and hypoglycemic protocol  A1c 9.1, uncontrolled.  There was a elevated beta hydroxybutyrate 1.7.  It is more likely from GI loss.    Dyslipidemia- (present on admission)  Assessment & Plan  Restart atorvastatin when appropriate         VTE prophylaxis:   SCDs/TEDs      I have performed a physical exam and reviewed and updated ROS and Plan today (9/21/2024). In review of yesterday's note (9/20/2024), there are no changes except as documented above.

## 2024-09-21 NOTE — PROGRESS NOTES
Patient discharged in stable condition per order. PIV removed without complication. Per patient request, patient left the unit at 1420 walking without assistance. Daughter present to provide ride home.

## 2024-09-21 NOTE — PROGRESS NOTES
[address]  [address]  Authorization for Surgical Operation and Procedure     Date:___________                                                                                                         Time:__________  I hereby authorize Surgeon(s):  Ирина Rodrigues MD, my physician and his/her assistants (if applicable), which may include medical students, residents, and/or fellows, to perform the following surgical operation/ procedure and administer such anesthesia as may be determined necessary by my physician:  Operation/Procedure name (s) Procedure(s):  EXPLORATORY LAPAROTOMY WITH LYSIS OF ADHESIONS, POSSIBLE BOWEL RESECTION on Catherine Bustamante   2.   I recognize that during the surgical operation/procedure, unforeseen conditions may necessitate additional or different procedures than those listed above.  I, therefore, further authorize and request that the above-named surgeon, assistants, or designees perform such procedures as are, in their judgment, necessary and desirable.    3.   My surgeon/physician has discussed prior to my surgery the potential benefits, risks and side effects of this procedure; the likelihood of achieving goals; and potential problems that might occur during recuperation.  They also discussed reasonable alternatives to the procedure, including risks, benefits, and side effects related to the alternatives and risks related to not receiving this procedure.  I have had all my questions answered and I acknowledge that no guarantee has been made as to the result that may be obtained.    4.   Should the need arise during my operation/procedure, which includes change of level of care prior to discharge, I also consent to the administration of blood and/or blood products.  Further, I understand that despite careful testing and screening of blood or blood products by collecting agencies, I may still be subject to ill effects as a result of receiving a blood  Assumed care, patient awake and alert. No complaints of pain or discomfort. Patient stated he was looking forward to going home today. Reviewed plan of care for the day, call light within reach, hourly rounding in place.   transfusion and/or blood products.  The following are some, but not all, of the potential risks that can occur: fever and allergic reactions, hemolytic reactions, transmission of diseases such as Hepatitis, AIDS and Cytomegalovirus (CMV) and fluid overload.  In the event that I wish to have an autologous transfusion of my own blood, or a directed donor transfusion, I will discuss this with my physician.  Check only if Refusing Blood or Blood Products  I understand refusal of blood or blood products as deemed necessary by my physician may have serious consequences to my condition to include possible death. I hereby assume responsibility for my refusal and release the hospital, its personnel, and my physicians from any responsibility for the consequences of my refusal.          o  Refuse      5.   I authorize the use of any specimen, organs, tissues, body parts or foreign objects that may be removed from my body during the operation/procedure for diagnosis, research or teaching purposes and their subsequent disposal by hospital authorities.  I also authorize the release of specimen test results and/or written reports to my treating physician on the hospital medical staff or other referring or consulting physicians involved in my care, at the discretion of the Pathologist or my treating physician.    6.   I consent to the photographing or videotaping of the operations or procedures to be performed, including appropriate portions of my body for medical, scientific, or educational purposes, provided my identity is not revealed by the pictures or by descriptive texts accompanying them.  If the procedure has been photographed/videotaped, the surgeon will obtain the original picture, image, videotape or CD.  The hospital will not be responsible for storage, release or maintenance of the picture, image, tape or CD.    7.   I consent to the presence of a  or observers in the operating room as deemed  necessary by my physician or their designees.    8.   I recognize that in the event my procedure results in extended X-Ray/fluoroscopy time, I may develop a skin reaction.    9. If I have a Do Not Attempt Resuscitation (DNAR) order in place, that status will be suspended while in the operating room, procedural suite, and during the recovery period unless otherwise explicitly stated by me (or a person authorized to consent on my behalf). The surgeon or my attending physician will determine when the applicable recovery period ends for purposes of reinstating the DNAR order.  10. Patients having a sterilization procedure: I understand that if the procedure is successful the results will be permanent and it will therefore be impossible for me to inseminate, conceive, or bear children.  I also understand that the procedure is intended to result in sterility, although the result has not been guaranteed.   11. I acknowledge that my physician has explained sedation/analgesia administration to me including the risk and benefits I consent to the administration of sedation/analgesia as may be necessary or desirable in the judgment of my physician.    I CERTIFY THAT I HAVE READ AND FULLY UNDERSTAND THE ABOVE CONSENT TO OPERATION and/or OTHER PROCEDURE.    _________________________________________  __________________________________  Signature of Patient     Signature of Responsible Person         ___________________________________         Printed Name of Responsible Person           _________________________________                 Relationship to Patient  _________________________________________  ______________________________  Signature of Witness          Date  Time      Patient Name: Catherine BURK Dianna     : 1937                 Printed: 2024     Medical Record #: [id number]                     Page 1 of 2                                    [address]  [address]    Consent  for Anesthesia    I, Catherine WM Dianna agree to be cared for by an anesthesiologist, who is specially trained to monitor me and give me medicine to put me to sleep or keep me comfortable during my procedure    I understand that my anesthesiologist is not an employee or agent of [facility] or [employer]. He or she works for [employer].    As the patient asking for anesthesia services, I agree to:  Allow the anesthesiologist (anesthesia doctor) to give me medicine and do additional procedures as necessary. Some examples are: Starting or using an “IV” to give me medicine, fluids or blood during my procedure, and having a breathing tube placed to help me breathe when I’m asleep (intubation). In the event that my heart stops working properly, I understand that my anesthesiologist will make every effort to sustain my life, unless otherwise directed by [facility] Do Not Resuscitate documents.  Tell my anesthesia doctor before my procedure:  If I am pregnant.  The last time that I ate or drank.  All of the medicines I take (including prescriptions, herbal supplements, and pills I can buy without a prescription (including street drugs/illegal medications). Failure to inform my anesthesiologist about these medicines may increase my risk of anesthetic complications.  If I am allergic to anything or have had a reaction to anesthesia before.  I understand how the anesthesia medicine will help me (benefits).  I understand that with any type of anesthesia medicine there are risks:  The most common risks are: nausea, vomiting, sore throat, muscle soreness, damage to my eyes, mouth, or teeth (from breathing tube placement).  Rare risks include: remembering what happened during my procedure, allergic reactions to medications, injury to my airway, heart, lungs, vision, nerves, or muscles and in extremely rare instances death.  My doctor has explained to me other choices available to me for my care  (alternatives).  Pregnant Patients (“epidural”):  I understand that the risks of having an epidural (medicine given into my back to help control pain during labor), include itching, low blood pressure, difficulty urinating, headache or slowing of the baby’s heart. Very rare risks include infection, bleeding, seizure, irregular heart rhythms and nerve injury.  Regional Anesthesia (“spinal”, “epidural”, & “nerve blocks”):  I understand that rare but potential complications include headache, bleeding, infection, seizure, irregular heart rhythms, and nerve injury.    I can change my mind about having anesthesia services at any time before I get the medicine.    _____________________________________________________________________________  Patient (or Representative) Signature/Relationship to Patient  Date   Time    _____________________________________________________________________________   Name (if used)    Language/Organization   Time    _____________________________________________________________________________  Anesthesiologist Signature     Date   Time  I have discussed the procedure and information above with the patient (or patient’s representative) and answered their questions. The patient or their representative has agreed to have anesthesia services.    _____________________________________________________________________________  Witness        Date   Time  I have verified that the signature is that of the patient or patient’s representative, and that it was signed before the procedure  Patient Name: Catherine Bustamante     : 1937                 Printed: 2024     Medical Record #: [id number]                     Page 2 of 2

## 2024-09-22 ENCOUNTER — PATIENT MESSAGE (OUTPATIENT)
Dept: ENDOCRINOLOGY | Facility: MEDICAL CENTER | Age: 64
End: 2024-09-22
Payer: MEDICARE

## 2024-09-23 ENCOUNTER — PATIENT OUTREACH (OUTPATIENT)
Dept: MEDICAL GROUP | Facility: LAB | Age: 64
End: 2024-09-23
Payer: MEDICARE

## 2024-09-23 NOTE — PROGRESS NOTES
Transitional Care Management  TCM Outreach Date and Time: Filed (9/23/2024  9:13 AM)    Discharge Questions  Actual Discharge Date: 09/21/24  Now that you are home, how are you feeling?: Good (Feeling smuch better; no further N/V/D. Has had 1 runny BM upon returning home; no watery diarrhea.  Feels his stomach is doing well.  Eating full meals and focusing on hydration.  He is fatigued due to 15 LB weight loss.  Instructed on frequent naps.)  Did you receive any new prescriptions?: Yes  Were you able to get them filled?: Yes  Meds to Bed or Pharmacy filled?: Pharmacy  Do you have any questions about your current medications or new medications (Review Med Rec)?: No (Med Req completed.  Pt has returned to all previous required prescriptions as well.  No questions or concerns.)  Did you have any durable medical equipment ordered?: No  Do you have a follow up appointment scheduled with your PCP?: Yes  Appointment Date: 10/07/24  Appointment Time: 0940  Any issues or paperwork you wish to discuss with your PCP?: No  Are you (patient) able to get to the appointment?: Yes  If Home Health was ordered, have they contacted you (Patient): Not Applicable  Did you have enough support after your last discharge?: Yes  Does this patient qualify for the CCM program?: No    Transitional Care  Number of attempts made to contact patient: 1  Current or previous attempts completed within two business days of discharge? : Yes  Provided education regarding treatment plan, medications, self-management, ADLs?: Yes (Discussed dietary focus on protein and nutritious intake; patient very knowledgeable of such and has a good plan.  Understands need for rehydration focus.)  Has patient completed an Advanced Directive?: No  Has the Care Manager's phone number provided?: No  Is there anything else I can help you with?: No    Discharge Summary  Chief Complaint: N/V/D; Lower abdominal pain  Admitting Diagnosis: N/V/D x4 days; Lower abdominal pain;  Dehydration; /99; HX:  DMII; HTN; CAD  Discharge Diagnosis: Salmonella gastroenteritis; Hyponatremia

## 2024-09-26 RX ORDER — KETOCONAZOLE 20 MG/G
1 CREAM TOPICAL DAILY
Qty: 60 G | Refills: 0 | Status: SHIPPED | OUTPATIENT
Start: 2024-09-26

## 2024-10-01 ENCOUNTER — NON-PROVIDER VISIT (OUTPATIENT)
Dept: ENDOCRINOLOGY | Facility: MEDICAL CENTER | Age: 64
End: 2024-10-01
Attending: INTERNAL MEDICINE
Payer: MEDICARE

## 2024-10-01 VITALS
SYSTOLIC BLOOD PRESSURE: 108 MMHG | OXYGEN SATURATION: 98 % | HEIGHT: 69 IN | WEIGHT: 155 LBS | BODY MASS INDEX: 22.96 KG/M2 | DIASTOLIC BLOOD PRESSURE: 70 MMHG

## 2024-10-01 DIAGNOSIS — E03.9 ACQUIRED HYPOTHYROIDISM: ICD-10-CM

## 2024-10-01 DIAGNOSIS — E78.5 DYSLIPIDEMIA: ICD-10-CM

## 2024-10-01 DIAGNOSIS — Z79.4 TYPE 2 DIABETES MELLITUS WITHOUT COMPLICATION, WITH LONG-TERM CURRENT USE OF INSULIN (HCC): ICD-10-CM

## 2024-10-01 DIAGNOSIS — E55.9 VITAMIN D DEFICIENCY: ICD-10-CM

## 2024-10-01 DIAGNOSIS — E11.9 TYPE 2 DIABETES MELLITUS WITHOUT COMPLICATION, WITH LONG-TERM CURRENT USE OF INSULIN (HCC): ICD-10-CM

## 2024-10-01 LAB — HBA1C MFR BLD: 9 % (ref ?–5.8)

## 2024-10-01 PROCEDURE — 95249 CONT GLUC MNTR PT PROV EQP: CPT | Performed by: INTERNAL MEDICINE

## 2024-10-01 PROCEDURE — 99212 OFFICE O/P EST SF 10 MIN: CPT | Mod: 25 | Performed by: INTERNAL MEDICINE

## 2024-10-01 RX ORDER — INSULIN DEGLUDEC 100 U/ML
30 INJECTION, SOLUTION SUBCUTANEOUS DAILY
Qty: 30 ML | Refills: 3 | Status: SHIPPED | OUTPATIENT
Start: 2024-10-01

## 2024-10-01 ASSESSMENT — FIBROSIS 4 INDEX: FIB4 SCORE: 1.1

## 2024-10-07 ENCOUNTER — APPOINTMENT (OUTPATIENT)
Dept: MEDICAL GROUP | Facility: LAB | Age: 64
End: 2024-10-07
Payer: MEDICARE

## 2024-10-07 VITALS
OXYGEN SATURATION: 95 % | DIASTOLIC BLOOD PRESSURE: 60 MMHG | HEIGHT: 69 IN | SYSTOLIC BLOOD PRESSURE: 110 MMHG | HEART RATE: 83 BPM | WEIGHT: 160 LBS | BODY MASS INDEX: 23.7 KG/M2 | TEMPERATURE: 97.4 F

## 2024-10-07 DIAGNOSIS — Z09 HOSPITAL DISCHARGE FOLLOW-UP: ICD-10-CM

## 2024-10-07 DIAGNOSIS — Z23 NEED FOR VACCINATION: ICD-10-CM

## 2024-10-07 DIAGNOSIS — E11.65 UNCONTROLLED TYPE 2 DIABETES MELLITUS WITH HYPERGLYCEMIA (HCC): ICD-10-CM

## 2024-10-07 DIAGNOSIS — A02.0 SALMONELLA GASTROENTERITIS: ICD-10-CM

## 2024-10-07 DIAGNOSIS — I10 BENIGN ESSENTIAL HYPERTENSION: ICD-10-CM

## 2024-10-07 PROBLEM — E87.6 HYPOKALEMIA: Status: RESOLVED | Noted: 2024-09-17 | Resolved: 2024-10-07

## 2024-10-07 PROBLEM — I16.0 HYPERTENSIVE URGENCY: Status: RESOLVED | Noted: 2024-09-14 | Resolved: 2024-10-07

## 2024-10-07 PROBLEM — E87.20 METABOLIC ACIDOSIS: Status: RESOLVED | Noted: 2024-09-14 | Resolved: 2024-10-07

## 2024-10-07 PROBLEM — E87.1 HYPONATREMIA: Status: RESOLVED | Noted: 2024-09-14 | Resolved: 2024-10-07

## 2024-10-07 PROCEDURE — 3078F DIAST BP <80 MM HG: CPT | Performed by: NURSE PRACTITIONER

## 2024-10-07 PROCEDURE — 3074F SYST BP LT 130 MM HG: CPT | Performed by: NURSE PRACTITIONER

## 2024-10-07 PROCEDURE — 99214 OFFICE O/P EST MOD 30 MIN: CPT | Mod: 25 | Performed by: NURSE PRACTITIONER

## 2024-10-07 PROCEDURE — 90662 IIV NO PRSV INCREASED AG IM: CPT | Performed by: NURSE PRACTITIONER

## 2024-10-07 PROCEDURE — G0008 ADMIN INFLUENZA VIRUS VAC: HCPCS | Performed by: NURSE PRACTITIONER

## 2024-10-07 ASSESSMENT — FIBROSIS 4 INDEX: FIB4 SCORE: 1.1

## 2024-10-08 DIAGNOSIS — E11.9 TYPE 2 DIABETES MELLITUS WITHOUT COMPLICATION, WITH LONG-TERM CURRENT USE OF INSULIN (HCC): ICD-10-CM

## 2024-10-08 DIAGNOSIS — Z79.4 TYPE 2 DIABETES MELLITUS WITHOUT COMPLICATION, WITH LONG-TERM CURRENT USE OF INSULIN (HCC): ICD-10-CM

## 2024-10-15 RX ORDER — ATORVASTATIN CALCIUM 80 MG/1
80 TABLET, FILM COATED ORAL EVERY EVENING
Qty: 90 TABLET | Refills: 3 | Status: SHIPPED | OUTPATIENT
Start: 2024-10-15

## 2024-10-19 ENCOUNTER — ANESTHESIA (OUTPATIENT)
Dept: SURGERY | Facility: MEDICAL CENTER | Age: 64
DRG: 419 | End: 2024-10-19
Payer: MEDICARE

## 2024-10-19 ENCOUNTER — HOSPITAL ENCOUNTER (INPATIENT)
Facility: MEDICAL CENTER | Age: 64
LOS: 1 days | DRG: 419 | End: 2024-10-20
Attending: EMERGENCY MEDICINE | Admitting: SURGERY
Payer: MEDICARE

## 2024-10-19 ENCOUNTER — ANESTHESIA EVENT (OUTPATIENT)
Dept: SURGERY | Facility: MEDICAL CENTER | Age: 64
DRG: 419 | End: 2024-10-19
Payer: MEDICARE

## 2024-10-19 ENCOUNTER — APPOINTMENT (OUTPATIENT)
Dept: RADIOLOGY | Facility: MEDICAL CENTER | Age: 64
DRG: 419 | End: 2024-10-19
Attending: EMERGENCY MEDICINE
Payer: MEDICARE

## 2024-10-19 DIAGNOSIS — K81.9 CHOLECYSTITIS: Primary | ICD-10-CM

## 2024-10-19 DIAGNOSIS — R11.2 NAUSEA AND VOMITING, UNSPECIFIED VOMITING TYPE: ICD-10-CM

## 2024-10-19 DIAGNOSIS — R10.33 PERIUMBILICAL ABDOMINAL PAIN: ICD-10-CM

## 2024-10-19 LAB
ALBUMIN SERPL BCP-MCNC: 4.1 G/DL (ref 3.2–4.9)
ALBUMIN/GLOB SERPL: 1.3 G/DL
ALP SERPL-CCNC: 98 U/L (ref 30–99)
ALT SERPL-CCNC: 19 U/L (ref 2–50)
ANION GAP SERPL CALC-SCNC: 15 MMOL/L (ref 7–16)
APPEARANCE UR: CLEAR
AST SERPL-CCNC: 15 U/L (ref 12–45)
BASOPHILS # BLD AUTO: 0.5 % (ref 0–1.8)
BASOPHILS # BLD: 0.11 K/UL (ref 0–0.12)
BILIRUB SERPL-MCNC: 1 MG/DL (ref 0.1–1.5)
BILIRUB UR QL STRIP.AUTO: NEGATIVE
BUN SERPL-MCNC: 9 MG/DL (ref 8–22)
CALCIUM ALBUM COR SERPL-MCNC: 9.2 MG/DL (ref 8.5–10.5)
CALCIUM SERPL-MCNC: 9.3 MG/DL (ref 8.4–10.2)
CHLORIDE SERPL-SCNC: 100 MMOL/L (ref 96–112)
CO2 SERPL-SCNC: 22 MMOL/L (ref 20–33)
COLOR UR: YELLOW
CREAT SERPL-MCNC: 0.52 MG/DL (ref 0.5–1.4)
EOSINOPHIL # BLD AUTO: 0.02 K/UL (ref 0–0.51)
EOSINOPHIL NFR BLD: 0.1 % (ref 0–6.9)
ERYTHROCYTE [DISTWIDTH] IN BLOOD BY AUTOMATED COUNT: 53.8 FL (ref 35.9–50)
FLUAV RNA SPEC QL NAA+PROBE: NEGATIVE
FLUBV RNA SPEC QL NAA+PROBE: NEGATIVE
GFR SERPLBLD CREATININE-BSD FMLA CKD-EPI: 111 ML/MIN/1.73 M 2
GLOBULIN SER CALC-MCNC: 3.2 G/DL (ref 1.9–3.5)
GLUCOSE BLD STRIP.AUTO-MCNC: 150 MG/DL (ref 65–99)
GLUCOSE BLD STRIP.AUTO-MCNC: 181 MG/DL (ref 65–99)
GLUCOSE SERPL-MCNC: 189 MG/DL (ref 65–99)
GLUCOSE UR STRIP.AUTO-MCNC: >=1000 MG/DL
HCT VFR BLD AUTO: 51.6 % (ref 42–52)
HGB BLD-MCNC: 16.9 G/DL (ref 14–18)
IMM GRANULOCYTES # BLD AUTO: 0.14 K/UL (ref 0–0.11)
IMM GRANULOCYTES NFR BLD AUTO: 0.6 % (ref 0–0.9)
KETONES UR STRIP.AUTO-MCNC: 15 MG/DL
LACTATE SERPL-SCNC: 1.6 MMOL/L (ref 0.5–2)
LEUKOCYTE ESTERASE UR QL STRIP.AUTO: NEGATIVE
LYMPHOCYTES # BLD AUTO: 1.2 K/UL (ref 1–4.8)
LYMPHOCYTES NFR BLD: 5.5 % (ref 22–41)
MCH RBC QN AUTO: 27.8 PG (ref 27–33)
MCHC RBC AUTO-ENTMCNC: 32.8 G/DL (ref 32.3–36.5)
MCV RBC AUTO: 85 FL (ref 81.4–97.8)
MICRO URNS: ABNORMAL
MONOCYTES # BLD AUTO: 1.46 K/UL (ref 0–0.85)
MONOCYTES NFR BLD AUTO: 6.7 % (ref 0–13.4)
NEUTROPHILS # BLD AUTO: 18.73 K/UL (ref 1.82–7.42)
NEUTROPHILS NFR BLD: 86.6 % (ref 44–72)
NITRITE UR QL STRIP.AUTO: NEGATIVE
NRBC # BLD AUTO: 0 K/UL
NRBC BLD-RTO: 0 /100 WBC (ref 0–0.2)
PATHOLOGY CONSULT NOTE: NORMAL
PH UR STRIP.AUTO: 5.5 [PH] (ref 5–8)
PLATELET # BLD AUTO: 326 K/UL (ref 164–446)
PMV BLD AUTO: 9.9 FL (ref 9–12.9)
POTASSIUM SERPL-SCNC: 3.7 MMOL/L (ref 3.6–5.5)
PROT SERPL-MCNC: 7.3 G/DL (ref 6–8.2)
PROT UR QL STRIP: NEGATIVE MG/DL
RBC # BLD AUTO: 6.07 M/UL (ref 4.7–6.1)
RBC UR QL AUTO: NEGATIVE
RSV RNA SPEC QL NAA+PROBE: NEGATIVE
SARS-COV-2 RNA RESP QL NAA+PROBE: NOTDETECTED
SODIUM SERPL-SCNC: 137 MMOL/L (ref 135–145)
SP GR UR STRIP.AUTO: 1.01
SPECIMEN SOURCE: NORMAL
WBC # BLD AUTO: 21.7 K/UL (ref 4.8–10.8)

## 2024-10-19 PROCEDURE — 87040 BLOOD CULTURE FOR BACTERIA: CPT

## 2024-10-19 PROCEDURE — 96375 TX/PRO/DX INJ NEW DRUG ADDON: CPT

## 2024-10-19 PROCEDURE — 0FT44ZZ RESECTION OF GALLBLADDER, PERCUTANEOUS ENDOSCOPIC APPROACH: ICD-10-PCS | Performed by: SURGERY

## 2024-10-19 PROCEDURE — 700105 HCHG RX REV CODE 258: Performed by: EMERGENCY MEDICINE

## 2024-10-19 PROCEDURE — 82962 GLUCOSE BLOOD TEST: CPT

## 2024-10-19 PROCEDURE — 700105 HCHG RX REV CODE 258: Performed by: SURGERY

## 2024-10-19 PROCEDURE — 160048 HCHG OR STATISTICAL LEVEL 1-5: Performed by: SURGERY

## 2024-10-19 PROCEDURE — 80053 COMPREHEN METABOLIC PANEL: CPT

## 2024-10-19 PROCEDURE — 700111 HCHG RX REV CODE 636 W/ 250 OVERRIDE (IP): Mod: JZ | Performed by: SURGERY

## 2024-10-19 PROCEDURE — A9270 NON-COVERED ITEM OR SERVICE: HCPCS | Performed by: SURGERY

## 2024-10-19 PROCEDURE — 160002 HCHG RECOVERY MINUTES (STAT): Performed by: SURGERY

## 2024-10-19 PROCEDURE — 700101 HCHG RX REV CODE 250: Performed by: SURGERY

## 2024-10-19 PROCEDURE — 502714 HCHG ROBOTIC SURGERY SERVICES: Performed by: SURGERY

## 2024-10-19 PROCEDURE — 700111 HCHG RX REV CODE 636 W/ 250 OVERRIDE (IP): Mod: JZ | Performed by: EMERGENCY MEDICINE

## 2024-10-19 PROCEDURE — 700111 HCHG RX REV CODE 636 W/ 250 OVERRIDE (IP): Performed by: ANESTHESIOLOGY

## 2024-10-19 PROCEDURE — 700102 HCHG RX REV CODE 250 W/ 637 OVERRIDE(OP): Performed by: ANESTHESIOLOGY

## 2024-10-19 PROCEDURE — 770001 HCHG ROOM/CARE - MED/SURG/GYN PRIV*

## 2024-10-19 PROCEDURE — 87086 URINE CULTURE/COLONY COUNT: CPT

## 2024-10-19 PROCEDURE — 85025 COMPLETE CBC W/AUTO DIFF WBC: CPT

## 2024-10-19 PROCEDURE — 94760 N-INVAS EAR/PLS OXIMETRY 1: CPT

## 2024-10-19 PROCEDURE — 36415 COLL VENOUS BLD VENIPUNCTURE: CPT

## 2024-10-19 PROCEDURE — 160031 HCHG SURGERY MINUTES - 1ST 30 MINS LEVEL 5: Performed by: SURGERY

## 2024-10-19 PROCEDURE — 700101 HCHG RX REV CODE 250: Performed by: ANESTHESIOLOGY

## 2024-10-19 PROCEDURE — 700117 HCHG RX CONTRAST REV CODE 255: Performed by: EMERGENCY MEDICINE

## 2024-10-19 PROCEDURE — 93005 ELECTROCARDIOGRAM TRACING: CPT | Performed by: SURGERY

## 2024-10-19 PROCEDURE — 700105 HCHG RX REV CODE 258: Performed by: ANESTHESIOLOGY

## 2024-10-19 PROCEDURE — 96367 TX/PROPH/DG ADDL SEQ IV INF: CPT

## 2024-10-19 PROCEDURE — 160036 HCHG PACU - EA ADDL 30 MINS PHASE I: Performed by: SURGERY

## 2024-10-19 PROCEDURE — 0241U HCHG SARS-COV-2 COVID-19 NFCT DS RESP RNA 4 TRGT MIC: CPT

## 2024-10-19 PROCEDURE — 8E0W4CZ ROBOTIC ASSISTED PROCEDURE OF TRUNK REGION, PERCUTANEOUS ENDOSCOPIC APPROACH: ICD-10-PCS | Performed by: SURGERY

## 2024-10-19 PROCEDURE — 160042 HCHG SURGERY MINUTES - EA ADDL 1 MIN LEVEL 5: Performed by: SURGERY

## 2024-10-19 PROCEDURE — 160035 HCHG PACU - 1ST 60 MINS PHASE I: Performed by: SURGERY

## 2024-10-19 PROCEDURE — 83605 ASSAY OF LACTIC ACID: CPT

## 2024-10-19 PROCEDURE — 160009 HCHG ANES TIME/MIN: Performed by: SURGERY

## 2024-10-19 PROCEDURE — 99285 EMERGENCY DEPT VISIT HI MDM: CPT

## 2024-10-19 PROCEDURE — 74177 CT ABD & PELVIS W/CONTRAST: CPT

## 2024-10-19 PROCEDURE — 700102 HCHG RX REV CODE 250 W/ 637 OVERRIDE(OP): Performed by: SURGERY

## 2024-10-19 PROCEDURE — 96376 TX/PRO/DX INJ SAME DRUG ADON: CPT

## 2024-10-19 PROCEDURE — 81003 URINALYSIS AUTO W/O SCOPE: CPT

## 2024-10-19 PROCEDURE — 110371 HCHG SHELL REV 272: Performed by: SURGERY

## 2024-10-19 PROCEDURE — 700111 HCHG RX REV CODE 636 W/ 250 OVERRIDE (IP): Mod: JZ | Performed by: ANESTHESIOLOGY

## 2024-10-19 PROCEDURE — 88304 TISSUE EXAM BY PATHOLOGIST: CPT

## 2024-10-19 PROCEDURE — A9270 NON-COVERED ITEM OR SERVICE: HCPCS | Performed by: ANESTHESIOLOGY

## 2024-10-19 PROCEDURE — 96365 THER/PROPH/DIAG IV INF INIT: CPT

## 2024-10-19 RX ORDER — LEVOFLOXACIN 500 MG/1
500 TABLET, FILM COATED ORAL DAILY
COMMUNITY
Start: 2024-09-20

## 2024-10-19 RX ORDER — HYDROMORPHONE HYDROCHLORIDE 1 MG/ML
0.1 INJECTION, SOLUTION INTRAMUSCULAR; INTRAVENOUS; SUBCUTANEOUS
Status: DISCONTINUED | OUTPATIENT
Start: 2024-10-19 | End: 2024-10-19 | Stop reason: HOSPADM

## 2024-10-19 RX ORDER — OXYCODONE AND ACETAMINOPHEN 5; 325 MG/1; MG/1
1 TABLET ORAL EVERY 4 HOURS PRN
Status: DISCONTINUED | OUTPATIENT
Start: 2024-10-19 | End: 2024-10-20 | Stop reason: HOSPADM

## 2024-10-19 RX ORDER — OXYCODONE HCL 5 MG/5 ML
5 SOLUTION, ORAL ORAL
Status: COMPLETED | OUTPATIENT
Start: 2024-10-19 | End: 2024-10-19

## 2024-10-19 RX ORDER — METFORMIN HYDROCHLORIDE 500 MG/1
500 TABLET, EXTENDED RELEASE ORAL 2 TIMES DAILY
Status: DISCONTINUED | OUTPATIENT
Start: 2024-10-19 | End: 2024-10-19

## 2024-10-19 RX ORDER — ONDANSETRON 2 MG/ML
INJECTION INTRAMUSCULAR; INTRAVENOUS PRN
Status: DISCONTINUED | OUTPATIENT
Start: 2024-10-19 | End: 2024-10-19 | Stop reason: SURG

## 2024-10-19 RX ORDER — KETOROLAC TROMETHAMINE 15 MG/ML
15 INJECTION, SOLUTION INTRAMUSCULAR; INTRAVENOUS EVERY 6 HOURS PRN
Status: DISCONTINUED | OUTPATIENT
Start: 2024-10-19 | End: 2024-10-20 | Stop reason: HOSPADM

## 2024-10-19 RX ORDER — MORPHINE SULFATE 4 MG/ML
4 INJECTION INTRAVENOUS
Status: COMPLETED | OUTPATIENT
Start: 2024-10-19 | End: 2024-10-19

## 2024-10-19 RX ORDER — LIDOCAINE HYDROCHLORIDE 20 MG/ML
INJECTION, SOLUTION EPIDURAL; INFILTRATION; INTRACAUDAL; PERINEURAL PRN
Status: DISCONTINUED | OUTPATIENT
Start: 2024-10-19 | End: 2024-10-19 | Stop reason: SURG

## 2024-10-19 RX ORDER — INSULIN LISPRO 100 [IU]/ML
0.2 INJECTION, SOLUTION INTRAVENOUS; SUBCUTANEOUS
Status: DISCONTINUED | OUTPATIENT
Start: 2024-10-19 | End: 2024-10-20 | Stop reason: HOSPADM

## 2024-10-19 RX ORDER — DIPHENHYDRAMINE HYDROCHLORIDE 50 MG/ML
12.5 INJECTION INTRAMUSCULAR; INTRAVENOUS
Status: DISCONTINUED | OUTPATIENT
Start: 2024-10-19 | End: 2024-10-19 | Stop reason: HOSPADM

## 2024-10-19 RX ORDER — HALOPERIDOL 5 MG/ML
1 INJECTION INTRAMUSCULAR
Status: DISCONTINUED | OUTPATIENT
Start: 2024-10-19 | End: 2024-10-19 | Stop reason: HOSPADM

## 2024-10-19 RX ORDER — AMOXICILLIN 250 MG
2 CAPSULE ORAL EVERY EVENING
Status: DISCONTINUED | OUTPATIENT
Start: 2024-10-19 | End: 2024-10-20 | Stop reason: HOSPADM

## 2024-10-19 RX ORDER — BUPIVACAINE HYDROCHLORIDE AND EPINEPHRINE 5; 5 MG/ML; UG/ML
INJECTION, SOLUTION EPIDURAL; INTRACAUDAL; PERINEURAL
Status: DISCONTINUED | OUTPATIENT
Start: 2024-10-19 | End: 2024-10-19 | Stop reason: HOSPADM

## 2024-10-19 RX ORDER — CALCIUM CARBONATE 500 MG/1
1000 TABLET, CHEWABLE ORAL EVERY 4 HOURS PRN
Status: DISCONTINUED | OUTPATIENT
Start: 2024-10-19 | End: 2024-10-20 | Stop reason: HOSPADM

## 2024-10-19 RX ORDER — ONDANSETRON 4 MG/1
4 TABLET, ORALLY DISINTEGRATING ORAL EVERY 4 HOURS PRN
Status: DISCONTINUED | OUTPATIENT
Start: 2024-10-19 | End: 2024-10-20 | Stop reason: HOSPADM

## 2024-10-19 RX ORDER — OXYCODONE HCL 5 MG/5 ML
10 SOLUTION, ORAL ORAL
Status: COMPLETED | OUTPATIENT
Start: 2024-10-19 | End: 2024-10-19

## 2024-10-19 RX ORDER — SODIUM CHLORIDE, SODIUM LACTATE, POTASSIUM CHLORIDE, CALCIUM CHLORIDE 600; 310; 30; 20 MG/100ML; MG/100ML; MG/100ML; MG/100ML
INJECTION, SOLUTION INTRAVENOUS CONTINUOUS
Status: ACTIVE | OUTPATIENT
Start: 2024-10-19 | End: 2024-10-19

## 2024-10-19 RX ORDER — MORPHINE SULFATE 4 MG/ML
2 INJECTION INTRAVENOUS
Status: CANCELLED | OUTPATIENT
Start: 2024-10-19

## 2024-10-19 RX ORDER — CEFAZOLIN SODIUM 1 G/3ML
INJECTION, POWDER, FOR SOLUTION INTRAMUSCULAR; INTRAVENOUS PRN
Status: DISCONTINUED | OUTPATIENT
Start: 2024-10-19 | End: 2024-10-19 | Stop reason: SURG

## 2024-10-19 RX ORDER — MEPERIDINE HYDROCHLORIDE 25 MG/ML
12.5 INJECTION INTRAMUSCULAR; INTRAVENOUS; SUBCUTANEOUS
Status: DISCONTINUED | OUTPATIENT
Start: 2024-10-19 | End: 2024-10-19 | Stop reason: HOSPADM

## 2024-10-19 RX ORDER — HYDROMORPHONE HYDROCHLORIDE 1 MG/ML
0.4 INJECTION, SOLUTION INTRAMUSCULAR; INTRAVENOUS; SUBCUTANEOUS
Status: DISCONTINUED | OUTPATIENT
Start: 2024-10-19 | End: 2024-10-19 | Stop reason: HOSPADM

## 2024-10-19 RX ORDER — ROCURONIUM BROMIDE 10 MG/ML
INJECTION, SOLUTION INTRAVENOUS PRN
Status: DISCONTINUED | OUTPATIENT
Start: 2024-10-19 | End: 2024-10-19 | Stop reason: SURG

## 2024-10-19 RX ORDER — LEVOTHYROXINE SODIUM 50 UG/1
50 TABLET ORAL
Status: DISCONTINUED | OUTPATIENT
Start: 2024-10-20 | End: 2024-10-20 | Stop reason: HOSPADM

## 2024-10-19 RX ORDER — ONDANSETRON 2 MG/ML
4 INJECTION INTRAMUSCULAR; INTRAVENOUS EVERY 6 HOURS PRN
Status: DISCONTINUED | OUTPATIENT
Start: 2024-10-19 | End: 2024-10-19

## 2024-10-19 RX ORDER — ONDANSETRON 2 MG/ML
4 INJECTION INTRAMUSCULAR; INTRAVENOUS
Status: DISCONTINUED | OUTPATIENT
Start: 2024-10-19 | End: 2024-10-19 | Stop reason: HOSPADM

## 2024-10-19 RX ORDER — MORPHINE SULFATE 4 MG/ML
2 INJECTION INTRAVENOUS
Status: DISCONTINUED | OUTPATIENT
Start: 2024-10-19 | End: 2024-10-20 | Stop reason: HOSPADM

## 2024-10-19 RX ORDER — ONDANSETRON 2 MG/ML
4 INJECTION INTRAMUSCULAR; INTRAVENOUS EVERY 4 HOURS PRN
Status: DISCONTINUED | OUTPATIENT
Start: 2024-10-19 | End: 2024-10-20 | Stop reason: HOSPADM

## 2024-10-19 RX ORDER — ATORVASTATIN CALCIUM 40 MG/1
80 TABLET, FILM COATED ORAL NIGHTLY
Status: DISCONTINUED | OUTPATIENT
Start: 2024-10-19 | End: 2024-10-20 | Stop reason: HOSPADM

## 2024-10-19 RX ORDER — SODIUM CHLORIDE, SODIUM LACTATE, POTASSIUM CHLORIDE, CALCIUM CHLORIDE 600; 310; 30; 20 MG/100ML; MG/100ML; MG/100ML; MG/100ML
INJECTION, SOLUTION INTRAVENOUS
Status: DISCONTINUED | OUTPATIENT
Start: 2024-10-19 | End: 2024-10-19 | Stop reason: SURG

## 2024-10-19 RX ORDER — HYDRALAZINE HYDROCHLORIDE 20 MG/ML
10 INJECTION INTRAMUSCULAR; INTRAVENOUS EVERY 4 HOURS PRN
Status: DISCONTINUED | OUTPATIENT
Start: 2024-10-19 | End: 2024-10-20 | Stop reason: HOSPADM

## 2024-10-19 RX ORDER — DEXTROSE MONOHYDRATE 25 G/50ML
25 INJECTION, SOLUTION INTRAVENOUS
Status: DISCONTINUED | OUTPATIENT
Start: 2024-10-19 | End: 2024-10-20 | Stop reason: HOSPADM

## 2024-10-19 RX ORDER — INSULIN LISPRO 100 [IU]/ML
1-6 INJECTION, SOLUTION INTRAVENOUS; SUBCUTANEOUS
Status: DISCONTINUED | OUTPATIENT
Start: 2024-10-19 | End: 2024-10-20 | Stop reason: HOSPADM

## 2024-10-19 RX ORDER — ONDANSETRON 2 MG/ML
4 INJECTION INTRAMUSCULAR; INTRAVENOUS EVERY 6 HOURS PRN
Status: COMPLETED | OUTPATIENT
Start: 2024-10-19 | End: 2024-10-19

## 2024-10-19 RX ORDER — HYDROMORPHONE HYDROCHLORIDE 1 MG/ML
0.2 INJECTION, SOLUTION INTRAMUSCULAR; INTRAVENOUS; SUBCUTANEOUS
Status: DISCONTINUED | OUTPATIENT
Start: 2024-10-19 | End: 2024-10-19 | Stop reason: HOSPADM

## 2024-10-19 RX ORDER — POLYETHYLENE GLYCOL 3350 17 G/17G
1 POWDER, FOR SOLUTION ORAL
Status: DISCONTINUED | OUTPATIENT
Start: 2024-10-19 | End: 2024-10-20 | Stop reason: HOSPADM

## 2024-10-19 RX ORDER — ENOXAPARIN SODIUM 100 MG/ML
40 INJECTION SUBCUTANEOUS DAILY
Status: DISCONTINUED | OUTPATIENT
Start: 2024-10-19 | End: 2024-10-20 | Stop reason: HOSPADM

## 2024-10-19 RX ORDER — DEXAMETHASONE SODIUM PHOSPHATE 4 MG/ML
INJECTION, SOLUTION INTRA-ARTICULAR; INTRALESIONAL; INTRAMUSCULAR; INTRAVENOUS; SOFT TISSUE PRN
Status: DISCONTINUED | OUTPATIENT
Start: 2024-10-19 | End: 2024-10-19 | Stop reason: SURG

## 2024-10-19 RX ORDER — ONDANSETRON 2 MG/ML
4 INJECTION INTRAMUSCULAR; INTRAVENOUS ONCE
Status: COMPLETED | OUTPATIENT
Start: 2024-10-19 | End: 2024-10-19

## 2024-10-19 RX ORDER — ACETAMINOPHEN 325 MG/1
650 TABLET ORAL EVERY 6 HOURS PRN
Status: DISCONTINUED | OUTPATIENT
Start: 2024-10-19 | End: 2024-10-20 | Stop reason: HOSPADM

## 2024-10-19 RX ADMIN — PIPERACILLIN AND TAZOBACTAM 3.38 G: 3; .375 INJECTION, POWDER, FOR SOLUTION INTRAVENOUS at 22:01

## 2024-10-19 RX ADMIN — ONDANSETRON 4 MG: 2 INJECTION INTRAMUSCULAR; INTRAVENOUS at 17:51

## 2024-10-19 RX ADMIN — FENTANYL CITRATE 50 MCG: 50 INJECTION, SOLUTION INTRAMUSCULAR; INTRAVENOUS at 17:21

## 2024-10-19 RX ADMIN — FENTANYL CITRATE 50 MCG: 50 INJECTION, SOLUTION INTRAMUSCULAR; INTRAVENOUS at 17:41

## 2024-10-19 RX ADMIN — OXYCODONE AND ACETAMINOPHEN 1 TABLET: 325; 5 TABLET ORAL at 23:33

## 2024-10-19 RX ADMIN — LIDOCAINE HYDROCHLORIDE 100 MG: 20 INJECTION, SOLUTION EPIDURAL; INFILTRATION; INTRACAUDAL at 16:53

## 2024-10-19 RX ADMIN — ONDANSETRON 4 MG: 2 INJECTION INTRAMUSCULAR; INTRAVENOUS at 11:00

## 2024-10-19 RX ADMIN — PROPOFOL 200 MG: 10 INJECTION, EMULSION INTRAVENOUS at 16:53

## 2024-10-19 RX ADMIN — PIPERACILLIN AND TAZOBACTAM 4.5 G: 4; .5 INJECTION, POWDER, FOR SOLUTION INTRAVENOUS at 11:01

## 2024-10-19 RX ADMIN — SODIUM CHLORIDE, POTASSIUM CHLORIDE, SODIUM LACTATE AND CALCIUM CHLORIDE: 600; 310; 30; 20 INJECTION, SOLUTION INTRAVENOUS at 16:47

## 2024-10-19 RX ADMIN — FENTANYL CITRATE 50 MCG: 50 INJECTION, SOLUTION INTRAMUSCULAR; INTRAVENOUS at 18:50

## 2024-10-19 RX ADMIN — INSULIN GLARGINE-YFGN 15 UNITS: 100 INJECTION, SOLUTION SUBCUTANEOUS at 20:10

## 2024-10-19 RX ADMIN — ONDANSETRON 4 MG: 2 INJECTION INTRAMUSCULAR; INTRAVENOUS at 20:20

## 2024-10-19 RX ADMIN — OXYCODONE HYDROCHLORIDE 10 MG: 5 SOLUTION ORAL at 18:42

## 2024-10-19 RX ADMIN — SUGAMMADEX 200 MG: 100 INJECTION, SOLUTION INTRAVENOUS at 17:48

## 2024-10-19 RX ADMIN — MORPHINE SULFATE 2 MG: 4 INJECTION, SOLUTION INTRAMUSCULAR; INTRAVENOUS at 13:58

## 2024-10-19 RX ADMIN — IOHEXOL 100 ML: 350 INJECTION, SOLUTION INTRAVENOUS at 09:39

## 2024-10-19 RX ADMIN — CALCIUM CARBONATE (ANTACID) CHEW TAB 500 MG 1000 MG: 500 CHEW TAB at 20:07

## 2024-10-19 RX ADMIN — FENTANYL CITRATE 50 MCG: 50 INJECTION, SOLUTION INTRAMUSCULAR; INTRAVENOUS at 18:42

## 2024-10-19 RX ADMIN — CEFAZOLIN 2 G: 1 INJECTION, POWDER, FOR SOLUTION INTRAMUSCULAR; INTRAVENOUS at 16:51

## 2024-10-19 RX ADMIN — ROCURONIUM BROMIDE 20 MG: 50 INJECTION, SOLUTION INTRAVENOUS at 17:31

## 2024-10-19 RX ADMIN — ONDANSETRON 4 MG: 2 INJECTION INTRAMUSCULAR; INTRAVENOUS at 13:57

## 2024-10-19 RX ADMIN — ROCURONIUM BROMIDE 50 MG: 50 INJECTION, SOLUTION INTRAVENOUS at 16:53

## 2024-10-19 RX ADMIN — PIPERACILLIN AND TAZOBACTAM 3.38 G: 3; .375 INJECTION, POWDER, FOR SOLUTION INTRAVENOUS at 20:01

## 2024-10-19 RX ADMIN — INSULIN LISPRO 1 UNITS: 100 INJECTION, SOLUTION INTRAVENOUS; SUBCUTANEOUS at 20:12

## 2024-10-19 RX ADMIN — MORPHINE SULFATE 4 MG: 4 INJECTION, SOLUTION INTRAMUSCULAR; INTRAVENOUS at 11:39

## 2024-10-19 RX ADMIN — DEXAMETHASONE SODIUM PHOSPHATE 4 MG: 4 INJECTION INTRA-ARTICULAR; INTRALESIONAL; INTRAMUSCULAR; INTRAVENOUS; SOFT TISSUE at 16:53

## 2024-10-19 RX ADMIN — ATORVASTATIN CALCIUM 80 MG: 40 TABLET, FILM COATED ORAL at 20:07

## 2024-10-19 ASSESSMENT — LIFESTYLE VARIABLES
HOW MANY TIMES IN THE PAST YEAR HAVE YOU HAD 5 OR MORE DRINKS IN A DAY: 0
TOTAL SCORE: 0
ON A TYPICAL DAY WHEN YOU DRINK ALCOHOL HOW MANY DRINKS DO YOU HAVE: 3
DOES PATIENT WANT TO STOP DRINKING: NO
EVER FELT BAD OR GUILTY ABOUT YOUR DRINKING: NO
ALCOHOL_USE: YES
EVER HAD A DRINK FIRST THING IN THE MORNING TO STEADY YOUR NERVES TO GET RID OF A HANGOVER: NO
HAVE YOU EVER FELT YOU SHOULD CUT DOWN ON YOUR DRINKING: NO
HAVE PEOPLE ANNOYED YOU BY CRITICIZING YOUR DRINKING: NO
AVERAGE NUMBER OF DAYS PER WEEK YOU HAVE A DRINK CONTAINING ALCOHOL: 0
TOTAL SCORE: 0
CONSUMPTION TOTAL: NEGATIVE
TOTAL SCORE: 0

## 2024-10-19 ASSESSMENT — PATIENT HEALTH QUESTIONNAIRE - PHQ9
SUM OF ALL RESPONSES TO PHQ9 QUESTIONS 1 AND 2: 0
2. FEELING DOWN, DEPRESSED, IRRITABLE, OR HOPELESS: NOT AT ALL
1. LITTLE INTEREST OR PLEASURE IN DOING THINGS: NOT AT ALL

## 2024-10-19 ASSESSMENT — PAIN DESCRIPTION - PAIN TYPE
TYPE: SURGICAL PAIN
TYPE: ACUTE PAIN
TYPE: SURGICAL PAIN
TYPE: SURGICAL PAIN

## 2024-10-19 ASSESSMENT — COGNITIVE AND FUNCTIONAL STATUS - GENERAL
SUGGESTED CMS G CODE MODIFIER DAILY ACTIVITY: CH
MOBILITY SCORE: 24
DAILY ACTIVITIY SCORE: 24
SUGGESTED CMS G CODE MODIFIER MOBILITY: CH

## 2024-10-19 ASSESSMENT — FIBROSIS 4 INDEX
FIB4 SCORE: 0.69
FIB4 SCORE: 1.1

## 2024-10-19 ASSESSMENT — SOCIAL DETERMINANTS OF HEALTH (SDOH)
WITHIN THE PAST 12 MONTHS, YOU WORRIED THAT YOUR FOOD WOULD RUN OUT BEFORE YOU GOT THE MONEY TO BUY MORE: NEVER TRUE
IN THE PAST 12 MONTHS, HAS THE ELECTRIC, GAS, OIL, OR WATER COMPANY THREATENED TO SHUT OFF SERVICE IN YOUR HOME?: NO
WITHIN THE LAST YEAR, HAVE YOU BEEN AFRAID OF YOUR PARTNER OR EX-PARTNER?: NO
WITHIN THE LAST YEAR, HAVE YOU BEEN HUMILIATED OR EMOTIONALLY ABUSED IN OTHER WAYS BY YOUR PARTNER OR EX-PARTNER?: NO
WITHIN THE LAST YEAR, HAVE YOU BEEN KICKED, HIT, SLAPPED, OR OTHERWISE PHYSICALLY HURT BY YOUR PARTNER OR EX-PARTNER?: NO
WITHIN THE LAST YEAR, HAVE TO BEEN RAPED OR FORCED TO HAVE ANY KIND OF SEXUAL ACTIVITY BY YOUR PARTNER OR EX-PARTNER?: NO
WITHIN THE PAST 12 MONTHS, THE FOOD YOU BOUGHT JUST DIDN'T LAST AND YOU DIDN'T HAVE MONEY TO GET MORE: NEVER TRUE

## 2024-10-19 ASSESSMENT — PAIN DESCRIPTION - DESCRIPTORS: DESCRIPTORS: TENDER

## 2024-10-20 VITALS
SYSTOLIC BLOOD PRESSURE: 129 MMHG | HEIGHT: 69 IN | OXYGEN SATURATION: 92 % | HEART RATE: 90 BPM | DIASTOLIC BLOOD PRESSURE: 79 MMHG | RESPIRATION RATE: 17 BRPM | TEMPERATURE: 98.6 F | BODY MASS INDEX: 24.1 KG/M2 | WEIGHT: 162.7 LBS

## 2024-10-20 LAB
EKG IMPRESSION: NORMAL
GLUCOSE BLD STRIP.AUTO-MCNC: 146 MG/DL (ref 65–99)
GLUCOSE BLD STRIP.AUTO-MCNC: 154 MG/DL (ref 65–99)

## 2024-10-20 PROCEDURE — A9270 NON-COVERED ITEM OR SERVICE: HCPCS | Performed by: SURGERY

## 2024-10-20 PROCEDURE — 700102 HCHG RX REV CODE 250 W/ 637 OVERRIDE(OP): Performed by: SURGERY

## 2024-10-20 PROCEDURE — 82962 GLUCOSE BLOOD TEST: CPT

## 2024-10-20 PROCEDURE — 93010 ELECTROCARDIOGRAM REPORT: CPT | Performed by: INTERNAL MEDICINE

## 2024-10-20 PROCEDURE — 700111 HCHG RX REV CODE 636 W/ 250 OVERRIDE (IP): Mod: JZ | Performed by: SURGERY

## 2024-10-20 PROCEDURE — 700105 HCHG RX REV CODE 258: Performed by: SURGERY

## 2024-10-20 RX ORDER — AMOXICILLIN AND CLAVULANATE POTASSIUM 500; 125 MG/1; MG/1
1 TABLET, FILM COATED ORAL 2 TIMES DAILY
Qty: 10 TABLET | Refills: 0 | Status: SHIPPED | OUTPATIENT
Start: 2024-10-20 | End: 2024-10-20

## 2024-10-20 RX ORDER — OXYCODONE HYDROCHLORIDE 5 MG/1
5 TABLET ORAL EVERY 6 HOURS PRN
Qty: 20 TABLET | Refills: 0 | Status: SHIPPED | OUTPATIENT
Start: 2024-10-20 | End: 2024-10-25

## 2024-10-20 RX ADMIN — OXYCODONE AND ACETAMINOPHEN 1 TABLET: 325; 5 TABLET ORAL at 08:35

## 2024-10-20 RX ADMIN — KETOROLAC TROMETHAMINE 15 MG: 15 INJECTION, SOLUTION INTRAMUSCULAR; INTRAVENOUS at 11:17

## 2024-10-20 RX ADMIN — LEVOTHYROXINE SODIUM 50 MCG: 0.05 TABLET ORAL at 06:00

## 2024-10-20 RX ADMIN — INSULIN LISPRO 5 UNITS: 100 INJECTION, SOLUTION INTRAVENOUS; SUBCUTANEOUS at 06:04

## 2024-10-20 RX ADMIN — INSULIN LISPRO 5 UNITS: 100 INJECTION, SOLUTION INTRAVENOUS; SUBCUTANEOUS at 12:30

## 2024-10-20 RX ADMIN — CALCIUM CARBONATE (ANTACID) CHEW TAB 500 MG 1000 MG: 500 CHEW TAB at 00:19

## 2024-10-20 RX ADMIN — CALCIUM CARBONATE (ANTACID) CHEW TAB 500 MG 1000 MG: 500 CHEW TAB at 04:35

## 2024-10-20 RX ADMIN — CALCIUM CARBONATE (ANTACID) CHEW TAB 500 MG 1000 MG: 500 CHEW TAB at 13:28

## 2024-10-20 RX ADMIN — INSULIN LISPRO 1 UNITS: 100 INJECTION, SOLUTION INTRAVENOUS; SUBCUTANEOUS at 06:04

## 2024-10-20 RX ADMIN — PIPERACILLIN AND TAZOBACTAM 3.38 G: 3; .375 INJECTION, POWDER, FOR SOLUTION INTRAVENOUS at 04:36

## 2024-10-20 RX ADMIN — OXYCODONE AND ACETAMINOPHEN 1 TABLET: 325; 5 TABLET ORAL at 03:50

## 2024-10-20 RX ADMIN — CALCIUM CARBONATE (ANTACID) CHEW TAB 500 MG 1000 MG: 500 CHEW TAB at 08:36

## 2024-10-20 ASSESSMENT — PAIN DESCRIPTION - PAIN TYPE
TYPE: SURGICAL PAIN

## 2024-10-21 ENCOUNTER — PATIENT OUTREACH (OUTPATIENT)
Dept: MEDICAL GROUP | Facility: LAB | Age: 64
End: 2024-10-21
Payer: MEDICARE

## 2024-10-21 LAB
BACTERIA UR CULT: NORMAL
SIGNIFICANT IND 70042: NORMAL
SITE SITE: NORMAL
SOURCE SOURCE: NORMAL

## 2024-10-24 LAB
BACTERIA BLD CULT: NORMAL
BACTERIA BLD CULT: NORMAL
SIGNIFICANT IND 70042: NORMAL
SIGNIFICANT IND 70042: NORMAL
SITE SITE: NORMAL
SITE SITE: NORMAL
SOURCE SOURCE: NORMAL
SOURCE SOURCE: NORMAL

## 2024-10-31 ENCOUNTER — OFFICE VISIT (OUTPATIENT)
Dept: MEDICAL GROUP | Facility: LAB | Age: 64
End: 2024-10-31
Payer: MEDICARE

## 2024-10-31 VITALS
OXYGEN SATURATION: 98 % | HEIGHT: 69 IN | DIASTOLIC BLOOD PRESSURE: 50 MMHG | WEIGHT: 160 LBS | BODY MASS INDEX: 23.7 KG/M2 | TEMPERATURE: 96 F | SYSTOLIC BLOOD PRESSURE: 100 MMHG | RESPIRATION RATE: 16 BRPM | HEART RATE: 80 BPM

## 2024-10-31 DIAGNOSIS — Z09 HOSPITAL DISCHARGE FOLLOW-UP: ICD-10-CM

## 2024-10-31 DIAGNOSIS — Z90.49 S/P CHOLECYSTECTOMY: ICD-10-CM

## 2024-10-31 DIAGNOSIS — I10 PRIMARY HYPERTENSION: ICD-10-CM

## 2024-10-31 DIAGNOSIS — E11.42 CONTROLLED TYPE 2 DIABETES MELLITUS WITH DIABETIC POLYNEUROPATHY, WITHOUT LONG-TERM CURRENT USE OF INSULIN (HCC): ICD-10-CM

## 2024-10-31 DIAGNOSIS — A02.0 SALMONELLA GASTROENTERITIS: ICD-10-CM

## 2024-10-31 PROBLEM — K81.9 CHOLECYSTITIS: Status: RESOLVED | Noted: 2024-10-19 | Resolved: 2024-10-31

## 2024-10-31 ASSESSMENT — FIBROSIS 4 INDEX: FIB4 SCORE: 0.69

## 2024-11-07 ENCOUNTER — OFFICE VISIT (OUTPATIENT)
Dept: CARDIOLOGY | Facility: MEDICAL CENTER | Age: 64
End: 2024-11-07
Attending: INTERNAL MEDICINE
Payer: MEDICARE

## 2024-11-07 VITALS
OXYGEN SATURATION: 98 % | WEIGHT: 163 LBS | DIASTOLIC BLOOD PRESSURE: 72 MMHG | BODY MASS INDEX: 24.14 KG/M2 | SYSTOLIC BLOOD PRESSURE: 122 MMHG | HEART RATE: 81 BPM | HEIGHT: 69 IN | RESPIRATION RATE: 16 BRPM

## 2024-11-07 DIAGNOSIS — E78.5 DYSLIPIDEMIA: ICD-10-CM

## 2024-11-07 DIAGNOSIS — Q24.5 CORONARY-MYOCARDIAL BRIDGE: ICD-10-CM

## 2024-11-07 DIAGNOSIS — I25.10 CORONARY ARTERY DISEASE, NON-OCCLUSIVE: ICD-10-CM

## 2024-11-07 DIAGNOSIS — I20.9 ANGINA PECTORIS (HCC): ICD-10-CM

## 2024-11-07 PROCEDURE — 3074F SYST BP LT 130 MM HG: CPT | Performed by: INTERNAL MEDICINE

## 2024-11-07 PROCEDURE — 99213 OFFICE O/P EST LOW 20 MIN: CPT | Performed by: INTERNAL MEDICINE

## 2024-11-07 PROCEDURE — 3078F DIAST BP <80 MM HG: CPT | Performed by: INTERNAL MEDICINE

## 2024-11-07 PROCEDURE — 99214 OFFICE O/P EST MOD 30 MIN: CPT | Performed by: INTERNAL MEDICINE

## 2024-11-07 ASSESSMENT — FIBROSIS 4 INDEX: FIB4 SCORE: 0.7

## 2024-11-07 ASSESSMENT — ENCOUNTER SYMPTOMS
MYALGIAS: 0
PALPITATIONS: 0
LOSS OF CONSCIOUSNESS: 0
DIZZINESS: 0
SHORTNESS OF BREATH: 0
COUGH: 0

## 2024-11-07 NOTE — PROGRESS NOTES
"Chief Complaint   Patient presents with    Hypertension     F/V Dx: Benign essential hypertension       Other     F/V Dx:Angina pectoris (HCC)       Subjective     Aly Hewitt is a 64 y.o. male who presents today for follow-up cardiac care.    The patient has CAD, myocardial bridging (LAD), hypertension, dyslipidemia.    Last seen 7/8/2022.  Since last appointment the patient has had several health problems.  He underwent C-spine and lumbar spine surgery.  Hospitalized 7 days in 9/2024 for \"Salmonella food poisoning\".  Hospitalized 10/2024 for acute cholecystitis undergoing successful laparoscopic cholecystectomy.  Throughout these events he has had no cardiac problems or symptoms.         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 Nicolle TEMPLE (postoperative nausea and vomiting)     Sleep apnea     has CPAP    Snoring      Past Surgical History:   Procedure Laterality Date    ROBOTIC ASSISTED TIARA W/INTRAOP CHOLANGIOGRAMS N/A 10/19/2024    Procedure: CHOLECYSTECTOMY, ROBOT-ASSISTED, LAPAROSCOPIC;  Surgeon: Patricia Acuña M.D.;  Location: SURGERY [facility];  Service: General    CERVICAL DISK AND FUSION ANTERIOR  5/26/2023    Procedure: ANTERIOR C5-7 DISCECTOMY AND FUSION;  Surgeon: Segun Cochran M.D.;  Location: SURGERY [facility];  Service: Neurosurgery    LUMBAR LAMINECTOMY DISKECTOMY  5/26/2023    Procedure: LAMINECTOMY, SPINE, LUMBAR, WITH DISCECTOMY POSTERIOR L4-S1 LAMINECTOMIES;  Surgeon: Segun Cochran M.D.;  Location: SURGERY [facility];  Service: " Neurosurgery    RADIO FREQUENCY ABLATION ADDITIONAL LEVEL Bilateral 07/06/2022    Procedure: BILATERAL radiofrequency neurotomies medial branch targeting the L3-4, L4-5 and L5-S1 facet joints with fluoroscopic guidance and sedation. Plan for 80 degree C for 90 seconds for each neurotomy.;  Surgeon: Dragan Ayala M.D.;  Location: [facility];  Service: Pain Management    CT INJ DX/THER AGNT PARAVERT FACET JOINT, DEVON* Bilateral 06/21/2022    Procedure: Diagnostic medial branch blocks targeting the BILATERAL L3-4, L4-5 and L5-S1 facet joints with fluoroscopic guidance #2;  Surgeon: Dragan Ayala M.D.;  Location: [facility];  Service: Pain Management    LUMBAR MEDIAL BRANCH BLOCKS Bilateral 06/21/2022    Procedure: .;  Surgeon: Dragan Ayala M.D.;  Location: [facility];  Service: Pain Management    CONSCIOUS SEDATION Bilateral 06/21/2022    Procedure: .;  Surgeon: Dragan Ayala M.D.;  Location: [facility];  Service: Pain Management    LUMBAR MEDIAL BRANCH BLOCKS Bilateral 06/14/2022    Procedure: Diagnostic medial branch blocks targeting the BILATERAL L3-4, L4-5 and L5-S1 facet joints with fluoroscopic guidance #1;  Surgeon: Dragan Ayala M.D.;  Location: [facility];  Service: Pain Management    CT TRANSURETHRAL ELEC-SURG PROSTATECTOM N/A 05/27/2022    Procedure: TURP, USING BUTTON ELECTRODE;  Surgeon: Lee Mckee M.D.;  Location: SURGERY [facility];  Service: Urology    BLOCK EPIDURAL STEROID INJECTION Left 03/22/2022    Procedure: LEFT L3-4 and L4-5 transforaminal epidural steroid injection with fluoroscopic guidance;  Surgeon: Dragan Ayala M.D.;  Location: [facility];  Service: Pain Management    CT NJX AA&/STRD TFRML EPI LUMBAR/SACRAL 1 LEVEL Left 02/15/2022    Procedure: LEFT L3-4 and L4-5 transforaminal epidural steroid injection with fluoroscopic guidance;  Surgeon: Dragan QUISPE  RENETTA Ayala;  Location: [facility];  Service: Pain Management    SHOULDER DECOMPRESSION ARTHROSCOPIC Left 01/04/2018    Procedure: SHOULDER DECOMPRESSION ARTHROSCOPIC - SUBACROMIAL;  Surgeon: Linwood Grover M.D.;  Location: SURGERY [facility];  Service: Orthopedics    SHOULDER ARTHROSCOPY W/ BICIPITAL TENODESIS REPAIR Left 01/04/2018    Procedure: SHOULDER ARTHROSCOPY W/ BICIPITAL Tenotomy REPAIR;  Surgeon: Linwood Grover M.D.;  Location: SURGERY [facility];  Service: Orthopedics    CLAVICLE DISTAL EXCISION Left 01/04/2018    Procedure: CLAVICLE DISTAL EXCISION - POSSIBLE;  Surgeon: Linwood Grover M.D.;  Location: SURGERY [facility];  Service: Orthopedics    RECOVERY  04/08/2016    Procedure: CATH LAB [facility] WITH POSSIBLE NAVIN;  Surgeon: Recovery[facility] Surgery;  [place] [facility]  Service:     VENTRAL HERNIA REPAIR LAPAROSCOPIC  11/01/2012    Performed by Marcos Ramírez M.D. at [facility]    KNEE ARTHROSCOPY  1990's    right    OTHER ABDOMINAL SURGERY  10-    About 8 years ago    SHOULDER ARTHROSCOPY  1990's    right    SINUSOTOMIES  1990's    times two surgeries    TUMOR EXCISION WITH BIOPSY  1990's    right hand - thumb     Family History   Problem Relation Age of Onset    Breast Cancer Mother     Hypertension Mother     Cancer Mother         breast    Heart Disease Mother         hx of bypass    Hypertension Father     Arthritis Father     Diabetes Father         prediabetes    No Known Problems Sister     Arthritis Brother     Heart Disease Other     Hypertension Other     Cancer Other     No Known Problems Brother      Social History     Socioeconomic History    Marital status:      Spouse name: Not on file    Number of children: Not on file    Years of education: Not on file    Highest education level: Not on file   Occupational History    Not on file   Tobacco Use    Smoking status: Former      Current packs/day: 0.00     Types: Cigarettes     Quit date:      Years since quittin.8    Smokeless tobacco: Never    Tobacco comments:     occasional cigars   Vaping Use    Vaping status: Never Used   Substance and Sexual Activity    Alcohol use: Not Currently     Comment: If and when I drink it's only social    Drug use: Yes     Types: Marijuana, Inhaled, Oral     Comment: THC/ Occ    Sexual activity: Not on file     Comment: ; 2 biological kids (2 of his wife's); 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   Other Topics Concern    Not on file   Social History Narrative    Not on file     Social Drivers of Health     Financial Resource Strain: Not on file   Food Insecurity: No Food Insecurity (10/19/2024)    Hunger Vital Sign     Worried About Running Out of Food in the Last Year: Never true     Ran Out of Food in the Last Year: Never true   Transportation Needs: No Transportation Needs (10/19/2024)    PRAPARE - Transportation     Lack of Transportation (Medical): No     Lack of Transportation (Non-Medical): No   Physical Activity: Not on file   Stress: Not on file   Social Connections: Not on file   Intimate Partner Violence: Not At Risk (10/19/2024)    Humiliation, Afraid, Rape, and Kick questionnaire     Fear of Current or Ex-Partner: No     Emotionally Abused: No     Physically Abused: No     Sexually Abused: No   Housing Stability: Low Risk  (10/19/2024)    Housing Stability Vital Sign     Unable to Pay for Housing in the Last Year: No     Number of Times Moved in the Last Year: 0     Homeless in the Last Year: No     Allergies   Allergen Reactions    Kiwi Extract Anaphylaxis    Shellfish Allergy Anaphylaxis and Unspecified    Strawberry Anaphylaxis and Unspecified    Ozempic (0.25 Or 0.5 Mg-Dose) [Semaglutide] Nausea    Sulfa Drugs Rash and Unspecified     rash    Testosterone Rash     rash     Outpatient Encounter Medications as of 2024   Medication Sig Dispense Refill     levoFLOXacin (LEVAQUIN) 500 MG tablet Take 500 mg by mouth every day. 4 days      atorvastatin (LIPITOR) 80 MG tablet TAKE 1 TABLET EVERY EVENING 90 Tablet 3    Insulin Degludec (TRESIBA FLEXTOUCH) 100 UNIT/ML Solution Pen-injector Inject 30 Units under the skin every day. 30 mL 3    loperamide (IMODIUM) 2 MG Cap Take 1 Capsule by mouth every four hours as needed for Diarrhea.      ondansetron (ZOFRAN ODT) 4 MG TABLET DISPERSIBLE Take 1 Tablet by mouth every 6 hours as needed for Nausea/Vomiting. 10 Tablet 0    fluticasone-salmeterol (ADVAIR DISKUS) 500-50 MCG/ACT AEROSOL POWDER, BREATH ACTIVATED Inhale 1 Puff 1 time a day as needed (shortness of breath).        acetaminophen (TYLENOL) 500 MG Tab Take 1,000 mg by mouth every 6 hours as needed. Indications: Pain      Cholecalciferol (D3 PO) Take 1 Capsule by mouth every day.      Empagliflozin (JARDIANCE) 25 MG Tab Take 1 Tablet by mouth every day. 90 Tablet 3    Continuous Glucose Sensor (FREESTYLE LUNA 14 DAY SENSOR) Misc 1 Application every 14 days. 6 Each 3    montelukast (SINGULAIR) 10 MG Tab Take 1 Tablet by mouth every evening. 90 Tablet 3    VENTOLIN  (90 Base) MCG/ACT Aero Soln inhalation aerosol USE 2 INHALATIONS EVERY 4 HOURS AS NEEDED FOR SHORTNESS OF BREATH (Patient taking differently: Inhale 2 Puffs every four hours as needed for Shortness of Breath.) 54 g 3    Dulaglutide (TRULICITY) 1.5 MG/0.5ML Solution Pen-injector INJECT 0.5 MLS UNDER THE SKIN EVERY 7 DAYS (Patient taking differently: Inject 0.5 mL as directed every 7 days. INJECT 0.5 MLS UNDER THE SKIN EVERY 7 DAYS) 6 mL 3    levothyroxine (SYNTHROID) 50 MCG Tab Take 1 Tablet by mouth every morning on an empty stomach. 90 Tablet 2    metFORMIN ER (GLUCOPHAGE XR) 500 MG TABLET SR 24 HR Take 1 Tablet by mouth 2 times a day. 180 Tablet 3    EPINEPHrine (EPIPEN) 0.3 MG/0.3ML Solution Auto-injector solution for injection Inject 0.3 mL into the thigh one time for 1 dose. 2 Each 2    pantoprazole  "(PROTONIX) 20 MG tablet Take 1 Tablet by mouth every day. 90 Tablet 3    aspirin 81 MG EC tablet Take 81 mg by mouth every day.      Blood Glucose Test Strips Test strips order:  Freestyle Freedom Lite test strips  testing one time per day 100 Strip 2    Omega-3 Fatty Acids (FISH OIL) 1200 MG CAPS Take 1 Capsule by mouth every day.      COENZYME Q10 PO Take 1 Capsule by mouth every evening.        cetirizine (ZYRTEC) 10 MG Tab Take 10 mg by mouth every day.       No facility-administered encounter medications on file as of 11/7/2024.     Review of Systems   Respiratory:  Negative for cough and shortness of breath.    Cardiovascular:  Negative for chest pain and palpitations.   Musculoskeletal:  Negative for myalgias.   Neurological:  Negative for dizziness and loss of consciousness.              Objective     /72 (BP Location: Left arm, Patient Position: Sitting, BP Cuff Size: Adult)   Pulse 81   Resp 16   Ht 1.753 m (5' 9\")   Wt 73.9 kg (163 lb)   SpO2 98%   BMI 24.07 kg/m²     Physical Exam  Constitutional:       Appearance: He is well-developed.   Neck:      Vascular: No JVD.   Cardiovascular:      Rate and Rhythm: Normal rate and regular rhythm.      Pulses:           Radial pulses are 2+ on the right side and 2+ on the left side.        Posterior tibial pulses are 2+ on the right side and 2+ on the left side.      Heart sounds: Normal heart sounds.   Pulmonary:      Effort: Pulmonary effort is normal. No accessory muscle usage or respiratory distress.      Breath sounds: Normal breath sounds. No wheezing or rales.   Musculoskeletal:      Comments: Left distal dorsal foot swelling  Intact motor and sensory function   Skin:     General: Skin is warm and dry.      Findings: No rash.      Nails: There is no clubbing.   Neurological:      Mental Status: He is alert and oriented to person, place, and time.   Psychiatric:         Behavior: Behavior normal.              TREADMILL STRESS TEST " "3/27/2008  Demonstrated  asymptomatic upsloping horizontal 1 mm ST-segment depression in the  inferolateral leads.       MPI 5/22/2008   Demonstrated the 1 to 1-1/2 mm  horizontal/upsloping ST-segment depression in inferolateral leads  after achieving 91% of maximum age predicted heart rate of 155 with  the perfusion scan suggesting \"ischemia in the left anterior  descending distribution\", ejection fraction was 69%.   3/22/2016 TREADMILL STRESS TEST  Stress ECG: Ischemic 2 mm ST depression in the inferior and  lateral leads with exercise.to exercise  Impression: Ischemic ECG changes at a  fair workload    CARDIAC CATHETERIZATION 7/11/2008  EF 73%.  Normal left ventricular wall motion.  Mid LAD mild myocardial bridging.     CARDIAC CATHETERIZATION 4/6/2016  1.  Normal left ventricular end-diastolic pressure.  2.  Normal left ventricular systolic function. EF 75%.  3.  Eccentric 60-70% lesion at the origin of the second LAD diagonal with FFR  across this lesion of 0.89.    EKG 10/19/2024 sinus rhythm, rate 96, personally reviewed    Assessment & Plan     1. Angina pectoris (HCC)        2. Coronary artery disease, non-occlusive        3. Coronary-myocardial bridge        4. Dyslipidemia              Medical Decision Making: Today's Assessment/Status/Plan:   Assessment  1.  Angina pectoris.  2.  CAD, cardiac catheterization 4/6/2016  3.  Myocardial bridging 2008 angiogram  4.  Dyslipidemia.  5.  Hypertension.    6.  Diabetes mellitus.  7.  Peripheral neuropathy with balance problems.  8.  Cervical and lumbar degenerative disc disease.  9.  S/P TURP 6/2022    Recommendation Discussion  1.  The patient is stable from a cardiac standpoint concerning his CAD, hypertension and dyslipidemia.  2.  BP normal, previously on benazepril  3.  Dyslipidemia, LDL 59, at goal, on atorvastatin 80 mg daily  4.  Diabetes mellitus, recent A1c 9% aggravated by recent illnesses, on empagliflozin, per PCP/endocrinology  5.  RTC 9 months     "

## 2024-11-15 ENCOUNTER — OFFICE VISIT (OUTPATIENT)
Dept: MEDICAL GROUP | Facility: LAB | Age: 64
End: 2024-11-15
Payer: COMMERCIAL

## 2024-11-15 VITALS
SYSTOLIC BLOOD PRESSURE: 122 MMHG | HEART RATE: 84 BPM | WEIGHT: 163.14 LBS | TEMPERATURE: 97.2 F | BODY MASS INDEX: 24.16 KG/M2 | OXYGEN SATURATION: 98 % | DIASTOLIC BLOOD PRESSURE: 66 MMHG | HEIGHT: 69 IN

## 2024-11-15 DIAGNOSIS — E11.42 CONTROLLED TYPE 2 DIABETES MELLITUS WITH DIABETIC POLYNEUROPATHY, WITHOUT LONG-TERM CURRENT USE OF INSULIN (HCC): ICD-10-CM

## 2024-11-15 DIAGNOSIS — J45.909 UNCOMPLICATED ASTHMA, UNSPECIFIED ASTHMA SEVERITY, UNSPECIFIED WHETHER PERSISTENT: ICD-10-CM

## 2024-11-15 DIAGNOSIS — J01.20 ACUTE ETHMOIDAL SINUSITIS, RECURRENCE NOT SPECIFIED: ICD-10-CM

## 2024-11-15 PROCEDURE — 99214 OFFICE O/P EST MOD 30 MIN: CPT | Performed by: INTERNAL MEDICINE

## 2024-11-15 PROCEDURE — 3074F SYST BP LT 130 MM HG: CPT | Performed by: INTERNAL MEDICINE

## 2024-11-15 PROCEDURE — 3078F DIAST BP <80 MM HG: CPT | Performed by: INTERNAL MEDICINE

## 2024-11-15 RX ORDER — METHYLPREDNISOLONE 4 MG/1
TABLET ORAL
Qty: 21 TABLET | Refills: 0 | Status: SHIPPED | OUTPATIENT
Start: 2024-11-15

## 2024-11-15 ASSESSMENT — FIBROSIS 4 INDEX: FIB4 SCORE: 0.7

## 2024-11-15 NOTE — PROGRESS NOTES
CC: Barron Hewitt is a 64 y.o. male is suffering from   Chief Complaint   Patient presents with    Sinus Problem     X 3 days     Asthma         SUBJECTIVE:  1. Acute ethmoidal sinusitis, recurrence not specified  Aly is here for follow-up has a history of acute ethmoidal sinusitis which is occurred over the past 3 days    2. Uncomplicated asthma, unspecified asthma severity, unspecified whether persistent  Patient with a history of asthma also recently has undergone a cholecystectomy    3. Controlled type 2 diabetes mellitus with diabetic polyneuropathy, without long-term current use of insulin (HCC)  History of controlled type 2 diabetes        Past social, family, history:  retired [employer]  Social History     Tobacco Use    Smoking status: Former     Current packs/day: 0.00     Types: Cigarettes     Quit date:      Years since quittin.8    Smokeless tobacco: Never    Tobacco comments:     occasional cigars   Vaping Use    Vaping status: Never Used   Substance Use Topics    Alcohol use: Not Currently     Comment: If and when I drink it's only social    Drug use: Yes     Types: Marijuana, Inhaled, Oral     Comment: THC/ Occ         MEDICATIONS:    Current Outpatient Medications:     amoxicillin-clavulanate (AUGMENTIN) 875-125 MG Tab, Take 1 Tablet by mouth 2 times a day., Disp: 14 Tablet, Rfl: 0    methylPREDNISolone (MEDROL DOSEPAK) 4 MG Tablet Therapy Pack, As directed on the packaging label., Disp: 21 Tablet, Rfl: 0    levoFLOXacin (LEVAQUIN) 500 MG tablet, Take 500 mg by mouth every day. 4 days, Disp: , Rfl:     atorvastatin (LIPITOR) 80 MG tablet, TAKE 1 TABLET EVERY EVENING, Disp: 90 Tablet, Rfl: 3    Insulin Degludec (TRESIBA FLEXTOUCH) 100 UNIT/ML Solution Pen-injector, Inject 30 Units under the skin every day., Disp: 30 mL, Rfl: 3    loperamide (IMODIUM) 2 MG Cap, Take 1 Capsule by mouth every four hours as needed for Diarrhea., Disp: , Rfl:     ondansetron (ZOFRAN ODT) 4 MG TABLET  DISPERSIBLE, Take 1 Tablet by mouth every 6 hours as needed for Nausea/Vomiting., Disp: 10 Tablet, Rfl: 0    fluticasone-salmeterol (ADVAIR DISKUS) 500-50 MCG/ACT AEROSOL POWDER, BREATH ACTIVATED, Inhale 1 Puff 1 time a day as needed (shortness of breath).  , Disp: , Rfl:     acetaminophen (TYLENOL) 500 MG Tab, Take 1,000 mg by mouth every 6 hours as needed. Indications: Pain, Disp: , Rfl:     Cholecalciferol (D3 PO), Take 1 Capsule by mouth every day., Disp: , Rfl:     Empagliflozin (JARDIANCE) 25 MG Tab, Take 1 Tablet by mouth every day., Disp: 90 Tablet, Rfl: 3    Continuous Glucose Sensor (FREESTYLE LUNA 14 DAY SENSOR) Misc, 1 Application every 14 days., Disp: 6 Each, Rfl: 3    montelukast (SINGULAIR) 10 MG Tab, Take 1 Tablet by mouth every evening., Disp: 90 Tablet, Rfl: 3    VENTOLIN  (90 Base) MCG/ACT Aero Soln inhalation aerosol, USE 2 INHALATIONS EVERY 4 HOURS AS NEEDED FOR SHORTNESS OF BREATH (Patient taking differently: Inhale 2 Puffs every four hours as needed for Shortness of Breath.), Disp: 54 g, Rfl: 3    Dulaglutide (TRULICITY) 1.5 MG/0.5ML Solution Pen-injector, INJECT 0.5 MLS UNDER THE SKIN EVERY 7 DAYS (Patient taking differently: Inject 0.5 mL as directed every 7 days. INJECT 0.5 MLS UNDER THE SKIN EVERY 7 DAYS), Disp: 6 mL, Rfl: 3    levothyroxine (SYNTHROID) 50 MCG Tab, Take 1 Tablet by mouth every morning on an empty stomach., Disp: 90 Tablet, Rfl: 2    metFORMIN ER (GLUCOPHAGE XR) 500 MG TABLET SR 24 HR, Take 1 Tablet by mouth 2 times a day., Disp: 180 Tablet, Rfl: 3    EPINEPHrine (EPIPEN) 0.3 MG/0.3ML Solution Auto-injector solution for injection, Inject 0.3 mL into the thigh one time for 1 dose., Disp: 2 Each, Rfl: 2    pantoprazole (PROTONIX) 20 MG tablet, Take 1 Tablet by mouth every day., Disp: 90 Tablet, Rfl: 3    aspirin 81 MG EC tablet, Take 81 mg by mouth every day., Disp: , Rfl:     Blood Glucose Test Strips, Test strips order:  Freestyle Freedom Lite test strips  testing  one time per day, Disp: 100 Strip, Rfl: 2    Omega-3 Fatty Acids (FISH OIL) 1200 MG CAPS, Take 1 Capsule by mouth every day., Disp: , Rfl:     COENZYME Q10 PO, Take 1 Capsule by mouth every evening.  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) 06/09/2021    Acquired hypothyroidism 08/25/2020    Controlled type 2 diabetes mellitus with diabetic polyneuropathy, without long-term current use of insulin ([facility]) 11/07/2019    History of small bowel obstruction - 2018 11/07/2019    Seasonal allergies 02/27/2019    Other insomnia 08/17/2018    Dyslipidemia 10/08/2017    Benign essential hypertension 10/08/2017    Coronary artery disease, non-occlusive 04/14/2016    Angina pectoris (HCC) 03/11/2016    Abnormal radionuclide heart study 01/31/2014    Coronary-myocardial bridge 11/27/2012       REVIEW OF SYSTEMS:  Gen.:  No Nausea, Vomiting, fever, Chills.  Heart: No chest pain.  Lungs:  No shortness of Breath.  Psychological: Dawson unusual Anxiety depression     PHYSICAL EXAM      Constitutional: Alert, cooperative, not in acute distress.  Cardiovascular:  Rate Rhythm is regular without murmurs rubs clicks.     Thorax & Lungs: Clear to auscultation, no wheezing, rhonchi, or rales  HENT: Normocephalic, Atraumatic.  Eyes: PERRLA, EOMI, Conjunctiva normal.   Neck: Trachia is midline no swelling of the thyroid.   Lymphatic: No lymphadenopathy noted.  "  Musculoskeletal: Complaints of pain at the ethmoid sinuses  Neurologic: Alert & oriented x 3, cranial nerves II through XII are intact, Normal motor function, Normal sensory function, No focal deficits noted.   Psychiatric: Affect normal, Judgment normal, Mood normal.     VITAL SIGNS:/66 (BP Location: Left arm, Patient Position: Sitting, BP Cuff Size: Adult)   Pulse 84   Temp 36.2 °C (97.2 °F) (Temporal)   Ht 1.753 m (5' 9\")   Wt 74 kg (163 lb 2.3 oz)   SpO2 98%   BMI 24.09 kg/m²     Labs: Reviewed    Assessment:                                                     Plan:     1. Acute ethmoidal sinusitis, recurrence not specified  Start Augmentin start prednisone  - amoxicillin-clavulanate (AUGMENTIN) 875-125 MG Tab; Take 1 Tablet by mouth 2 times a day.  Dispense: 14 Tablet; Refill: 0  - methylPREDNISolone (MEDROL DOSEPAK) 4 MG Tablet Therapy Pack; As directed on the packaging label.  Dispense: 21 Tablet; Refill: 0  - CBC WITH DIFFERENTIAL; Future  - Comp Metabolic Panel; Future    2. Uncomplicated asthma, unspecified asthma severity, unspecified whether persistent  No change in medical therapy  - amoxicillin-clavulanate (AUGMENTIN) 875-125 MG Tab; Take 1 Tablet by mouth 2 times a day.  Dispense: 14 Tablet; Refill: 0  - methylPREDNISolone (MEDROL DOSEPAK) 4 MG Tablet Therapy Pack; As directed on the packaging label.  Dispense: 21 Tablet; Refill: 0  - CBC WITH DIFFERENTIAL; Future  - Comp Metabolic Panel; Future    3. Controlled type 2 diabetes mellitus with diabetic polyneuropathy, without long-term current use of insulin ([facility])  Recheck CBC comprehensive metabolic panel after acute cholecystectomy to assess for normalization of labs  - CBC WITH DIFFERENTIAL; Future  - Comp Metabolic Panel; Future        "

## 2024-12-10 DIAGNOSIS — E11.42 CONTROLLED TYPE 2 DIABETES MELLITUS WITH DIABETIC POLYNEUROPATHY, WITHOUT LONG-TERM CURRENT USE OF INSULIN (HCC): ICD-10-CM

## 2024-12-10 RX ORDER — METOPROLOL SUCCINATE 25 MG/1
25 TABLET, EXTENDED RELEASE ORAL DAILY
Qty: 90 TABLET | Refills: 3 | Status: SHIPPED | OUTPATIENT
Start: 2024-12-10

## 2024-12-10 RX ORDER — FLASH GLUCOSE SENSOR
KIT MISCELLANEOUS
Qty: 6 EACH | Refills: 3 | Status: SHIPPED | OUTPATIENT
Start: 2024-12-10 | End: 2024-12-16

## 2024-12-10 NOTE — TELEPHONE ENCOUNTER
Received request via: Pharmacy    Was the patient seen in the last year in this department? Yes    Does the patient have an active prescription (recently filled or refills available) for medication(s) requested? No    Pharmacy Name: [facility]    Does the patient have [facility] and need 100-day supply? (This applies to ALL medications) Patient does not have SCP

## 2024-12-16 DIAGNOSIS — E11.42 CONTROLLED TYPE 2 DIABETES MELLITUS WITH DIABETIC POLYNEUROPATHY, WITHOUT LONG-TERM CURRENT USE OF INSULIN (HCC): ICD-10-CM

## 2024-12-16 RX ORDER — ACYCLOVIR 400 MG/1
1 TABLET ORAL
Qty: 9 EACH | Refills: 3 | Status: SHIPPED | OUTPATIENT
Start: 2024-12-16

## 2024-12-16 RX ORDER — ACYCLOVIR 400 MG/1
1 TABLET ORAL CONTINUOUS
Qty: 1 EACH | Refills: 0 | Status: SHIPPED | OUTPATIENT
Start: 2024-12-16

## 2025-01-02 ENCOUNTER — TELEPHONE (OUTPATIENT)
Dept: ENDOCRINOLOGY | Facility: MEDICAL CENTER | Age: 65
End: 2025-01-02
Payer: COMMERCIAL

## 2025-01-02 NOTE — TELEPHONE ENCOUNTER
Left a detailed message to offer an earlier appt for 1/6/25. Requested pt to call back if interested

## 2025-01-13 DIAGNOSIS — E03.9 ACQUIRED HYPOTHYROIDISM: ICD-10-CM

## 2025-01-14 RX ORDER — LEVOTHYROXINE SODIUM 50 UG/1
50 TABLET ORAL
Qty: 90 TABLET | Refills: 3 | Status: SHIPPED | OUTPATIENT
Start: 2025-01-14

## 2025-01-21 ENCOUNTER — TELEMEDICINE (OUTPATIENT)
Dept: MEDICAL GROUP | Facility: LAB | Age: 65
End: 2025-01-21
Payer: MEDICARE

## 2025-01-21 ENCOUNTER — HOSPITAL ENCOUNTER (OUTPATIENT)
Dept: RADIOLOGY | Facility: MEDICAL CENTER | Age: 65
End: 2025-01-21
Attending: NURSE PRACTITIONER
Payer: COMMERCIAL

## 2025-01-21 VITALS
OXYGEN SATURATION: 93 % | BODY MASS INDEX: 22.66 KG/M2 | HEIGHT: 69 IN | DIASTOLIC BLOOD PRESSURE: 100 MMHG | SYSTOLIC BLOOD PRESSURE: 150 MMHG | WEIGHT: 153 LBS | HEART RATE: 84 BPM

## 2025-01-21 DIAGNOSIS — J20.9 ACUTE BRONCHITIS, UNSPECIFIED ORGANISM: ICD-10-CM

## 2025-01-21 DIAGNOSIS — K59.00 CONSTIPATION, UNSPECIFIED CONSTIPATION TYPE: ICD-10-CM

## 2025-01-21 DIAGNOSIS — E11.42 CONTROLLED TYPE 2 DIABETES MELLITUS WITH DIABETIC POLYNEUROPATHY, WITHOUT LONG-TERM CURRENT USE OF INSULIN (HCC): ICD-10-CM

## 2025-01-21 PROCEDURE — 71046 X-RAY EXAM CHEST 2 VIEWS: CPT

## 2025-01-21 PROCEDURE — 99214 OFFICE O/P EST MOD 30 MIN: CPT | Mod: 95 | Performed by: NURSE PRACTITIONER

## 2025-01-21 RX ORDER — DOXYCYCLINE HYCLATE 100 MG
100 TABLET ORAL 2 TIMES DAILY
Qty: 14 TABLET | Refills: 0 | Status: SHIPPED | OUTPATIENT
Start: 2025-01-21 | End: 2025-01-28

## 2025-01-21 RX ORDER — PREDNISONE 20 MG/1
40 TABLET ORAL DAILY
Qty: 10 TABLET | Refills: 0 | Status: SHIPPED | OUTPATIENT
Start: 2025-01-21

## 2025-01-21 ASSESSMENT — FIBROSIS 4 INDEX: FIB4 SCORE: 0.7

## 2025-01-21 NOTE — PROGRESS NOTES
Virtual Visit: Established Patient   This visit was conducted via Teams using secure and encrypted videoconferencing technology.   The patient was in their home in the [facility].    The patient's identity was confirmed and verbal consent was obtained for this virtual visit.    Subjective:   CC: not well    HPI:  Barron Hewitt is a 64 y.o. male presenting for evaluation and management of:    Verbal consent was acquired by the patient to use "[employer] ambient listening note generation during this visit Yes     History of Present Illness  The patient is a 64-year-old male who presents via virtual visit for evaluation of upper respiratory infection, constipation, back pain, and diabetes.    He has been experiencing symptoms consistent with influenza for over a week, including loss of appetite, chest pain, and dyspnea. He tested negative for COVID-19. He reports that the color of his sputum has lightened from dark green to medium green over the past two days. He describes a sensation of constriction around his larynx, which impedes his ability to take deep breaths. He has been producing brownish-green sputum from his lungs. He has been experiencing sinus pain in his forehead and cheeks, which has been accompanied by significant drainage over the past two days. He reports that his ear pain has improved. He has been experiencing a persistent cough, which has remained unchanged. He reports wheezing but is uncertain if it originates from his lungs. He has been bedridden due to fatigue and back pain. He has been maintaining hydration with cold water. He has been using Mucinex for the past five days. He has been using Ventolin as needed and Advair twice daily.  He has an incentive spirometer from a previous hospital stay. He has a small treadmill for exercise. He has been considering the use of prednisone to manage his laryngeal swelling. He has been experiencing a low-grade fever, which has since  resolved.    He has not had a bowel movement since last Thursday. He has been consuming prune juice, coffee, and apples without any issues. He has been drinking two glasses of milk daily. He has been avoiding the use of stool softeners until after this consultation. He has been experiencing flatulence after consuming coffee.    He has been experiencing lower back pain, which he attributes to his recent influenza infection. He has a history of back surgery. He reports that the pain in the center of his back has resolved, but he continues to experience pain in his lower right back, which is severe enough to cause breathlessness. He has been on aspirin for the past 10 days.    He does have type 2 diabetes: He is also followed by endocrinology.  His average blood glucose level over the past 10 days has been 126 mg/dL, with a reading of 96 mg/dL this morning and a current reading of 101 mg/dL. He has discontinued long-acting insulin due to associated nausea and vomiting. He has an upcoming appointment with Dr. Lyon, but not until April, to discuss the potential impact of his gallbladder condition on his diabetes management. He has been using a G7 monitor to track his blood glucose levels.       Current medicines (including changes today)  Current Outpatient Medications   Medication Sig Dispense Refill    doxycycline (VIBRAMYCIN) 100 MG Tab Take 1 Tablet by mouth 2 times a day for 7 days. 14 Tablet 0    predniSONE (DELTASONE) 20 MG Tab Take 2 Tablets by mouth every day. In the morning with food 10 Tablet 0    levothyroxine (SYNTHROID) 50 MCG Tab TAKE 1 TABLET EVERY MORNING ON AN EMPTY STOMACH 90 Tablet 3    Continuous Glucose  (DEXCOM G7 ) Device 1 Each continuous. 1 Each 0    Continuous Glucose Sensor (DEXCOM G7 SENSOR) Misc 1 Each every 10 days. 9 Each 3    metoprolol SR (TOPROL XL) 25 MG TABLET SR 24 HR TAKE 1 TABLET DAILY 90 Tablet 3    atorvastatin (LIPITOR) 80 MG tablet TAKE 1 TABLET EVERY  EVENING 90 Tablet 3    Insulin Degludec (TRESIBA FLEXTOUCH) 100 UNIT/ML Solution Pen-injector Inject 30 Units under the skin every day. 30 mL 3    loperamide (IMODIUM) 2 MG Cap Take 1 Capsule by mouth every four hours as needed for Diarrhea.      fluticasone-salmeterol (ADVAIR DISKUS) 500-50 MCG/ACT AEROSOL POWDER, BREATH ACTIVATED Inhale 1 Puff 1 time a day as needed (shortness of breath).        acetaminophen (TYLENOL) 500 MG Tab Take 1,000 mg by mouth every 6 hours as needed. Indications: Pain      Cholecalciferol (D3 PO) Take 1 Capsule by mouth every day.      Empagliflozin (JARDIANCE) 25 MG Tab Take 1 Tablet by mouth every day. 90 Tablet 3    montelukast (SINGULAIR) 10 MG Tab Take 1 Tablet by mouth every evening. 90 Tablet 3    VENTOLIN  (90 Base) MCG/ACT Aero Soln inhalation aerosol USE 2 INHALATIONS EVERY 4 HOURS AS NEEDED FOR SHORTNESS OF BREATH (Patient taking differently: Inhale 2 Puffs every four hours as needed for Shortness of Breath.) 54 g 3    Dulaglutide (TRULICITY) 1.5 MG/0.5ML Solution Pen-injector INJECT 0.5 MLS UNDER THE SKIN EVERY 7 DAYS (Patient taking differently: Inject 0.5 mL as directed every 7 days. INJECT 0.5 MLS UNDER THE SKIN EVERY 7 DAYS) 6 mL 3    metFORMIN ER (GLUCOPHAGE XR) 500 MG TABLET SR 24 HR Take 1 Tablet by mouth 2 times a day. 180 Tablet 3    EPINEPHrine (EPIPEN) 0.3 MG/0.3ML Solution Auto-injector solution for injection Inject 0.3 mL into the thigh one time for 1 dose. 2 Each 2    pantoprazole (PROTONIX) 20 MG tablet Take 1 Tablet by mouth every day. 90 Tablet 3    aspirin 81 MG EC tablet Take 81 mg by mouth every day.      Blood Glucose Test Strips Test strips order:  Freestyle Freedom Lite test strips  testing one time per day 100 Strip 2    Omega-3 Fatty Acids (FISH OIL) 1200 MG CAPS Take 1 Capsule by mouth every day.      COENZYME Q10 PO Take 1 Capsule by mouth every evening.        cetirizine (ZYRTEC) 10 MG Tab Take 10 mg by mouth every day.       No current  "facility-administered medications for this visit.       Patient Active Problem List    Diagnosis Date Noted    S/P cholecystectomy 10/31/2024    Hiccups 09/18/2024    Dehydration 09/14/2024    Polycythemia 09/14/2024    Primary hypertension 09/14/2024    History of sleep apnea - improved with 70 lb weight loss 03/14/2024    H/O cervical discectomy 10/09/2023    Degeneration of cervical intervertebral disc 03/30/2023    Benign prostatic hyperplasia with urinary frequency 05/27/2022    Asthma     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) 06/09/2021    Acquired hypothyroidism 08/25/2020    Controlled type 2 diabetes mellitus with diabetic polyneuropathy, without long-term current use of insulin ([facility]) 11/07/2019    History of small bowel obstruction - 2018 11/07/2019    Seasonal allergies 02/27/2019    Other insomnia 08/17/2018    Dyslipidemia 10/08/2017    Benign essential hypertension 10/08/2017    Coronary artery disease, non-occlusive 04/14/2016    Angina pectoris ([facility]) 03/11/2016    Abnormal radionuclide heart study 01/31/2014    Coronary-myocardial bridge 11/27/2012        Objective:   BP (!) 150/100   Pulse 84   Ht 1.753 m (5' 9\")   Wt 69.4 kg (153 lb)   SpO2 93%   BMI 22.59 kg/m²     Physical Exam:  Gen: NAD  Resp: nonlabored.  Able to speak in full sentences  Psy: pleasant / cooperative.   Neuro:  Alert and oriented x 3      Assessment and Plan:   The following treatment plan was discussed:     1. Controlled type 2 diabetes mellitus with diabetic polyneuropathy, without long-term current use of insulin ([facility])  -He keeps a close eye on blood sugars with his continuous glucose monitor.  Currently off long-acting insulin because of nausea/vomiting and will go back on it if his fasting blood sugars in the morning are consistently greater than 140.  He does have a scheduled follow-up with endocrinology.  He will let me know if he has any questions prior to.  2. Constipation, unspecified " constipation type  -Discussed the importance of taking care of his constipation.  We discussed utilizing 100 mg of docusate sodium twice daily and a scoop of MiraLAX over the next 24 hours to induce a large bowel movement.  If he does not have a bowel movement within 24 hours and he begins to have abdominal pain with nausea/vomiting, he should present to the ER due to the possibility of a bowel obstruction.  At this time he is not having abdominal pain.  We discussed long-term treatment of constipation in the future with a stool softener twice daily and the scoop of MiraLAX if he goes greater than 48 to 72 hours without a bowel movement.  He will keep me updated with any lack of progress in the bowel movement department.    3. Acute bronchitis, unspecified organism  -Half of his symptoms are improving in terms of sinus symptoms.  Encouraged him to start doxycycline and prednisone within 48 hours if symptoms worsen in regards to chest symptoms such as cough, wheezing.  Especially if he begins to have a fever greater than 102 he should begin doxycycline.  He should continue Advair twice daily and albuterol as needed.  If symptoms persist next week he needs to have a chest x-ray.  DX-CHEST-2 VIEWS; Future    ER precautions prn fever >102.5, increased WOB, onset CP, or worsening overall symptoms.

## 2025-01-27 DIAGNOSIS — E11.9 TYPE 2 DIABETES MELLITUS WITHOUT COMPLICATION, WITH LONG-TERM CURRENT USE OF INSULIN (HCC): ICD-10-CM

## 2025-01-27 DIAGNOSIS — Z79.4 TYPE 2 DIABETES MELLITUS WITHOUT COMPLICATION, WITH LONG-TERM CURRENT USE OF INSULIN (HCC): ICD-10-CM

## 2025-01-30 DIAGNOSIS — J18.9 PNEUMONIA DUE TO INFECTIOUS ORGANISM, UNSPECIFIED LATERALITY, UNSPECIFIED PART OF LUNG: ICD-10-CM

## 2025-01-30 RX ORDER — NYSTATIN 100000 [USP'U]/ML
500000 SUSPENSION ORAL 4 TIMES DAILY
Qty: 473 ML | Refills: 0 | Status: SHIPPED | OUTPATIENT
Start: 2025-01-30

## 2025-01-31 ENCOUNTER — HOSPITAL ENCOUNTER (OUTPATIENT)
Dept: RADIOLOGY | Facility: MEDICAL CENTER | Age: 65
End: 2025-01-31
Attending: NURSE PRACTITIONER
Payer: COMMERCIAL

## 2025-01-31 DIAGNOSIS — J18.9 PNEUMONIA DUE TO INFECTIOUS ORGANISM, UNSPECIFIED LATERALITY, UNSPECIFIED PART OF LUNG: ICD-10-CM

## 2025-01-31 PROCEDURE — 71046 X-RAY EXAM CHEST 2 VIEWS: CPT

## 2025-03-05 DIAGNOSIS — E11.9 TYPE 2 DIABETES MELLITUS WITHOUT COMPLICATION, WITHOUT LONG-TERM CURRENT USE OF INSULIN (HCC): ICD-10-CM

## 2025-03-06 RX ORDER — DULAGLUTIDE 1.5 MG/.5ML
0.5 INJECTION, SOLUTION SUBCUTANEOUS
Qty: 6 ML | Refills: 3 | Status: SHIPPED | OUTPATIENT
Start: 2025-03-06

## 2025-03-31 DIAGNOSIS — E11.9 TYPE 2 DIABETES MELLITUS WITHOUT COMPLICATION, WITH LONG-TERM CURRENT USE OF INSULIN (HCC): ICD-10-CM

## 2025-03-31 DIAGNOSIS — E03.9 ACQUIRED HYPOTHYROIDISM: ICD-10-CM

## 2025-03-31 DIAGNOSIS — Z79.4 TYPE 2 DIABETES MELLITUS WITHOUT COMPLICATION, WITH LONG-TERM CURRENT USE OF INSULIN (HCC): ICD-10-CM

## 2025-03-31 DIAGNOSIS — E78.5 DYSLIPIDEMIA: ICD-10-CM

## 2025-03-31 DIAGNOSIS — E55.9 VITAMIN D DEFICIENCY: ICD-10-CM

## 2025-04-01 ENCOUNTER — HOSPITAL ENCOUNTER (OUTPATIENT)
Dept: LAB | Facility: MEDICAL CENTER | Age: 65
End: 2025-04-01
Attending: INTERNAL MEDICINE
Payer: MEDICARE

## 2025-04-01 ENCOUNTER — TELEPHONE (OUTPATIENT)
Dept: ENDOCRINOLOGY | Facility: MEDICAL CENTER | Age: 65
End: 2025-04-01
Payer: MEDICARE

## 2025-04-01 DIAGNOSIS — E11.42 CONTROLLED TYPE 2 DIABETES MELLITUS WITH DIABETIC POLYNEUROPATHY, WITHOUT LONG-TERM CURRENT USE OF INSULIN (HCC): ICD-10-CM

## 2025-04-01 DIAGNOSIS — E03.9 ACQUIRED HYPOTHYROIDISM: ICD-10-CM

## 2025-04-01 DIAGNOSIS — E78.5 DYSLIPIDEMIA: ICD-10-CM

## 2025-04-01 DIAGNOSIS — E55.9 VITAMIN D DEFICIENCY: ICD-10-CM

## 2025-04-01 DIAGNOSIS — Z79.4 TYPE 2 DIABETES MELLITUS WITHOUT COMPLICATION, WITH LONG-TERM CURRENT USE OF INSULIN (HCC): ICD-10-CM

## 2025-04-01 DIAGNOSIS — J01.20 ACUTE ETHMOIDAL SINUSITIS, RECURRENCE NOT SPECIFIED: ICD-10-CM

## 2025-04-01 DIAGNOSIS — J45.909 UNCOMPLICATED ASTHMA, UNSPECIFIED ASTHMA SEVERITY, UNSPECIFIED WHETHER PERSISTENT: ICD-10-CM

## 2025-04-01 DIAGNOSIS — E11.9 TYPE 2 DIABETES MELLITUS WITHOUT COMPLICATION, WITH LONG-TERM CURRENT USE OF INSULIN (HCC): ICD-10-CM

## 2025-04-01 LAB
25(OH)D3 SERPL-MCNC: 73 NG/ML (ref 30–100)
ALBUMIN SERPL BCP-MCNC: 4.3 G/DL (ref 3.2–4.9)
ALBUMIN/GLOB SERPL: 1.7 G/DL
ALP SERPL-CCNC: 62 U/L (ref 30–99)
ALT SERPL-CCNC: 25 U/L (ref 2–50)
ANION GAP SERPL CALC-SCNC: 10 MMOL/L (ref 7–16)
AST SERPL-CCNC: 27 U/L (ref 12–45)
BASOPHILS # BLD AUTO: 1.8 % (ref 0–1.8)
BASOPHILS # BLD: 0.17 K/UL (ref 0–0.12)
BILIRUB SERPL-MCNC: 0.7 MG/DL (ref 0.1–1.5)
BUN SERPL-MCNC: 8 MG/DL (ref 8–22)
CALCIUM ALBUM COR SERPL-MCNC: 9.1 MG/DL (ref 8.5–10.5)
CALCIUM SERPL-MCNC: 9.3 MG/DL (ref 8.5–10.5)
CHLORIDE SERPL-SCNC: 104 MMOL/L (ref 96–112)
CHOLEST SERPL-MCNC: 91 MG/DL (ref 100–199)
CO2 SERPL-SCNC: 26 MMOL/L (ref 20–33)
CREAT SERPL-MCNC: 0.72 MG/DL (ref 0.5–1.4)
CREAT UR-MCNC: 76.8 MG/DL
EOSINOPHIL # BLD AUTO: 0.51 K/UL (ref 0–0.51)
EOSINOPHIL NFR BLD: 5.4 % (ref 0–6.9)
ERYTHROCYTE [DISTWIDTH] IN BLOOD BY AUTOMATED COUNT: 51.9 FL (ref 35.9–50)
FASTING STATUS PATIENT QL REPORTED: NORMAL
GFR SERPLBLD CREATININE-BSD FMLA CKD-EPI: 101 ML/MIN/1.73 M 2
GLOBULIN SER CALC-MCNC: 2.5 G/DL (ref 1.9–3.5)
GLUCOSE SERPL-MCNC: 137 MG/DL (ref 65–99)
HCT VFR BLD AUTO: 50.8 % (ref 42–52)
HDLC SERPL-MCNC: 32 MG/DL
HGB BLD-MCNC: 15.8 G/DL (ref 14–18)
IMM GRANULOCYTES # BLD AUTO: 0.02 K/UL (ref 0–0.11)
IMM GRANULOCYTES NFR BLD AUTO: 0.2 % (ref 0–0.9)
LDLC SERPL CALC-MCNC: 48 MG/DL
LYMPHOCYTES # BLD AUTO: 1.73 K/UL (ref 1–4.8)
LYMPHOCYTES NFR BLD: 18.5 % (ref 22–41)
MCH RBC QN AUTO: 26.1 PG (ref 27–33)
MCHC RBC AUTO-ENTMCNC: 31.1 G/DL (ref 32.3–36.5)
MCV RBC AUTO: 83.8 FL (ref 81.4–97.8)
MICROALBUMIN UR-MCNC: <1.2 MG/DL
MICROALBUMIN/CREAT UR: NORMAL MG/G (ref 0–30)
MONOCYTES # BLD AUTO: 0.83 K/UL (ref 0–0.85)
MONOCYTES NFR BLD AUTO: 8.9 % (ref 0–13.4)
NEUTROPHILS # BLD AUTO: 6.11 K/UL (ref 1.82–7.42)
NEUTROPHILS NFR BLD: 65.2 % (ref 44–72)
NRBC # BLD AUTO: 0 K/UL
NRBC BLD-RTO: 0 /100 WBC (ref 0–0.2)
PLATELET # BLD AUTO: 337 K/UL (ref 164–446)
PMV BLD AUTO: 10.6 FL (ref 9–12.9)
POTASSIUM SERPL-SCNC: 3.9 MMOL/L (ref 3.6–5.5)
PROT SERPL-MCNC: 6.8 G/DL (ref 6–8.2)
RBC # BLD AUTO: 6.06 M/UL (ref 4.7–6.1)
SODIUM SERPL-SCNC: 140 MMOL/L (ref 135–145)
T3FREE SERPL-MCNC: 3.08 PG/ML (ref 2–4.4)
T4 FREE SERPL-MCNC: 1.37 NG/DL (ref 0.93–1.7)
TRIGL SERPL-MCNC: 56 MG/DL (ref 0–149)
TSH SERPL-ACNC: 1.25 UIU/ML (ref 0.35–5.5)
WBC # BLD AUTO: 9.4 K/UL (ref 4.8–10.8)

## 2025-04-01 PROCEDURE — 80053 COMPREHEN METABOLIC PANEL: CPT

## 2025-04-01 PROCEDURE — 82043 UR ALBUMIN QUANTITATIVE: CPT

## 2025-04-01 PROCEDURE — 85025 COMPLETE CBC W/AUTO DIFF WBC: CPT

## 2025-04-01 PROCEDURE — 84439 ASSAY OF FREE THYROXINE: CPT

## 2025-04-01 PROCEDURE — 84481 FREE ASSAY (FT-3): CPT

## 2025-04-01 PROCEDURE — 36415 COLL VENOUS BLD VENIPUNCTURE: CPT

## 2025-04-01 PROCEDURE — 82570 ASSAY OF URINE CREATININE: CPT

## 2025-04-01 PROCEDURE — 84443 ASSAY THYROID STIM HORMONE: CPT

## 2025-04-01 PROCEDURE — 80061 LIPID PANEL: CPT

## 2025-04-01 PROCEDURE — 82306 VITAMIN D 25 HYDROXY: CPT

## 2025-04-01 NOTE — TELEPHONE ENCOUNTER
Tried calling pt to inform them that we have reordered the G7 sensors through [employer] and used the same supplier which is "[employer]. I will be looking out for any further updates on this request.

## 2025-04-02 DIAGNOSIS — Z79.4 TYPE 2 DIABETES MELLITUS WITHOUT COMPLICATION, WITH LONG-TERM CURRENT USE OF INSULIN (HCC): ICD-10-CM

## 2025-04-02 DIAGNOSIS — E11.9 TYPE 2 DIABETES MELLITUS WITHOUT COMPLICATION, WITH LONG-TERM CURRENT USE OF INSULIN (HCC): ICD-10-CM

## 2025-04-03 RX ORDER — METFORMIN HYDROCHLORIDE 500 MG/1
500 TABLET, EXTENDED RELEASE ORAL 2 TIMES DAILY
Qty: 180 TABLET | Refills: 3 | Status: SHIPPED | OUTPATIENT
Start: 2025-04-03

## 2025-04-07 ENCOUNTER — TELEPHONE (OUTPATIENT)
Dept: ENDOCRINOLOGY | Facility: MEDICAL CENTER | Age: 65
End: 2025-04-07
Payer: MEDICARE

## 2025-04-07 NOTE — TELEPHONE ENCOUNTER
Called pt to let him know that we are waiting for him to been seen in office for us to submit progress notes to supplier to then move forward with ordering glucose sensor.

## 2025-04-10 ENCOUNTER — OFFICE VISIT (OUTPATIENT)
Dept: ENDOCRINOLOGY | Facility: MEDICAL CENTER | Age: 65
End: 2025-04-10
Attending: INTERNAL MEDICINE
Payer: MEDICARE

## 2025-04-10 VITALS
BODY MASS INDEX: 24.91 KG/M2 | DIASTOLIC BLOOD PRESSURE: 64 MMHG | OXYGEN SATURATION: 96 % | SYSTOLIC BLOOD PRESSURE: 132 MMHG | HEIGHT: 69 IN | HEART RATE: 77 BPM | WEIGHT: 168.2 LBS

## 2025-04-10 DIAGNOSIS — E78.5 DYSLIPIDEMIA: ICD-10-CM

## 2025-04-10 DIAGNOSIS — E11.9 TYPE 2 DIABETES MELLITUS WITHOUT COMPLICATION, WITH LONG-TERM CURRENT USE OF INSULIN (HCC): ICD-10-CM

## 2025-04-10 DIAGNOSIS — E03.9 ACQUIRED HYPOTHYROIDISM: ICD-10-CM

## 2025-04-10 DIAGNOSIS — Z79.4 TYPE 2 DIABETES MELLITUS WITHOUT COMPLICATION, WITH LONG-TERM CURRENT USE OF INSULIN (HCC): ICD-10-CM

## 2025-04-10 DIAGNOSIS — Z79.84 LONG TERM (CURRENT) USE OF ORAL HYPOGLYCEMIC DRUGS: ICD-10-CM

## 2025-04-10 DIAGNOSIS — E55.9 VITAMIN D DEFICIENCY: ICD-10-CM

## 2025-04-10 LAB
HBA1C MFR BLD: 7.7 % (ref ?–5.8)
POCT INT CON NEG: NEGATIVE
POCT INT CON POS: POSITIVE

## 2025-04-10 PROCEDURE — 95249 CONT GLUC MNTR PT PROV EQP: CPT | Performed by: INTERNAL MEDICINE

## 2025-04-10 PROCEDURE — 3075F SYST BP GE 130 - 139MM HG: CPT | Performed by: INTERNAL MEDICINE

## 2025-04-10 PROCEDURE — 95251 CONT GLUC MNTR ANALYSIS I&R: CPT | Performed by: INTERNAL MEDICINE

## 2025-04-10 PROCEDURE — 3078F DIAST BP <80 MM HG: CPT | Performed by: INTERNAL MEDICINE

## 2025-04-10 PROCEDURE — 83036 HEMOGLOBIN GLYCOSYLATED A1C: CPT | Performed by: INTERNAL MEDICINE

## 2025-04-10 PROCEDURE — 99212 OFFICE O/P EST SF 10 MIN: CPT | Performed by: INTERNAL MEDICINE

## 2025-04-10 PROCEDURE — 99214 OFFICE O/P EST MOD 30 MIN: CPT | Performed by: INTERNAL MEDICINE

## 2025-04-10 ASSESSMENT — FIBROSIS 4 INDEX: FIB4 SCORE: 1.06

## 2025-04-10 NOTE — PROGRESS NOTES
"Endocrinology Clinic Progress Note  PCP: NIRANJAN Butts    HPI:  Braron Hewitt is a 64 y.o. old patient who is seen today by the Diabetes Nurse Specialist for review of his uncontrolled type 2 diabetes with long term use of insulin.    Recent changes in health:  came down with Salmonella in September and spent a week in hospital, after that he had cholecystectomy and then developed pneumonia.  He lost ~20 lbs and has been slowly trying to regain his weight.   DM:   Last A1c:   Lab Results   Component Value Date/Time    HBA1C 7.7 (A) 04/10/2025 10:04 AM      Previous A1c was 9 on 10/01/2024  A1C GOAL: < 7    Diabetes Medications:   Metformin 500 mg bid  Jardiance 25 mg daily  Trulicity 1.5 mg weekly (has not had Trulicity for about a month)      Exercise: walking 1-2 miles per day and 3-4 times a week walking 3-4 miles.  Does some weight training and seeing PT for his back  Diet: \"healthy\" diet  in general  Patient's body mass index is 24.84 kg/m². Exercise and nutrition counseling were performed at this visit.    Glucose monitoring frequency:  using Dexcom CGM  ( has not had a sensor for a couple of weeks, needed to see provider so that Medicare would cover )       Hypoglycemic episodes: no  Last Retinal Exam: on file and up-to-date, states it feels as if he has tight socks on all the time.  States his neuropathy has gotten worse over the past 6 months.     Foot Exam:  Monofilament testing with a 10 gram force: sensation intact: intact bilaterally  Visual Inspection: Feet without maceration, ulcers, fissures.  Pedal pulses: intact bilaterally      Plan:     Discussed and educated on:   - All medications, side effects and compliance (discussed carefully)  - Factors Affecting Blood Glucose Control: illness and stress  - HbA1C: target  - Home glucose monitoring emphasized  - Weight control and daily exercise    Recommended medication changes: none    "

## 2025-04-10 NOTE — PROGRESS NOTES
CHIEF COMPLAINT: Patient is here for follow up of Type 2 Diabetes Mellitus    HPI:     Barron Hewitt is a 64 y.o. male with Type 2 Diabetes Mellitus here for follow up.    Labs from 4/10/2025 show A1c is 7.7%  Labs from 9/15/2024 show A1c is 9.1% ( acutely ill )  Labs from 3/28/2024 show A1c is 7.3%  Labs from 12/26/2023 show A1c is 7.8%  Labs from 3/21/2023 show a1c is 7.2%  Labs from 12/2/2022 show A1c was 7.2%  Labs from 7//9/2022 show A1c was 7.1%    He was previously seen by Dr. Sandoval and then Yoseph DOMÍNGUEZ.    He then saw Lizett Mi NP   He has been wearing a Dexcom G7 CGM for the past 6 months  He did not tolerate the higher doses of Trulicity         He is currently on   Metformin 500 mg twice a day,  Jardiance 25mg daily  Trulicity 1.5mg weekly        He denies SE with his meds  He stopped Tresiba when he was sick  and glimepiride  He had food poisoning from  salmonella and lost 20 lbs      The CGM is medically as he has a history of severe hypoglycemia BG < 50 and hypoglycemic unawareness           CGM was downloaded and reviewed today                He has hyperlipidemia and is on atorvastatin  He denies muscle aches and muscle weakness   Latest Reference Range & Units 04/01/25 08:10   Cholesterol,Tot 100 - 199 mg/dL 91 (L)   Triglycerides 0 - 149 mg/dL 56   HDL >=40 mg/dL 32 !   LDL <100 mg/dL 48     He sees Dr. Huerta as well  He is now on Toprol XL and Benazepril   He doesn't have diabetic kidney disease   Latest Reference Range & Units 04/01/25 08:10   Micro Alb Creat Ratio 0 - 30 mg/g see below   Creatinine, Urine mg/dL 76.80   Microalbumin, Urine Random mg/dL <1.2         Eye exam was done on 3/4/2024        Finally he has primary hypothyroidism and is on Levothyroxine 50 every day   He denies constipation and cold intolerance  TSH is 1.2 on 4/2025  TSH is 1.9 on 3/2024  TSH is 1.4 on 3/2023  TSH is 1.4 on 8/2022          BG Diary:  CGM was downloaded and reviewed    Weight has  been stable    Diabetes Complications   Retinopathy: No known retinopathy.  Last eye exam: 12/2/2022  Neuropathy: Denies paresthesias or numbness in hands or feet. Denies any foot wounds.  Exercise: Minimal.  Diet: Fair.  Patient's medications, allergies, and social histories were reviewed and updated as appropriate.    ROS:     CONS:     No fever, no chills   EYES:     No diplopia, no blurry vision   CV:           No chest pain, no palpitations   PULM:     No SOB, no cough, no hemoptysis.   GI:            No nausea, no vomiting, no diarrhea, no constipation   ENDO:     No polyuria, no polydipsia, no heat intolerance, no cold intolerance       Past Medical History:  Problem List:  2024-10: S/P cholecystectomy  2024-10: Cholecystitis  2024-09: Hiccups  2024-09: Hypokalemia  2024-09: Hyponatremia  2024-09: Dehydration  2024-09: Salmonella gastroenteritis  2024-09: Metabolic acidosis  2024-09: Polycythemia  2024-09: Hypertensive urgency  2024-09: Primary hypertension  2024-03: History of sleep apnea - improved with 70 lb weight loss  2023-10: H/O cervical discectomy  2023-10: History of lumbar discectomy  2023-10: Neuropathic pain of lower extremity, left  2023-05: Lumbar stenosis without neurogenic claudication  2023-03: Degeneration of cervical intervertebral disc  2022-12: Spinal stenosis of lumbar region  2022-07: Long term (current) use of oral hypoglycemic drugs  2022-05: Benign prostatic hyperplasia with urinary frequency  2022-03: Lower urinary tract symptoms due to benign prostatic   hyperplasia  2021-06: Diabetic amyotrophy associated with type 2 diabetes mellitus   (HCC)  2021-05: Neuropathy - proximal, diabetic  2020-08: Acquired hypothyroidism  2019-11: Controlled type 2 diabetes mellitus with diabetic   polyneuropathy, without long-term current use of insulin ([facility])  2019-11: History of small bowel obstruction - 2018  2019-11: Syncope and collapse  2019-11: Type 2 diabetes mellitus without complication  ([facility])  2019-11: History of intestinal obstruction  2019-02: Seasonal allergies  2018-08: Other insomnia  2017-11: Diabetes mellitus ([facility])  2017-11: Hypertensive disorder  2017-10: Essential hypertension, benign  2017-10: Dyslipidemia  2017-10: Benign essential hypertension  2017-07: Mild intermittent asthma without complication  2017-07: Mild intermittent asthma  2017-05: Ileus ([facility])  2017-05: Erythrocytosis  2017-05: ISELA on CPAP - [facility]  2017-05: HTN (hypertension)  2017-05: Enteritis  2017-04: Obstructive sleep apnea syndrome  2016-04: Coronary artery disease, non-occlusive  2016-03: Angina pectoris ([facility])  2014-01: Chest pain at rest  2014-01: Abnormal radionuclide heart study  2014-01: DM (diabetes mellitus) ([facility])  2014-01: Hyperlipidemia  2014-01: Hypertension  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  2011-06: CHEST PAIN  PONV (postoperative nausea and vomiting)  Asthma      Past Surgical History:  Past Surgical History:   Procedure Laterality Date    ROBOTIC ASSISTED TIARA W/INTRAOP CHOLANGIOGRAMS N/A 10/19/2024    Procedure: CHOLECYSTECTOMY, ROBOT-ASSISTED, LAPAROSCOPIC;  Surgeon: Patricia Acuña M.D.;  Location: SURGERY [facility];  Service: General    CERVICAL DISK AND FUSION ANTERIOR  5/26/2023    Procedure: ANTERIOR C5-7 DISCECTOMY AND FUSION;  Surgeon: Segun Cochran M.D.;  Location: SURGERY [facility];  Service: Neurosurgery    LUMBAR LAMINECTOMY DISKECTOMY  5/26/2023    Procedure: LAMINECTOMY, SPINE, LUMBAR, WITH DISCECTOMY POSTERIOR L4-S1 LAMINECTOMIES;  Surgeon: Segun Cochran M.D.;  Location: SURGERY [facility];  Service: Neurosurgery    RADIO FREQUENCY ABLATION ADDITIONAL LEVEL Bilateral 07/06/2022    Procedure: BILATERAL radiofrequency neurotomies medial branch targeting the L3-4, L4-5 and L5-S1 facet joints with fluoroscopic guidance and sedation. Plan for 80 degree C for 90 seconds for each neurotomy.;  Surgeon: Dragan Ayala M.D.;  [facility];  Service: Pain  Management    OR INJ DX/THER AGNT PARAVERT FACET JOINT, DEVON* Bilateral 06/21/2022    Procedure: Diagnostic medial branch blocks targeting the BILATERAL L3-4, L4-5 and L5-S1 facet joints with fluoroscopic guidance #2;  Surgeon: Dragan Ayala M.D.;  Location: [facility];  Service: Pain Management    LUMBAR MEDIAL BRANCH BLOCKS Bilateral 06/21/2022    Procedure: .;  Surgeon: Dragan Ayala M.D.;  Location: [facility];  Service: Pain Management    CONSCIOUS SEDATION Bilateral 06/21/2022    Procedure: .;  Surgeon: Dragan Ayala M.D.;  Location: [facility];  Service: Pain Management    LUMBAR MEDIAL BRANCH BLOCKS Bilateral 06/14/2022    Procedure: Diagnostic medial branch blocks targeting the BILATERAL L3-4, L4-5 and L5-S1 facet joints with fluoroscopic guidance #1;  Surgeon: Dragan Ayala M.D.;  Location: [facility];  Service: Pain Management    OR TRANSURETHRAL ELEC-SURG PROSTATECTOM N/A 05/27/2022    Procedure: TURP, USING BUTTON ELECTRODE;  Surgeon: Lee Mckee M.D.;  Location: [facility];  Service: Urology    BLOCK EPIDURAL STEROID INJECTION Left 03/22/2022    Procedure: LEFT L3-4 and L4-5 transforaminal epidural steroid injection with fluoroscopic guidance;  Surgeon: Dragan Ayala M.D.;  Location: [facility];  Service: Pain Management    OR NJX AA&/STRD TFRML EPI LUMBAR/SACRAL 1 LEVEL Left 02/15/2022    Procedure: LEFT L3-4 and L4-5 transforaminal epidural steroid injection with fluoroscopic guidance;  Surgeon: Dragan Ayala M.D.;  Location: [facility];  Service: Pain Management    SHOULDER DECOMPRESSION ARTHROSCOPIC Left 01/04/2018    Procedure: SHOULDER DECOMPRESSION ARTHROSCOPIC - SUBACROMIAL;  Surgeon: Linwood Grover M.D.;  Location: [facility];  Service: Orthopedics    SHOULDER ARTHROSCOPY W/ BICIPITAL TENODESIS REPAIR Left 01/04/2018     "Procedure: SHOULDER ARTHROSCOPY W/ BICIPITAL [place] REPAIR;  Surgeon: Linwood Grover M.D.;  Location: SURGERY [facility];  Service: Orthopedics    CLAVICLE DISTAL EXCISION Left 2018    Procedure: CLAVICLE DISTAL EXCISION - POSSIBLE;  Surgeon: Linwood Grover M.D.;  Location: SURGERY [facility];  Service: Orthopedics    RECOVERY  2016    Procedure: CATH LAB [facility] WITH POSSIBLE NAVIN;  Surgeon: Recoveryonly Surgery;  Location: SURGERY [facility] PROC [facility];  Service:     VENTRAL HERNIA REPAIR LAPAROSCOPIC  2012    Performed by Marcos Ramírez M.D. at SURGERY [facility]    KNEE ARTHROSCOPY      right    OTHER ABDOMINAL SURGERY  10-    About 8 years ago    SHOULDER ARTHROSCOPY      right    SINUSOTOMIES      times two surgeries    TUMOR EXCISION WITH BIOPSY      right hand - thumb        Allergies:  Kiwi extract, Shellfish allergy, Strawberry, Ozempic (0.25 or 0.5 mg-dose) [semaglutide], Sulfa drugs, and Testosterone     Social History:  Social History     Tobacco Use    Smoking status: Former     Current packs/day: 0.00     Types: Cigarettes     Quit date: 2020     Years since quittin.2    Smokeless tobacco: Never    Tobacco comments:     occasional cigars   Vaping Use    Vaping status: Never Used   Substance Use Topics    Alcohol use: Not Currently     Comment: If and when I drink it's only social    Drug use: Yes     Types: Marijuana, Inhaled, Oral     Comment: THC/ Occ        Family History:   family history includes Arthritis in his brother and father; Breast Cancer in his mother; Cancer in his mother and another family member; Diabetes in his father; Heart Disease in his mother and another family member; Hypertension in his father, mother, and another family member; No Known Problems in his brother and sister.      PHYSICAL EXAM:   OBJECTIVE:  Vital signs: /64   Pulse 77   Ht 1.753 m (5' 9\")   Wt 76.3 kg (168 lb 3.2 " oz)   SpO2 96%   BMI 24.84 kg/m²   GENERAL: Well-developed, well-nourished in no apparent distress.   EYE:  No ocular asymmetry, PERRLA  HENT: Pink, moist mucous membranes.    NECK: No thyromegaly.   CARDIOVASCULAR:  No murmurs  LUNGS: Clear breath sounds  ABDOMEN: Soft, nontender   BACK: negative SLR bilaterally  EXTREMITIES: No clubbing, cyanosis, or edema.   NEUROLOGICAL: No gross focal motor abnormalities   LYMPH: No cervical adenopathy palpated.   SKIN: No rashes, lesions.     Labs:  Lab Results   Component Value Date/Time    HBA1C 7.7 (A) 04/10/2025 10:04 AM        Lab Results   Component Value Date/Time    WBC 9.4 04/01/2025 08:10 AM    RBC 6.06 04/01/2025 08:10 AM    HEMOGLOBIN 15.8 04/01/2025 08:10 AM    MCV 83.8 04/01/2025 08:10 AM    MCH 26.1 (L) 04/01/2025 08:10 AM    MCHC 31.1 (L) 04/01/2025 08:10 AM    RDW 51.9 (H) 04/01/2025 08:10 AM    MPV 10.6 04/01/2025 08:10 AM       Lab Results   Component Value Date/Time    SODIUM 140 04/01/2025 08:10 AM    POTASSIUM 3.9 04/01/2025 08:10 AM    CHLORIDE 104 04/01/2025 08:10 AM    CO2 26 04/01/2025 08:10 AM    ANION 10.0 04/01/2025 08:10 AM    GLUCOSE 137 (H) 04/01/2025 08:10 AM    BUN 8 04/01/2025 08:10 AM    CREATININE 0.72 04/01/2025 08:10 AM    CREATININE 1.1 07/10/2008 09:25 AM    CALCIUM 9.3 04/01/2025 08:10 AM    ASTSGOT 27 04/01/2025 08:10 AM    ALTSGPT 25 04/01/2025 08:10 AM    TBILIRUBIN 0.7 04/01/2025 08:10 AM    ALBUMIN 4.3 04/01/2025 08:10 AM    TOTPROTEIN 6.8 04/01/2025 08:10 AM    GLOBULIN 2.5 04/01/2025 08:10 AM    AGRATIO 1.7 04/01/2025 08:10 AM       Lab Results   Component Value Date/Time    CHOLSTRLTOT 126 07/22/2022 0700    TRIGLYCERIDE 97 07/22/2022 0700    HDL 34 (A) 07/22/2022 0700    LDL 73 07/22/2022 0700       Lab Results   Component Value Date/Time    MALBCRT see below 04/01/2025 08:10 AM    MICROALBUR <1.2 04/01/2025 08:10 AM        Lab Results   Component Value Date/Time    TSHULTRASEN 2.130 06/09/2021 1313     No results found for:  FREEDIR  Lab Results   Component Value Date/Time    FREET3 3.59 12/22/2017 0926     No results found for: THYSTIMIG        ASSESSMENT/PLAN:     1. Controlled type 2 diabetes mellitus without complication, without long-term current use of insulin (HCC)  Fair control  his A1c is fair at 7.7% ---> based on CGM data his next A1c is 6.6%  We downloaded and reviewed his CGM today   The CGM is medically as he has a history of severe hypoglycemia BG < 50 and hypoglycemic unawareness     Continue Metformin 500 mg twice a day  Continue Jardiance 25mg daily  Continue Trulicity 1.5mg weekly  He is updated with his labs  Follow-up with Viola in 3 months       2. Acquired hypothyroidism  Controlled  TSH is stable  Continue  Levothyroxine 50 mcg  every day   Reviewed proper administration of thyroid hormone  Patient should take thyroid hormone 30-60 minutes before breakfast on an empty stomach plain water and not take it together with food, iron, calcium, and antacids.  Iron, calcium, and antacids should be taken at least 4 hours apart from thyroid hormone.  Repeat TSH in 6 months    3. Dyslipidemia  Controlled  Continue atorvastatin  Repeat fasting lipids in 12  months    4. Vitamin D deficiency  Stable  Continue monitoring     5. Long term (current) use of oral hypoglycemic drugs  Patient is on oral agents for type 2 diabetes management      Return in about 3 months (around 7/10/2025).      Thank you kindly for allowing me to participate in the diabetes care plan for this patient.    Seth Vizcarra MD, FACE, [facility]      CC:   NIRANJAN Butts

## 2025-04-14 ENCOUNTER — TELEPHONE (OUTPATIENT)
Dept: MEDICAL GROUP | Facility: LAB | Age: 65
End: 2025-04-14

## 2025-04-14 ENCOUNTER — APPOINTMENT (OUTPATIENT)
Dept: MEDICAL GROUP | Facility: LAB | Age: 65
End: 2025-04-14
Payer: MEDICARE

## 2025-04-14 VITALS
SYSTOLIC BLOOD PRESSURE: 120 MMHG | HEIGHT: 69 IN | HEART RATE: 78 BPM | WEIGHT: 167 LBS | OXYGEN SATURATION: 98 % | RESPIRATION RATE: 16 BRPM | DIASTOLIC BLOOD PRESSURE: 56 MMHG | BODY MASS INDEX: 24.73 KG/M2 | TEMPERATURE: 96.1 F

## 2025-04-14 DIAGNOSIS — E11.44 DIABETIC AMYOTROPHY ASSOCIATED WITH TYPE 2 DIABETES MELLITUS (HCC): ICD-10-CM

## 2025-04-14 DIAGNOSIS — R94.39: ICD-10-CM

## 2025-04-14 DIAGNOSIS — J45.909 UNCOMPLICATED ASTHMA, UNSPECIFIED ASTHMA SEVERITY, UNSPECIFIED WHETHER PERSISTENT: ICD-10-CM

## 2025-04-14 DIAGNOSIS — I25.10 CORONARY ARTERY DISEASE, NON-OCCLUSIVE: ICD-10-CM

## 2025-04-14 DIAGNOSIS — Z87.19 HISTORY OF SMALL BOWEL OBSTRUCTION: ICD-10-CM

## 2025-04-14 DIAGNOSIS — E78.5 DYSLIPIDEMIA: ICD-10-CM

## 2025-04-14 DIAGNOSIS — E03.9 ACQUIRED HYPOTHYROIDISM: ICD-10-CM

## 2025-04-14 DIAGNOSIS — N40.1 BENIGN PROSTATIC HYPERPLASIA WITH URINARY FREQUENCY: ICD-10-CM

## 2025-04-14 DIAGNOSIS — Q24.5 CORONARY-MYOCARDIAL BRIDGE: ICD-10-CM

## 2025-04-14 DIAGNOSIS — Z86.69 HISTORY OF SLEEP APNEA: ICD-10-CM

## 2025-04-14 DIAGNOSIS — G89.29 CHRONIC BILATERAL LOW BACK PAIN WITHOUT SCIATICA: ICD-10-CM

## 2025-04-14 DIAGNOSIS — Z90.49 S/P CHOLECYSTECTOMY: ICD-10-CM

## 2025-04-14 DIAGNOSIS — J30.2 SEASONAL ALLERGIES: ICD-10-CM

## 2025-04-14 DIAGNOSIS — I20.9 ANGINA PECTORIS (HCC): ICD-10-CM

## 2025-04-14 DIAGNOSIS — I10 BENIGN ESSENTIAL HYPERTENSION: ICD-10-CM

## 2025-04-14 DIAGNOSIS — Z00.00 MEDICARE ANNUAL WELLNESS VISIT, SUBSEQUENT: ICD-10-CM

## 2025-04-14 DIAGNOSIS — Z98.890 H/O CERVICAL DISCECTOMY: ICD-10-CM

## 2025-04-14 DIAGNOSIS — G47.09 OTHER INSOMNIA: ICD-10-CM

## 2025-04-14 DIAGNOSIS — E11.42 CONTROLLED TYPE 2 DIABETES MELLITUS WITH DIABETIC POLYNEUROPATHY, WITHOUT LONG-TERM CURRENT USE OF INSULIN (HCC): ICD-10-CM

## 2025-04-14 DIAGNOSIS — M54.50 CHRONIC BILATERAL LOW BACK PAIN WITHOUT SCIATICA: ICD-10-CM

## 2025-04-14 DIAGNOSIS — D75.1 POLYCYTHEMIA: ICD-10-CM

## 2025-04-14 DIAGNOSIS — Z79.891 USE OF OPIATES FOR THERAPEUTIC PURPOSES: ICD-10-CM

## 2025-04-14 DIAGNOSIS — M50.30 DEGENERATION OF CERVICAL INTERVERTEBRAL DISC: ICD-10-CM

## 2025-04-14 DIAGNOSIS — R35.0 BENIGN PROSTATIC HYPERPLASIA WITH URINARY FREQUENCY: ICD-10-CM

## 2025-04-14 PROBLEM — E86.0 DEHYDRATION: Status: RESOLVED | Noted: 2024-09-14 | Resolved: 2025-04-14

## 2025-04-14 PROBLEM — R06.6 HICCUPS: Status: RESOLVED | Noted: 2024-09-18 | Resolved: 2025-04-14

## 2025-04-14 PROCEDURE — G0439 PPPS, SUBSEQ VISIT: HCPCS | Performed by: NURSE PRACTITIONER

## 2025-04-14 PROCEDURE — 3074F SYST BP LT 130 MM HG: CPT | Performed by: NURSE PRACTITIONER

## 2025-04-14 PROCEDURE — 3078F DIAST BP <80 MM HG: CPT | Performed by: NURSE PRACTITIONER

## 2025-04-14 RX ORDER — TRAMADOL HYDROCHLORIDE 50 MG/1
50-100 TABLET ORAL EVERY 8 HOURS PRN
Qty: 45 TABLET | Refills: 0 | Status: SHIPPED | OUTPATIENT
Start: 2025-04-14 | End: 2025-05-14

## 2025-04-14 RX ORDER — GABAPENTIN 300 MG/1
300 CAPSULE ORAL NIGHTLY PRN
Qty: 30 CAPSULE | Refills: 0 | Status: SHIPPED | OUTPATIENT
Start: 2025-04-14

## 2025-04-14 ASSESSMENT — ACTIVITIES OF DAILY LIVING (ADL): BATHING_REQUIRES_ASSISTANCE: 0

## 2025-04-14 ASSESSMENT — FIBROSIS 4 INDEX: FIB4 SCORE: 1.06

## 2025-04-14 ASSESSMENT — PATIENT HEALTH QUESTIONNAIRE - PHQ9: CLINICAL INTERPRETATION OF PHQ2 SCORE: 0

## 2025-04-14 ASSESSMENT — ENCOUNTER SYMPTOMS: GENERAL WELL-BEING: FAIR

## 2025-04-14 NOTE — PROGRESS NOTES
Chief Complaint   Patient presents with    Medication Management     Verbal consent was acquired by the patient to use [employer] ambient listening note generation during this visit Yes     History of Present Illness  The patient is a 64-year-old male who presents for a Medicare annual wellness visit. He is also followed by endocrinology and neurosurgery. He has been dealing with chronic back pain x years with an exacerbation as of 2/2025. His last visit with cardiology was in November 2024, and he was told that he was stable from a cardiac standpoint concerning coronary artery disease, hypertension, and dyslipidemia. Last lipid panel was done on 04/01/2025, and LDL was at 48.    He has been experiencing persistent coughing, producing green phlegm, since his bout of pneumonia in Jan. He experiences daily morning coughing episodes, during which he expels gray mucus. He occasionally experiences forehead and cheek pain. He has a history of 2 polyp surgeries and is aware of his sinus issues. He maintains good hydration. He uses Advair twice daily and Ventolin as needed, particularly before bedtime if he experiences tightness. He takes Zyrtec daily and does not use nasal sprays. He continues to take montelukast.    He has been managing his diabetes without the use of a CGM for over a month. His 90-day extrapolated A1c is 6.6. He was prescribed Tresiba post-gallbladder surgery but discontinued it due to adverse effects and abnormal readings. His current medication regimen includes Jardiance, metformin, and Trulicity once weekly.    He has been experiencing severe hand pain, necessitating potential hand surgery and meeting with ortho re: hand in 2 d.     He underwent a lumbar MRI 3 months ago, which revealed more bulging discs. He has a history of back surgery and is unable to see his specialist until May 2025. He experiences severe pain on certain days, rendering him immobile. He has been using leftover pain medication  from his gallbladder surgery for relief. He occasionally takes tizanidine to aid sleep. He recently experienced leg cramping upon stretching. He has not yet discussed his MRI results with Dr. Cochran. He has previously consulted with Dr. Ayala, a physiatrist, following his surgery, which resulted in significant improvement in his neuropathy. He believes he is experiencing similar nerve issues again. He has been referred to his regular physical therapist, Keyur Tellez, whom he sees weekly. He performs home exercises using bands and stretches. He experiences lower back tightness in the morning, which typically eases after an hour. He walks his dogs daily and attempts to walk 2 miles at least 3 times a week. He noticed a decline in his physical abilities between December 2024 and February 2025, now requiring him to take one step at a time when climbing stairs. He reports no injuries in January 2025. He has been more cautious about his balance when dressing.    His blood pressure is well-controlled with metoprolol.    His LDL levels are well-managed with atorvastatin.    He reports poor sleep quality, typically sleeping from 11:00 PM to 3:30 AM or 4:00 AM, attributing this to his back pain. He does not take zolpidem regularly, having only taken 2 out of the last 15 pills, as he wishes to avoid dependency on sleep aids.      He does not wear hearing aids and all his teeth are natural.     He does not have to use a CPAP machine anymore since he lost all his weight.          Patient Active Problem List    Diagnosis Date Noted    S/P cholecystectomy 10/31/2024    Hiccups 09/18/2024    Dehydration 09/14/2024    Polycythemia 09/14/2024    Primary hypertension 09/14/2024    History of sleep apnea - improved with 70 lb weight loss 03/14/2024    H/O cervical discectomy 10/09/2023    Degeneration of cervical intervertebral disc 03/30/2023    Benign prostatic hyperplasia with urinary frequency 05/27/2022    Asthma     Diabetic  amyotrophy associated with type 2 diabetes mellitus ([facility]) 06/09/2021    Acquired hypothyroidism 08/25/2020    Controlled type 2 diabetes mellitus with diabetic polyneuropathy, without long-term current use of insulin ([facility]) 11/07/2019    History of small bowel obstruction - 2018 11/07/2019    Seasonal allergies 02/27/2019    Other insomnia 08/17/2018    Dyslipidemia 10/08/2017    Benign essential hypertension 10/08/2017    Coronary artery disease, non-occlusive 04/14/2016    Angina pectoris ([facility]) 03/11/2016    Abnormal radionuclide heart study 01/31/2014    Coronary-myocardial bridge 11/27/2012       Current Outpatient Medications   Medication Sig Dispense Refill    nystatin (MYCOSTATIN) 663576 UNIT/ML Suspension Take 5 mL by mouth 4 times a day. 473 mL 0    metFORMIN ER (GLUCOPHAGE XR) 500 MG TABLET SR 24 HR TAKE 1 TABLET TWICE A  Tablet 3    TRULICITY 1.5 MG/0.5ML Solution Auto-injector INJECT 0.5 ML UNDER THE SKIN EVERY 7 DAYS 6 mL 3    Insulin Pen Needle 32G X 6 MM Misc 1 Each every day. 100 Each 3    levothyroxine (SYNTHROID) 50 MCG Tab TAKE 1 TABLET EVERY MORNING ON AN EMPTY STOMACH 90 Tablet 3    Continuous Glucose  (DEXCOM G7 ) Device 1 Each continuous. 1 Each 0    Continuous Glucose Sensor (DEXCOM G7 SENSOR) Misc 1 Each every 10 days. 9 Each 3    metoprolol SR (TOPROL XL) 25 MG TABLET SR 24 HR TAKE 1 TABLET DAILY 90 Tablet 3    atorvastatin (LIPITOR) 80 MG tablet TAKE 1 TABLET EVERY EVENING 90 Tablet 3    fluticasone-salmeterol (ADVAIR DISKUS) 500-50 MCG/ACT AEROSOL POWDER, BREATH ACTIVATED Inhale 1 Puff 1 time a day as needed (shortness of breath).        acetaminophen (TYLENOL) 500 MG Tab Take 1,000 mg by mouth every 6 hours as needed. Indications: Pain      Cholecalciferol (D3 PO) Take 1 Capsule by mouth every day.      Empagliflozin (JARDIANCE) 25 MG Tab Take 1 Tablet by mouth every day. 90 Tablet 3    montelukast (SINGULAIR) 10 MG Tab Take 1 Tablet by mouth every evening. 90  Tablet 3    VENTOLIN  (90 Base) MCG/ACT Aero Soln inhalation aerosol USE 2 INHALATIONS EVERY 4 HOURS AS NEEDED FOR SHORTNESS OF BREATH (Patient taking differently: Inhale 2 Puffs every four hours as needed for Shortness of Breath.) 54 g 3    EPINEPHrine (EPIPEN) 0.3 MG/0.3ML Solution Auto-injector solution for injection Inject 0.3 mL into the thigh one time for 1 dose. 2 Each 2    pantoprazole (PROTONIX) 20 MG tablet Take 1 Tablet by mouth every day. 90 Tablet 3    aspirin 81 MG EC tablet Take 81 mg by mouth every day.      Blood Glucose Test Strips Test strips order:  Freestyle Freedom Lite test strips  testing one time per day 100 Strip 2    Omega-3 Fatty Acids (FISH OIL) 1200 MG CAPS Take 1 Capsule by mouth every day.      COENZYME Q10 PO Take 1 Capsule by mouth every evening.        cetirizine (ZYRTEC) 10 MG Tab Take 10 mg by mouth every day.       No current facility-administered medications for this visit.          Current supplements as per medication list.     Allergies: Kiwi extract, Shellfish allergy, Strawberry, Ozempic (0.25 or 0.5 mg-dose) [semaglutide], Sulfa drugs, and Testosterone    Current social contact/activities: cards/ play guitar/ walk / friends     He  reports that he quit smoking about 5 years ago. His smoking use included cigarettes. He has never used smokeless tobacco. He reports that he does not currently use alcohol. He reports current drug use. Drugs: Marijuana, Inhaled, and Oral.  Counseling given: Not Answered  Tobacco comments: occasional cigars      ROS:    Gait: Uses no assistive device  Ostomy: No  Other tubes: No  Amputations: No  Chronic oxygen use: No  Last eye exam: 1 year ago  Wears hearing aids: No   : Denies any urinary leakage during the last 6 months    Screening:    Depression Screening  Little interest or pleasure in doing things?  0 - not at all  Feeling down, depressed , or hopeless? 0 - not at all  Patient Health Questionnaire Score: 0     If depressive  symptoms identified deferred to follow up visit unless specifically addressed in assessment and plan.    Interpretation of PHQ-9 Total Score   Score Severity   1-4 No Depression   5-9 Mild Depression   10-14 Moderate Depression   15-19 Moderately Severe Depression   20-27 Severe Depression    Screening for Cognitive Impairment  Do you or any of your friends or family members have any concern about your memory? No  Three Minute Recall (Village, Kitchen, Baby) 3/3    [place] clock face with all 12 numbers and set the hands to show 10 minutes past 11.  Yes    Cognitive concerns identified deferred for follow up unless specifically addressed in assessment and plan.    Fall Risk Assessment  Has the patient had two or more falls in the last year or any fall with injury in the last year?  No    Safety Assessment  Do you always wear your seatbelt?  Yes  Any changes to home needed to function safely? No  Difficulty hearing.  No  Patient counseled about all safety risks that were identified.    Functional Assessment ADLs  Are there any barriers preventing you from cooking for yourself or meeting nutritional needs?  No.    Are there any barriers preventing you from driving safely or obtaining transportation?  No.    Are there any barriers preventing you from using a telephone or calling for help?  No    Are there any barriers preventing you from shopping?  No.    Are there any barriers preventing you from taking care of your own finances?  No    Are there any barriers preventing you from managing your medications?  No    Are there any barriers preventing you from showering, bathing or dressing yourself? No    Are there any barriers preventing you from doing housework or laundry? No  Are there any barriers preventing you from using the toilet?No  Are you currently engaging in any exercise or physical activity?  Yes.      Self-Assessment of Health  What is your perception of your health? Fair    Do you sleep more than six hours a  night? No    In the past 7 days, how much did pain keep you from doing your normal work? A lot    Do you spend quality time with family or friends (virtually or in person)? Yes    Do you usually eat a heart healthy diet that constists of a variety of fruits, vegetables, whole grains and fiber? Yes    Do you eat foods high in fat and/or Fast Food more than three times per week? No    How concerned are you that your medical conditions are not being well managed? Not at all    Are you worried that in the next 2 months, you may not have stable housing that you own, rent, or stay in as part of a household? No      Advance Care Planning  Do you have an Advance Directive, Living Will, Durable Power of , or POLST? Yes  Advance Directive       is on file      Health Maintenance Summary            Current Care Gaps       Annual Wellness Visit (Yearly) Overdue since 3/14/2025      03/14/2024  Visit Dx: Welcome to Medicare preventive visit              Diabetes: Retinopathy Screening (Yearly) Overdue since 4/12/2025 04/12/2024  RETINAL SCREENING RESULTS    03/04/2024  Done - Dr. Frost    12/06/2022  RETINAL SCREENING RESULTS    12/02/2022  POCT Retinal Eye Exam    11/19/2021  Referral for Retinal Screening Exam     Only the first 5 history entries have been loaded, but more history exists.                    Needs Review       Abdominal Aortic Aneurysm (AAA) Screening  Tentatively Complete      10/19/2024  CT-ABDOMEN-PELVIS WITH    04/17/2024  US-ABDOMINAL AORTA SCREENING (AAA)    05/01/2017  CT-ABDOMEN-PELVIS WITH    03/14/2017  Done              Hepatitis C Screening  Tentatively Complete      06/04/2020  Hepatitis C Antibody component of HEP C VIRUS ANTIBODY              Colorectal Cancer Screening (Colonoscopy - Every 5 Years) Tentatively due on 8/31/2026 08/31/2021  REFERRAL TO GI FOR COLONOSCOPY    07/15/2016  REFERRAL TO GI FOR COLONOSCOPY                      Upcoming       COVID-19 Vaccine (4 -  2024-25 season) Postponed until 9/3/2025      09/27/2022  Imm Admin: MODERNA BIVALENT BOOSTER SARS-COV-2 VACCINE (6+)    11/09/2021  Imm Admin: MODERNA SARS-COV-2 VACCINE (12+)    04/13/2021  Imm Admin: MODERNA SARS-COV-2 VACCINE (12+)    03/16/2021  Imm Admin: MODERNA SARS-COV-2 VACCINE (12+)              A1c Screening (Every 6 Months) Next due on 10/10/2025      04/10/2025  POCT Hemoglobin A1C    10/01/2024  HEMOGLOBIN A1C    09/15/2024  HEMOGLOBIN A1C    03/28/2024  POCT Hemoglobin A1C    12/26/2023  POCT  A1C      Only the first 5 history entries have been loaded, but more history exists.              Fasting Lipid Profile (Yearly) Next due on 4/1/2026 04/01/2025  Lipid Profile    03/14/2024  Lipid Profile    03/16/2023  Lipid Profile    07/22/2022  Lipid Profile    01/07/2021  Lipid Profile      Only the first 5 history entries have been loaded, but more history exists.              Diabetes: Urine Protein Screening (Yearly) Next due on 4/1/2026 04/01/2025  MICROALBUMIN CREAT RATIO URINE    03/16/2023  MICROALBUMIN CREAT RATIO URINE    07/29/2022  MICROALBUMIN CREAT RATIO URINE    01/07/2021  MICROALBUMIN CREAT RATIO URINE    12/31/2019  MICROALBUMIN CREAT RATIO URINE      Only the first 5 history entries have been loaded, but more history exists.              SERUM CREATININE (Yearly) Next due on 4/1/2026 04/01/2025  Comp Metabolic Panel    10/19/2024  Complete Metabolic Panel    09/21/2024  Basic Metabolic Panel    09/20/2024  Basic Metabolic Panel    09/19/2024  Basic Metabolic Panel      Only the first 5 history entries have been loaded, but more history exists.              Diabetes: Monofilament / LE Exam (Yearly) Next due on 4/10/2026      04/10/2025  SmartData: WORKFLOW - DIABETES - DIABETIC FOOT EXAM PERFORMED    04/10/2025  Diabetic Monofilament LE Exam    03/28/2024  Diabetic Monofilament LE Exam    12/26/2023  SmartData: WORKFLOW - DIABETES - DIABETIC FOOT EXAM PERFORMED    12/26/2023   Diabetic Monofilament LE Exam      Only the first 5 history entries have been loaded, but more history exists.              IMM DTaP/Tdap/Td Vaccine (2 - Td or Tdap) Next due on 11/7/2029 11/07/2019  Imm Admin: Tdap Vaccine                      Completed or No Longer Recommended       Hepatitis B Vaccine (Hep B) (Series Information) Completed      09/03/2024  Imm Admin: Hepatitis B Vaccine (Adol/Adult)    06/04/2020  Imm Admin: Hepatitis B Vaccine (Adol/Adult)    11/07/2019  Imm Admin: Hepatitis B Vaccine (Adol/Adult)              Influenza Vaccine (Series Information) Completed      10/07/2024  Imm Admin: Influenza high-dose trivalent (PF)    09/26/2023  Imm Admin: Influenza Vaccine Quad Inj (Pf)    09/27/2022  Imm Admin: Influenza Vaccine Quad Inj (Pf)    09/28/2021  Imm Admin: Influenza Vaccine Quad Inj (Pf)    11/07/2019  Imm Admin: Influenza Vaccine Quad Inj (Pf)      Only the first 5 history entries have been loaded, but more history exists.              Zoster (Shingles) Vaccines (Series Information) Completed      10/04/2021  Imm Admin: Zoster Vaccine Recombinant (RZV) (SHINGRIX)    02/06/2019  Imm Admin: Zoster Vaccine Recombinant (RZV) (SHINGRIX)              Pneumococcal Vaccine: 50+ Years (Series Information) Completed      12/15/2022  Imm Admin: Pneumococcal Conjugate Vaccine (PCV20)    11/01/2015  Imm Admin: Pneumococcal Vaccine (PCV7) - HISTORICAL DATA    01/28/2014  Imm Admin: Pneumococcal Conjugate Vaccine (Prevnar/PCV-13)              Hepatitis A Vaccine (Hep A) (Series Information) Aged Out      09/28/2017  Imm Admin: Hepatitis A Vaccine, Adult              HPV Vaccines (Series Information) Aged Out      No completion history exists for this topic.              Polio Vaccine (Inactivated Polio) (Series Information) Aged Out     No completion history exists for this topic.              Meningococcal Immunization (Series Information) Aged Out     No completion history exists for this topic.                             Patient Care Team:  NIRANJAN Butts as PCP - General (Family Medicine)  Ishan as Respiratory Therapist  [facility]  as Respiratory Therapist  Elijah Stern M.D. (Otolaryngology)  Aurelio Dennis M.D. (Orthopedic Surgery)  Marcos Carlin M.D. (Cardiovascular Disease (Cardiology))  Dragan Ayala M.D. (Phys Med and Rehab)        Social History     Tobacco Use    Smoking status: Former     Current packs/day: 0.00     Types: Cigarettes     Quit date:      Years since quittin.2    Smokeless tobacco: Never    Tobacco comments:     occasional cigars   Vaping Use    Vaping status: Never Used   Substance Use Topics    Alcohol use: Not Currently     Comment: If and when I drink it's only social    Drug use: Yes     Types: Marijuana, Inhaled, Oral     Comment: THC/ Occ     Family History   Problem Relation Age of Onset    Breast Cancer Mother     Hypertension Mother     Cancer Mother         breast    Heart Disease Mother         hx of bypass    Hypertension Father     Arthritis Father     Diabetes Father         prediabetes    No Known Problems Sister     Arthritis Brother     Heart Disease Other     Hypertension Other     Cancer Other     No Known Problems Brother      He  has a past medical history of Abnormal nuclear cardiac imaging test (2014), Allergy, Anesthesia, Anginal syndrome (HCC), ASTHMA, CHEST PAIN (6/15/2011), Chest pain at rest (2014), Coronary-myocardial bridge (2012), Diabetes (), High cholesterol, Hyperlipidemia, Hypertension, Mild intermittent asthma without complication (2017), Myocardial bridge, PONV (postoperative nausea and vomiting), Sleep apnea, and Snoring.   Past Surgical History:   Procedure Laterality Date    ROBOTIC ASSISTED TIARA W/INTRAOP CHOLANGIOGRAMS N/A 10/19/2024    Procedure: CHOLECYSTECTOMY, ROBOT-ASSISTED, LAPAROSCOPIC;  Surgeon: Patricia Acuña M.D.;  Location: SURGERY [facility];  Service: General     CERVICAL DISK AND FUSION ANTERIOR  5/26/2023    Procedure: ANTERIOR C5-7 DISCECTOMY AND FUSION;  Surgeon: Segun Cochran M.D.;  Location: SURGERY [facility];  Service: Neurosurgery    LUMBAR LAMINECTOMY DISKECTOMY  5/26/2023    Procedure: LAMINECTOMY, SPINE, LUMBAR, WITH DISCECTOMY POSTERIOR L4-S1 LAMINECTOMIES;  Surgeon: Segun Cochran M.D.;  Location: SURGERY [facility];  Service: Neurosurgery    RADIO FREQUENCY ABLATION ADDITIONAL LEVEL Bilateral 07/06/2022    Procedure: BILATERAL radiofrequency neurotomies medial branch targeting the L3-4, L4-5 and L5-S1 facet joints with fluoroscopic guidance and sedation. Plan for 80 degree C for 90 seconds for each neurotomy.;  Surgeon: Dragan Ayala M.D.;  Location: SURGERY [facility];  Service: Pain Management    MI INJ DX/THER AGNT PARAVERT FACET JOINT, DEVON* Bilateral 06/21/2022    Procedure: Diagnostic medial branch blocks targeting the BILATERAL L3-4, L4-5 and L5-S1 facet joints with fluoroscopic guidance #2;  Surgeon: Dragan Ayala M.D.;  Location: SUR[facility]ERY [facility];  Service: Pain Management    LUMBAR MEDIAL BRANCH BLOCKS Bilateral 06/21/2022    Procedure: .;  Surgeon: Dragan Ayala M.D.;  Location: SURGERY [facility];  Service: Pain Management    CONSCIOUS SEDATION Bilateral 06/21/2022    Procedure: .;  Surgeon: Dragan Ayala M.D.;  Location: SURGERY [facility];  Service: Pain Management    LUMBAR MEDIAL BRANCH BLOCKS Bilateral 06/14/2022    Procedure: Diagnostic medial branch blocks targeting the BILATERAL L3-4, L4-5 and L5-S1 facet joints with fluoroscopic guidance #1;  Surgeon: Dragan Ayala M.D.;  Location: SUR[facility]ERY [facility];  Service: Pain Management    MI TRANSURETHRAL ELEC-SURG PROSTATECTOM N/A 05/27/2022    Procedure: TURP, USING BUTTON ELECTRODE;  Surgeon: Lee Mckee M.D.;  Location: SURGERY [facility];  Service: Urology    BLOCK EPIDURAL STEROID INJECTION Left 03/22/2022     "Procedure: LEFT L3-4 and L4-5 transforaminal epidural steroid injection with fluoroscopic guidance;  Surgeon: Dragan Ayala M.D.;  Location: SURGERY [facility];  Service: Pain Management    ND NJX AA&/STRD TFRML EPI LUMBAR/SACRAL 1 LEVEL Left 02/15/2022    Procedure: LEFT L3-4 and L4-5 transforaminal epidural steroid injection with fluoroscopic guidance;  Surgeon: Dragan Ayala M.D.;  Location: SURGERY [facility];  Service: Pain Management    SHOULDER DECOMPRESSION ARTHROSCOPIC Left 01/04/2018    Procedure: SHOULDER DECOMPRESSION ARTHROSCOPIC - SUBACROMIAL;  Surgeon: Linwood Grover M.D.;  Location: SURGERY [facility];  Service: Orthopedics    SHOULDER ARTHROSCOPY W/ BICIPITAL TENODESIS REPAIR Left 01/04/2018    Procedure: SHOULDER ARTHROSCOPY W/ BICIPITAL Tenotomy REPAIR;  Surgeon: Linwood Grover M.D.;  Location: [facility];  Service: Orthopedics    CLAVICLE DISTAL EXCISION Left 01/04/2018    Procedure: CLAVICLE DISTAL EXCISION - POSSIBLE;  Surgeon: Linwood Grover M.D.;  Location: SURGERY [facility];  Service: Orthopedics    RECOVERY  04/08/2016    Procedure: CATH LAB C WITH POSSIBLE NAVIN;  Surgeon: Recoveryonly Surgery;  Location: SURGERY PRE-POST PROC UNIT [facility];  Service:     VENTRAL HERNIA REPAIR LAPAROSCOPIC  11/01/2012    Performed by Marcos Ramírez M.D. at [facility]    KNEE ARTHROSCOPY  1990's    right    OTHER ABDOMINAL SURGERY  10-    About 8 years ago    SHOULDER ARTHROSCOPY  1990's    right    SINUSOTOMIES  1990's    times two surgeries    TUMOR EXCISION WITH BIOPSY  1990's    right hand - thumb       Exam:   /56 (BP Location: Right arm, Patient Position: Sitting, BP Cuff Size: Adult)   Pulse 78   Temp (!) 35.6 °C (96.1 °F)   Resp 16   Ht 1.753 m (5' 9\")   Wt 75.8 kg (167 lb)   SpO2 98%  Body mass index is 24.66 kg/m².    Hearing excellent.    Dentition good  Alert, oriented in no acute " distress.  Eye contact is good, speech goal directed, affect calm    Assessment and Plan. The following treatment and monitoring plan is recommended:   1. Medicare annual wellness visit, subsequent        2. Abnormal radionuclide heart study        3. Acquired hypothyroidism        4. Angina pectoris (HCC)        5. Uncomplicated asthma, unspecified asthma severity, unspecified whether persistent        6. Benign essential hypertension        7. Benign prostatic hyperplasia with urinary frequency        8. Controlled type 2 diabetes mellitus with diabetic polyneuropathy, without long-term current use of insulin (HCC)        9. Coronary artery disease, non-occlusive        10. Coronary-myocardial bridge        11. Degeneration of cervical intervertebral disc        12. 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)        13. Dyslipidemia        14. H/O cervical discectomy        15. History of sleep apnea - improved with 70 lb weight loss        16. History of small bowel obstruction - 2018        17. Other insomnia        18. Polycythemia        19. S/P cholecystectomy        20. Seasonal allergies        Main issue is his spine- awaiting appt with Dr Cochran, neurosurgery, in may.  Requested mri.  Taking ambien as needed.    Awaiting Dr Herring this Wednesday - re: trigger finger / thumb.   Meeting with Dr. Le, cardiology, soon.    Labs are UTD.   Colonoscopy utd 2021.  Repeat 8/2026.   Filled tramadol and gabapentin for upcoming travel in the next 2 weeks and meets with Dr Cochran in may.   Reviewed  profile.   In prescribing controlled substances to this patient, I certify that I have obtained and reviewed the medical history of Barron Hewitt. I have also made a good dixie effort to obtain applicable records from other providers who have treated the patient and records did not demonstrate any increased risk of substance abuse that would prevent me from prescribing controlled  substances.     I have conducted a physical exam and documented it. I have reviewed Mr. Hewitt’s prescription history as maintained by the [place] Prescription Monitoring Program.     I have assessed the patient’s risk for abuse, dependency, and addiction using the validated Opioid Risk Tool available at https://www.mdcalc.com/xrmree-lczc-uahh-ort-narcotic-abuse.     Given the above, I believe the benefits of controlled substance therapy outweigh the risks. The reasons for prescribing controlled substances include non-narcotic, oral analgesic alternatives have been inadequate for pain control. Accordingly, I have discussed the risk and benefits, treatment plan, and alternative therapies with the patient.     He understands not to take tramadol and Ambien at night.  If he is in pain at night his regimen could include 1-2 tramadol along with 1 gabapentin.    Services suggested: No services needed at this time  Health Care Screening: Age-appropriate preventive services recommended by USPTF and ACIP covered by Medicare were discussed today. Services ordered if indicated and agreed upon by the patient.  Referrals offered: Community-based lifestyle interventions to reduce health risks and promote self-management and wellness, fall prevention, nutrition, physical activity, tobacco-use cessation, weight loss, and mental health services as per orders if indicated.    Discussion today about general wellness and lifestyle habits:    Prevent falls and reduce trip hazards; Cautioned about securing or removing rugs.  Have a working fire alarm and carbon monoxide detector;   Engage in regular physical activity and social activities     Follow-up: 6 months, sooner with any problems or concerns.

## 2025-04-14 NOTE — LETTER
[facility]  Ruthie Unger A.P.N.  [address]  [place] [address]  Fax: [phone]   Authorization for Release/Disclosure of   Protected Health Information   Name: SYLVIE JARAMILLO : 1960 SSN: [id number]   Address: [address] [place] [id number] Phone:    There are no phone numbers on file.   I authorize the entity listed below to release/disclose the PHI below to:   [facility]/Ruthie Unger A.P.N. and Ruthie Unger A.P.N.   Provider or Entity Name:  [facility]   City, State, [facility]   Phone:      Fax:  [phone]   Reason for request: continuity of care   Information to be released:    [  ] LAST COLONOSCOPY,  including any PATH REPORT and follow-up  [  ] LAST FIT/COLOGUARD RESULT [  ] LAST DEXA  [  ] LAST MAMMOGRAM  [  ] LAST PAP  [  ] LAST LABS [  ] RETINA EXAM REPORT  [  ] IMMUNIZATION RECORDS  [XX  ] Release MRI/ Radiology report      [  ] Check here and initial the line next to each item to release ALL health information INCLUDING  _____ Care and treatment for drug and / or alcohol abuse  _____ HIV testing, infection status, or AIDS  _____ Genetic Testing    DATES OF SERVICE OR TIME PERIOD TO BE DISCLOSED: _____________  I understand and acknowledge that:  * This Authorization may be revoked at any time by you in writing, except if your health information has already been used or disclosed.  * Your health information that will be used or disclosed as a result of you signing this authorization could be re-disclosed by the recipient. If this occurs, your re-disclosed health information may no longer be protected by State or Federal laws.  * You may refuse to sign this Authorization. Your refusal will not affect your ability to obtain treatment.  * This Authorization becomes effective upon signing and will  on (date) __________.      If no date is indicated, this Authorization will  one (1) year from the signature date.     Name: Barron Hewitt  Signature:continuity of care Date:   4/14/2025     PLEASE FAX REQUESTED RECORDS BACK TO: [phone]

## 2025-04-14 NOTE — TELEPHONE ENCOUNTER
Request faxed  ----- Message from Nurse Practitioner NIRANJAN Baires sent at 4/14/2025  9:54 AM PDT -----  Please request MRI from [facility] neurosurgery (copy of radiology report).  Thanks!

## 2025-05-19 ENCOUNTER — OFFICE VISIT (OUTPATIENT)
Dept: CARDIOLOGY | Facility: MEDICAL CENTER | Age: 65
End: 2025-05-19
Attending: INTERNAL MEDICINE
Payer: MEDICARE

## 2025-05-19 VITALS
WEIGHT: 166 LBS | OXYGEN SATURATION: 96 % | DIASTOLIC BLOOD PRESSURE: 64 MMHG | HEART RATE: 88 BPM | BODY MASS INDEX: 24.59 KG/M2 | RESPIRATION RATE: 16 BRPM | HEIGHT: 69 IN | SYSTOLIC BLOOD PRESSURE: 116 MMHG

## 2025-05-19 DIAGNOSIS — I25.83 CORONARY ARTERY DISEASE DUE TO LIPID RICH PLAQUE: Chronic | ICD-10-CM

## 2025-05-19 DIAGNOSIS — E78.1 HYPERTRIGLYCERIDEMIA: ICD-10-CM

## 2025-05-19 DIAGNOSIS — E78.5 DYSLIPIDEMIA: ICD-10-CM

## 2025-05-19 DIAGNOSIS — I20.9 ANGINA PECTORIS (HCC): ICD-10-CM

## 2025-05-19 DIAGNOSIS — I25.10 CORONARY ARTERY DISEASE DUE TO LIPID RICH PLAQUE: Chronic | ICD-10-CM

## 2025-05-19 DIAGNOSIS — Q24.5 CORONARY-MYOCARDIAL BRIDGE: Primary | ICD-10-CM

## 2025-05-19 PROCEDURE — G2211 COMPLEX E/M VISIT ADD ON: HCPCS | Performed by: INTERNAL MEDICINE

## 2025-05-19 PROCEDURE — 99212 OFFICE O/P EST SF 10 MIN: CPT | Performed by: INTERNAL MEDICINE

## 2025-05-19 PROCEDURE — 3078F DIAST BP <80 MM HG: CPT | Performed by: INTERNAL MEDICINE

## 2025-05-19 PROCEDURE — 99214 OFFICE O/P EST MOD 30 MIN: CPT | Performed by: INTERNAL MEDICINE

## 2025-05-19 PROCEDURE — 3074F SYST BP LT 130 MM HG: CPT | Performed by: INTERNAL MEDICINE

## 2025-05-19 RX ORDER — CHLORAL HYDRATE 500 MG
1000 CAPSULE ORAL 2 TIMES DAILY
COMMUNITY
Start: 2025-05-19

## 2025-05-19 RX ORDER — CLOPIDOGREL BISULFATE 75 MG/1
75 TABLET ORAL DAILY
Qty: 90 TABLET | Refills: 3 | Status: SHIPPED | OUTPATIENT
Start: 2025-05-19

## 2025-05-19 ASSESSMENT — FIBROSIS 4 INDEX: FIB4 SCORE: 1.06

## 2025-05-19 NOTE — PATIENT INSTRUCTIONS
A - Antiplatelet - Clopidogrel (PLAVIX) reduces your risk of cardiac event by 27% compared to Aspirin 81 mg daily (HOST EXAM study 2021), prasrugrel (EFFIENT), ticagrelor (BRILINTA)) may be used for the first year.  Aspirin 81 mg daily is associated with a 20% less use of heart event and is used the first year after a cardiac event, stent or CABG.  B - Blood Pressure Control - reduces your risk or heart attack and stroke, the goal is <130/80.  C - Cholesterol Management - statins dramatically reduce your risk; for those that are intolerant to statins, there are alternatives.  D - Diet - MEDITERRANEAN DIET or Cardiac rehab diets, [email or web address].  E - Exercise - at least 2.5 hours of moderate exercise weekly  (typical brisk walking or similar activity).  F - Fats - VASCEPA, or EPA Fish oil (if Vascepa too expensive) for elevated triglycerides (REDUCE IT trial showed reduction from 22% 5 year MACE to 17%).  G - Good Vibes - meditation, exercise, yoga, Pilates, mindfulness, Heath-Chi, stress reduction.  H - Heart Failure - betablockers, sucubatril (ENTRESTO) 16% less risk of dying over 3 years, spironolactone, empagliflozin (JARDIANCE) 17% less risk of dying over 2 years, CRT +/- ICD.  I - Inflammation - Colchicine in the LoDoCo2 study in 2020 reduced the risk of heart attack by 30% in 2.5 year follow up.  R - Rehab - Cardiac Rehab reduces risk of dying by 13-24% and need to go to the hospital by 30% within the first year. Compared to regular Cardiac Rehab, Intensive Cardiac Rehab ([place] [facility]) was shown to reduce the risk of major events 17% to 11% and hospitalization for CHF from 8% to 2%. (Nutrients [id number](11:5247)  S - Smoking - never smoke, if you do smoke ask for help to quit for good. Patients who quit smoking after heart attack have 36% less likely risk of dying.  Resources are 1-800-QUIT-NOW [employer] in addition to Chantix, bupropion (Zyban) or nicotine replacement  T - Type II Diabetes  - pills empagliflozin (JARDIANCE) 38% less risk of dying over 4 years, and/or weekly injections: tirzepatide (Mounjaro), semaglutide (Ozempic), liraglutide (Victoza), dulaglutide (Trulicity) ~26% less risk of MACE in 2 years.  V - Vaccines - Annual flu shot and COVID vaccine reduces the risk of serious cardiovascular complications from these deadly infections.  W - Weight - maintain a healthy weight. Semaglutide (WEGOVY) weekly injection was shown to reduce weight by 10% and heart events by 20% for patients with CAD and BMI > 27 in the SELECT trial (6.5% vs 8% in 48 month follow up [facility] 12/2023).      Work on at least 2.5 - 5 hours a week of moderate exercise    Please look into the following diets and incorporate them into your diet  LOW SALT DIET   KEEP YOUR SODIUM EQUAL TO CALORIES AND NO MORE THAN DOUBLE THE CALORIES FOR A LOW SALT DIET    Cardiosmart.org - great resource for American College of Cardiology on heart disease prevention and treatment    FOR TREATMENT OR PREVENTION OF CORONARY ARTERY DISEASE  These three programs are approved by Medicare/Insurers for those with heart disease  [facility]  Dr. Holliday's Program for Reversing Heart Disease - Jagdish Devi's Cardiologist vegetarian-based  [facility] Program - [place]-based mind-body Program    Mediterranean Diet has been shown to be a hearty healthy diet.    This is a commonly referenced Program  Dr Mixon - [place] over [place] (book and documentary) - vegetarian-based    FOR TREATMENT OF BLOOD PRESSURE  DASH DIET - American Heart Association for treatment of HYPERTENSION    FOR TREATMENT OF BAD CHOLESTEROL/FATS  REDUCE PROCESSED SUGAR AS MUCH AS POSSIBLE  INCREASE WHOLE GRAINS/VEGETABLES  INCREASE FIBER    Lowering total cholesterol and LDL (bad) cholesterol:  - Eat leaner cuts of meat, or eliminate altogether if possible red meat, and frequently substitute fish or chicken.  - Limit saturated  fat to no more than 7-10% of total calories no more than 10 g per day is recommended. Some sources of saturated fat include butter, animal fats, hydrogenated vegetable fats and oils, many desserts, whole milk dairy products.  - Replaced saturated fats with polyunsaturated fats and monounsaturated fats. Foods high in monounsaturated fat include nuts, canola oil, avocados, and olives.  - Limit trans fat (processed foods) and replaced with fresh fruits and vegetables  - Recommend nonfat dairy products  - Increase substantially the amount of soluble fiber intake (legumes such as beans, fruit, whole grains).  - Consider nutritional supplements: plant sterile spreads such as Benecol, fish oil,  flaxseed oil, omega-3 acids EPA capsules 2000 mg twice a day, or viscous fiber such as Metamucil  - Attain ideal weight and regular exercise (at least 30 minutes per day of moderate exercise)  ASK ABOUT STATIN OR NON STATIN MEDICATION TO REDUCE YOUR LDL AND HEART RISK    Lowering triglycerides:  - Reduce intake of simple sugar: Desserts, candy, pastries, honey, sodas, sugared cereals, yogurt, Gatorade, sports bars, canned fruit, smoothies, fruit juice, coffee drinks  - Reduced intake of refined starches: Refined Pasta, most bread  - Reduce or abstain from alcohol  - Increase omega-3 fatty acids: [place], Trout, Mackerel, Herring, [place] tuna and supplements  - Attain ideal weight and regular exercise (at least 30 minutes per day of moderate exercise)  ASK ABOUT PURIFIED OMEGA 3 EPA or FISH OIL TO REDUCE YOUR TG AND HEART RISK    Elevating HDL (good) cholesterol:  - Increase physical activity  - Increase omega-3 fatty acids and supplements as listed above  - Incorporating appropriate amounts of monounsaturated fats such as nuts, olive oil, canola oil, avocados, olives  - Stop smoking  - Attain ideal weight and regular exercise (at least 30 minutes per day of moderate exercise)

## 2025-05-19 NOTE — PROGRESS NOTES
Chief Complaint   Patient presents with    Coronary Artery Disease    Hypertension    Dyslipidemia       Subjective     Aly Hewitt is a 64 y.o. male who presents today CAD, myocardial bridging (LAD), hypertension, dyslipidemia.     Father had MI and found to also have myocardial bridge    Past Medical History[1]  Past Surgical History[2]  Family History   Problem Relation Age of Onset    Breast Cancer Mother     Hypertension Mother     Cancer Mother         breast    Heart Disease Mother         hx of bypass    Hypertension Father     Arthritis Father     Diabetes Father         prediabetes    No Known Problems Sister     Arthritis Brother     Heart Disease Other     Hypertension Other     Cancer Other     No Known Problems Brother      Social History     Socioeconomic History    Marital status:      Spouse name: Not on file    Number of children: Not on file    Years of education: Not on file    Highest education level: Not on file   Occupational History    Not on file   Tobacco Use    Smoking status: Former     Current packs/day: 0.00     Types: Cigarettes     Quit date:      Years since quittin.3    Smokeless tobacco: Never    Tobacco comments:     occasional cigars   Vaping Use    Vaping status: Never Used   Substance and Sexual Activity    Alcohol use: Yes     Comment: On OCC    Drug use: Not Currently     Types: Marijuana, Inhaled, Oral     Comment: THC/ Occ    Sexual activity: Not on file     Comment: ; 2 biological kids (2 of his wife's); 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   Other Topics Concern    Not on file   Social History Narrative    Not on file     Social Drivers of Health     Financial Resource Strain: Not on file   Food Insecurity: No Food Insecurity (10/19/2024)    Hunger Vital Sign     Worried About Running Out of Food in the Last Year: Never true     Ran Out of Food in the Last Year: Never true   Transportation Needs: No Transportation Needs  "(10/19/2024)    PRAPARE - Transportation     Lack of Transportation (Medical): No     Lack of Transportation (Non-Medical): No   Physical Activity: Not on file   Stress: Not on file   Social Connections: Not on file   Intimate Partner Violence: Not At Risk (10/19/2024)    Humiliation, Afraid, Rape, and Kick questionnaire     Fear of Current or Ex-Partner: No     Emotionally Abused: No     Physically Abused: No     Sexually Abused: No   Housing Stability: Low Risk  (10/19/2024)    Housing Stability Vital Sign     Unable to Pay for Housing in the Last Year: No     Number of Times Moved in the Last Year: 0     Homeless in the Last Year: No     Allergies[3]  Encounter Medications[4]  ROS           Objective     /64 (BP Location: Left arm, Patient Position: Sitting, BP Cuff Size: Adult)   Pulse 88   Resp 16   Ht 1.753 m (5' 9\")   Wt 75.3 kg (166 lb)   SpO2 96%   BMI 24.51 kg/m²     Physical Exam  Constitutional:       General: He is not in acute distress.     Appearance: He is not diaphoretic.   Eyes:      General: No scleral icterus.  Neck:      Vascular: No JVD.   Cardiovascular:      Rate and Rhythm: Normal rate.      Heart sounds: Normal heart sounds. No murmur heard.     No friction rub. No gallop.   Pulmonary:      Effort: No respiratory distress.      Breath sounds: No wheezing or rales.   Abdominal:      General: Bowel sounds are normal.      Palpations: Abdomen is soft.   Musculoskeletal:      Right lower leg: No edema.      Left lower leg: No edema.   Skin:     Findings: No rash.   Neurological:      Mental Status: He is alert. Mental status is at baseline.   Psychiatric:         Mood and Affect: Mood normal.            We reviewed in person the most recent labs  Recent Results (from the past 30 weeks)   Comp Metabolic Panel    Collection Time: 04/01/25  8:10 AM   Result Value Ref Range    Sodium 140 135 - 145 mmol/L    Potassium 3.9 3.6 - 5.5 mmol/L    Chloride 104 96 - 112 mmol/L    Co2 26 20 - 33 " mmol/L    Anion Gap 10.0 7.0 - 16.0    Glucose 137 (H) 65 - 99 mg/dL    Bun 8 8 - 22 mg/dL    Creatinine 0.72 0.50 - 1.40 mg/dL    Calcium 9.3 8.5 - 10.5 mg/dL    Correct Calcium 9.1 8.5 - 10.5 mg/dL    AST(SGOT) 27 12 - 45 U/L    ALT(SGPT) 25 2 - 50 U/L    Alkaline Phosphatase 62 30 - 99 U/L    Total Bilirubin 0.7 0.1 - 1.5 mg/dL    Albumin 4.3 3.2 - 4.9 g/dL    Total Protein 6.8 6.0 - 8.2 g/dL    Globulin 2.5 1.9 - 3.5 g/dL    A-G Ratio 1.7 g/dL   VITAMIN D,25 HYDROXY (DEFICIENCY)    Collection Time: 04/01/25  8:10 AM   Result Value Ref Range    25-Hydroxy   Vitamin D 25 73 30 - 100 ng/mL   TSH    Collection Time: 04/01/25  8:10 AM   Result Value Ref Range    TSH 1.250 0.350 - 5.500 uIU/mL   FREE THYROXINE    Collection Time: 04/01/25  8:10 AM   Result Value Ref Range    Free T-4 1.37 0.93 - 1.70 ng/dL   T3 FREE    Collection Time: 04/01/25  8:10 AM   Result Value Ref Range    T3,Free 3.08 2.00 - 4.40 pg/mL   MICROALBUMIN CREAT RATIO URINE    Collection Time: 04/01/25  8:10 AM   Result Value Ref Range    Creatinine, Urine 76.80 mg/dL    Microalbumin, Urine Random <1.2 mg/dL    Micro Alb Creat Ratio see below 0 - 30 mg/g   Lipid Profile    Collection Time: 04/01/25  8:10 AM   Result Value Ref Range    Cholesterol,Tot 91 (L) 100 - 199 mg/dL    Triglycerides 56 0 - 149 mg/dL    HDL 32 (A) >=40 mg/dL    LDL 48 <100 mg/dL   CBC WITH DIFFERENTIAL    Collection Time: 04/01/25  8:10 AM   Result Value Ref Range    WBC 9.4 4.8 - 10.8 K/uL    RBC 6.06 4.70 - 6.10 M/uL    Hemoglobin 15.8 14.0 - 18.0 g/dL    Hematocrit 50.8 42.0 - 52.0 %    MCV 83.8 81.4 - 97.8 fL    MCH 26.1 (L) 27.0 - 33.0 pg    MCHC 31.1 (L) 32.3 - 36.5 g/dL    RDW 51.9 (H) 35.9 - 50.0 fL    Platelet Count 337 164 - 446 K/uL    MPV 10.6 9.0 - 12.9 fL    Neutrophils-Polys 65.20 44.00 - 72.00 %    Lymphocytes 18.50 (L) 22.00 - 41.00 %    Monocytes 8.90 0.00 - 13.40 %    Eosinophils 5.40 0.00 - 6.90 %    Basophils 1.80 0.00 - 1.80 %    Immature Granulocytes  0.20 0.00 - 0.90 %    Nucleated RBC 0.00 0.00 - 0.20 /100 WBC    Neutrophils (Absolute) 6.11 1.82 - 7.42 K/uL    Lymphs (Absolute) 1.73 1.00 - 4.80 K/uL    Monos (Absolute) 0.83 0.00 - 0.85 K/uL    Eos (Absolute) 0.51 0.00 - 0.51 K/uL    Baso (Absolute) 0.17 (H) 0.00 - 0.12 K/uL    Immature Granulocytes (abs) 0.02 0.00 - 0.11 K/uL    NRBC (Absolute) 0.00 K/uL   FASTING STATUS    Collection Time: 04/01/25  8:10 AM   Result Value Ref Range    Fasting Status Fasting    ESTIMATED GFR    Collection Time: 04/01/25  8:10 AM   Result Value Ref Range    GFR (CKD-EPI) 101 >60 mL/min/1.73 m 2   POCT Hemoglobin A1C    Collection Time: 04/10/25 10:04 AM   Result Value Ref Range    Glycohemoglobin 7.7 (A) <=5.8 %    Internal Control Positive Positive     Internal Control Negative Negative          Assessment & Plan     1. Coronary-myocardial bridge        2. Coronary artery disease due to lipid rich plaque  clopidogrel (PLAVIX) 75 MG Tab      3. Dyslipidemia  Coenzyme Q10 300 MG Cap      4. Angina pectoris (HCC)        5. Hypertriglyceridemia  Omega-3 Fatty Acids (FISH OIL) 1000 MG Cap capsule          Medical Decision Making: Today's Assessment/Status/Plan:          It was my pleasure to meet with Mr. Hewitt.    We addressed the management of hypertension at today's visit. Blood pressure is well controlled.  We specifically assessed the labs on hypertension treatment    We addressed the management of dyslipidemia and atherosclerosis at today's visit. He is on appropriate lipid lowering medication.    We addressed the management of atherosclerotic artery disease.  He is on proper antiplatelet, cholesterol management as appropriate.  We addressed the potential side effects and laboratory follow-up for these medications.    SWITCH TO PLAVIX MONOTHERAPY    I will see Mr. Hewitt back in 1 year time and encouraged him to follow up with us over the phone or electronically using my MyChart as issues arise.    It is my pleasure to  "participate in the care of Mr. Hewitt.  Please do not hesitate to contact me with questions or concerns.    Linwood Le MD PhD [facility]  Cardiologist [facility]    Please note that this dictation was created using voice recognition software. There may be errors I did not discover before finalizing the note.     () Today's E/M visit is associated with medical care services that serve as the continuing focal point for all needed health care services and/or with medical care services that  are part of ongoing care related to a patient's single, serious condition, or a complex condition: This includes  furnishing services to patients on an ongoing basis that result in care that is personalized  to the patient. The services result in a comprehensive, longitudinal, and continuous  relationship with the patient and involve delivery of team-based care that is accessible, coordinated with other practitioners and providers, and integrated with the broader health  care landscape.                      [1]   Past Medical History:  Diagnosis Date    Abnormal nuclear cardiac imaging test 1/31/2014    Allergy     Anesthesia     PONV    Anginal syndrome (HCC)     \"myocardial bridging\"    ASTHMA     CHEST PAIN 6/15/2011    Chest pain at rest 1/31/2014    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 Nicolle TEMPLE (postoperative nausea and vomiting)     Sleep apnea     has CPAP    Snoring    [2]   Past Surgical History:  Procedure Laterality Date    ROBOTIC ASSISTED TIARA W/INTRAOP CHOLANGIOGRAMS N/A 10/19/2024    Procedure: CHOLECYSTECTOMY, ROBOT-ASSISTED, LAPAROSCOPIC;  Surgeon: Patricia Acuña M.D.;  Location: SURGERY [facility];  Service: General    CERVICAL DISK AND FUSION ANTERIOR  " 5/26/2023    Procedure: ANTERIOR C5-7 DISCECTOMY AND FUSION;  Surgeon: Segun Cochran M.D.;  Location: SURGERY [facility];  Service: Neurosurgery    LUMBAR LAMINECTOMY DISKECTOMY  5/26/2023    Procedure: LAMINECTOMY, SPINE, LUMBAR, WITH DISCECTOMY POSTERIOR L4-S1 LAMINECTOMIES;  Surgeon: Segun Cochran M.D.;  Location: SURGERY [facility];  Service: Neurosurgery    RADIO FREQUENCY ABLATION ADDITIONAL LEVEL Bilateral 07/06/2022    Procedure: BILATERAL radiofrequency neurotomies medial branch targeting the L3-4, L4-5 and L5-S1 facet joints with fluoroscopic guidance and sedation. Plan for 80 degree C for 90 seconds for each neurotomy.;  Surgeon: Dragan Ayala M.D.;  [facility];  Service: Pain Management    PA INJ DX/THER AGNT PARAVERT FACET JOINT, DEVON* Bilateral 06/21/2022    Procedure: Diagnostic medial branch blocks targeting the BILATERAL L3-4, L4-5 and L5-S1 facet joints with fluoroscopic guidance #2;  Surgeon: Dragan Ayala M.D.;  Location: [facility];  Service: Pain Management    LUMBAR MEDIAL BRANCH BLOCKS Bilateral 06/21/2022    Procedure: .;  Surgeon: Dragan Ayala M.D.;  Location: [facility];  Service: Pain Management    CONSCIOUS SEDATION Bilateral 06/21/2022    Procedure: .;  Surgeon: Dragan Ayala M.D.;  Location: [facility];  Service: Pain Management    LUMBAR MEDIAL BRANCH BLOCKS Bilateral 06/14/2022    Procedure: Diagnostic medial branch blocks targeting the BILATERAL L3-4, L4-5 and L5-S1 facet joints with fluoroscopic guidance #1;  Surgeon: Dragan Ayala M.D.;  Location: [facility];  Service: Pain Management    PA TRANSURETHRAL ELEC-SURG PROSTATECTOM N/A 05/27/2022    Procedure: TURP, USING BUTTON ELECTRODE;  Surgeon: Lee Mckee M.D.;  Location: SURGERY [facility];  Service: Urology    BLOCK EPIDURAL STEROID INJECTION Left 03/22/2022    Procedure: LEFT L3-4 and L4-5 transforaminal  epidural steroid injection with fluoroscopic guidance;  Surgeon: Dragan Ayala M.D.;  Location: [facility];  Service: Pain Management    OR NJX AA&/STRD TFRML EPI LUMBAR/SACRAL 1 LEVEL Left 02/15/2022    Procedure: LEFT L3-4 and L4-5 transforaminal epidural steroid injection with fluoroscopic guidance;  Surgeon: Dragan Ayala M.D.;  Location: [facility];  Service: Pain Management    SHOULDER DECOMPRESSION ARTHROSCOPIC Left 01/04/2018    Procedure: SHOULDER DECOMPRESSION ARTHROSCOPIC - SUBACROMIAL;  Surgeon: Linwood Grover M.D.;  Location: SURGERY [facility];  Service: Orthopedics    SHOULDER ARTHROSCOPY W/ BICIPITAL TENODESIS REPAIR Left 01/04/2018    Procedure: SHOULDER ARTHROSCOPY W/ BICIPITAL Tenotomy REPAIR;  Surgeon: Linwood Grover M.D.;  Location: [facility];  Service: Orthopedics    CLAVICLE DISTAL EXCISION Left 01/04/2018    Procedure: CLAVICLE DISTAL EXCISION - POSSIBLE;  Surgeon: Linwood Grover M.D.;  Location: [facility];  Service: Orthopedics    RECOVERY  04/08/2016    Procedure: CATH LAB C WITH POSSIBLE NAVIN;  Surgeon: Recoveryon Surgery;  Location: SURGERY PRE-POST PROC UNIT [facility];  Service:     VENTRAL HERNIA REPAIR LAPAROSCOPIC  11/01/2012    Performed by Marcos Ramírez M.D. at [facility]    KNEE ARTHROSCOPY  1990's    right    OTHER ABDOMINAL SURGERY  10-    About 8 years ago    SHOULDER ARTHROSCOPY  1990's    right    SINUSOTOMIES  1990's    times two surgeries    TUMOR EXCISION WITH BIOPSY  1990's    right hand - thumb   [3]   Allergies  Allergen Reactions    Kiwi Extract Anaphylaxis    Shellfish Allergy Anaphylaxis and Unspecified    Strawberry Anaphylaxis and Unspecified    Ozempic (0.25 Or 0.5 Mg-Dose) [Semaglutide] Nausea    Sulfa Drugs Rash and Unspecified     rash    Testosterone Rash     rash   [4]   Outpatient Encounter Medications as of 5/19/2025   Medication  Sig Dispense Refill    gabapentin (NEURONTIN) 300 MG Cap Take 1 Capsule by mouth at bedtime as needed (back / leg pain / neuropathy). 30 Capsule 0    metFORMIN ER (GLUCOPHAGE XR) 500 MG TABLET SR 24 HR TAKE 1 TABLET TWICE A  Tablet 3    TRULICITY 1.5 MG/0.5ML Solution Auto-injector INJECT 0.5 ML UNDER THE SKIN EVERY 7 DAYS 6 mL 3    nystatin (MYCOSTATIN) 100402 UNIT/ML Suspension Take 5 mL by mouth 4 times a day. (Patient not taking: Reported on 5/19/2025) 473 mL 0    Insulin Pen Needle 32G X 6 MM Misc 1 Each every day. 100 Each 3    levothyroxine (SYNTHROID) 50 MCG Tab TAKE 1 TABLET EVERY MORNING ON AN EMPTY STOMACH 90 Tablet 3    Continuous Glucose  (DEXCOM G7 ) Device 1 Each continuous. 1 Each 0    Continuous Glucose Sensor (DEXCOM G7 SENSOR) Misc 1 Each every 10 days. 9 Each 3    metoprolol SR (TOPROL XL) 25 MG TABLET SR 24 HR TAKE 1 TABLET DAILY 90 Tablet 3    atorvastatin (LIPITOR) 80 MG tablet TAKE 1 TABLET EVERY EVENING 90 Tablet 3    fluticasone-salmeterol (ADVAIR DISKUS) 500-50 MCG/ACT AEROSOL POWDER, BREATH ACTIVATED Inhale 1 Puff 1 time a day as needed (shortness of breath).   (Patient taking differently: Inhale 1 Puff every day.  )      acetaminophen (TYLENOL) 500 MG Tab Take 1,000 mg by mouth every 6 hours as needed. Indications: Pain      Cholecalciferol (D3 PO) Take 1 Capsule by mouth every day.      Empagliflozin (JARDIANCE) 25 MG Tab Take 1 Tablet by mouth every day. 90 Tablet 3    montelukast (SINGULAIR) 10 MG Tab Take 1 Tablet by mouth every evening. 90 Tablet 3    VENTOLIN  (90 Base) MCG/ACT Aero Soln inhalation aerosol USE 2 INHALATIONS EVERY 4 HOURS AS NEEDED FOR SHORTNESS OF BREATH 54 g 3    EPINEPHrine (EPIPEN) 0.3 MG/0.3ML Solution Auto-injector solution for injection Inject 0.3 mL into the thigh one time for 1 dose. 2 Each 2    pantoprazole (PROTONIX) 20 MG tablet Take 1 Tablet by mouth every day. 90 Tablet 3    aspirin 81 MG EC tablet Take 81 mg by mouth  every day.      Blood Glucose Test Strips Test strips order:  Freestyle Freedom Lite test strips  testing one time per day 100 Strip 2    Omega-3 Fatty Acids (FISH OIL) 1200 MG CAPS Take 1 Capsule by mouth every day.      COENZYME Q10 PO Take 1 Capsule by mouth every evening.        cetirizine (ZYRTEC) 10 MG Tab Take 10 mg by mouth every day.       No facility-administered encounter medications on file as of 5/19/2025.

## 2025-05-20 ENCOUNTER — OFFICE VISIT (OUTPATIENT)
Dept: MEDICAL GROUP | Facility: LAB | Age: 65
End: 2025-05-20
Payer: MEDICARE

## 2025-05-20 VITALS
HEART RATE: 84 BPM | RESPIRATION RATE: 16 BRPM | SYSTOLIC BLOOD PRESSURE: 142 MMHG | OXYGEN SATURATION: 97 % | HEIGHT: 69 IN | WEIGHT: 162 LBS | BODY MASS INDEX: 23.99 KG/M2 | TEMPERATURE: 96.6 F | DIASTOLIC BLOOD PRESSURE: 68 MMHG

## 2025-05-20 DIAGNOSIS — E11.42 CONTROLLED TYPE 2 DIABETES MELLITUS WITH DIABETIC POLYNEUROPATHY, WITHOUT LONG-TERM CURRENT USE OF INSULIN (HCC): ICD-10-CM

## 2025-05-20 DIAGNOSIS — M51.360 DEGENERATION OF INTERVERTEBRAL DISC OF LUMBAR REGION WITH DISCOGENIC BACK PAIN: ICD-10-CM

## 2025-05-20 DIAGNOSIS — J45.909 UNCOMPLICATED ASTHMA, UNSPECIFIED ASTHMA SEVERITY, UNSPECIFIED WHETHER PERSISTENT: Primary | ICD-10-CM

## 2025-05-20 DIAGNOSIS — J01.00 ACUTE NON-RECURRENT MAXILLARY SINUSITIS: ICD-10-CM

## 2025-05-20 DIAGNOSIS — J20.9 ACUTE BRONCHITIS, UNSPECIFIED ORGANISM: ICD-10-CM

## 2025-05-20 PROCEDURE — 3077F SYST BP >= 140 MM HG: CPT | Performed by: NURSE PRACTITIONER

## 2025-05-20 PROCEDURE — 99214 OFFICE O/P EST MOD 30 MIN: CPT | Performed by: NURSE PRACTITIONER

## 2025-05-20 PROCEDURE — 3078F DIAST BP <80 MM HG: CPT | Performed by: NURSE PRACTITIONER

## 2025-05-20 RX ORDER — PREDNISONE 20 MG/1
40 TABLET ORAL DAILY
Qty: 10 TABLET | Refills: 0 | Status: SHIPPED | OUTPATIENT
Start: 2025-05-20

## 2025-05-20 ASSESSMENT — FIBROSIS 4 INDEX: FIB4 SCORE: 1.06

## 2025-05-20 NOTE — PROGRESS NOTES
Verbal consent was acquired by the patient to use [employer] ambient listening note generation during this visit Yes      Subjective   Aly Hewitt is a 64 y.o. male who presents for f/u  History of Present Illness  The patient is a 64-year-old male who presents with sinusitis symptoms.    He reports experiencing sinus issues for approximately 2 weeks, initially attributing them to allergies. Symptoms have recently worsened, including pain behind the eyes, coughing up green phlegm, and blowing out green mucus. There has been a significant increase in chest congestion over the past week. He has not required any antibiotics for the past 4 months. He has been using a neti pot for sinus drainage and takes Zyrtec for allergies, occasionally doubling up with Benadryl at night to help with sneezing and sleep. His regular treatment regimen includes Ventolin and Advair, with Advair being used in the morning and at night, and Ventolin being used more frequently recently. He also takes montelukast.  Nonsmoker.     His blood sugar levels are currently at 7.4, down from 7.2, and he is working towards achieving a level of 6.9.  Is followed by endocrinology.    Lumbar degenerative disc disease: Chronic issue.  He has consulted with Dr. Cochran regarding his back pain, who has recommended physical therapy. He has been prescribed tramadol and gabapentin for pain management.    He is planning to schedule an eye appointment before his upcoming trip.    He reports poor sleep quality, often waking up after 5 to 6 hours of sleep. He took a muscle relaxer and zolpidem last night due to leg cramps.    FAMILY HISTORY  His father had a heart attack at the age of 97 and had an emergency stent placed.    Review of Systems   Neurological:  Numbness: .cku.     Negative except for HPI  Objective   BP (!) 142/68 (BP Location: Right arm, Patient Position: Sitting, BP Cuff Size: Adult)   Pulse 84   Temp 35.9 °C (96.6 °F)   Resp 16   Ht  "1.753 m (5' 9\")   Wt 73.5 kg (162 lb)   SpO2 97%   Physical Exam  Constitutional: Alert, no distress, plus 3 vital signs  Skin:  Warm, dry, no rashes invisible areas  Eye: Equal, round and reactive, conjunctiva clear  ENMT: Bilateral tympanic membranes are retracted but translucent without erythema or perforation. Positive bilateral maxillary sinus tenderness. Oropharynx is slightly injected without exudate. No tonsillar enlargement..    Neck: Mild anterior cervical, nontender lymphadenopathy  Respiratory: Unlabored respiration,.  he has expiratory wheezing and rhonchi but no specific areas of decreased lung sounds.  Cardiovascular: Normal rate and rhythm, no murmur, no edema  Psych: Alert, pleasant, well-groomed, normal affect      Results  Labs   - Blood Glucose Test: 7.4       Assessment & Plan  1. Sinusitis  - Presents with symptoms of sinusitis, including coughing up green mucus and pressure behind the eyes.  - Temperature and oxygen levels are within normal limits; wheezing noted in the lungs.  Treatment plan: Augmentin prescribed, to be taken twice daily with meals for 7 days; prednisone 40 mg prescribed, to be taken twice daily for 5 days.  Clinical decision making: Advised to start prednisone tomorrow to avoid sleep disturbances; instructed to inform the clinic if no improvement within a week.    2. Diabetes Mellitus  - Blood sugar levels currently stable, with a recent reading of 7.4%.  - Aware that steroid use may temporarily elevate blood sugar levels.  Treatment plan: Will monitor blood sugar levels closely and followed DM diet.     3. Back Pain  Up-to-date with Dr. Cochran, neurosurgery.  Told to continue PT and follow conservative measures.    4.  Asthma:  Continue Advair twice daily, montelukast nightly, Zyrtec daily, Benadryl nightly as needed, albuterol as needed.  Prednisone started.  Follow-up 1 week if not improving, sooner with worsening.  ER precautions prn fever >102.5, increased WOB, onset " CP, or worsening overall symptoms.               Please note that this dictation was created using voice recognition software. I have made every reasonable attempt to correct obvious errors, but I expect that there are errors of grammar and possibly content that I did not discover before finalizing the note.

## 2025-06-02 ENCOUNTER — PATIENT MESSAGE (OUTPATIENT)
Dept: MEDICAL GROUP | Facility: LAB | Age: 65
End: 2025-06-02
Payer: MEDICARE

## 2025-06-02 DIAGNOSIS — M79.605 BILATERAL LOWER EXTREMITY PAIN: Primary | ICD-10-CM

## 2025-06-02 DIAGNOSIS — M79.604 BILATERAL LOWER EXTREMITY PAIN: Primary | ICD-10-CM

## 2025-07-07 ENCOUNTER — NON-PROVIDER VISIT (OUTPATIENT)
Dept: ENDOCRINOLOGY | Facility: MEDICAL CENTER | Age: 65
End: 2025-07-07
Attending: INTERNAL MEDICINE
Payer: MEDICARE

## 2025-07-07 VITALS
SYSTOLIC BLOOD PRESSURE: 108 MMHG | DIASTOLIC BLOOD PRESSURE: 70 MMHG | HEIGHT: 69 IN | HEART RATE: 76 BPM | WEIGHT: 163 LBS | OXYGEN SATURATION: 98 % | BODY MASS INDEX: 24.14 KG/M2

## 2025-07-07 DIAGNOSIS — E78.5 DYSLIPIDEMIA: ICD-10-CM

## 2025-07-07 DIAGNOSIS — E11.9 TYPE 2 DIABETES MELLITUS WITHOUT COMPLICATION, WITH LONG-TERM CURRENT USE OF INSULIN (HCC): Primary | ICD-10-CM

## 2025-07-07 DIAGNOSIS — Z79.4 TYPE 2 DIABETES MELLITUS WITHOUT COMPLICATION, WITH LONG-TERM CURRENT USE OF INSULIN (HCC): Primary | ICD-10-CM

## 2025-07-07 PROCEDURE — 95250 CONT GLUC MNTR PHYS/QHP EQP: CPT | Mod: 27 | Performed by: INTERNAL MEDICINE

## 2025-07-07 PROCEDURE — 99213 OFFICE O/P EST LOW 20 MIN: CPT | Performed by: INTERNAL MEDICINE

## 2025-07-07 RX ORDER — EMPAGLIFLOZIN, METFORMIN HYDROCHLORIDE 12.5; 1 MG/1; MG/1
2 TABLET, EXTENDED RELEASE ORAL DAILY
Qty: 200 TABLET | Refills: 3 | Status: SHIPPED | OUTPATIENT
Start: 2025-07-07 | End: 2026-08-11

## 2025-07-07 RX ORDER — ATORVASTATIN CALCIUM 40 MG/1
40 TABLET, FILM COATED ORAL NIGHTLY
Qty: 100 TABLET | Refills: 3 | Status: SHIPPED | OUTPATIENT
Start: 2025-07-07 | End: 2026-08-11

## 2025-07-07 ASSESSMENT — FIBROSIS 4 INDEX: FIB4 SCORE: 1.06

## 2025-07-07 NOTE — PROGRESS NOTES
RN-CDE Note    Subjective:   Endocrinology Clinic Progress Note  PCP: NIRANJAN Butts    HPI:  Barron Hewitt is a 64 y.o. old patient who is seen today by the Diabetes Nurse Specialist for review of Type 2 Diabetes and Hypothyroidism.  Recent changes in health: He wants to get off some of his medications.  He has had prednisone and steroid injections making his blood sugars high.    DM:   Last A1c:   Lab Results   Component Value Date/Time    HBA1C 7.7 (A) 04/10/2025 10:04 AM      HBA1C today is  A1C GOAL: < 7    Diabetes Medications:   Metformin  mg BID  Jardiance 25 mg daily  Trulicity 1.5 mg weekly         Exercise: Active and walking  Diet: Fruit and coffee in the morning.  Lunch is small.  Dinner is protein, vegetables, and carbohydrates.   Exercise and nutrition counseling were performed at this visit.    Glucose monitoring frequency: See Lucy download    Hypoglycemic episodes: no     Last Retinal Exam: Scheduled with Dr. tSern  Daily Foot Exam: Yes   Foot Exam:  Monofilament: done  Monofilament testing with a 10 gram force: sensation intact: intact bilaterally  Visual Inspection: Feet without maceration, ulcers, fissures.  Pedal pulses: intact bilaterally   Lab Results   Component Value Date/Time    MALBCRT see below 04/01/2025 08:10 AM    MICROALBUR <1.2 04/01/2025 08:10 AM        ACR Albumin/Creatinine Ratio goal <30     HTN:   Blood pressure goal <130/<80   Currently Rx ACE/ARB:  no    Dyslipidemia:    Lab Results   Component Value Date/Time    CHOLSTRLTOT 91 (L) 04/01/2025 08:10 AM    LDL 48 04/01/2025 08:10 AM    HDL 32 (A) 04/01/2025 08:10 AM    TRIGLYCERIDE 56 04/01/2025 08:10 AM         Currently Rx Statin:  yes    He  reports that he quit smoking about 5 years ago. His smoking use included cigarettes. He has never used smokeless tobacco.    Plan:     Discussed and educated on:   - All medications, side effects and compliance (discussed carefully)  - Annual eye  examinations at Ophthalmology  - Home glucose monitoring emphasized  - Weight control and daily exercise    Recommended medication changes: He will change his Metformin and Jardiance to Synjardy 12.5-1000 mg BID.  He does not want to lose more weight so he will keep his Trulicity at 1.5 mg weekly.   He understands that if he gets another steroid injection then he can start back on his Glimepiride 2 mg daily.  He will follow up with Dr. Vizcarra in October.

## 2025-07-08 ENCOUNTER — PATIENT MESSAGE (OUTPATIENT)
Dept: MEDICAL GROUP | Facility: LAB | Age: 65
End: 2025-07-08
Payer: MEDICARE

## 2025-07-08 DIAGNOSIS — J30.2 SEASONAL ALLERGIES: ICD-10-CM

## 2025-07-08 RX ORDER — MONTELUKAST SODIUM 10 MG/1
10 TABLET ORAL EVERY EVENING
Qty: 90 TABLET | Refills: 3 | Status: SHIPPED | OUTPATIENT
Start: 2025-07-08

## 2025-08-04 ENCOUNTER — APPOINTMENT (OUTPATIENT)
Dept: RADIOLOGY | Facility: MEDICAL CENTER | Age: 65
End: 2025-08-04
Attending: NURSE PRACTITIONER
Payer: MEDICARE

## 2025-08-13 ENCOUNTER — HOSPITAL ENCOUNTER (OUTPATIENT)
Dept: RADIOLOGY | Facility: MEDICAL CENTER | Age: 65
End: 2025-08-13
Attending: NURSE PRACTITIONER
Payer: MEDICARE

## 2025-08-13 DIAGNOSIS — M79.605 BILATERAL LOWER EXTREMITY PAIN: ICD-10-CM

## 2025-08-13 DIAGNOSIS — M79.604 BILATERAL LOWER EXTREMITY PAIN: ICD-10-CM

## 2025-08-13 PROCEDURE — 93922 UPR/L XTREMITY ART 2 LEVELS: CPT

## 2025-08-14 ENCOUNTER — RESULTS FOLLOW-UP (OUTPATIENT)
Dept: MEDICAL GROUP | Facility: LAB | Age: 65
End: 2025-08-14
Payer: MEDICARE

## 2025-08-14 DIAGNOSIS — M25.551 BILATERAL HIP PAIN: Primary | ICD-10-CM

## 2025-08-14 DIAGNOSIS — M25.552 BILATERAL HIP PAIN: Primary | ICD-10-CM

## (undated) DEVICE — GOWN WARMING STANDARD FLEX - (30/CA)

## (undated) DEVICE — SUCTION INSTRUMENT YANKAUER BULBOUS TIP W/O VENT (50EA/CA)

## (undated) DEVICE — CLOSURE SKIN STRIP 1/2 X 4 IN - (STERI STRIP) (50/BX 4BX/CA)

## (undated) DEVICE — COVER LIGHT HANDLE ALC PLUS DISP (18EA/BX)

## (undated) DEVICE — SUTURE 3-0 VICRYL PLUS RB-1 - 8 X 18 INCH (12/BX)

## (undated) DEVICE — TOWEL STOP TIMEOUT SAFETY FLAG (40EA/CA)

## (undated) DEVICE — LACTATED RINGERS INJ 1000 ML - (14EA/CA 60CA/PF)

## (undated) DEVICE — PACK SINGLE BASIN - (6/CA)

## (undated) DEVICE — KIT SURGIFLO W/OUT THROMBIN - (6EA/CA)

## (undated) DEVICE — GLOVE BIOGEL ECLIPSE  PF LATEX SIZE 6.5 (50PR/BX)

## (undated) DEVICE — NEPTUNE 4 PORT MANIFOLD - (20/PK)

## (undated) DEVICE — MEDICINE CUP STERILE 2 OZ - (100/CA)

## (undated) DEVICE — CLIP SM INTNL HRZN TI ESCP LGT - (24EA/PK 25PK/BX)

## (undated) DEVICE — PROTECTOR ULNA NERVE - (36PR/CA)

## (undated) DEVICE — ELECTRODE DUAL RETURN W/ CORD - (50/PK)

## (undated) DEVICE — SUTURE 3-0 ETHILON FS-1 - (36/BX) 30 INCH

## (undated) DEVICE — DRAPE, TUR 24X24 GU7725

## (undated) DEVICE — SYSTEM CLEARIFY VISUALIZATION (10EA/PK)

## (undated) DEVICE — CHLORAPREP 26 ML APPLICATOR - ORANGE TINT(25/CA)

## (undated) DEVICE — SPONGE PEANUT - (5/PK 50PK/CA)

## (undated) DEVICE — CANISTER SUCTION 3000ML MECHANICAL FILTER AUTO SHUTOFF MEDI-VAC NONSTERILE LF DISP  (40EA/CA)

## (undated) DEVICE — INTRAOP NEURO IN OR 1:1 PER 15 MIN

## (undated) DEVICE — GOWN SURGICAL XX-LARGE - (28EA/CA) SIRUS NON REINFORCED

## (undated) DEVICE — TOWELS CLOTH SURGICAL - (4/PK 20PK/CA)

## (undated) DEVICE — ELECTRODE 850 FOAM ADHESIVE - HYDROGEL RADIOTRNSPRNT (50/PK)

## (undated) DEVICE — PENCIL ELECTSURG 10FT BTN SWH - (50/CA)

## (undated) DEVICE — Device

## (undated) DEVICE — FOAM FACEHOLDER SPIDER (8EA/BX)

## (undated) DEVICE — CANISTER SUCTION RIGID RED 1500CC (40EA/CA)

## (undated) DEVICE — CANNULA CRYSTAL 5.75MM X 7CM PART THD (5/BX)

## (undated) DEVICE — GLOVE 6.5 LF PF PROTEXIS (50PR/BX)

## (undated) DEVICE — MASK ANESTHESIA ADULT  - (100/CA)

## (undated) DEVICE — KIT ANESTHESIA W/CIRCUIT & 3/LT BAG W/FILTER (20EA/CA)

## (undated) DEVICE — GLOVE BIOGEL INDICATOR SZ 7SURGICAL PF LTX - (50/BX 4BX/CA)

## (undated) DEVICE — DRAPE COLUMN BOX OF 20

## (undated) DEVICE — ARTHROWAND TURBOVAC 3.5/90 SCT

## (undated) DEVICE — TUBE CONNECTING SUCTION - CLEAR PLASTIC STERILE 72 IN (50EA/CA)

## (undated) DEVICE — BLOCK

## (undated) DEVICE — SUTURE GENERAL

## (undated) DEVICE — GLOVE BIOGEL PI INDICATOR SZ 9.0 SURGICAL PF LF -(50PR/BX 4BX/CA)

## (undated) DEVICE — GLOVE BIOGEL INDICATOR SZ 8 SURGICAL PF LTX - (50/BX 4BX/CA)

## (undated) DEVICE — SODIUM CHL IRRIGATION 0.9% 1000ML (12EA/CA)

## (undated) DEVICE — CORD RESECTO DISP ACT DAC-1 FOR USA RESECTOSCOPE (12EA/BX)

## (undated) DEVICE — CLIP MED INTNL HRZN TI ESCP - (25/BX)

## (undated) DEVICE — DERMABOND ADVANCED - (12EA/BX)

## (undated) DEVICE — HEAD HOLDER JUNIOR/ADULT

## (undated) DEVICE — GLOVE BIOGEL INDICATOR SZ 6.5 SURGICAL PF LTX - (50PR/BX 4BX/CA)

## (undated) DEVICE — GLOVE BIOGEL PI INDICATOR SZ 7.0 SURGICAL PF LF - (50/BX 4BX/CA)

## (undated) DEVICE — SHAVER, 5.5 RESECTOR

## (undated) DEVICE — APPLICATOR ENDOSCOPIC SURGICEL (5EA/BX)

## (undated) DEVICE — IRRIGATOR SUCTION ENDOWRIST DISPOSABLE OD8 MM (6EA/BX)

## (undated) DEVICE — SENSOR SPO2 NEO LNCS ADHESIVE (20/BX) SEE USER NOTES

## (undated) DEVICE — TUBE E-T HI-LO CUFF 7.5MM (10EA/PK)

## (undated) DEVICE — TUBING C&T SET FLYING LEADS DRAIN TUBING (10EA/BX)

## (undated) DEVICE — BOVIE BLADE COATED &INSULATED - 25/PK

## (undated) DEVICE — NEEDLE INSFL 120MM 14GA VRRS - (20/BX)

## (undated) DEVICE — SHEET PEDIATRIC LAPAROTOMY - (10/CA)

## (undated) DEVICE — HUMID-VENT HEAT AND MOISTURE EXCHANGE- (50/BX)

## (undated) DEVICE — SYRINGE 30 ML LS (56/BX)

## (undated) DEVICE — TUBING CLEARLINK DUO-VENT - C-FLO (48EA/CA)

## (undated) DEVICE — SET EPIDURAL BURRON NON-SAFETY (12EA/CA)

## (undated) DEVICE — PACK SHOULDER ARTHROSCOPY SM - (2EA/CA)

## (undated) DEVICE — SUTURE 4-0 MONOCRYL PLUS PS-2 - 27 INCH (36/BX)

## (undated) DEVICE — BONE PRESS SPINAL EDITION HENSLER (10EA/CA)

## (undated) DEVICE — TUBE CONNECT SUCTION CLEAR 120 X 1/4" (50EA/CA)"

## (undated) DEVICE — COVER MAYO STAND X-LG - (22EA/CA)

## (undated) DEVICE — SET SUCTION/IRRIGATION WITH DISPOSABLE TIP (6/CA )PART #0250-070-520 IS A SUB

## (undated) DEVICE — SET EXTENSION WITH 2 PORTS (48EA/CA) ***PART #2C8610 IS A SUBSTITUTE*****

## (undated) DEVICE — DRESSING TRANSPARENT FILM TEGADERM 4 X 4.75" (50EA/BX)"

## (undated) DEVICE — DRAPE MICROSCOPE ARMATEC 120IN X 46IN (10EA/CA)

## (undated) DEVICE — CLIP HEMOLOCK PURPLE - (14/BX)

## (undated) DEVICE — SUTURE 0 VICRYL PLUS UR-6 - 27 INCH (36/BX)

## (undated) DEVICE — GLOVE SZ 6.5 BIOGEL PI MICRO - PF LF (50PR/BX)

## (undated) DEVICE — GOWN SURGEONS X-LARGE - DISP. (30/CA)

## (undated) DEVICE — SUTURE 2-0 VICRYL PLUS CT-1 - 8 X 18 INCH(12/BX)

## (undated) DEVICE — WATER IRRIGATION STERILE 1000ML (12EA/CA)

## (undated) DEVICE — SODIUM CHL. IRRIGATION 0.9% 3000ML (4EA/CA 65CA/PF)

## (undated) DEVICE — BAG RETRIEVAL 5MM (10EA/BX)

## (undated) DEVICE — SLEEVE VASO CALF MED - (10PR/CA)

## (undated) DEVICE — KIT EVACUATER 3 SPRING PVC LF 1/8 DRAIN SIZE (10EA/CA)"

## (undated) DEVICE — GLOVE BIOGEL SZ 7.5 SURGICAL PF LTX - (50PR/BX 4BX/CA)

## (undated) DEVICE — GLOVE BIOGEL SZ 9 SURGICAL PF LTX - (50/BX 4BX/CA)

## (undated) DEVICE — DRESSING POST OP BORDER 4 X 10 (5EA/BX)

## (undated) DEVICE — BAG SPONGE COUNT 10.25 X 32 - BLUE (250/CA)

## (undated) DEVICE — SET IRRIGATION CYSTOSCOPY Y-TYPE L81 IN (20EA/CA)

## (undated) DEVICE — DRAPETIBURON SHOULDER W/POUCH - (5EA/CA)

## (undated) DEVICE — SHAVER4.0 AGGRESSIVE + FORMLA (5EA/BX)

## (undated) DEVICE — OBTURATOR BLADELESS STANDARD 8MM (6EA/BX)

## (undated) DEVICE — GLOVE BIOGEL PI INDICATOR SZ 7.5 SURGICAL PF LF -(50/BX 4BX/CA)

## (undated) DEVICE — KIT ROOM DECONTAMINATION

## (undated) DEVICE — HEMOSTAT ABSORBABLE POWDER SURGICEL 3G (5EA/BX)

## (undated) DEVICE — GOWN SURGICAL X-LARGE ULTRA - FILM-REINFORCED (20/CA)

## (undated) DEVICE — TUBING PATIENT W/CONNECTOR REDEUCE (1EA)

## (undated) DEVICE — TUBING PUMP WITH CONNECTOR REDEUCE (1EA)

## (undated) DEVICE — GUIDE TRACHE TUBE INTUBATING STYLET 5.0-10.0MM 14FR (20EA/PK)

## (undated) DEVICE — ELECTRODE BIPOLAR SMALL CUTTING LOOP URO 24/26 FR (6EA/PK)

## (undated) DEVICE — EVACUATOR BLADDER ELLIK - (10/BX)

## (undated) DEVICE — DRAPE LAPAROTOMY T SHEET - (12EA/CA)

## (undated) DEVICE — SPONGE GAUZESTER 4 X 4 4PLY - (128PK/CA)

## (undated) DEVICE — DRAPE IOBAN II INCISE 23X17 - (10EA/BX 4BX/CA)

## (undated) DEVICE — GLOVE, LITE (PAIR)

## (undated) DEVICE — SUTURE 1 VICRYL PLUS CTX - 8 X 18 INCH (12/BX)

## (undated) DEVICE — NEEDLE SPINAL NON-SAFETY 18 GA X 3 IN (25EA/BX)

## (undated) DEVICE — SYRINGE 30 ML LL (56/BX)

## (undated) DEVICE — DRAPE SURG STERI-DRAPE 7X11OD - (40EA/CA)

## (undated) DEVICE — DRAPE ARM BOX OF 20

## (undated) DEVICE — GLOVE BIOGEL SZ 8.5 SURGICAL PF LTX - (50PR/BX 4BX/CA)

## (undated) DEVICE — GLOVE BIOGEL PI ORTHO SZ 8 PF LF (40PR/BX)

## (undated) DEVICE — TOOL MR8 14CM CYLINDER 5MM DIAMETER (1/EA)

## (undated) DEVICE — GLOVE BIOGEL INDICATOR SZ 7.5 SURGICAL PF LTX - (50PR/BX 4BX/CA)

## (undated) DEVICE — SPIDER SHOULDER HOLDER (12EA/BX)

## (undated) DEVICE — WAX BONE ALKYLENE OXIDE HEMOSTASIS OSTENE 3.5GM (10EA/CA)

## (undated) DEVICE — TOOL MR8 14CM MATCH HD SYM-TRI 3MM DIAMETER (1/EA)

## (undated) DEVICE — CONTAINER SPECIMEN BAG OR - STERILE 4 OZ W/LID (100EA/CA)

## (undated) DEVICE — JELLY SURGILUBE STERILE TUBE 4.25 OZ (1/EA)

## (undated) DEVICE — SET LEADWIRE 5 LEAD BEDSIDE DISPOSABLE ECG (1SET OF 5/EA)

## (undated) DEVICE — NEEDLE W/FACET TIP DULL VERSION W/STIMULATION CABLE SONOPLEX 21G X 4 (10/EA)"

## (undated) DEVICE — ARMREST CRADLE FOAM - (2PR/PK 12PR/CA)

## (undated) DEVICE — DRAIN FLAT SUCTION 10MMX20CM - LATEX FREE (10EA/CA)

## (undated) DEVICE — CLOSURE WOUND 1/4 X 4 (STERI - STRIP) (50/BX 4BX/CA)

## (undated) DEVICE — GLOVE BIOGEL SZ 7 SURGICAL PF LTX - (50PR/BX 4BX/CA)

## (undated) DEVICE — DRAPE LARGE 3 QUARTER - (20/CA)

## (undated) DEVICE — RESERVOIR SUCTION 100 CC - SILICONE (20EA/CA)